# Patient Record
Sex: FEMALE | Race: WHITE | Employment: OTHER | URBAN - METROPOLITAN AREA
[De-identification: names, ages, dates, MRNs, and addresses within clinical notes are randomized per-mention and may not be internally consistent; named-entity substitution may affect disease eponyms.]

---

## 2017-03-19 PROBLEM — N39.0 UTI (URINARY TRACT INFECTION): Status: ACTIVE | Noted: 2017-03-19

## 2017-05-01 ENCOUNTER — HOSPITAL ENCOUNTER (OUTPATIENT)
Dept: OTHER | Age: 57
Discharge: HOME | End: 2017-05-31
Attending: INTERNAL MEDICINE | Admitting: INTERNAL MEDICINE

## 2017-05-17 LAB
ALBUMIN SERPL-MCNC: 3.7 GM/DL (ref 3.4–5)
ALP BLD-CCNC: 76 IU/L (ref 40–128)
ALT SERPL-CCNC: 7 U/L (ref 10–40)
ANION GAP SERPL CALCULATED.3IONS-SCNC: 15 MMOL/L (ref 4–16)
AST SERPL-CCNC: 12 IU/L (ref 15–37)
BILIRUB SERPL-MCNC: 0.2 MG/DL (ref 0–1)
BUN BLDV-MCNC: 23 MG/DL (ref 6–23)
CALCIUM SERPL-MCNC: 9.1 MG/DL (ref 8.3–10.6)
CHLORIDE BLD-SCNC: 101 MMOL/L (ref 99–110)
CHOLESTEROL: 228 MG/DL
CO2: 25 MMOL/L (ref 21–32)
CREAT SERPL-MCNC: 0.6 MG/DL (ref 0.6–1.1)
ESTIMATED AVERAGE GLUCOSE: 123 MG/DL
GFR AFRICAN AMERICAN: >60 ML/MIN/1.73M2
GFR NON-AFRICAN AMERICAN: >60 ML/MIN/1.73M2
GLUCOSE BLD-MCNC: 95 MG/DL (ref 70–140)
HBA1C MFR BLD: 5.9 % (ref 4.2–6.3)
HDLC SERPL-MCNC: 28 MG/DL
LDL CHOLESTEROL DIRECT: 116 MG/DL
POTASSIUM SERPL-SCNC: 4 MMOL/L (ref 3.5–5.1)
SODIUM BLD-SCNC: 141 MMOL/L (ref 135–145)
T4 FREE: 0.86 NG/DL (ref 0.9–1.8)
TOTAL PROTEIN: 6.1 GM/DL (ref 6.4–8.2)
TRIGL SERPL-MCNC: 611 MG/DL
TSH HIGH SENSITIVITY: 2.52 UIU/ML (ref 0.27–4.2)

## 2017-06-01 ENCOUNTER — HOSPITAL ENCOUNTER (OUTPATIENT)
Dept: OTHER | Age: 57
Discharge: HOME | End: 2017-06-30
Attending: INTERNAL MEDICINE | Admitting: INTERNAL MEDICINE

## 2017-06-14 LAB
ALBUMIN SERPL-MCNC: 3.7 GM/DL (ref 3.4–5)
ALP BLD-CCNC: 85 IU/L (ref 40–128)
ALT SERPL-CCNC: 8 U/L (ref 10–40)
ANION GAP SERPL CALCULATED.3IONS-SCNC: 14 MMOL/L (ref 4–16)
AST SERPL-CCNC: 9 IU/L (ref 15–37)
BILIRUB SERPL-MCNC: 0.2 MG/DL (ref 0–1)
BUN BLDV-MCNC: 19 MG/DL (ref 6–23)
CALCIUM SERPL-MCNC: 9 MG/DL (ref 8.3–10.6)
CHLORIDE BLD-SCNC: 102 MMOL/L (ref 99–110)
CHOLESTEROL: 198 MG/DL
CO2: 27 MMOL/L (ref 21–32)
CREAT SERPL-MCNC: 0.6 MG/DL (ref 0.6–1.1)
ESTIMATED AVERAGE GLUCOSE: 123 MG/DL
GFR AFRICAN AMERICAN: >60 ML/MIN/1.73M2
GFR NON-AFRICAN AMERICAN: >60 ML/MIN/1.73M2
GLUCOSE BLD-MCNC: 95 MG/DL (ref 70–140)
HBA1C MFR BLD: 5.9 % (ref 4.2–6.3)
HDLC SERPL-MCNC: 32 MG/DL
LDL CHOLESTEROL DIRECT: 110 MG/DL
POTASSIUM SERPL-SCNC: 3.8 MMOL/L (ref 3.5–5.1)
SODIUM BLD-SCNC: 143 MMOL/L (ref 135–145)
T4 FREE: 1.15 NG/DL (ref 0.9–1.8)
TOTAL PROTEIN: 6 GM/DL (ref 6.4–8.2)
TRIGL SERPL-MCNC: 493 MG/DL
TSH HIGH SENSITIVITY: 1.7 UIU/ML (ref 0.27–4.2)

## 2017-07-01 ENCOUNTER — HOSPITAL ENCOUNTER (OUTPATIENT)
Dept: OTHER | Age: 57
Discharge: HOME | End: 2017-07-31
Attending: INTERNAL MEDICINE | Admitting: INTERNAL MEDICINE

## 2017-07-24 ENCOUNTER — OFFICE VISIT (OUTPATIENT)
Dept: FAMILY MEDICINE CLINIC | Age: 57
End: 2017-07-24

## 2017-07-24 VITALS — SYSTOLIC BLOOD PRESSURE: 121 MMHG | HEART RATE: 75 BPM | DIASTOLIC BLOOD PRESSURE: 80 MMHG | OXYGEN SATURATION: 93 %

## 2017-07-24 DIAGNOSIS — I10 HTN (HYPERTENSION), BENIGN: Primary | ICD-10-CM

## 2017-07-24 DIAGNOSIS — J44.9 CHRONIC OBSTRUCTIVE PULMONARY DISEASE, UNSPECIFIED COPD TYPE (HCC): ICD-10-CM

## 2017-07-24 DIAGNOSIS — G40.909 SEIZURE DISORDER (HCC): Chronic | ICD-10-CM

## 2017-07-24 DIAGNOSIS — G81.94 LEFT HEMIPLEGIA (HCC): ICD-10-CM

## 2017-07-24 DIAGNOSIS — E78.2 MIXED HYPERLIPIDEMIA: ICD-10-CM

## 2017-07-24 DIAGNOSIS — Z86.73 H/O: STROKE: ICD-10-CM

## 2017-07-24 DIAGNOSIS — E03.9 ACQUIRED HYPOTHYROIDISM: ICD-10-CM

## 2017-07-24 DIAGNOSIS — E11.9 CONTROLLED TYPE 2 DIABETES MELLITUS WITHOUT COMPLICATION, WITHOUT LONG-TERM CURRENT USE OF INSULIN (HCC): ICD-10-CM

## 2017-07-24 PROCEDURE — 99203 OFFICE O/P NEW LOW 30 MIN: CPT | Performed by: FAMILY MEDICINE

## 2017-07-24 RX ORDER — ATORVASTATIN CALCIUM 40 MG/1
40 TABLET, FILM COATED ORAL DAILY
Qty: 30 TABLET | Refills: 5 | Status: SHIPPED | OUTPATIENT
Start: 2017-07-24 | End: 2017-09-19 | Stop reason: DRUGHIGH

## 2017-08-06 ASSESSMENT — ENCOUNTER SYMPTOMS
STRIDOR: 0
DIARRHEA: 0
EYE REDNESS: 0
VOMITING: 0
BLOOD IN STOOL: 0
SINUS PRESSURE: 0
SHORTNESS OF BREATH: 0
EYE ITCHING: 0
COUGH: 0
WHEEZING: 0
NAUSEA: 0
RHINORRHEA: 0
CONSTIPATION: 0
SORE THROAT: 0
ABDOMINAL PAIN: 0
APNEA: 0
TROUBLE SWALLOWING: 0

## 2017-09-08 RX ORDER — FLUTICASONE PROPIONATE 50 MCG
1 SPRAY, SUSPENSION (ML) NASAL DAILY
Qty: 1 BOTTLE | Refills: 3 | Status: SHIPPED | OUTPATIENT
Start: 2017-09-08 | End: 2018-05-31 | Stop reason: SDUPTHER

## 2017-10-02 RX ORDER — GLUCOSAMINE HCL/CHONDROITIN SU 500-400 MG
CAPSULE ORAL
Qty: 100 STRIP | Refills: 3 | Status: SHIPPED | OUTPATIENT
Start: 2017-10-02 | End: 2018-03-14 | Stop reason: SDUPTHER

## 2017-10-24 ENCOUNTER — OFFICE VISIT (OUTPATIENT)
Dept: FAMILY MEDICINE CLINIC | Age: 57
End: 2017-10-24

## 2017-10-24 VITALS
TEMPERATURE: 98.1 F | SYSTOLIC BLOOD PRESSURE: 125 MMHG | DIASTOLIC BLOOD PRESSURE: 82 MMHG | OXYGEN SATURATION: 98 % | HEART RATE: 78 BPM

## 2017-10-24 DIAGNOSIS — G40.909 SEIZURE DISORDER (HCC): Chronic | ICD-10-CM

## 2017-10-24 DIAGNOSIS — R82.998 DARK URINE: ICD-10-CM

## 2017-10-24 DIAGNOSIS — I10 HTN (HYPERTENSION), BENIGN: ICD-10-CM

## 2017-10-24 DIAGNOSIS — G81.94 LEFT HEMIPLEGIA (HCC): ICD-10-CM

## 2017-10-24 DIAGNOSIS — Z86.73 H/O: STROKE: ICD-10-CM

## 2017-10-24 DIAGNOSIS — E11.9 CONTROLLED TYPE 2 DIABETES MELLITUS WITHOUT COMPLICATION, WITHOUT LONG-TERM CURRENT USE OF INSULIN (HCC): Primary | ICD-10-CM

## 2017-10-24 DIAGNOSIS — H61.23 BILATERAL IMPACTED CERUMEN: ICD-10-CM

## 2017-10-24 PROCEDURE — G8428 CUR MEDS NOT DOCUMENT: HCPCS | Performed by: FAMILY MEDICINE

## 2017-10-24 PROCEDURE — G8484 FLU IMMUNIZE NO ADMIN: HCPCS | Performed by: FAMILY MEDICINE

## 2017-10-24 PROCEDURE — 83036 HEMOGLOBIN GLYCOSYLATED A1C: CPT | Performed by: FAMILY MEDICINE

## 2017-10-24 PROCEDURE — 3044F HG A1C LEVEL LT 7.0%: CPT | Performed by: FAMILY MEDICINE

## 2017-10-24 PROCEDURE — 1111F DSCHRG MED/CURRENT MED MERGE: CPT | Performed by: FAMILY MEDICINE

## 2017-10-24 PROCEDURE — 3014F SCREEN MAMMO DOC REV: CPT | Performed by: FAMILY MEDICINE

## 2017-10-24 PROCEDURE — 99213 OFFICE O/P EST LOW 20 MIN: CPT | Performed by: FAMILY MEDICINE

## 2017-10-24 PROCEDURE — 1036F TOBACCO NON-USER: CPT | Performed by: FAMILY MEDICINE

## 2017-10-24 PROCEDURE — G8417 CALC BMI ABV UP PARAM F/U: HCPCS | Performed by: FAMILY MEDICINE

## 2017-10-24 PROCEDURE — 3017F COLORECTAL CA SCREEN DOC REV: CPT | Performed by: FAMILY MEDICINE

## 2017-10-24 PROCEDURE — 69209 REMOVE IMPACTED EAR WAX UNI: CPT | Performed by: FAMILY MEDICINE

## 2017-10-25 LAB — HBA1C MFR BLD: 5.4 %

## 2017-10-28 ASSESSMENT — ENCOUNTER SYMPTOMS
SINUS PRESSURE: 0
SHORTNESS OF BREATH: 0
SINUS PAIN: 0

## 2017-10-28 NOTE — PROGRESS NOTES
Take 1 capsule by mouth every 4 hours as needed for Headaches 30 capsule 0    cephALEXin (KEFLEX) 500 MG capsule Take 1 capsule by mouth 2 times daily for 7 days 14 capsule 0    butalbital-aspirin-caffeine (FIORINAL) -40 MG per capsule Take 1 capsule by mouth every 4 hours as needed for Headaches 30 capsule 0    Glucose Blood (BLOOD GLUCOSE TEST STRIPS) STRP Check blood sugars 3 times daily 100 strip 3    levETIRAcetam (KEPPRA) 750 MG tablet Take 2 tablets by mouth 2 times daily 120 tablet 0    metFORMIN (GLUCOPHAGE) 500 MG tablet Take 2 tablets by mouth daily (with breakfast) 30 tablet 0    lactulose (CHRONULAC) 10 GM/15ML solution Take 30 mLs by mouth 3 times daily . Adjust from once to three times a day so that patient has at least 1-2 bowel movements a day.  1 Bottle 0    atorvastatin (LIPITOR) 20 MG tablet Take 20 mg by mouth nightly      levocetirizine (XYZAL) 5 MG tablet Take 5 mg by mouth daily      fluticasone (FLONASE) 50 MCG/ACT nasal spray 1 spray by Nasal route daily 1 Bottle 3    docusate sodium (COLACE) 100 MG capsule Take 1 capsule by mouth 2 times daily 60 capsule 0    mirtazapine (REMERON) 30 MG tablet Take 30 mg by mouth nightly       divalproex (DEPAKOTE) 500 MG DR tablet Take 4 tablets by mouth nightly 90 tablet 0    lisinopril (PRINIVIL;ZESTRIL) 40 MG tablet Take 40 mg by mouth daily      promethazine-dextromethorphan (PROMETHAZINE-DM) 6.25-15 MG/5ML syrup Take 5 mLs by mouth 4 times daily as needed for Cough      ondansetron (ZOFRAN ODT) 4 MG disintegrating tablet Take 1 tablet by mouth every 6 hours 10 tablet 0    dicyclomine (BENTYL) 10 MG capsule Take 1 capsule by mouth 3 times daily As needed for abdominal pain 15 capsule 3    acetaminophen (AMINOFEN) 325 MG tablet Take 2 tablets by mouth every 6 hours as needed for Pain 120 tablet 3    amLODIPine (NORVASC) 5 MG tablet Take 5 mg by mouth daily      busPIRone (BUSPAR) 10 MG tablet Take 10 mg by mouth 2 times daily  diphenhydrAMINE (BENADRYL) 25 MG capsule Take 25 mg by mouth every 6 hours as needed for Itching      albuterol (PROVENTIL) (2.5 MG/3ML) 0.083% nebulizer solution Take 3 mLs by nebulization every 6 hours as needed for Wheezing. 120 each 1    carBAMazepine (TEGRETOL) 200 MG tablet Take 1 tablet by mouth 2 times daily. 90 tablet 0    furosemide (LASIX) 20 MG tablet Take 1 tablet by mouth daily. 60 tablet 0    sitaGLIPtin (JANUVIA) 100 MG tablet Take 1 tablet by mouth daily. 30 tablet 1    sertraline (ZOLOFT) 100 MG tablet Take 200 mg by mouth nightly       risperiDONE (RISPERDAL) 1 MG tablet Take 1 mg by mouth 3 times daily 1 tablet in am, 1 tablet at 4pm, 1 tablet at bedtime      Lactobacillus (ACIDOPHILUS) 100 MG CAPS Take 100 mg by mouth daily.  levothyroxine (SYNTHROID) 25 MCG tablet 1 tablet by PEG Tube route Daily. (Patient taking differently: Take 25 mcg by mouth daily ) 30 tablet 0     No current facility-administered medications for this visit. Review of Systems   Constitutional: Negative for activity change. HENT: Negative for ear pain, sinus pain and sinus pressure. Respiratory: Negative for shortness of breath. Cardiovascular: Negative for leg swelling. Genitourinary: Negative for dysuria and hematuria. Neurological: Negative for dizziness. Psychiatric/Behavioral: Negative for agitation. The patient is not nervous/anxious. /82 (Site: Right Arm, Position: Sitting, Cuff Size: Small Adult)   Pulse 78   Temp 98.1 °F (36.7 °C) (Oral)   SpO2 98%   Breastfeeding? No     Physical Exam   Constitutional: She is oriented to person, place, and time. She appears well-developed and well-nourished. HENT:   Head: Normocephalic and atraumatic. Right Ear: No decreased hearing is noted. Left Ear: No decreased hearing is noted. Full of wax both ears   Cardiovascular: Normal rate, regular rhythm and normal heart sounds. No murmur heard.   Pulmonary/Chest: Effort

## 2017-11-01 ENCOUNTER — OFFICE VISIT (OUTPATIENT)
Dept: FAMILY MEDICINE CLINIC | Age: 57
End: 2017-11-01

## 2017-11-01 ENCOUNTER — HOSPITAL ENCOUNTER (OUTPATIENT)
Dept: OTHER | Age: 57
Discharge: HOME | End: 2017-11-30
Admitting: INTERNAL MEDICINE

## 2017-11-01 VITALS
SYSTOLIC BLOOD PRESSURE: 118 MMHG | BODY MASS INDEX: 29.71 KG/M2 | HEIGHT: 56 IN | HEART RATE: 74 BPM | OXYGEN SATURATION: 96 % | DIASTOLIC BLOOD PRESSURE: 70 MMHG | WEIGHT: 132.06 LBS

## 2017-11-01 DIAGNOSIS — R51.9 ACUTE INTRACTABLE HEADACHE, UNSPECIFIED HEADACHE TYPE: Primary | ICD-10-CM

## 2017-11-01 LAB
ALBUMIN SERPL-MCNC: 3.8 GM/DL (ref 3.4–5)
ALP BLD-CCNC: 83 IU/L (ref 40–128)
ALT SERPL-CCNC: 11 U/L (ref 10–40)
ANION GAP SERPL CALCULATED.3IONS-SCNC: 17 MMOL/L (ref 4–16)
AST SERPL-CCNC: 12 IU/L (ref 15–37)
BILIRUB SERPL-MCNC: 0.2 MG/DL (ref 0–1)
BUN BLDV-MCNC: 15 MG/DL (ref 6–23)
CALCIUM SERPL-MCNC: 8.9 MG/DL (ref 8.3–10.6)
CHLORIDE BLD-SCNC: 91 MMOL/L (ref 99–110)
CO2: 26 MMOL/L (ref 21–32)
CREAT SERPL-MCNC: 0.6 MG/DL (ref 0.6–1.1)
ESTIMATED AVERAGE GLUCOSE: 108 MG/DL
GFR AFRICAN AMERICAN: >60 ML/MIN/1.73M2
GFR NON-AFRICAN AMERICAN: >60 ML/MIN/1.73M2
GLUCOSE BLD-MCNC: 95 MG/DL (ref 70–140)
HBA1C MFR BLD: 5.4 % (ref 4.2–6.3)
POTASSIUM SERPL-SCNC: 4 MMOL/L (ref 3.5–5.1)
SODIUM BLD-SCNC: 134 MMOL/L (ref 135–145)
TOTAL PROTEIN: 5.9 GM/DL (ref 6.4–8.2)
TSH HIGH SENSITIVITY: 2.74 UIU/ML (ref 0.27–4.2)

## 2017-11-01 PROCEDURE — 99213 OFFICE O/P EST LOW 20 MIN: CPT | Performed by: FAMILY MEDICINE

## 2017-11-01 PROCEDURE — G8417 CALC BMI ABV UP PARAM F/U: HCPCS | Performed by: FAMILY MEDICINE

## 2017-11-01 PROCEDURE — 3014F SCREEN MAMMO DOC REV: CPT | Performed by: FAMILY MEDICINE

## 2017-11-01 PROCEDURE — 96372 THER/PROPH/DIAG INJ SC/IM: CPT | Performed by: FAMILY MEDICINE

## 2017-11-01 PROCEDURE — 1036F TOBACCO NON-USER: CPT | Performed by: FAMILY MEDICINE

## 2017-11-01 PROCEDURE — G8484 FLU IMMUNIZE NO ADMIN: HCPCS | Performed by: FAMILY MEDICINE

## 2017-11-01 PROCEDURE — 3017F COLORECTAL CA SCREEN DOC REV: CPT | Performed by: FAMILY MEDICINE

## 2017-11-01 PROCEDURE — G8427 DOCREV CUR MEDS BY ELIG CLIN: HCPCS | Performed by: FAMILY MEDICINE

## 2017-11-01 RX ORDER — NAPROXEN 500 MG/1
500 TABLET ORAL 2 TIMES DAILY PRN
Qty: 30 TABLET | Refills: 0 | Status: SHIPPED | OUTPATIENT
Start: 2017-11-01 | End: 2018-03-09 | Stop reason: SDUPTHER

## 2017-11-01 RX ORDER — SUMATRIPTAN 50 MG/1
50 TABLET, FILM COATED ORAL
Qty: 9 TABLET | Refills: 3 | Status: SHIPPED | OUTPATIENT
Start: 2017-11-01 | End: 2018-05-31 | Stop reason: SDUPTHER

## 2017-11-01 ASSESSMENT — ENCOUNTER SYMPTOMS
SHORTNESS OF BREATH: 0
VOMITING: 1
BLURRED VISION: 0
COUGH: 0
NAUSEA: 1
ABDOMINAL PAIN: 0
PHOTOPHOBIA: 1

## 2017-11-01 ASSESSMENT — PATIENT HEALTH QUESTIONNAIRE - PHQ9
SUM OF ALL RESPONSES TO PHQ9 QUESTIONS 1 & 2: 0
SUM OF ALL RESPONSES TO PHQ QUESTIONS 1-9: 0
1. LITTLE INTEREST OR PLEASURE IN DOING THINGS: 0
2. FEELING DOWN, DEPRESSED OR HOPELESS: 0

## 2017-11-02 LAB — T4 TOTAL: 5.1 UG/DL

## 2017-11-02 NOTE — PROGRESS NOTES
tobacco: Never Used    Alcohol use No    Drug use: No    Sexual activity: Not on file     Other Topics Concern    Not on file     Social History Narrative    No narrative on file       Allergies   Allergen Reactions    Macrobid [Nitrofurantoin] Hives and Swelling     Hives     Current Outpatient Prescriptions   Medication Sig Dispense Refill    SUMAtriptan (IMITREX) 50 MG tablet Take 1 tablet by mouth once as needed for Migraine 9 tablet 3    naproxen (NAPROSYN) 500 MG tablet Take 1 tablet by mouth 2 times daily as needed for Pain 30 tablet 0    neomycin-polymyxin-hydrocortisone (CORTISPORIN) 3.5-10892-5 otic solution Place 3 drops in ear(s) 3 times daily for 10 days 1 each 0    butalbital-aspirin-caffeine (FIORINAL) -40 MG per capsule Take 1 capsule by mouth every 4 hours as needed for Headaches 30 capsule 0    cephALEXin (KEFLEX) 500 MG capsule Take 1 capsule by mouth 2 times daily for 7 days 14 capsule 0    butalbital-aspirin-caffeine (FIORINAL) -40 MG per capsule Take 1 capsule by mouth every 4 hours as needed for Headaches 30 capsule 0    Glucose Blood (BLOOD GLUCOSE TEST STRIPS) STRP Check blood sugars 3 times daily 100 strip 3    levETIRAcetam (KEPPRA) 750 MG tablet Take 2 tablets by mouth 2 times daily 120 tablet 0    metFORMIN (GLUCOPHAGE) 500 MG tablet Take 2 tablets by mouth daily (with breakfast) 30 tablet 0    lactulose (CHRONULAC) 10 GM/15ML solution Take 30 mLs by mouth 3 times daily . Adjust from once to three times a day so that patient has at least 1-2 bowel movements a day.  1 Bottle 0    atorvastatin (LIPITOR) 20 MG tablet Take 20 mg by mouth nightly      levocetirizine (XYZAL) 5 MG tablet Take 5 mg by mouth daily      fluticasone (FLONASE) 50 MCG/ACT nasal spray 1 spray by Nasal route daily 1 Bottle 3    docusate sodium (COLACE) 100 MG capsule Take 1 capsule by mouth 2 times daily 60 capsule 0    mirtazapine (REMERON) 30 MG tablet Take 30 mg by mouth nightly blurred vision. Respiratory: Negative for cough and shortness of breath. Cardiovascular: Negative for chest pain. Gastrointestinal: Positive for nausea and vomiting. Negative for abdominal pain. Musculoskeletal: Negative for neck pain. Neurological: Positive for headaches. Negative for dizziness. Psychiatric/Behavioral: Positive for agitation. The patient is nervous/anxious. /70   Pulse 74   Ht 4' 8\" (1.422 m)   Wt 132 lb 0.9 oz (59.9 kg)   SpO2 96%   BMI 29.61 kg/m²     Physical Exam   Constitutional: She is oriented to person, place, and time. She appears well-developed and well-nourished. HENT:   Head: Normocephalic and atraumatic. Right Ear: External ear normal.   Left Ear: There is drainage, swelling and tenderness. No foreign bodies. Tympanic membrane is injected and erythematous. Mouth/Throat: Oropharynx is clear and moist. No oropharyngeal exudate. Cardiovascular: Normal rate and normal heart sounds. No murmur heard. Pulmonary/Chest: Effort normal and breath sounds normal. She has no wheezes. Musculoskeletal: Normal range of motion. Neurological: She is alert and oriented to person, place, and time. Patient use the wheel chair       ASSESSMENT/ PLAN:    1. Acute intractable headache, unspecified headache type  - MRI brain with and without contrast; Future  - ketorolac (TORADOL) injection 60 mg; Inject 2 mLs into the muscle once  - SUMAtriptan (IMITREX) 50 MG tablet; Take 1 tablet by mouth once as needed for Migraine  Dispense: 9 tablet; Refill: 3  - naproxen (NAPROSYN) 500 MG tablet; Take 1 tablet by mouth 2 times daily as needed for Pain  Dispense: 30 tablet; Refill: 0                - Appropriate prescription are addressed. - After visit summery provided. - Questions answered and patient verbalizes understanding.  - Call for any problem, questions, or concerns       Return if symptoms worsen or fail to improve.

## 2017-11-14 ENCOUNTER — HOSPITAL ENCOUNTER (OUTPATIENT)
Dept: MRI IMAGING | Age: 57
Discharge: HOME | End: 2017-11-14
Attending: FAMILY MEDICINE | Admitting: FAMILY MEDICINE

## 2017-11-14 DIAGNOSIS — R51.9 ACUTE INTRACTABLE HEADACHE, UNSPECIFIED HEADACHE TYPE: ICD-10-CM

## 2017-12-01 ENCOUNTER — HOSPITAL ENCOUNTER (OUTPATIENT)
Dept: OTHER | Age: 57
Discharge: HOME | End: 2017-12-31
Attending: INTERNAL MEDICINE | Admitting: INTERNAL MEDICINE

## 2017-12-04 ENCOUNTER — TELEPHONE (OUTPATIENT)
Dept: FAMILY MEDICINE CLINIC | Age: 57
End: 2017-12-04

## 2017-12-04 NOTE — TELEPHONE ENCOUNTER
Pts care giver called requesting an appointment for the pt due to a potential UTI. An appointment was provided for the PT with the [facility]. Care Giver called back stating they couldn't get her a ride there at 10:15am. I then provided them with the number to call them back to reschedule.

## 2018-01-05 ENCOUNTER — OFFICE VISIT (OUTPATIENT)
Dept: FAMILY MEDICINE CLINIC | Age: 58
End: 2018-01-05

## 2018-01-05 VITALS — HEART RATE: 77 BPM | SYSTOLIC BLOOD PRESSURE: 118 MMHG | RESPIRATION RATE: 18 BRPM | DIASTOLIC BLOOD PRESSURE: 70 MMHG

## 2018-01-05 DIAGNOSIS — J40 BRONCHITIS: Primary | ICD-10-CM

## 2018-01-05 PROCEDURE — 99212 OFFICE O/P EST SF 10 MIN: CPT | Performed by: NURSE PRACTITIONER

## 2018-01-05 RX ORDER — BENZONATATE 100 MG/1
100 CAPSULE ORAL 3 TIMES DAILY PRN
Qty: 21 CAPSULE | Refills: 0 | Status: SHIPPED | OUTPATIENT
Start: 2018-01-05 | End: 2018-01-12

## 2018-01-05 RX ORDER — DOXYCYCLINE HYCLATE 100 MG
100 TABLET ORAL 2 TIMES DAILY
Qty: 20 TABLET | Refills: 0 | Status: SHIPPED | OUTPATIENT
Start: 2018-01-05 | End: 2018-01-15

## 2018-01-05 ASSESSMENT — ENCOUNTER SYMPTOMS
SORE THROAT: 1
COUGH: 1
NAUSEA: 1
VOMITING: 1
SINUS PAIN: 0
SHORTNESS OF BREATH: 0
WHEEZING: 0
RHINORRHEA: 1
CHEST TIGHTNESS: 0
SINUS PRESSURE: 0

## 2018-01-05 NOTE — PROGRESS NOTES
Dayanara Jackson   56 y.o.  female  [id number]      Chief Complaint   Patient presents with    Cough     Productive cough Yellowish in color         Subjective:  56 y. o.female is here for a follow up. She has the following chronic/acute medical problems:   Here to with a productive cough. Sputum is yellow in color. Cough   This is a new problem. The current episode started in the past 7 days (3 days). The problem has been gradually worsening. Episode frequency: intermittantly. The cough is productive of sputum (yellow in color). Associated symptoms include chills, nasal congestion, postnasal drip, rhinorrhea and a sore throat. Pertinent negatives include no chest pain, fever, headaches, myalgias, rash, shortness of breath or wheezing. She has tried OTC cough suppressant for the symptoms. The treatment provided mild relief. Her past medical history is significant for COPD. Review of Systems   Constitutional: Positive for chills and fatigue. Negative for appetite change (decreased) and fever. HENT: Positive for congestion, postnasal drip, rhinorrhea and sore throat. Negative for sinus pain and sinus pressure. Respiratory: Positive for cough. Negative for chest tightness, shortness of breath and wheezing. Cardiovascular: Negative for chest pain and palpitations. Gastrointestinal: Positive for nausea and vomiting (Wed night). Musculoskeletal: Negative for myalgias. Skin: Negative for rash. Neurological: Negative for headaches.        Current Outpatient Prescriptions   Medication Sig Dispense Refill    benzonatate (TESSALON PERLES) 100 MG capsule Take 1 capsule by mouth 3 times daily as needed for Cough 21 capsule 0    doxycycline hyclate (VIBRA-TABS) 100 MG tablet Take 1 tablet by mouth 2 times daily for 10 days 20 tablet 0    atorvastatin (LIPITOR) 40 MG tablet TAKE 1 TABLET BY MOUTH DAILY 30 tablet 5    ondansetron (ZOFRAN ODT) 4 MG disintegrating tablet Take 1 tablet by mouth every 8 mouth 2 times daily      diphenhydrAMINE (BENADRYL) 25 MG capsule Take 25 mg by mouth every 6 hours as needed for Itching      albuterol (PROVENTIL) (2.5 MG/3ML) 0.083% nebulizer solution Take 3 mLs by nebulization every 6 hours as needed for Wheezing. 120 each 1    carBAMazepine (TEGRETOL) 200 MG tablet Take 1 tablet by mouth 2 times daily. 90 tablet 0    furosemide (LASIX) 20 MG tablet Take 1 tablet by mouth daily. 60 tablet 0    sitaGLIPtin (JANUVIA) 100 MG tablet Take 1 tablet by mouth daily. 30 tablet 1    sertraline (ZOLOFT) 100 MG tablet Take 200 mg by mouth nightly       risperiDONE (RISPERDAL) 1 MG tablet Take 1 mg by mouth 3 times daily 1 tablet in am, 1 tablet at 4pm, 1 tablet at bedtime      Lactobacillus (ACIDOPHILUS) 100 MG CAPS Take 100 mg by mouth daily.  levothyroxine (SYNTHROID) 25 MCG tablet 1 tablet by PEG Tube route Daily. (Patient taking differently: Take 25 mcg by mouth daily ) 30 tablet 0    SUMAtriptan (IMITREX) 50 MG tablet Take 1 tablet by mouth once as needed for Migraine 9 tablet 3     No current facility-administered medications for this visit. Past medical, family,surgical history reviewed today. Objective:  /70 (Site: Right Arm, Position: Sitting, Cuff Size: Medium Adult)   Pulse 77   Resp 18   Breastfeeding? No   BP Readings from Last 3 Encounters:   01/05/18 118/70   12/04/17 (!) 157/83   11/01/17 118/70     Wt Readings from Last 3 Encounters:   12/04/17 132 lb (59.9 kg)   11/01/17 132 lb 0.9 oz (59.9 kg)   10/27/17 132 lb (59.9 kg)         Physical Exam   Constitutional: She is oriented to person, place, and time. She appears well-developed and well-nourished. HENT:   Head: Normocephalic. Right Ear: Hearing and tympanic membrane normal.   Left Ear: Hearing and tympanic membrane normal.   Nose: Rhinorrhea present. No mucosal edema. Mouth/Throat: Posterior oropharyngeal erythema present. Neck: Neck supple.    Cardiovascular: Normal rate,

## 2018-01-05 NOTE — PATIENT INSTRUCTIONS
good.  When should you call for help? Call 911 anytime you think you may need emergency care. For example, call if:  ? · You have severe trouble breathing. ?Call your doctor now or seek immediate medical care if:  ? · You have new or worse trouble breathing. ? · You cough up dark brown or bloody mucus (sputum). ? · You have a new or higher fever. ? · You have a new rash. ? Watch closely for changes in your health, and be sure to contact your doctor if:  ? · You cough more deeply or more often, especially if you notice more mucus or a change in the color of your mucus. ? · You are not getting better as expected. Where can you learn more? Go to [email or web address] and sign in to your [facility] account. Enter H333 in the [employer] box to learn more about \"Bronchitis: Care Instructions. \"     If you do not have an account, please click on the \"Sign Up Now\" link. Current as of: May 12, 2017  Content Version: 11.5  © 4840-0850 Healthwise, Inc[employer]. Care instructions adapted under license by [facility] ([facility]). If you have questions about a medical condition or this instruction, always ask your healthcare professional. [address] any warranty or liability for your use of this information. We are committed to providing you the best care possible. If you receive a survey after visiting one of our offices, please take time to share your experience concerning your physician office visit. These surveys are confidential and no health information about you is shared. We are eager to improve for you and we are counting on your feedback to help make that happen.

## 2018-03-09 DIAGNOSIS — R51.9 ACUTE INTRACTABLE HEADACHE, UNSPECIFIED HEADACHE TYPE: ICD-10-CM

## 2018-03-09 RX ORDER — NAPROXEN 500 MG/1
500 TABLET ORAL 2 TIMES DAILY PRN
Qty: 30 TABLET | Refills: 0 | Status: SHIPPED | OUTPATIENT
Start: 2018-03-09 | End: 2018-05-31 | Stop reason: SDUPTHER

## 2018-03-09 RX ORDER — CETIRIZINE HYDROCHLORIDE 10 MG/1
10 TABLET ORAL DAILY
Qty: 30 TABLET | Refills: 3 | Status: SHIPPED | OUTPATIENT
Start: 2018-03-09 | End: 2018-03-20 | Stop reason: SDUPTHER

## 2018-03-15 RX ORDER — GLUCOSAMINE HCL/CHONDROITIN SU 500-400 MG
CAPSULE ORAL
Qty: 100 STRIP | Refills: 3 | Status: SHIPPED | OUTPATIENT
Start: 2018-03-15 | End: 2018-05-31 | Stop reason: SDUPTHER

## 2018-03-20 RX ORDER — CETIRIZINE HYDROCHLORIDE 10 MG/1
10 TABLET ORAL DAILY
Qty: 30 TABLET | Refills: 3 | Status: SHIPPED | OUTPATIENT
Start: 2018-03-20 | End: 2018-05-31 | Stop reason: SDUPTHER

## 2018-04-12 ENCOUNTER — TELEPHONE (OUTPATIENT)
Dept: FAMILY MEDICINE CLINIC | Age: 58
End: 2018-04-12

## 2018-04-24 ENCOUNTER — OFFICE VISIT (OUTPATIENT)
Dept: FAMILY MEDICINE CLINIC | Age: 58
End: 2018-04-24

## 2018-04-24 VITALS — SYSTOLIC BLOOD PRESSURE: 124 MMHG | DIASTOLIC BLOOD PRESSURE: 82 MMHG | OXYGEN SATURATION: 95 % | HEART RATE: 74 BPM

## 2018-04-24 DIAGNOSIS — Z86.73 H/O: STROKE: ICD-10-CM

## 2018-04-24 DIAGNOSIS — G81.94 LEFT HEMIPLEGIA (HCC): ICD-10-CM

## 2018-04-24 DIAGNOSIS — E03.9 ACQUIRED HYPOTHYROIDISM: ICD-10-CM

## 2018-04-24 DIAGNOSIS — E78.2 MIXED HYPERLIPIDEMIA: ICD-10-CM

## 2018-04-24 DIAGNOSIS — I10 HTN (HYPERTENSION), BENIGN: Primary | ICD-10-CM

## 2018-04-24 DIAGNOSIS — G40.909 SEIZURE DISORDER (HCC): Chronic | ICD-10-CM

## 2018-04-24 PROCEDURE — 99214 OFFICE O/P EST MOD 30 MIN: CPT | Performed by: FAMILY MEDICINE

## 2018-04-24 RX ORDER — NAPROXEN 500 MG/1
500 TABLET ORAL 2 TIMES DAILY PRN
Qty: 30 TABLET | Refills: 0 | Status: CANCELLED | OUTPATIENT
Start: 2018-04-24

## 2018-04-24 RX ORDER — ATORVASTATIN CALCIUM 40 MG/1
40 TABLET, FILM COATED ORAL DAILY
Qty: 30 TABLET | Refills: 5 | Status: SHIPPED | OUTPATIENT
Start: 2018-04-24 | End: 2018-05-31 | Stop reason: SDUPTHER

## 2018-04-26 ASSESSMENT — ENCOUNTER SYMPTOMS
SHORTNESS OF BREATH: 0
STRIDOR: 0
COUGH: 0

## 2018-05-01 ENCOUNTER — HOSPITAL ENCOUNTER (OUTPATIENT)
Dept: OTHER | Age: 58
Discharge: HOME | End: 2018-05-31
Attending: INTERNAL MEDICINE | Admitting: INTERNAL MEDICINE

## 2018-05-16 LAB
ALBUMIN SERPL-MCNC: 3.7 GM/DL (ref 3.4–5)
ALP BLD-CCNC: 78 IU/L (ref 40–128)
ALT SERPL-CCNC: 12 U/L (ref 10–40)
ANION GAP SERPL CALCULATED.3IONS-SCNC: 16 MMOL/L (ref 4–16)
AST SERPL-CCNC: 18 IU/L (ref 15–37)
BILIRUB SERPL-MCNC: 0.2 MG/DL (ref 0–1)
BUN BLDV-MCNC: 13 MG/DL (ref 6–23)
CALCIUM SERPL-MCNC: 9.2 MG/DL (ref 8.3–10.6)
CHLORIDE BLD-SCNC: 98 MMOL/L (ref 99–110)
CO2: 27 MMOL/L (ref 21–32)
CREAT SERPL-MCNC: 0.4 MG/DL (ref 0.6–1.1)
ESTIMATED AVERAGE GLUCOSE: 140 MG/DL
GFR AFRICAN AMERICAN: >60 ML/MIN/1.73M2
GFR NON-AFRICAN AMERICAN: >60 ML/MIN/1.73M2
GLUCOSE BLD-MCNC: 134 MG/DL (ref 70–99)
HBA1C MFR BLD: 6.5 % (ref 4.2–6.3)
POTASSIUM SERPL-SCNC: 4.3 MMOL/L (ref 3.5–5.1)
SODIUM BLD-SCNC: 141 MMOL/L (ref 135–145)
TOTAL PROTEIN: 6.1 GM/DL (ref 6.4–8.2)
TSH HIGH SENSITIVITY: 2.82 UIU/ML (ref 0.27–4.2)

## 2018-05-17 LAB — T4 TOTAL: 4.37 UG/DL

## 2018-05-31 DIAGNOSIS — G43.909 MIGRAINE WITHOUT STATUS MIGRAINOSUS, NOT INTRACTABLE, UNSPECIFIED MIGRAINE TYPE: Primary | ICD-10-CM

## 2018-05-31 DIAGNOSIS — E78.2 MIXED HYPERLIPIDEMIA: ICD-10-CM

## 2018-05-31 DIAGNOSIS — J44.9 CHRONIC OBSTRUCTIVE PULMONARY DISEASE, UNSPECIFIED COPD TYPE (HCC): ICD-10-CM

## 2018-05-31 DIAGNOSIS — R51.9 ACUTE INTRACTABLE HEADACHE, UNSPECIFIED HEADACHE TYPE: ICD-10-CM

## 2018-05-31 RX ORDER — FLUTICASONE PROPIONATE 50 MCG
1 SPRAY, SUSPENSION (ML) NASAL DAILY
Qty: 1 BOTTLE | Refills: 3 | Status: SHIPPED | OUTPATIENT
Start: 2018-05-31 | End: 2018-09-03

## 2018-05-31 RX ORDER — DEXTROMETHORPHAN HYDROBROMIDE AND PROMETHAZINE HYDROCHLORIDE 15; 6.25 MG/5ML; MG/5ML
5 SYRUP ORAL 4 TIMES DAILY PRN
Qty: 1 BOTTLE | Refills: 5 | Status: SHIPPED | OUTPATIENT
Start: 2018-05-31 | End: 2018-06-30

## 2018-05-31 RX ORDER — RISPERIDONE 1 MG/1
1 TABLET, FILM COATED ORAL 3 TIMES DAILY
Qty: 60 TABLET | Refills: 5 | Status: ON HOLD | OUTPATIENT
Start: 2018-05-31 | End: 2022-11-02 | Stop reason: SDUPTHER

## 2018-05-31 RX ORDER — SUMATRIPTAN 50 MG/1
50 TABLET, FILM COATED ORAL
Qty: 9 TABLET | Refills: 5 | Status: ON HOLD | OUTPATIENT
Start: 2018-05-31 | End: 2018-12-18

## 2018-05-31 RX ORDER — GLUCOSAMINE HCL/CHONDROITIN SU 500-400 MG
CAPSULE ORAL
Qty: 100 STRIP | Refills: 5 | Status: ON HOLD | OUTPATIENT
Start: 2018-05-31 | End: 2018-12-26 | Stop reason: HOSPADM

## 2018-05-31 RX ORDER — DIVALPROEX SODIUM 500 MG/1
2000 TABLET, DELAYED RELEASE ORAL NIGHTLY
Qty: 90 TABLET | Refills: 0 | Status: ON HOLD | OUTPATIENT
Start: 2018-05-31 | End: 2022-11-02 | Stop reason: SDUPTHER

## 2018-05-31 RX ORDER — YOHIMBE BARK 500 MG
100 CAPSULE ORAL DAILY
Qty: 30 CAPSULE | Refills: 5 | Status: SHIPPED | OUTPATIENT
Start: 2018-05-31 | End: 2018-10-23

## 2018-05-31 RX ORDER — LEVETIRACETAM 750 MG/1
1500 TABLET ORAL 2 TIMES DAILY
Qty: 120 TABLET | Refills: 0 | Status: SHIPPED | OUTPATIENT
Start: 2018-05-31 | End: 2020-03-10

## 2018-05-31 RX ORDER — CARBAMAZEPINE 200 MG/1
200 TABLET ORAL 2 TIMES DAILY
Qty: 90 TABLET | Refills: 5 | Status: SHIPPED | OUTPATIENT
Start: 2018-05-31 | End: 2018-12-18 | Stop reason: CLARIF

## 2018-05-31 RX ORDER — LEVOTHYROXINE SODIUM 0.03 MG/1
25 TABLET ORAL DAILY
Qty: 90 TABLET | Refills: 5 | Status: ON HOLD | OUTPATIENT
Start: 2018-05-31 | End: 2018-12-18 | Stop reason: DRUGHIGH

## 2018-05-31 RX ORDER — ATORVASTATIN CALCIUM 40 MG/1
40 TABLET, FILM COATED ORAL DAILY
Qty: 30 TABLET | Refills: 5 | Status: SHIPPED | OUTPATIENT
Start: 2018-05-31 | End: 2018-09-03

## 2018-05-31 RX ORDER — DICYCLOMINE HYDROCHLORIDE 10 MG/1
10 CAPSULE ORAL 3 TIMES DAILY
Qty: 15 CAPSULE | Refills: 3 | Status: SHIPPED | OUTPATIENT
Start: 2018-05-31 | End: 2018-09-03

## 2018-05-31 RX ORDER — IPRATROPIUM BROMIDE AND ALBUTEROL SULFATE 2.5; .5 MG/3ML; MG/3ML
1 SOLUTION RESPIRATORY (INHALATION) PRN
Qty: 120 VIAL | Refills: 5 | Status: ON HOLD | OUTPATIENT
Start: 2018-05-31 | End: 2018-12-26

## 2018-05-31 RX ORDER — BUTALBITAL, ASPIRIN, AND CAFFEINE 50; 325; 40 MG/1; MG/1; MG/1
1 CAPSULE ORAL EVERY 4 HOURS PRN
Qty: 30 CAPSULE | Refills: 5 | OUTPATIENT
Start: 2018-05-31 | End: 2018-06-30

## 2018-05-31 RX ORDER — BUSPIRONE HYDROCHLORIDE 10 MG/1
10 TABLET ORAL 2 TIMES DAILY
Qty: 60 TABLET | Refills: 5 | Status: SHIPPED | OUTPATIENT
Start: 2018-05-31

## 2018-05-31 RX ORDER — ATORVASTATIN CALCIUM 20 MG/1
20 TABLET, FILM COATED ORAL NIGHTLY
Qty: 30 TABLET | Refills: 5 | Status: ON HOLD | OUTPATIENT
Start: 2018-05-31 | End: 2018-12-18 | Stop reason: DRUGHIGH

## 2018-05-31 RX ORDER — LEVOCETIRIZINE DIHYDROCHLORIDE 5 MG/1
5 TABLET, FILM COATED ORAL DAILY
Qty: 30 TABLET | Refills: 5 | Status: SHIPPED | OUTPATIENT
Start: 2018-05-31 | End: 2018-12-18 | Stop reason: CLARIF

## 2018-05-31 RX ORDER — ONDANSETRON 4 MG/1
4 TABLET, ORALLY DISINTEGRATING ORAL EVERY 8 HOURS PRN
Qty: 15 TABLET | Refills: 0 | Status: ON HOLD | OUTPATIENT
Start: 2018-05-31 | End: 2018-12-18

## 2018-05-31 RX ORDER — ACETAMINOPHEN 325 MG/1
650 TABLET ORAL EVERY 6 HOURS PRN
Qty: 120 TABLET | Refills: 5 | Status: SHIPPED | OUTPATIENT
Start: 2018-05-31 | End: 2018-09-03

## 2018-05-31 RX ORDER — BUTALBITAL, ASPIRIN, AND CAFFEINE 50; 325; 40 MG/1; MG/1; MG/1
1 CAPSULE ORAL EVERY 4 HOURS PRN
Qty: 30 CAPSULE | Refills: 5 | Status: ON HOLD | OUTPATIENT
Start: 2018-05-31 | End: 2018-12-18

## 2018-05-31 RX ORDER — MIRTAZAPINE 30 MG/1
30 TABLET, FILM COATED ORAL NIGHTLY
Qty: 30 TABLET | Refills: 5 | Status: ON HOLD | OUTPATIENT
Start: 2018-05-31 | End: 2018-12-20 | Stop reason: SDUPTHER

## 2018-05-31 RX ORDER — SERTRALINE HYDROCHLORIDE 100 MG/1
200 TABLET, FILM COATED ORAL NIGHTLY
Qty: 30 TABLET | Refills: 5 | Status: ON HOLD | OUTPATIENT
Start: 2018-05-31 | End: 2022-11-02 | Stop reason: SDUPTHER

## 2018-05-31 RX ORDER — CETIRIZINE HYDROCHLORIDE 10 MG/1
10 TABLET ORAL DAILY
Qty: 30 TABLET | Refills: 3 | Status: SHIPPED | OUTPATIENT
Start: 2018-05-31 | End: 2018-09-03

## 2018-05-31 RX ORDER — FUROSEMIDE 20 MG/1
20 TABLET ORAL DAILY
Qty: 60 TABLET | Refills: 5 | Status: SHIPPED | OUTPATIENT
Start: 2018-05-31 | End: 2019-02-25 | Stop reason: SDUPTHER

## 2018-05-31 RX ORDER — DOCUSATE SODIUM 100 MG/1
100 CAPSULE, LIQUID FILLED ORAL 2 TIMES DAILY
Qty: 60 CAPSULE | Refills: 0 | Status: SHIPPED | OUTPATIENT
Start: 2018-05-31 | End: 2018-09-03

## 2018-05-31 RX ORDER — NAPROXEN 500 MG/1
500 TABLET ORAL 2 TIMES DAILY PRN
Qty: 30 TABLET | Refills: 0 | Status: SHIPPED | OUTPATIENT
Start: 2018-05-31 | End: 2018-09-03

## 2018-05-31 RX ORDER — LISINOPRIL 40 MG/1
40 TABLET ORAL DAILY
Qty: 30 TABLET | Refills: 5 | Status: SHIPPED | OUTPATIENT
Start: 2018-05-31 | End: 2018-09-03

## 2018-05-31 RX ORDER — ALBUTEROL SULFATE 2.5 MG/3ML
2.5 SOLUTION RESPIRATORY (INHALATION) EVERY 6 HOURS PRN
Qty: 120 EACH | Refills: 5 | Status: SHIPPED | OUTPATIENT
Start: 2018-05-31 | End: 2018-12-18 | Stop reason: CLARIF

## 2018-05-31 RX ORDER — AMLODIPINE BESYLATE 5 MG/1
5 TABLET ORAL DAILY
Qty: 30 TABLET | Refills: 5 | Status: SHIPPED | OUTPATIENT
Start: 2018-05-31 | End: 2019-02-25 | Stop reason: SDUPTHER

## 2018-05-31 RX ORDER — LACTULOSE 10 G/15ML
20 SOLUTION ORAL 3 TIMES DAILY
Qty: 1 BOTTLE | Refills: 0 | Status: SHIPPED | OUTPATIENT
Start: 2018-05-31 | End: 2018-09-03

## 2018-05-31 RX ORDER — DIPHENHYDRAMINE HCL 25 MG
25 CAPSULE ORAL EVERY 6 HOURS PRN
Qty: 90 CAPSULE | Refills: 5 | Status: SHIPPED | OUTPATIENT
Start: 2018-05-31 | End: 2018-09-03

## 2018-06-01 ENCOUNTER — HOSPITAL ENCOUNTER (OUTPATIENT)
Dept: OTHER | Age: 58
Discharge: HOME | End: 2018-06-30
Attending: INTERNAL MEDICINE | Admitting: INTERNAL MEDICINE

## 2018-08-28 ENCOUNTER — OFFICE VISIT (OUTPATIENT)
Dept: FAMILY MEDICINE CLINIC | Age: 58
End: 2018-08-28

## 2018-08-28 VITALS — OXYGEN SATURATION: 92 % | HEART RATE: 75 BPM | SYSTOLIC BLOOD PRESSURE: 136 MMHG | DIASTOLIC BLOOD PRESSURE: 82 MMHG

## 2018-08-28 DIAGNOSIS — N30.01 ACUTE CYSTITIS WITH HEMATURIA: ICD-10-CM

## 2018-08-28 DIAGNOSIS — E11.9 TYPE 2 DIABETES MELLITUS WITHOUT COMPLICATION, WITHOUT LONG-TERM CURRENT USE OF INSULIN (HCC): ICD-10-CM

## 2018-08-28 DIAGNOSIS — R30.0 DYSURIA: Primary | ICD-10-CM

## 2018-08-28 LAB
BILIRUBIN, POC: ABNORMAL
BLOOD URINE, POC: ABNORMAL
CLARITY, POC: ABNORMAL
COLOR, POC: ABNORMAL
CREATININE URINE: 108 MG/DL (ref 28–259)
GLUCOSE URINE, POC: 100
KETONES, POC: 15
LEUKOCYTE EST, POC: ABNORMAL
MICROALBUMIN UR-MCNC: <1.2 MG/DL
MICROALBUMIN/CREAT UR-RTO: NORMAL MG/G (ref 0–30)
NITRITE, POC: ABNORMAL
PH, POC: 5
PROTEIN, POC: 302
SPECIFIC GRAVITY, POC: 1.02
UROBILINOGEN, POC: 2

## 2018-08-28 PROCEDURE — 3017F COLORECTAL CA SCREEN DOC REV: CPT | Performed by: NURSE PRACTITIONER

## 2018-08-28 PROCEDURE — 1036F TOBACCO NON-USER: CPT | Performed by: NURSE PRACTITIONER

## 2018-08-28 PROCEDURE — 81002 URINALYSIS NONAUTO W/O SCOPE: CPT | Performed by: NURSE PRACTITIONER

## 2018-08-28 PROCEDURE — G8427 DOCREV CUR MEDS BY ELIG CLIN: HCPCS | Performed by: NURSE PRACTITIONER

## 2018-08-28 PROCEDURE — G8417 CALC BMI ABV UP PARAM F/U: HCPCS | Performed by: NURSE PRACTITIONER

## 2018-08-28 PROCEDURE — 2022F DILAT RTA XM EVC RTNOPTHY: CPT | Performed by: NURSE PRACTITIONER

## 2018-08-28 PROCEDURE — 99213 OFFICE O/P EST LOW 20 MIN: CPT | Performed by: NURSE PRACTITIONER

## 2018-08-28 PROCEDURE — 3044F HG A1C LEVEL LT 7.0%: CPT | Performed by: NURSE PRACTITIONER

## 2018-08-28 RX ORDER — PHENAZOPYRIDINE HYDROCHLORIDE 200 MG/1
200 TABLET, FILM COATED ORAL 3 TIMES DAILY PRN
Qty: 6 TABLET | Refills: 0 | Status: SHIPPED | OUTPATIENT
Start: 2018-08-28 | End: 2018-08-30

## 2018-08-28 RX ORDER — CIPROFLOXACIN 500 MG/1
500 TABLET, FILM COATED ORAL 2 TIMES DAILY
Qty: 14 TABLET | Refills: 0 | Status: SHIPPED | OUTPATIENT
Start: 2018-08-28 | End: 2018-09-03

## 2018-08-28 ASSESSMENT — ENCOUNTER SYMPTOMS
NAUSEA: 0
SHORTNESS OF BREATH: 0
VOMITING: 0
BACK PAIN: 0
ABDOMINAL DISTENTION: 0

## 2018-08-28 NOTE — PROGRESS NOTES
SUBJECTIVE:  Lisa Warren   1960   female   Allergies   Allergen Reactions    Macrobid [Nitrofurantoin] Hives and Swelling     Hives       Chief Complaint   Patient presents with    Urinary Tract Infection     dysuria, odor while urinating       HPI   Wheelchair-bound patient brought in by caregiver today. Finished bactrim for UTI last week, and continues to complain of burning with urination & foul odor  No fever, no vomiting, no change in appetite  Tried azo otc over the past few days  She is sometimes incontinent of urine and stool chronically due to history of CVA and developmental delay    Past Medical History:   Diagnosis Date    Anxiety disorder     Back pain, chronic     Cerebral palsy (HCC)     COPD (chronic obstructive pulmonary disease) (HCC)     CVA (cerebral infarction) 2014 x2    Diverticulosis     [facility] [address]     GERD (gastroesophageal reflux disease)     Hyperlipidemia     Hypertension     Insomnia     Iron (Fe) deficiency anemia     Mental disability     Seizures (HCC)     Unspecified cerebral artery occlusion with cerebral infarction      Social History     Social History    Marital status: Single     Spouse name: N/A    Number of children: N/A    Years of education: N/A     Occupational History    Not on file. Social History Main Topics    Smoking status: Former Smoker     Packs/day: 1.50     Years: 20.00     Quit date: 8/28/2011    Smokeless tobacco: Never Used    Alcohol use No    Drug use: No    Sexual activity: Not on file     Other Topics Concern    Not on file     Social History Narrative    No narrative on file     No family history on file. Past Surgical History:   Procedure Laterality Date    GASTROSTOMY TUBE PLACEMENT         Review of Systems   Constitutional: Negative for fatigue and unexpected weight change. Respiratory: Negative for shortness of breath. Cardiovascular: Negative for chest pain.    Gastrointestinal: Negative for abdominal

## 2018-08-28 NOTE — PATIENT INSTRUCTIONS
We are committed to providing you the best care possible. If you receive a survey after visiting one of our offices, please take time to share your experience concerning your physician office visit. These surveys are confidential and no health information about you is shared. We are eager to improve for you and we are counting on your feedback to help make that happen. Your medication has been sent to the pharmacy    Discontinue the azo, and start the pyridium  Patient Education            Current as of: June 26, 2017  Content Version: 11.7  © 0250-3610 [employer]. Care instructions adapted under license by [facility] ([facility]). If you have questions about a medical condition or this instruction, always ask your healthcare professional. [address] any warranty or liability for your use of this information. Patient Education        Female Urinary Tract: Anatomy Sketch    Current as of: Rut 10, 2017  Content Version: 11.7  © 7832-9710 [employer]. Care instructions adapted under license by [facility] ([facility]). If you have questions about a medical condition or this instruction, always ask your healthcare professional. [address] any warranty or liability for your use of this information.

## 2018-08-30 ENCOUNTER — NURSE ONLY (OUTPATIENT)
Dept: FAMILY MEDICINE CLINIC | Age: 58
End: 2018-08-30

## 2018-08-30 DIAGNOSIS — R31.0 GROSS HEMATURIA: Primary | ICD-10-CM

## 2018-08-30 DIAGNOSIS — N30.01 ACUTE CYSTITIS WITH HEMATURIA: Primary | ICD-10-CM

## 2018-08-30 LAB
ORGANISM: ABNORMAL
URINE CULTURE, ROUTINE: ABNORMAL
URINE CULTURE, ROUTINE: ABNORMAL

## 2018-08-30 RX ORDER — CEFTRIAXONE 500 MG/1
500 INJECTION, POWDER, FOR SOLUTION INTRAMUSCULAR; INTRAVENOUS ONCE
Status: COMPLETED | OUTPATIENT
Start: 2018-08-30 | End: 2018-08-30

## 2018-08-30 RX ADMIN — CEFTRIAXONE 500 MG: 500 INJECTION, POWDER, FOR SOLUTION INTRAMUSCULAR; INTRAVENOUS at 10:48

## 2018-09-01 ENCOUNTER — HOSPITAL ENCOUNTER (OUTPATIENT)
Dept: OTHER | Age: 58
Discharge: HOME | End: 2018-09-01
Attending: INTERNAL MEDICINE | Admitting: INTERNAL MEDICINE

## 2018-09-04 ENCOUNTER — TELEPHONE (OUTPATIENT)
Dept: FAMILY MEDICINE CLINIC | Age: 58
End: 2018-09-04

## 2018-09-04 DIAGNOSIS — N39.0 URINARY TRACT INFECTION WITHOUT HEMATURIA, SITE UNSPECIFIED: Primary | ICD-10-CM

## 2018-09-04 RX ORDER — SULFAMETHOXAZOLE AND TRIMETHOPRIM 800; 160 MG/1; MG/1
1 TABLET ORAL 2 TIMES DAILY
Qty: 14 TABLET | Refills: 0 | Status: SHIPPED | OUTPATIENT
Start: 2018-09-04 | End: 2018-09-11

## 2018-09-06 LAB
ALBUMIN SERPL-MCNC: 3.9 GM/DL (ref 3.4–5)
ALP BLD-CCNC: 79 IU/L (ref 40–128)
ALT SERPL-CCNC: 13 U/L (ref 10–40)
ANION GAP SERPL CALCULATED.3IONS-SCNC: 14 MMOL/L (ref 4–16)
AST SERPL-CCNC: 13 IU/L (ref 15–37)
BILIRUB SERPL-MCNC: 0.2 MG/DL (ref 0–1)
BUN BLDV-MCNC: 13 MG/DL (ref 6–23)
CALCIUM SERPL-MCNC: 9.6 MG/DL (ref 8.3–10.6)
CHLORIDE BLD-SCNC: 98 MMOL/L (ref 99–110)
CHOLESTEROL: 156 MG/DL
CO2: 28 MMOL/L (ref 21–32)
CREAT SERPL-MCNC: 0.5 MG/DL (ref 0.6–1.1)
ESTIMATED AVERAGE GLUCOSE: 114 MG/DL
GFR AFRICAN AMERICAN: >60 ML/MIN/1.73M2
GFR NON-AFRICAN AMERICAN: >60 ML/MIN/1.73M2
GLUCOSE BLD-MCNC: 107 MG/DL (ref 70–99)
HBA1C MFR BLD: 5.6 % (ref 4.2–6.3)
HDLC SERPL-MCNC: 37 MG/DL
LDL CHOLESTEROL DIRECT: 80 MG/DL
POTASSIUM SERPL-SCNC: 4 MMOL/L (ref 3.5–5.1)
SODIUM BLD-SCNC: 140 MMOL/L (ref 135–145)
T4 FREE: 1.12 NG/DL (ref 0.9–1.8)
TOTAL PROTEIN: 6 GM/DL (ref 6.4–8.2)
TRIGL SERPL-MCNC: 328 MG/DL
TSH HIGH SENSITIVITY: 1.54 UIU/ML (ref 0.27–4.2)

## 2018-09-10 ENCOUNTER — TELEPHONE (OUTPATIENT)
Dept: FAMILY MEDICINE CLINIC | Age: 58
End: 2018-09-10

## 2018-09-18 ENCOUNTER — TELEPHONE (OUTPATIENT)
Dept: FAMILY MEDICINE CLINIC | Age: 58
End: 2018-09-18

## 2018-09-18 NOTE — TELEPHONE ENCOUNTER
Delgado Reynolds [phone] the patients home care nurse called and states that the patient is still complaining of burning with urination even after the bactrim and the injection she received at our office. Delgado Reynolds wondering where we go from here.  Please Advise

## 2018-09-26 ENCOUNTER — APPOINTMENT (OUTPATIENT)
Dept: CT IMAGING | Age: 58
End: 2018-09-26
Payer: MEDICAID

## 2018-09-26 ENCOUNTER — HOSPITAL ENCOUNTER (EMERGENCY)
Age: 58
Discharge: HOME | End: 2018-09-26
Attending: EMERGENCY MEDICINE
Payer: MEDICAID

## 2018-09-26 VITALS
TEMPERATURE: 97.6 F | RESPIRATION RATE: 18 BRPM | DIASTOLIC BLOOD PRESSURE: 87 MMHG | WEIGHT: 140 LBS | HEART RATE: 69 BPM | HEIGHT: 58 IN | BODY MASS INDEX: 29.39 KG/M2 | SYSTOLIC BLOOD PRESSURE: 160 MMHG | OXYGEN SATURATION: 95 %

## 2018-09-26 DIAGNOSIS — K59.00 CONSTIPATION, UNSPECIFIED CONSTIPATION TYPE: ICD-10-CM

## 2018-09-26 DIAGNOSIS — R30.0 DYSURIA: Primary | ICD-10-CM

## 2018-09-26 PROBLEM — N39.0 UTI (URINARY TRACT INFECTION): Status: RESOLVED | Noted: 2017-03-19 | Resolved: 2018-09-26

## 2018-09-26 LAB
ALBUMIN SERPL-MCNC: 3.8 GM/DL (ref 3.4–5)
ALP BLD-CCNC: 89 IU/L (ref 40–129)
ALT SERPL-CCNC: 9 U/L (ref 10–40)
ANION GAP SERPL CALCULATED.3IONS-SCNC: 15 MMOL/L (ref 4–16)
AST SERPL-CCNC: 10 IU/L (ref 15–37)
BACTERIA: NEGATIVE /HPF
BASOPHILS ABSOLUTE: 0 K/CU MM
BASOPHILS RELATIVE PERCENT: 0.5 % (ref 0–1)
BILIRUB SERPL-MCNC: 0.1 MG/DL (ref 0–1)
BILIRUBIN URINE: NEGATIVE MG/DL
BLOOD, URINE: NEGATIVE
BUN BLDV-MCNC: 7 MG/DL (ref 6–23)
CALCIUM SERPL-MCNC: 9 MG/DL (ref 8.3–10.6)
CHLORIDE BLD-SCNC: 93 MMOL/L (ref 99–110)
CLARITY: CLEAR
CO2: 25 MMOL/L (ref 21–32)
COLOR: ABNORMAL
CREAT SERPL-MCNC: 0.4 MG/DL (ref 0.6–1.1)
DIFFERENTIAL TYPE: ABNORMAL
EOSINOPHILS ABSOLUTE: 0.1 K/CU MM
EOSINOPHILS RELATIVE PERCENT: 1.6 % (ref 0–3)
GFR AFRICAN AMERICAN: >60 ML/MIN/1.73M2
GFR NON-AFRICAN AMERICAN: >60 ML/MIN/1.73M2
GLUCOSE BLD-MCNC: 127 MG/DL (ref 70–99)
GLUCOSE, URINE: NEGATIVE MG/DL
HCG QUALITATIVE: NEGATIVE
HCT VFR BLD CALC: 40.2 % (ref 37–47)
HEMOGLOBIN: 13.1 GM/DL (ref 12.5–16)
IMMATURE NEUTROPHIL %: 0.6 % (ref 0–0.43)
KETONES, URINE: NEGATIVE MG/DL
LEUKOCYTE ESTERASE, URINE: NEGATIVE
LYMPHOCYTES ABSOLUTE: 1.9 K/CU MM
LYMPHOCYTES RELATIVE PERCENT: 29.9 % (ref 24–44)
MCH RBC QN AUTO: 30.9 PG (ref 27–31)
MCHC RBC AUTO-ENTMCNC: 32.6 % (ref 32–36)
MCV RBC AUTO: 94.8 FL (ref 78–100)
MONOCYTES ABSOLUTE: 0.7 K/CU MM
MONOCYTES RELATIVE PERCENT: 11.6 % (ref 0–4)
NITRITE URINE, QUANTITATIVE: NEGATIVE
NUCLEATED RBC %: 0 %
PDW BLD-RTO: 11.9 % (ref 11.7–14.9)
PH, URINE: 8 (ref 5–8)
PLATELET # BLD: 194 K/CU MM (ref 140–440)
PMV BLD AUTO: 8.2 FL (ref 7.5–11.1)
POTASSIUM SERPL-SCNC: 3.4 MMOL/L (ref 3.5–5.1)
PROTEIN UA: NEGATIVE MG/DL
RBC # BLD: 4.24 M/CU MM (ref 4.2–5.4)
RBC URINE: ABNORMAL /HPF (ref 0–6)
SEGMENTED NEUTROPHILS ABSOLUTE COUNT: 3.6 K/CU MM
SEGMENTED NEUTROPHILS RELATIVE PERCENT: 55.8 % (ref 36–66)
SODIUM BLD-SCNC: 133 MMOL/L (ref 135–145)
SPECIFIC GRAVITY UA: 1.01 (ref 1–1.03)
SQUAMOUS EPITHELIAL: 1 /HPF
TOTAL IMMATURE NEUTOROPHIL: 0.04 K/CU MM
TOTAL NUCLEATED RBC: 0 K/CU MM
TOTAL PROTEIN: 6.9 GM/DL (ref 6.4–8.2)
TRICHOMONAS: ABNORMAL /HPF
UROBILINOGEN, URINE: NORMAL MG/DL (ref 0.2–1)
WBC # BLD: 6.4 K/CU MM (ref 4–10.5)
WBC UA: 1 /HPF (ref 0–5)

## 2018-09-26 PROCEDURE — 80053 COMPREHEN METABOLIC PANEL: CPT

## 2018-09-26 PROCEDURE — 84703 CHORIONIC GONADOTROPIN ASSAY: CPT

## 2018-09-26 PROCEDURE — 99284 EMERGENCY DEPT VISIT MOD MDM: CPT

## 2018-09-26 PROCEDURE — 81001 URINALYSIS AUTO W/SCOPE: CPT

## 2018-09-26 PROCEDURE — 6370000000 HC RX 637 (ALT 250 FOR IP): Performed by: EMERGENCY MEDICINE

## 2018-09-26 PROCEDURE — 36415 COLL VENOUS BLD VENIPUNCTURE: CPT

## 2018-09-26 PROCEDURE — 87086 URINE CULTURE/COLONY COUNT: CPT

## 2018-09-26 PROCEDURE — 74176 CT ABD & PELVIS W/O CONTRAST: CPT

## 2018-09-26 PROCEDURE — 85025 COMPLETE CBC W/AUTO DIFF WBC: CPT

## 2018-09-26 PROCEDURE — 4500000027

## 2018-09-26 RX ORDER — DOCUSATE SODIUM 100 MG/1
100 CAPSULE, LIQUID FILLED ORAL 3 TIMES DAILY PRN
Qty: 30 CAPSULE | Refills: 0 | Status: SHIPPED | OUTPATIENT
Start: 2018-09-26 | End: 2018-10-23 | Stop reason: DRUGHIGH

## 2018-09-26 RX ORDER — TRAMADOL HYDROCHLORIDE 50 MG/1
50 TABLET ORAL ONCE
Status: COMPLETED | OUTPATIENT
Start: 2018-09-26 | End: 2018-09-26

## 2018-09-26 RX ORDER — SENNOSIDES 8.6 MG
1 TABLET ORAL 2 TIMES DAILY
Qty: 30 TABLET | Refills: 0 | Status: ON HOLD | OUTPATIENT
Start: 2018-09-26 | End: 2018-12-20 | Stop reason: HOSPADM

## 2018-09-26 RX ORDER — SULFAMETHOXAZOLE AND TRIMETHOPRIM 800; 160 MG/1; MG/1
1 TABLET ORAL ONCE
Status: COMPLETED | OUTPATIENT
Start: 2018-09-26 | End: 2018-09-26

## 2018-09-26 RX ORDER — SULFAMETHOXAZOLE AND TRIMETHOPRIM 800; 160 MG/1; MG/1
1 TABLET ORAL 2 TIMES DAILY
Qty: 6 TABLET | Refills: 0 | Status: SHIPPED | OUTPATIENT
Start: 2018-09-26 | End: 2018-09-29

## 2018-09-26 RX ADMIN — SULFAMETHOXAZOLE AND TRIMETHOPRIM 1 TABLET: 800; 160 TABLET ORAL at 13:32

## 2018-09-26 RX ADMIN — TRAMADOL HYDROCHLORIDE 50 MG: 50 TABLET, FILM COATED ORAL at 13:32

## 2018-09-26 ASSESSMENT — PAIN DESCRIPTION - PAIN TYPE: TYPE: ACUTE PAIN

## 2018-09-26 ASSESSMENT — PAIN DESCRIPTION - ORIENTATION: ORIENTATION: RIGHT;LEFT

## 2018-09-26 ASSESSMENT — PAIN SCALES - GENERAL: PAINLEVEL_OUTOF10: 5

## 2018-09-26 ASSESSMENT — PAIN SCALES - WONG BAKER: WONGBAKER_NUMERICALRESPONSE: 4

## 2018-09-26 ASSESSMENT — PAIN DESCRIPTION - ONSET: ONSET: GRADUAL

## 2018-09-26 ASSESSMENT — PAIN DESCRIPTION - FREQUENCY: FREQUENCY: INTERMITTENT

## 2018-09-26 ASSESSMENT — PAIN DESCRIPTION - LOCATION: LOCATION: BACK;FLANK

## 2018-09-26 ASSESSMENT — PAIN DESCRIPTION - DESCRIPTORS: DESCRIPTORS: BURNING;ACHING

## 2018-09-26 NOTE — ED NOTES
Reviewed discharge instructions with patient and family. All voice understanding/deny questions. Assisted to wheelchair. Taken from floor with staff.        Gillian Souza RN  09/26/18 0914

## 2018-09-27 LAB
CULTURE: NORMAL
Lab: NORMAL
REPORT STATUS: NORMAL
SPECIMEN: NORMAL
TOTAL COLONY COUNT: NORMAL

## 2018-10-04 ENCOUNTER — TELEPHONE (OUTPATIENT)
Dept: FAMILY MEDICINE CLINIC | Age: 58
End: 2018-10-04

## 2018-10-22 RX ORDER — CETIRIZINE HYDROCHLORIDE 10 MG/1
10 TABLET ORAL DAILY
Qty: 31 TABLET | Refills: 3 | Status: SHIPPED | OUTPATIENT
Start: 2018-10-22 | End: 2019-02-25 | Stop reason: SDUPTHER

## 2018-10-23 ENCOUNTER — OFFICE VISIT (OUTPATIENT)
Dept: FAMILY MEDICINE CLINIC | Age: 58
End: 2018-10-23
Payer: MEDICAID

## 2018-10-23 VITALS — SYSTOLIC BLOOD PRESSURE: 128 MMHG | OXYGEN SATURATION: 91 % | HEART RATE: 75 BPM | DIASTOLIC BLOOD PRESSURE: 84 MMHG

## 2018-10-23 DIAGNOSIS — Z23 NEEDS FLU SHOT: ICD-10-CM

## 2018-10-23 DIAGNOSIS — Z00.00 ANNUAL PHYSICAL EXAM: Primary | ICD-10-CM

## 2018-10-23 DIAGNOSIS — E03.9 ACQUIRED HYPOTHYROIDISM: ICD-10-CM

## 2018-10-23 DIAGNOSIS — Z86.73 H/O: STROKE: ICD-10-CM

## 2018-10-23 DIAGNOSIS — E78.2 MIXED HYPERLIPIDEMIA: ICD-10-CM

## 2018-10-23 DIAGNOSIS — G81.94 LEFT HEMIPLEGIA (HCC): ICD-10-CM

## 2018-10-23 PROCEDURE — G0008 ADMIN INFLUENZA VIRUS VAC: HCPCS | Performed by: FAMILY MEDICINE

## 2018-10-23 PROCEDURE — G8482 FLU IMMUNIZE ORDER/ADMIN: HCPCS | Performed by: FAMILY MEDICINE

## 2018-10-23 PROCEDURE — 90686 IIV4 VACC NO PRSV 0.5 ML IM: CPT | Performed by: FAMILY MEDICINE

## 2018-10-23 PROCEDURE — 99396 PREV VISIT EST AGE 40-64: CPT | Performed by: FAMILY MEDICINE

## 2018-10-23 RX ORDER — DOCUSATE SODIUM 100 MG/1
100 CAPSULE, LIQUID FILLED ORAL 2 TIMES DAILY
Qty: 30 CAPSULE | Refills: 0 | Status: SHIPPED | OUTPATIENT
Start: 2018-10-23 | End: 2018-11-07

## 2018-10-23 ASSESSMENT — PATIENT HEALTH QUESTIONNAIRE - PHQ9
SUM OF ALL RESPONSES TO PHQ QUESTIONS 1-9: 0
SUM OF ALL RESPONSES TO PHQ QUESTIONS 1-9: 0
1. LITTLE INTEREST OR PLEASURE IN DOING THINGS: 0
2. FEELING DOWN, DEPRESSED OR HOPELESS: 0
SUM OF ALL RESPONSES TO PHQ9 QUESTIONS 1 & 2: 0

## 2018-10-23 NOTE — PROGRESS NOTES
Vaccine Information Sheet, \"Influenza - Inactivated\"  given to Magan Jim, or parent/legal guardian of  Magan Jim and verbalized understanding. Patient responses:    Have you ever had a reaction to a flu vaccine? No  Are you able to eat eggs without adverse effects? Yes  Do you have any current illness? No  Have you ever had Guillian [place] Syndrome? No    Flu vaccine given per order. Please see immunization tab.

## 2018-10-23 NOTE — PROGRESS NOTES
Martell Graciela  1960  10/28/18    Chief Complaint   Patient presents with    Annual Exam           Patient here with the social work, for annual physical, patient doing fine and no new concerns. Past Medical History:   Diagnosis Date    Anxiety disorder     Back pain, chronic     Cerebral palsy (HCC)     COPD (chronic obstructive pulmonary disease) (HCC)     CVA (cerebral infarction) 2014 x2    Diabetes mellitus ([facility] [address]     Diverticulosis     Epilepsy ([facility] [address]     GERD (gastroesophageal reflux disease)     Hyperlipidemia     Hypertension     Insomnia     Iron (Fe) deficiency anemia     Mental disability     Seizures (HCC)     Unspecified cerebral artery occlusion with cerebral infarction      Past Surgical History:   Procedure Laterality Date    GASTROSTOMY TUBE PLACEMENT       No family history on file. Social History     Social History    Marital status: Single     Spouse name: N/A    Number of children: N/A    Years of education: N/A     Occupational History    Not on file.      Social History Main Topics    Smoking status: Former Smoker     Packs/day: 1.50     Years: 20.00     Quit date: 8/28/2011    Smokeless tobacco: Never Used    Alcohol use No    Drug use: No    Sexual activity: Not on file     Other Topics Concern    Not on file     Social History Narrative    No narrative on file       Allergies   Allergen Reactions    Macrobid [Nitrofurantoin] Hives and Swelling     Hives     Current Outpatient Prescriptions   Medication Sig Dispense Refill    docusate sodium (COLACE) 100 MG capsule Take 1 capsule by mouth 2 times daily for 30 doses 30 capsule 0    albuterol (PROVENTIL) (2.5 MG/3ML) 0.083% nebulizer solution Take 3 mLs by nebulization every 6 hours as needed for Wheezing 120 each 5    cetirizine (ZYRTEC) 10 MG tablet TAKE 1 TABLET BY MOUTH DAILY 31 tablet 3    senna (SENOKOT) 8.6 MG TABS tablet Take 1 tablet by mouth 2 times daily 30 tablet 0    trimethoprim from Last 3 Encounters:   10/23/18 128/84   09/26/18 (!) 160/87   09/03/18 (!) 180/99       Wt Readings from Last 3 Encounters:   09/26/18 140 lb (63.5 kg)   09/03/18 140 lb (63.5 kg)   08/11/18 140 lb (63.5 kg)         Physical Exam   Constitutional: She appears well-developed and well-nourished. No distress. Use wheel chair   HENT:   Head: Normocephalic and atraumatic. Right Ear: External ear normal.   Left Ear: There is drainage, swelling and tenderness. No foreign bodies. Tympanic membrane is injected and erythematous. Mouth/Throat: Oropharynx is clear and moist. No oropharyngeal exudate. Eyes: Pupils are equal, round, and reactive to light. EOM are normal. No scleral icterus. Neck: Normal range of motion. Neck supple. Cardiovascular: Normal rate and normal heart sounds. No murmur heard. Pulmonary/Chest: Effort normal and breath sounds normal. She has no wheezes. Abdominal: Soft. She exhibits no mass. Musculoskeletal: Normal range of motion. Neurological: She is alert. Has left hemiplegic   Skin: She is not diaphoretic. ASSESSMENT/ PLAN:    1. Annual physical exam  - stable    2. H/O: stroke  - stable    3. Mixed hyperlipidemia  - stable    4. Acquired hypothyroidism  - stable    5. Left hemiplegia ([address]  - Ambulatory referral to Physical Therapy    6. Needs flu shot  - INFLUENZA, QUADV, 3 YRS AND OLDER, IM, PF, PREFILL SYR OR SDV, 0.5ML (FLUZONE QUADV, PF)  - tolerate the shot              - Appropriateprescription are addressed. - After visit summery provided. - Questions answered and patient verbalizes understanding.  - Call for any problem, questions, or concerns. Return in about 6 months (around 4/23/2019).

## 2018-10-28 ASSESSMENT — ENCOUNTER SYMPTOMS
DIARRHEA: 0
COUGH: 0
ABDOMINAL PAIN: 0
CONSTIPATION: 0
SHORTNESS OF BREATH: 0
SINUS PAIN: 0
SINUS PRESSURE: 0
NAUSEA: 0

## 2018-11-25 ENCOUNTER — HOSPITAL ENCOUNTER (EMERGENCY)
Age: 58
Discharge: HOME | End: 2018-11-25
Attending: EMERGENCY MEDICINE
Payer: MEDICARE

## 2018-11-25 VITALS
BODY MASS INDEX: 29.39 KG/M2 | TEMPERATURE: 98.4 F | SYSTOLIC BLOOD PRESSURE: 121 MMHG | WEIGHT: 140 LBS | RESPIRATION RATE: 15 BRPM | HEART RATE: 80 BPM | OXYGEN SATURATION: 92 % | DIASTOLIC BLOOD PRESSURE: 74 MMHG | HEIGHT: 58 IN

## 2018-11-25 DIAGNOSIS — R19.7 DIARRHEA, UNSPECIFIED TYPE: ICD-10-CM

## 2018-11-25 DIAGNOSIS — M79.10 MYALGIA: Primary | ICD-10-CM

## 2018-11-25 LAB
ANION GAP SERPL CALCULATED.3IONS-SCNC: 12 MMOL/L (ref 4–16)
BACTERIA: ABNORMAL /HPF
BASOPHILS ABSOLUTE: 0 K/CU MM
BASOPHILS RELATIVE PERCENT: 0.3 % (ref 0–1)
BILIRUBIN URINE: NEGATIVE MG/DL
BLOOD, URINE: NEGATIVE
BUN BLDV-MCNC: 10 MG/DL (ref 6–23)
CALCIUM SERPL-MCNC: 9.3 MG/DL (ref 8.3–10.6)
CHLORIDE BLD-SCNC: 97 MMOL/L (ref 99–110)
CLARITY: CLEAR
CO2: 29 MMOL/L (ref 21–32)
COLOR: YELLOW
CREAT SERPL-MCNC: 0.5 MG/DL (ref 0.6–1.1)
DIFFERENTIAL TYPE: ABNORMAL
EOSINOPHILS ABSOLUTE: 0 K/CU MM
EOSINOPHILS RELATIVE PERCENT: 0.6 % (ref 0–3)
GFR AFRICAN AMERICAN: >60 ML/MIN/1.73M2
GFR NON-AFRICAN AMERICAN: >60 ML/MIN/1.73M2
GLUCOSE BLD-MCNC: 122 MG/DL (ref 70–99)
GLUCOSE, URINE: NEGATIVE MG/DL
HCT VFR BLD CALC: 42 % (ref 37–47)
HEMOGLOBIN: 13.4 GM/DL (ref 12.5–16)
IMMATURE NEUTROPHIL %: 1 % (ref 0–0.43)
KETONES, URINE: ABNORMAL MG/DL
LEUKOCYTE ESTERASE, URINE: ABNORMAL
LYMPHOCYTES ABSOLUTE: 1.7 K/CU MM
LYMPHOCYTES RELATIVE PERCENT: 27.4 % (ref 24–44)
MCH RBC QN AUTO: 30.6 PG (ref 27–31)
MCHC RBC AUTO-ENTMCNC: 31.9 % (ref 32–36)
MCV RBC AUTO: 95.9 FL (ref 78–100)
MONOCYTES ABSOLUTE: 0.6 K/CU MM
MONOCYTES RELATIVE PERCENT: 9.5 % (ref 0–4)
NITRITE URINE, QUANTITATIVE: NEGATIVE
NUCLEATED RBC %: 0 %
PDW BLD-RTO: 11.8 % (ref 11.7–14.9)
PH, URINE: 6 (ref 5–8)
PLATELET # BLD: 274 K/CU MM (ref 140–440)
PMV BLD AUTO: 8.9 FL (ref 7.5–11.1)
POTASSIUM SERPL-SCNC: 4.2 MMOL/L (ref 3.5–5.1)
PROTEIN UA: NEGATIVE MG/DL
RBC # BLD: 4.38 M/CU MM (ref 4.2–5.4)
RBC URINE: 2 /HPF (ref 0–6)
SEGMENTED NEUTROPHILS ABSOLUTE COUNT: 3.8 K/CU MM
SEGMENTED NEUTROPHILS RELATIVE PERCENT: 61.2 % (ref 36–66)
SODIUM BLD-SCNC: 138 MMOL/L (ref 135–145)
SPECIFIC GRAVITY UA: 1.02 (ref 1–1.03)
TOTAL CK: 37 IU/L (ref 26–140)
TOTAL IMMATURE NEUTOROPHIL: 0.06 K/CU MM
TOTAL NUCLEATED RBC: 0 K/CU MM
TRICHOMONAS: ABNORMAL /HPF
UROBILINOGEN, URINE: NORMAL MG/DL (ref 0.2–1)
WBC # BLD: 6.2 K/CU MM (ref 4–10.5)
WBC UA: 2 /HPF (ref 0–5)

## 2018-11-25 PROCEDURE — 80048 BASIC METABOLIC PNL TOTAL CA: CPT

## 2018-11-25 PROCEDURE — 6370000000 HC RX 637 (ALT 250 FOR IP): Performed by: EMERGENCY MEDICINE

## 2018-11-25 PROCEDURE — 82550 ASSAY OF CK (CPK): CPT

## 2018-11-25 PROCEDURE — 99284 EMERGENCY DEPT VISIT MOD MDM: CPT

## 2018-11-25 PROCEDURE — 85025 COMPLETE CBC W/AUTO DIFF WBC: CPT

## 2018-11-25 PROCEDURE — 81001 URINALYSIS AUTO W/SCOPE: CPT

## 2018-11-25 RX ORDER — ACETAMINOPHEN 500 MG
1000 TABLET ORAL ONCE
Status: COMPLETED | OUTPATIENT
Start: 2018-11-25 | End: 2018-11-25

## 2018-11-25 RX ADMIN — ACETAMINOPHEN 1000 MG: 500 TABLET ORAL at 14:49

## 2018-11-25 ASSESSMENT — PAIN DESCRIPTION - DESCRIPTORS
DESCRIPTORS: PATIENT UNABLE TO DESCRIBE
DESCRIPTORS: PATIENT UNABLE TO DESCRIBE

## 2018-11-25 ASSESSMENT — PAIN DESCRIPTION - PAIN TYPE
TYPE: ACUTE PAIN
TYPE: ACUTE PAIN

## 2018-11-25 ASSESSMENT — PAIN SCALES - GENERAL
PAINLEVEL_OUTOF10: 6
PAINLEVEL_OUTOF10: 2

## 2018-11-25 ASSESSMENT — PAIN DESCRIPTION - LOCATION
LOCATION: GENERALIZED
LOCATION: GENERALIZED

## 2018-11-25 ASSESSMENT — PAIN SCALES - WONG BAKER: WONGBAKER_NUMERICALRESPONSE: 8

## 2018-11-25 NOTE — ED PROVIDER NOTES
MM    Lymphocytes # 1.7 K/CU MM    Monocytes # 0.6 K/CU MM    Eosinophils # 0.0 K/CU MM    Basophils # 0.0 K/CU MM    Nucleated RBC % 0.0 %    Total Nucleated RBC 0.0 K/CU MM    Total Immature Neutrophil 0.06 K/CU MM    Immature Neutrophil % 1.0 (H) 0 - 0.43 %   BMP   Result Value Ref Range    Sodium 138 135 - 145 MMOL/L    Potassium 4.2 3.5 - 5.1 MMOL/L    Chloride 97 (L) 99 - 110 mMol/L    CO2 29 21 - 32 MMOL/L    Anion Gap 12 4 - 16    BUN 10 6 - 23 MG/DL    CREATININE 0.5 (L) 0.6 - 1.1 MG/DL    Glucose 122 (H) 70 - 99 MG/DL    Calcium 9.3 8.3 - 10.6 MG/DL    GFR Non-African American >60 >60 mL/min/1.73m2    GFR African American >60 >60 mL/min/1.73m2   CK   Result Value Ref Range    Total CK 37 26 - 140 IU/L   Urinalysis (Lab)   Result Value Ref Range    Color, UA YELLOW UYELL    Clarity, UA CLEAR CLEAR    Glucose, Urine NEGATIVE NEG MG/DL    Bilirubin Urine NEGATIVE NEG MG/DL    Ketones, Urine SMALL (A) NEG MG/DL    Specific Gravity, UA 1.018 1.001 - 1.035    Blood, Urine NEGATIVE NEG    pH, Urine 6.0 5.0 - 8.0    Protein, UA NEGATIVE NEG MG/DL    Urobilinogen, Urine NORMAL 0.2 - 1.0 MG/DL    Nitrite Urine, Quantitative NEGATIVE NEG    Leukocyte Esterase, Urine TRACE (A) NEG    RBC, UA 2 0 - 6 /HPF    WBC, UA 2 0 - 5 /HPF    Bacteria, UA RARE (A) NEG /HPF    Trichomonas, UA NONE SEEN NOSEE /HPF      Radiographs (if obtained):  [] The following radiograph was interpreted by myself in the absence of a radiologist:  [] Radiologist's Report Reviewed:    EKG (if obtained): (All EKG's are interpreted by myself in the absence of a cardiologist)    MDM:  Plan of care is discussed thoroughly with the patient and family if present. If performed, all imaging and lab work also discussed with patient. All relevant prior results and chart reviewed if available. Patient well-appearing on exam at this time with normal vital signs.   She has a benign abdominal exam.  Caretaker is present and states that she appears to be at

## 2018-11-25 NOTE — ED NOTES
Discharge instructions given to pt and caregiver. Caregiver verbalized her understanding. Pt awaiting transport from [facility] at this time.      Katie Gant RN  11/25/18 4850

## 2018-11-25 NOTE — ED NOTES
Bed: ED-20  Expected date:   Expected time:   Means of arrival:   Comments:  spfld twp, body aches, MRDD     Gaby Lerma RN  11/25/18 5466

## 2018-11-28 ENCOUNTER — HOSPITAL ENCOUNTER (OUTPATIENT)
Dept: PHYSICAL THERAPY | Age: 58
Setting detail: THERAPIES SERIES
Discharge: HOME | End: 2018-11-28
Payer: MEDICARE

## 2018-11-28 PROCEDURE — 97162 PT EVAL MOD COMPLEX 30 MIN: CPT

## 2018-11-28 PROCEDURE — 97530 THERAPEUTIC ACTIVITIES: CPT

## 2018-11-28 PROCEDURE — G8979 MOBILITY GOAL STATUS: HCPCS

## 2018-11-28 PROCEDURE — G8978 MOBILITY CURRENT STATUS: HCPCS

## 2018-11-29 NOTE — PROGRESS NOTES
anti-tippers, wearing seat belt, caregiver present    PROM RLE (degrees)  RLE General PROM: neutral DF; heel cord tightness  AROM RLE (degrees)  RLE AROM: WFL  RLE General AROM: lacks approx 10* DF AROM (cognitive deficit/lack of understanding?)  PROM LLE (degrees)  LLE General PROM: limited hip IR d/t tight hip ER's; unable to measure d/t limited patient participation. Sits w/LLE in ER approx 30 deg. Heel cord restriction; lacking approx 5* to neutral DF  AROM LLE (degrees)  LLE General AROM: limited by hemiplegia/weakness (see MMT)  PROM RUE (degrees)  RUE PROM: WFL  AROM RUE (degrees)  RUE AROM : WFL  AROM LUE (degrees)  LUE General AROM: severe limitation d/t hemiplegia; non-functional  Spine  Cervical: cervical SBL approx 30 deg (structural changes from chronic posturing since CVA)  Thoracic: thoracic SBL, structural changes    Strength RLE  Comment: hip 3-/5, knee 3+/5, ankle 3-/5  Strength LLE  Comment: hip 2-/5, knee 2+/5, ankle trace to 1/5  Strength RUE  Comment: shoulder 3- to 3/5, elbow 3+/5  Strength LUE  Comment: no functional strength; trace to 2/5 at best   Motor Control  Gross Motor?: Exceptions  Comments: hypertonicity LUE w/intentional attempts, hypotonic tendency LLE      Sensation  Overall Sensation Status: WFL (grossly)     Transfers  Sit to Stand: Moderate Assistance  Stand to sit: Moderate Assistance  Bed to Chair: Maximum assistance  Stand Pivot Transfers: Maximum Assistance  Ambulation  Ambulation?: No (Pt unable; static stand x <= 5 sec Mod assist x 1 prior to fatigue.   Unable to weight shift or unweight LEs, posterior lean and supporting self against WC .)  Wheelchair Activities  Wheelchair Type: Standard  Wheelchair Cushion: None  Level of Assistance for pressure relief activities: Maximum assistance  Wheelchair Parts Management: No  Propulsion: Yes  Propulsion 1  Propulsion: Manual  Level: Carpet  Method: RLE;LLE;RUE  Level of Assistance: Supervision  Distance: 10 feet seizures, mental disability, cerebral artery occlusion, chronic back pain, anxiety disorder, HLP, GERD, diverticulosis  Exam: MMT, ROM, transfers, cognition  Clinical Presentation: Medium complexity  Patient Education: see daily note  Barriers to Learning: yes; cognitive deficits, anxiety  REQUIRES PT FOLLOW UP: Yes  Treatment Initiated : see daily note  Discharge Recommendations: 24 hour supervision or assist  Activity Tolerance  Activity Tolerance: Patient Tolerated treatment well  Activity Tolerance: can become agitated         Plan   Plan  Times per week: 2  Plan weeks: 8  Current Treatment Recommendations: Strengthening, ROM, Balance Training, Functional Mobility Training, Transfer Training, Gait Training, Endurance Training, Neuromuscular Re-education, Patient/Caregiver Education & Training, Safety Education & Training, Home Exercise Program  Safety Devices  Type of devices: Gait belt, Patient at risk for falls    G-Code  PT G-Codes  Functional Assessment Tool Used: Transfers  Score: Max Assist  Functional Limitation: Mobility: Walking and moving around  Mobility: Walking and Moving Around Current Status (): At least 80 percent but less than 100 percent impaired, limited or restricted  Mobility: Walking and Moving Around Goal Status (): At least 60 percent but less than 80 percent impaired, limited or restricted    OutComes Score                                           Goals  Long term goals  Time Frame for Long term goals : in 8 weeks, patient will  Long term goal 1: perform HEP w/caregiver assist.  Long term goal 2: perform sit><stand transfers w/Min to Mod Assist x 1 person, demonstrating safe technique. Long term goal 3: perform SPTs Mod assist x 1 person, demonstrating safe technique. Long term goal 4: ambulate >= 15 feet Mod assist x 1 plus WC follow.   Patient Goals   Patient goals : Walk short distances w/help; improve transfers       Therapy Time   Individual Concurrent Group Co-treatment

## 2018-11-29 NOTE — FLOWSHEET NOTE
Physical Therapy Daily Treatment Note  Date:  2018    Patient Name:  Shruti Meza    :  1960  MRN: [id number]  Other position/activity restrictions: fall risk, left hemiplegia   Diagnosis: Left hemiplegia (remote CVA )  Treatment Diagnosis: left hemiplegia, postural deficits, impaired ROM left hip/bilat heel cords, impaired mobility and function  Date of injury/Date of Surgery:   PT Insurance Information: [employer], Medicare, Secondary Medicaid  Referring Practitioner: Kady Carlos  Referring Practitioner Follow-Up:   Unknown  POC Signed: pending  POC Date Range:  18 - 19  Progress Note Due:  Every 10 visits  Visit# / total visits:                     G-Code Selection: (On Eval and every 10th visit or Discharge)    MEASURE    [x] Mobility: Walking and Moving Around     [x] Current ()   [x] Goal ()   [] DC ()  [] Changing/Maintaining Body Position     [] Current (5878)      [] Goal ()   [] DC ()  [] Carrying / Moving / Handling Objects     [] Current ()   [] Goal ()   [] DC ()  [] Self-Care     [] Current ()   [] Goal ()   [] DC ()  [] Other PT/OT primary DX     [] Current ()   [] Goal ()   [] DC ()    SEVERITY    CURRENT  GOAL  DISCHARGE   [] CH (0% Impaired, Indep.)  [] CI (1-19% Impaired, SBA-CGA)  [] CJ (20-39% Impaired, MIN A)  [] CK  (40-59% Impairment, Mod A)  [] CL  (60-79% Impairment, Max A)  [x] CM  (80-99% Impairment, Dep.)   [] CN  (100% Impairment, Tot Dep.) [] CH (0% Impaired, Indep.)  [] CI (1-19% Impaired, SBA-CGA)  [] CJ (20-39% Impaired, MIN A)  [] CK  (40-59% Impairment, Mod A)  [x] CL  (60-79% Impairment, Max A)  [] CM  (80-99% Impairment, Dep.)   [] CN  (100% Impairment, Tot Dep.)  [] CH (0% Impaired, Indep.)  [] CI (1-19% Impaired, SBA-CGA)  [] CJ (20-39% Impaired, MIN A)  [] CK  (40-59% Impairment, Mod A)  [] CL  (60-79% Impairment, Max A)  [] CM  (80-99% Impairment, Dep.)   [] CN  (100% Impairment, Tot Dep.)            Goals:     Long term goals  Time Frame for Long term goals : in 8 weeks, patient will  Long term goal 1: perform HEP w/caregiver assist.  Long term goal 2: perform sit><stand transfers w/Min to Mod Assist x 1 person, demonstrating safe technique. Long term goal 3: perform SPTs Mod assist x 1 person, demonstrating safe technique. Long term goal 4: ambulate >= 15 feet Mod assist x 1 plus WC follow. Patient goals : Walk short distances w/help; improve transfers    Summary of Eval:   Patient is a 66 y.o. female w/remote hx CVA x 2 in 2014.  Pt also has co-morbid DX, including but not limited to mental disability, cerebral palsy.  Today, patient was able to answer simple, non-complex questions such as name, birth date, city and street where she lives and why she came for this appointment. Makenna Colin, patient's caregiver provided more substantive information regarding PLOF/Current LOF, assist level required at home.  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  PLOF: Miraa Primrose to [facility] in 2014, patient was walking w/o AD and able to assist more w/personal care. Ge Pittman has had 24/7 care since being in [address] x approx 5 years. Gaetana Primrose to that, patient was institutionalized as [place].  Current LOF:  Patient's main means of mobility is via standard WC, which she can propel short distances.  She had been walking short distances (<= 10ft) w/2 person assist up to approx 1 mo ago, requires nearly total assist for ADLs and Mod to Total A for transfers depending on patient's ability or willingness to participate.  Pt is able to feed self/drink from a cup after set-up.  It is patient and caregiver(s) goal(s) to improve upon transfers and short distance gait.   Treatment Diagnosis: left hemiplegia,

## 2018-12-06 ENCOUNTER — HOSPITAL ENCOUNTER (OUTPATIENT)
Dept: PHYSICAL THERAPY | Age: 58
Discharge: HOME | End: 2018-12-06

## 2018-12-06 NOTE — FLOWSHEET NOTE
Physical Therapy  Cancellation/No-show Note  Patient Name:  Ariela Hughes  :  1960   Date:  2018  Cancelled visits to date: 1  No-shows to date: 0    For today's appointment patient:  [x]  Cancelled  []  Rescheduled appointment  []  No-show     Reason given by patient:  []  Patient ill  []  Conflicting appointment  [x]  No transportation    []  Conflict with work  []  No reason given  []  Other:     Comments:      Electronically signed by:  Gricelda Montoya PTA, 2018, 4:34 PM

## 2018-12-18 ENCOUNTER — HOSPITAL ENCOUNTER (INPATIENT)
Age: 58
LOS: 3 days | Discharge: HOME HEALTH | DRG: 871 | End: 2018-12-21
Attending: EMERGENCY MEDICINE | Admitting: INTERNAL MEDICINE
Payer: MEDICARE

## 2018-12-18 ENCOUNTER — APPOINTMENT (OUTPATIENT)
Dept: GENERAL RADIOLOGY | Age: 58
DRG: 871 | End: 2018-12-18
Payer: MEDICARE

## 2018-12-18 ENCOUNTER — APPOINTMENT (OUTPATIENT)
Dept: CT IMAGING | Age: 58
DRG: 871 | End: 2018-12-18
Payer: MEDICARE

## 2018-12-18 DIAGNOSIS — A41.9 SEPTICEMIA (HCC): ICD-10-CM

## 2018-12-18 DIAGNOSIS — F79 MENTAL RETARDATION: ICD-10-CM

## 2018-12-18 DIAGNOSIS — N39.0 URINARY TRACT INFECTION WITHOUT HEMATURIA, SITE UNSPECIFIED: ICD-10-CM

## 2018-12-18 DIAGNOSIS — R05.9 COUGH: ICD-10-CM

## 2018-12-18 DIAGNOSIS — R50.9 FEVER, UNSPECIFIED FEVER CAUSE: Primary | ICD-10-CM

## 2018-12-18 LAB
ALBUMIN SERPL-MCNC: 3.6 GM/DL (ref 3.4–5)
ALP BLD-CCNC: 86 IU/L (ref 40–129)
ALT SERPL-CCNC: 10 U/L (ref 10–40)
ANION GAP SERPL CALCULATED.3IONS-SCNC: 13 MMOL/L (ref 4–16)
AST SERPL-CCNC: 16 IU/L (ref 15–37)
BACTERIA: NEGATIVE /HPF
BASOPHILS ABSOLUTE: 0 K/CU MM
BASOPHILS RELATIVE PERCENT: 0.3 % (ref 0–1)
BILIRUB SERPL-MCNC: 0.1 MG/DL (ref 0–1)
BILIRUBIN URINE: NEGATIVE MG/DL
BLOOD, URINE: ABNORMAL
BUN BLDV-MCNC: 8 MG/DL (ref 6–23)
CALCIUM SERPL-MCNC: 8.9 MG/DL (ref 8.3–10.6)
CHLORIDE BLD-SCNC: 98 MMOL/L (ref 99–110)
CLARITY: CLEAR
CO2: 25 MMOL/L (ref 21–32)
COLOR: YELLOW
CREAT SERPL-MCNC: 0.5 MG/DL (ref 0.6–1.1)
DIFFERENTIAL TYPE: ABNORMAL
EOSINOPHILS ABSOLUTE: 0 K/CU MM
EOSINOPHILS RELATIVE PERCENT: 0.2 % (ref 0–3)
GFR AFRICAN AMERICAN: >60 ML/MIN/1.73M2
GFR NON-AFRICAN AMERICAN: >60 ML/MIN/1.73M2
GLUCOSE BLD-MCNC: 156 MG/DL (ref 70–99)
GLUCOSE BLD-MCNC: 177 MG/DL (ref 70–99)
GLUCOSE, URINE: NEGATIVE MG/DL
HCT VFR BLD CALC: 40 % (ref 37–47)
HEMOGLOBIN: 12.7 GM/DL (ref 12.5–16)
IMMATURE NEUTROPHIL %: 0.8 % (ref 0–0.43)
KETONES, URINE: ABNORMAL MG/DL
LACTATE: 1.7 MMOL/L (ref 0.4–2)
LEUKOCYTE ESTERASE, URINE: ABNORMAL
LIPASE: 20 IU/L (ref 13–60)
LYMPHOCYTES ABSOLUTE: 1 K/CU MM
LYMPHOCYTES RELATIVE PERCENT: 9.8 % (ref 24–44)
MCH RBC QN AUTO: 30.8 PG (ref 27–31)
MCHC RBC AUTO-ENTMCNC: 31.8 % (ref 32–36)
MCV RBC AUTO: 97.1 FL (ref 78–100)
MONOCYTES ABSOLUTE: 1 K/CU MM
MONOCYTES RELATIVE PERCENT: 10.1 % (ref 0–4)
MUCUS: ABNORMAL HPF
NITRITE URINE, QUANTITATIVE: NEGATIVE
NUCLEATED RBC %: 0 %
PDW BLD-RTO: 11.9 % (ref 11.7–14.9)
PH, URINE: 6 (ref 5–8)
PLATELET # BLD: 254 K/CU MM (ref 140–440)
PMV BLD AUTO: 8.3 FL (ref 7.5–11.1)
POTASSIUM SERPL-SCNC: 4.1 MMOL/L (ref 3.5–5.1)
PRO-BNP: 188.6 PG/ML
PROTEIN UA: NEGATIVE MG/DL
RAPID INFLUENZA  B AGN: NEGATIVE
RAPID INFLUENZA A AGN: NEGATIVE
RBC # BLD: 4.12 M/CU MM (ref 4.2–5.4)
RBC URINE: 5 /HPF (ref 0–6)
SEGMENTED NEUTROPHILS ABSOLUTE COUNT: 7.8 K/CU MM
SEGMENTED NEUTROPHILS RELATIVE PERCENT: 78.8 % (ref 36–66)
SODIUM BLD-SCNC: 136 MMOL/L (ref 135–145)
SPECIFIC GRAVITY UA: 1.02 (ref 1–1.03)
TOTAL CK: 47 IU/L (ref 26–140)
TOTAL IMMATURE NEUTOROPHIL: 0.08 K/CU MM
TOTAL NUCLEATED RBC: 0 K/CU MM
TOTAL PROTEIN: 6.9 GM/DL (ref 6.4–8.2)
TRICHOMONAS: ABNORMAL /HPF
TROPONIN T: <0.01 NG/ML
TSH HIGH SENSITIVITY: 0.95 UIU/ML (ref 0.27–4.2)
UROBILINOGEN, URINE: NORMAL MG/DL (ref 0.2–1)
WBC # BLD: 9.9 K/CU MM (ref 4–10.5)
WBC UA: 80 /HPF (ref 0–5)

## 2018-12-18 PROCEDURE — 83880 ASSAY OF NATRIURETIC PEPTIDE: CPT

## 2018-12-18 PROCEDURE — 93005 ELECTROCARDIOGRAM TRACING: CPT | Performed by: EMERGENCY MEDICINE

## 2018-12-18 PROCEDURE — 6370000000 HC RX 637 (ALT 250 FOR IP): Performed by: EMERGENCY MEDICINE

## 2018-12-18 PROCEDURE — 94664 DEMO&/EVAL PT USE INHALER: CPT

## 2018-12-18 PROCEDURE — 81001 URINALYSIS AUTO W/SCOPE: CPT

## 2018-12-18 PROCEDURE — G8996 SWALLOW CURRENT STATUS: HCPCS | Performed by: SPEECH-LANGUAGE PATHOLOGIST

## 2018-12-18 PROCEDURE — G8997 SWALLOW GOAL STATUS: HCPCS | Performed by: SPEECH-LANGUAGE PATHOLOGIST

## 2018-12-18 PROCEDURE — 36415 COLL VENOUS BLD VENIPUNCTURE: CPT

## 2018-12-18 PROCEDURE — 92610 EVALUATE SWALLOWING FUNCTION: CPT | Performed by: SPEECH-LANGUAGE PATHOLOGIST

## 2018-12-18 PROCEDURE — 6370000000 HC RX 637 (ALT 250 FOR IP): Performed by: INTERNAL MEDICINE

## 2018-12-18 PROCEDURE — 87804 INFLUENZA ASSAY W/OPTIC: CPT

## 2018-12-18 PROCEDURE — 4500000027

## 2018-12-18 PROCEDURE — 6360000002 HC RX W HCPCS: Performed by: EMERGENCY MEDICINE

## 2018-12-18 PROCEDURE — 80053 COMPREHEN METABOLIC PANEL: CPT

## 2018-12-18 PROCEDURE — 94640 AIRWAY INHALATION TREATMENT: CPT

## 2018-12-18 PROCEDURE — 87086 URINE CULTURE/COLONY COUNT: CPT

## 2018-12-18 PROCEDURE — 87077 CULTURE AEROBIC IDENTIFY: CPT

## 2018-12-18 PROCEDURE — 2580000003 HC RX 258: Performed by: EMERGENCY MEDICINE

## 2018-12-18 PROCEDURE — 87186 SC STD MICRODIL/AGAR DIL: CPT

## 2018-12-18 PROCEDURE — 83690 ASSAY OF LIPASE: CPT

## 2018-12-18 PROCEDURE — 99285 EMERGENCY DEPT VISIT HI MDM: CPT

## 2018-12-18 PROCEDURE — 74176 CT ABD & PELVIS W/O CONTRAST: CPT

## 2018-12-18 PROCEDURE — 71045 X-RAY EXAM CHEST 1 VIEW: CPT

## 2018-12-18 PROCEDURE — 70450 CT HEAD/BRAIN W/O DYE: CPT

## 2018-12-18 PROCEDURE — 96365 THER/PROPH/DIAG IV INF INIT: CPT

## 2018-12-18 PROCEDURE — 96367 TX/PROPH/DG ADDL SEQ IV INF: CPT

## 2018-12-18 PROCEDURE — 82550 ASSAY OF CK (CPK): CPT

## 2018-12-18 PROCEDURE — 84484 ASSAY OF TROPONIN QUANT: CPT

## 2018-12-18 PROCEDURE — 2580000003 HC RX 258: Performed by: INTERNAL MEDICINE

## 2018-12-18 PROCEDURE — 82962 GLUCOSE BLOOD TEST: CPT

## 2018-12-18 PROCEDURE — 96375 TX/PRO/DX INJ NEW DRUG ADDON: CPT

## 2018-12-18 PROCEDURE — 85025 COMPLETE CBC W/AUTO DIFF WBC: CPT

## 2018-12-18 PROCEDURE — 6360000002 HC RX W HCPCS: Performed by: INTERNAL MEDICINE

## 2018-12-18 PROCEDURE — 87040 BLOOD CULTURE FOR BACTERIA: CPT

## 2018-12-18 PROCEDURE — 1200000000 HC SEMI PRIVATE

## 2018-12-18 PROCEDURE — 2700000000 HC OXYGEN THERAPY PER DAY

## 2018-12-18 PROCEDURE — 94761 N-INVAS EAR/PLS OXIMETRY MLT: CPT

## 2018-12-18 PROCEDURE — 83605 ASSAY OF LACTIC ACID: CPT

## 2018-12-18 PROCEDURE — 84443 ASSAY THYROID STIM HORMONE: CPT

## 2018-12-18 RX ORDER — ATORVASTATIN CALCIUM 40 MG/1
40 TABLET, FILM COATED ORAL DAILY
COMMUNITY
End: 2019-06-28

## 2018-12-18 RX ORDER — CARBAMAZEPINE 300 MG/1
300 CAPSULE, EXTENDED RELEASE ORAL 2 TIMES DAILY
COMMUNITY
End: 2019-03-01 | Stop reason: SDUPTHER

## 2018-12-18 RX ORDER — SODIUM CHLORIDE 9 MG/ML
INJECTION, SOLUTION INTRAVENOUS CONTINUOUS
Status: DISCONTINUED | OUTPATIENT
Start: 2018-12-18 | End: 2018-12-20

## 2018-12-18 RX ORDER — LACTOBACILLUS RHAMNOSUS GG 10B CELL
1 CAPSULE ORAL DAILY
Status: DISCONTINUED | OUTPATIENT
Start: 2018-12-18 | End: 2018-12-21 | Stop reason: HOSPADM

## 2018-12-18 RX ORDER — ACETAMINOPHEN 500 MG
1000 TABLET ORAL ONCE
Status: COMPLETED | OUTPATIENT
Start: 2018-12-18 | End: 2018-12-18

## 2018-12-18 RX ORDER — 0.9 % SODIUM CHLORIDE 0.9 %
1000 INTRAVENOUS SOLUTION INTRAVENOUS ONCE
Status: COMPLETED | OUTPATIENT
Start: 2018-12-18 | End: 2018-12-18

## 2018-12-18 RX ORDER — FLUTICASONE PROPIONATE 50 MCG
1 SPRAY, SUSPENSION (ML) NASAL DAILY
COMMUNITY
End: 2020-01-03 | Stop reason: SDUPTHER

## 2018-12-18 RX ORDER — ACETAMINOPHEN 650 MG/1
650 SUPPOSITORY RECTAL ONCE
Status: DISCONTINUED | OUTPATIENT
Start: 2018-12-18 | End: 2018-12-18

## 2018-12-18 RX ORDER — DEXTROSE MONOHYDRATE 50 MG/ML
100 INJECTION, SOLUTION INTRAVENOUS PRN
Status: DISCONTINUED | OUTPATIENT
Start: 2018-12-18 | End: 2018-12-21 | Stop reason: HOSPADM

## 2018-12-18 RX ORDER — NICOTINE POLACRILEX 4 MG
15 LOZENGE BUCCAL PRN
Status: DISCONTINUED | OUTPATIENT
Start: 2018-12-18 | End: 2018-12-21 | Stop reason: HOSPADM

## 2018-12-18 RX ORDER — CETIRIZINE HYDROCHLORIDE 10 MG/1
10 TABLET ORAL DAILY
Status: DISCONTINUED | OUTPATIENT
Start: 2018-12-18 | End: 2018-12-21 | Stop reason: HOSPADM

## 2018-12-18 RX ORDER — SODIUM CHLORIDE 0.9 % (FLUSH) 0.9 %
10 SYRINGE (ML) INJECTION EVERY 12 HOURS SCHEDULED
Status: DISCONTINUED | OUTPATIENT
Start: 2018-12-18 | End: 2018-12-21 | Stop reason: HOSPADM

## 2018-12-18 RX ORDER — LACTULOSE 10 G/15ML
20 SOLUTION ORAL 3 TIMES DAILY PRN
Status: ON HOLD | COMMUNITY
End: 2021-09-25 | Stop reason: HOSPADM

## 2018-12-18 RX ORDER — LEVETIRACETAM 500 MG/1
1500 TABLET ORAL 2 TIMES DAILY
Status: DISCONTINUED | OUTPATIENT
Start: 2018-12-18 | End: 2018-12-21 | Stop reason: HOSPADM

## 2018-12-18 RX ORDER — BUSPIRONE HYDROCHLORIDE 10 MG/1
10 TABLET ORAL 2 TIMES DAILY
Status: DISCONTINUED | OUTPATIENT
Start: 2018-12-18 | End: 2018-12-21 | Stop reason: HOSPADM

## 2018-12-18 RX ORDER — FLUTICASONE PROPIONATE 50 MCG
1 SPRAY, SUSPENSION (ML) NASAL NIGHTLY
Status: DISCONTINUED | OUTPATIENT
Start: 2018-12-18 | End: 2018-12-21 | Stop reason: HOSPADM

## 2018-12-18 RX ORDER — IPRATROPIUM BROMIDE AND ALBUTEROL SULFATE 2.5; .5 MG/3ML; MG/3ML
1 SOLUTION RESPIRATORY (INHALATION)
Status: DISCONTINUED | OUTPATIENT
Start: 2018-12-18 | End: 2018-12-21 | Stop reason: HOSPADM

## 2018-12-18 RX ORDER — DIVALPROEX SODIUM 500 MG/1
2000 TABLET, DELAYED RELEASE ORAL NIGHTLY
Status: DISCONTINUED | OUTPATIENT
Start: 2018-12-18 | End: 2018-12-21 | Stop reason: HOSPADM

## 2018-12-18 RX ORDER — CLOPIDOGREL 300 MG/1
75 TABLET, FILM COATED ORAL DAILY
Status: ON HOLD | COMMUNITY
End: 2018-12-18

## 2018-12-18 RX ORDER — RISPERIDONE 0.5 MG/1
1 TABLET, FILM COATED ORAL 3 TIMES DAILY
Status: DISCONTINUED | OUTPATIENT
Start: 2018-12-18 | End: 2018-12-21 | Stop reason: HOSPADM

## 2018-12-18 RX ORDER — AMLODIPINE BESYLATE 5 MG/1
5 TABLET ORAL DAILY
Status: DISCONTINUED | OUTPATIENT
Start: 2018-12-18 | End: 2018-12-21 | Stop reason: HOSPADM

## 2018-12-18 RX ORDER — LEVOFLOXACIN 5 MG/ML
500 INJECTION, SOLUTION INTRAVENOUS EVERY 24 HOURS
Status: DISCONTINUED | OUTPATIENT
Start: 2018-12-18 | End: 2018-12-21 | Stop reason: HOSPADM

## 2018-12-18 RX ORDER — CARBAMAZEPINE 300 MG/1
300 CAPSULE, EXTENDED RELEASE ORAL 2 TIMES DAILY
Status: DISCONTINUED | OUTPATIENT
Start: 2018-12-18 | End: 2018-12-21 | Stop reason: HOSPADM

## 2018-12-18 RX ORDER — LEVOTHYROXINE SODIUM 0.05 MG/1
50 TABLET ORAL DAILY
Status: DISCONTINUED | OUTPATIENT
Start: 2018-12-19 | End: 2018-12-21 | Stop reason: HOSPADM

## 2018-12-18 RX ORDER — ONDANSETRON 2 MG/ML
4 INJECTION INTRAMUSCULAR; INTRAVENOUS ONCE
Status: COMPLETED | OUTPATIENT
Start: 2018-12-18 | End: 2018-12-18

## 2018-12-18 RX ORDER — IPRATROPIUM BROMIDE AND ALBUTEROL SULFATE 2.5; .5 MG/3ML; MG/3ML
1 SOLUTION RESPIRATORY (INHALATION) ONCE
Status: COMPLETED | OUTPATIENT
Start: 2018-12-18 | End: 2018-12-18

## 2018-12-18 RX ORDER — LISINOPRIL 40 MG/1
40 TABLET ORAL DAILY
Status: DISCONTINUED | OUTPATIENT
Start: 2018-12-18 | End: 2018-12-21 | Stop reason: HOSPADM

## 2018-12-18 RX ORDER — LEVOTHYROXINE SODIUM 0.05 MG/1
50 TABLET ORAL DAILY
Status: ON HOLD | COMMUNITY
End: 2021-10-17 | Stop reason: HOSPADM

## 2018-12-18 RX ORDER — SODIUM CHLORIDE 0.9 % (FLUSH) 0.9 %
10 SYRINGE (ML) INJECTION PRN
Status: DISCONTINUED | OUTPATIENT
Start: 2018-12-18 | End: 2018-12-21 | Stop reason: HOSPADM

## 2018-12-18 RX ORDER — PANTOPRAZOLE SODIUM 40 MG/1
40 TABLET, DELAYED RELEASE ORAL
Status: DISCONTINUED | OUTPATIENT
Start: 2018-12-18 | End: 2018-12-21 | Stop reason: HOSPADM

## 2018-12-18 RX ORDER — ATORVASTATIN CALCIUM 20 MG/1
20 TABLET, FILM COATED ORAL NIGHTLY
Status: DISCONTINUED | OUTPATIENT
Start: 2018-12-18 | End: 2018-12-21 | Stop reason: HOSPADM

## 2018-12-18 RX ORDER — CARVEDILOL 6.25 MG/1
6.25 TABLET ORAL 2 TIMES DAILY WITH MEALS
Status: ON HOLD | COMMUNITY
End: 2018-12-18

## 2018-12-18 RX ORDER — LISINOPRIL 40 MG/1
40 TABLET ORAL DAILY
COMMUNITY
End: 2019-05-20 | Stop reason: SDUPTHER

## 2018-12-18 RX ORDER — METHYLPREDNISOLONE SODIUM SUCCINATE 40 MG/ML
40 INJECTION, POWDER, LYOPHILIZED, FOR SOLUTION INTRAMUSCULAR; INTRAVENOUS DAILY
Status: DISCONTINUED | OUTPATIENT
Start: 2018-12-18 | End: 2018-12-21 | Stop reason: HOSPADM

## 2018-12-18 RX ORDER — YOHIMBE BARK 500 MG
1 CAPSULE ORAL DAILY
COMMUNITY
End: 2019-05-20 | Stop reason: SDUPTHER

## 2018-12-18 RX ORDER — DEXTROSE MONOHYDRATE 25 G/50ML
12.5 INJECTION, SOLUTION INTRAVENOUS PRN
Status: DISCONTINUED | OUTPATIENT
Start: 2018-12-18 | End: 2018-12-21 | Stop reason: HOSPADM

## 2018-12-18 RX ORDER — FUROSEMIDE 20 MG/1
20 TABLET ORAL DAILY
Status: DISCONTINUED | OUTPATIENT
Start: 2018-12-18 | End: 2018-12-21 | Stop reason: HOSPADM

## 2018-12-18 RX ORDER — ONDANSETRON 2 MG/ML
4 INJECTION INTRAMUSCULAR; INTRAVENOUS EVERY 6 HOURS PRN
Status: DISCONTINUED | OUTPATIENT
Start: 2018-12-18 | End: 2018-12-21 | Stop reason: HOSPADM

## 2018-12-18 RX ORDER — MIRTAZAPINE 15 MG/1
30 TABLET, FILM COATED ORAL NIGHTLY
Status: DISCONTINUED | OUTPATIENT
Start: 2018-12-18 | End: 2018-12-21 | Stop reason: HOSPADM

## 2018-12-18 RX ADMIN — IPRATROPIUM BROMIDE AND ALBUTEROL SULFATE 1 AMPULE: .5; 3 SOLUTION RESPIRATORY (INHALATION) at 11:18

## 2018-12-18 RX ADMIN — TAZOBACTAM SODIUM AND PIPERACILLIN SODIUM 3.38 G: 375; 3 INJECTION, SOLUTION INTRAVENOUS at 08:22

## 2018-12-18 RX ADMIN — FLUTICASONE PROPIONATE 1 SPRAY: 50 SPRAY, METERED NASAL at 22:01

## 2018-12-18 RX ADMIN — RISPERIDONE 1 MG: 0.5 TABLET, FILM COATED ORAL at 22:02

## 2018-12-18 RX ADMIN — ATORVASTATIN CALCIUM 20 MG: 20 TABLET, FILM COATED ORAL at 22:02

## 2018-12-18 RX ADMIN — METHYLPREDNISOLONE SODIUM SUCCINATE 40 MG: 40 INJECTION, POWDER, LYOPHILIZED, FOR SOLUTION INTRAMUSCULAR; INTRAVENOUS at 15:44

## 2018-12-18 RX ADMIN — SODIUM CHLORIDE 1000 ML: 9 INJECTION, SOLUTION INTRAVENOUS at 08:22

## 2018-12-18 RX ADMIN — MIRTAZAPINE 30 MG: 15 TABLET, FILM COATED ORAL at 22:02

## 2018-12-18 RX ADMIN — CARBAMAZEPINE 300 MG: 300 CAPSULE, EXTENDED RELEASE ORAL at 22:01

## 2018-12-18 RX ADMIN — ACETAMINOPHEN 1000 MG: 500 TABLET ORAL at 08:21

## 2018-12-18 RX ADMIN — IPRATROPIUM BROMIDE AND ALBUTEROL SULFATE 1 AMPULE: .5; 3 SOLUTION RESPIRATORY (INHALATION) at 16:36

## 2018-12-18 RX ADMIN — LEVOFLOXACIN 500 MG: 5 INJECTION, SOLUTION INTRAVENOUS at 15:44

## 2018-12-18 RX ADMIN — SODIUM CHLORIDE: 9 INJECTION, SOLUTION INTRAVENOUS at 13:19

## 2018-12-18 RX ADMIN — IPRATROPIUM BROMIDE AND ALBUTEROL SULFATE 1 AMPULE: .5; 3 SOLUTION RESPIRATORY (INHALATION) at 21:35

## 2018-12-18 RX ADMIN — DIVALPROEX SODIUM 2000 MG: 500 TABLET, DELAYED RELEASE ORAL at 22:01

## 2018-12-18 RX ADMIN — INSULIN LISPRO 1 UNITS: 100 INJECTION, SOLUTION INTRAVENOUS; SUBCUTANEOUS at 22:06

## 2018-12-18 RX ADMIN — BUSPIRONE HYDROCHLORIDE 10 MG: 10 TABLET ORAL at 22:02

## 2018-12-18 RX ADMIN — ONDANSETRON 4 MG: 2 INJECTION INTRAMUSCULAR; INTRAVENOUS at 08:21

## 2018-12-18 RX ADMIN — VANCOMYCIN HYDROCHLORIDE 1500 MG: 5 INJECTION, POWDER, LYOPHILIZED, FOR SOLUTION INTRAVENOUS at 09:49

## 2018-12-18 RX ADMIN — IPRATROPIUM BROMIDE AND ALBUTEROL SULFATE 1 AMPULE: .5; 3 SOLUTION RESPIRATORY (INHALATION) at 09:47

## 2018-12-18 RX ADMIN — SODIUM CHLORIDE, PRESERVATIVE FREE 10 ML: 5 INJECTION INTRAVENOUS at 22:02

## 2018-12-18 RX ADMIN — LEVETIRACETAM 1500 MG: 500 TABLET ORAL at 22:02

## 2018-12-18 ASSESSMENT — ENCOUNTER SYMPTOMS: COUGH: 1

## 2018-12-18 ASSESSMENT — PAIN SCALES - GENERAL
PAINLEVEL_OUTOF10: 0
PAINLEVEL_OUTOF10: 0

## 2018-12-19 ENCOUNTER — APPOINTMENT (OUTPATIENT)
Dept: GENERAL RADIOLOGY | Age: 58
DRG: 871 | End: 2018-12-19
Payer: MEDICARE

## 2018-12-19 LAB
ADENOVIRUS DETECTION BY PCR: NOT DETECTED
ANION GAP SERPL CALCULATED.3IONS-SCNC: 10 MMOL/L (ref 4–16)
BORDETELLA PERTUSSIS PCR: NOT DETECTED
BUN BLDV-MCNC: 4 MG/DL (ref 6–23)
CALCIUM SERPL-MCNC: 8.9 MG/DL (ref 8.3–10.6)
CHLAMYDOPHILA PNEUMONIA PCR: NOT DETECTED
CHLORIDE BLD-SCNC: 104 MMOL/L (ref 99–110)
CO2: 30 MMOL/L (ref 21–32)
CORONAVIRUS 229E PCR: NOT DETECTED
CORONAVIRUS HKU1 PCR: NOT DETECTED
CORONAVIRUS NL63 PCR: NOT DETECTED
CORONAVIRUS OC43 PCR: NOT DETECTED
CREAT SERPL-MCNC: 0.5 MG/DL (ref 0.6–1.1)
GFR AFRICAN AMERICAN: >60 ML/MIN/1.73M2
GFR NON-AFRICAN AMERICAN: >60 ML/MIN/1.73M2
GLUCOSE BLD-MCNC: 137 MG/DL (ref 70–99)
GLUCOSE BLD-MCNC: 143 MG/DL (ref 70–99)
HCT VFR BLD CALC: 39.1 % (ref 37–47)
HEMOGLOBIN: 11.8 GM/DL (ref 12.5–16)
HUMAN METAPNEUMOVIRUS PCR: ABNORMAL
INFLUENZA A BY PCR: NOT DETECTED
INFLUENZA A H1 (2009) PCR: NOT DETECTED
INFLUENZA A H1 PANDEMIC PCR: NOT DETECTED
INFLUENZA A H3 PCR: NOT DETECTED
INFLUENZA B BY PCR: NOT DETECTED
MCH RBC QN AUTO: 30.6 PG (ref 27–31)
MCHC RBC AUTO-ENTMCNC: 30.2 % (ref 32–36)
MCV RBC AUTO: 101.3 FL (ref 78–100)
MYCOPLASMA PNEUMONIAE PCR: NOT DETECTED
PARAINFLUENZA 1 PCR: NOT DETECTED
PARAINFLUENZA 2 PCR: NOT DETECTED
PARAINFLUENZA 3 PCR: NOT DETECTED
PARAINFLUENZA 4 PCR: NOT DETECTED
PDW BLD-RTO: 12 % (ref 11.7–14.9)
PLATELET # BLD: 199 K/CU MM (ref 140–440)
PMV BLD AUTO: 8.4 FL (ref 7.5–11.1)
POTASSIUM SERPL-SCNC: 4.3 MMOL/L (ref 3.5–5.1)
RBC # BLD: 3.86 M/CU MM (ref 4.2–5.4)
RHINOVIRUS ENTEROVIRUS PCR: NOT DETECTED
RSV PCR: NOT DETECTED
SODIUM BLD-SCNC: 144 MMOL/L (ref 135–145)
WBC # BLD: 5.1 K/CU MM (ref 4–10.5)

## 2018-12-19 PROCEDURE — 1200000000 HC SEMI PRIVATE

## 2018-12-19 PROCEDURE — 80048 BASIC METABOLIC PNL TOTAL CA: CPT

## 2018-12-19 PROCEDURE — 2700000000 HC OXYGEN THERAPY PER DAY

## 2018-12-19 PROCEDURE — 94761 N-INVAS EAR/PLS OXIMETRY MLT: CPT

## 2018-12-19 PROCEDURE — 2580000003 HC RX 258: Performed by: INTERNAL MEDICINE

## 2018-12-19 PROCEDURE — 6360000002 HC RX W HCPCS: Performed by: INTERNAL MEDICINE

## 2018-12-19 PROCEDURE — 71046 X-RAY EXAM CHEST 2 VIEWS: CPT

## 2018-12-19 PROCEDURE — 93010 ELECTROCARDIOGRAM REPORT: CPT | Performed by: INTERNAL MEDICINE

## 2018-12-19 PROCEDURE — 87798 DETECT AGENT NOS DNA AMP: CPT

## 2018-12-19 PROCEDURE — 82962 GLUCOSE BLOOD TEST: CPT

## 2018-12-19 PROCEDURE — 6370000000 HC RX 637 (ALT 250 FOR IP): Performed by: INTERNAL MEDICINE

## 2018-12-19 PROCEDURE — 36415 COLL VENOUS BLD VENIPUNCTURE: CPT

## 2018-12-19 PROCEDURE — 85027 COMPLETE CBC AUTOMATED: CPT

## 2018-12-19 PROCEDURE — 94640 AIRWAY INHALATION TREATMENT: CPT

## 2018-12-19 RX ADMIN — PANTOPRAZOLE SODIUM 40 MG: 40 TABLET, DELAYED RELEASE ORAL at 05:54

## 2018-12-19 RX ADMIN — CARBAMAZEPINE 300 MG: 300 CAPSULE, EXTENDED RELEASE ORAL at 10:02

## 2018-12-19 RX ADMIN — CETIRIZINE HYDROCHLORIDE 10 MG: 10 TABLET, FILM COATED ORAL at 09:55

## 2018-12-19 RX ADMIN — ENOXAPARIN SODIUM 40 MG: 40 INJECTION SUBCUTANEOUS at 09:55

## 2018-12-19 RX ADMIN — CARBAMAZEPINE 300 MG: 300 CAPSULE, EXTENDED RELEASE ORAL at 22:16

## 2018-12-19 RX ADMIN — RISPERIDONE 1 MG: 0.5 TABLET, FILM COATED ORAL at 22:16

## 2018-12-19 RX ADMIN — LEVOTHYROXINE SODIUM 50 MCG: 50 TABLET ORAL at 05:54

## 2018-12-19 RX ADMIN — SODIUM CHLORIDE: 9 INJECTION, SOLUTION INTRAVENOUS at 02:25

## 2018-12-19 RX ADMIN — METHYLPREDNISOLONE SODIUM SUCCINATE 40 MG: 40 INJECTION, POWDER, LYOPHILIZED, FOR SOLUTION INTRAMUSCULAR; INTRAVENOUS at 09:56

## 2018-12-19 RX ADMIN — SODIUM CHLORIDE: 9 INJECTION, SOLUTION INTRAVENOUS at 22:16

## 2018-12-19 RX ADMIN — AMLODIPINE BESYLATE 5 MG: 5 TABLET ORAL at 09:54

## 2018-12-19 RX ADMIN — SODIUM CHLORIDE, PRESERVATIVE FREE 10 ML: 5 INJECTION INTRAVENOUS at 22:18

## 2018-12-19 RX ADMIN — DIVALPROEX SODIUM 2000 MG: 500 TABLET, DELAYED RELEASE ORAL at 22:16

## 2018-12-19 RX ADMIN — IPRATROPIUM BROMIDE AND ALBUTEROL SULFATE 1 AMPULE: .5; 3 SOLUTION RESPIRATORY (INHALATION) at 08:44

## 2018-12-19 RX ADMIN — MIRTAZAPINE 30 MG: 15 TABLET, FILM COATED ORAL at 22:16

## 2018-12-19 RX ADMIN — FUROSEMIDE 20 MG: 20 TABLET ORAL at 09:55

## 2018-12-19 RX ADMIN — LISINOPRIL 40 MG: 40 TABLET ORAL at 09:54

## 2018-12-19 RX ADMIN — LEVETIRACETAM 1500 MG: 500 TABLET ORAL at 09:54

## 2018-12-19 RX ADMIN — FLUTICASONE PROPIONATE 1 SPRAY: 50 SPRAY, METERED NASAL at 22:16

## 2018-12-19 RX ADMIN — IPRATROPIUM BROMIDE AND ALBUTEROL SULFATE 1 AMPULE: .5; 3 SOLUTION RESPIRATORY (INHALATION) at 15:34

## 2018-12-19 RX ADMIN — LEVOFLOXACIN 500 MG: 5 INJECTION, SOLUTION INTRAVENOUS at 09:55

## 2018-12-19 RX ADMIN — IPRATROPIUM BROMIDE AND ALBUTEROL SULFATE 1 AMPULE: .5; 3 SOLUTION RESPIRATORY (INHALATION) at 20:05

## 2018-12-19 RX ADMIN — ATORVASTATIN CALCIUM 20 MG: 20 TABLET, FILM COATED ORAL at 22:17

## 2018-12-19 RX ADMIN — Medication 1 CAPSULE: at 09:54

## 2018-12-19 RX ADMIN — RISPERIDONE 1 MG: 0.5 TABLET, FILM COATED ORAL at 16:37

## 2018-12-19 RX ADMIN — BUSPIRONE HYDROCHLORIDE 10 MG: 10 TABLET ORAL at 09:54

## 2018-12-19 RX ADMIN — LINAGLIPTIN 5 MG: 5 TABLET, FILM COATED ORAL at 09:54

## 2018-12-19 RX ADMIN — RISPERIDONE 1 MG: 0.5 TABLET, FILM COATED ORAL at 09:54

## 2018-12-19 RX ADMIN — IPRATROPIUM BROMIDE AND ALBUTEROL SULFATE 1 AMPULE: .5; 3 SOLUTION RESPIRATORY (INHALATION) at 12:15

## 2018-12-19 RX ADMIN — LEVETIRACETAM 1500 MG: 500 TABLET ORAL at 22:16

## 2018-12-19 RX ADMIN — BUSPIRONE HYDROCHLORIDE 10 MG: 10 TABLET ORAL at 22:17

## 2018-12-20 ENCOUNTER — HOSPITAL ENCOUNTER (OUTPATIENT)
Dept: PHYSICAL THERAPY | Age: 58
Discharge: HOME | End: 2018-12-20

## 2018-12-20 DIAGNOSIS — E78.2 MIXED HYPERLIPIDEMIA: ICD-10-CM

## 2018-12-20 LAB
ANION GAP SERPL CALCULATED.3IONS-SCNC: 13 MMOL/L (ref 4–16)
BUN BLDV-MCNC: 8 MG/DL (ref 6–23)
CALCIUM SERPL-MCNC: 8.8 MG/DL (ref 8.3–10.6)
CHLORIDE BLD-SCNC: 104 MMOL/L (ref 99–110)
CO2: 31 MMOL/L (ref 21–32)
CREAT SERPL-MCNC: 0.5 MG/DL (ref 0.6–1.1)
CULTURE: NORMAL
GFR AFRICAN AMERICAN: >60 ML/MIN/1.73M2
GFR NON-AFRICAN AMERICAN: >60 ML/MIN/1.73M2
GLUCOSE BLD-MCNC: 140 MG/DL (ref 70–99)
GLUCOSE BLD-MCNC: 183 MG/DL (ref 70–99)
GLUCOSE BLD-MCNC: 210 MG/DL (ref 70–99)
HCT VFR BLD CALC: 39 % (ref 37–47)
HEMOGLOBIN: 11.9 GM/DL (ref 12.5–16)
Lab: NORMAL
MCH RBC QN AUTO: 30.7 PG (ref 27–31)
MCHC RBC AUTO-ENTMCNC: 30.5 % (ref 32–36)
MCV RBC AUTO: 100.8 FL (ref 78–100)
ORGANISM: NORMAL
PDW BLD-RTO: 12.2 % (ref 11.7–14.9)
PLATELET # BLD: 209 K/CU MM (ref 140–440)
PMV BLD AUTO: 8.3 FL (ref 7.5–11.1)
POTASSIUM SERPL-SCNC: 3.7 MMOL/L (ref 3.5–5.1)
RBC # BLD: 3.87 M/CU MM (ref 4.2–5.4)
REPORT STATUS: NORMAL
SODIUM BLD-SCNC: 148 MMOL/L (ref 135–145)
SPECIMEN: NORMAL
TOTAL COLONY COUNT: NORMAL
WBC # BLD: 4.5 K/CU MM (ref 4–10.5)

## 2018-12-20 PROCEDURE — 80048 BASIC METABOLIC PNL TOTAL CA: CPT

## 2018-12-20 PROCEDURE — 94761 N-INVAS EAR/PLS OXIMETRY MLT: CPT

## 2018-12-20 PROCEDURE — G8979 MOBILITY GOAL STATUS: HCPCS

## 2018-12-20 PROCEDURE — 36415 COLL VENOUS BLD VENIPUNCTURE: CPT

## 2018-12-20 PROCEDURE — 6370000000 HC RX 637 (ALT 250 FOR IP): Performed by: INTERNAL MEDICINE

## 2018-12-20 PROCEDURE — 94760 N-INVAS EAR/PLS OXIMETRY 1: CPT

## 2018-12-20 PROCEDURE — 2700000000 HC OXYGEN THERAPY PER DAY

## 2018-12-20 PROCEDURE — 82962 GLUCOSE BLOOD TEST: CPT

## 2018-12-20 PROCEDURE — 94640 AIRWAY INHALATION TREATMENT: CPT

## 2018-12-20 PROCEDURE — 85027 COMPLETE CBC AUTOMATED: CPT

## 2018-12-20 PROCEDURE — 2580000003 HC RX 258: Performed by: INTERNAL MEDICINE

## 2018-12-20 PROCEDURE — 1200000000 HC SEMI PRIVATE

## 2018-12-20 PROCEDURE — 6360000002 HC RX W HCPCS: Performed by: INTERNAL MEDICINE

## 2018-12-20 PROCEDURE — G8980 MOBILITY D/C STATUS: HCPCS

## 2018-12-20 RX ORDER — PANTOPRAZOLE SODIUM 40 MG/1
40 TABLET, DELAYED RELEASE ORAL
Qty: 30 TABLET | Refills: 3 | Status: SHIPPED | OUTPATIENT
Start: 2018-12-21 | End: 2019-03-21 | Stop reason: SDUPTHER

## 2018-12-20 RX ORDER — FUROSEMIDE 10 MG/ML
40 INJECTION INTRAMUSCULAR; INTRAVENOUS ONCE
Status: COMPLETED | OUTPATIENT
Start: 2018-12-20 | End: 2018-12-20

## 2018-12-20 RX ORDER — PREDNISONE 20 MG/1
40 TABLET ORAL DAILY
Qty: 20 TABLET | Refills: 0 | Status: ON HOLD | OUTPATIENT
Start: 2018-12-20 | End: 2018-12-26 | Stop reason: HOSPADM

## 2018-12-20 RX ORDER — LEVOFLOXACIN 500 MG/1
500 TABLET, FILM COATED ORAL DAILY
Qty: 5 TABLET | Refills: 0 | Status: ON HOLD | OUTPATIENT
Start: 2018-12-20 | End: 2018-12-26 | Stop reason: HOSPADM

## 2018-12-20 RX ADMIN — INSULIN LISPRO 1 UNITS: 100 INJECTION, SOLUTION INTRAVENOUS; SUBCUTANEOUS at 21:48

## 2018-12-20 RX ADMIN — LEVETIRACETAM 1500 MG: 500 TABLET ORAL at 09:10

## 2018-12-20 RX ADMIN — MIRTAZAPINE 30 MG: 15 TABLET, FILM COATED ORAL at 21:05

## 2018-12-20 RX ADMIN — INSULIN LISPRO 4 UNITS: 100 INJECTION, SOLUTION INTRAVENOUS; SUBCUTANEOUS at 18:45

## 2018-12-20 RX ADMIN — FUROSEMIDE 40 MG: 10 INJECTION, SOLUTION INTRAMUSCULAR; INTRAVENOUS at 14:41

## 2018-12-20 RX ADMIN — RISPERIDONE 1 MG: 0.5 TABLET, FILM COATED ORAL at 09:10

## 2018-12-20 RX ADMIN — IPRATROPIUM BROMIDE AND ALBUTEROL SULFATE 1 AMPULE: .5; 3 SOLUTION RESPIRATORY (INHALATION) at 15:38

## 2018-12-20 RX ADMIN — METHYLPREDNISOLONE SODIUM SUCCINATE 40 MG: 40 INJECTION, POWDER, LYOPHILIZED, FOR SOLUTION INTRAMUSCULAR; INTRAVENOUS at 09:10

## 2018-12-20 RX ADMIN — BUSPIRONE HYDROCHLORIDE 10 MG: 10 TABLET ORAL at 21:05

## 2018-12-20 RX ADMIN — ATORVASTATIN CALCIUM 20 MG: 20 TABLET, FILM COATED ORAL at 21:05

## 2018-12-20 RX ADMIN — FUROSEMIDE 20 MG: 20 TABLET ORAL at 09:10

## 2018-12-20 RX ADMIN — FLUTICASONE PROPIONATE 1 SPRAY: 50 SPRAY, METERED NASAL at 21:11

## 2018-12-20 RX ADMIN — ENOXAPARIN SODIUM 40 MG: 40 INJECTION SUBCUTANEOUS at 09:16

## 2018-12-20 RX ADMIN — IPRATROPIUM BROMIDE AND ALBUTEROL SULFATE 1 AMPULE: .5; 3 SOLUTION RESPIRATORY (INHALATION) at 12:49

## 2018-12-20 RX ADMIN — SODIUM CHLORIDE, PRESERVATIVE FREE 10 ML: 5 INJECTION INTRAVENOUS at 21:06

## 2018-12-20 RX ADMIN — LEVETIRACETAM 1500 MG: 500 TABLET ORAL at 21:05

## 2018-12-20 RX ADMIN — BUSPIRONE HYDROCHLORIDE 10 MG: 10 TABLET ORAL at 09:10

## 2018-12-20 RX ADMIN — RISPERIDONE 1 MG: 0.5 TABLET, FILM COATED ORAL at 12:51

## 2018-12-20 RX ADMIN — LEVOFLOXACIN 500 MG: 5 INJECTION, SOLUTION INTRAVENOUS at 12:51

## 2018-12-20 RX ADMIN — CARBAMAZEPINE 300 MG: 300 CAPSULE, EXTENDED RELEASE ORAL at 21:06

## 2018-12-20 RX ADMIN — PANTOPRAZOLE SODIUM 40 MG: 40 TABLET, DELAYED RELEASE ORAL at 09:16

## 2018-12-20 RX ADMIN — LEVOTHYROXINE SODIUM 50 MCG: 50 TABLET ORAL at 09:15

## 2018-12-20 RX ADMIN — CARBAMAZEPINE 300 MG: 300 CAPSULE, EXTENDED RELEASE ORAL at 09:15

## 2018-12-20 RX ADMIN — CETIRIZINE HYDROCHLORIDE 10 MG: 10 TABLET, FILM COATED ORAL at 09:10

## 2018-12-20 RX ADMIN — LISINOPRIL 40 MG: 40 TABLET ORAL at 09:10

## 2018-12-20 RX ADMIN — AMLODIPINE BESYLATE 5 MG: 5 TABLET ORAL at 09:10

## 2018-12-20 RX ADMIN — DIVALPROEX SODIUM 2000 MG: 500 TABLET, DELAYED RELEASE ORAL at 21:05

## 2018-12-20 RX ADMIN — RISPERIDONE 1 MG: 0.5 TABLET, FILM COATED ORAL at 21:05

## 2018-12-20 RX ADMIN — LINAGLIPTIN 5 MG: 5 TABLET, FILM COATED ORAL at 09:10

## 2018-12-20 RX ADMIN — Medication 1 CAPSULE: at 09:11

## 2018-12-20 RX ADMIN — IPRATROPIUM BROMIDE AND ALBUTEROL SULFATE 1 AMPULE: .5; 3 SOLUTION RESPIRATORY (INHALATION) at 19:11

## 2018-12-20 NOTE — FLOWSHEET NOTE
Physical Therapy  Cancellation/No-show Note  Patient Name:  Mckenzie Garcia  :  1960   Date:  2018  Cancelled visits to date: 4  No-shows to date: 0    For today's appointment patient:  [x]  Cancelled  []  Rescheduled appointment  []  No-show     Reason given by patient:  []  Patient ill  []  Conflicting appointment  []  No transportation    []  Conflict with work  []  No reason given  [x]  Other: In hospital/will discharge   Comments:      Electronically signed by:  Kaushal Ortiz,  2018, 10:43 AM

## 2018-12-20 NOTE — FLOWSHEET NOTE
Physical Therapy Daily Treatment Note  DISCHARGE  Date:  2018    Patient Name:  Cyril Bardales    :  1960  MRN: [id number]  Other position/activity restrictions: fall risk, left hemiplegia   Diagnosis: Left hemiplegia (remote CVA )  Treatment Diagnosis: left hemiplegia, postural deficits, impaired ROM left hip/bilat heel cords, impaired mobility and function  Date of injury/Date of Surgery:   PT Insurance Information: [employer], Medicare, Secondary Medicaid  Referring Practitioner: Bryan Hauser  Referring Practitioner Follow-Up:   Unknown  POC Signed: pending  POC Date Range:  18 - 19  Progress Note Due:  Every 10 visits  Visit# / total visits:                     G-Code Selection: (On Eval and every 10th visit or Discharge)    MEASURE    [x] Mobility: Walking and Moving Around     [] Current ()   [x] Goal ()   [x] DC ()  [] Changing/Maintaining Body Position     [] Current (2609)      [] Goal ()   [] DC ()  [] Carrying / Moving / Handling Objects     [] Current ()   [] Goal ()   [] DC ()  [] Self-Care     [] Current ()   [] Goal ()   [] DC ()  [] Other PT/OT primary DX     [] Current ()   [] Goal ()   [] DC ()    SEVERITY    CURRENT  GOAL  DISCHARGE   [] CH (0% Impaired, Indep.)  [] CI (1-19% Impaired, SBA-CGA)  [] CJ (20-39% Impaired, MIN A)  [] CK  (40-59% Impairment, Mod A)  [] CL  (60-79% Impairment, Max A)  [] CM  (80-99% Impairment, Dep.)   [] CN  (100% Impairment, Tot Dep.) [] CH (0% Impaired, Indep.)  [] CI (1-19% Impaired, SBA-CGA)  [] CJ (20-39% Impaired, MIN A)  [] CK  (40-59% Impairment, Mod A)  [x] CL  (60-79% Impairment, Max A)  [] CM  (80-99% Impairment, Dep.)   [] CN  (100% Impairment, Tot Dep.)  [] CH (0% Impaired, Indep.)  [] CI (1-19% Impaired, SBA-CGA)  [] CJ (20-39% Impaired, MIN A)  [] CK  (40-59% Impairment, Mod A)  [] CL  (60-79% Impairment, Max A)  [x] CM  (80-99% Impairment, Dep.)   [] CN  (100% Impairment, Tot Dep.)            Goals:     Long term goals  Time Frame for Long term goals : in 8 weeks, patient will  Long term goal 1: perform HEP w/caregiver assist.  Long term goal 2: perform sit><stand transfers w/Min to Mod Assist x 1 person, demonstrating safe technique. Long term goal 3: perform SPTs Mod assist x 1 person, demonstrating safe technique. Long term goal 4: ambulate >= 15 feet Mod assist x 1 plus WC follow. Patient goals : Walk short distances w/help; improve transfers    Summary of Eval:   Patient is a 66 y.o. female w/remote hx CVA x 2 in 2014.  Pt also has co-morbid DX, including but not limited to mental disability, cerebral palsy.  Today, patient was able to answer simple, non-complex questions such as name, birth date, city and street where she lives and why she came for this appointment. Maritza Alfonso, patient's caregiver provided more substantive information regarding PLOF/Current LOF, assist level required at home.  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  PLOF: Eh Cuellar to [facility] in 2014, patient was walking w/o AD and able to assist more w/personal care. Dipika Gómez has had 24/7 care since being in [address] x approx 5 years. Eh Cuellar to that, patient was institutionalized as [place].  Current LOF:  Patient's main means of mobility is via standard WC, which she can propel short distances.  She had been walking short distances (<= 10ft) w/2 person assist up to approx 1 mo ago, requires nearly total assist for ADLs and Mod to Total A for transfers depending on patient's ability or willingness to participate.  Pt is able to feed self/drink from a cup after set-up.  It is patient and caregiver(s) goal(s) to improve upon transfers and short distance gait.   Treatment Diagnosis: left Nu-Step  Mat core and LE strengthening (needs a wedge/cannot lie flat)      Plan:  DISCHARGE D/T HOSPITALIZATION      Electronically signed by:  Alec Sauceda, PT 12/20/2018, 5:34 PM

## 2018-12-21 ENCOUNTER — TELEPHONE (OUTPATIENT)
Dept: FAMILY MEDICINE CLINIC | Age: 58
End: 2018-12-21

## 2018-12-21 ENCOUNTER — HOSPITAL ENCOUNTER (INPATIENT)
Age: 58
LOS: 6 days | Discharge: HOME HEALTH | DRG: 871 | End: 2018-12-27
Attending: EMERGENCY MEDICINE | Admitting: INTERNAL MEDICINE
Payer: MEDICARE

## 2018-12-21 ENCOUNTER — APPOINTMENT (OUTPATIENT)
Dept: GENERAL RADIOLOGY | Age: 58
DRG: 871 | End: 2018-12-21
Payer: MEDICARE

## 2018-12-21 ENCOUNTER — APPOINTMENT (OUTPATIENT)
Dept: CT IMAGING | Age: 58
DRG: 871 | End: 2018-12-21
Payer: MEDICARE

## 2018-12-21 VITALS
SYSTOLIC BLOOD PRESSURE: 148 MMHG | TEMPERATURE: 98.8 F | RESPIRATION RATE: 20 BRPM | DIASTOLIC BLOOD PRESSURE: 66 MMHG | HEIGHT: 61 IN | HEART RATE: 95 BPM | OXYGEN SATURATION: 91 % | WEIGHT: 135.4 LBS | BODY MASS INDEX: 25.57 KG/M2

## 2018-12-21 DIAGNOSIS — R06.03 RESPIRATORY DISTRESS: Primary | ICD-10-CM

## 2018-12-21 DIAGNOSIS — J44.9 CHRONIC OBSTRUCTIVE PULMONARY DISEASE, UNSPECIFIED COPD TYPE (HCC): ICD-10-CM

## 2018-12-21 DIAGNOSIS — J12.3 HUMAN METAPNEUMOVIRUS PNEUMONIA: ICD-10-CM

## 2018-12-21 PROBLEM — J20.9 ACUTE BRONCHITIS: Status: ACTIVE | Noted: 2018-12-21

## 2018-12-21 LAB
ADENOVIRUS DETECTION BY PCR: NOT DETECTED
ALBUMIN SERPL-MCNC: 3.2 GM/DL (ref 3.4–5)
ALP BLD-CCNC: 66 IU/L (ref 40–129)
ALT SERPL-CCNC: 7 U/L (ref 10–40)
ANION GAP SERPL CALCULATED.3IONS-SCNC: 11 MMOL/L (ref 4–16)
ANION GAP SERPL CALCULATED.3IONS-SCNC: 15 MMOL/L (ref 4–16)
AST SERPL-CCNC: 12 IU/L (ref 15–37)
BACTERIA: NEGATIVE /HPF
BASOPHILS ABSOLUTE: 0 K/CU MM
BASOPHILS RELATIVE PERCENT: 0.3 % (ref 0–1)
BILIRUB SERPL-MCNC: 0.1 MG/DL (ref 0–1)
BILIRUBIN URINE: NEGATIVE MG/DL
BLOOD, URINE: NORMAL
BORDETELLA PERTUSSIS PCR: NOT DETECTED
BUN BLDV-MCNC: 12 MG/DL (ref 6–23)
BUN BLDV-MCNC: 13 MG/DL (ref 6–23)
CALCIUM SERPL-MCNC: 8.6 MG/DL (ref 8.3–10.6)
CALCIUM SERPL-MCNC: 8.7 MG/DL (ref 8.3–10.6)
CHLAMYDOPHILA PNEUMONIA PCR: NOT DETECTED
CHLORIDE BLD-SCNC: 101 MMOL/L (ref 99–110)
CHLORIDE BLD-SCNC: 97 MMOL/L (ref 99–110)
CLARITY: CLEAR
CO2: 28 MMOL/L (ref 21–32)
CO2: 32 MMOL/L (ref 21–32)
COLOR: YELLOW
CORONAVIRUS 229E PCR: NOT DETECTED
CORONAVIRUS HKU1 PCR: NOT DETECTED
CORONAVIRUS NL63 PCR: NOT DETECTED
CORONAVIRUS OC43 PCR: NOT DETECTED
CREAT SERPL-MCNC: 0.5 MG/DL (ref 0.6–1.1)
CREAT SERPL-MCNC: 0.5 MG/DL (ref 0.6–1.1)
DIFFERENTIAL TYPE: ABNORMAL
EKG ATRIAL RATE: 108 BPM
EKG DIAGNOSIS: NORMAL
EKG P AXIS: 50 DEGREES
EKG P-R INTERVAL: 160 MS
EKG Q-T INTERVAL: 326 MS
EKG QRS DURATION: 74 MS
EKG QTC CALCULATION (BAZETT): 436 MS
EKG R AXIS: 34 DEGREES
EKG T AXIS: 14 DEGREES
EKG VENTRICULAR RATE: 108 BPM
EOSINOPHILS ABSOLUTE: 0 K/CU MM
EOSINOPHILS RELATIVE PERCENT: 0.5 % (ref 0–3)
GFR AFRICAN AMERICAN: >60 ML/MIN/1.73M2
GFR AFRICAN AMERICAN: >60 ML/MIN/1.73M2
GFR NON-AFRICAN AMERICAN: >60 ML/MIN/1.73M2
GFR NON-AFRICAN AMERICAN: >60 ML/MIN/1.73M2
GLUCOSE BLD-MCNC: 127 MG/DL (ref 70–99)
GLUCOSE BLD-MCNC: 151 MG/DL (ref 70–99)
GLUCOSE BLD-MCNC: 155 MG/DL (ref 70–99)
GLUCOSE BLD-MCNC: 161 MG/DL (ref 70–99)
GLUCOSE BLD-MCNC: 225 MG/DL (ref 70–99)
GLUCOSE, URINE: NEGATIVE MG/DL
HCT VFR BLD CALC: 40.9 % (ref 37–47)
HCT VFR BLD CALC: 41.3 % (ref 37–47)
HEMOGLOBIN: 12.6 GM/DL (ref 12.5–16)
HEMOGLOBIN: 13 GM/DL (ref 12.5–16)
HUMAN METAPNEUMOVIRUS PCR: ABNORMAL
IMMATURE NEUTROPHIL %: 0.7 % (ref 0–0.43)
INFLUENZA A BY PCR: NOT DETECTED
INFLUENZA A H1 (2009) PCR: NOT DETECTED
INFLUENZA A H1 PANDEMIC PCR: NOT DETECTED
INFLUENZA A H3 PCR: NOT DETECTED
INFLUENZA B BY PCR: NOT DETECTED
KETONES, URINE: NEGATIVE MG/DL
LACTIC ACID, SEPSIS: 1.4 MMOL/L (ref 0.5–1.9)
LEUKOCYTE ESTERASE, URINE: NEGATIVE
LYMPHOCYTES ABSOLUTE: 0.8 K/CU MM
LYMPHOCYTES RELATIVE PERCENT: 13.7 % (ref 24–44)
MAGNESIUM: 1.8 MG/DL (ref 1.8–2.4)
MCH RBC QN AUTO: 30.4 PG (ref 27–31)
MCH RBC QN AUTO: 30.7 PG (ref 27–31)
MCHC RBC AUTO-ENTMCNC: 30.8 % (ref 32–36)
MCHC RBC AUTO-ENTMCNC: 31.5 % (ref 32–36)
MCV RBC AUTO: 97.4 FL (ref 78–100)
MCV RBC AUTO: 98.8 FL (ref 78–100)
MONOCYTES ABSOLUTE: 0.8 K/CU MM
MONOCYTES RELATIVE PERCENT: 14 % (ref 0–4)
MUCUS: NORMAL HPF
MYCOPLASMA PNEUMONIAE PCR: NOT DETECTED
NITRITE URINE, QUANTITATIVE: NEGATIVE
NUCLEATED RBC %: 0 %
PARAINFLUENZA 1 PCR: NOT DETECTED
PARAINFLUENZA 2 PCR: NOT DETECTED
PARAINFLUENZA 3 PCR: NOT DETECTED
PARAINFLUENZA 4 PCR: NOT DETECTED
PDW BLD-RTO: 12.2 % (ref 11.7–14.9)
PDW BLD-RTO: 12.5 % (ref 11.7–14.9)
PH, URINE: 6
PLATELET # BLD: 203 K/CU MM (ref 140–440)
PLATELET # BLD: 252 K/CU MM (ref 140–440)
PMV BLD AUTO: 8.4 FL (ref 7.5–11.1)
PMV BLD AUTO: 9.3 FL (ref 7.5–11.1)
POTASSIUM SERPL-SCNC: 3.4 MMOL/L (ref 3.5–5.1)
POTASSIUM SERPL-SCNC: 3.6 MMOL/L (ref 3.5–5.1)
PRO-BNP: 94.78 PG/ML
PROTEIN UA: 30 MG/DL
RBC # BLD: 4.14 M/CU MM (ref 4.2–5.4)
RBC # BLD: 4.24 M/CU MM (ref 4.2–5.4)
RBC URINE: 156 /HPF
REASON FOR REJECTION: NORMAL
REJECTED TEST: NORMAL
RHINOVIRUS ENTEROVIRUS PCR: NOT DETECTED
RSV PCR: NOT DETECTED
SEGMENTED NEUTROPHILS ABSOLUTE COUNT: 4.1 K/CU MM
SEGMENTED NEUTROPHILS RELATIVE PERCENT: 70.8 % (ref 36–66)
SODIUM BLD-SCNC: 140 MMOL/L (ref 135–145)
SODIUM BLD-SCNC: 144 MMOL/L (ref 135–145)
SOURCE: NORMAL
SPECIFIC GRAVITY UA: 1.05
SQUAMOUS EPITHELIAL: <1 /HPF
TOTAL IMMATURE NEUTOROPHIL: 0.04 K/CU MM
TOTAL NUCLEATED RBC: 0 K/CU MM
TOTAL PROTEIN: 6.8 GM/DL (ref 6.4–8.2)
TRICHOMONAS: NORMAL /HPF
TROPONIN T: <0.01 NG/ML
UROBILINOGEN, URINE: NORMAL MG/DL
WBC # BLD: 4.2 K/CU MM (ref 4–10.5)
WBC # BLD: 5.8 K/CU MM (ref 4–10.5)
WBC UA: 1 /HPF

## 2018-12-21 PROCEDURE — 99285 EMERGENCY DEPT VISIT HI MDM: CPT

## 2018-12-21 PROCEDURE — 80048 BASIC METABOLIC PNL TOTAL CA: CPT

## 2018-12-21 PROCEDURE — 80053 COMPREHEN METABOLIC PANEL: CPT

## 2018-12-21 PROCEDURE — 87798 DETECT AGENT NOS DNA AMP: CPT

## 2018-12-21 PROCEDURE — 83880 ASSAY OF NATRIURETIC PEPTIDE: CPT

## 2018-12-21 PROCEDURE — 71045 X-RAY EXAM CHEST 1 VIEW: CPT

## 2018-12-21 PROCEDURE — 96374 THER/PROPH/DIAG INJ IV PUSH: CPT

## 2018-12-21 PROCEDURE — 6360000004 HC RX CONTRAST MEDICATION: Performed by: EMERGENCY MEDICINE

## 2018-12-21 PROCEDURE — 71275 CT ANGIOGRAPHY CHEST: CPT

## 2018-12-21 PROCEDURE — 6370000000 HC RX 637 (ALT 250 FOR IP): Performed by: INTERNAL MEDICINE

## 2018-12-21 PROCEDURE — 83605 ASSAY OF LACTIC ACID: CPT

## 2018-12-21 PROCEDURE — 36415 COLL VENOUS BLD VENIPUNCTURE: CPT

## 2018-12-21 PROCEDURE — 2060000000 HC ICU INTERMEDIATE R&B

## 2018-12-21 PROCEDURE — 6370000000 HC RX 637 (ALT 250 FOR IP): Performed by: EMERGENCY MEDICINE

## 2018-12-21 PROCEDURE — 94640 AIRWAY INHALATION TREATMENT: CPT

## 2018-12-21 PROCEDURE — 6360000002 HC RX W HCPCS: Performed by: EMERGENCY MEDICINE

## 2018-12-21 PROCEDURE — 84484 ASSAY OF TROPONIN QUANT: CPT

## 2018-12-21 PROCEDURE — 2580000003 HC RX 258: Performed by: INTERNAL MEDICINE

## 2018-12-21 PROCEDURE — 2580000003 HC RX 258: Performed by: EMERGENCY MEDICINE

## 2018-12-21 PROCEDURE — 94761 N-INVAS EAR/PLS OXIMETRY MLT: CPT

## 2018-12-21 PROCEDURE — 6360000002 HC RX W HCPCS: Performed by: INTERNAL MEDICINE

## 2018-12-21 PROCEDURE — 94680 O2 UPTK RST&XERS DIR SIMPLE: CPT

## 2018-12-21 PROCEDURE — 85025 COMPLETE CBC W/AUTO DIFF WBC: CPT

## 2018-12-21 PROCEDURE — 82962 GLUCOSE BLOOD TEST: CPT

## 2018-12-21 PROCEDURE — 2700000000 HC OXYGEN THERAPY PER DAY

## 2018-12-21 PROCEDURE — 93005 ELECTROCARDIOGRAM TRACING: CPT | Performed by: EMERGENCY MEDICINE

## 2018-12-21 PROCEDURE — 83735 ASSAY OF MAGNESIUM: CPT

## 2018-12-21 PROCEDURE — 81001 URINALYSIS AUTO W/SCOPE: CPT

## 2018-12-21 PROCEDURE — 85027 COMPLETE CBC AUTOMATED: CPT

## 2018-12-21 RX ORDER — SODIUM CHLORIDE 9 MG/ML
INJECTION, SOLUTION INTRAVENOUS CONTINUOUS
Status: DISCONTINUED | OUTPATIENT
Start: 2018-12-21 | End: 2018-12-26

## 2018-12-21 RX ORDER — SODIUM CHLORIDE 0.9 % (FLUSH) 0.9 %
10 SYRINGE (ML) INJECTION 2 TIMES DAILY
Status: DISCONTINUED | OUTPATIENT
Start: 2018-12-21 | End: 2018-12-27 | Stop reason: HOSPADM

## 2018-12-21 RX ORDER — LEVOFLOXACIN 5 MG/ML
500 INJECTION, SOLUTION INTRAVENOUS DAILY
Status: DISCONTINUED | OUTPATIENT
Start: 2018-12-22 | End: 2018-12-26

## 2018-12-21 RX ORDER — LEVOTHYROXINE SODIUM 0.05 MG/1
50 TABLET ORAL DAILY
Status: DISCONTINUED | OUTPATIENT
Start: 2018-12-22 | End: 2018-12-27 | Stop reason: HOSPADM

## 2018-12-21 RX ORDER — CETIRIZINE HYDROCHLORIDE 10 MG/1
10 TABLET ORAL DAILY
Status: DISCONTINUED | OUTPATIENT
Start: 2018-12-22 | End: 2018-12-27 | Stop reason: HOSPADM

## 2018-12-21 RX ORDER — IPRATROPIUM BROMIDE AND ALBUTEROL SULFATE 2.5; .5 MG/3ML; MG/3ML
3 SOLUTION RESPIRATORY (INHALATION) ONCE
Status: COMPLETED | OUTPATIENT
Start: 2018-12-21 | End: 2018-12-21

## 2018-12-21 RX ORDER — METHYLPREDNISOLONE SODIUM SUCCINATE 40 MG/ML
40 INJECTION, POWDER, LYOPHILIZED, FOR SOLUTION INTRAMUSCULAR; INTRAVENOUS 2 TIMES DAILY
Status: DISCONTINUED | OUTPATIENT
Start: 2018-12-22 | End: 2018-12-23

## 2018-12-21 RX ORDER — DEXTROSE MONOHYDRATE 25 G/50ML
12.5 INJECTION, SOLUTION INTRAVENOUS PRN
Status: DISCONTINUED | OUTPATIENT
Start: 2018-12-21 | End: 2018-12-27 | Stop reason: HOSPADM

## 2018-12-21 RX ORDER — SERTRALINE HYDROCHLORIDE 100 MG/1
200 TABLET, FILM COATED ORAL NIGHTLY
Status: DISCONTINUED | OUTPATIENT
Start: 2018-12-21 | End: 2018-12-27 | Stop reason: HOSPADM

## 2018-12-21 RX ORDER — METHYLPREDNISOLONE SODIUM SUCCINATE 125 MG/2ML
125 INJECTION, POWDER, LYOPHILIZED, FOR SOLUTION INTRAMUSCULAR; INTRAVENOUS ONCE
Status: COMPLETED | OUTPATIENT
Start: 2018-12-21 | End: 2018-12-21

## 2018-12-21 RX ORDER — ATORVASTATIN CALCIUM 40 MG/1
40 TABLET, FILM COATED ORAL DAILY
Status: DISCONTINUED | OUTPATIENT
Start: 2018-12-22 | End: 2018-12-27 | Stop reason: HOSPADM

## 2018-12-21 RX ORDER — MIRTAZAPINE 30 MG/1
30 TABLET, FILM COATED ORAL NIGHTLY
Qty: 31 TABLET | Refills: 5 | Status: SHIPPED | OUTPATIENT
Start: 2018-12-21 | End: 2018-12-21 | Stop reason: HOSPADM

## 2018-12-21 RX ORDER — AMLODIPINE BESYLATE 5 MG/1
5 TABLET ORAL DAILY
Status: DISCONTINUED | OUTPATIENT
Start: 2018-12-22 | End: 2018-12-27 | Stop reason: HOSPADM

## 2018-12-21 RX ORDER — LACTOBACILLUS RHAMNOSUS GG 10B CELL
1 CAPSULE ORAL DAILY
Status: DISCONTINUED | OUTPATIENT
Start: 2018-12-22 | End: 2018-12-27 | Stop reason: HOSPADM

## 2018-12-21 RX ORDER — PANTOPRAZOLE SODIUM 40 MG/1
40 TABLET, DELAYED RELEASE ORAL
Status: DISCONTINUED | OUTPATIENT
Start: 2018-12-22 | End: 2018-12-27 | Stop reason: HOSPADM

## 2018-12-21 RX ORDER — DIVALPROEX SODIUM 500 MG/1
2000 TABLET, DELAYED RELEASE ORAL NIGHTLY
Status: DISCONTINUED | OUTPATIENT
Start: 2018-12-21 | End: 2018-12-27 | Stop reason: HOSPADM

## 2018-12-21 RX ORDER — LACTULOSE 10 G/15ML
20 SOLUTION ORAL 3 TIMES DAILY
Status: DISCONTINUED | OUTPATIENT
Start: 2018-12-21 | End: 2018-12-27 | Stop reason: HOSPADM

## 2018-12-21 RX ORDER — CARBAMAZEPINE 300 MG/1
300 CAPSULE, EXTENDED RELEASE ORAL 2 TIMES DAILY
Status: DISCONTINUED | OUTPATIENT
Start: 2018-12-21 | End: 2018-12-27 | Stop reason: HOSPADM

## 2018-12-21 RX ORDER — LEVETIRACETAM 500 MG/1
1500 TABLET ORAL 2 TIMES DAILY
Status: DISCONTINUED | OUTPATIENT
Start: 2018-12-21 | End: 2018-12-27 | Stop reason: HOSPADM

## 2018-12-21 RX ORDER — ACETAMINOPHEN 500 MG
1000 TABLET ORAL
Status: COMPLETED | OUTPATIENT
Start: 2018-12-21 | End: 2018-12-21

## 2018-12-21 RX ORDER — BUSPIRONE HYDROCHLORIDE 10 MG/1
10 TABLET ORAL 2 TIMES DAILY
Status: DISCONTINUED | OUTPATIENT
Start: 2018-12-21 | End: 2018-12-27 | Stop reason: HOSPADM

## 2018-12-21 RX ORDER — IPRATROPIUM BROMIDE AND ALBUTEROL SULFATE 2.5; .5 MG/3ML; MG/3ML
1 SOLUTION RESPIRATORY (INHALATION) EVERY 4 HOURS PRN
Status: DISCONTINUED | OUTPATIENT
Start: 2018-12-21 | End: 2018-12-27 | Stop reason: HOSPADM

## 2018-12-21 RX ORDER — NICOTINE POLACRILEX 4 MG
15 LOZENGE BUCCAL PRN
Status: DISCONTINUED | OUTPATIENT
Start: 2018-12-21 | End: 2018-12-27 | Stop reason: HOSPADM

## 2018-12-21 RX ORDER — DEXTROSE MONOHYDRATE 50 MG/ML
100 INJECTION, SOLUTION INTRAVENOUS PRN
Status: DISCONTINUED | OUTPATIENT
Start: 2018-12-21 | End: 2018-12-27 | Stop reason: HOSPADM

## 2018-12-21 RX ORDER — IPRATROPIUM BROMIDE AND ALBUTEROL SULFATE 2.5; .5 MG/3ML; MG/3ML
1 SOLUTION RESPIRATORY (INHALATION) 4 TIMES DAILY
Status: DISCONTINUED | OUTPATIENT
Start: 2018-12-21 | End: 2018-12-27 | Stop reason: HOSPADM

## 2018-12-21 RX ORDER — RISPERIDONE 0.5 MG/1
1 TABLET, FILM COATED ORAL 3 TIMES DAILY
Status: DISCONTINUED | OUTPATIENT
Start: 2018-12-21 | End: 2018-12-27 | Stop reason: HOSPADM

## 2018-12-21 RX ORDER — 0.9 % SODIUM CHLORIDE 0.9 %
1000 INTRAVENOUS SOLUTION INTRAVENOUS ONCE
Status: COMPLETED | OUTPATIENT
Start: 2018-12-21 | End: 2018-12-21

## 2018-12-21 RX ORDER — ATORVASTATIN CALCIUM 40 MG/1
40 TABLET, FILM COATED ORAL DAILY
Qty: 31 TABLET | Refills: 5 | Status: SHIPPED | OUTPATIENT
Start: 2018-12-21 | End: 2018-12-21 | Stop reason: HOSPADM

## 2018-12-21 RX ADMIN — BUSPIRONE HYDROCHLORIDE 10 MG: 10 TABLET ORAL at 22:51

## 2018-12-21 RX ADMIN — METHYLPREDNISOLONE SODIUM SUCCINATE 125 MG: 125 INJECTION, POWDER, FOR SOLUTION INTRAMUSCULAR; INTRAVENOUS at 19:13

## 2018-12-21 RX ADMIN — IPRATROPIUM BROMIDE AND ALBUTEROL SULFATE 3 AMPULE: .5; 3 SOLUTION RESPIRATORY (INHALATION) at 19:17

## 2018-12-21 RX ADMIN — LEVETIRACETAM 1500 MG: 500 TABLET ORAL at 22:50

## 2018-12-21 RX ADMIN — CETIRIZINE HYDROCHLORIDE 10 MG: 10 TABLET, FILM COATED ORAL at 09:41

## 2018-12-21 RX ADMIN — IPRATROPIUM BROMIDE AND ALBUTEROL SULFATE 1 AMPULE: .5; 3 SOLUTION RESPIRATORY (INHALATION) at 07:19

## 2018-12-21 RX ADMIN — ENOXAPARIN SODIUM 40 MG: 40 INJECTION SUBCUTANEOUS at 09:41

## 2018-12-21 RX ADMIN — SODIUM CHLORIDE, PRESERVATIVE FREE 10 ML: 5 INJECTION INTRAVENOUS at 17:52

## 2018-12-21 RX ADMIN — IPRATROPIUM BROMIDE AND ALBUTEROL SULFATE 1 AMPULE: .5; 3 SOLUTION RESPIRATORY (INHALATION) at 11:08

## 2018-12-21 RX ADMIN — SODIUM CHLORIDE 1000 ML: 9 INJECTION, SOLUTION INTRAVENOUS at 17:04

## 2018-12-21 RX ADMIN — LACTULOSE 20 G: 10 SOLUTION ORAL at 22:50

## 2018-12-21 RX ADMIN — INSULIN LISPRO 2 UNITS: 100 INJECTION, SOLUTION INTRAVENOUS; SUBCUTANEOUS at 09:43

## 2018-12-21 RX ADMIN — GUAIFENESIN AND DEXTROMETHORPHAN HYDROBROMIDE 1 TABLET: 600; 30 TABLET, EXTENDED RELEASE ORAL at 22:51

## 2018-12-21 RX ADMIN — IOPAMIDOL 67 ML: 755 INJECTION, SOLUTION INTRAVENOUS at 17:51

## 2018-12-21 RX ADMIN — PANTOPRAZOLE SODIUM 40 MG: 40 TABLET, DELAYED RELEASE ORAL at 06:14

## 2018-12-21 RX ADMIN — IPRATROPIUM BROMIDE AND ALBUTEROL SULFATE 1 AMPULE: .5; 3 SOLUTION RESPIRATORY (INHALATION) at 22:17

## 2018-12-21 RX ADMIN — ACETAMINOPHEN 1000 MG: 500 TABLET, FILM COATED ORAL at 17:04

## 2018-12-21 RX ADMIN — IPRATROPIUM BROMIDE AND ALBUTEROL SULFATE 1 AMPULE: .5; 3 SOLUTION RESPIRATORY (INHALATION) at 06:00

## 2018-12-21 RX ADMIN — SODIUM CHLORIDE, PRESERVATIVE FREE 10 ML: 5 INJECTION INTRAVENOUS at 22:52

## 2018-12-21 RX ADMIN — INSULIN LISPRO 1 UNITS: 100 INJECTION, SOLUTION INTRAVENOUS; SUBCUTANEOUS at 23:05

## 2018-12-21 RX ADMIN — SODIUM CHLORIDE: 9 INJECTION, SOLUTION INTRAVENOUS at 22:50

## 2018-12-21 RX ADMIN — RISPERIDONE 1 MG: 0.5 TABLET, FILM COATED ORAL at 22:51

## 2018-12-21 RX ADMIN — METHYLPREDNISOLONE SODIUM SUCCINATE 40 MG: 40 INJECTION, POWDER, LYOPHILIZED, FOR SOLUTION INTRAMUSCULAR; INTRAVENOUS at 09:42

## 2018-12-21 RX ADMIN — LISINOPRIL 40 MG: 40 TABLET ORAL at 09:41

## 2018-12-21 RX ADMIN — FUROSEMIDE 20 MG: 20 TABLET ORAL at 09:41

## 2018-12-21 RX ADMIN — RISPERIDONE 1 MG: 0.5 TABLET, FILM COATED ORAL at 09:41

## 2018-12-21 RX ADMIN — LEVETIRACETAM 1500 MG: 500 TABLET ORAL at 09:41

## 2018-12-21 RX ADMIN — CARBAMAZEPINE 300 MG: 300 CAPSULE, EXTENDED RELEASE ORAL at 23:25

## 2018-12-21 RX ADMIN — Medication 1 CAPSULE: at 09:42

## 2018-12-21 RX ADMIN — CARBAMAZEPINE 300 MG: 300 CAPSULE, EXTENDED RELEASE ORAL at 09:42

## 2018-12-21 RX ADMIN — LEVOTHYROXINE SODIUM 50 MCG: 50 TABLET ORAL at 06:14

## 2018-12-21 RX ADMIN — LINAGLIPTIN 5 MG: 5 TABLET, FILM COATED ORAL at 09:41

## 2018-12-21 RX ADMIN — BUSPIRONE HYDROCHLORIDE 10 MG: 10 TABLET ORAL at 09:41

## 2018-12-21 RX ADMIN — SERTRALINE HYDROCHLORIDE 200 MG: 100 TABLET ORAL at 22:51

## 2018-12-21 RX ADMIN — DIVALPROEX SODIUM 2000 MG: 500 TABLET, DELAYED RELEASE ORAL at 22:50

## 2018-12-21 RX ADMIN — AMLODIPINE BESYLATE 5 MG: 5 TABLET ORAL at 09:41

## 2018-12-21 ASSESSMENT — PAIN SCALES - GENERAL
PAINLEVEL_OUTOF10: 0

## 2018-12-22 LAB
ANION GAP SERPL CALCULATED.3IONS-SCNC: 10 MMOL/L (ref 4–16)
BASOPHILS ABSOLUTE: 0 K/CU MM
BASOPHILS RELATIVE PERCENT: 0.2 % (ref 0–1)
BUN BLDV-MCNC: 21 MG/DL (ref 6–23)
CALCIUM SERPL-MCNC: 8.9 MG/DL (ref 8.3–10.6)
CHLORIDE BLD-SCNC: 102 MMOL/L (ref 99–110)
CO2: 32 MMOL/L (ref 21–32)
CREAT SERPL-MCNC: 0.5 MG/DL (ref 0.6–1.1)
DIFFERENTIAL TYPE: ABNORMAL
EOSINOPHILS ABSOLUTE: 0 K/CU MM
EOSINOPHILS RELATIVE PERCENT: 0 % (ref 0–3)
GFR AFRICAN AMERICAN: >60 ML/MIN/1.73M2
GFR NON-AFRICAN AMERICAN: >60 ML/MIN/1.73M2
GLUCOSE BLD-MCNC: 105 MG/DL (ref 70–99)
GLUCOSE BLD-MCNC: 131 MG/DL (ref 70–99)
GLUCOSE BLD-MCNC: 141 MG/DL (ref 70–99)
GLUCOSE BLD-MCNC: 191 MG/DL (ref 70–99)
GLUCOSE BLD-MCNC: 219 MG/DL (ref 70–99)
HCT VFR BLD CALC: 37.8 % (ref 37–47)
HEMOGLOBIN: 11.6 GM/DL (ref 12.5–16)
IMMATURE NEUTROPHIL %: 0.5 % (ref 0–0.43)
LACTIC ACID, SEPSIS: 1.8 MMOL/L (ref 0.5–1.9)
LACTIC ACID, SEPSIS: 2.2 MMOL/L (ref 0.5–1.9)
LYMPHOCYTES ABSOLUTE: 1.6 K/CU MM
LYMPHOCYTES RELATIVE PERCENT: 26.4 % (ref 24–44)
MAGNESIUM: 2.3 MG/DL (ref 1.8–2.4)
MCH RBC QN AUTO: 30.9 PG (ref 27–31)
MCHC RBC AUTO-ENTMCNC: 30.7 % (ref 32–36)
MCV RBC AUTO: 100.8 FL (ref 78–100)
MONOCYTES ABSOLUTE: 0.6 K/CU MM
MONOCYTES RELATIVE PERCENT: 10.9 % (ref 0–4)
NUCLEATED RBC %: 0 %
PDW BLD-RTO: 12.2 % (ref 11.7–14.9)
PLATELET # BLD: 219 K/CU MM (ref 140–440)
PMV BLD AUTO: 8.7 FL (ref 7.5–11.1)
POTASSIUM SERPL-SCNC: 4.3 MMOL/L (ref 3.5–5.1)
RBC # BLD: 3.75 M/CU MM (ref 4.2–5.4)
SEGMENTED NEUTROPHILS ABSOLUTE COUNT: 3.7 K/CU MM
SEGMENTED NEUTROPHILS RELATIVE PERCENT: 62 % (ref 36–66)
SODIUM BLD-SCNC: 144 MMOL/L (ref 135–145)
TOTAL IMMATURE NEUTOROPHIL: 0.03 K/CU MM
TOTAL NUCLEATED RBC: 0 K/CU MM
WBC # BLD: 5.9 K/CU MM (ref 4–10.5)

## 2018-12-22 PROCEDURE — 2580000003 HC RX 258: Performed by: EMERGENCY MEDICINE

## 2018-12-22 PROCEDURE — 1200000000 HC SEMI PRIVATE

## 2018-12-22 PROCEDURE — 6370000000 HC RX 637 (ALT 250 FOR IP): Performed by: INTERNAL MEDICINE

## 2018-12-22 PROCEDURE — 85025 COMPLETE CBC W/AUTO DIFF WBC: CPT

## 2018-12-22 PROCEDURE — 93010 ELECTROCARDIOGRAM REPORT: CPT | Performed by: INTERNAL MEDICINE

## 2018-12-22 PROCEDURE — 6360000002 HC RX W HCPCS: Performed by: INTERNAL MEDICINE

## 2018-12-22 PROCEDURE — 2060000000 HC ICU INTERMEDIATE R&B

## 2018-12-22 PROCEDURE — 82962 GLUCOSE BLOOD TEST: CPT

## 2018-12-22 PROCEDURE — 2700000000 HC OXYGEN THERAPY PER DAY

## 2018-12-22 PROCEDURE — 83735 ASSAY OF MAGNESIUM: CPT

## 2018-12-22 PROCEDURE — 94640 AIRWAY INHALATION TREATMENT: CPT

## 2018-12-22 PROCEDURE — 80048 BASIC METABOLIC PNL TOTAL CA: CPT

## 2018-12-22 PROCEDURE — 36415 COLL VENOUS BLD VENIPUNCTURE: CPT

## 2018-12-22 PROCEDURE — 2580000003 HC RX 258: Performed by: INTERNAL MEDICINE

## 2018-12-22 PROCEDURE — 94761 N-INVAS EAR/PLS OXIMETRY MLT: CPT

## 2018-12-22 PROCEDURE — 83605 ASSAY OF LACTIC ACID: CPT

## 2018-12-22 RX ADMIN — METHYLPREDNISOLONE SODIUM SUCCINATE 40 MG: 40 INJECTION, POWDER, LYOPHILIZED, FOR SOLUTION INTRAMUSCULAR; INTRAVENOUS at 09:51

## 2018-12-22 RX ADMIN — SODIUM CHLORIDE, PRESERVATIVE FREE 10 ML: 5 INJECTION INTRAVENOUS at 09:52

## 2018-12-22 RX ADMIN — IPRATROPIUM BROMIDE AND ALBUTEROL SULFATE 1 AMPULE: .5; 3 SOLUTION RESPIRATORY (INHALATION) at 12:44

## 2018-12-22 RX ADMIN — IPRATROPIUM BROMIDE AND ALBUTEROL SULFATE 1 AMPULE: .5; 3 SOLUTION RESPIRATORY (INHALATION) at 16:18

## 2018-12-22 RX ADMIN — RISPERIDONE 1 MG: 0.5 TABLET, FILM COATED ORAL at 09:51

## 2018-12-22 RX ADMIN — CETIRIZINE HYDROCHLORIDE 10 MG: 10 TABLET, FILM COATED ORAL at 09:52

## 2018-12-22 RX ADMIN — LEVOFLOXACIN 500 MG: 5 INJECTION, SOLUTION INTRAVENOUS at 09:52

## 2018-12-22 RX ADMIN — PANTOPRAZOLE SODIUM 40 MG: 40 TABLET, DELAYED RELEASE ORAL at 07:04

## 2018-12-22 RX ADMIN — ENOXAPARIN SODIUM 40 MG: 40 INJECTION SUBCUTANEOUS at 09:51

## 2018-12-22 RX ADMIN — LEVETIRACETAM 1500 MG: 500 TABLET ORAL at 21:58

## 2018-12-22 RX ADMIN — SODIUM CHLORIDE: 9 INJECTION, SOLUTION INTRAVENOUS at 18:37

## 2018-12-22 RX ADMIN — SERTRALINE HYDROCHLORIDE 200 MG: 100 TABLET ORAL at 21:58

## 2018-12-22 RX ADMIN — GUAIFENESIN AND DEXTROMETHORPHAN HYDROBROMIDE 1 TABLET: 600; 30 TABLET, EXTENDED RELEASE ORAL at 21:58

## 2018-12-22 RX ADMIN — CARBAMAZEPINE 300 MG: 300 CAPSULE, EXTENDED RELEASE ORAL at 22:00

## 2018-12-22 RX ADMIN — GUAIFENESIN AND DEXTROMETHORPHAN HYDROBROMIDE 1 TABLET: 600; 30 TABLET, EXTENDED RELEASE ORAL at 09:51

## 2018-12-22 RX ADMIN — LACTULOSE 20 G: 10 SOLUTION ORAL at 15:00

## 2018-12-22 RX ADMIN — SODIUM CHLORIDE, PRESERVATIVE FREE 10 ML: 5 INJECTION INTRAVENOUS at 21:59

## 2018-12-22 RX ADMIN — IPRATROPIUM BROMIDE AND ALBUTEROL SULFATE 1 AMPULE: .5; 3 SOLUTION RESPIRATORY (INHALATION) at 08:31

## 2018-12-22 RX ADMIN — INSULIN LISPRO 1 UNITS: 100 INJECTION, SOLUTION INTRAVENOUS; SUBCUTANEOUS at 18:36

## 2018-12-22 RX ADMIN — LACTULOSE 20 G: 10 SOLUTION ORAL at 09:51

## 2018-12-22 RX ADMIN — LEVOTHYROXINE SODIUM 50 MCG: 50 TABLET ORAL at 07:04

## 2018-12-22 RX ADMIN — AMLODIPINE BESYLATE 5 MG: 5 TABLET ORAL at 09:52

## 2018-12-22 RX ADMIN — LEVETIRACETAM 1500 MG: 500 TABLET ORAL at 09:51

## 2018-12-22 RX ADMIN — IPRATROPIUM BROMIDE AND ALBUTEROL SULFATE 1 AMPULE: .5; 3 SOLUTION RESPIRATORY (INHALATION) at 19:35

## 2018-12-22 RX ADMIN — LINAGLIPTIN 5 MG: 5 TABLET, FILM COATED ORAL at 09:52

## 2018-12-22 RX ADMIN — METHYLPREDNISOLONE SODIUM SUCCINATE 40 MG: 40 INJECTION, POWDER, LYOPHILIZED, FOR SOLUTION INTRAMUSCULAR; INTRAVENOUS at 21:58

## 2018-12-22 RX ADMIN — ATORVASTATIN CALCIUM 40 MG: 40 TABLET, FILM COATED ORAL at 09:52

## 2018-12-22 RX ADMIN — CARBAMAZEPINE 300 MG: 300 CAPSULE, EXTENDED RELEASE ORAL at 09:51

## 2018-12-22 RX ADMIN — BUSPIRONE HYDROCHLORIDE 10 MG: 10 TABLET ORAL at 21:58

## 2018-12-22 RX ADMIN — BUSPIRONE HYDROCHLORIDE 10 MG: 10 TABLET ORAL at 09:52

## 2018-12-22 RX ADMIN — LACTULOSE 20 G: 10 SOLUTION ORAL at 21:58

## 2018-12-22 RX ADMIN — RISPERIDONE 1 MG: 0.5 TABLET, FILM COATED ORAL at 15:00

## 2018-12-22 RX ADMIN — Medication 1 CAPSULE: at 09:51

## 2018-12-22 RX ADMIN — DIVALPROEX SODIUM 2000 MG: 500 TABLET, DELAYED RELEASE ORAL at 21:57

## 2018-12-22 RX ADMIN — RISPERIDONE 1 MG: 0.5 TABLET, FILM COATED ORAL at 21:58

## 2018-12-22 ASSESSMENT — PAIN SCALES - GENERAL
PAINLEVEL_OUTOF10: 0
PAINLEVEL_OUTOF10: 0

## 2018-12-23 LAB
ANION GAP SERPL CALCULATED.3IONS-SCNC: 9 MMOL/L (ref 4–16)
BASOPHILS ABSOLUTE: 0 K/CU MM
BASOPHILS RELATIVE PERCENT: 0.4 % (ref 0–1)
BUN BLDV-MCNC: 18 MG/DL (ref 6–23)
CALCIUM SERPL-MCNC: 8.5 MG/DL (ref 8.3–10.6)
CHLORIDE BLD-SCNC: 105 MMOL/L (ref 99–110)
CO2: 29 MMOL/L (ref 21–32)
CREAT SERPL-MCNC: 0.4 MG/DL (ref 0.6–1.1)
CULTURE: NORMAL
CULTURE: NORMAL
DIFFERENTIAL TYPE: ABNORMAL
EOSINOPHILS ABSOLUTE: 0 K/CU MM
EOSINOPHILS RELATIVE PERCENT: 0.2 % (ref 0–3)
GFR AFRICAN AMERICAN: >60 ML/MIN/1.73M2
GFR NON-AFRICAN AMERICAN: >60 ML/MIN/1.73M2
GLUCOSE BLD-MCNC: 112 MG/DL (ref 70–99)
GLUCOSE BLD-MCNC: 138 MG/DL (ref 70–99)
GLUCOSE BLD-MCNC: 139 MG/DL (ref 70–99)
GLUCOSE BLD-MCNC: 167 MG/DL (ref 70–99)
GLUCOSE BLD-MCNC: 226 MG/DL (ref 70–99)
GLUCOSE BLD-MCNC: 228 MG/DL (ref 70–99)
HCT VFR BLD CALC: 38.7 % (ref 37–47)
HEMOGLOBIN: 11.6 GM/DL (ref 12.5–16)
IMMATURE NEUTROPHIL %: 1.4 % (ref 0–0.43)
LYMPHOCYTES ABSOLUTE: 1.3 K/CU MM
LYMPHOCYTES RELATIVE PERCENT: 24.8 % (ref 24–44)
Lab: NORMAL
Lab: NORMAL
MAGNESIUM: 2 MG/DL (ref 1.8–2.4)
MCH RBC QN AUTO: 30.3 PG (ref 27–31)
MCHC RBC AUTO-ENTMCNC: 30 % (ref 32–36)
MCV RBC AUTO: 101 FL (ref 78–100)
MONOCYTES ABSOLUTE: 0.2 K/CU MM
MONOCYTES RELATIVE PERCENT: 4.5 % (ref 0–4)
PDW BLD-RTO: 12 % (ref 11.7–14.9)
PLATELET # BLD: 210 K/CU MM (ref 140–440)
PMV BLD AUTO: 9 FL (ref 7.5–11.1)
POTASSIUM SERPL-SCNC: 4.4 MMOL/L (ref 3.5–5.1)
RBC # BLD: 3.83 M/CU MM (ref 4.2–5.4)
REPORT STATUS: NORMAL
REPORT STATUS: NORMAL
SEGMENTED NEUTROPHILS ABSOLUTE COUNT: 3.5 K/CU MM
SEGMENTED NEUTROPHILS RELATIVE PERCENT: 68.7 % (ref 36–66)
SODIUM BLD-SCNC: 143 MMOL/L (ref 135–145)
SPECIMEN: NORMAL
SPECIMEN: NORMAL
TOTAL IMMATURE NEUTOROPHIL: 0.07 K/CU MM
WBC # BLD: 5.1 K/CU MM (ref 4–10.5)
WBC # BLD: ABNORMAL 10*3/UL

## 2018-12-23 PROCEDURE — 6370000000 HC RX 637 (ALT 250 FOR IP): Performed by: INTERNAL MEDICINE

## 2018-12-23 PROCEDURE — 1200000000 HC SEMI PRIVATE

## 2018-12-23 PROCEDURE — 36415 COLL VENOUS BLD VENIPUNCTURE: CPT

## 2018-12-23 PROCEDURE — 94640 AIRWAY INHALATION TREATMENT: CPT

## 2018-12-23 PROCEDURE — 2700000000 HC OXYGEN THERAPY PER DAY

## 2018-12-23 PROCEDURE — 6360000002 HC RX W HCPCS: Performed by: INTERNAL MEDICINE

## 2018-12-23 PROCEDURE — 94761 N-INVAS EAR/PLS OXIMETRY MLT: CPT

## 2018-12-23 PROCEDURE — 2580000003 HC RX 258: Performed by: EMERGENCY MEDICINE

## 2018-12-23 PROCEDURE — 85025 COMPLETE CBC W/AUTO DIFF WBC: CPT

## 2018-12-23 PROCEDURE — 83735 ASSAY OF MAGNESIUM: CPT

## 2018-12-23 PROCEDURE — 82962 GLUCOSE BLOOD TEST: CPT

## 2018-12-23 PROCEDURE — 80048 BASIC METABOLIC PNL TOTAL CA: CPT

## 2018-12-23 PROCEDURE — 2580000003 HC RX 258: Performed by: INTERNAL MEDICINE

## 2018-12-23 RX ORDER — METHYLPREDNISOLONE SODIUM SUCCINATE 40 MG/ML
20 INJECTION, POWDER, LYOPHILIZED, FOR SOLUTION INTRAMUSCULAR; INTRAVENOUS 2 TIMES DAILY
Status: DISCONTINUED | OUTPATIENT
Start: 2018-12-23 | End: 2018-12-26

## 2018-12-23 RX ADMIN — CETIRIZINE HYDROCHLORIDE 10 MG: 10 TABLET, FILM COATED ORAL at 10:00

## 2018-12-23 RX ADMIN — METHYLPREDNISOLONE SODIUM SUCCINATE 40 MG: 40 INJECTION, POWDER, LYOPHILIZED, FOR SOLUTION INTRAMUSCULAR; INTRAVENOUS at 10:01

## 2018-12-23 RX ADMIN — RISPERIDONE 1 MG: 0.5 TABLET, FILM COATED ORAL at 15:49

## 2018-12-23 RX ADMIN — IPRATROPIUM BROMIDE AND ALBUTEROL SULFATE 1 AMPULE: .5; 3 SOLUTION RESPIRATORY (INHALATION) at 20:21

## 2018-12-23 RX ADMIN — SODIUM CHLORIDE: 9 INJECTION, SOLUTION INTRAVENOUS at 20:58

## 2018-12-23 RX ADMIN — GUAIFENESIN AND DEXTROMETHORPHAN HYDROBROMIDE 1 TABLET: 600; 30 TABLET, EXTENDED RELEASE ORAL at 20:57

## 2018-12-23 RX ADMIN — IPRATROPIUM BROMIDE AND ALBUTEROL SULFATE 1 AMPULE: .5; 3 SOLUTION RESPIRATORY (INHALATION) at 08:16

## 2018-12-23 RX ADMIN — LEVOFLOXACIN 500 MG: 5 INJECTION, SOLUTION INTRAVENOUS at 09:58

## 2018-12-23 RX ADMIN — Medication 1 CAPSULE: at 09:58

## 2018-12-23 RX ADMIN — LINAGLIPTIN 5 MG: 5 TABLET, FILM COATED ORAL at 09:59

## 2018-12-23 RX ADMIN — SODIUM CHLORIDE, PRESERVATIVE FREE 10 ML: 5 INJECTION INTRAVENOUS at 10:02

## 2018-12-23 RX ADMIN — AMLODIPINE BESYLATE 5 MG: 5 TABLET ORAL at 09:59

## 2018-12-23 RX ADMIN — ATORVASTATIN CALCIUM 40 MG: 40 TABLET, FILM COATED ORAL at 10:00

## 2018-12-23 RX ADMIN — IPRATROPIUM BROMIDE AND ALBUTEROL SULFATE 1 AMPULE: .5; 3 SOLUTION RESPIRATORY (INHALATION) at 15:20

## 2018-12-23 RX ADMIN — IPRATROPIUM BROMIDE AND ALBUTEROL SULFATE 1 AMPULE: .5; 3 SOLUTION RESPIRATORY (INHALATION) at 11:50

## 2018-12-23 RX ADMIN — PANTOPRAZOLE SODIUM 40 MG: 40 TABLET, DELAYED RELEASE ORAL at 06:10

## 2018-12-23 RX ADMIN — GUAIFENESIN AND DEXTROMETHORPHAN HYDROBROMIDE 1 TABLET: 600; 30 TABLET, EXTENDED RELEASE ORAL at 09:58

## 2018-12-23 RX ADMIN — SERTRALINE HYDROCHLORIDE 200 MG: 100 TABLET ORAL at 20:57

## 2018-12-23 RX ADMIN — BUSPIRONE HYDROCHLORIDE 10 MG: 10 TABLET ORAL at 20:57

## 2018-12-23 RX ADMIN — LACTULOSE 20 G: 10 SOLUTION ORAL at 20:58

## 2018-12-23 RX ADMIN — INSULIN LISPRO 2 UNITS: 100 INJECTION, SOLUTION INTRAVENOUS; SUBCUTANEOUS at 21:05

## 2018-12-23 RX ADMIN — RISPERIDONE 1 MG: 0.5 TABLET, FILM COATED ORAL at 09:58

## 2018-12-23 RX ADMIN — INSULIN LISPRO 1 UNITS: 100 INJECTION, SOLUTION INTRAVENOUS; SUBCUTANEOUS at 12:12

## 2018-12-23 RX ADMIN — SODIUM CHLORIDE, PRESERVATIVE FREE 10 ML: 5 INJECTION INTRAVENOUS at 20:58

## 2018-12-23 RX ADMIN — ENOXAPARIN SODIUM 40 MG: 40 INJECTION SUBCUTANEOUS at 09:58

## 2018-12-23 RX ADMIN — LEVETIRACETAM 1500 MG: 500 TABLET ORAL at 09:58

## 2018-12-23 RX ADMIN — CARBAMAZEPINE 300 MG: 300 CAPSULE, EXTENDED RELEASE ORAL at 20:57

## 2018-12-23 RX ADMIN — CARBAMAZEPINE 300 MG: 300 CAPSULE, EXTENDED RELEASE ORAL at 10:00

## 2018-12-23 RX ADMIN — LEVETIRACETAM 1500 MG: 500 TABLET ORAL at 20:57

## 2018-12-23 RX ADMIN — DIVALPROEX SODIUM 2000 MG: 500 TABLET, DELAYED RELEASE ORAL at 20:58

## 2018-12-23 RX ADMIN — LEVOTHYROXINE SODIUM 50 MCG: 50 TABLET ORAL at 06:10

## 2018-12-23 RX ADMIN — BUSPIRONE HYDROCHLORIDE 10 MG: 10 TABLET ORAL at 09:59

## 2018-12-23 RX ADMIN — METHYLPREDNISOLONE SODIUM SUCCINATE 20 MG: 40 INJECTION, POWDER, LYOPHILIZED, FOR SOLUTION INTRAMUSCULAR; INTRAVENOUS at 20:58

## 2018-12-23 RX ADMIN — RISPERIDONE 1 MG: 0.5 TABLET, FILM COATED ORAL at 20:57

## 2018-12-24 LAB
ANION GAP SERPL CALCULATED.3IONS-SCNC: 11 MMOL/L (ref 4–16)
ATYPICAL LYMPHOCYTE ABSOLUTE COUNT: ABNORMAL
BANDED NEUTROPHILS ABSOLUTE COUNT: 0.36 K/CU MM
BANDED NEUTROPHILS RELATIVE PERCENT: 7 % (ref 5–11)
BUN BLDV-MCNC: 13 MG/DL (ref 6–23)
CALCIUM SERPL-MCNC: 8.6 MG/DL (ref 8.3–10.6)
CHLORIDE BLD-SCNC: 105 MMOL/L (ref 99–110)
CO2: 29 MMOL/L (ref 21–32)
CREAT SERPL-MCNC: 0.5 MG/DL (ref 0.6–1.1)
DIFFERENTIAL TYPE: ABNORMAL
EOSINOPHILS ABSOLUTE: 0.1 K/CU MM
EOSINOPHILS RELATIVE PERCENT: 1 % (ref 0–3)
GFR AFRICAN AMERICAN: >60 ML/MIN/1.73M2
GFR NON-AFRICAN AMERICAN: >60 ML/MIN/1.73M2
GLUCOSE BLD-MCNC: 108 MG/DL (ref 70–99)
GLUCOSE BLD-MCNC: 131 MG/DL (ref 70–99)
GLUCOSE BLD-MCNC: 176 MG/DL (ref 70–99)
GLUCOSE BLD-MCNC: 252 MG/DL (ref 70–99)
HCT VFR BLD CALC: 36.5 % (ref 37–47)
HEMOGLOBIN: 11 GM/DL (ref 12.5–16)
LYMPHOCYTES ABSOLUTE: 2.2 K/CU MM
LYMPHOCYTES RELATIVE PERCENT: 45 % (ref 24–44)
MAGNESIUM: 2.2 MG/DL (ref 1.8–2.4)
MCH RBC QN AUTO: 29.8 PG (ref 27–31)
MCHC RBC AUTO-ENTMCNC: 30.1 % (ref 32–36)
MCV RBC AUTO: 98.9 FL (ref 78–100)
MONOCYTES ABSOLUTE: 0.4 K/CU MM
MONOCYTES RELATIVE PERCENT: 7 % (ref 0–4)
PDW BLD-RTO: 11.7 % (ref 11.7–14.9)
PLATELET # BLD: 270 K/CU MM (ref 140–440)
PMV BLD AUTO: 8.9 FL (ref 7.5–11.1)
POLYCHROMASIA: ABNORMAL
POTASSIUM SERPL-SCNC: 4.9 MMOL/L (ref 3.5–5.1)
RBC # BLD: 3.69 M/CU MM (ref 4.2–5.4)
SEGMENTED NEUTROPHILS ABSOLUTE COUNT: 2.1 K/CU MM
SEGMENTED NEUTROPHILS RELATIVE PERCENT: 40 % (ref 36–66)
SODIUM BLD-SCNC: 145 MMOL/L (ref 135–145)
WBC # BLD: 5.2 K/CU MM (ref 4–10.5)

## 2018-12-24 PROCEDURE — 2580000003 HC RX 258: Performed by: INTERNAL MEDICINE

## 2018-12-24 PROCEDURE — 6370000000 HC RX 637 (ALT 250 FOR IP): Performed by: INTERNAL MEDICINE

## 2018-12-24 PROCEDURE — 6360000002 HC RX W HCPCS: Performed by: INTERNAL MEDICINE

## 2018-12-24 PROCEDURE — 83735 ASSAY OF MAGNESIUM: CPT

## 2018-12-24 PROCEDURE — 85025 COMPLETE CBC W/AUTO DIFF WBC: CPT

## 2018-12-24 PROCEDURE — 2580000003 HC RX 258: Performed by: EMERGENCY MEDICINE

## 2018-12-24 PROCEDURE — 94640 AIRWAY INHALATION TREATMENT: CPT

## 2018-12-24 PROCEDURE — 2700000000 HC OXYGEN THERAPY PER DAY

## 2018-12-24 PROCEDURE — 1200000000 HC SEMI PRIVATE

## 2018-12-24 PROCEDURE — 36415 COLL VENOUS BLD VENIPUNCTURE: CPT

## 2018-12-24 PROCEDURE — 82962 GLUCOSE BLOOD TEST: CPT

## 2018-12-24 PROCEDURE — 80048 BASIC METABOLIC PNL TOTAL CA: CPT

## 2018-12-24 PROCEDURE — 94761 N-INVAS EAR/PLS OXIMETRY MLT: CPT

## 2018-12-24 RX ADMIN — DIVALPROEX SODIUM 2000 MG: 500 TABLET, DELAYED RELEASE ORAL at 22:19

## 2018-12-24 RX ADMIN — METHYLPREDNISOLONE SODIUM SUCCINATE 20 MG: 40 INJECTION, POWDER, LYOPHILIZED, FOR SOLUTION INTRAMUSCULAR; INTRAVENOUS at 22:20

## 2018-12-24 RX ADMIN — LEVOTHYROXINE SODIUM 50 MCG: 50 TABLET ORAL at 05:42

## 2018-12-24 RX ADMIN — RISPERIDONE 1 MG: 0.5 TABLET, FILM COATED ORAL at 14:06

## 2018-12-24 RX ADMIN — ATORVASTATIN CALCIUM 40 MG: 40 TABLET, FILM COATED ORAL at 08:24

## 2018-12-24 RX ADMIN — RISPERIDONE 1 MG: 0.5 TABLET, FILM COATED ORAL at 08:24

## 2018-12-24 RX ADMIN — IPRATROPIUM BROMIDE AND ALBUTEROL SULFATE 1 AMPULE: .5; 3 SOLUTION RESPIRATORY (INHALATION) at 13:02

## 2018-12-24 RX ADMIN — ENOXAPARIN SODIUM 40 MG: 40 INJECTION SUBCUTANEOUS at 08:23

## 2018-12-24 RX ADMIN — BUSPIRONE HYDROCHLORIDE 10 MG: 10 TABLET ORAL at 22:19

## 2018-12-24 RX ADMIN — SODIUM CHLORIDE, PRESERVATIVE FREE 10 ML: 5 INJECTION INTRAVENOUS at 08:23

## 2018-12-24 RX ADMIN — AMLODIPINE BESYLATE 5 MG: 5 TABLET ORAL at 08:24

## 2018-12-24 RX ADMIN — GUAIFENESIN AND DEXTROMETHORPHAN HYDROBROMIDE 1 TABLET: 600; 30 TABLET, EXTENDED RELEASE ORAL at 08:24

## 2018-12-24 RX ADMIN — SODIUM CHLORIDE: 9 INJECTION, SOLUTION INTRAVENOUS at 14:06

## 2018-12-24 RX ADMIN — SERTRALINE HYDROCHLORIDE 200 MG: 100 TABLET ORAL at 22:20

## 2018-12-24 RX ADMIN — CARBAMAZEPINE 300 MG: 300 CAPSULE, EXTENDED RELEASE ORAL at 08:25

## 2018-12-24 RX ADMIN — INSULIN LISPRO 3 UNITS: 100 INJECTION, SOLUTION INTRAVENOUS; SUBCUTANEOUS at 11:53

## 2018-12-24 RX ADMIN — CARBAMAZEPINE 300 MG: 300 CAPSULE, EXTENDED RELEASE ORAL at 22:33

## 2018-12-24 RX ADMIN — LINAGLIPTIN 5 MG: 5 TABLET, FILM COATED ORAL at 08:25

## 2018-12-24 RX ADMIN — PANTOPRAZOLE SODIUM 40 MG: 40 TABLET, DELAYED RELEASE ORAL at 05:42

## 2018-12-24 RX ADMIN — LEVOFLOXACIN 500 MG: 5 INJECTION, SOLUTION INTRAVENOUS at 08:24

## 2018-12-24 RX ADMIN — LACTULOSE 20 G: 10 SOLUTION ORAL at 14:06

## 2018-12-24 RX ADMIN — BUSPIRONE HYDROCHLORIDE 10 MG: 10 TABLET ORAL at 08:24

## 2018-12-24 RX ADMIN — SODIUM CHLORIDE, PRESERVATIVE FREE 10 ML: 5 INJECTION INTRAVENOUS at 22:34

## 2018-12-24 RX ADMIN — LEVETIRACETAM 1500 MG: 500 TABLET ORAL at 22:19

## 2018-12-24 RX ADMIN — RISPERIDONE 1 MG: 0.5 TABLET, FILM COATED ORAL at 22:19

## 2018-12-24 RX ADMIN — LACTULOSE 20 G: 10 SOLUTION ORAL at 22:20

## 2018-12-24 RX ADMIN — LACTULOSE 20 G: 10 SOLUTION ORAL at 08:23

## 2018-12-24 RX ADMIN — CETIRIZINE HYDROCHLORIDE 10 MG: 10 TABLET, FILM COATED ORAL at 08:24

## 2018-12-24 RX ADMIN — Medication 1 CAPSULE: at 08:22

## 2018-12-24 RX ADMIN — METHYLPREDNISOLONE SODIUM SUCCINATE 20 MG: 40 INJECTION, POWDER, LYOPHILIZED, FOR SOLUTION INTRAMUSCULAR; INTRAVENOUS at 08:26

## 2018-12-24 RX ADMIN — INSULIN LISPRO 1 UNITS: 100 INJECTION, SOLUTION INTRAVENOUS; SUBCUTANEOUS at 22:34

## 2018-12-24 RX ADMIN — LEVETIRACETAM 1500 MG: 500 TABLET ORAL at 08:23

## 2018-12-24 RX ADMIN — GUAIFENESIN AND DEXTROMETHORPHAN HYDROBROMIDE 1 TABLET: 600; 30 TABLET, EXTENDED RELEASE ORAL at 22:20

## 2018-12-24 ASSESSMENT — PAIN SCALES - GENERAL
PAINLEVEL_OUTOF10: 0

## 2018-12-25 LAB
ANION GAP SERPL CALCULATED.3IONS-SCNC: 13 MMOL/L (ref 4–16)
BASOPHILS ABSOLUTE: 0 K/CU MM
BASOPHILS RELATIVE PERCENT: 0.6 % (ref 0–1)
BUN BLDV-MCNC: 10 MG/DL (ref 6–23)
CALCIUM SERPL-MCNC: 8.7 MG/DL (ref 8.3–10.6)
CHLORIDE BLD-SCNC: 104 MMOL/L (ref 99–110)
CO2: 29 MMOL/L (ref 21–32)
CREAT SERPL-MCNC: 0.4 MG/DL (ref 0.6–1.1)
DIFFERENTIAL TYPE: ABNORMAL
EOSINOPHILS ABSOLUTE: 0 K/CU MM
EOSINOPHILS RELATIVE PERCENT: 0 % (ref 0–3)
GFR AFRICAN AMERICAN: >60 ML/MIN/1.73M2
GFR NON-AFRICAN AMERICAN: >60 ML/MIN/1.73M2
GLUCOSE BLD-MCNC: 116 MG/DL (ref 70–99)
GLUCOSE BLD-MCNC: 156 MG/DL (ref 70–99)
GLUCOSE BLD-MCNC: 175 MG/DL (ref 70–99)
GLUCOSE BLD-MCNC: 177 MG/DL (ref 70–99)
GLUCOSE BLD-MCNC: 206 MG/DL (ref 70–99)
GLUCOSE BLD-MCNC: 233 MG/DL (ref 70–99)
HCT VFR BLD CALC: 39.9 % (ref 37–47)
HEMOGLOBIN: 12 GM/DL (ref 12.5–16)
IMMATURE NEUTROPHIL %: 2.7 % (ref 0–0.43)
LYMPHOCYTES ABSOLUTE: 1.7 K/CU MM
LYMPHOCYTES RELATIVE PERCENT: 31.8 % (ref 24–44)
MAGNESIUM: 2.2 MG/DL (ref 1.8–2.4)
MCH RBC QN AUTO: 30.5 PG (ref 27–31)
MCHC RBC AUTO-ENTMCNC: 30.1 % (ref 32–36)
MCV RBC AUTO: 101.5 FL (ref 78–100)
MONOCYTES ABSOLUTE: 0.2 K/CU MM
MONOCYTES RELATIVE PERCENT: 3.8 % (ref 0–4)
NUCLEATED RBC %: 0 %
PDW BLD-RTO: 11.6 % (ref 11.7–14.9)
PLATELET # BLD: 302 K/CU MM (ref 140–440)
PMV BLD AUTO: 9.1 FL (ref 7.5–11.1)
POTASSIUM SERPL-SCNC: 4.8 MMOL/L (ref 3.5–5.1)
RBC # BLD: 3.93 M/CU MM (ref 4.2–5.4)
SEGMENTED NEUTROPHILS ABSOLUTE COUNT: 3.2 K/CU MM
SEGMENTED NEUTROPHILS RELATIVE PERCENT: 61.1 % (ref 36–66)
SODIUM BLD-SCNC: 146 MMOL/L (ref 135–145)
TOTAL IMMATURE NEUTOROPHIL: 0.14 K/CU MM
TOTAL NUCLEATED RBC: 0 K/CU MM
WBC # BLD: 5.2 K/CU MM (ref 4–10.5)

## 2018-12-25 PROCEDURE — 6360000002 HC RX W HCPCS: Performed by: INTERNAL MEDICINE

## 2018-12-25 PROCEDURE — 1200000000 HC SEMI PRIVATE

## 2018-12-25 PROCEDURE — 2580000003 HC RX 258: Performed by: INTERNAL MEDICINE

## 2018-12-25 PROCEDURE — 6370000000 HC RX 637 (ALT 250 FOR IP): Performed by: INTERNAL MEDICINE

## 2018-12-25 PROCEDURE — 2580000003 HC RX 258: Performed by: EMERGENCY MEDICINE

## 2018-12-25 PROCEDURE — 85025 COMPLETE CBC W/AUTO DIFF WBC: CPT

## 2018-12-25 PROCEDURE — 94761 N-INVAS EAR/PLS OXIMETRY MLT: CPT

## 2018-12-25 PROCEDURE — 36415 COLL VENOUS BLD VENIPUNCTURE: CPT

## 2018-12-25 PROCEDURE — 83735 ASSAY OF MAGNESIUM: CPT

## 2018-12-25 PROCEDURE — 82962 GLUCOSE BLOOD TEST: CPT

## 2018-12-25 PROCEDURE — 94640 AIRWAY INHALATION TREATMENT: CPT

## 2018-12-25 PROCEDURE — 80048 BASIC METABOLIC PNL TOTAL CA: CPT

## 2018-12-25 PROCEDURE — 2700000000 HC OXYGEN THERAPY PER DAY

## 2018-12-25 RX ADMIN — CARBAMAZEPINE 300 MG: 300 CAPSULE, EXTENDED RELEASE ORAL at 08:07

## 2018-12-25 RX ADMIN — LACTULOSE 20 G: 10 SOLUTION ORAL at 08:07

## 2018-12-25 RX ADMIN — IPRATROPIUM BROMIDE AND ALBUTEROL SULFATE 1 AMPULE: .5; 3 SOLUTION RESPIRATORY (INHALATION) at 15:26

## 2018-12-25 RX ADMIN — LEVOTHYROXINE SODIUM 50 MCG: 50 TABLET ORAL at 05:49

## 2018-12-25 RX ADMIN — LEVOFLOXACIN 500 MG: 5 INJECTION, SOLUTION INTRAVENOUS at 08:08

## 2018-12-25 RX ADMIN — AMLODIPINE BESYLATE 5 MG: 5 TABLET ORAL at 08:07

## 2018-12-25 RX ADMIN — INSULIN LISPRO 1 UNITS: 100 INJECTION, SOLUTION INTRAVENOUS; SUBCUTANEOUS at 22:06

## 2018-12-25 RX ADMIN — METHYLPREDNISOLONE SODIUM SUCCINATE 20 MG: 40 INJECTION, POWDER, LYOPHILIZED, FOR SOLUTION INTRAMUSCULAR; INTRAVENOUS at 08:07

## 2018-12-25 RX ADMIN — DIVALPROEX SODIUM 2000 MG: 500 TABLET, DELAYED RELEASE ORAL at 21:45

## 2018-12-25 RX ADMIN — RISPERIDONE 1 MG: 0.5 TABLET, FILM COATED ORAL at 08:08

## 2018-12-25 RX ADMIN — PANTOPRAZOLE SODIUM 40 MG: 40 TABLET, DELAYED RELEASE ORAL at 05:49

## 2018-12-25 RX ADMIN — METHYLPREDNISOLONE SODIUM SUCCINATE 20 MG: 40 INJECTION, POWDER, LYOPHILIZED, FOR SOLUTION INTRAMUSCULAR; INTRAVENOUS at 21:45

## 2018-12-25 RX ADMIN — RISPERIDONE 1 MG: 0.5 TABLET, FILM COATED ORAL at 21:45

## 2018-12-25 RX ADMIN — INSULIN LISPRO 2 UNITS: 100 INJECTION, SOLUTION INTRAVENOUS; SUBCUTANEOUS at 17:20

## 2018-12-25 RX ADMIN — Medication 1 CAPSULE: at 08:08

## 2018-12-25 RX ADMIN — IPRATROPIUM BROMIDE AND ALBUTEROL SULFATE 1 AMPULE: .5; 3 SOLUTION RESPIRATORY (INHALATION) at 08:01

## 2018-12-25 RX ADMIN — ATORVASTATIN CALCIUM 40 MG: 40 TABLET, FILM COATED ORAL at 08:08

## 2018-12-25 RX ADMIN — RISPERIDONE 1 MG: 0.5 TABLET, FILM COATED ORAL at 13:26

## 2018-12-25 RX ADMIN — CETIRIZINE HYDROCHLORIDE 10 MG: 10 TABLET, FILM COATED ORAL at 08:08

## 2018-12-25 RX ADMIN — SODIUM CHLORIDE, PRESERVATIVE FREE 10 ML: 5 INJECTION INTRAVENOUS at 21:44

## 2018-12-25 RX ADMIN — IPRATROPIUM BROMIDE AND ALBUTEROL SULFATE 1 AMPULE: .5; 3 SOLUTION RESPIRATORY (INHALATION) at 23:39

## 2018-12-25 RX ADMIN — LEVETIRACETAM 1500 MG: 500 TABLET ORAL at 08:07

## 2018-12-25 RX ADMIN — GUAIFENESIN AND DEXTROMETHORPHAN HYDROBROMIDE 1 TABLET: 600; 30 TABLET, EXTENDED RELEASE ORAL at 08:07

## 2018-12-25 RX ADMIN — SERTRALINE HYDROCHLORIDE 200 MG: 100 TABLET ORAL at 21:45

## 2018-12-25 RX ADMIN — GUAIFENESIN AND DEXTROMETHORPHAN HYDROBROMIDE 1 TABLET: 600; 30 TABLET, EXTENDED RELEASE ORAL at 21:45

## 2018-12-25 RX ADMIN — LINAGLIPTIN 5 MG: 5 TABLET, FILM COATED ORAL at 08:08

## 2018-12-25 RX ADMIN — IPRATROPIUM BROMIDE AND ALBUTEROL SULFATE 1 AMPULE: .5; 3 SOLUTION RESPIRATORY (INHALATION) at 11:37

## 2018-12-25 RX ADMIN — ENOXAPARIN SODIUM 40 MG: 40 INJECTION SUBCUTANEOUS at 08:07

## 2018-12-25 RX ADMIN — SODIUM CHLORIDE: 9 INJECTION, SOLUTION INTRAVENOUS at 13:27

## 2018-12-25 RX ADMIN — LEVETIRACETAM 1500 MG: 500 TABLET ORAL at 21:44

## 2018-12-25 RX ADMIN — INSULIN LISPRO 2 UNITS: 100 INJECTION, SOLUTION INTRAVENOUS; SUBCUTANEOUS at 13:26

## 2018-12-25 RX ADMIN — CARBAMAZEPINE 300 MG: 300 CAPSULE, EXTENDED RELEASE ORAL at 21:45

## 2018-12-25 RX ADMIN — BUSPIRONE HYDROCHLORIDE 10 MG: 10 TABLET ORAL at 08:08

## 2018-12-25 RX ADMIN — SODIUM CHLORIDE, PRESERVATIVE FREE 10 ML: 5 INJECTION INTRAVENOUS at 08:07

## 2018-12-25 RX ADMIN — BUSPIRONE HYDROCHLORIDE 10 MG: 10 TABLET ORAL at 21:44

## 2018-12-25 ASSESSMENT — PAIN SCALES - GENERAL
PAINLEVEL_OUTOF10: 0

## 2018-12-26 LAB
ANION GAP SERPL CALCULATED.3IONS-SCNC: 12 MMOL/L (ref 4–16)
ANISOCYTOSIS: ABNORMAL
BANDED NEUTROPHILS ABSOLUTE COUNT: 0.59 K/CU MM
BANDED NEUTROPHILS RELATIVE PERCENT: 11 % (ref 5–11)
BUN BLDV-MCNC: 12 MG/DL (ref 6–23)
CALCIUM SERPL-MCNC: 8.9 MG/DL (ref 8.3–10.6)
CHLORIDE BLD-SCNC: 101 MMOL/L (ref 99–110)
CO2: 30 MMOL/L (ref 21–32)
CREAT SERPL-MCNC: 0.5 MG/DL (ref 0.6–1.1)
DIFFERENTIAL TYPE: ABNORMAL
GFR AFRICAN AMERICAN: >60 ML/MIN/1.73M2
GFR NON-AFRICAN AMERICAN: >60 ML/MIN/1.73M2
GLUCOSE BLD-MCNC: 107 MG/DL (ref 70–99)
GLUCOSE BLD-MCNC: 121 MG/DL (ref 70–99)
GLUCOSE BLD-MCNC: 152 MG/DL (ref 70–99)
GLUCOSE BLD-MCNC: 157 MG/DL (ref 70–99)
GLUCOSE BLD-MCNC: 208 MG/DL (ref 70–99)
HCT VFR BLD CALC: 37.3 % (ref 37–47)
HEMOGLOBIN: 11.8 GM/DL (ref 12.5–16)
LYMPHOCYTES ABSOLUTE: 1.8 K/CU MM
LYMPHOCYTES RELATIVE PERCENT: 33 % (ref 24–44)
MAGNESIUM: 2.2 MG/DL (ref 1.8–2.4)
MCH RBC QN AUTO: 30.5 PG (ref 27–31)
MCHC RBC AUTO-ENTMCNC: 31.6 % (ref 32–36)
MCV RBC AUTO: 96.4 FL (ref 78–100)
METAMYELOCYTES ABSOLUTE COUNT: 0.05 K/CU MM
METAMYELOCYTES PERCENT: 1 %
MONOCYTES ABSOLUTE: 0.3 K/CU MM
MONOCYTES RELATIVE PERCENT: 5 % (ref 0–4)
PDW BLD-RTO: 11.8 % (ref 11.7–14.9)
PLATELET # BLD: 324 K/CU MM (ref 140–440)
PMV BLD AUTO: 8.9 FL (ref 7.5–11.1)
POTASSIUM SERPL-SCNC: 4.6 MMOL/L (ref 3.5–5.1)
RBC # BLD: 3.87 M/CU MM (ref 4.2–5.4)
RBC # BLD: ABNORMAL 10*6/UL
SEGMENTED NEUTROPHILS ABSOLUTE COUNT: 2.7 K/CU MM
SEGMENTED NEUTROPHILS RELATIVE PERCENT: 50 % (ref 36–66)
SODIUM BLD-SCNC: 143 MMOL/L (ref 135–145)
WBC # BLD: 5.4 K/CU MM (ref 4–10.5)
WBC # BLD: ABNORMAL 10*3/UL

## 2018-12-26 PROCEDURE — 36415 COLL VENOUS BLD VENIPUNCTURE: CPT

## 2018-12-26 PROCEDURE — 6370000000 HC RX 637 (ALT 250 FOR IP): Performed by: INTERNAL MEDICINE

## 2018-12-26 PROCEDURE — 82962 GLUCOSE BLOOD TEST: CPT

## 2018-12-26 PROCEDURE — 83735 ASSAY OF MAGNESIUM: CPT

## 2018-12-26 PROCEDURE — 94640 AIRWAY INHALATION TREATMENT: CPT

## 2018-12-26 PROCEDURE — 80048 BASIC METABOLIC PNL TOTAL CA: CPT

## 2018-12-26 PROCEDURE — 6360000002 HC RX W HCPCS: Performed by: INTERNAL MEDICINE

## 2018-12-26 PROCEDURE — 2700000000 HC OXYGEN THERAPY PER DAY

## 2018-12-26 PROCEDURE — 1200000000 HC SEMI PRIVATE

## 2018-12-26 PROCEDURE — 6370000000 HC RX 637 (ALT 250 FOR IP): Performed by: HOSPITALIST

## 2018-12-26 PROCEDURE — 85007 BL SMEAR W/DIFF WBC COUNT: CPT

## 2018-12-26 PROCEDURE — 2580000003 HC RX 258: Performed by: INTERNAL MEDICINE

## 2018-12-26 PROCEDURE — 85027 COMPLETE CBC AUTOMATED: CPT

## 2018-12-26 PROCEDURE — 94761 N-INVAS EAR/PLS OXIMETRY MLT: CPT

## 2018-12-26 RX ORDER — LEVOFLOXACIN 500 MG/1
500 TABLET, FILM COATED ORAL
Status: DISCONTINUED | OUTPATIENT
Start: 2018-12-27 | End: 2018-12-27 | Stop reason: HOSPADM

## 2018-12-26 RX ORDER — PREDNISONE 20 MG/1
TABLET ORAL
Qty: 18 TABLET | Refills: 0 | Status: SHIPPED | OUTPATIENT
Start: 2018-12-26 | End: 2019-03-12

## 2018-12-26 RX ORDER — GLUCOSAMINE HCL/CHONDROITIN SU 500-400 MG
CAPSULE ORAL
Qty: 90 STRIP | Refills: 0 | Status: SHIPPED | OUTPATIENT
Start: 2018-12-26

## 2018-12-26 RX ORDER — IPRATROPIUM BROMIDE AND ALBUTEROL SULFATE 2.5; .5 MG/3ML; MG/3ML
1 SOLUTION RESPIRATORY (INHALATION) EVERY 6 HOURS
Qty: 3 ML | Refills: 1 | Status: SHIPPED | OUTPATIENT
Start: 2018-12-26 | End: 2019-05-20 | Stop reason: DRUGHIGH

## 2018-12-26 RX ADMIN — SODIUM CHLORIDE: 9 INJECTION, SOLUTION INTRAVENOUS at 11:47

## 2018-12-26 RX ADMIN — DIVALPROEX SODIUM 2000 MG: 500 TABLET, DELAYED RELEASE ORAL at 21:52

## 2018-12-26 RX ADMIN — LEVOFLOXACIN 500 MG: 5 INJECTION, SOLUTION INTRAVENOUS at 11:48

## 2018-12-26 RX ADMIN — IPRATROPIUM BROMIDE AND ALBUTEROL SULFATE 1 AMPULE: .5; 3 SOLUTION RESPIRATORY (INHALATION) at 07:57

## 2018-12-26 RX ADMIN — RISPERIDONE 1 MG: 0.5 TABLET, FILM COATED ORAL at 11:48

## 2018-12-26 RX ADMIN — BUSPIRONE HYDROCHLORIDE 10 MG: 10 TABLET ORAL at 21:53

## 2018-12-26 RX ADMIN — GUAIFENESIN AND DEXTROMETHORPHAN HYDROBROMIDE 1 TABLET: 600; 30 TABLET, EXTENDED RELEASE ORAL at 11:48

## 2018-12-26 RX ADMIN — CARBAMAZEPINE 300 MG: 300 CAPSULE, EXTENDED RELEASE ORAL at 21:53

## 2018-12-26 RX ADMIN — LINAGLIPTIN 5 MG: 5 TABLET, FILM COATED ORAL at 11:49

## 2018-12-26 RX ADMIN — ENOXAPARIN SODIUM 40 MG: 40 INJECTION SUBCUTANEOUS at 11:49

## 2018-12-26 RX ADMIN — METHYLPREDNISOLONE SODIUM SUCCINATE 20 MG: 40 INJECTION, POWDER, LYOPHILIZED, FOR SOLUTION INTRAMUSCULAR; INTRAVENOUS at 11:48

## 2018-12-26 RX ADMIN — SERTRALINE HYDROCHLORIDE 200 MG: 100 TABLET ORAL at 21:53

## 2018-12-26 RX ADMIN — GUAIFENESIN AND DEXTROMETHORPHAN HYDROBROMIDE 1 TABLET: 600; 30 TABLET, EXTENDED RELEASE ORAL at 21:53

## 2018-12-26 RX ADMIN — RISPERIDONE 1 MG: 0.5 TABLET, FILM COATED ORAL at 21:53

## 2018-12-26 RX ADMIN — PANTOPRAZOLE SODIUM 40 MG: 40 TABLET, DELAYED RELEASE ORAL at 06:08

## 2018-12-26 RX ADMIN — LEVETIRACETAM 1500 MG: 500 TABLET ORAL at 11:49

## 2018-12-26 RX ADMIN — BUSPIRONE HYDROCHLORIDE 10 MG: 10 TABLET ORAL at 11:51

## 2018-12-26 RX ADMIN — CARBAMAZEPINE 300 MG: 300 CAPSULE, EXTENDED RELEASE ORAL at 11:49

## 2018-12-26 RX ADMIN — INSULIN LISPRO 2 UNITS: 100 INJECTION, SOLUTION INTRAVENOUS; SUBCUTANEOUS at 18:55

## 2018-12-26 RX ADMIN — IPRATROPIUM BROMIDE AND ALBUTEROL SULFATE 1 AMPULE: .5; 3 SOLUTION RESPIRATORY (INHALATION) at 19:35

## 2018-12-26 RX ADMIN — CETIRIZINE HYDROCHLORIDE 10 MG: 10 TABLET, FILM COATED ORAL at 11:48

## 2018-12-26 RX ADMIN — ATORVASTATIN CALCIUM 40 MG: 40 TABLET, FILM COATED ORAL at 11:49

## 2018-12-26 RX ADMIN — AMLODIPINE BESYLATE 5 MG: 5 TABLET ORAL at 11:49

## 2018-12-26 RX ADMIN — PREDNISONE 30 MG: 20 TABLET ORAL at 21:53

## 2018-12-26 RX ADMIN — LEVETIRACETAM 1500 MG: 500 TABLET ORAL at 21:53

## 2018-12-26 RX ADMIN — IPRATROPIUM BROMIDE AND ALBUTEROL SULFATE 1 AMPULE: .5; 3 SOLUTION RESPIRATORY (INHALATION) at 11:21

## 2018-12-26 RX ADMIN — Medication 1 CAPSULE: at 11:49

## 2018-12-26 RX ADMIN — INSULIN LISPRO 1 UNITS: 100 INJECTION, SOLUTION INTRAVENOUS; SUBCUTANEOUS at 22:14

## 2018-12-26 RX ADMIN — LEVOTHYROXINE SODIUM 50 MCG: 50 TABLET ORAL at 06:08

## 2018-12-26 ASSESSMENT — PAIN SCALES - WONG BAKER: WONGBAKER_NUMERICALRESPONSE: 8

## 2018-12-27 VITALS
DIASTOLIC BLOOD PRESSURE: 67 MMHG | BODY MASS INDEX: 23.11 KG/M2 | RESPIRATION RATE: 18 BRPM | HEIGHT: 64 IN | SYSTOLIC BLOOD PRESSURE: 133 MMHG | TEMPERATURE: 97.7 F | WEIGHT: 135.36 LBS | OXYGEN SATURATION: 95 % | HEART RATE: 61 BPM

## 2018-12-27 LAB
ANION GAP SERPL CALCULATED.3IONS-SCNC: 13 MMOL/L (ref 4–16)
BASOPHILS ABSOLUTE: 0 K/CU MM
BASOPHILS RELATIVE PERCENT: 0.3 % (ref 0–1)
BUN BLDV-MCNC: 18 MG/DL (ref 6–23)
CALCIUM SERPL-MCNC: 9.3 MG/DL (ref 8.3–10.6)
CHLORIDE BLD-SCNC: 98 MMOL/L (ref 99–110)
CO2: 29 MMOL/L (ref 21–32)
CREAT SERPL-MCNC: 0.5 MG/DL (ref 0.6–1.1)
DIFFERENTIAL TYPE: ABNORMAL
EOSINOPHILS ABSOLUTE: 0 K/CU MM
EOSINOPHILS RELATIVE PERCENT: 0 % (ref 0–3)
GFR AFRICAN AMERICAN: >60 ML/MIN/1.73M2
GFR NON-AFRICAN AMERICAN: >60 ML/MIN/1.73M2
GLUCOSE BLD-MCNC: 183 MG/DL (ref 70–99)
HCT VFR BLD CALC: 39.1 % (ref 37–47)
HEMOGLOBIN: 12.6 GM/DL (ref 12.5–16)
IMMATURE NEUTROPHIL %: 2 % (ref 0–0.43)
LYMPHOCYTES ABSOLUTE: 1.5 K/CU MM
LYMPHOCYTES RELATIVE PERCENT: 23.5 % (ref 24–44)
MAGNESIUM: 2.3 MG/DL (ref 1.8–2.4)
MCH RBC QN AUTO: 30.7 PG (ref 27–31)
MCHC RBC AUTO-ENTMCNC: 32.2 % (ref 32–36)
MCV RBC AUTO: 95.4 FL (ref 78–100)
MONOCYTES ABSOLUTE: 0.2 K/CU MM
MONOCYTES RELATIVE PERCENT: 3.1 % (ref 0–4)
NUCLEATED RBC %: 0 %
PDW BLD-RTO: 11.9 % (ref 11.7–14.9)
PLATELET # BLD: 377 K/CU MM (ref 140–440)
PMV BLD AUTO: 8.8 FL (ref 7.5–11.1)
POTASSIUM SERPL-SCNC: 4.5 MMOL/L (ref 3.5–5.1)
RBC # BLD: 4.1 M/CU MM (ref 4.2–5.4)
SEGMENTED NEUTROPHILS ABSOLUTE COUNT: 4.6 K/CU MM
SEGMENTED NEUTROPHILS RELATIVE PERCENT: 71.1 % (ref 36–66)
SODIUM BLD-SCNC: 140 MMOL/L (ref 135–145)
TOTAL IMMATURE NEUTOROPHIL: 0.13 K/CU MM
TOTAL NUCLEATED RBC: 0 K/CU MM
WBC # BLD: 6.5 K/CU MM (ref 4–10.5)

## 2018-12-27 PROCEDURE — 83735 ASSAY OF MAGNESIUM: CPT

## 2018-12-27 PROCEDURE — 85025 COMPLETE CBC W/AUTO DIFF WBC: CPT

## 2018-12-27 PROCEDURE — 36415 COLL VENOUS BLD VENIPUNCTURE: CPT

## 2018-12-27 PROCEDURE — 6370000000 HC RX 637 (ALT 250 FOR IP): Performed by: HOSPITALIST

## 2018-12-27 PROCEDURE — 80048 BASIC METABOLIC PNL TOTAL CA: CPT

## 2018-12-27 PROCEDURE — 6370000000 HC RX 637 (ALT 250 FOR IP): Performed by: INTERNAL MEDICINE

## 2018-12-27 RX ADMIN — LEVOFLOXACIN 500 MG: 500 TABLET, FILM COATED ORAL at 06:52

## 2018-12-27 RX ADMIN — LEVOTHYROXINE SODIUM 50 MCG: 50 TABLET ORAL at 06:52

## 2018-12-27 RX ADMIN — PANTOPRAZOLE SODIUM 40 MG: 40 TABLET, DELAYED RELEASE ORAL at 06:52

## 2018-12-28 LAB
EKG ATRIAL RATE: 108 BPM
EKG DIAGNOSIS: NORMAL
EKG P AXIS: 25 DEGREES
EKG P-R INTERVAL: 130 MS
EKG Q-T INTERVAL: 408 MS
EKG QRS DURATION: 72 MS
EKG QTC CALCULATION (BAZETT): 546 MS
EKG R AXIS: 8 DEGREES
EKG T AXIS: 13 DEGREES
EKG VENTRICULAR RATE: 108 BPM

## 2019-01-02 ENCOUNTER — TELEPHONE (OUTPATIENT)
Dept: FAMILY MEDICINE CLINIC | Age: 59
End: 2019-01-02

## 2019-01-02 DIAGNOSIS — G81.94 LEFT HEMIPLEGIA (HCC): Primary | ICD-10-CM

## 2019-01-08 ENCOUNTER — OFFICE VISIT (OUTPATIENT)
Dept: FAMILY MEDICINE CLINIC | Age: 59
End: 2019-01-08
Payer: MEDICARE

## 2019-01-08 VITALS — DIASTOLIC BLOOD PRESSURE: 86 MMHG | HEART RATE: 71 BPM | OXYGEN SATURATION: 97 % | SYSTOLIC BLOOD PRESSURE: 126 MMHG

## 2019-01-08 DIAGNOSIS — I10 HTN (HYPERTENSION), BENIGN: ICD-10-CM

## 2019-01-08 DIAGNOSIS — Z09 HOSPITAL DISCHARGE FOLLOW-UP: ICD-10-CM

## 2019-01-08 DIAGNOSIS — R53.1 WEAKNESS: ICD-10-CM

## 2019-01-08 DIAGNOSIS — G81.94 LEFT HEMIPLEGIA (HCC): ICD-10-CM

## 2019-01-08 DIAGNOSIS — J12.9 VIRAL PNEUMONIA: Primary | ICD-10-CM

## 2019-01-08 DIAGNOSIS — J44.9 CHRONIC OBSTRUCTIVE PULMONARY DISEASE, UNSPECIFIED COPD TYPE (HCC): ICD-10-CM

## 2019-01-08 PROCEDURE — G8926 SPIRO NO PERF OR DOC: HCPCS | Performed by: FAMILY MEDICINE

## 2019-01-08 PROCEDURE — G8427 DOCREV CUR MEDS BY ELIG CLIN: HCPCS | Performed by: FAMILY MEDICINE

## 2019-01-08 PROCEDURE — 1036F TOBACCO NON-USER: CPT | Performed by: FAMILY MEDICINE

## 2019-01-08 PROCEDURE — 1111F DSCHRG MED/CURRENT MED MERGE: CPT | Performed by: FAMILY MEDICINE

## 2019-01-08 PROCEDURE — G8420 CALC BMI NORM PARAMETERS: HCPCS | Performed by: FAMILY MEDICINE

## 2019-01-08 PROCEDURE — 3017F COLORECTAL CA SCREEN DOC REV: CPT | Performed by: FAMILY MEDICINE

## 2019-01-08 PROCEDURE — 3023F SPIROM DOC REV: CPT | Performed by: FAMILY MEDICINE

## 2019-01-08 PROCEDURE — 99214 OFFICE O/P EST MOD 30 MIN: CPT | Performed by: FAMILY MEDICINE

## 2019-01-08 PROCEDURE — G8482 FLU IMMUNIZE ORDER/ADMIN: HCPCS | Performed by: FAMILY MEDICINE

## 2019-01-08 ASSESSMENT — ENCOUNTER SYMPTOMS
COUGH: 0
CONSTIPATION: 0
SHORTNESS OF BREATH: 0
WHEEZING: 0
DIARRHEA: 0
VOMITING: 0
NAUSEA: 0
ABDOMINAL PAIN: 0
CHOKING: 0

## 2019-02-25 RX ORDER — CETIRIZINE HYDROCHLORIDE 10 MG/1
10 TABLET ORAL DAILY
Qty: 31 TABLET | Refills: 3 | Status: SHIPPED | OUTPATIENT
Start: 2019-02-25 | End: 2019-06-05 | Stop reason: SDUPTHER

## 2019-02-25 RX ORDER — LISINOPRIL 40 MG/1
40 TABLET ORAL DAILY
Qty: 31 TABLET | Refills: 5 | Status: SHIPPED | OUTPATIENT
Start: 2019-02-25 | End: 2019-05-20 | Stop reason: SDUPTHER

## 2019-02-25 RX ORDER — SELENIUM 50 MCG
TABLET ORAL
Qty: 31 CAPSULE | Refills: 5 | Status: SHIPPED | OUTPATIENT
Start: 2019-02-25 | End: 2019-08-04 | Stop reason: SDUPTHER

## 2019-02-25 RX ORDER — AMLODIPINE BESYLATE 5 MG/1
5 TABLET ORAL DAILY
Qty: 31 TABLET | Refills: 5 | Status: ON HOLD | OUTPATIENT
Start: 2019-02-25 | End: 2019-06-30 | Stop reason: HOSPADM

## 2019-02-25 RX ORDER — FUROSEMIDE 20 MG/1
TABLET ORAL
Qty: 31 TABLET | Refills: 5 | Status: SHIPPED | OUTPATIENT
Start: 2019-02-25 | End: 2019-08-04 | Stop reason: SDUPTHER

## 2019-02-25 RX ORDER — SITAGLIPTIN 100 MG/1
100 TABLET, FILM COATED ORAL DAILY
Qty: 31 TABLET | Refills: 5 | Status: SHIPPED | OUTPATIENT
Start: 2019-02-25 | End: 2020-05-05 | Stop reason: SDUPTHER

## 2019-02-26 RX ORDER — LISINOPRIL 40 MG/1
40 TABLET ORAL DAILY
Qty: 31 TABLET | Refills: 5 | OUTPATIENT
Start: 2019-02-26

## 2019-03-01 ENCOUNTER — TELEPHONE (OUTPATIENT)
Dept: FAMILY MEDICINE CLINIC | Age: 59
End: 2019-03-01

## 2019-03-01 RX ORDER — SULFAMETHOXAZOLE AND TRIMETHOPRIM 800; 160 MG/1; MG/1
1 TABLET ORAL 2 TIMES DAILY
Qty: 10 TABLET | Refills: 0 | Status: SHIPPED | OUTPATIENT
Start: 2019-03-01 | End: 2019-03-06

## 2019-03-01 RX ORDER — CARBAMAZEPINE 300 MG/1
300 CAPSULE, EXTENDED RELEASE ORAL 2 TIMES DAILY
Qty: 60 CAPSULE | Refills: 5 | Status: SHIPPED | OUTPATIENT
Start: 2019-03-01 | End: 2019-07-02 | Stop reason: SDUPTHER

## 2019-03-05 RX ORDER — LISINOPRIL 40 MG/1
40 TABLET ORAL DAILY
Qty: 31 TABLET | Refills: 5 | Status: SHIPPED | OUTPATIENT
Start: 2019-03-05 | End: 2019-08-04 | Stop reason: SDUPTHER

## 2019-03-12 ENCOUNTER — HOSPITAL ENCOUNTER (EMERGENCY)
Age: 59
Discharge: HOME | End: 2019-03-12
Attending: EMERGENCY MEDICINE
Payer: MEDICARE

## 2019-03-12 ENCOUNTER — APPOINTMENT (OUTPATIENT)
Dept: CT IMAGING | Age: 59
End: 2019-03-12
Payer: MEDICARE

## 2019-03-12 ENCOUNTER — TELEPHONE (OUTPATIENT)
Dept: FAMILY MEDICINE CLINIC | Age: 59
End: 2019-03-12

## 2019-03-12 VITALS
BODY MASS INDEX: 23.05 KG/M2 | TEMPERATURE: 98.6 F | OXYGEN SATURATION: 92 % | RESPIRATION RATE: 16 BRPM | HEIGHT: 64 IN | DIASTOLIC BLOOD PRESSURE: 74 MMHG | HEART RATE: 77 BPM | WEIGHT: 135 LBS | SYSTOLIC BLOOD PRESSURE: 144 MMHG

## 2019-03-12 DIAGNOSIS — J44.9 CHRONIC OBSTRUCTIVE PULMONARY DISEASE, UNSPECIFIED COPD TYPE (HCC): Primary | ICD-10-CM

## 2019-03-12 LAB
ALBUMIN SERPL-MCNC: 3.5 GM/DL (ref 3.4–5)
ALP BLD-CCNC: 78 IU/L (ref 40–129)
ALT SERPL-CCNC: 6 U/L (ref 10–40)
ANION GAP SERPL CALCULATED.3IONS-SCNC: 10 MMOL/L (ref 4–16)
AST SERPL-CCNC: 9 IU/L (ref 15–37)
BASE EXCESS MIXED: ABNORMAL (ref 0–2.3)
BASOPHILS ABSOLUTE: 0 K/CU MM
BASOPHILS RELATIVE PERCENT: 0.7 % (ref 0–1)
BILIRUB SERPL-MCNC: 0.1 MG/DL (ref 0–1)
BUN BLDV-MCNC: 12 MG/DL (ref 6–23)
CALCIUM SERPL-MCNC: 8.5 MG/DL (ref 8.3–10.6)
CHLORIDE BLD-SCNC: 101 MMOL/L (ref 99–110)
CO2: 28 MMOL/L (ref 21–32)
COMMENT: ABNORMAL
CREAT SERPL-MCNC: 0.5 MG/DL (ref 0.6–1.1)
DIFFERENTIAL TYPE: ABNORMAL
EOSINOPHILS ABSOLUTE: 0.1 K/CU MM
EOSINOPHILS RELATIVE PERCENT: 1.1 % (ref 0–3)
GFR AFRICAN AMERICAN: >60 ML/MIN/1.73M2
GFR NON-AFRICAN AMERICAN: >60 ML/MIN/1.73M2
GLUCOSE BLD-MCNC: 125 MG/DL (ref 70–99)
HCO3 VENOUS: 32.7 MMOL/L (ref 19–25)
HCT VFR BLD CALC: 35.8 % (ref 37–47)
HEMOGLOBIN: 11.1 GM/DL (ref 12.5–16)
IMMATURE NEUTROPHIL %: 1.3 % (ref 0–0.43)
LACTATE: 1.5 MMOL/L (ref 0.4–2)
LYMPHOCYTES ABSOLUTE: 1.5 K/CU MM
LYMPHOCYTES RELATIVE PERCENT: 26.7 % (ref 24–44)
MCH RBC QN AUTO: 30.7 PG (ref 27–31)
MCHC RBC AUTO-ENTMCNC: 31 % (ref 32–36)
MCV RBC AUTO: 99.2 FL (ref 78–100)
MONOCYTES ABSOLUTE: 0.7 K/CU MM
MONOCYTES RELATIVE PERCENT: 12.2 % (ref 0–4)
NUCLEATED RBC %: 0 %
O2 SAT, VEN: 94.6 % (ref 50–70)
PCO2, VEN: 47 MMHG (ref 38–52)
PDW BLD-RTO: 13.5 % (ref 11.7–14.9)
PH VENOUS: 7.45 (ref 7.32–7.42)
PLATELET # BLD: 261 K/CU MM (ref 140–440)
PMV BLD AUTO: 8.4 FL (ref 7.5–11.1)
PO2, VEN: 137 MMHG (ref 28–48)
POTASSIUM SERPL-SCNC: 3.7 MMOL/L (ref 3.5–5.1)
PRO-BNP: 139.6 PG/ML
RBC # BLD: 3.61 M/CU MM (ref 4.2–5.4)
SEGMENTED NEUTROPHILS ABSOLUTE COUNT: 3.2 K/CU MM
SEGMENTED NEUTROPHILS RELATIVE PERCENT: 58 % (ref 36–66)
SODIUM BLD-SCNC: 139 MMOL/L (ref 135–145)
TOTAL IMMATURE NEUTOROPHIL: 0.07 K/CU MM
TOTAL NUCLEATED RBC: 0 K/CU MM
TOTAL PROTEIN: 6.2 GM/DL (ref 6.4–8.2)
TROPONIN T: <0.01 NG/ML
WBC # BLD: 5.5 K/CU MM (ref 4–10.5)

## 2019-03-12 PROCEDURE — 2700000000 HC OXYGEN THERAPY PER DAY

## 2019-03-12 PROCEDURE — 94761 N-INVAS EAR/PLS OXIMETRY MLT: CPT

## 2019-03-12 PROCEDURE — 93005 ELECTROCARDIOGRAM TRACING: CPT | Performed by: EMERGENCY MEDICINE

## 2019-03-12 PROCEDURE — 82805 BLOOD GASES W/O2 SATURATION: CPT

## 2019-03-12 PROCEDURE — 94640 AIRWAY INHALATION TREATMENT: CPT

## 2019-03-12 PROCEDURE — 36415 COLL VENOUS BLD VENIPUNCTURE: CPT

## 2019-03-12 PROCEDURE — 80053 COMPREHEN METABOLIC PANEL: CPT

## 2019-03-12 PROCEDURE — 6370000000 HC RX 637 (ALT 250 FOR IP): Performed by: EMERGENCY MEDICINE

## 2019-03-12 PROCEDURE — 83605 ASSAY OF LACTIC ACID: CPT

## 2019-03-12 PROCEDURE — 83880 ASSAY OF NATRIURETIC PEPTIDE: CPT

## 2019-03-12 PROCEDURE — 99285 EMERGENCY DEPT VISIT HI MDM: CPT

## 2019-03-12 PROCEDURE — 84484 ASSAY OF TROPONIN QUANT: CPT

## 2019-03-12 PROCEDURE — 71250 CT THORAX DX C-: CPT

## 2019-03-12 PROCEDURE — 85025 COMPLETE CBC W/AUTO DIFF WBC: CPT

## 2019-03-12 RX ORDER — PREDNISONE 20 MG/1
60 TABLET ORAL ONCE
Status: COMPLETED | OUTPATIENT
Start: 2019-03-12 | End: 2019-03-12

## 2019-03-12 RX ORDER — IPRATROPIUM BROMIDE AND ALBUTEROL SULFATE 2.5; .5 MG/3ML; MG/3ML
1 SOLUTION RESPIRATORY (INHALATION) ONCE
Status: COMPLETED | OUTPATIENT
Start: 2019-03-12 | End: 2019-03-12

## 2019-03-12 RX ORDER — PREDNISONE 10 MG/1
TABLET ORAL
Qty: 45 TABLET | Refills: 0 | Status: SHIPPED | OUTPATIENT
Start: 2019-03-12 | End: 2019-05-20 | Stop reason: ALTCHOICE

## 2019-03-12 RX ADMIN — PREDNISONE 60 MG: 20 TABLET ORAL at 10:31

## 2019-03-12 RX ADMIN — IPRATROPIUM BROMIDE AND ALBUTEROL SULFATE 1 AMPULE: .5; 3 SOLUTION RESPIRATORY (INHALATION) at 10:27

## 2019-03-12 NOTE — ED NOTES
Third diaper changed per ED staff. Isha Newell EDT sts some BM again, with urine. Skin cleansed and fresh diaper applied.      Shannan Luna RN  03/12/19 [id number]

## 2019-03-12 NOTE — ED PROVIDER NOTES
Triage Chief Complaint:   Other (reported low oxygen saturation)      [place]:  Gemma Goldstein is a 66 y.o. female that presents to the emergency department with low O2 sats. Stated it got down into the 80s. Patient has a history of COPD. Caregiver is bedside and states that she was recently taken off oxygen. .  She is denying any chest pain or pressure. Patient does have a history of anxiety, cerebral palsy, mental disability, diabetes, hypertension, hyperlipidemia. Caregiver says she was brought in to be evaluated. Past Medical History:   Diagnosis Date    Anxiety disorder     Back pain, chronic     Cerebral palsy (HCC)     COPD (chronic obstructive pulmonary disease) (HCC)     CVA (cerebral infarction) 2014 x2    Diabetes mellitus ([facility]     Diverticulosis     Epilepsy ([facility]     GERD (gastroesophageal reflux disease)     Hyperlipidemia     Hypertension     Insomnia     Iron (Fe) deficiency anemia     Mental disability     Seizures (HCC)     Unspecified cerebral artery occlusion with cerebral infarction      Past Surgical History:   Procedure Laterality Date    GASTROSTOMY TUBE PLACEMENT       History reviewed. No pertinent family history.   Social History     Socioeconomic History    Marital status: Single     Spouse name: Not on file    Number of children: Not on file    Years of education: Not on file    Highest education level: Not on file   Occupational History    Not on file   Social Needs    Financial resource strain: Not on file    Food insecurity:     Worry: Not on file     Inability: Not on file    Transportation needs:     Medical: Not on file     Non-medical: Not on file   Tobacco Use    Smoking status: Former Smoker     Packs/day: 1.50     Years: 20.00     Pack years: 30.00     Last attempt to quit: 2011     Years since quittin.5    Smokeless tobacco: Never Used   Substance and Sexual Activity    Alcohol use: No     Alcohol/week: 0.0 oz    Drug use: No    Sexual activity: Not on file   Lifestyle    Physical activity:     Days per week: Not on file     Minutes per session: Not on file    Stress: Not on file   Relationships    Social connections:     Talks on phone: Not on file     Gets together: Not on file     Attends Jehovah's witness service: Not on file     Active member of club or organization: Not on file     Attends meetings of clubs or organizations: Not on file     Relationship status: Not on file    Intimate partner violence:     Fear of current or ex partner: Not on file     Emotionally abused: Not on file     Physically abused: Not on file     Forced sexual activity: Not on file   Other Topics Concern    Not on file   Social History Narrative    Not on file     No current facility-administered medications for this encounter. Current Outpatient Medications   Medication Sig Dispense Refill    predniSONE (DELTASONE) 10 MG tablet 5 tablets PO Qday X 3, then 4 tablets PO Qday X 3, then 3 tablets PO Qday X 3, then 2 tablets PO Qday X 3, then 1 tablets PO Qday X 3, then stop.  45 tablet 0    lisinopril (PRINIVIL;ZESTRIL) 40 MG tablet TAKE 1 TABLET BY MOUTH DAILY 31 tablet 5    carBAMazepine (CARBATROL) 300 MG extended release capsule Take 1 capsule by mouth 2 times daily 60 capsule 5    cetirizine (ZYRTEC) 10 MG tablet TAKE 1 TABLET BY MOUTH DAILY 31 tablet 3    furosemide (LASIX) 20 MG tablet TAKE ONE TABLET BY MOUTH DAILY 31 tablet 5    amLODIPine (NORVASC) 5 MG tablet TAKE 1 TABLET BY MOUTH DAILY 31 tablet 5    JANUVIA 100 MG tablet TAKE 1 TABLET BY MOUTH DAILY 31 tablet 5    lisinopril (PRINIVIL;ZESTRIL) 40 MG tablet TAKE 1 TABLET BY MOUTH DAILY 31 tablet 5    Lactobacillus (ACIDOPHILUS) CAPS capsule TAKE 1 CAPSULE BY MOUTH DAILY 31 capsule 5    UNABLE TO FIND Need 2 persons to assist her mobility and lifting 1 Dose 0    ipratropium-albuterol (DUONEB) 0.5-2.5 (3) MG/3ML SOLN nebulizer solution Inhale 3 mLs into the lungs every 6 hours 3 mL 1    blood glucose monitor strips Test 3 times a day & as needed for symptoms of irregular blood glucose. 90 strip 0    pantoprazole (PROTONIX) 40 MG tablet Take 1 tablet by mouth every morning (before breakfast) 30 tablet 3    albuterol-ipratropium (COMBIVENT RESPIMAT)  MCG/ACT AERS inhaler Inhale 1 puff into the lungs every 6 hours 1 Inhaler 0    Lactobacillus (ACIDOPHILUS) 100 MG CAPS Take 1 capsule by mouth daily      lisinopril (PRINIVIL;ZESTRIL) 40 MG tablet Take 40 mg by mouth daily      fluticasone (FLONASE) 50 MCG/ACT nasal spray 1 spray by Each Nare route daily      lactulose (CHRONULAC) 10 GM/15ML solution Take 20 g by mouth 3 times daily      conjugated estrogens (PREMARIN) 0.625 MG/GM vaginal cream Place 0.5 g vaginally See Admin Instructions Use daily for two weeks then decrease to three time per week      atorvastatin (LIPITOR) 40 MG tablet Take 40 mg by mouth daily      levothyroxine (SYNTHROID) 50 MCG tablet Take 50 mcg by mouth Daily      busPIRone (BUSPAR) 10 MG tablet Take 1 tablet by mouth 2 times daily 60 tablet 5    divalproex (DEPAKOTE) 500 MG DR tablet Take 4 tablets by mouth nightly 90 tablet 0    levETIRAcetam (KEPPRA) 750 MG tablet Take 2 tablets by mouth 2 times daily 120 tablet 0    metFORMIN (GLUCOPHAGE) 500 MG tablet Take 2 tablets by mouth daily (with breakfast) 30 tablet 0    risperiDONE (RISPERDAL) 1 MG tablet Take 1 tablet by mouth 3 times daily 1 tablet in am, 1 tablet at 4pm, 1 tablet at bedtime 60 tablet 5    sertraline (ZOLOFT) 100 MG tablet Take 2 tablets by mouth nightly 30 tablet 5     Allergies   Allergen Reactions    Macrobid [Nitrofurantoin] Hives and Swelling     Hives     Nursing Notes Reviewed    ROS:  At least 10 systems reviewed and otherwise negative except as in the [place].     Physical Exam:  ED Triage Vitals   Enc Vitals Group      BP 03/12/19 0941 (!) 140/90      Pulse 03/12/19 0941 82      Resp 03/12/19 0941 16      Temp 03/12/19 0941 98.6 °F (37 °C)      Temp Source 03/12/19 0941 Oral      SpO2 03/12/19 0941 90 % on 2L       Weight 03/12/19 0934 135 lb (61.2 kg)      Height 03/12/19 0934 5' 4\" (1.626 m)      Head Circumference --       Peak Flow --       Pain Score --       Pain Loc --       Pain Edu? --       Excl. in [address]? --      My pulse oximetry interpretation is which is abnormal    GENERAL APPEARANCE: Awake and alert. Cooperative. No acute distress. HEAD: Normocephalic. Atraumatic. EYES: EOM's grossly intact. Sclera anicteric. ENT: Mucous membranes are moist. Tolerates saliva. No trismus. NECK: Supple. No meningismus. Trachea midline. HEART: RRR. Radial pulses 2+. LUNGS: Respirations unlabored. Decreased breath sounds at bases. ABDOMEN: Soft. Non-tender. No guarding or rebound. EXTREMITIES: No acute deformities. No pitting edema. SKIN: Warm and dry. NEUROLOGICAL: No gross facial drooping. Moves all 4 extremities spontaneously. PSYCHIATRIC: Normal mood.     I have reviewed and interpreted all of the currently available lab results from this visit (if applicable):  Results for orders placed or performed during the hospital encounter of 03/12/19   CBC Auto Differential   Result Value Ref Range    WBC 5.5 4.0 - 10.5 K/CU MM    RBC 3.61 (L) 4.2 - 5.4 M/CU MM    Hemoglobin 11.1 (L) 12.5 - 16.0 GM/DL    Hematocrit 35.8 (L) 37 - 47 %    MCV 99.2 78 - 100 FL    MCH 30.7 27 - 31 PG    MCHC 31.0 (L) 32.0 - 36.0 %    RDW 13.5 11.7 - 14.9 %    Platelets 539 535 - 313 K/CU MM    MPV 8.4 7.5 - 11.1 FL    Differential Type AUTOMATED DIFFERENTIAL     Segs Relative 58.0 36 - 66 %    Lymphocytes % 26.7 24 - 44 %    Monocytes % 12.2 (H) 0 - 4 %    Eosinophils % 1.1 0 - 3 %    Basophils % 0.7 0 - 1 %    Segs Absolute 3.2 K/CU MM    Lymphocytes # 1.5 K/CU MM    Monocytes # 0.7 K/CU MM    Eosinophils # 0.1 K/CU MM    Basophils # 0.0 K/CU MM    Nucleated RBC % 0.0 %    Total Nucleated RBC 0.0 K/CU MM    Total Immature Neutrophil 0.07 K/CU MM    Immature Neutrophil % 1.3 (H) 0 - 0.43 %   CMP   Result Value Ref Range    Sodium 139 135 - 145 MMOL/L    Potassium 3.7 3.5 - 5.1 MMOL/L    Chloride 101 99 - 110 mMol/L    CO2 28 21 - 32 MMOL/L    BUN 12 6 - 23 MG/DL    CREATININE 0.5 (L) 0.6 - 1.1 MG/DL    Glucose 125 (H) 70 - 99 MG/DL    Calcium 8.5 8.3 - 10.6 MG/DL    Alb 3.5 3.4 - 5.0 GM/DL    Total Protein 6.2 (L) 6.4 - 8.2 GM/DL    Total Bilirubin 0.1 0.0 - 1.0 MG/DL    ALT 6 (L) 10 - 40 U/L    AST 9 (L) 15 - 37 IU/L    Alkaline Phosphatase 78 40 - 129 IU/L    GFR Non-African American >60 >60 mL/min/1.73m2    GFR African American >60 >60 mL/min/1.73m2    Anion Gap 10 4 - 16   Brain Natriuretic Peptide   Result Value Ref Range    Pro-.6 <300 PG/ML   Lactic Acid, Plasma   Result Value Ref Range    Lactate 1.5 0.4 - 2.0 mMOL/L   Troponin   Result Value Ref Range    Troponin T <0.010 <0.01 NG/ML   Blood Gas, Venous   Result Value Ref Range    pH, Lan 7.45 (H) 7.32 - 7.42    pCO2, Lan 47 38 - 52 mmHG    pO2, Lan 137 (H) 28 - 48 mmHG    Base Exc, Mixed 7.5  PLUS   (H) 0 - 2.3    HCO3, Venous 32.7 (H) 19 - 25 MMOL/L    O2 Sat, Lan 94.6 (H) 50 - 70 %    Comment VBG    EKG 12 Lead   Result Value Ref Range    Ventricular Rate 80 BPM    Atrial Rate 80 BPM    P-R Interval 150 ms    QRS Duration 82 ms    Q-T Interval 368 ms    QTc Calculation (Bazett) 424 ms    P Axis 34 degrees    R Axis 47 degrees    T Axis 24 degrees    Diagnosis       Normal sinus rhythm  Normal ECG  When compared with ECG of 21-DEC-2018 16:21,  QT has shortened          Radiographs:  [] Radiologist's Wet Read Report Reviewed:      CT CHEST WO CONTRAST (Preliminary result)   Result time 03/12/19 10:51:08   Preliminary result by Lucy Monsivais MD (03/12/19 10:51:08)                Impression:    1. Technically limited by patient motion. 2. No acute cardiopulmonary disease. 3. Nonspecific prominent mediastinal lymph nodes, stable since December 2018.             Narrative:    EXAMINATION:  CT OF THE CHEST WITHOUT radiologist:     EKG: (All EKG's are interpreted by myself in the absence of a cardiologist)  The Ekg interpreted by me shows  normal sinus rhythm with a rate of 80  Axis is   Normal  QTc is  normal  Intervals and Durations are unremarkable. ST Segments: no acute change  No significant change from prior EKG dated 12-          MDM:  Patient's vital signs are stable other than low oxygen sat. She is currently on 2 L now. Does have a history of COPD. did give prednisone and a DuoNeb treatment. Labs are all within normal limits including troponin, EKG, BNP, CBC, CMP. CT chest does not show any acute findings. Patient does have COPD. Will discharge with prednisone taper. Follow-up PCP. Clinical Impression:  1. Chronic obstructive pulmonary disease, unspecified COPD type ([facility]        Disposition Vitals:  [unfilled], [unfilled], [unfilled], [unfilled]    Disposition referral (if applicable):  Blaze Moody MD  [address]   [phone]            Disposition medications (if applicable):  New Prescriptions    PREDNISONE (DELTASONE) 10 MG TABLET    5 tablets PO Qday X 3, then 4 tablets PO Qday X 3, then 3 tablets PO Qday X 3, then 2 tablets PO Qday X 3, then 1 tablets PO Qday X 3, then stop.          (Please note that portions of this note may have been completed with a voice recognition program. Efforts were made to edit the dictations but occasionally words are mis-transcribed.)    MD Pj Oliva Mc, Mc, MD  03/12/19 7386

## 2019-03-12 NOTE — ED TRIAGE NOTES
Patient presents to ED by EMS with complaint of reported low oxygen saturation for home health providers. Patient denies complaints.

## 2019-03-12 NOTE — ED NOTES
SOB and a cough times 2 days according to her care givers at the bedside. One is her direct caregiver and the other is the supervisor of her home, for disabled people.      Armen Khalil, RN  03/12/19 1531

## 2019-03-13 PROCEDURE — 93010 ELECTROCARDIOGRAM REPORT: CPT | Performed by: INTERNAL MEDICINE

## 2019-03-19 LAB
EKG ATRIAL RATE: 80 BPM
EKG DIAGNOSIS: NORMAL
EKG P AXIS: 34 DEGREES
EKG P-R INTERVAL: 150 MS
EKG Q-T INTERVAL: 368 MS
EKG QRS DURATION: 82 MS
EKG QTC CALCULATION (BAZETT): 424 MS
EKG R AXIS: 47 DEGREES
EKG T AXIS: 24 DEGREES
EKG VENTRICULAR RATE: 80 BPM

## 2019-03-20 ENCOUNTER — TELEPHONE (OUTPATIENT)
Dept: FAMILY MEDICINE CLINIC | Age: 59
End: 2019-03-20

## 2019-03-20 NOTE — TELEPHONE ENCOUNTER
Patient was given a script for Protonix and is now out and would like to know if you would send a new script?  Please advise

## 2019-03-21 RX ORDER — PANTOPRAZOLE SODIUM 40 MG/1
40 TABLET, DELAYED RELEASE ORAL
Qty: 30 TABLET | Refills: 5 | Status: SHIPPED | OUTPATIENT
Start: 2019-03-21 | End: 2019-08-04 | Stop reason: SDUPTHER

## 2019-03-29 ENCOUNTER — TELEPHONE (OUTPATIENT)
Dept: FAMILY MEDICINE CLINIC | Age: 59
End: 2019-03-29

## 2019-03-29 DIAGNOSIS — G81.94 LEFT HEMIPLEGIA (HCC): ICD-10-CM

## 2019-03-29 DIAGNOSIS — R53.1 WEAKNESS: Primary | ICD-10-CM

## 2019-04-05 ENCOUNTER — OFFICE VISIT (OUTPATIENT)
Dept: FAMILY MEDICINE CLINIC | Age: 59
End: 2019-04-05
Payer: MEDICARE

## 2019-04-05 VITALS — DIASTOLIC BLOOD PRESSURE: 82 MMHG | OXYGEN SATURATION: 93 % | SYSTOLIC BLOOD PRESSURE: 116 MMHG | HEART RATE: 75 BPM

## 2019-04-05 DIAGNOSIS — R30.0 DYSURIA: Primary | ICD-10-CM

## 2019-04-05 DIAGNOSIS — Z87.440 H/O URINARY TRACT INFECTION: ICD-10-CM

## 2019-04-05 LAB
BILIRUBIN, POC: NORMAL
BLOOD URINE, POC: NORMAL
CLARITY, POC: CLEAR
COLOR, POC: YELLOW
GLUCOSE URINE, POC: NORMAL
KETONES, POC: 15
LEUKOCYTE EST, POC: NORMAL
NITRITE, POC: NORMAL
PH, POC: 6
PROTEIN, POC: NORMAL
SPECIFIC GRAVITY, POC: 1.02
UROBILINOGEN, POC: 0.2

## 2019-04-05 PROCEDURE — G8427 DOCREV CUR MEDS BY ELIG CLIN: HCPCS | Performed by: FAMILY MEDICINE

## 2019-04-05 PROCEDURE — 99213 OFFICE O/P EST LOW 20 MIN: CPT | Performed by: FAMILY MEDICINE

## 2019-04-05 PROCEDURE — 1036F TOBACCO NON-USER: CPT | Performed by: FAMILY MEDICINE

## 2019-04-05 PROCEDURE — 3017F COLORECTAL CA SCREEN DOC REV: CPT | Performed by: FAMILY MEDICINE

## 2019-04-05 PROCEDURE — G8417 CALC BMI ABV UP PARAM F/U: HCPCS | Performed by: FAMILY MEDICINE

## 2019-04-05 PROCEDURE — 81002 URINALYSIS NONAUTO W/O SCOPE: CPT | Performed by: FAMILY MEDICINE

## 2019-04-05 ASSESSMENT — PATIENT HEALTH QUESTIONNAIRE - PHQ9
SUM OF ALL RESPONSES TO PHQ QUESTIONS 1-9: 4
1. LITTLE INTEREST OR PLEASURE IN DOING THINGS: 1
2. FEELING DOWN, DEPRESSED OR HOPELESS: 3
SUM OF ALL RESPONSES TO PHQ9 QUESTIONS 1 & 2: 4
SUM OF ALL RESPONSES TO PHQ QUESTIONS 1-9: 4

## 2019-04-05 ASSESSMENT — ENCOUNTER SYMPTOMS
COUGH: 0
SHORTNESS OF BREATH: 0

## 2019-04-05 NOTE — PROGRESS NOTES
Adrianne Jayna  1960    Chief Complaint   Patient presents with    Urinary Tract Infection     had UTI last 2 wks           Patient here with the care giver, to check her urine, because was having Dysuria and was having UTI, finished the antibiotic, no fever, no abdominal pain, per the care giver, eating well, sleeping well, and no concerns at this time. Past Medical History:   Diagnosis Date    Anxiety disorder     Back pain, chronic     Cerebral palsy (HCC)     [facility]) ([facility])     CVA (cerebral infarction) 2014 x2    Diabetes mellitus ([facility]     Diverticulosis     Epile[facility]     GERD (gastroesophageal reflux disease)     Hyperlipidemia     Hypertension     Insomnia     Iron (Fe) deficiency anemia     Mental disability     Seizures ([facility])     Unspecified cerebral artery occlusion with cerebral infarction      Past Surgical History:   Procedure Laterality Date    GASTROSTOMY TUBE PLACEMENT       No family history on file.   Social History     Socioeconomic History    Marital status: Single     Spouse name: Not on file    Number of children: Not on file    Years of education: Not on file    Highest education level: Not on file   Occupational History    Not on file   Social Needs    Financial resource strain: Not on file    Food insecurity:     Worry: Not on file     Inability: Not on file    Transportation needs:     Medical: Not on file     Non-medical: Not on file   Tobacco Use    Smoking status: Former Smoker     Packs/day: 1.50     Years: 20.00     Pack years: 30.00     Last attempt to quit: 2011     Years since quittin.6    Smokeless tobacco: Never Used   Substance and Sexual Activity    Alcohol use: No     Alcohol/week: 0.0 oz    Drug use: No    Sexual activity: Not on file   Lifestyle    Physical activity:     Days per week: Not on file     Minutes per session: Not on file    Stress: Not on file   Relationships    (3) MG/3ML SOLN nebulizer solution Inhale 3 mLs into the lungs every 6 hours 3 mL 1    blood glucose monitor strips Test 3 times a day & as needed for symptoms of irregular blood glucose. 90 strip 0    Lactobacillus (ACIDOPHILUS) 100 MG CAPS Take 1 capsule by mouth daily      lisinopril (PRINIVIL;ZESTRIL) 40 MG tablet Take 40 mg by mouth daily      fluticasone (FLONASE) 50 MCG/ACT nasal spray 1 spray by Each Nare route daily      lactulose (CHRONULAC) 10 GM/15ML solution Take 20 g by mouth 3 times daily      conjugated estrogens (PREMARIN) 0.625 MG/GM vaginal cream Place 0.5 g vaginally See Admin Instructions Use daily for two weeks then decrease to three time per week      atorvastatin (LIPITOR) 40 MG tablet Take 40 mg by mouth daily      levothyroxine (SYNTHROID) 50 MCG tablet Take 50 mcg by mouth Daily      busPIRone (BUSPAR) 10 MG tablet Take 1 tablet by mouth 2 times daily 60 tablet 5    divalproex (DEPAKOTE) 500 MG DR tablet Take 4 tablets by mouth nightly 90 tablet 0    levETIRAcetam (KEPPRA) 750 MG tablet Take 2 tablets by mouth 2 times daily 120 tablet 0    metFORMIN (GLUCOPHAGE) 500 MG tablet Take 2 tablets by mouth daily (with breakfast) 30 tablet 0    risperiDONE (RISPERDAL) 1 MG tablet Take 1 tablet by mouth 3 times daily 1 tablet in am, 1 tablet at 4pm, 1 tablet at bedtime 60 tablet 5    sertraline (ZOLOFT) 100 MG tablet Take 2 tablets by mouth nightly 30 tablet 5     No current facility-administered medications for this visit. Review of Systems   Constitutional: Negative for activity change, chills and fever. Respiratory: Negative for cough and shortness of breath. Cardiovascular: Negative for chest pain. Genitourinary: Positive for dysuria (improved). Negative for frequency. Psychiatric/Behavioral: Negative for agitation. The patient is not nervous/anxious.         Lab Results   Component Value Date    WBC 5.5 03/12/2019    HGB 11.1 (L) 03/12/2019    HCT 35.8 (L) 03/12/2019    MCV 99.2 03/12/2019     03/12/2019     Lab Results   Component Value Date     03/12/2019    K 3.7 03/12/2019     03/12/2019    CO2 28 03/12/2019    BUN 12 03/12/2019    CREATININE 0.5 (L) 03/12/2019    GLUCOSE 125 (H) 03/12/2019    CALCIUM 8.5 03/12/2019    PROT 6.2 (L) 03/12/2019    LABALBU 3.5 03/12/2019    BILITOT 0.1 03/12/2019    ALKPHOS 78 03/12/2019    AST 9 (L) 03/12/2019    ALT 6 (L) 03/12/2019    LABGLOM >60 03/12/2019    GFRAA >60 03/12/2019     Lab Results   Component Value Date    CHOL 156 09/06/2018    CHOL 198 06/14/2017    CHOL 228 (H) 05/17/2017     Lab Results   Component Value Date    TRIG 328 (H) 09/06/2018    TRIG 493 (H) 06/14/2017    TRIG 611 (H) 05/17/2017     Lab Results   Component Value Date    HDL 37 (L) 09/06/2018    HDL 32 (L) 06/14/2017    HDL 28 (L) 05/17/2017     No results found for: LDLCALC, LDLCHOLESTEROL  Lab Results   Component Value Date    LABA1C 5.6 09/06/2018     Lab Results   Component Value Date    TSHHS 0.951 12/18/2018         /82   Pulse 75   SpO2 93%     BP Readings from Last 3 Encounters:   04/05/19 116/82   03/12/19 (!) 144/74   01/08/19 126/86       Wt Readings from Last 3 Encounters:   03/12/19 135 lb (61.2 kg)   12/27/18 135 lb 5.8 oz (61.4 kg)   12/21/18 135 lb 6.4 oz (61.4 kg)         Physical Exam   Constitutional: She is oriented to person, place, and time. She appears well-developed and well-nourished. HENT:   Head: Normocephalic and atraumatic. Cardiovascular: Normal rate, regular rhythm and normal heart sounds. No murmur heard. Pulmonary/Chest: Effort normal and breath sounds normal.   Musculoskeletal: Normal range of motion. Neurological: She is alert and oriented to person, place, and time. Psychiatric: She has a normal mood and affect. Her behavior is normal.       ASSESSMENT/ PLAN:    1. Dysuria  - POCT Urinalysis no Micro    2.  H/O urinary tract infection  - improving                -

## 2019-04-15 ENCOUNTER — TELEPHONE (OUTPATIENT)
Dept: FAMILY MEDICINE CLINIC | Age: 59
End: 2019-04-15

## 2019-04-15 NOTE — TELEPHONE ENCOUNTER
Gregg Bloom from [facility] [phone] called is requesting that we send over a new order for a wheel chair to [facility]. Please Advise.

## 2019-05-20 ENCOUNTER — HOSPITAL ENCOUNTER (INPATIENT)
Age: 59
LOS: 2 days | Discharge: HOME | DRG: 190 | End: 2019-05-22
Attending: EMERGENCY MEDICINE | Admitting: INTERNAL MEDICINE
Payer: MEDICARE

## 2019-05-20 ENCOUNTER — APPOINTMENT (OUTPATIENT)
Dept: CT IMAGING | Age: 59
DRG: 190 | End: 2019-05-20
Payer: MEDICARE

## 2019-05-20 DIAGNOSIS — R50.9 FEVER, UNSPECIFIED FEVER CAUSE: ICD-10-CM

## 2019-05-20 DIAGNOSIS — J18.9 PNEUMONIA DUE TO ORGANISM: Primary | ICD-10-CM

## 2019-05-20 DIAGNOSIS — R09.02 HYPOXIA: ICD-10-CM

## 2019-05-20 DIAGNOSIS — N30.00 ACUTE CYSTITIS WITHOUT HEMATURIA: ICD-10-CM

## 2019-05-20 PROBLEM — N39.0 URINARY TRACT INFECTION: Status: ACTIVE | Noted: 2019-05-20

## 2019-05-20 LAB
ADENOVIRUS DETECTION BY PCR: NOT DETECTED
ALBUMIN SERPL-MCNC: 3.6 GM/DL (ref 3.4–5)
ALP BLD-CCNC: 80 IU/L (ref 40–129)
ALT SERPL-CCNC: 14 U/L (ref 10–40)
ANION GAP SERPL CALCULATED.3IONS-SCNC: 11 MMOL/L (ref 4–16)
AST SERPL-CCNC: 26 IU/L (ref 15–37)
BACTERIA: ABNORMAL /HPF
BASOPHILS ABSOLUTE: 0 K/CU MM
BASOPHILS RELATIVE PERCENT: 0.3 % (ref 0–1)
BILIRUB SERPL-MCNC: 0.1 MG/DL (ref 0–1)
BILIRUBIN URINE: NEGATIVE MG/DL
BLOOD, URINE: ABNORMAL
BORDETELLA PERTUSSIS PCR: NOT DETECTED
BUN BLDV-MCNC: 13 MG/DL (ref 6–23)
CALCIUM SERPL-MCNC: 9 MG/DL (ref 8.3–10.6)
CHLAMYDOPHILA PNEUMONIA PCR: NOT DETECTED
CHLORIDE BLD-SCNC: 100 MMOL/L (ref 99–110)
CLARITY: CLEAR
CO2: 27 MMOL/L (ref 21–32)
COLOR: YELLOW
CORONAVIRUS 229E PCR: NOT DETECTED
CORONAVIRUS HKU1 PCR: NOT DETECTED
CORONAVIRUS NL63 PCR: NOT DETECTED
CORONAVIRUS OC43 PCR: NOT DETECTED
CREAT SERPL-MCNC: 0.5 MG/DL (ref 0.6–1.1)
DIFFERENTIAL TYPE: ABNORMAL
EOSINOPHILS ABSOLUTE: 0.1 K/CU MM
EOSINOPHILS RELATIVE PERCENT: 0.7 % (ref 0–3)
GFR AFRICAN AMERICAN: >60 ML/MIN/1.73M2
GFR NON-AFRICAN AMERICAN: >60 ML/MIN/1.73M2
GLUCOSE BLD-MCNC: 136 MG/DL (ref 70–99)
GLUCOSE BLD-MCNC: 151 MG/DL (ref 70–99)
GLUCOSE BLD-MCNC: 196 MG/DL (ref 70–99)
GLUCOSE, URINE: NEGATIVE MG/DL
HCT VFR BLD CALC: 38.3 % (ref 37–47)
HEMOGLOBIN: 11.8 GM/DL (ref 12.5–16)
HUMAN METAPNEUMOVIRUS PCR: NOT DETECTED
IMMATURE NEUTROPHIL %: 0.6 % (ref 0–0.43)
INFLUENZA A BY PCR: NOT DETECTED
INFLUENZA A H1 (2009) PCR: NOT DETECTED
INFLUENZA A H1 PANDEMIC PCR: NOT DETECTED
INFLUENZA A H3 PCR: NOT DETECTED
INFLUENZA B BY PCR: NOT DETECTED
KETONES, URINE: ABNORMAL MG/DL
LACTATE: 1.9 MMOL/L (ref 0.4–2)
LACTATE: ABNORMAL MMOL/L (ref 0.4–2)
LEUKOCYTE ESTERASE, URINE: ABNORMAL
LIPASE: 17 IU/L (ref 13–60)
LYMPHOCYTES ABSOLUTE: 0.8 K/CU MM
LYMPHOCYTES RELATIVE PERCENT: 11.6 % (ref 24–44)
MAGNESIUM: 1.7 MG/DL (ref 1.8–2.4)
MCH RBC QN AUTO: 29.5 PG (ref 27–31)
MCHC RBC AUTO-ENTMCNC: 30.8 % (ref 32–36)
MCV RBC AUTO: 95.8 FL (ref 78–100)
MONOCYTES ABSOLUTE: 0.9 K/CU MM
MONOCYTES RELATIVE PERCENT: 12.6 % (ref 0–4)
MYCOPLASMA PNEUMONIAE PCR: NOT DETECTED
NITRITE URINE, QUANTITATIVE: NEGATIVE
NUCLEATED RBC %: 0 %
PARAINFLUENZA 1 PCR: NOT DETECTED
PARAINFLUENZA 2 PCR: NOT DETECTED
PARAINFLUENZA 3 PCR: NOT DETECTED
PARAINFLUENZA 4 PCR: NOT DETECTED
PDW BLD-RTO: 12.4 % (ref 11.7–14.9)
PH, URINE: 7 (ref 5–8)
PHOSPHORUS: 3.4 MG/DL (ref 2.5–4.9)
PLATELET # BLD: 262 K/CU MM (ref 140–440)
PMV BLD AUTO: 8.3 FL (ref 7.5–11.1)
POTASSIUM SERPL-SCNC: 3.6 MMOL/L (ref 3.5–5.1)
PROTEIN UA: 30 MG/DL
RBC # BLD: 4 M/CU MM (ref 4.2–5.4)
RBC URINE: 3 /HPF (ref 0–6)
RHINOVIRUS ENTEROVIRUS PCR: ABNORMAL
RSV PCR: NOT DETECTED
SEGMENTED NEUTROPHILS ABSOLUTE COUNT: 5 K/CU MM
SEGMENTED NEUTROPHILS RELATIVE PERCENT: 74.2 % (ref 36–66)
SODIUM BLD-SCNC: 138 MMOL/L (ref 135–145)
SPECIFIC GRAVITY UA: 1.02 (ref 1–1.03)
TOTAL IMMATURE NEUTOROPHIL: 0.04 K/CU MM
TOTAL NUCLEATED RBC: 0 K/CU MM
TOTAL PROTEIN: 6.6 GM/DL (ref 6.4–8.2)
TRANSITIONAL EPITHELIAL: <1 /HPF
TRICHOMONAS: ABNORMAL /HPF
UROBILINOGEN, URINE: NORMAL MG/DL (ref 0.2–1)
WBC # BLD: 6.7 K/CU MM (ref 4–10.5)
WBC UA: 34 /HPF (ref 0–5)

## 2019-05-20 PROCEDURE — 71260 CT THORAX DX C+: CPT

## 2019-05-20 PROCEDURE — 96365 THER/PROPH/DIAG IV INF INIT: CPT

## 2019-05-20 PROCEDURE — 74177 CT ABD & PELVIS W/CONTRAST: CPT

## 2019-05-20 PROCEDURE — 36415 COLL VENOUS BLD VENIPUNCTURE: CPT

## 2019-05-20 PROCEDURE — 85025 COMPLETE CBC W/AUTO DIFF WBC: CPT

## 2019-05-20 PROCEDURE — 87633 RESP VIRUS 12-25 TARGETS: CPT

## 2019-05-20 PROCEDURE — 81001 URINALYSIS AUTO W/SCOPE: CPT

## 2019-05-20 PROCEDURE — 80053 COMPREHEN METABOLIC PANEL: CPT

## 2019-05-20 PROCEDURE — 84100 ASSAY OF PHOSPHORUS: CPT

## 2019-05-20 PROCEDURE — 82962 GLUCOSE BLOOD TEST: CPT

## 2019-05-20 PROCEDURE — 2580000003 HC RX 258: Performed by: NURSE PRACTITIONER

## 2019-05-20 PROCEDURE — 83605 ASSAY OF LACTIC ACID: CPT

## 2019-05-20 PROCEDURE — 99285 EMERGENCY DEPT VISIT HI MDM: CPT

## 2019-05-20 PROCEDURE — 92610 EVALUATE SWALLOWING FUNCTION: CPT

## 2019-05-20 PROCEDURE — 87081 CULTURE SCREEN ONLY: CPT

## 2019-05-20 PROCEDURE — 96366 THER/PROPH/DIAG IV INF ADDON: CPT

## 2019-05-20 PROCEDURE — 6370000000 HC RX 637 (ALT 250 FOR IP): Performed by: NURSE PRACTITIONER

## 2019-05-20 PROCEDURE — 6360000002 HC RX W HCPCS: Performed by: NURSE PRACTITIONER

## 2019-05-20 PROCEDURE — 87430 STREP A AG IA: CPT

## 2019-05-20 PROCEDURE — 6360000004 HC RX CONTRAST MEDICATION: Performed by: EMERGENCY MEDICINE

## 2019-05-20 PROCEDURE — 87581 M.PNEUMON DNA AMP PROBE: CPT

## 2019-05-20 PROCEDURE — 2700000000 HC OXYGEN THERAPY PER DAY

## 2019-05-20 PROCEDURE — 94640 AIRWAY INHALATION TREATMENT: CPT

## 2019-05-20 PROCEDURE — 1200000000 HC SEMI PRIVATE

## 2019-05-20 PROCEDURE — 83735 ASSAY OF MAGNESIUM: CPT

## 2019-05-20 PROCEDURE — 87798 DETECT AGENT NOS DNA AMP: CPT

## 2019-05-20 PROCEDURE — 6360000002 HC RX W HCPCS: Performed by: EMERGENCY MEDICINE

## 2019-05-20 PROCEDURE — 94761 N-INVAS EAR/PLS OXIMETRY MLT: CPT

## 2019-05-20 PROCEDURE — 2580000003 HC RX 258: Performed by: EMERGENCY MEDICINE

## 2019-05-20 PROCEDURE — 83690 ASSAY OF LIPASE: CPT

## 2019-05-20 PROCEDURE — 6370000000 HC RX 637 (ALT 250 FOR IP): Performed by: EMERGENCY MEDICINE

## 2019-05-20 PROCEDURE — 87486 CHLMYD PNEUM DNA AMP PROBE: CPT

## 2019-05-20 PROCEDURE — 87040 BLOOD CULTURE FOR BACTERIA: CPT

## 2019-05-20 RX ORDER — 0.9 % SODIUM CHLORIDE 0.9 %
10 VIAL (ML) INJECTION
Status: COMPLETED | OUTPATIENT
Start: 2019-05-20 | End: 2019-05-20

## 2019-05-20 RX ORDER — IPRATROPIUM BROMIDE AND ALBUTEROL SULFATE 2.5; .5 MG/3ML; MG/3ML
1 SOLUTION RESPIRATORY (INHALATION)
Status: DISCONTINUED | OUTPATIENT
Start: 2019-05-20 | End: 2019-05-22 | Stop reason: HOSPADM

## 2019-05-20 RX ORDER — ATORVASTATIN CALCIUM 40 MG/1
40 TABLET, FILM COATED ORAL NIGHTLY
Status: DISCONTINUED | OUTPATIENT
Start: 2019-05-20 | End: 2019-05-22 | Stop reason: HOSPADM

## 2019-05-20 RX ORDER — RISPERIDONE 0.5 MG/1
1 TABLET, FILM COATED ORAL 3 TIMES DAILY
Status: DISCONTINUED | OUTPATIENT
Start: 2019-05-20 | End: 2019-05-22 | Stop reason: HOSPADM

## 2019-05-20 RX ORDER — 0.9 % SODIUM CHLORIDE 0.9 %
30 INTRAVENOUS SOLUTION INTRAVENOUS ONCE
Status: COMPLETED | OUTPATIENT
Start: 2019-05-20 | End: 2019-05-20

## 2019-05-20 RX ORDER — FUROSEMIDE 20 MG/1
20 TABLET ORAL DAILY
Status: DISCONTINUED | OUTPATIENT
Start: 2019-05-20 | End: 2019-05-22 | Stop reason: HOSPADM

## 2019-05-20 RX ORDER — MIRTAZAPINE 15 MG/1
30 TABLET, FILM COATED ORAL NIGHTLY
Status: DISCONTINUED | OUTPATIENT
Start: 2019-05-20 | End: 2019-05-22 | Stop reason: HOSPADM

## 2019-05-20 RX ORDER — LACTULOSE 10 G/15ML
20 SOLUTION ORAL 3 TIMES DAILY PRN
Status: DISCONTINUED | OUTPATIENT
Start: 2019-05-20 | End: 2019-05-22 | Stop reason: HOSPADM

## 2019-05-20 RX ORDER — DIVALPROEX SODIUM 500 MG/1
2000 TABLET, DELAYED RELEASE ORAL NIGHTLY
Status: DISCONTINUED | OUTPATIENT
Start: 2019-05-20 | End: 2019-05-22 | Stop reason: HOSPADM

## 2019-05-20 RX ORDER — CARBAMAZEPINE 300 MG/1
300 CAPSULE, EXTENDED RELEASE ORAL 2 TIMES DAILY
Status: DISCONTINUED | OUTPATIENT
Start: 2019-05-20 | End: 2019-05-22 | Stop reason: HOSPADM

## 2019-05-20 RX ORDER — PANTOPRAZOLE SODIUM 40 MG/1
40 TABLET, DELAYED RELEASE ORAL
Status: DISCONTINUED | OUTPATIENT
Start: 2019-05-21 | End: 2019-05-22 | Stop reason: HOSPADM

## 2019-05-20 RX ORDER — CETIRIZINE HYDROCHLORIDE 10 MG/1
10 TABLET ORAL DAILY
Status: DISCONTINUED | OUTPATIENT
Start: 2019-05-20 | End: 2019-05-22 | Stop reason: HOSPADM

## 2019-05-20 RX ORDER — ONDANSETRON 2 MG/ML
4 INJECTION INTRAMUSCULAR; INTRAVENOUS EVERY 6 HOURS PRN
Status: DISCONTINUED | OUTPATIENT
Start: 2019-05-20 | End: 2019-05-22 | Stop reason: HOSPADM

## 2019-05-20 RX ORDER — BUSPIRONE HYDROCHLORIDE 10 MG/1
10 TABLET ORAL 2 TIMES DAILY
Status: DISCONTINUED | OUTPATIENT
Start: 2019-05-20 | End: 2019-05-22 | Stop reason: HOSPADM

## 2019-05-20 RX ORDER — IPRATROPIUM BROMIDE AND ALBUTEROL SULFATE 2.5; .5 MG/3ML; MG/3ML
1 SOLUTION RESPIRATORY (INHALATION) EVERY 6 HOURS PRN
Status: DISCONTINUED | OUTPATIENT
Start: 2019-05-20 | End: 2019-05-22 | Stop reason: HOSPADM

## 2019-05-20 RX ORDER — MIRTAZAPINE 30 MG/1
30 TABLET, FILM COATED ORAL NIGHTLY
COMMUNITY
End: 2019-06-28

## 2019-05-20 RX ORDER — SERTRALINE HYDROCHLORIDE 100 MG/1
200 TABLET, FILM COATED ORAL NIGHTLY
Status: DISCONTINUED | OUTPATIENT
Start: 2019-05-20 | End: 2019-05-22 | Stop reason: HOSPADM

## 2019-05-20 RX ORDER — LEVETIRACETAM 500 MG/1
1500 TABLET ORAL 2 TIMES DAILY
Status: DISCONTINUED | OUTPATIENT
Start: 2019-05-20 | End: 2019-05-22 | Stop reason: HOSPADM

## 2019-05-20 RX ORDER — AMLODIPINE BESYLATE 5 MG/1
5 TABLET ORAL DAILY
Status: DISCONTINUED | OUTPATIENT
Start: 2019-05-20 | End: 2019-05-22 | Stop reason: HOSPADM

## 2019-05-20 RX ORDER — METHYLPREDNISOLONE SODIUM SUCCINATE 40 MG/ML
40 INJECTION, POWDER, LYOPHILIZED, FOR SOLUTION INTRAMUSCULAR; INTRAVENOUS DAILY
Status: DISCONTINUED | OUTPATIENT
Start: 2019-05-20 | End: 2019-05-22 | Stop reason: HOSPADM

## 2019-05-20 RX ORDER — LEVOTHYROXINE SODIUM 0.05 MG/1
50 TABLET ORAL DAILY
Status: DISCONTINUED | OUTPATIENT
Start: 2019-05-20 | End: 2019-05-22 | Stop reason: HOSPADM

## 2019-05-20 RX ORDER — SODIUM CHLORIDE 0.9 % (FLUSH) 0.9 %
10 SYRINGE (ML) INJECTION PRN
Status: DISCONTINUED | OUTPATIENT
Start: 2019-05-20 | End: 2019-05-22 | Stop reason: HOSPADM

## 2019-05-20 RX ORDER — FLUTICASONE PROPIONATE 50 MCG
1 SPRAY, SUSPENSION (ML) NASAL DAILY
Status: DISCONTINUED | OUTPATIENT
Start: 2019-05-20 | End: 2019-05-22 | Stop reason: HOSPADM

## 2019-05-20 RX ORDER — IPRATROPIUM BROMIDE AND ALBUTEROL SULFATE 2.5; .5 MG/3ML; MG/3ML
1 SOLUTION RESPIRATORY (INHALATION) EVERY 6 HOURS PRN
Status: ON HOLD | COMMUNITY
End: 2019-12-02 | Stop reason: SDUPTHER

## 2019-05-20 RX ORDER — GUAIFENESIN 600 MG/1
600 TABLET, EXTENDED RELEASE ORAL 2 TIMES DAILY
Status: DISCONTINUED | OUTPATIENT
Start: 2019-05-20 | End: 2019-05-22 | Stop reason: HOSPADM

## 2019-05-20 RX ORDER — SODIUM CHLORIDE 0.9 % (FLUSH) 0.9 %
10 SYRINGE (ML) INJECTION EVERY 12 HOURS SCHEDULED
Status: DISCONTINUED | OUTPATIENT
Start: 2019-05-20 | End: 2019-05-22 | Stop reason: HOSPADM

## 2019-05-20 RX ORDER — LISINOPRIL 40 MG/1
40 TABLET ORAL DAILY
Status: DISCONTINUED | OUTPATIENT
Start: 2019-05-20 | End: 2019-05-22 | Stop reason: HOSPADM

## 2019-05-20 RX ORDER — ACETAMINOPHEN 325 MG/1
650 TABLET ORAL ONCE
Status: COMPLETED | OUTPATIENT
Start: 2019-05-20 | End: 2019-05-20

## 2019-05-20 RX ORDER — ACETAMINOPHEN 325 MG/1
650 TABLET ORAL EVERY 4 HOURS PRN
Status: DISCONTINUED | OUTPATIENT
Start: 2019-05-20 | End: 2019-05-22 | Stop reason: HOSPADM

## 2019-05-20 RX ADMIN — BUSPIRONE HYDROCHLORIDE 10 MG: 10 TABLET ORAL at 15:00

## 2019-05-20 RX ADMIN — ENOXAPARIN SODIUM 40 MG: 40 INJECTION SUBCUTANEOUS at 15:00

## 2019-05-20 RX ADMIN — FUROSEMIDE 20 MG: 20 TABLET ORAL at 15:00

## 2019-05-20 RX ADMIN — LEVOTHYROXINE SODIUM 50 MCG: 50 TABLET ORAL at 15:00

## 2019-05-20 RX ADMIN — LEVETIRACETAM 1500 MG: 500 TABLET, FILM COATED ORAL at 14:59

## 2019-05-20 RX ADMIN — GUAIFENESIN 600 MG: 600 TABLET, EXTENDED RELEASE ORAL at 21:17

## 2019-05-20 RX ADMIN — CEFTRIAXONE 1 G: 1 INJECTION, POWDER, FOR SOLUTION INTRAMUSCULAR; INTRAVENOUS at 09:30

## 2019-05-20 RX ADMIN — LEVETIRACETAM 1500 MG: 500 TABLET, FILM COATED ORAL at 21:18

## 2019-05-20 RX ADMIN — RISPERIDONE 1 MG: 0.5 TABLET, FILM COATED ORAL at 14:59

## 2019-05-20 RX ADMIN — IOPAMIDOL 75 ML: 755 INJECTION, SOLUTION INTRAVENOUS at 09:16

## 2019-05-20 RX ADMIN — DIVALPROEX SODIUM 2000 MG: 500 TABLET, DELAYED RELEASE ORAL at 21:23

## 2019-05-20 RX ADMIN — MIRTAZAPINE 30 MG: 15 TABLET, FILM COATED ORAL at 21:17

## 2019-05-20 RX ADMIN — SERTRALINE HYDROCHLORIDE 200 MG: 100 TABLET ORAL at 21:17

## 2019-05-20 RX ADMIN — RISPERIDONE 1 MG: 0.5 TABLET, FILM COATED ORAL at 21:17

## 2019-05-20 RX ADMIN — SODIUM CHLORIDE, PRESERVATIVE FREE 10 ML: 5 INJECTION INTRAVENOUS at 21:24

## 2019-05-20 RX ADMIN — GUAIFENESIN 600 MG: 600 TABLET, EXTENDED RELEASE ORAL at 14:59

## 2019-05-20 RX ADMIN — CARBAMAZEPINE 300 MG: 300 CAPSULE, EXTENDED RELEASE ORAL at 17:42

## 2019-05-20 RX ADMIN — CETIRIZINE HYDROCHLORIDE 10 MG: 10 TABLET, FILM COATED ORAL at 14:59

## 2019-05-20 RX ADMIN — LISINOPRIL 40 MG: 40 TABLET ORAL at 15:00

## 2019-05-20 RX ADMIN — SODIUM CHLORIDE 1836 ML: 9 INJECTION, SOLUTION INTRAVENOUS at 08:31

## 2019-05-20 RX ADMIN — IPRATROPIUM BROMIDE AND ALBUTEROL SULFATE 1 AMPULE: .5; 3 SOLUTION RESPIRATORY (INHALATION) at 16:04

## 2019-05-20 RX ADMIN — ATORVASTATIN CALCIUM 40 MG: 40 TABLET, FILM COATED ORAL at 21:18

## 2019-05-20 RX ADMIN — FLUTICASONE PROPIONATE 1 SPRAY: 50 SPRAY, METERED NASAL at 17:42

## 2019-05-20 RX ADMIN — INSULIN LISPRO 1 UNITS: 100 INJECTION, SOLUTION INTRAVENOUS; SUBCUTANEOUS at 21:28

## 2019-05-20 RX ADMIN — CARBAMAZEPINE 300 MG: 300 CAPSULE, EXTENDED RELEASE ORAL at 21:28

## 2019-05-20 RX ADMIN — SODIUM CHLORIDE 10 ML: 9 INJECTION, SOLUTION INTRAMUSCULAR; INTRAVENOUS; SUBCUTANEOUS at 09:16

## 2019-05-20 RX ADMIN — AMLODIPINE BESYLATE 5 MG: 5 TABLET ORAL at 14:59

## 2019-05-20 RX ADMIN — AZITHROMYCIN MONOHYDRATE 500 MG: 500 INJECTION, POWDER, LYOPHILIZED, FOR SOLUTION INTRAVENOUS at 17:42

## 2019-05-20 RX ADMIN — ACETAMINOPHEN 650 MG: 325 TABLET ORAL at 08:32

## 2019-05-20 RX ADMIN — METHYLPREDNISOLONE SODIUM SUCCINATE 40 MG: 40 INJECTION, POWDER, LYOPHILIZED, FOR SOLUTION INTRAMUSCULAR; INTRAVENOUS at 15:14

## 2019-05-20 RX ADMIN — BUSPIRONE HYDROCHLORIDE 10 MG: 10 TABLET ORAL at 21:17

## 2019-05-20 ASSESSMENT — PAIN SCALES - GENERAL
PAINLEVEL_OUTOF10: 6
PAINLEVEL_OUTOF10: 0
PAINLEVEL_OUTOF10: 0

## 2019-05-20 ASSESSMENT — PAIN DESCRIPTION - PAIN TYPE: TYPE: ACUTE PAIN

## 2019-05-20 NOTE — PROGRESS NOTES
tablet Take 1 tablet by mouth 2 times daily 5/31/18  Yes Anival Hutchins MD   divalproex (DEPAKOTE) 500 MG DR tablet Take 4 tablets by mouth nightly 5/31/18  Yes Anival Hutchins MD   levETIRAcetam (KEPPRA) 750 MG tablet Take 2 tablets by mouth 2 times daily 5/31/18  Yes Anival Hutchins MD   metFORMIN (GLUCOPHAGE) 500 MG tablet Take 2 tablets by mouth daily (with breakfast) 5/31/18  Yes Anival Hutchins MD   risperiDONE (RISPERDAL) 1 MG tablet Take 1 tablet by mouth 3 times daily 1 tablet in am, 1 tablet at 4pm, 1 tablet at bedtime 5/31/18  Yes Anival Hutchins MD   sertraline (ZOLOFT) 100 MG tablet Take 2 tablets by mouth nightly 5/31/18  Yes Anival Hutchins MD   ipratropium-albuterol ([place] Stephanie) 0.5-2.5 (3) MG/3ML SOLN nebulizer solution Inhale 1 vial into the lungs every 6 hours as needed for Shortness of Breath    Historical Provider, MD   [address] chair 4/16/19   Anival Hutchins MD                   UNABLE TO FIND Need 2 persons to assist her mobility and lifting 1/2/19   Anival Hutchins MD   blood glucose monitor strips Test 3 times a day & as needed for symptoms of irregular blood glucose. 12/26/18   Alan Farley MD           lactulose (CHRONULAC) 10 GM/15ML solution Take 20 g by mouth 3 times daily as needed     Historical Provider, MD                               Medications added or changed (ex.  new medication, dosage change, interval change, formulation change):  Mirtazapine new medication  Duoneb frequency changed to q6 prn from q6 scheduled  Lactulose frequency changed to tid prn from tid scheduled    Medications removed from list (include reason, ex. noncompliance, medication cost, therapy complete etc.):   Lactobacillus duplicate  Lisinopril duplicate   Prednisone therapy complete  Estrogen cream no longer using    Other Comments:  Reviewed and updated med list per facility list provided and verified with claims  Patient can swallow whole tablets if given with applesauce    To my knowledge the above medication history is accurate as of 5/20/2019 11:17 AM.   Speedy Last, CPhT   5/20/2019 11:17 AM

## 2019-05-20 NOTE — ED NOTES
Called and gave report to [facility]; pt will be going [place] [id number].  Derick Rashid  05/20/19 0941

## 2019-05-20 NOTE — PROGRESS NOTES
problem list.  ONSET DATE: this admission    Recent Chest Xray/CT of Chest: see chart    Date of Eval: 5/20/2019  Evaluating Therapist: Sirena Nino    Current Diet level:  Current Diet : Regular  Current Liquid Diet : Thin      Primary Complaint  Patient Complaint: does not state    Pain:  Pain Assessment  Pain Level: 6  Pain Type: Acute pain    Reason for Referral  Mckenzie Garcia was referred for a bedside swallow evaluation to assess the efficiency of her swallow function, identify signs and symptoms of aspiration and make recommendations regarding safe dietary consistencies, effective compensatory strategies, and safe eating environment. Impression  Dysphagia Diagnosis: Moderate oral stage dysphagia;Mild to moderate pharyngeal stage dysphagia  Dysphagia Outcome Severity Scale: Level 4: Mild moderate dysphagia- Intermittent supervision/cueing. One - two diet consistencies restricted     Treatment Plan  Requires SLP Intervention: Yes  Duration/Frequency of Treatment: 2-3x/week for LOS  D/C Recommendations: 24 hour supervision/assistance       Recommended Diet and Intervention  Diet Solids Recommendation: Dysphagia II Mechanically Altered  Liquid Consistency Recommendation: Thin  Recommended Form of Meds: Whole with puree     Therapeutic Interventions: Diet tolerance monitoring;Patient/Family education; Therapeutic PO trials with SLP    Compensatory Swallowing Strategies  Compensatory Swallowing Strategies: Upright as possible for all oral intake;Small bites/sips;Eat/Feed slowly; Assist feed    Treatment/Goals  Short-term Goals  Timeframe for Short-term Goals: length of admission  Goal 1: Pt will tolerate thin liquid/dysphagia II solid diet with adequate oral manipulation and no s/s aspiration. Goal 2: Caregivers will indicate understanding of all recommendations. General  Chart Reviewed: Yes  Behavior/Cognition: Alert; Requires cueing; Uncooperative  Communication Observation: (cognitive-communication impairment)  Follows Directions: Simple(inconsistent)  Dentition: Some missing teeth;Poor dental/oral hygeine  Patient Positioning: Upright in bed  Prior Dysphagia History: yes; history of multiple MBSs, most recent rx thin/dysphagia II  Consistencies Administered: Reg solid; Soft solid;Puree; Thin - teaspoon; Thin - cup; Thin - straw           Vision/Hearing  Vision  Vision: Within Functional Limits  Hearing  Hearing: Within functional limits    Oral Motor Deficits  Oral/Motor  Oral Motor: Exceptions to [facility]    Oral Phase Dysfunction  Oral Phase  Oral Phase: Exceptions     Indicators of Pharyngeal Phase Dysfunction   Pharyngeal Phase  Pharyngeal Phase: Exceptions    Prognosis  Prognosis  Prognosis for safe diet advancement: guarded  Individuals consulted  Consulted and agree with results and recommendations: Patient;RN    Education  Patient Education: recommendations/plan  Patient Education Response: Needs reinforcement  Safety Devices in place: Yes  Type of devices:  All fall risk precautions in place           Therapy Time  SLP Individual Minutes  Time In: 5965  Time Out: 1600  Minutes: [address]  5/20/2019 5:21 PM

## 2019-05-20 NOTE — H&P
History and Physical      Name:  Master Leung /Age/Sex: 1960  (66 y.o. female)   MRN & CSN:  [id number] & [id number] Admission Date/Time: 2019  7:32 AM   Location:  ED[facility]/ED-34 PCP: Junior Fields MD       Admitting Physicians :  Juliajuanis Cabot is a 66 y.o.  female  who presents with Fever (103.3 orally); Cough (Patient is prone to pneumonia and has cough per health aid. No production. Patient coughing more this morning than normal. ); Pharyngitis (per patient, throat is sore. ); and Abdominal Pain (To palpation. )    Assessment and Plan:   1. Right lower lobe pneumonia  CXR: concerning for right lower pneumonia  -Azithromycin and Rocephin IV  -Respiratory interventions: supplemental oxygen, continuous/ spot check pulse oximetry, and TCDB. -Continue routine inhalers and add prn bronchodilators, expectorants, and antitussives.   -Solu-medrol   -Blood and Sputum culture  -Resp PCR panel, Legionella, Strep pneumo Ag   -Procalcitonin level, Magnesium, Phosphorus:    2. Acute cystitis with hematuria  -Rocephin IV  -Urine cx    3. Chronic Issues  -DMII  -HTN  -Seizure  -Insominia  -COPD  -Chronic back pain      DVT-PPX: Lovenox  Diet:           Medications:   Medications:    Infusions:   PRN Meds:   No current facility-administered medications for this encounter.      Current Outpatient Medications:     mirtazapine (REMERON) 30 MG tablet, Take 30 mg by mouth nightly, Disp: , Rfl:     pantoprazole (PROTONIX) 40 MG tablet, Take 1 tablet by mouth every morning (before breakfast), Disp: 30 tablet, Rfl: 5    albuterol-ipratropium (COMBIVENT RESPIMAT)  MCG/ACT AERS inhaler, Inhale 1 puff into the lungs every 6 hours, Disp: 1 Inhaler, Rfl: 5    lisinopril (PRINIVIL;ZESTRIL) 40 MG tablet, TAKE 1 TABLET BY MOUTH DAILY, Disp: 31 tablet, Rfl: 5    carBAMazepine (CARBATROL) 300 MG extended release capsule, Take 1 capsule by mouth 2 times daily, Disp: 60 capsule, Rfl: 5    cetirizine (ZYRTEC) 10 MG tablet, TAKE 1 TABLET BY MOUTH DAILY, Disp: 31 tablet, Rfl: 3    furosemide (LASIX) 20 MG tablet, TAKE ONE TABLET BY MOUTH DAILY, Disp: 31 tablet, Rfl: 5    amLODIPine (NORVASC) 5 MG tablet, TAKE 1 TABLET BY MOUTH DAILY, Disp: 31 tablet, Rfl: 5    JANUVIA 100 MG tablet, TAKE 1 TABLET BY MOUTH DAILY, Disp: 31 tablet, Rfl: 5    Lactobacillus (ACIDOPHILUS) CAPS capsule, TAKE 1 CAPSULE BY MOUTH DAILY, Disp: 31 capsule, Rfl: 5    fluticasone (FLONASE) 50 MCG/ACT nasal spray, 1 spray by Each Nare route daily, Disp: , Rfl:     atorvastatin (LIPITOR) 40 MG tablet, Take 40 mg by mouth daily, Disp: , Rfl:     levothyroxine (SYNTHROID) 50 MCG tablet, Take 50 mcg by mouth Daily, Disp: , Rfl:     busPIRone (BUSPAR) 10 MG tablet, Take 1 tablet by mouth 2 times daily, Disp: 60 tablet, Rfl: 5    divalproex (DEPAKOTE) 500 MG DR tablet, Take 4 tablets by mouth nightly, Disp: 90 tablet, Rfl: 0    levETIRAcetam (KEPPRA) 750 MG tablet, Take 2 tablets by mouth 2 times daily, Disp: 120 tablet, Rfl: 0    metFORMIN (GLUCOPHAGE) 500 MG tablet, Take 2 tablets by mouth daily (with breakfast), Disp: 30 tablet, Rfl: 0    risperiDONE (RISPERDAL) 1 MG tablet, Take 1 tablet by mouth 3 times daily 1 tablet in am, 1 tablet at 4pm, 1 tablet at bedtime, Disp: 60 tablet, Rfl: 5    sertraline (ZOLOFT) 100 MG tablet, Take 2 tablets by mouth nightly, Disp: 30 tablet, Rfl: 5    ipratropium-albuterol (DUONEB) 0.5-2.5 (3) MG/3ML SOLN nebulizer solution, Inhale 1 vial into the lungs every 6 hours as needed for Shortness of Breath, Disp: , Rfl:     UNABLE TO FIND, Wheel chair, Disp: 1 Units, Rfl: 0    UNABLE TO FIND, Need 2 persons to assist her mobility and lifting, Disp: 1 Dose, Rfl: 0    blood glucose monitor strips, Test 3 times a day & as needed for symptoms of irregular blood glucose., Disp: 90 strip, Rfl: 0    lactulose (CHRONULAC) 10 GM/15ML solution, Take 20 g by mouth 3 times daily as needed , Disp: , Rfl:     History of present illness     Chief Complaint: Fever (103.3 orally); Cough (Patient is prone to pneumonia and has cough per health aid. No production. Patient coughing more this morning than normal. ); Pharyngitis (per patient, throat is sore. ); and Abdominal Pain (To palpation. )      Carlo Li is a 66 y.o.  female  who presents with fever and productive cough that started this morning as reported by caregiver. Patient has hx of recurrent UTI and pneumonia in setting of MRDD. Patient unable to provide HPI due to MRDD. Hx of DMII, HTN, Seizure, Insomnia, COPD, and Chronic back pain     Review of Systems   Ten point ROS reviewed and negative, unless as noted below. Objective:   No intake or output data in the 24 hours ending 05/20/19 1127   Vitals:   Vitals:    05/20/19 1000   BP: 134/88   Pulse: 92   Resp: 16   Temp:    SpO2: 99%     Physical Exam:   Gen:  awake, alert, cooperative, no apparent distress  EYES:Lids and lashes normal, pupils equal, round ,extra ocular muscles intact, sclera clear, conjunctiva normal  ENT:  Normocephalic, oral pharynx with moist mucus membranes  NECK:  Supple, symmetrical, trachea midline, no adenopathy,  LUNGS:  Diminished with diffuse wheezing and use of accessory muscles noted. Hypoxic requiring 2 liters of oxygen  CARDIOVASCULAR:Tachycardic, normal S1 and S2,no murmur noted, peripheral pulses 2+, no pitting edema  ABDOMEN: Normal BS, mild tenderness in lower abdominal pain, non distended, no HSM noted. MUSCULOSKELETAL:  ROM of all extremities grossly wnl  NEUROLOGIC: AOx 3,  Cranial nerves II-XII are grossly intact. Motor is 5 out of 5 bilaterally. Sensory is intact, no lateralizing findings.    SKIN:  no bruising or bleeding, normal skin color, turgor, no redness, warmth, or swelling      Past Medical History:      Past Medical History:   Diagnosis Date    Anxiety disorder     Back pain, chronic     Cerebral palsy (HCC)     COPD [facility]ch[facility] obstructive [facility] disease) [facility]  CVA (cerebral infarction) 2014 x2    Diabetes mellitus ([facility]     Diverticulosis     Epilepsy ([facility]     GERD (gastroesophageal reflux disease)     Hyperlipidemia     Hypertension     Insomnia     Iron (Fe) deficiency anemia     Mental disability     Seizures (HCC)     Unspecified cerebral artery occlusion with cerebral infarction      PSHX:  has a past surgical history that includes Gastrostomy tube placement. Allergies: Allergies   Allergen Reactions    Macrobid [Nitrofurantoin] Hives and Swelling     Hives       FAM HX: Unable to obtain due to MRDD  Family history reviewed and is essentially negative unless as stated above.      Soc HX:   Social History     Socioeconomic History    Marital status: Single     Spouse name: None    Number of children: None    Years of education: None    Highest education level: None   Occupational History    None   Social Needs    Financial resource strain: None    Food insecurity:     Worry: None     Inability: None    Transportation needs:     Medical: None     Non-medical: None   Tobacco Use    Smoking status: Former Smoker     Packs/day: 1.50     Years: 20.00     Pack years: 30.00     Last attempt to quit: 2011     Years since quittin.7    Smokeless tobacco: Never Used   Substance and Sexual Activity    Alcohol use: No     Alcohol/week: 0.0 oz    Drug use: No    Sexual activity: None   Lifestyle    Physical activity:     Days per week: None     Minutes per session: None    Stress: None   Relationships    Social connections:     Talks on phone: None     Gets together: None     Attends Tenriism service: None     Active member of club or organization: None     Attends meetings of clubs or organizations: None     Relationship status: None    Intimate partner violence:     Fear of current or ex partner: None     Emotionally abused: None     Physically abused: None     Forced sexual activity: None   Other Topics Concern    None   Social History Narrative    None       Electronically signed by INOCENCIO Centeno CNP on 5/20/2019 at 11:27 AM

## 2019-05-20 NOTE — ED NOTES
Pt straight cathed for urine per order. Nirmala care provided and depends changed. Pt repositioned for comfort. Call light within reach and bed in lowest position. Caregiver at bedside. Urine specimen sent to lab.       Wesley Waller RN  05/20/19 8981

## 2019-05-20 NOTE — ED PROVIDER NOTES
Triage Chief Complaint:   Fever (103.3 orally); Cough (Patient is prone to pneumonia and has cough per health aid. No production. Patient coughing more this morning than normal. ); Pharyngitis (per patient, throat is sore. ); and Abdominal Pain (To palpation. )      [place]:  Ashish Hayes is a 66 y.o. female that presents via EMS for evaluation of fever and abdominal pain. Caregiver, she was in her normal state of health yesterday. This morning when the caregiver arrived, her sinus from the night team that had been no acute findings. Last check was at 3 AM this morning. She's been complaining of diffuse upper abdominal pain. She also has been having a cough. She has been known to have pneumonia and UTIs in the past.  Caregiver reports that there are no additional medications given prior to arrival.    Patient complains of a sore throat. Up until this morning, she had a normal appetite. No diarrhea, nausea, vomiting. She denies having any sick contacts that she is aware of however did spend a lot of the weekend out. ROS:  At least 10 systems reviewed and otherwise negative except as in the [address] Past Medical History:   Diagnosis Date    Anxiety disorder     Back pain, chronic     Cerebral palsy (HCC)     COPD (chronic obstructive pulmonary disease) (HCC)     CVA (cerebral infarction) 2014 x2    Diabetes mellitus [facility]     Diverticulosis     Epilepsy ([facility]     GERD (gastroesophageal reflux disease)     Hyperlipidemia     Hypertension     Insomnia     Iron (Fe) deficiency anemia     Mental disability     Seizures (HCC)     Unspecified cerebral artery occlusion with cerebral infarction      Past Surgical History:   Procedure Laterality Date    GASTROSTOMY TUBE PLACEMENT       History reviewed. No pertinent family history.   Social History     Socioeconomic History    Marital status: Single     Spouse name: Not on file    Number of children: Not on file    Years of education: Not on file daily 60 capsule 5    cetirizine (ZYRTEC) 10 MG tablet TAKE 1 TABLET BY MOUTH DAILY 31 tablet 3    furosemide (LASIX) 20 MG tablet TAKE ONE TABLET BY MOUTH DAILY 31 tablet 5    amLODIPine (NORVASC) 5 MG tablet TAKE 1 TABLET BY MOUTH DAILY 31 tablet 5    JANUVIA 100 MG tablet TAKE 1 TABLET BY MOUTH DAILY 31 tablet 5    Lactobacillus (ACIDOPHILUS) CAPS capsule TAKE 1 CAPSULE BY MOUTH DAILY 31 capsule 5    fluticasone (FLONASE) 50 MCG/ACT nasal spray 1 spray by Each Nare route daily      atorvastatin (LIPITOR) 40 MG tablet Take 40 mg by mouth daily      levothyroxine (SYNTHROID) 50 MCG tablet Take 50 mcg by mouth Daily      busPIRone (BUSPAR) 10 MG tablet Take 1 tablet by mouth 2 times daily 60 tablet 5    divalproex (DEPAKOTE) 500 MG DR tablet Take 4 tablets by mouth nightly 90 tablet 0    levETIRAcetam (KEPPRA) 750 MG tablet Take 2 tablets by mouth 2 times daily 120 tablet 0    metFORMIN (GLUCOPHAGE) 500 MG tablet Take 2 tablets by mouth daily (with breakfast) 30 tablet 0    risperiDONE (RISPERDAL) 1 MG tablet Take 1 tablet by mouth 3 times daily 1 tablet in am, 1 tablet at 4pm, 1 tablet at bedtime 60 tablet 5    sertraline (ZOLOFT) 100 MG tablet Take 2 tablets by mouth nightly 30 tablet 5    ipratropium-albuterol (DUONEB) 0.5-2.5 (3) MG/3ML SOLN nebulizer solution Inhale 1 vial into the lungs every 6 hours as needed for Shortness of Breath      UNABLE TO FIND Wheel chair 1 Units 0    UNABLE TO FIND Need 2 persons to assist her mobility and lifting 1 Dose 0    blood glucose monitor strips Test 3 times a day & as needed for symptoms of irregular blood glucose.  90 strip 0    lactulose (CHRONULAC) 10 GM/15ML solution Take 20 g by mouth 3 times daily as needed        Allergies   Allergen Reactions    Macrobid [Nitrofurantoin] Hives and Swelling     Hives       Nursing Notes Reviewed    Physical Exam:  ED Triage Vitals [05/20/19 0734]   Enc Vitals Group      BP (!) 167/93      Pulse 115      Resp 22 MM    Eosinophils # 0.1 K/CU MM    Basophils # 0.0 K/CU MM    Nucleated RBC % 0.0 %    Total Nucleated RBC 0.0 K/CU MM    Total Immature Neutrophil 0.04 K/CU MM    Immature Neutrophil % 0.6 (H) 0 - 0.43 %   Comprehensive Metabolic Panel w/ Reflex to MG   Result Value Ref Range    Sodium 138 135 - 145 MMOL/L    Potassium 3.6 3.5 - 5.1 MMOL/L    Chloride 100 99 - 110 mMol/L    CO2 27 21 - 32 MMOL/L    BUN 13 6 - 23 MG/DL    CREATININE 0.5 (L) 0.6 - 1.1 MG/DL    Glucose 151 (H) 70 - 99 MG/DL    Calcium 9.0 8.3 - 10.6 MG/DL    Alb 3.6 3.4 - 5.0 GM/DL    Total Protein 6.6 6.4 - 8.2 GM/DL    Total Bilirubin 0.1 0.0 - 1.0 MG/DL    ALT 14 10 - 40 U/L    AST 26 15 - 37 IU/L    Alkaline Phosphatase 80 40 - 129 IU/L    GFR Non-African American >60 >60 mL/min/1.73m2    GFR African American >60 >60 mL/min/1.73m2    Anion Gap 11 4 - 16   Lactic Acid, Plasma   Result Value Ref Range    Lactate (HH) 0.4 - 2.0 mMOL/L     2.1  LACT CALLED TO ER, DR Tessy Damon ON [id number] AT 0853 BY [facility]  RESULTS READ BACK     Lipase   Result Value Ref Range    Lipase 17 13 - 60 IU/L   Urinalysis   Result Value Ref Range    Color, UA YELLOW YELLOW    Clarity, UA CLEAR CLEAR    Glucose, Urine NEGATIVE NEGATIVE MG/DL    Bilirubin Urine NEGATIVE NEGATIVE MG/DL    Ketones, Urine SMALL (A) NEGATIVE MG/DL    Specific Gravity, UA 1.017 1.001 - 1.035    Blood, Urine SMALL (A) NEGATIVE    pH, Urine 7.0 5.0 - 8.0    Protein, UA 30 (A) NEGATIVE MG/DL    Urobilinogen, Urine NORMAL 0.2 - 1.0 MG/DL    Nitrite Urine, Quantitative NEGATIVE NEGATIVE    Leukocyte Esterase, Urine MODERATE (A) NEGATIVE    RBC, UA 3 0 - 6 /HPF    WBC, UA 34 (H) 0 - 5 /HPF    Bacteria, UA RARE (A) NEGATIVE /HPF    Trans Epithel, UA <1 /HPF    Trichomonas, UA NONE SEEN NONE SEEN /HPF   Lactic Acid, Plasma   Result Value Ref Range    Lactate 1.9 0.4 - 2.0 mMOL/L      Radiographs (if obtained):  [] The following radiograph was interpreted by myself in the absence of a radiologist:  [x] Radiologist's Report Reviewed:  No results found. EKG (if obtained): (All EKG's are interpreted by myself in the absence of a cardiologist)    MDM:    Patient presents with fever, abdominal pain. She did have a dry cough on examination. Her blood work was overall benign. She did have evidence of a urinary tract infection on straight cath. I did start her on Rocephin and sent the urine for culture. Her chest abdomen pelvis CT scan did demonstrate evidence of pneumonia but without signs of an acute intra-abdominal process. She did have a mild oxygen requirement here when she first arrived. Her caregiver at bedside reports that she has had this previously before after she had a prolonged hospitalization and rehabilitation course after pneumonia. I do think she would benefit from admission at this time given her comorbidities, hypoxia and 2 infections. Plan for admission for further workup and monitoring. Clinical Impression:  1. Pneumonia due to organism    2. Acute cystitis without hematuria    3. Fever, unspecified fever cause    4.  Hypoxia      (Please note that portions of this note may have been completed with a voice recognition program. Efforts were made to edit the dictations but occasionally words are mis-transcribed.)    Electronically signed by Kuldip Cates MD on 5/20/2019 at 11:24 Aura Bradford MD  05/20/19 1124

## 2019-05-20 NOTE — ED NOTES
Bed: ED-34  Expected date:   Expected time:   Means of arrival:   Comments:  medic     Courtney Richter, AMBROSIO  05/20/19 2635

## 2019-05-21 LAB
ANION GAP SERPL CALCULATED.3IONS-SCNC: 10 MMOL/L (ref 4–16)
BUN BLDV-MCNC: 8 MG/DL (ref 6–23)
CALCIUM SERPL-MCNC: 8.7 MG/DL (ref 8.3–10.6)
CHLORIDE BLD-SCNC: 106 MMOL/L (ref 99–110)
CO2: 31 MMOL/L (ref 21–32)
CREAT SERPL-MCNC: 0.5 MG/DL (ref 0.6–1.1)
GFR AFRICAN AMERICAN: >60 ML/MIN/1.73M2
GFR NON-AFRICAN AMERICAN: >60 ML/MIN/1.73M2
GLUCOSE BLD-MCNC: 102 MG/DL (ref 70–99)
GLUCOSE BLD-MCNC: 114 MG/DL (ref 70–99)
GLUCOSE BLD-MCNC: 116 MG/DL (ref 70–99)
GLUCOSE BLD-MCNC: 326 MG/DL (ref 70–99)
GLUCOSE BLD-MCNC: 89 MG/DL (ref 70–99)
HCT VFR BLD CALC: 36.8 % (ref 37–47)
HEMOGLOBIN: 11.1 GM/DL (ref 12.5–16)
LACTATE: 1.5 MMOL/L (ref 0.4–2)
MCH RBC QN AUTO: 30 PG (ref 27–31)
MCHC RBC AUTO-ENTMCNC: 30.2 % (ref 32–36)
MCV RBC AUTO: 99.5 FL (ref 78–100)
PDW BLD-RTO: 12.3 % (ref 11.7–14.9)
PLATELET # BLD: 253 K/CU MM (ref 140–440)
PMV BLD AUTO: 8.4 FL (ref 7.5–11.1)
POTASSIUM SERPL-SCNC: 4.3 MMOL/L (ref 3.5–5.1)
PROCALCITONIN: 0.06
RBC # BLD: 3.7 M/CU MM (ref 4.2–5.4)
SODIUM BLD-SCNC: 147 MMOL/L (ref 135–145)
WBC # BLD: 4.5 K/CU MM (ref 4–10.5)

## 2019-05-21 PROCEDURE — 92526 ORAL FUNCTION THERAPY: CPT

## 2019-05-21 PROCEDURE — 83605 ASSAY OF LACTIC ACID: CPT

## 2019-05-21 PROCEDURE — 84145 PROCALCITONIN (PCT): CPT

## 2019-05-21 PROCEDURE — 82962 GLUCOSE BLOOD TEST: CPT

## 2019-05-21 PROCEDURE — 1200000000 HC SEMI PRIVATE

## 2019-05-21 PROCEDURE — 80048 BASIC METABOLIC PNL TOTAL CA: CPT

## 2019-05-21 PROCEDURE — 6360000002 HC RX W HCPCS: Performed by: NURSE PRACTITIONER

## 2019-05-21 PROCEDURE — 6370000000 HC RX 637 (ALT 250 FOR IP): Performed by: NURSE PRACTITIONER

## 2019-05-21 PROCEDURE — 2700000000 HC OXYGEN THERAPY PER DAY

## 2019-05-21 PROCEDURE — 2580000003 HC RX 258: Performed by: NURSE PRACTITIONER

## 2019-05-21 PROCEDURE — 94640 AIRWAY INHALATION TREATMENT: CPT

## 2019-05-21 PROCEDURE — 94761 N-INVAS EAR/PLS OXIMETRY MLT: CPT

## 2019-05-21 PROCEDURE — 36415 COLL VENOUS BLD VENIPUNCTURE: CPT

## 2019-05-21 PROCEDURE — 85027 COMPLETE CBC AUTOMATED: CPT

## 2019-05-21 RX ADMIN — METHYLPREDNISOLONE SODIUM SUCCINATE 40 MG: 40 INJECTION, POWDER, LYOPHILIZED, FOR SOLUTION INTRAMUSCULAR; INTRAVENOUS at 09:48

## 2019-05-21 RX ADMIN — INSULIN LISPRO 4 UNITS: 100 INJECTION, SOLUTION INTRAVENOUS; SUBCUTANEOUS at 14:03

## 2019-05-21 RX ADMIN — FLUTICASONE PROPIONATE 1 SPRAY: 50 SPRAY, METERED NASAL at 09:47

## 2019-05-21 RX ADMIN — ENOXAPARIN SODIUM 40 MG: 40 INJECTION SUBCUTANEOUS at 09:49

## 2019-05-21 RX ADMIN — IPRATROPIUM BROMIDE AND ALBUTEROL SULFATE 1 AMPULE: .5; 3 SOLUTION RESPIRATORY (INHALATION) at 15:34

## 2019-05-21 RX ADMIN — AMLODIPINE BESYLATE 5 MG: 5 TABLET ORAL at 09:49

## 2019-05-21 RX ADMIN — LEVETIRACETAM 1500 MG: 500 TABLET, FILM COATED ORAL at 09:48

## 2019-05-21 RX ADMIN — LEVOTHYROXINE SODIUM 50 MCG: 50 TABLET ORAL at 05:43

## 2019-05-21 RX ADMIN — IPRATROPIUM BROMIDE AND ALBUTEROL SULFATE 1 AMPULE: .5; 3 SOLUTION RESPIRATORY (INHALATION) at 23:45

## 2019-05-21 RX ADMIN — GUAIFENESIN 600 MG: 600 TABLET, EXTENDED RELEASE ORAL at 09:48

## 2019-05-21 RX ADMIN — SODIUM CHLORIDE, PRESERVATIVE FREE 10 ML: 5 INJECTION INTRAVENOUS at 22:09

## 2019-05-21 RX ADMIN — CETIRIZINE HYDROCHLORIDE 10 MG: 10 TABLET, FILM COATED ORAL at 09:48

## 2019-05-21 RX ADMIN — BUSPIRONE HYDROCHLORIDE 10 MG: 10 TABLET ORAL at 22:09

## 2019-05-21 RX ADMIN — SODIUM CHLORIDE, PRESERVATIVE FREE 10 ML: 5 INJECTION INTRAVENOUS at 09:49

## 2019-05-21 RX ADMIN — GUAIFENESIN 600 MG: 600 TABLET, EXTENDED RELEASE ORAL at 22:10

## 2019-05-21 RX ADMIN — ATORVASTATIN CALCIUM 40 MG: 40 TABLET, FILM COATED ORAL at 22:10

## 2019-05-21 RX ADMIN — LEVETIRACETAM 1500 MG: 500 TABLET, FILM COATED ORAL at 22:10

## 2019-05-21 RX ADMIN — CARBAMAZEPINE 300 MG: 300 CAPSULE, EXTENDED RELEASE ORAL at 09:48

## 2019-05-21 RX ADMIN — RISPERIDONE 1 MG: 0.5 TABLET, FILM COATED ORAL at 09:48

## 2019-05-21 RX ADMIN — BUSPIRONE HYDROCHLORIDE 10 MG: 10 TABLET ORAL at 09:48

## 2019-05-21 RX ADMIN — AZITHROMYCIN MONOHYDRATE 500 MG: 500 INJECTION, POWDER, LYOPHILIZED, FOR SOLUTION INTRAVENOUS at 14:11

## 2019-05-21 RX ADMIN — PANTOPRAZOLE SODIUM 40 MG: 40 TABLET, DELAYED RELEASE ORAL at 05:44

## 2019-05-21 RX ADMIN — DIVALPROEX SODIUM 2000 MG: 500 TABLET, DELAYED RELEASE ORAL at 22:10

## 2019-05-21 RX ADMIN — FUROSEMIDE 20 MG: 20 TABLET ORAL at 09:48

## 2019-05-21 RX ADMIN — LISINOPRIL 40 MG: 40 TABLET ORAL at 09:48

## 2019-05-21 RX ADMIN — RISPERIDONE 1 MG: 0.5 TABLET, FILM COATED ORAL at 14:11

## 2019-05-21 RX ADMIN — CEFTRIAXONE 1 G: 1 INJECTION, POWDER, FOR SOLUTION INTRAMUSCULAR; INTRAVENOUS at 12:02

## 2019-05-21 RX ADMIN — CARBAMAZEPINE 300 MG: 300 CAPSULE, EXTENDED RELEASE ORAL at 22:09

## 2019-05-21 RX ADMIN — SERTRALINE HYDROCHLORIDE 200 MG: 100 TABLET ORAL at 22:10

## 2019-05-21 RX ADMIN — RISPERIDONE 1 MG: 0.5 TABLET, FILM COATED ORAL at 22:11

## 2019-05-21 RX ADMIN — IPRATROPIUM BROMIDE AND ALBUTEROL SULFATE 1 AMPULE: .5; 3 SOLUTION RESPIRATORY (INHALATION) at 07:46

## 2019-05-21 RX ADMIN — IPRATROPIUM BROMIDE AND ALBUTEROL SULFATE 1 AMPULE: .5; 3 SOLUTION RESPIRATORY (INHALATION) at 11:46

## 2019-05-21 RX ADMIN — MIRTAZAPINE 30 MG: 15 TABLET, FILM COATED ORAL at 22:11

## 2019-05-21 ASSESSMENT — PAIN SCALES - GENERAL: PAINLEVEL_OUTOF10: 0

## 2019-05-21 NOTE — PROGRESS NOTES
Hospitalist Progress Note      Name:  Stephanie Munoz /Age/Sex: 1960  (66 y.o. female)   MRN & CSN:  [id number] & [id number] Admission Date/Time: 2019  7:32 AM   Location:  [address] [place] [address] PCP: Samira Carrion MD         Hospital Day: 2    Assessment and Plan:   Stephanie Munoz is a 66 y.o.  female  who presents with Pneumonia    1. Pneumonia, Right Lowe Lobe: on Ceftriaxone and Azithromycin. RVP +ve Rhinovirus. Continue Duonebs,   2. Urinary Tract Infection: UA with pyuria. Urine culture pending. Continue Rocephin   3. Type 2 DM: Last A1C. Sliding scale. Hypoglycemia protocol. Accucheck. Hold oral hypoglycemic agents for now  4. Hyperlipidemia: continue statin  5. Seizure Disorder: Continue medications. 6. Hypertension: Blood pressure controlled. Continue medications. 7. MRDD    Diet DIET CARDIAC; Dysphagia II Mechanically Altered   DVT Prophylaxis [x] Lovenox, []  Heparin, [] SCDs, [] Warfarin  [] NOAC     GI Prophylaxis [x] PPI,  [] H2 Blocker,  [] Carafate,  [] Diet/Tube Feeds   Code Status Full Code   MDM [] Low, [x] Moderate,[]  High     History of Present Illness:     Chief Complaint: Urinary tract infection    She was seen and examined. Still with cough. No SOB. No chest pain. Ten point ROS reviewed negative, unless as noted above    Objective:   No intake or output data in the 24 hours ending 19 1559   Vitals:   Vitals:    19 1534   BP:    Pulse:    Resp: 18   Temp:    SpO2: 93%     Physical Exam:   GEN Awake female, sitting upright in bed in no apparent distress. Appears given age. EYES Pupils are equally round. No scleral erythema, discharge, or conjunctivitis. HENT Mucous membranes are moist. Oral pharynx without exudates, no evidence of thrush. NECK Supple, no apparent thyromegaly or masses. RESP + wheezing, no rales,   CARDIO/VASC S1/S2 auscultated. Regular rate without appreciable murmurs, rubs, or gallops. No JVD or carotid bruits.  Peripheral pulses equal bilaterally and palpable. No peripheral edema. GI Abdomen is soft without significant tenderness, masses, or guarding. Bowel sounds are normoactive. Rectal exam deferred.  No costovertebral angle tenderness. Normal appearing external genitalia. Alberts catheter is not present. HEME/LYMPH No palpable cervical lymphadenopathy and no hepatosplenomegaly. No petechiae or ecchymoses. MSK No gross joint deformities. SKIN Normal coloration, warm, dry. NEURO Cranial nerves appear grossly intact, normal speech, no lateralizing weakness.   PSYCH Awake, not oriented    Medications:   Medications:    amLODIPine  5 mg Oral Daily    atorvastatin  40 mg Oral Nightly    busPIRone  10 mg Oral BID    carBAMazepine  300 mg Oral BID    cetirizine  10 mg Oral Daily    divalproex  2,000 mg Oral Nightly    fluticasone  1 spray Each Nare Daily    furosemide  20 mg Oral Daily    levETIRAcetam  1,500 mg Oral BID    levothyroxine  50 mcg Oral Daily    lisinopril  40 mg Oral Daily    mirtazapine  30 mg Oral Nightly    pantoprazole  40 mg Oral QAM AC    risperiDONE  1 mg Oral TID    sertraline  200 mg Oral Nightly    sodium chloride flush  10 mL Intravenous 2 times per day    enoxaparin  40 mg Subcutaneous Daily    ipratropium-albuterol  1 ampule Inhalation Q4H WA    azithromycin  500 mg Intravenous Q24H    And    cefTRIAXone (ROCEPHIN) IV  1 g Intravenous Q24H    insulin lispro  0-6 Units Subcutaneous TID WC    insulin lispro  0-3 Units Subcutaneous Nightly    methylPREDNISolone  40 mg Intravenous Daily    guaiFENesin  600 mg Oral BID      Infusions:   PRN Meds:   ipratropium-albuterol 1 vial Q6H PRN   lactulose 20 g TID PRN   sodium chloride flush 10 mL PRN   magnesium hydroxide 30 mL Daily PRN   ondansetron 4 mg Q6H PRN   acetaminophen 650 mg Q4H PRN       Recent Labs     05/20/19  0815 05/21/19  0738   WBC 6.7 4.5   HGB 11.8* 11.1*   HCT 38.3 36.8*    253      Recent Labs     05/20/19  0815 05/20/19  1033

## 2019-05-21 NOTE — CARE COORDINATION
FRACISCO called pts house supervisor Eva Jiang [phone]   CM was informed by pts house supervisor; Pt lives alone in a group home. Pt receives 24/7 caregiving. Pt is on a mechanical soft diet, able to feed self. Pt is able to pivot to chr with assist of one. Pt is mostly incont of urine and stool. Pt takes meds in applesauce. Pt calls people either a \"doll\" or \"bitch. \" Staff describe her as being feisty. Pt receives nursing care twice a day from [facility]. Facility will need order to continue SN. FRACISCO called [facility] at [phone]  Fax [phone]   FRACISCO spoke with Maryjane Heller and he confirmed nursing care twice a day for pt for med management. When pt is discharged please fax order, face sheet, AVS and D/S to the above Number. FRACISCO collaborated with nursing Nicole Valentin with the above information.  [address]

## 2019-05-21 NOTE — PROGRESS NOTES
[facility]  DEPARTMENT OF SPEECH/LANGUAGE PATHOLOGY  DAILY PROGRESS NOTE  Johnnie Paiz  5/21/2019  [id number]  Pneumonia due to organism [J18.9]  Urinary tract infection [N39.0]  Hypoxia [R09.02]  Acute cystitis without hematuria [N30.00]  Fever, unspecified fever cause [R50.9]  Allergies   Allergen Reactions    Macrobid [Nitrofurantoin] Hives and Swelling     Hives         Pt was seen this date for dysphagia treatment. IMPRESSION AND RECOMMENDATIONS: Johnnie Paiz was seen for diet tolerance monitoring and PO trials seated upright in bed, alert, cooperative given cues. She was presented with PO trials of thin liquids by cup/straw, nectar liquids by straw, puree, and soft solids. Continues with oral stage dysphagia characterized by lingual protrusion and poor mastication/bolus formation, lingual pumping for AP transit, likely premature posterior loss. Adequate oral clearance for all textures. Continues with suspected pharyngeal dysphagia evidenced by delayed swallow initiation, reduced laryngeal elevation, and wet vocal quality and immediate coughing following majority of trials of thin liquids. No s/s aspiration following trials of nectar liquids, puree, or soft solids. Recommend downgrade to nectar liquids with allowance for ice chips, continued dysphagia II solids. Position pt upright for all PO. SLP will follow. Recommendations discussed with RN. GOALS (current status in bold):  Short-term Goals  Timeframe for Short-term Goals: length of admission  Goal 1: Pt will tolerate thin liquid/dysphagia II solid diet with adequate oral manipulation and no s/s aspiration. Not met  New goal: Pt will tolerate nectar liquid/dysphagia II solid diet with adequate oral manipulation and no s/s aspiration. New goal: Pt will tolerate trials of ice chips without s/s aspiration. Goal 2: Caregivers will indicate understanding of all recommendations.  Meeting, continue          EDUCATION:

## 2019-05-22 VITALS
HEART RATE: 66 BPM | RESPIRATION RATE: 16 BRPM | HEIGHT: 58 IN | SYSTOLIC BLOOD PRESSURE: 120 MMHG | TEMPERATURE: 98.1 F | WEIGHT: 131.8 LBS | DIASTOLIC BLOOD PRESSURE: 62 MMHG | OXYGEN SATURATION: 95 % | BODY MASS INDEX: 27.66 KG/M2

## 2019-05-22 LAB
CULTURE: ABNORMAL
GLUCOSE BLD-MCNC: 112 MG/DL (ref 70–99)
GLUCOSE BLD-MCNC: 121 MG/DL (ref 70–99)
GLUCOSE BLD-MCNC: 176 MG/DL (ref 70–99)
Lab: ABNORMAL
SPECIMEN: ABNORMAL
STREP A DIRECT SCREEN: NEGATIVE

## 2019-05-22 PROCEDURE — 2580000003 HC RX 258: Performed by: NURSE PRACTITIONER

## 2019-05-22 PROCEDURE — 2700000000 HC OXYGEN THERAPY PER DAY

## 2019-05-22 PROCEDURE — 94640 AIRWAY INHALATION TREATMENT: CPT

## 2019-05-22 PROCEDURE — 82962 GLUCOSE BLOOD TEST: CPT

## 2019-05-22 PROCEDURE — 6360000002 HC RX W HCPCS: Performed by: NURSE PRACTITIONER

## 2019-05-22 PROCEDURE — 6370000000 HC RX 637 (ALT 250 FOR IP): Performed by: NURSE PRACTITIONER

## 2019-05-22 PROCEDURE — 94761 N-INVAS EAR/PLS OXIMETRY MLT: CPT

## 2019-05-22 RX ORDER — AMOXICILLIN AND CLAVULANATE POTASSIUM 875; 125 MG/1; MG/1
1 TABLET, FILM COATED ORAL 2 TIMES DAILY
Qty: 14 TABLET | Refills: 0 | Status: SHIPPED | OUTPATIENT
Start: 2019-05-22 | End: 2019-05-29

## 2019-05-22 RX ORDER — AZITHROMYCIN 500 MG/1
500 TABLET, FILM COATED ORAL DAILY
Qty: 6 TABLET | Refills: 0 | Status: SHIPPED | OUTPATIENT
Start: 2019-05-22 | End: 2019-05-25

## 2019-05-22 RX ORDER — PREDNISONE 20 MG/1
40 TABLET ORAL DAILY
Qty: 10 TABLET | Refills: 0 | Status: SHIPPED | OUTPATIENT
Start: 2019-05-25 | End: 2019-05-30

## 2019-05-22 RX ORDER — PREDNISONE 20 MG/1
40 TABLET ORAL 2 TIMES DAILY
Qty: 12 TABLET | Refills: 0 | Status: SHIPPED | OUTPATIENT
Start: 2019-05-22 | End: 2019-05-25

## 2019-05-22 RX ADMIN — AMLODIPINE BESYLATE 5 MG: 5 TABLET ORAL at 10:03

## 2019-05-22 RX ADMIN — AZITHROMYCIN MONOHYDRATE 500 MG: 500 INJECTION, POWDER, LYOPHILIZED, FOR SOLUTION INTRAVENOUS at 16:30

## 2019-05-22 RX ADMIN — INSULIN LISPRO 1 UNITS: 100 INJECTION, SOLUTION INTRAVENOUS; SUBCUTANEOUS at 13:56

## 2019-05-22 RX ADMIN — METHYLPREDNISOLONE SODIUM SUCCINATE 40 MG: 40 INJECTION, POWDER, LYOPHILIZED, FOR SOLUTION INTRAMUSCULAR; INTRAVENOUS at 10:04

## 2019-05-22 RX ADMIN — FUROSEMIDE 20 MG: 20 TABLET ORAL at 10:03

## 2019-05-22 RX ADMIN — LEVOTHYROXINE SODIUM 50 MCG: 50 TABLET ORAL at 05:19

## 2019-05-22 RX ADMIN — PANTOPRAZOLE SODIUM 40 MG: 40 TABLET, DELAYED RELEASE ORAL at 05:19

## 2019-05-22 RX ADMIN — IPRATROPIUM BROMIDE AND ALBUTEROL SULFATE 1 AMPULE: .5; 3 SOLUTION RESPIRATORY (INHALATION) at 15:25

## 2019-05-22 RX ADMIN — GUAIFENESIN 600 MG: 600 TABLET, EXTENDED RELEASE ORAL at 10:03

## 2019-05-22 RX ADMIN — CEFTRIAXONE 1 G: 1 INJECTION, POWDER, FOR SOLUTION INTRAMUSCULAR; INTRAVENOUS at 12:52

## 2019-05-22 RX ADMIN — IPRATROPIUM BROMIDE AND ALBUTEROL SULFATE 1 AMPULE: .5; 3 SOLUTION RESPIRATORY (INHALATION) at 11:35

## 2019-05-22 RX ADMIN — RISPERIDONE 1 MG: 0.5 TABLET, FILM COATED ORAL at 10:03

## 2019-05-22 RX ADMIN — CARBAMAZEPINE 300 MG: 300 CAPSULE, EXTENDED RELEASE ORAL at 10:23

## 2019-05-22 RX ADMIN — SODIUM CHLORIDE, PRESERVATIVE FREE 10 ML: 5 INJECTION INTRAVENOUS at 10:04

## 2019-05-22 RX ADMIN — ENOXAPARIN SODIUM 40 MG: 40 INJECTION SUBCUTANEOUS at 10:04

## 2019-05-22 RX ADMIN — LEVETIRACETAM 1500 MG: 500 TABLET, FILM COATED ORAL at 10:02

## 2019-05-22 RX ADMIN — BUSPIRONE HYDROCHLORIDE 10 MG: 10 TABLET ORAL at 10:03

## 2019-05-22 RX ADMIN — CETIRIZINE HYDROCHLORIDE 10 MG: 10 TABLET, FILM COATED ORAL at 10:03

## 2019-05-22 RX ADMIN — LISINOPRIL 40 MG: 40 TABLET ORAL at 10:03

## 2019-05-22 RX ADMIN — FLUTICASONE PROPIONATE 1 SPRAY: 50 SPRAY, METERED NASAL at 10:23

## 2019-05-22 RX ADMIN — RISPERIDONE 1 MG: 0.5 TABLET, FILM COATED ORAL at 12:52

## 2019-05-22 ASSESSMENT — PAIN SCALES - GENERAL
PAINLEVEL_OUTOF10: 0

## 2019-05-22 NOTE — CARE COORDINATION
CM reviewed chart and found that pt is discharged. CM called [facility] and spoke with Kishore Delatorre. Nelly angry that they were not given a notice. CM explained that CM does not always know the exact time of discharge. CM informed Kishore Delatorre that CM could set up [facility] for later today so that staff could be called in.   CM called and spoke with Lizzy at [facility]. CM informed that pt was discharged. CM informed that ok for pt to return with [facility] home care. CM faxed [phone] the face sheet, H/P notes, AVS to [facility]. CM called [facility] and informed Spring Chavez  that pt would be discharged today at 200. CM faxed face sheet ,H/p AVS Order, D/S . CM called [facility] and transport time is 7 pm.   CM collaborated with nursing. CM called [facility] [phone]. CM informed that they will have staffing at 7 pm for pt. CM spoke with Kishore Delatorre and informed of transport time who is in agreement. L  CM placed envelope in soft chart. [facility]  1530 CM faxed updated AVS to [facility] home care and [facility]. [facility]    5/23 CM placed a PS to Dr Dena Hills to write when pt is able to go back to work. CM received documentation and called Kishore Delatorre and faxed information. Pt to return to work in one week.  [facility]

## 2019-05-22 NOTE — DISCHARGE SUMMARY
Discharge Summary    Name:  Luann Obregon /Age/Sex: 1960  (66 y.o. female)   MRN & CSN:  [id number] & [id number] Admission Date/Time: 2019  7:32 AM   Attending:  Bennie Tuttle MD Discharging Physician: Bennie Tuttle MD     Hospital Course:   Luann Obregon is a 66 y.o.  female  who presents with Urinary tract infection    She was admitted to the hospital due to cough. She was found to have right lower lobe pneumonia. Respiratory viral panel was positive for rhinovirus. Urinalysis showed pyuria. She was started on ceftriaxone and azithromycin. IV steroid was also started. Her symptoms improved. She was discharged stable. 1. Pneumonia, Right Lowe Lobe: on Ceftriaxone and Azithromycin. RVP +ve Rhinovirus. Continue Duonebs. Discharged on Augmentin and azithromycin. Prednisone given. 2. Urinary Tract Infection: UA with pyuria. Urine culture pending. 3. Type 2 DM: Last A1C. Sliding scale. Hypoglycemia protocol. Accucheck. Hold oral hypoglycemic agents for now  4. Hyperlipidemia: continue statin  5. Seizure Disorder: Continue medications. 6. Hypertension: Blood pressure controlled. Continue medications. 7. MRDD, Severe    The patient expressed appropriate understanding of and agreement with the discharge recommendations, medications, and plan. She may return to work after 1 week.      Consults this admission:  IP CONSULT TO HOSPITALIST    Discharge Instruction:   Follow up appointments:   Primary care physician:  within 1 week    Diet:  diabetic diet   Activity: activity as tolerated  Disposition: Discharged to:   [x]Home, []C, []SNF, []Acute Rehab, []Hospice   Condition on discharge: Stable    Discharge Medications:      [address] Medication Instructions ANGELICA:[id number]    Printed on:19 1246   Medication Information                      albuterol-ipratropium (COMBIVENT RESPIMAT)  MCG/ACT AERS inhaler  Inhale 1 puff into the lungs every 6 hours amLODIPine (NORVASC) 5 MG tablet  TAKE 1 TABLET BY MOUTH DAILY             atorvastatin (LIPITOR) 40 MG tablet  Take 40 mg by mouth daily             blood glucose monitor strips  Test 3 times a day & as needed for symptoms of irregular blood glucose.              busPIRone (BUSPAR) 10 MG tablet  Take 1 tablet by mouth 2 times daily             carBAMazepine (CARBATROL) 300 MG extended release capsule  Take 1 capsule by mouth 2 times daily             cetirizine (ZYRTEC) 10 MG tablet  TAKE 1 TABLET BY MOUTH DAILY             divalproex (DEPAKOTE) 500 MG DR tablet  Take 4 tablets by mouth nightly             fluticasone (FLONASE) 50 MCG/ACT nasal spray  1 spray by Each Nare route daily             furosemide (LASIX) 20 MG tablet  TAKE ONE TABLET BY MOUTH DAILY             ipratropium-albuterol (DUONEB) 0.5-2.5 (3) MG/3ML SOLN nebulizer solution  Inhale 1 vial into the lungs every 6 hours as needed for Shortness of Breath             JANUVIA 100 MG tablet  TAKE 1 TABLET BY MOUTH DAILY             Lactobacillus (ACIDOPHILUS) CAPS capsule  TAKE 1 CAPSULE BY MOUTH DAILY             lactulose (CHRONULAC) 10 GM/15ML solution  Take 20 g by mouth 3 times daily as needed              levETIRAcetam (KEPPRA) 750 MG tablet  Take 2 tablets by mouth 2 times daily             levothyroxine (SYNTHROID) 50 MCG tablet  Take 50 mcg by mouth Daily             lisinopril (PRINIVIL;ZESTRIL) 40 MG tablet  TAKE 1 TABLET BY MOUTH DAILY             metFORMIN (GLUCOPHAGE) 500 MG tablet  Take 2 tablets by mouth daily (with breakfast)             mirtazapine (REMERON) 30 MG tablet  Take 30 mg by mouth nightly             pantoprazole (PROTONIX) 40 MG tablet  Take 1 tablet by mouth every morning (before breakfast)             risperiDONE (RISPERDAL) 1 MG tablet  Take 1 tablet by mouth 3 times daily 1 tablet in am, 1 tablet at 4pm, 1 tablet at bedtime             sertraline (ZOLOFT) 100 MG tablet  Take 2 tablets by mouth nightly UNABLE TO FIND  Need 2 persons to assist her mobility and lifting             UNABLE  [address] chair                 Objective Findings at Discharge:   /62   Pulse 66   Temp 98.1 °F (36.7 °C) (Oral)   Resp 16   Ht 4' 10\" (1.473 m)   Wt 131 lb 12.8 oz (59.8 kg)   SpO2 92%   BMI 27.55 kg/m²            GEN    Awake female, sitting upright in bed in no apparent distress. Appears given age. EYES   Pupils are equally round. No scleral erythema, discharge, or conjunctivitis. HENT  Mucous membranes are moist. Oral pharynx without exudates, no evidence of thrush. NECK  Supple, no apparent thyromegaly or masses. RESP  + wheezing, resolving  CARDIO/VASC           S1/S2 auscultated. Regular rate without appreciable murmurs, rubs, or gallops. No JVD or carotid bruits. Peripheral pulses equal bilaterally and palpable. No peripheral edema. GI        Abdomen is soft without significant tenderness, masses, or guarding. Bowel sounds are normoactive. Rectal exam deferred.        No costovertebral angle tenderness. Normal appearing external genitalia. Alberts catheter is not present. HEME/LYMPH            No palpable cervical lymphadenopathy and no hepatosplenomegaly. No petechiae or ecchymoses. MSK    No gross joint deformities. SKIN    Normal coloration, warm, dry. NEURO           Cranial nerves appear grossly intact, normal speech, no lateralizing weakness. PSYCH            Awake, not oriented    BMP/CBC  Recent Labs     05/20/19  0815 05/21/19  0738    147*   K 3.6 4.3    106   CO2 27 31   BUN 13 8   CREATININE 0.5* 0.5*   WBC 6.7 4.5   HCT 38.3 36.8*    253       IMAGING:  Ct Chest W Contrast    Result Date: 5/20/2019  EXAMINATION: CT OF THE CHEST WITH CONTRAST; CT OF THE ABDOMEN AND PELVIS WITH CONTRAST 5/20/2019 8:53 am TECHNIQUE: CT of the chest was performed with the administration of intravenous contrast. Multiplanar reformatted images are provided for review.  Dose modulation, iterative reconstruction, and/or weight based adjustment of the mA/kV was utilized to reduce the radiation dose to as low as reasonably achievable.; CT of the abdomen and pelvis was performed with the administration of intravenous contrast. Multiplanar reformatted images are provided for review. Dose modulation, iterative reconstruction, and/or weight based adjustment of the mA/kV was utilized to reduce the radiation dose to as low as reasonably achievable. COMPARISON: 03/12/2019 HISTORY: ORDERING SYSTEM PROVIDED HISTORY: cough, fever TECHNOLOGIST PROVIDED HISTORY: Ordering Physician Provided Reason for Exam: cough, fever Acuity: Acute Type of Exam: Initial Additional signs and symptoms: patient unable to follow commands, mrdd Relevant Medical/Surgical History: 75ml isovue 370/ gfr>60 FINDINGS: CT scan of chest: Mediastinum: There is a stable enlarged pre tracheal lymph node with a normal fatty hilum which measures 1.5 cm in short axis diameter. This is unchanged when compared to a prior study from 03/12/2019. There is also a stable enlarged subcarinal lymph node measuring 1.8 cm in short axis diameter. Lungs/pleura: There is bilateral ground-glass infiltrate and right lower lobe interstitial infiltrate. .  No focal consolidation or pneumothorax is seen. No pleural effusion is seen. CT scan of the abdomen: The liver, spleen, adrenal glands, pancreas, gallbladder are normal.  There is an exophytic cyst off the posterior mid left kidney. There is a nonobstructed bowel gas pattern. There is motion artifact through the abdomen. CT pelvis: There is diverticulosis in the sigmoid colon. The bladder is predominantly decompressed but otherwise unremarkable in appearance. No free air free fluid is seen within the pelvis. Uterus is unremarkable in appearance. There is calcific atherosclerotic disease in the aorta and mesenteric and renal arteries.      Bilateral ground-glass infiltrate with interstitial infiltrate in the right lower lobe and stable enlarged mediastinal lymph nodes which may be reactive in nature. Follow-up imaging is recommended to confirm resolution for stability. Diverticulosis Calcific atherosclerotic disease in the aorta, mesenteric arteries, and renal arteries. Ct Abdomen Pelvis W Iv Contrast    Result Date: 5/20/2019  EXAMINATION: CT OF THE CHEST WITH CONTRAST; CT OF THE ABDOMEN AND PELVIS WITH CONTRAST 5/20/2019 8:53 am TECHNIQUE: CT of the chest was performed with the administration of intravenous contrast. Multiplanar reformatted images are provided for review. Dose modulation, iterative reconstruction, and/or weight based adjustment of the mA/kV was utilized to reduce the radiation dose to as low as reasonably achievable.; CT of the abdomen and pelvis was performed with the administration of intravenous contrast. Multiplanar reformatted images are provided for review. Dose modulation, iterative reconstruction, and/or weight based adjustment of the mA/kV was utilized to reduce the radiation dose to as low as reasonably achievable. COMPARISON: 03/12/2019 HISTORY: ORDERING SYSTEM PROVIDED HISTORY: cough, fever TECHNOLOGIST PROVIDED HISTORY: Ordering Physician Provided Reason for Exam: cough, fever Acuity: Acute Type of Exam: Initial Additional signs and symptoms: patient unable to follow commands, mrdd Relevant Medical/Surgical History: 75ml isovue 370/ gfr>60 FINDINGS: CT scan of chest: Mediastinum: There is a stable enlarged pre tracheal lymph node with a normal fatty hilum which measures 1.5 cm in short axis diameter. This is unchanged when compared to a prior study from 03/12/2019. There is also a stable enlarged subcarinal lymph node measuring 1.8 cm in short axis diameter. Lungs/pleura: There is bilateral ground-glass infiltrate and right lower lobe interstitial infiltrate. .  No focal consolidation or pneumothorax is seen. No pleural effusion is seen. CT scan of the abdomen:  The

## 2019-05-22 NOTE — DISCHARGE INSTR - COC
Continuity of Care Form    Patient Name: Dax Castro   :  1960  MRN:  [id number]    Admit date:  2019  Discharge date:  2019    Code Status Order: Full Code   Advance Directives:     Admitting Physician:  Aristeo Sloan MD  PCP: Luther Montalvo MD    Discharging Nurse: [facility] Unit/Room#: 1221/9801-T  Discharging Unit Phone Number: [phone]    Emergency Contact:   Extended Emergency Contact Information  Primary Emergency Contact: Liza Blum [address] Phone: [phone]  Work Phone: [phone]  Relation: Other  Secondary Emergency Contact: ApsiApsi  Address: [address] [place] [address], [address] Phone: [phone]  Work Phone: [phone]  Relation: Legal Guardian    Past Surgical History:  Past Surgical History:   Procedure Laterality Date    GASTROSTOMY TUBE PLACEMENT         Immunization History:   Immunization History   Administered Date(s) Administered    Influenza Vaccine, unspecified formulation 10/21/2016    Influenza Virus Vaccine 2012, 10/01/2013, 2015    Influenza, [place], 3 yrs and older, IM, PF (Fluzone 3 yrs and older or Afluria 5 yrs and older) 10/23/2018    Pneumococcal Polysaccharide ([id number]) 2011, 2015       Active Problems:  Patient Active Problem List   Diagnosis Code    Pneumonia J18.9    HTN (hypertension), benign I10    Seizure disorder ([address] G40.909    Altered mental status R41.82    Chest pain R07.9    MR (mental retardation) F79    COPD (chronic obstructive pulmonary disease) ([address] J44.9    Chronic obstructive pulmonary disease ([address] J44.9    Left hemiplegia ([address] G81.94    H/O: stroke Z80.78    Mixed hyperlipidemia E78.2    Acquired hypothyroidism E03.9    Sepsis ([address] A41.9    Acute bronchitis J20.9    Urinary tract infection N39.0       Isolation/Infection:   Isolation          Contact and Droplet a DME order):  {EQUIPMENT:484023010}  Other Treatments: ***    Patient's personal belongings (please select all that are sent with patient):  {CHP DME Belongings:708971989}    RN SIGNATURE:  {Esignature:[id number]}    CASE MANAGEMENT/SOCIAL WORK SECTION    Inpatient Status Date: ***    Readmission Risk Assessment Score:  Readmission Risk              Risk of Unplanned Readmission:        30           Discharging to Facility/ Agency   · Name:   · Address:  · Phone:  · Fax:    Dialysis Facility (if applicable)   · Name:  · Address:  · Dialysis Schedule:  · Phone:  · Fax:    / signature: {Esignature:[id number]}    PHYSICIAN SECTION    Prognosis: {Prognosis:2246269715}    Condition at Discharge: [address] [place] [address] Patient Condition:521098886}    Rehab Potential (if transferring to Rehab): {Prognosis:3301270694}    Recommended Labs or Other Treatments After Discharge: ***    Physician Certification: I certify the above information and transfer of Anu Brown  is necessary for the continuing treatment of the diagnosis listed and that she requires {Admit to Appropriate Level of Care:59398} for {GREATER/LESS:221215702} 30 days.      Update Admission H&P: {CHP DME Changes in DQU:798220757}    PHYSICIAN SIGNATURE:  {Esignature:080234896}

## 2019-05-22 NOTE — PROGRESS NOTES
HOSPITALIST on call      Called re: staff wanting to know if patient should be straith cath'd so urine Strep pneumo ag and Legionella ag can be sent. Will defer to rounding physician.

## 2019-05-25 LAB
CULTURE: NORMAL
CULTURE: NORMAL
Lab: NORMAL
Lab: NORMAL
SPECIMEN: NORMAL
SPECIMEN: NORMAL

## 2019-05-28 ENCOUNTER — OFFICE VISIT (OUTPATIENT)
Dept: FAMILY MEDICINE CLINIC | Age: 59
End: 2019-05-28
Payer: MEDICARE

## 2019-05-28 VITALS
HEART RATE: 85 BPM | TEMPERATURE: 98.3 F | OXYGEN SATURATION: 95 % | DIASTOLIC BLOOD PRESSURE: 74 MMHG | SYSTOLIC BLOOD PRESSURE: 112 MMHG

## 2019-05-28 DIAGNOSIS — J18.9 PNEUMONIA OF RIGHT LOWER LOBE DUE TO INFECTIOUS ORGANISM: ICD-10-CM

## 2019-05-28 DIAGNOSIS — N39.0 URINARY TRACT INFECTION WITHOUT HEMATURIA, SITE UNSPECIFIED: Primary | ICD-10-CM

## 2019-05-28 PROCEDURE — G8417 CALC BMI ABV UP PARAM F/U: HCPCS | Performed by: NURSE PRACTITIONER

## 2019-05-28 PROCEDURE — 1036F TOBACCO NON-USER: CPT | Performed by: NURSE PRACTITIONER

## 2019-05-28 PROCEDURE — G8427 DOCREV CUR MEDS BY ELIG CLIN: HCPCS | Performed by: NURSE PRACTITIONER

## 2019-05-28 PROCEDURE — 99213 OFFICE O/P EST LOW 20 MIN: CPT | Performed by: NURSE PRACTITIONER

## 2019-05-28 PROCEDURE — 3017F COLORECTAL CA SCREEN DOC REV: CPT | Performed by: NURSE PRACTITIONER

## 2019-05-28 PROCEDURE — 1111F DSCHRG MED/CURRENT MED MERGE: CPT | Performed by: NURSE PRACTITIONER

## 2019-05-28 NOTE — PATIENT INSTRUCTIONS
We are committed to providing you the best care possible. If you receive a survey after visiting one of our offices, please take time to share your experience concerning your physician office visit. These surveys are confidential and no health information about you is shared. We are eager to improve for you and we are counting on your feedback to help make that happen. Patient Education        Urinary Tract Infection in Women: Care Instructions  Your Care Instructions    A urinary tract infection, or UTI, is a general term for an infection anywhere between the kidneys and the urethra (where urine comes out). Most UTIs are bladder infections. They often cause pain or burning when you urinate. UTIs are caused by bacteria and can be cured with antibiotics. Be sure to complete your treatment so that the infection goes away. Follow-up care is a key part of your treatment and safety. Be sure to make and go to all appointments, and call your doctor if you are having problems. It's also a good idea to know your test results and keep a list of the medicines you take. How can you care for yourself at home? · Take your antibiotics as directed. Do not stop taking them just because you feel better. You need to take the full course of antibiotics. · Drink extra water and other fluids for the next day or two. This may help wash out the bacteria that are causing the infection. (If you have kidney, heart, or liver disease and have to limit fluids, talk with your doctor before you increase your fluid intake.)  · Avoid drinks that are carbonated or have caffeine. They can irritate the bladder. · Urinate often. Try to empty your bladder each time. · To relieve pain, take a hot bath or lay a heating pad set on low over your lower belly or genital area. Never go to sleep with a heating pad in place. To prevent UTIs  · Drink plenty of water each day. This helps you urinate often, which clears bacteria from your system. (If you have kidney, heart, or liver disease and have to limit fluids, talk with your doctor before you increase your fluid intake.)  · Urinate when you need to. · Urinate right after you have sex. · Change sanitary pads often. · Avoid douches, bubble baths, feminine hygiene sprays, and other feminine hygiene products that have deodorants. · After going to the bathroom, wipe from front to back. When should you call for help? Call your doctor now or seek immediate medical care if:    · Symptoms such as fever, chills, nausea, or vomiting get worse or appear for the first time.     · You have new pain in your back just below your rib cage. This is called flank pain.     · There is new blood or pus in your urine.     · You have any problems with your antibiotic medicine.    Watch closely for changes in your health, and be sure to contact your doctor if:    · You are not getting better after taking an antibiotic for 2 days.     · Your symptoms go away but then come back. Where can you learn more? Go to https://GoPago."[employer]. org and sign in to your [employer] account. Enter E711 in the [employer] box to learn more about \"Urinary Tract Infection in Women: Care Instructions. \"     If you do not have an account, please click on the \"Sign Up Now\" link. Current as of: December 19, 2018  Content Version: 12.0  © 8143-0815 [employer]. Care instructions adapted under license by [facility] ([facility]). If you have questions about a medical condition or this instruction, always ask your healthcare professional. [place] [address] any warranty or liability for your use of this information. Patient Education        Pneumonia: Care Instructions  Your Care Instructions    Pneumonia is an infection of the lungs. Most cases are caused by infections from bacteria or viruses. Pneumonia may be mild or very severe.  If it is caused by bacteria, you will be treated with antibiotics. It may take a few weeks to a few months to recover fully from pneumonia, depending on how sick you were and whether your overall health is good. Follow-up care is a key part of your treatment and safety. Be sure to make and go to all appointments, and call your doctor if you are having problems. It's also a good idea to know your test results and keep a list of the medicines you take. How can you care for yourself at home? · Take your antibiotics exactly as directed. Do not stop taking the medicine just because you are feeling better. You need to take the full course of antibiotics. · Take your medicines exactly as prescribed. Call your doctor if you think you are having a problem with your medicine. · Get plenty of rest and sleep. You may feel weak and tired for a while, but your energy level will improve with time. · To prevent dehydration, drink plenty of fluids, enough so that your urine is light yellow or clear like water. Choose water and other caffeine-free clear liquids until you feel better. If you have kidney, heart, or liver disease and have to limit fluids, talk with your doctor before you increase the amount of fluids you drink. · Take care of your cough so you can rest. A cough that brings up mucus from your lungs is common with pneumonia. It is one way your body gets rid of the infection. But if coughing keeps you from resting or causes severe fatigue and chest-wall pain, talk to your doctor. He or she may suggest that you take a medicine to reduce the cough. · Use a vaporizer or humidifier to add moisture to your bedroom. Follow the directions for cleaning the machine. · Do not smoke or allow others to smoke around you. Smoke will make your cough last longer. If you need help quitting, talk to your doctor about stop-smoking programs and medicines. These can increase your chances of quitting for good.   · Take an over-the-counter pain medicine, such as acetaminophen (Tylenol), ibuprofen (Advil, Motrin), or naproxen (Aleve). Read and follow all instructions on the label. · Do not take two or more pain medicines at the same time unless the doctor told you to. Many pain medicines have acetaminophen, which is Tylenol. Too much acetaminophen (Tylenol) can be harmful. · If you were given a spirometer to measure how well your lungs are working, use it as instructed. This can help your doctor tell how your recovery is going. · To prevent pneumonia in the future, talk to your doctor about getting a flu vaccine (once a year) and a pneumococcal vaccine (one time only for most people). When should you call for help? Call 911 anytime you think you may need emergency care. For example, call if:    · You have severe trouble breathing.    Call your doctor now or seek immediate medical care if:    · You cough up dark brown or bloody mucus (sputum).     · You have new or worse trouble breathing.     · You are dizzy or lightheaded, or you feel like you may faint.    Watch closely for changes in your health, and be sure to contact your doctor if:    · You have a new or higher fever.     · You are coughing more deeply or more often.     · You are not getting better after 2 days (48 hours).     · You do not get better as expected. Where can you learn more? Go to https://Conjure.twenty5media. org and sign in to your [employer] account. Enter D336 in the [employer] box to learn more about \"Pneumonia: Care Instructions. \"     If you do not have an account, please click on the \"Sign Up Now\" link. Current as of: September 5, 2018  Content Version: 12.0  © 6333-2752 [employer]. Care instructions adapted under license by [facility] ([facility]). If you have questions about a medical condition or this instruction, always ask your healthcare professional. [address] any warranty or liability for your use of this information.

## 2019-05-29 RX ORDER — NAPROXEN 250 MG/1
250 TABLET ORAL 2 TIMES DAILY WITH MEALS
Status: ON HOLD | COMMUNITY
End: 2019-12-02 | Stop reason: SDUPTHER

## 2019-05-29 RX ORDER — BENZONATATE 100 MG/1
100 CAPSULE ORAL 3 TIMES DAILY PRN
Status: ON HOLD | COMMUNITY
End: 2021-09-25 | Stop reason: HOSPADM

## 2019-05-29 RX ORDER — SUMATRIPTAN 50 MG/1
50 TABLET, FILM COATED ORAL
Status: ON HOLD | COMMUNITY
End: 2021-09-25 | Stop reason: HOSPADM

## 2019-05-29 RX ORDER — DIPHENHYDRAMINE HCL 25 MG
25 CAPSULE ORAL EVERY 6 HOURS PRN
COMMUNITY

## 2019-05-29 RX ORDER — ASPIRIN 325 MG
325 TABLET ORAL EVERY 4 HOURS PRN
Status: ON HOLD | COMMUNITY
End: 2021-10-17 | Stop reason: HOSPADM

## 2019-05-29 RX ORDER — DOCUSATE SODIUM 100 MG/1
100 CAPSULE, LIQUID FILLED ORAL 2 TIMES DAILY
Status: ON HOLD | COMMUNITY
End: 2021-09-25 | Stop reason: HOSPADM

## 2019-06-03 ASSESSMENT — ENCOUNTER SYMPTOMS
DIARRHEA: 0
CONSTIPATION: 0
SORE THROAT: 0
ABDOMINAL PAIN: 0
NAUSEA: 0
CHEST TIGHTNESS: 0
RHINORRHEA: 0
ABDOMINAL DISTENTION: 0
SINUS PAIN: 0
SINUS PRESSURE: 0
COUGH: 0

## 2019-06-03 NOTE — PROGRESS NOTES
Juliet Tirado is a 66 y.o. female who presents today for hermedical conditions/complaints as noted below. Juliet Tirado is c/o of Other (needs RTW note)      Chief Complaint   Patient presents with    Other     needs RTW note       HPI:     Juliet Tirado is a 66 y.o. Female who presents today for a return to work slip. The patient was seen in the [address] [place] [address] Avenue 5/20/19 with a cough and urinary symptoms, diagnosed with Pneumonia, Right Lowe Lobe: on Ceftriaxone and Azithromycin. RVP +ve Rhinovirus and Urinary Tract Infection: UA with pyuria. Discharged on Augmentin and azithromycin. States antibiotics are completed and she feels better she is ready to go back to work. Subjective:     Review of Systems   Constitutional: Negative for appetite change, chills, fatigue and fever. HENT: Negative for congestion, postnasal drip, rhinorrhea, sinus pressure, sinus pain and sore throat. Respiratory: Negative for cough and chest tightness. Cardiovascular: Negative for chest pain and palpitations. Gastrointestinal: Negative for abdominal distention, abdominal pain, constipation, diarrhea and nausea. Genitourinary: Negative for difficulty urinating, dysuria, flank pain, frequency, hematuria and urgency. Skin: Negative. Neurological: Negative for dizziness, syncope, weakness and light-headedness. Objective:     Vitals:    05/28/19 1241   BP: 112/74   Site: Right Upper Arm   Position: Sitting   Cuff Size: Medium Adult   Pulse: 85   Temp: 98.3 °F (36.8 °C)   SpO2: 95%       Physical Exam   Constitutional: She is oriented to person, place, and time. She appears well-developed and well-nourished. No distress. HENT:   Head: Normocephalic. Cardiovascular: Normal rate, regular rhythm and normal heart sounds. Pulmonary/Chest: Effort normal and breath sounds normal. No tachypnea and no bradypnea. No respiratory distress. Abdominal: Soft.  Bowel sounds are normal.   Genitourinary:   Genitourinary Comments: Negative CVA tenderness. Neurological: She is alert and oriented to person, place, and time. Skin: Skin is warm and dry. No rash noted. No pallor. Assessment & Plan: The following diagnoses and conditions are stable with no further orders unlessindicated:    1. Urinary tract infection without hematuria, site unspecified    2. Pneumonia of right lower lobe due to infectious organism [facility])        Uday Turner was seen today for other. Diagnoses and all orders for this visit:    Urinary tract infection without hematuria, site unspecified  -  [facility]FL - Maxim Chiu MD, Urology, [facility]. Has seen Dr. Qi Chicas in the past and wants to follow up with him for recurrent UTI's.  -Return to work letter provided. -Return to clinic or ED if any signs or symptoms recurrent pneumonia: fever, chills, wheezing, fatigue, cough. -Handouts given on UTI and pneumonia. Return if symptoms worsen or fail to improve. Patient should call the office immediately with new or ongoing signs or symptoms or worsening, or proceed to the emergency room. If you are onmedications which could impair your senses, you are at risk of weakness, falls, dizziness, or drowsiness. You should be careful during activities which could place you at risk of harm, such as climbing, using stairs,operating machinery, or driving vehicles. If you feel you cannot safely do these activities, you should request others to help you, or avoid the activities altogether. If you are drowsy for any other reason, you should usethe same precautions as listed above. Call if pattern of symptoms change or persists for an extended time.     SUSAN Alba

## 2019-06-05 DIAGNOSIS — E78.2 MIXED HYPERLIPIDEMIA: ICD-10-CM

## 2019-06-06 RX ORDER — MIRTAZAPINE 30 MG/1
30 TABLET, FILM COATED ORAL NIGHTLY
Qty: 31 TABLET | Refills: 5 | Status: SHIPPED | OUTPATIENT
Start: 2019-06-06 | End: 2019-12-16 | Stop reason: SDUPTHER

## 2019-06-06 RX ORDER — CETIRIZINE HYDROCHLORIDE 10 MG/1
TABLET ORAL
Qty: 31 TABLET | Refills: 5 | Status: SHIPPED | OUTPATIENT
Start: 2019-06-06 | End: 2019-12-16 | Stop reason: SDUPTHER

## 2019-06-06 RX ORDER — ATORVASTATIN CALCIUM 40 MG/1
40 TABLET, FILM COATED ORAL DAILY
Qty: 31 TABLET | Refills: 5 | Status: SHIPPED | OUTPATIENT
Start: 2019-06-06 | End: 2019-12-16 | Stop reason: SDUPTHER

## 2019-06-20 PROBLEM — N39.0 URINARY TRACT INFECTION: Status: RESOLVED | Noted: 2019-05-20 | Resolved: 2019-06-20

## 2019-06-27 ENCOUNTER — TELEPHONE (OUTPATIENT)
Dept: FAMILY MEDICINE CLINIC | Age: 59
End: 2019-06-27

## 2019-06-27 ENCOUNTER — HOSPITAL ENCOUNTER (EMERGENCY)
Age: 59
Discharge: HOME | DRG: 190 | End: 2019-06-27
Payer: MEDICARE

## 2019-06-27 ENCOUNTER — APPOINTMENT (OUTPATIENT)
Dept: GENERAL RADIOLOGY | Age: 59
DRG: 190 | End: 2019-06-27
Payer: MEDICARE

## 2019-06-27 VITALS
WEIGHT: 130 LBS | DIASTOLIC BLOOD PRESSURE: 57 MMHG | SYSTOLIC BLOOD PRESSURE: 144 MMHG | HEIGHT: 58 IN | BODY MASS INDEX: 27.29 KG/M2 | HEART RATE: 111 BPM | OXYGEN SATURATION: 93 % | TEMPERATURE: 99.7 F | RESPIRATION RATE: 18 BRPM

## 2019-06-27 DIAGNOSIS — N39.0 URINARY TRACT INFECTION WITH HEMATURIA, SITE UNSPECIFIED: ICD-10-CM

## 2019-06-27 DIAGNOSIS — J02.9 SORE THROAT: Primary | ICD-10-CM

## 2019-06-27 DIAGNOSIS — R31.9 URINARY TRACT INFECTION WITH HEMATURIA, SITE UNSPECIFIED: ICD-10-CM

## 2019-06-27 LAB
BACTERIA: ABNORMAL /HPF
BILIRUBIN URINE: NEGATIVE MG/DL
BLOOD, URINE: ABNORMAL
CLARITY: CLEAR
COLOR: YELLOW
GLUCOSE, URINE: NEGATIVE MG/DL
KETONES, URINE: ABNORMAL MG/DL
LEUKOCYTE ESTERASE, URINE: ABNORMAL
NITRITE URINE, QUANTITATIVE: NEGATIVE
PH, URINE: 7 (ref 5–8)
PROTEIN UA: NEGATIVE MG/DL
RBC URINE: 27 /HPF (ref 0–6)
SPECIFIC GRAVITY UA: 1.02 (ref 1–1.03)
TRICHOMONAS: ABNORMAL /HPF
UROBILINOGEN, URINE: NORMAL MG/DL (ref 0.2–1)
WBC UA: 10 /HPF (ref 0–5)

## 2019-06-27 PROCEDURE — 99284 EMERGENCY DEPT VISIT MOD MDM: CPT

## 2019-06-27 PROCEDURE — 81001 URINALYSIS AUTO W/SCOPE: CPT

## 2019-06-27 PROCEDURE — 71046 X-RAY EXAM CHEST 2 VIEWS: CPT

## 2019-06-27 RX ORDER — CEPHALEXIN 500 MG/1
500 CAPSULE ORAL 2 TIMES DAILY
Qty: 14 CAPSULE | Refills: 0 | Status: ON HOLD | OUTPATIENT
Start: 2019-06-27 | End: 2019-06-30 | Stop reason: HOSPADM

## 2019-06-27 NOTE — ED PROVIDER NOTES
EMERGENCY DEPARTMENT ENCOUNTER      PCP: Meme Combs MD    [address] [facility]    Chief Complaint   Patient presents with    Fever    Pharyngitis           This patient was not evaluated by the attending physician. I have independently evaluated this patient. HPI    Johann Solis is a 66 y.o. female who presents with caregiver, Ro, for sore throat, mild nonproductive cough, generally not feeling well. Onset yesterday evening. Context is patient receives home care and caregiver states patient complains of the above symptoms since yesterday evening. Caregiver measures oral temperature of 100.4 yesterday evening. Caregiver also states patient is \"prone to urinary tract infections\". REVIEW OF SYSTEMS    Constitutional:  Denies fever, chills, weight loss or weakness   HENT: See HPI.   Cardiovascular:  Denies chest pain, palpitations   Respiratory:  Denies wheezes or shortness of breath    GI:  Denies abdominal pain, nausea, vomiting, or diarrhea  :  Denies any urinary symptoms  Musculoskeletal:  Denies back pain  Skin:  Denies rash  Neurologic:  Denies headache, focal weakness or sensory changes   Endocrine:  Denies polyuria or polydypsia   Lymphatic:  Denies swollen glands     All other review of systems are negative  See HPI and nursing notes for additional information     PAST MEDICAL AND SURGICAL HISTORY    Past Medical History:   Diagnosis Date    Anxiety disorder     Back pain, chronic     Cerebral palsy ([address]     COPD (chronic obstructive pulmonary disease) ([address]     CVA (cerebral infarction) 2014 x2    Diabetes mellitus ([address]     Diverticulosis     Epilepsy ([address]     GERD (gastroesophageal reflux disease)     Hyperlipidemia     Hypertension     Insomnia     Iron (Fe) deficiency anemia     Mental disability     Seizures (HCC)     Unspecified cerebral artery occlusion with cerebral infarction      Past Surgical History:   Procedure Laterality Date    GASTROSTOMY TUBE tobacco: Never Used   Substance and Sexual Activity    Alcohol use: No     Alcohol/week: 0.0 oz    Drug use: No    Sexual activity: None   Lifestyle    Physical activity:     Days per week: None     Minutes per session: None    Stress: None   Relationships    Social connections:     Talks on phone: None     Gets together: None     Attends [employer] service: None     Active member of club or organization: None     Attends meetings of clubs or organizations: None     Relationship status: None    Intimate partner violence:     Fear of current or ex partner: None     Emotionally abused: None     Physically abused: None     Forced sexual activity: None   Other Topics Concern    None   Social History Narrative    None     History reviewed. No pertinent family history. PHYSICAL EXAM    VITAL SIGNS: BP (!) 144/57   Pulse 111   Temp 99.7 °F (37.6 °C) (Oral)   Resp 18   Ht 4' 10\" (1.473 m)   Wt 130 lb (59 kg)   SpO2 93%   BMI 27.17 kg/m²    Constitutional:  Well developed, Well nourished  HENT:  Normocephalic, Atraumatic, PERRL. EOMI. Sclera clear. Conjunctiva normal, No discharge. Ear canals and TMs clear. Oropharynx clear. Nasal passages clear. Neck/Lymphatics: supple, no JVD, no swollen nodes  Cardiovascular:  Rate 100, reg Rhythm,  no murmurs/rubs/gallops. No JVD  Respiratory:  Nonlabored breathing. Normal breath sounds, No wheezing  Abdomen: Bowel sounds normal, Soft, No tenderness, no masses. Musculoskeletal:    There is no edema, asymmetry, or calf / thigh tenderness bilaterally. No cyanosis. No cool or pale-appearing limb. Distal cap refill and pulses intact bilateral upper and lower extremities  Integument:  Warm, Dry  Neurologic: Alert & oriented , No focal deficits noted. Cranial nerves II through XII grossly intact. Normal gross motor coordination & motor strength bilateral upper and lower extremities  Sensation intact.   Psychiatric:  Affect normal, Mood normal. Labs:  Results for orders placed or performed during the hospital encounter of 06/27/19   Urinalysis   Result Value Ref Range    Color, UA YELLOW YELLOW    Clarity, UA CLEAR CLEAR    Glucose, Urine NEGATIVE NEGATIVE MG/DL    Bilirubin Urine NEGATIVE NEGATIVE MG/DL    Ketones, Urine SMALL (A) NEGATIVE MG/DL    Specific Gravity, UA 1.019 1.001 - 1.035    Blood, Urine SMALL (A) NEGATIVE    pH, Urine 7.0 5.0 - 8.0    Protein, UA NEGATIVE NEGATIVE MG/DL    Urobilinogen, Urine NORMAL 0.2 - 1.0 MG/DL    Nitrite Urine, Quantitative NEGATIVE NEGATIVE    Leukocyte Esterase, Urine SMALL (A) NEGATIVE    RBC, UA 27 (H) 0 - 6 /HPF    WBC, UA 10 (H) 0 - 5 /HPF    Bacteria, UA RARE (A) NEGATIVE /HPF    Trichomonas, UA NONE SEEN NONE SEEN /HPF             RADIOLOGY    Xr Chest Standard (2 Vw)    Result Date: 6/27/2019  EXAMINATION: TWO XRAY VIEWS OF THE CHEST 6/27/2019 9:22 am COMPARISON: 12/21/2018. HISTORY: ORDERING SYSTEM PROVIDED HISTORY: cough TECHNOLOGIST PROVIDED HISTORY: Reason for exam:->cough Ordering Physician Provided Reason for Exam: cough Acuity: Acute Type of Exam: Initial Additional signs and symptoms: Pt to ED with complaints of a sore throat and a fever since yesterday. FINDINGS: The cardiac silhouette appears unchanged in size from the prior exam. Minimal prominence of the pulmonary vasculature also appears unchanged. No focal consolidation. No pleural effusion or pneumothorax. No acute osseous abnormality. 1. Overall, no significant change in the appearance of the chest. 2. Mild prominence of the pulmonary vasculature bilaterally. 3. No focal consolidation. ED COURSE & MEDICAL DECISION MAKING       Exact cause of patient's symptoms unknown. History and exam is consistent with URI, likely viral.  Patient's urinalysis today does reveal evidence of red blood cells and white blood cells, possible early UTI.   Will provide patient oral antibiotic therapy and recommend follow with PCP over the next several days for recheck. Otherwise patient is clinically stable, afebrile, well-hydrated and I feel safe for discharge home with outpatient plan. Pt's  caregiver agrees to return emergency department if symptoms worsen or any new symptoms develop. Vital signs and nursing notes reviewed during ED course. Clinical  IMPRESSION    1. Sore throat    2. Urinary tract infection with hematuria, site unspecified              Comment: Please note this report has been produced using speech recognition software and may contain errors related to that system including errors in grammar, punctuation, and spelling, as well as words and phrases that may be inappropriate. If there are any questions or concerns please feel free to contact the dictating provider for clarification.          Mua Lee  06/27/19 1023

## 2019-06-27 NOTE — LETTER
[facility] Emergency Department  [address] [id number]  Phone: [phone]  Fax: [phone]             June 27, 2019    Patient: Marcel Lee   YOB: 1960   Date of Visit: 6/27/2019       To Whom It May Concern:    Mariluz Mead was seen and treated in our emergency department on 6/27/2019. She may return to work on 7/1/19 without retrictions.       Sincerely,             Signature:__________________________________

## 2019-06-28 ENCOUNTER — HOSPITAL ENCOUNTER (INPATIENT)
Age: 59
LOS: 2 days | Discharge: HOME | DRG: 190 | End: 2019-06-30
Attending: EMERGENCY MEDICINE | Admitting: HOSPITALIST
Payer: MEDICARE

## 2019-06-28 ENCOUNTER — APPOINTMENT (OUTPATIENT)
Dept: ULTRASOUND IMAGING | Age: 59
DRG: 190 | End: 2019-06-28
Payer: MEDICARE

## 2019-06-28 ENCOUNTER — APPOINTMENT (OUTPATIENT)
Dept: GENERAL RADIOLOGY | Age: 59
DRG: 190 | End: 2019-06-28
Payer: MEDICARE

## 2019-06-28 ENCOUNTER — TELEPHONE (OUTPATIENT)
Dept: FAMILY MEDICINE CLINIC | Age: 59
End: 2019-06-28

## 2019-06-28 DIAGNOSIS — R09.02 HYPOXIA: ICD-10-CM

## 2019-06-28 DIAGNOSIS — J44.1 COPD EXACERBATION (HCC): Primary | ICD-10-CM

## 2019-06-28 PROBLEM — J96.01 ACUTE RESPIRATORY FAILURE WITH HYPOXIA (HCC): Status: ACTIVE | Noted: 2019-06-28

## 2019-06-28 PROBLEM — J96.00 ACUTE RESPIRATORY FAILURE (HCC): Status: ACTIVE | Noted: 2019-06-28

## 2019-06-28 LAB
ALBUMIN SERPL-MCNC: 3.7 GM/DL (ref 3.4–5)
ALP BLD-CCNC: 83 IU/L (ref 40–129)
ALT SERPL-CCNC: 11 U/L (ref 10–40)
ANION GAP SERPL CALCULATED.3IONS-SCNC: 12 MMOL/L (ref 4–16)
AST SERPL-CCNC: 15 IU/L (ref 15–37)
BASOPHILS ABSOLUTE: 0 K/CU MM
BASOPHILS RELATIVE PERCENT: 0.7 % (ref 0–1)
BILIRUB SERPL-MCNC: 0.1 MG/DL (ref 0–1)
BUN BLDV-MCNC: 14 MG/DL (ref 6–23)
CALCIUM SERPL-MCNC: 9.5 MG/DL (ref 8.3–10.6)
CHLORIDE BLD-SCNC: 104 MMOL/L (ref 99–110)
CO2: 27 MMOL/L (ref 21–32)
CREAT SERPL-MCNC: 0.5 MG/DL (ref 0.6–1.1)
DIFFERENTIAL TYPE: ABNORMAL
EOSINOPHILS ABSOLUTE: 0.1 K/CU MM
EOSINOPHILS RELATIVE PERCENT: 1.8 % (ref 0–3)
ESTIMATED AVERAGE GLUCOSE: 134 MG/DL
GFR AFRICAN AMERICAN: >60 ML/MIN/1.73M2
GFR NON-AFRICAN AMERICAN: >60 ML/MIN/1.73M2
GLUCOSE BLD-MCNC: 184 MG/DL (ref 70–99)
GLUCOSE BLD-MCNC: 243 MG/DL (ref 70–99)
GLUCOSE BLD-MCNC: 251 MG/DL (ref 70–99)
HBA1C MFR BLD: 6.3 % (ref 4.2–6.3)
HCT VFR BLD CALC: 36.7 % (ref 37–47)
HEMOGLOBIN: 11.1 GM/DL (ref 12.5–16)
IMMATURE NEUTROPHIL %: 1.8 % (ref 0–0.43)
LYMPHOCYTES ABSOLUTE: 1.5 K/CU MM
LYMPHOCYTES RELATIVE PERCENT: 24.8 % (ref 24–44)
MCH RBC QN AUTO: 29.6 PG (ref 27–31)
MCHC RBC AUTO-ENTMCNC: 30.2 % (ref 32–36)
MCV RBC AUTO: 97.9 FL (ref 78–100)
MONOCYTES ABSOLUTE: 0.8 K/CU MM
MONOCYTES RELATIVE PERCENT: 12.9 % (ref 0–4)
NUCLEATED RBC %: 0 %
PDW BLD-RTO: 13.2 % (ref 11.7–14.9)
PLATELET # BLD: 244 K/CU MM (ref 140–440)
PMV BLD AUTO: 8.6 FL (ref 7.5–11.1)
POTASSIUM SERPL-SCNC: 4 MMOL/L (ref 3.5–5.1)
PRO-BNP: 180.1 PG/ML
RBC # BLD: 3.75 M/CU MM (ref 4.2–5.4)
SEGMENTED NEUTROPHILS ABSOLUTE COUNT: 3.6 K/CU MM
SEGMENTED NEUTROPHILS RELATIVE PERCENT: 58 % (ref 36–66)
SODIUM BLD-SCNC: 143 MMOL/L (ref 135–145)
TOTAL IMMATURE NEUTOROPHIL: 0.11 K/CU MM
TOTAL NUCLEATED RBC: 0 K/CU MM
TOTAL PROTEIN: 6.5 GM/DL (ref 6.4–8.2)
TROPONIN T: <0.01 NG/ML
WBC # BLD: 6.1 K/CU MM (ref 4–10.5)

## 2019-06-28 PROCEDURE — 96372 THER/PROPH/DIAG INJ SC/IM: CPT

## 2019-06-28 PROCEDURE — 2700000000 HC OXYGEN THERAPY PER DAY

## 2019-06-28 PROCEDURE — 96376 TX/PRO/DX INJ SAME DRUG ADON: CPT

## 2019-06-28 PROCEDURE — 96365 THER/PROPH/DIAG IV INF INIT: CPT

## 2019-06-28 PROCEDURE — 93970 EXTREMITY STUDY: CPT

## 2019-06-28 PROCEDURE — 6360000002 HC RX W HCPCS: Performed by: HOSPITALIST

## 2019-06-28 PROCEDURE — 93005 ELECTROCARDIOGRAM TRACING: CPT | Performed by: EMERGENCY MEDICINE

## 2019-06-28 PROCEDURE — 93010 ELECTROCARDIOGRAM REPORT: CPT | Performed by: INTERNAL MEDICINE

## 2019-06-28 PROCEDURE — 96374 THER/PROPH/DIAG INJ IV PUSH: CPT

## 2019-06-28 PROCEDURE — 36415 COLL VENOUS BLD VENIPUNCTURE: CPT

## 2019-06-28 PROCEDURE — 71046 X-RAY EXAM CHEST 2 VIEWS: CPT

## 2019-06-28 PROCEDURE — 82962 GLUCOSE BLOOD TEST: CPT

## 2019-06-28 PROCEDURE — 6370000000 HC RX 637 (ALT 250 FOR IP): Performed by: EMERGENCY MEDICINE

## 2019-06-28 PROCEDURE — 99285 EMERGENCY DEPT VISIT HI MDM: CPT

## 2019-06-28 PROCEDURE — 96367 TX/PROPH/DG ADDL SEQ IV INF: CPT

## 2019-06-28 PROCEDURE — 96375 TX/PRO/DX INJ NEW DRUG ADDON: CPT

## 2019-06-28 PROCEDURE — 94761 N-INVAS EAR/PLS OXIMETRY MLT: CPT

## 2019-06-28 PROCEDURE — 2580000003 HC RX 258: Performed by: HOSPITALIST

## 2019-06-28 PROCEDURE — 83036 HEMOGLOBIN GLYCOSYLATED A1C: CPT

## 2019-06-28 PROCEDURE — 85025 COMPLETE CBC W/AUTO DIFF WBC: CPT

## 2019-06-28 PROCEDURE — 6360000002 HC RX W HCPCS: Performed by: EMERGENCY MEDICINE

## 2019-06-28 PROCEDURE — 80053 COMPREHEN METABOLIC PANEL: CPT

## 2019-06-28 PROCEDURE — 83880 ASSAY OF NATRIURETIC PEPTIDE: CPT

## 2019-06-28 PROCEDURE — 94640 AIRWAY INHALATION TREATMENT: CPT

## 2019-06-28 PROCEDURE — 84484 ASSAY OF TROPONIN QUANT: CPT

## 2019-06-28 PROCEDURE — 1200000000 HC SEMI PRIVATE

## 2019-06-28 PROCEDURE — 6370000000 HC RX 637 (ALT 250 FOR IP): Performed by: HOSPITALIST

## 2019-06-28 PROCEDURE — G0378 HOSPITAL OBSERVATION PER HR: HCPCS

## 2019-06-28 RX ORDER — BUSPIRONE HYDROCHLORIDE 10 MG/1
10 TABLET ORAL 2 TIMES DAILY
Status: DISCONTINUED | OUTPATIENT
Start: 2019-06-28 | End: 2019-06-30 | Stop reason: HOSPADM

## 2019-06-28 RX ORDER — IPRATROPIUM BROMIDE AND ALBUTEROL SULFATE 2.5; .5 MG/3ML; MG/3ML
1 SOLUTION RESPIRATORY (INHALATION) ONCE
Status: COMPLETED | OUTPATIENT
Start: 2019-06-28 | End: 2019-06-28

## 2019-06-28 RX ORDER — DOCUSATE SODIUM 100 MG/1
100 CAPSULE, LIQUID FILLED ORAL 2 TIMES DAILY
Status: DISCONTINUED | OUTPATIENT
Start: 2019-06-28 | End: 2019-06-30 | Stop reason: HOSPADM

## 2019-06-28 RX ORDER — RISPERIDONE 0.5 MG/1
1 TABLET, FILM COATED ORAL 3 TIMES DAILY
Status: DISCONTINUED | OUTPATIENT
Start: 2019-06-28 | End: 2019-06-30 | Stop reason: HOSPADM

## 2019-06-28 RX ORDER — DEXTROSE MONOHYDRATE 25 G/50ML
12.5 INJECTION, SOLUTION INTRAVENOUS PRN
Status: DISCONTINUED | OUTPATIENT
Start: 2019-06-28 | End: 2019-06-30 | Stop reason: HOSPADM

## 2019-06-28 RX ORDER — IPRATROPIUM BROMIDE AND ALBUTEROL SULFATE 2.5; .5 MG/3ML; MG/3ML
1 SOLUTION RESPIRATORY (INHALATION)
Status: DISCONTINUED | OUTPATIENT
Start: 2019-06-28 | End: 2019-06-30 | Stop reason: HOSPADM

## 2019-06-28 RX ORDER — SERTRALINE HYDROCHLORIDE 100 MG/1
200 TABLET, FILM COATED ORAL NIGHTLY
Status: DISCONTINUED | OUTPATIENT
Start: 2019-06-28 | End: 2019-06-30 | Stop reason: HOSPADM

## 2019-06-28 RX ORDER — METHYLPREDNISOLONE SODIUM SUCCINATE 40 MG/ML
40 INJECTION, POWDER, LYOPHILIZED, FOR SOLUTION INTRAMUSCULAR; INTRAVENOUS EVERY 6 HOURS
Status: DISCONTINUED | OUTPATIENT
Start: 2019-06-28 | End: 2019-06-30 | Stop reason: HOSPADM

## 2019-06-28 RX ORDER — SODIUM CHLORIDE 0.9 % (FLUSH) 0.9 %
10 SYRINGE (ML) INJECTION PRN
Status: DISCONTINUED | OUTPATIENT
Start: 2019-06-28 | End: 2019-06-30 | Stop reason: HOSPADM

## 2019-06-28 RX ORDER — DEXTROSE MONOHYDRATE 50 MG/ML
100 INJECTION, SOLUTION INTRAVENOUS PRN
Status: DISCONTINUED | OUTPATIENT
Start: 2019-06-28 | End: 2019-06-30 | Stop reason: HOSPADM

## 2019-06-28 RX ORDER — ALBUTEROL SULFATE 2.5 MG/3ML
2.5 SOLUTION RESPIRATORY (INHALATION)
Status: DISCONTINUED | OUTPATIENT
Start: 2019-06-28 | End: 2019-06-30 | Stop reason: HOSPADM

## 2019-06-28 RX ORDER — SODIUM CHLORIDE 0.9 % (FLUSH) 0.9 %
10 SYRINGE (ML) INJECTION EVERY 12 HOURS SCHEDULED
Status: DISCONTINUED | OUTPATIENT
Start: 2019-06-28 | End: 2019-06-30 | Stop reason: HOSPADM

## 2019-06-28 RX ORDER — PANTOPRAZOLE SODIUM 40 MG/1
40 TABLET, DELAYED RELEASE ORAL
Status: DISCONTINUED | OUTPATIENT
Start: 2019-06-29 | End: 2019-06-30 | Stop reason: HOSPADM

## 2019-06-28 RX ORDER — INSULIN GLARGINE 100 [IU]/ML
5 INJECTION, SOLUTION SUBCUTANEOUS NIGHTLY
Status: DISCONTINUED | OUTPATIENT
Start: 2019-06-28 | End: 2019-06-30 | Stop reason: HOSPADM

## 2019-06-28 RX ORDER — DIVALPROEX SODIUM 500 MG/1
1500 TABLET, EXTENDED RELEASE ORAL NIGHTLY
Status: DISCONTINUED | OUTPATIENT
Start: 2019-06-28 | End: 2019-06-30 | Stop reason: HOSPADM

## 2019-06-28 RX ORDER — LEVETIRACETAM 500 MG/1
1500 TABLET ORAL 2 TIMES DAILY
Status: DISCONTINUED | OUTPATIENT
Start: 2019-06-28 | End: 2019-06-30 | Stop reason: HOSPADM

## 2019-06-28 RX ORDER — CARBAMAZEPINE 300 MG/1
300 CAPSULE, EXTENDED RELEASE ORAL 2 TIMES DAILY
Status: DISCONTINUED | OUTPATIENT
Start: 2019-06-28 | End: 2019-06-30 | Stop reason: HOSPADM

## 2019-06-28 RX ORDER — ASPIRIN 325 MG
325 TABLET ORAL EVERY 4 HOURS PRN
Status: DISCONTINUED | OUTPATIENT
Start: 2019-06-28 | End: 2019-06-30 | Stop reason: HOSPADM

## 2019-06-28 RX ORDER — FUROSEMIDE 20 MG/1
20 TABLET ORAL DAILY
Status: DISCONTINUED | OUTPATIENT
Start: 2019-06-28 | End: 2019-06-30 | Stop reason: HOSPADM

## 2019-06-28 RX ORDER — LEVOTHYROXINE SODIUM 0.05 MG/1
50 TABLET ORAL DAILY
Status: DISCONTINUED | OUTPATIENT
Start: 2019-06-29 | End: 2019-06-30 | Stop reason: HOSPADM

## 2019-06-28 RX ORDER — LACTOBACILLUS RHAMNOSUS GG 10B CELL
1 CAPSULE ORAL
Status: DISCONTINUED | OUTPATIENT
Start: 2019-06-29 | End: 2019-06-30 | Stop reason: HOSPADM

## 2019-06-28 RX ORDER — NICOTINE POLACRILEX 4 MG
15 LOZENGE BUCCAL PRN
Status: DISCONTINUED | OUTPATIENT
Start: 2019-06-28 | End: 2019-06-30 | Stop reason: HOSPADM

## 2019-06-28 RX ORDER — ATORVASTATIN CALCIUM 40 MG/1
40 TABLET, FILM COATED ORAL DAILY
Status: DISCONTINUED | OUTPATIENT
Start: 2019-06-28 | End: 2019-06-30 | Stop reason: HOSPADM

## 2019-06-28 RX ORDER — PREDNISONE 20 MG/1
40 TABLET ORAL DAILY
Status: DISCONTINUED | OUTPATIENT
Start: 2019-07-01 | End: 2019-06-30 | Stop reason: HOSPADM

## 2019-06-28 RX ORDER — ONDANSETRON 2 MG/ML
4 INJECTION INTRAMUSCULAR; INTRAVENOUS EVERY 6 HOURS PRN
Status: DISCONTINUED | OUTPATIENT
Start: 2019-06-28 | End: 2019-06-30 | Stop reason: HOSPADM

## 2019-06-28 RX ORDER — ACETAMINOPHEN 325 MG/1
650 TABLET ORAL EVERY 4 HOURS PRN
Status: DISCONTINUED | OUTPATIENT
Start: 2019-06-28 | End: 2019-06-30 | Stop reason: HOSPADM

## 2019-06-28 RX ORDER — METHYLPREDNISOLONE SODIUM SUCCINATE 125 MG/2ML
125 INJECTION, POWDER, LYOPHILIZED, FOR SOLUTION INTRAMUSCULAR; INTRAVENOUS ONCE
Status: COMPLETED | OUTPATIENT
Start: 2019-06-28 | End: 2019-06-28

## 2019-06-28 RX ADMIN — METHYLPREDNISOLONE SODIUM SUCCINATE 40 MG: 40 INJECTION, POWDER, LYOPHILIZED, FOR SOLUTION INTRAMUSCULAR; INTRAVENOUS at 23:09

## 2019-06-28 RX ADMIN — BUSPIRONE HYDROCHLORIDE 10 MG: 10 TABLET ORAL at 21:35

## 2019-06-28 RX ADMIN — METHYLPREDNISOLONE SODIUM SUCCINATE 125 MG: 125 INJECTION, POWDER, LYOPHILIZED, FOR SOLUTION INTRAMUSCULAR; INTRAVENOUS at 14:52

## 2019-06-28 RX ADMIN — RISPERIDONE 1 MG: 0.5 TABLET, FILM COATED ORAL at 21:34

## 2019-06-28 RX ADMIN — DIVALPROEX SODIUM 1500 MG: 500 TABLET, FILM COATED, EXTENDED RELEASE ORAL at 21:34

## 2019-06-28 RX ADMIN — INSULIN GLARGINE 5 UNITS: 100 INJECTION, SOLUTION SUBCUTANEOUS at 21:36

## 2019-06-28 RX ADMIN — ENOXAPARIN SODIUM 40 MG: 100 INJECTION SUBCUTANEOUS at 18:06

## 2019-06-28 RX ADMIN — METOPROLOL TARTRATE 25 MG: 25 TABLET ORAL at 21:34

## 2019-06-28 RX ADMIN — LEVETIRACETAM 1500 MG: 500 TABLET ORAL at 21:35

## 2019-06-28 RX ADMIN — INSULIN LISPRO 2 UNITS: 100 INJECTION, SOLUTION INTRAVENOUS; SUBCUTANEOUS at 18:05

## 2019-06-28 RX ADMIN — AZITHROMYCIN MONOHYDRATE 500 MG: 500 INJECTION, POWDER, LYOPHILIZED, FOR SOLUTION INTRAVENOUS at 21:36

## 2019-06-28 RX ADMIN — INSULIN LISPRO 2 UNITS: 100 INJECTION, SOLUTION INTRAVENOUS; SUBCUTANEOUS at 21:35

## 2019-06-28 RX ADMIN — SODIUM CHLORIDE, PRESERVATIVE FREE 10 ML: 5 INJECTION INTRAVENOUS at 21:35

## 2019-06-28 RX ADMIN — CEFTRIAXONE 1 G: 1 INJECTION, POWDER, FOR SOLUTION INTRAMUSCULAR; INTRAVENOUS at 21:35

## 2019-06-28 RX ADMIN — CARBAMAZEPINE 300 MG: 300 CAPSULE, EXTENDED RELEASE ORAL at 22:16

## 2019-06-28 RX ADMIN — SERTRALINE HYDROCHLORIDE 200 MG: 100 TABLET ORAL at 21:34

## 2019-06-28 RX ADMIN — IPRATROPIUM BROMIDE AND ALBUTEROL SULFATE 1 AMPULE: .5; 3 SOLUTION RESPIRATORY (INHALATION) at 14:02

## 2019-06-28 RX ADMIN — IPRATROPIUM BROMIDE AND ALBUTEROL SULFATE 1 AMPULE: .5; 3 SOLUTION RESPIRATORY (INHALATION) at 14:14

## 2019-06-28 RX ADMIN — DOCUSATE SODIUM 100 MG: 100 CAPSULE, LIQUID FILLED ORAL at 21:34

## 2019-06-28 RX ADMIN — IPRATROPIUM BROMIDE AND ALBUTEROL SULFATE 1 AMPULE: .5; 3 SOLUTION RESPIRATORY (INHALATION) at 14:08

## 2019-06-28 RX ADMIN — ATORVASTATIN CALCIUM 40 MG: 40 TABLET, FILM COATED ORAL at 21:35

## 2019-06-28 ASSESSMENT — PAIN SCALES - GENERAL: PAINLEVEL_OUTOF10: 0

## 2019-06-28 NOTE — H&P
History and Physical      Name:  Haleigh Mcclellan /Age/Sex: 1960  (66 y.o. female)   MRN & CSN:  [id number] & [id number] Admission Date/Time: 2019 11:32 AM   Location:  ED25/ED-25 PCP: Jose Nicolas MD       Hospital Day: 1    Assessment and Plan:   Haleigh Mcclellan is a 66 y.o.  female  who presents with Dyspnea, dx with acute resp failure, COPD exacerbation. > acute resp failure,   > COPD exacerbation. - admit, duonebs, steroids, IV rocephin, zithromax, oxygen  - consult pulm, check resp panel, strep AG,   - concern about aspiration, consult SLP    >Type 2 DM:   - Hold oral hypoglycemic agents for now  - cover with lantus, SSI    Seizure Disorder   Hypertension:   Hypothyroidism   MRDD, Severe  Cerebral palsy  H/o SDH  H/o CVA  Left Hemiplegia     Continue home meds. Diet No diet orders on file   DVT Prophylaxis [x] Lovenox, []  Heparin, [] SCDs, [] No VTE prophylaxis, patient ambulating   Code Status Prior     History of Present Illness:     Chief Complaint: Dyspnea, dx with acute resp failure, COPD exacerbation. Haleigh Mcclellan is a 66 y.o.  female  who presents with Dyspnea, dx with acute resp failure, COPD exacerbation. Limited with pt's MRDD, cerebral palsy, obtained with the help of ER staff and documentation. Pt presented with few days h/o worsening dyspnea, worse with cough , better with breathing treatment, associated with cough productive of phlegm, pt admit coughing with food, no abd pain, no N/V, no F/C.     10-14 point ROS reviewed negative, unless as noted above    Objective:   No intake or output data in the 24 hours ending 19 1456   Vitals:   Vitals:    19 1452   BP: 128/63   Pulse: 75   Resp: 24   Temp:    SpO2: 100%     Physical Exam:      Pt communicate  and follow commands. GEN Awake female, sitting upright in bed in no apparent distress. Appears given age. EYES Pupils are equally round.   No scleral erythema, discharge, or

## 2019-06-28 NOTE — ED NOTES
VL DUP LOWER EXTREMITY VENOUS BILATERAL   Status: Preliminary result   Order Providers     Nidia Mendoza MD          Reading Providers     Read Date Phone Pager   Checo Gama Jun 28, 2019 [phone]    Radiation Dose Estimates     No radiation information found for this patient   Narrative   EXAMINATION:   DUPLEX VENOUS ULTRASOUND OF THE BILATERAL LOWER EXTREMITIES, 6/28/2019 1:24 pm       TECHNIQUE:   Duplex ultrasound and Doppler images were obtained of the bilateral lower   extremities       COMPARISON:   08/17/2013       HISTORY:   ORDERING SYSTEM PROVIDED HISTORY: r/o dvt   TECHNOLOGIST PROVIDED HISTORY:   Reason for exam:->r/o dvt   Ordering Physician Provided Reason for Exam: SOB, leg pain   Acuity: Unknown   Type of Exam: Initial       FINDINGS:   The visualized veins of the bilateral lower extremities are patent and free   of echogenic thrombus.  The veins are normally compressible and have normal   phasic flow.           Impression   No evidence of DVT in either lower extremity          Inez Gil RN  06/28/19 6531

## 2019-06-28 NOTE — ED NOTES
Patient stating she needs changed before xray. Unit clerk was told that patient needed changed @1150.  Will check back for xray

## 2019-06-28 NOTE — TELEPHONE ENCOUNTER
Spoke with one of patients home care nursed and she stated the patients O2 was 82-89 when she got there today. She gave the patient a breathing treatment and O2 was at 97%. Nurse instructed other staff to give treatments every 6 hours. Staff felt that the patient was doing worse and said they were going to the ED. I told to nurse the patient could go to the [facility] instead of the ED.

## 2019-06-28 NOTE — ED PROVIDER NOTES
Triage Chief Complaint:   Shortness of Breath (pt's caregiver called EMS due to shortness of breath; pt does not wear home oxygen; EMS reports patient's oxygen sat 93% on room air and placed on 2 LNC and improved to 97%)    [place]:  Zuleika Burnette is a 66 y.o. female that presents for evaluation for hypoxia and shortness of breath. Reportedly this patient has been feeling short of breath normally over the last day or 2. The patient has not been wearing home oxygen and was found to be in the low 90s. Was seen by her physician and recommended to come to the ER for hospitalization. She states that she feels some relief with nebulizer treatments. She says denied any chest pain. No abdominal pain. No nausea vomiting diarrhea constipation. No fevers or chills. No known sick contacts.   She does have a caretaker at bedside and is noticed that she is been more labored than normal.    ROS:  General:  No fevers, no chills, no weakness  Eyes:  No recent vison changes, no discharge  ENT:  No sore throat, no nasal congestion, no hearing changes  Cardiovascular:  No chest pain, no palpitations  Respiratory:  +shortness of breath, + cough, + wheezing  Gastrointestinal:  No pain, no nausea, no vomiting, no diarrhea  Musculoskeletal:  No muscle pain, no joint pain  Skin:  No rash, no pruritis, no easy bruising  Neurologic:  No speech problems, no headache, no extremity numbness, no extremity tingling, no extremity weakness  Psychiatric:  No anxiety  Genitourinary:  No dysuria, no hematuria  Endocrine:  No unexpected weight gain, no unexpected weight loss  Extremities:  no edema, no pain    Past Medical History:   Diagnosis Date    Anxiety disorder     Back pain, chronic     Cerebral palsy (HCC)     COPD (chronic obstructive pulmonary disease) (HCC)     CVA (cerebral infarction) 2014 x2    [facility]     Diverticulosis     Epilepsy ([facility]     GERD (gastroesophageal reflux disease)     Hyperlipidemia     Neck:  Trachea midline. Extremity:  No swelling. Normal ROM     Heart:  Regular rate and rhythm. Perfusion:  intact  Respiratory: Patient is tachypneic in mild respiratory distress with diffuse wheezes. Abdominal:  Normal bowel sounds. Soft. Nontender. Non distended. Back:  No CVA tenderness to palpation     Neurological:  Alert and oriented times 3. At baseline neurological function.   Patient does not move her lower extremities          Psychiatric:  Appropriate    I have reviewed and interpreted all of the currently available lab results from this visit (if applicable):  Results for orders placed or performed during the hospital encounter of 06/28/19   CBC auto diff   Result Value Ref Range    WBC 6.1 4.0 - 10.5 K/CU MM    RBC 3.75 (L) 4.2 - 5.4 M/CU MM    Hemoglobin 11.1 (L) 12.5 - 16.0 GM/DL    Hematocrit 36.7 (L) 37 - 47 %    MCV 97.9 78 - 100 FL    MCH 29.6 27 - 31 PG    MCHC 30.2 (L) 32.0 - 36.0 %    RDW 13.2 11.7 - 14.9 %    Platelets 163 821 - 573 K/CU MM    MPV 8.6 7.5 - 11.1 FL    Differential Type AUTOMATED DIFFERENTIAL     Segs Relative 58.0 36 - 66 %    Lymphocytes % 24.8 24 - 44 %    Monocytes % 12.9 (H) 0 - 4 %    Eosinophils % 1.8 0 - 3 %    Basophils % 0.7 0 - 1 %    Segs Absolute 3.6 K/CU MM    Lymphocytes # 1.5 K/CU MM    Monocytes # 0.8 K/CU MM    Eosinophils # 0.1 K/CU MM    Basophils # 0.0 K/CU MM    Nucleated RBC % 0.0 %    Total Nucleated RBC 0.0 K/CU MM    Total Immature Neutrophil 0.11 K/CU MM    Immature Neutrophil % 1.8 (H) 0 - 0.43 %   CMP   Result Value Ref Range    Sodium 143 135 - 145 MMOL/L    Potassium 4.0 3.5 - 5.1 MMOL/L    Chloride 104 99 - 110 mMol/L    CO2 27 21 - 32 MMOL/L    BUN 14 6 - 23 MG/DL    CREATININE 0.5 (L) 0.6 - 1.1 MG/DL    Glucose 184 (H) 70 - 99 MG/DL    Calcium 9.5 8.3 - 10.6 MG/DL    Alb 3.7 3.4 - 5.0 GM/DL    Total Protein 6.5 6.4 - 8.2 GM/DL    Total Bilirubin 0.1 0.0 - 1.0 MG/DL    ALT 11 10 - 40 U/L    AST 15 15 - 37 IU/L    Alkaline

## 2019-06-28 NOTE — ED NOTES
Pt checked for incontinence. Pt had soft semi formed bowel movement and incontinence of urine. Nirmala care provided. Depends changed and repositioned in bed. Transport team here to take patient. Pt clean and dry at time of departure from the ER.       Monet Arellano RN  06/28/19 8131

## 2019-06-28 NOTE — ED NOTES
Rai Cheema is an alert 66 y.o female that presents to ED via EMS from home due to increasing shortness of breath x 2 days. Per report of home health care aide, pt has had more difficulty breathing and noticed a drop in her oxygen sat levels. Pt's oxygen sat on EMS arrival was reportedly 93%. EMS placed patient on 2 L NC and oxygen sat improved to 97%. Pt does not normally wear home oxygen. Pt currently on 2 L NC and at 99%. Pt has labored breathing noted as evidenced by increased respiratory rate of 26 and accessory muscle use. Pt has a dry infrequent cough noted. Pt history of COPD. Is not a smoker. Pt history of CVA, chronic back pain, anxiety, diabetes, Hyperlipidemia, HTN, insomnia, mental disability, left hemiplegia, seizures, and cerebral palsy. Left upper extremity contracted. Pt is typically able to feed self but requires full assistance with other activities of daily living. Pt is bed/wheelchair bound.       Brielle Villegas RN  06/28/19 5409

## 2019-06-29 LAB
GLUCOSE BLD-MCNC: 195 MG/DL (ref 70–99)
GLUCOSE BLD-MCNC: 201 MG/DL (ref 70–99)
GLUCOSE BLD-MCNC: 209 MG/DL (ref 70–99)
GLUCOSE BLD-MCNC: 215 MG/DL (ref 70–99)
GLUCOSE BLD-MCNC: 258 MG/DL (ref 70–99)

## 2019-06-29 PROCEDURE — 6360000002 HC RX W HCPCS: Performed by: HOSPITALIST

## 2019-06-29 PROCEDURE — 87633 RESP VIRUS 12-25 TARGETS: CPT

## 2019-06-29 PROCEDURE — 87581 M.PNEUMON DNA AMP PROBE: CPT

## 2019-06-29 PROCEDURE — G0378 HOSPITAL OBSERVATION PER HR: HCPCS

## 2019-06-29 PROCEDURE — 96366 THER/PROPH/DIAG IV INF ADDON: CPT

## 2019-06-29 PROCEDURE — 6370000000 HC RX 637 (ALT 250 FOR IP): Performed by: HOSPITALIST

## 2019-06-29 PROCEDURE — 1200000000 HC SEMI PRIVATE

## 2019-06-29 PROCEDURE — 2700000000 HC OXYGEN THERAPY PER DAY

## 2019-06-29 PROCEDURE — 82962 GLUCOSE BLOOD TEST: CPT

## 2019-06-29 PROCEDURE — 2580000003 HC RX 258: Performed by: HOSPITALIST

## 2019-06-29 PROCEDURE — 96376 TX/PRO/DX INJ SAME DRUG ADON: CPT

## 2019-06-29 PROCEDURE — 87798 DETECT AGENT NOS DNA AMP: CPT

## 2019-06-29 PROCEDURE — 87486 CHLMYD PNEUM DNA AMP PROBE: CPT

## 2019-06-29 PROCEDURE — 94761 N-INVAS EAR/PLS OXIMETRY MLT: CPT

## 2019-06-29 PROCEDURE — 94640 AIRWAY INHALATION TREATMENT: CPT

## 2019-06-29 PROCEDURE — 96372 THER/PROPH/DIAG INJ SC/IM: CPT

## 2019-06-29 RX ADMIN — AZITHROMYCIN MONOHYDRATE 500 MG: 500 INJECTION, POWDER, LYOPHILIZED, FOR SOLUTION INTRAVENOUS at 09:41

## 2019-06-29 RX ADMIN — INSULIN LISPRO 2 UNITS: 100 INJECTION, SOLUTION INTRAVENOUS; SUBCUTANEOUS at 17:56

## 2019-06-29 RX ADMIN — BUSPIRONE HYDROCHLORIDE 10 MG: 10 TABLET ORAL at 08:29

## 2019-06-29 RX ADMIN — SODIUM CHLORIDE, PRESERVATIVE FREE 10 ML: 5 INJECTION INTRAVENOUS at 12:50

## 2019-06-29 RX ADMIN — INSULIN LISPRO 3 UNITS: 100 INJECTION, SOLUTION INTRAVENOUS; SUBCUTANEOUS at 12:52

## 2019-06-29 RX ADMIN — CARBAMAZEPINE 300 MG: 300 CAPSULE, EXTENDED RELEASE ORAL at 08:29

## 2019-06-29 RX ADMIN — METHYLPREDNISOLONE SODIUM SUCCINATE 40 MG: 40 INJECTION, POWDER, LYOPHILIZED, FOR SOLUTION INTRAMUSCULAR; INTRAVENOUS at 12:50

## 2019-06-29 RX ADMIN — METOPROLOL TARTRATE 25 MG: 25 TABLET ORAL at 08:29

## 2019-06-29 RX ADMIN — METHYLPREDNISOLONE SODIUM SUCCINATE 40 MG: 40 INJECTION, POWDER, LYOPHILIZED, FOR SOLUTION INTRAMUSCULAR; INTRAVENOUS at 05:49

## 2019-06-29 RX ADMIN — PANTOPRAZOLE SODIUM 40 MG: 40 TABLET, DELAYED RELEASE ORAL at 05:49

## 2019-06-29 RX ADMIN — INSULIN LISPRO 1 UNITS: 100 INJECTION, SOLUTION INTRAVENOUS; SUBCUTANEOUS at 22:31

## 2019-06-29 RX ADMIN — Medication 1 CAPSULE: at 08:29

## 2019-06-29 RX ADMIN — IPRATROPIUM BROMIDE AND ALBUTEROL SULFATE 1 AMPULE: .5; 3 SOLUTION RESPIRATORY (INHALATION) at 12:00

## 2019-06-29 RX ADMIN — INSULIN GLARGINE 5 UNITS: 100 INJECTION, SOLUTION SUBCUTANEOUS at 22:31

## 2019-06-29 RX ADMIN — IPRATROPIUM BROMIDE AND ALBUTEROL SULFATE 1 AMPULE: .5; 3 SOLUTION RESPIRATORY (INHALATION) at 07:56

## 2019-06-29 RX ADMIN — ATORVASTATIN CALCIUM 40 MG: 40 TABLET, FILM COATED ORAL at 22:31

## 2019-06-29 RX ADMIN — CEFTRIAXONE 1 G: 1 INJECTION, POWDER, FOR SOLUTION INTRAMUSCULAR; INTRAVENOUS at 08:28

## 2019-06-29 RX ADMIN — LEVOTHYROXINE SODIUM 50 MCG: 50 TABLET ORAL at 05:49

## 2019-06-29 RX ADMIN — FUROSEMIDE 20 MG: 20 TABLET ORAL at 08:29

## 2019-06-29 RX ADMIN — RISPERIDONE 1 MG: 0.5 TABLET, FILM COATED ORAL at 22:33

## 2019-06-29 RX ADMIN — BUSPIRONE HYDROCHLORIDE 10 MG: 10 TABLET ORAL at 22:33

## 2019-06-29 RX ADMIN — DIVALPROEX SODIUM 1500 MG: 500 TABLET, FILM COATED, EXTENDED RELEASE ORAL at 22:33

## 2019-06-29 RX ADMIN — DOCUSATE SODIUM 100 MG: 100 CAPSULE, LIQUID FILLED ORAL at 08:29

## 2019-06-29 RX ADMIN — ENOXAPARIN SODIUM 40 MG: 100 INJECTION SUBCUTANEOUS at 08:29

## 2019-06-29 RX ADMIN — SERTRALINE HYDROCHLORIDE 200 MG: 100 TABLET ORAL at 22:33

## 2019-06-29 RX ADMIN — INSULIN LISPRO 2 UNITS: 100 INJECTION, SOLUTION INTRAVENOUS; SUBCUTANEOUS at 08:32

## 2019-06-29 RX ADMIN — LEVETIRACETAM 1500 MG: 500 TABLET ORAL at 08:28

## 2019-06-29 RX ADMIN — SODIUM CHLORIDE, PRESERVATIVE FREE 10 ML: 5 INJECTION INTRAVENOUS at 08:31

## 2019-06-29 RX ADMIN — RISPERIDONE 1 MG: 0.5 TABLET, FILM COATED ORAL at 12:50

## 2019-06-29 RX ADMIN — METHYLPREDNISOLONE SODIUM SUCCINATE 40 MG: 40 INJECTION, POWDER, LYOPHILIZED, FOR SOLUTION INTRAMUSCULAR; INTRAVENOUS at 17:56

## 2019-06-29 RX ADMIN — IPRATROPIUM BROMIDE AND ALBUTEROL SULFATE 1 AMPULE: .5; 3 SOLUTION RESPIRATORY (INHALATION) at 15:46

## 2019-06-29 RX ADMIN — ALBUTEROL SULFATE 2.5 MG: 2.5 SOLUTION RESPIRATORY (INHALATION) at 07:58

## 2019-06-29 RX ADMIN — CARBAMAZEPINE 300 MG: 300 CAPSULE, EXTENDED RELEASE ORAL at 22:32

## 2019-06-29 RX ADMIN — RISPERIDONE 1 MG: 0.5 TABLET, FILM COATED ORAL at 08:29

## 2019-06-29 RX ADMIN — DOCUSATE SODIUM 100 MG: 100 CAPSULE, LIQUID FILLED ORAL at 22:34

## 2019-06-29 RX ADMIN — METHYLPREDNISOLONE SODIUM SUCCINATE 40 MG: 40 INJECTION, POWDER, LYOPHILIZED, FOR SOLUTION INTRAMUSCULAR; INTRAVENOUS at 22:31

## 2019-06-29 RX ADMIN — IPRATROPIUM BROMIDE AND ALBUTEROL SULFATE 1 AMPULE: .5; 3 SOLUTION RESPIRATORY (INHALATION) at 22:15

## 2019-06-29 RX ADMIN — SODIUM CHLORIDE, PRESERVATIVE FREE 10 ML: 5 INJECTION INTRAVENOUS at 22:31

## 2019-06-29 RX ADMIN — LEVETIRACETAM 1500 MG: 500 TABLET ORAL at 22:33

## 2019-06-29 ASSESSMENT — PAIN SCALES - WONG BAKER: WONGBAKER_NUMERICALRESPONSE: 0

## 2019-06-29 ASSESSMENT — PAIN SCALES - GENERAL
PAINLEVEL_OUTOF10: 0
PAINLEVEL_OUTOF10: 0

## 2019-06-29 NOTE — PROGRESS NOTES
Hospitalist Progress Note      Name:  Neema Nash /Age/Sex: 1960  (66 y.o. female)   MRN & CSN:  [id number] & [id number] Admission Date/Time: 2019 11:32 AM   Location:  [address] [place] [address] PCP: Ernesto Evans MD         Hospital Day: 2    Assessment and Plan:   Neema Nash is a 66 y.o.  female  who presents with Acute respiratory failure with hypoxia ([address]    > acute resp failure,   > COPD exacerbation.   - duonebs, steroids, IV rocephin, zithromax, oxygen  - consult pulm,  resp panel, strep AG pending,   - concern about aspiration, consulted SLP     >Type 2 DM:   - Hold oral hypoglycemic agents for now  - cover with lantus, SSI     Seizure Disorder   Hypertension:   Hypothyroidism   MRDD, Severe  Cerebral palsy  H/o SDH  H/o CVA  Left Hemiplegia      Continue home meds. Diet DIET CARB CONTROL;   DVT Prophylaxis ? Lovenox,    Code Status Full Code   Disposition  pending clinical improvement     History of Present Illness:     Pt S&E. Improving dyspnea and cough, no abd pain, no chest pain, no F/C,     10-14 point ROS reviewed negative, unless as noted above    Objective: Intake/Output Summary (Last 24 hours) at 2019 0902  Last data filed at 2019 1913  Gross per 24 hour   Intake 240 ml   Output 3 ml   Net 237 ml      Vitals:   Vitals:    19 0501   BP: (!) 113/56   Pulse: 76   Resp: 18   Temp: 97.4 °F (36.3 °C)   SpO2: 96%     Physical Exam:      GEN    Awake female, sitting upright in bed in no apparent distress. Appears given age. NECK  No apparent thyromegaly or masses. RESP  Decreased air sounds, wheezes. Symmetric chest movement . CARDIO/VASC           S1/S2 auscultated. Regular rate . GI        Abdomen is soft without significant tenderness, or guarding. Bowel sounds are normoactive. MSK/Neuro/Psych      Awake, alert, oriented, Left Hemiplegia, muscle atrophy, cerebral palsy, MRDD, impaired speech, Affect appropriate.   SKIN    Normal coloration, warm,

## 2019-06-30 VITALS
HEART RATE: 83 BPM | RESPIRATION RATE: 17 BRPM | TEMPERATURE: 97.7 F | HEIGHT: 58 IN | OXYGEN SATURATION: 93 % | SYSTOLIC BLOOD PRESSURE: 92 MMHG | BODY MASS INDEX: 28.25 KG/M2 | WEIGHT: 134.6 LBS | DIASTOLIC BLOOD PRESSURE: 48 MMHG

## 2019-06-30 LAB
ADENOVIRUS DETECTION BY PCR: NOT DETECTED
BORDETELLA PERTUSSIS PCR: NOT DETECTED
CHLAMYDOPHILA PNEUMONIA PCR: NOT DETECTED
CORONAVIRUS 229E PCR: NOT DETECTED
CORONAVIRUS HKU1 PCR: NOT DETECTED
CORONAVIRUS NL63 PCR: NOT DETECTED
CORONAVIRUS OC43 PCR: NOT DETECTED
GLUCOSE BLD-MCNC: 172 MG/DL (ref 70–99)
GLUCOSE BLD-MCNC: 242 MG/DL (ref 70–99)
HUMAN METAPNEUMOVIRUS PCR: NOT DETECTED
INFLUENZA A BY PCR: NOT DETECTED
INFLUENZA A H1 (2009) PCR: NOT DETECTED
INFLUENZA A H1 PANDEMIC PCR: NOT DETECTED
INFLUENZA A H3 PCR: NOT DETECTED
INFLUENZA B BY PCR: NOT DETECTED
MYCOPLASMA PNEUMONIAE PCR: NOT DETECTED
PARAINFLUENZA 1 PCR: NOT DETECTED
PARAINFLUENZA 2 PCR: NOT DETECTED
PARAINFLUENZA 3 PCR: NOT DETECTED
PARAINFLUENZA 4 PCR: NOT DETECTED
RHINOVIRUS ENTEROVIRUS PCR: ABNORMAL
RSV PCR: NOT DETECTED

## 2019-06-30 PROCEDURE — 94761 N-INVAS EAR/PLS OXIMETRY MLT: CPT

## 2019-06-30 PROCEDURE — 6370000000 HC RX 637 (ALT 250 FOR IP): Performed by: HOSPITALIST

## 2019-06-30 PROCEDURE — 2700000000 HC OXYGEN THERAPY PER DAY

## 2019-06-30 PROCEDURE — 96372 THER/PROPH/DIAG INJ SC/IM: CPT

## 2019-06-30 PROCEDURE — 96376 TX/PRO/DX INJ SAME DRUG ADON: CPT

## 2019-06-30 PROCEDURE — 82962 GLUCOSE BLOOD TEST: CPT

## 2019-06-30 PROCEDURE — 94640 AIRWAY INHALATION TREATMENT: CPT

## 2019-06-30 PROCEDURE — G0378 HOSPITAL OBSERVATION PER HR: HCPCS

## 2019-06-30 PROCEDURE — 6360000002 HC RX W HCPCS: Performed by: HOSPITALIST

## 2019-06-30 PROCEDURE — 2580000003 HC RX 258: Performed by: HOSPITALIST

## 2019-06-30 RX ORDER — PREDNISONE 20 MG/1
TABLET ORAL
Qty: 18 TABLET | Refills: 0 | Status: SHIPPED | OUTPATIENT
Start: 2019-06-30 | End: 2019-07-08

## 2019-06-30 RX ORDER — CEFDINIR 300 MG/1
300 CAPSULE ORAL 2 TIMES DAILY
Qty: 10 CAPSULE | Refills: 0 | Status: SHIPPED | OUTPATIENT
Start: 2019-06-30 | End: 2019-07-05

## 2019-06-30 RX ORDER — AZITHROMYCIN 250 MG/1
250 TABLET, FILM COATED ORAL DAILY
Qty: 3 TABLET | Refills: 0 | Status: SHIPPED | OUTPATIENT
Start: 2019-06-30 | End: 2019-07-03

## 2019-06-30 RX ADMIN — INSULIN LISPRO 1 UNITS: 100 INJECTION, SOLUTION INTRAVENOUS; SUBCUTANEOUS at 09:13

## 2019-06-30 RX ADMIN — METOPROLOL TARTRATE 25 MG: 25 TABLET ORAL at 09:10

## 2019-06-30 RX ADMIN — RISPERIDONE 1 MG: 0.5 TABLET, FILM COATED ORAL at 14:31

## 2019-06-30 RX ADMIN — METHYLPREDNISOLONE SODIUM SUCCINATE 40 MG: 40 INJECTION, POWDER, LYOPHILIZED, FOR SOLUTION INTRAMUSCULAR; INTRAVENOUS at 12:45

## 2019-06-30 RX ADMIN — ENOXAPARIN SODIUM 40 MG: 100 INJECTION SUBCUTANEOUS at 09:10

## 2019-06-30 RX ADMIN — LEVETIRACETAM 1500 MG: 500 TABLET ORAL at 09:10

## 2019-06-30 RX ADMIN — LEVOTHYROXINE SODIUM 50 MCG: 50 TABLET ORAL at 06:12

## 2019-06-30 RX ADMIN — BUSPIRONE HYDROCHLORIDE 10 MG: 10 TABLET ORAL at 09:10

## 2019-06-30 RX ADMIN — Medication 1 CAPSULE: at 09:10

## 2019-06-30 RX ADMIN — CARBAMAZEPINE 300 MG: 300 CAPSULE, EXTENDED RELEASE ORAL at 09:21

## 2019-06-30 RX ADMIN — IPRATROPIUM BROMIDE AND ALBUTEROL SULFATE 1 AMPULE: .5; 3 SOLUTION RESPIRATORY (INHALATION) at 08:15

## 2019-06-30 RX ADMIN — DOCUSATE SODIUM 100 MG: 100 CAPSULE, LIQUID FILLED ORAL at 09:10

## 2019-06-30 RX ADMIN — METHYLPREDNISOLONE SODIUM SUCCINATE 40 MG: 40 INJECTION, POWDER, LYOPHILIZED, FOR SOLUTION INTRAMUSCULAR; INTRAVENOUS at 05:33

## 2019-06-30 RX ADMIN — CEFTRIAXONE 1 G: 1 INJECTION, POWDER, FOR SOLUTION INTRAMUSCULAR; INTRAVENOUS at 09:12

## 2019-06-30 RX ADMIN — INSULIN LISPRO 2 UNITS: 100 INJECTION, SOLUTION INTRAVENOUS; SUBCUTANEOUS at 12:41

## 2019-06-30 RX ADMIN — AZITHROMYCIN MONOHYDRATE 500 MG: 500 INJECTION, POWDER, LYOPHILIZED, FOR SOLUTION INTRAVENOUS at 09:48

## 2019-06-30 RX ADMIN — RISPERIDONE 1 MG: 0.5 TABLET, FILM COATED ORAL at 09:10

## 2019-06-30 RX ADMIN — SODIUM CHLORIDE, PRESERVATIVE FREE 10 ML: 5 INJECTION INTRAVENOUS at 09:12

## 2019-06-30 RX ADMIN — PANTOPRAZOLE SODIUM 40 MG: 40 TABLET, DELAYED RELEASE ORAL at 06:13

## 2019-06-30 RX ADMIN — FUROSEMIDE 20 MG: 20 TABLET ORAL at 09:10

## 2019-06-30 RX ADMIN — IPRATROPIUM BROMIDE AND ALBUTEROL SULFATE 1 AMPULE: .5; 3 SOLUTION RESPIRATORY (INHALATION) at 11:32

## 2019-06-30 ASSESSMENT — PAIN SCALES - GENERAL: PAINLEVEL_OUTOF10: 0

## 2019-06-30 NOTE — DISCHARGE SUMMARY
Discharge Summary    Name:  Michelle Wills /Age/Sex: 1960  (66 y.o. female)   MRN & CSN:  [id number] & [id number] Admission Date/Time: 2019 11:32 AM   Attending:  Morris Danielle MD Discharging Physician: Jad Meadows MD     Hospital Course:   Michelle Wills is a 66 y.o.  female  who presents with Acute respiratory failure with hypoxia ([address]    > acute resp failure,   > COPD exacerbation.   - duonebs, steroids, IV rocephin, zithromax, oxygen  - consult pulm,  resp panel positive for rhinovirus  - concern about aspiration, consulted SLP  - sx and clinically improved and was discharged home with [address] in a stable condition.      >Type 2 DM:   - Hold oral hypoglycemic agents for now  - cover with lantus, SSI     Seizure Disorder   Hypertension:   Hypothyroidism   MRDD, Severe  Cerebral palsy  H/o SDH  H/o CVA  Left Hemiplegia      Continue home meds.     The patient expressed appropriate understanding of and agreement with the discharge recommendations, medications, and plan. Consults this admission:  IP CONSULT TO HOSPITALIST  IP CONSULT TO PULMONOLOGY  IP CONSULT TO HOME CARE NEEDS    Discharge Instruction:   Follow up appointments:     See AVS    Disposition: Discharged to:   ?Home, ? [facility]  Condition on discharge: Stable    Discharge Medications:      [address] Medication Instructions GTD:[id number]    Printed on:19 4234   Medication Information                      albuterol-ipratropium (COMBIVENT RESPIMAT)  MCG/ACT AERS inhaler  Inhale 1 puff into the lungs every 6 hours             aspirin 325 MG tablet  Take 325 mg by mouth every 4 hours as needed for Pain             atorvastatin (LIPITOR) 40 MG tablet  TAKE 1 TABLET BY MOUTH DAILY             azithromycin (ZITHROMAX) 250 MG tablet  Take 1 tablet by mouth daily for 3 days             benzonatate (TESSALON) 100 MG capsule  Take 100 mg by mouth 3 times daily as needed for Cough             blood glucose monitor strips  Test to ED via EMS from home due to increasing shortness of breath x 2 days. Per report of home health care aide, pt has had more difficulty breathing and noticed a drop in her oxygen sat levels. Pt's oxygen sat on EMS arrival was reportedly 93%. EMS placed patient on 2 L NC and oxygen sat improved to 97%. Pt does not normally wear home oxygen FINDINGS: The lung volumes are low. The heart size is normal.  There is no pneumothorax, edema or focal consolidation. The bony thorax is grossly intact. There is mild degenerative change of the spine. Aortic calcifications are noted. Low lung volumes. Otherwise, no evidence of acute cardiopulmonary disease. Xr Chest Standard (2 Vw)    Result Date: 6/27/2019  EXAMINATION: TWO XRAY VIEWS OF THE CHEST 6/27/2019 9:22 am COMPARISON: 12/21/2018. HISTORY: ORDERING SYSTEM PROVIDED HISTORY: cough TECHNOLOGIST PROVIDED HISTORY: Reason for exam:->cough Ordering Physician Provided Reason for Exam: cough Acuity: Acute Type of Exam: Initial Additional signs and symptoms: Pt to ED with complaints of a sore throat and a fever since yesterday. FINDINGS: The cardiac silhouette appears unchanged in size from the prior exam. Minimal prominence of the pulmonary vasculature also appears unchanged. No focal consolidation. No pleural effusion or pneumothorax. No acute osseous abnormality. 1. Overall, no significant change in the appearance of the chest. 2. Mild prominence of the pulmonary vasculature bilaterally. 3. No focal consolidation.      Vl Dup Lower Extremity Venous Bilateral    Result Date: 6/28/2019  EXAMINATION: DUPLEX VENOUS ULTRASOUND OF THE BILATERAL LOWER EXTREMITIES, 6/28/2019 1:24 pm TECHNIQUE: Duplex ultrasound and Doppler images were obtained of the bilateral lower extremities COMPARISON: 08/17/2013 HISTORY: ORDERING SYSTEM PROVIDED HISTORY: r/o dvt TECHNOLOGIST PROVIDED HISTORY: Reason for exam:->r/o dvt Ordering Physician Provided Reason for Exam: SOB, leg pain Acuity: Unknown Type of Exam: Initial FINDINGS: The visualized veins of the bilateral lower extremities are patent and free of echogenic thrombus. The veins are normally compressible and have normal phasic flow. No evidence of DVT in either lower extremity.        Discharge Time of 35 minutes    Electronically signed by Capri Ho MD on 6/30/2019 at 9:33 AM

## 2019-06-30 NOTE — FLOWSHEET NOTE
Kevni Holcomb from [facility] gave consent for pt to be discharged today. Kevin Hurst also stated that the hospital does not need to get consent to discharge pts. The hospital only has to get consent to treat.

## 2019-07-01 ENCOUNTER — TELEPHONE (OUTPATIENT)
Dept: FAMILY MEDICINE CLINIC | Age: 59
End: 2019-07-01

## 2019-07-01 LAB
EKG ATRIAL RATE: 81 BPM
EKG DIAGNOSIS: NORMAL
EKG P AXIS: 43 DEGREES
EKG P-R INTERVAL: 150 MS
EKG Q-T INTERVAL: 368 MS
EKG QRS DURATION: 82 MS
EKG QTC CALCULATION (BAZETT): 427 MS
EKG R AXIS: 36 DEGREES
EKG T AXIS: 33 DEGREES
EKG VENTRICULAR RATE: 81 BPM

## 2019-07-02 ENCOUNTER — APPOINTMENT (OUTPATIENT)
Dept: GENERAL RADIOLOGY | Age: 59
End: 2019-07-02
Payer: MEDICARE

## 2019-07-02 ENCOUNTER — HOSPITAL ENCOUNTER (EMERGENCY)
Age: 59
Discharge: HOME | End: 2019-07-02
Attending: EMERGENCY MEDICINE
Payer: MEDICARE

## 2019-07-02 ENCOUNTER — APPOINTMENT (OUTPATIENT)
Dept: CT IMAGING | Age: 59
End: 2019-07-02
Payer: MEDICARE

## 2019-07-02 ENCOUNTER — TELEPHONE (OUTPATIENT)
Dept: FAMILY MEDICINE CLINIC | Age: 59
End: 2019-07-02

## 2019-07-02 VITALS
BODY MASS INDEX: 28.13 KG/M2 | WEIGHT: 134 LBS | TEMPERATURE: 98.2 F | RESPIRATION RATE: 16 BRPM | SYSTOLIC BLOOD PRESSURE: 132 MMHG | DIASTOLIC BLOOD PRESSURE: 72 MMHG | HEIGHT: 58 IN | HEART RATE: 86 BPM | OXYGEN SATURATION: 94 %

## 2019-07-02 DIAGNOSIS — M54.2 NECK PAIN: ICD-10-CM

## 2019-07-02 DIAGNOSIS — J02.9 SORE THROAT: ICD-10-CM
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
## 2019-07-02 DIAGNOSIS — R52 BODY ACHES: ICD-10-CM

## 2019-07-02 DIAGNOSIS — M54.9 ACUTE MIDLINE BACK PAIN, UNSPECIFIED BACK LOCATION: ICD-10-CM

## 2019-07-02 LAB
ALBUMIN SERPL-MCNC: 4 GM/DL (ref 3.4–5)
ALP BLD-CCNC: 84 IU/L (ref 40–128)
ALT SERPL-CCNC: 10 U/L (ref 10–40)
ANION GAP SERPL CALCULATED.3IONS-SCNC: 16 MMOL/L (ref 4–16)
AST SERPL-CCNC: 8 IU/L (ref 15–37)
BACTERIA: NEGATIVE /HPF
BASOPHILS ABSOLUTE: 0 K/CU MM
BASOPHILS RELATIVE PERCENT: 0.5 % (ref 0–1)
BILIRUB SERPL-MCNC: 0.2 MG/DL (ref 0–1)
BILIRUBIN URINE: NEGATIVE MG/DL
BLOOD, URINE: NEGATIVE
BUN BLDV-MCNC: 15 MG/DL (ref 6–23)
CALCIUM SERPL-MCNC: 9.1 MG/DL (ref 8.3–10.6)
CHLORIDE BLD-SCNC: 98 MMOL/L (ref 99–110)
CLARITY: CLEAR
CO2: 27 MMOL/L (ref 21–32)
COLOR: YELLOW
CREAT SERPL-MCNC: 0.5 MG/DL (ref 0.6–1.1)
DIFFERENTIAL TYPE: ABNORMAL
EOSINOPHILS ABSOLUTE: 0 K/CU MM
EOSINOPHILS RELATIVE PERCENT: 0.2 % (ref 0–3)
GFR AFRICAN AMERICAN: >60 ML/MIN/1.73M2
GFR NON-AFRICAN AMERICAN: >60 ML/MIN/1.73M2
GLUCOSE BLD-MCNC: 166 MG/DL (ref 70–99)
GLUCOSE, URINE: NEGATIVE MG/DL
HCT VFR BLD CALC: 38.5 % (ref 37–47)
HEMOGLOBIN: 12.1 GM/DL (ref 12.5–16)
IMMATURE NEUTROPHIL %: 2.2 % (ref 0–0.43)
KETONES, URINE: ABNORMAL MG/DL
LEUKOCYTE ESTERASE, URINE: NEGATIVE
LYMPHOCYTES ABSOLUTE: 1.2 K/CU MM
LYMPHOCYTES RELATIVE PERCENT: 19.4 % (ref 24–44)
MCH RBC QN AUTO: 29.5 PG (ref 27–31)
MCHC RBC AUTO-ENTMCNC: 31.4 % (ref 32–36)
MCV RBC AUTO: 93.9 FL (ref 78–100)
MONOCYTES ABSOLUTE: 0.3 K/CU MM
MONOCYTES RELATIVE PERCENT: 4 % (ref 0–4)
NITRITE URINE, QUANTITATIVE: NEGATIVE
NUCLEATED RBC %: 0 %
PDW BLD-RTO: 13.2 % (ref 11.7–14.9)
PH, URINE: 8 (ref 5–8)
PLATELET # BLD: 254 K/CU MM (ref 140–440)
PMV BLD AUTO: 8.6 FL (ref 7.5–11.1)
POTASSIUM SERPL-SCNC: 4.1 MMOL/L (ref 3.5–5.1)
PROTEIN UA: NEGATIVE MG/DL
RBC # BLD: 4.1 M/CU MM (ref 4.2–5.4)
RBC URINE: <1 /HPF (ref 0–6)
SEGMENTED NEUTROPHILS ABSOLUTE COUNT: 4.6 K/CU MM
SEGMENTED NEUTROPHILS RELATIVE PERCENT: 73.7 % (ref 36–66)
SODIUM BLD-SCNC: 141 MMOL/L (ref 135–145)
SPECIFIC GRAVITY UA: 1.02 (ref 1–1.03)
SQUAMOUS EPITHELIAL: <1 /HPF
TOTAL CK: 28 IU/L (ref 26–140)
TOTAL IMMATURE NEUTOROPHIL: 0.14 K/CU MM
TOTAL NUCLEATED RBC: 0 K/CU MM
TOTAL PROTEIN: 6.7 GM/DL (ref 6.4–8.2)
TRICHOMONAS: ABNORMAL /HPF
UROBILINOGEN, URINE: NORMAL MG/DL (ref 0.2–1)
WBC # BLD: 6.2 K/CU MM (ref 4–10.5)
WBC UA: <1 /HPF (ref 0–5)

## 2019-07-02 PROCEDURE — 72131 CT LUMBAR SPINE W/O DYE: CPT

## 2019-07-02 PROCEDURE — 80053 COMPREHEN METABOLIC PANEL: CPT

## 2019-07-02 PROCEDURE — 87081 CULTURE SCREEN ONLY: CPT

## 2019-07-02 PROCEDURE — 82550 ASSAY OF CK (CPK): CPT

## 2019-07-02 PROCEDURE — 71045 X-RAY EXAM CHEST 1 VIEW: CPT

## 2019-07-02 PROCEDURE — 87430 STREP A AG IA: CPT

## 2019-07-02 PROCEDURE — 72128 CT CHEST SPINE W/O DYE: CPT

## 2019-07-02 PROCEDURE — 36415 COLL VENOUS BLD VENIPUNCTURE: CPT

## 2019-07-02 PROCEDURE — 99285 EMERGENCY DEPT VISIT HI MDM: CPT

## 2019-07-02 PROCEDURE — 93005 ELECTROCARDIOGRAM TRACING: CPT | Performed by: PHYSICIAN ASSISTANT

## 2019-07-02 PROCEDURE — 72125 CT NECK SPINE W/O DYE: CPT

## 2019-07-02 PROCEDURE — 81001 URINALYSIS AUTO W/SCOPE: CPT

## 2019-07-02 PROCEDURE — 85025 COMPLETE CBC W/AUTO DIFF WBC: CPT

## 2019-07-02 PROCEDURE — 93010 ELECTROCARDIOGRAM REPORT: CPT | Performed by: INTERNAL MEDICINE

## 2019-07-02 ASSESSMENT — PAIN SCALES - GENERAL: PAINLEVEL_OUTOF10: 3

## 2019-07-02 NOTE — ED PROVIDER NOTES
Past Surgical History:   Procedure Laterality Date    GASTROSTOMY TUBE PLACEMENT         CURRENT MEDICATIONS    Current Outpatient Rx   Medication Sig Dispense Refill    azithromycin (ZITHROMAX) 250 MG tablet Take 1 tablet by mouth daily for 3 days 3 tablet 0    predniSONE (DELTASONE) 20 MG tablet 30mg daily x 3 d, then 20mg daily x 3d, then 10mg daily x 3. 18 tablet 0    cefdinir (OMNICEF) 300 MG capsule Take 1 capsule by mouth 2 times daily for 5 days 10 capsule 0    mirtazapine (REMERON) 30 MG tablet TAKE 1 TABLET BY MOUTH NIGHTLY 31 tablet 5    atorvastatin (LIPITOR) 40 MG tablet TAKE 1 TABLET BY MOUTH DAILY 31 tablet 5    cetirizine (ZYRTEC) 10 MG tablet TAKE 1 TABLET BY MOUTH DAILY (BED) 31 tablet 5    Ondansetron HCl (ZOFRAN PO) Take 1 tablet by mouth every 8 hours Indications: per facility med list      docusate sodium (COLACE) 100 MG capsule Take 100 mg by mouth 2 times daily      naproxen (NAPROSYN) 250 MG tablet Take 250 mg by mouth 2 times daily (with meals)      diphenhydrAMINE (BENADRYL) 25 MG capsule Take 25 mg by mouth every 6 hours as needed for Itching      SUMAtriptan (IMITREX) 50 MG tablet Take 50 mg by mouth once as needed for Migraine      benzonatate (TESSALON) 100 MG capsule Take 100 mg by mouth 3 times daily as needed for Cough      aspirin 325 MG tablet Take 325 mg by mouth every 4 hours as needed for Pain      metoprolol tartrate (LOPRESSOR) 25 MG tablet Take 25 mg by mouth 2 times daily      ipratropium-albuterol (DUONEB) 0.5-2.5 (3) MG/3ML SOLN nebulizer solution Inhale 1 vial into the lungs every 6 hours as needed for Shortness of Breath      pantoprazole (PROTONIX) 40 MG tablet Take 1 tablet by mouth every morning (before breakfast) 30 tablet 5    albuterol-ipratropium (COMBIVENT RESPIMAT)  MCG/ACT AERS inhaler Inhale 1 puff into the lungs every 6 hours 1 Inhaler 5    lisinopril (PRINIVIL;ZESTRIL) 40 MG tablet TAKE 1 TABLET BY MOUTH DAILY 31 tablet 5    to right  Psych: Pleasant affect, no hallucinations        LABS:  Results for orders placed or performed during the hospital encounter of 07/02/19   Strep Screen Group A Throat   Result Value Ref Range    Specimen THROAT     Special Requests NONE     Strep A Direct Screen NEGATIVE    CBC with Auto Diff   Result Value Ref Range    WBC 6.2 4.0 - 10.5 K/CU MM    RBC 4.10 (L) 4.2 - 5.4 M/CU MM    Hemoglobin 12.1 (L) 12.5 - 16.0 GM/DL    Hematocrit 38.5 37 - 47 %    MCV 93.9 78 - 100 FL    MCH 29.5 27 - 31 PG    MCHC 31.4 (L) 32.0 - 36.0 %    RDW 13.2 11.7 - 14.9 %    Platelets 818 664 - 670 K/CU MM    MPV 8.6 7.5 - 11.1 FL    Differential Type AUTOMATED DIFFERENTIAL     Segs Relative 73.7 (H) 36 - 66 %    Lymphocytes % 19.4 (L) 24 - 44 %    Monocytes % 4.0 0 - 4 %    Eosinophils % 0.2 0 - 3 %    Basophils % 0.5 0 - 1 %    Segs Absolute 4.6 K/CU MM    Lymphocytes # 1.2 K/CU MM    Monocytes # 0.3 K/CU MM    Eosinophils # 0.0 K/CU MM    Basophils # 0.0 K/CU MM    Nucleated RBC % 0.0 %    Total Nucleated RBC 0.0 K/CU MM    Total Immature Neutrophil 0.14 K/CU MM    Immature Neutrophil % 2.2 (H) 0 - 0.43 %   Comprehensive Metabolic Panel   Result Value Ref Range    Sodium 141 135 - 145 MMOL/L    Potassium 4.1 3.5 - 5.1 MMOL/L    Chloride 98 (L) 99 - 110 mMol/L    CO2 27 21 - 32 MMOL/L    BUN 15 6 - 23 MG/DL    CREATININE 0.5 (L) 0.6 - 1.1 MG/DL    Glucose 166 (H) 70 - 99 MG/DL    Calcium 9.1 8.3 - 10.6 MG/DL    Alb 4.0 3.4 - 5.0 GM/DL    Total Protein 6.7 6.4 - 8.2 GM/DL    Total Bilirubin 0.2 0.0 - 1.0 MG/DL    ALT 10 10 - 40 U/L    AST 8 (L) 15 - 37 IU/L    Alkaline Phosphatase 84 40 - 128 IU/L    GFR Non-African American >60 >60 mL/min/1.73m2    GFR African American >60 >60 mL/min/1.73m2    Anion Gap 16 4 - 16   CK   Result Value Ref Range    Total CK 28 26 - 140 IU/L         RADIOLOGY   XR CHEST PORTABLE   Final Result   No acute process.          CT Thoracic Spine WO Contrast   Final Result   No acute abnormality in the

## 2019-07-03 LAB
EKG ATRIAL RATE: 77 BPM
EKG DIAGNOSIS: NORMAL
EKG P AXIS: 45 DEGREES
EKG P-R INTERVAL: 146 MS
EKG Q-T INTERVAL: 388 MS
EKG QRS DURATION: 74 MS
EKG QTC CALCULATION (BAZETT): 439 MS
EKG R AXIS: 23 DEGREES
EKG T AXIS: 33 DEGREES
EKG VENTRICULAR RATE: 77 BPM

## 2019-07-03 RX ORDER — CARBAMAZEPINE 300 MG/1
CAPSULE, EXTENDED RELEASE ORAL
Qty: 62 CAPSULE | Refills: 5 | Status: SHIPPED | OUTPATIENT
Start: 2019-07-03 | End: 2020-02-05

## 2019-07-04 LAB
CULTURE: ABNORMAL
Lab: ABNORMAL
SPECIMEN: ABNORMAL
STREP A DIRECT SCREEN: NEGATIVE

## 2019-07-08 ENCOUNTER — OFFICE VISIT (OUTPATIENT)
Dept: FAMILY MEDICINE CLINIC | Age: 59
End: 2019-07-08
Payer: MEDICARE

## 2019-07-08 VITALS — DIASTOLIC BLOOD PRESSURE: 68 MMHG | HEART RATE: 67 BPM | OXYGEN SATURATION: 93 % | SYSTOLIC BLOOD PRESSURE: 110 MMHG

## 2019-07-08 DIAGNOSIS — Z12.31 ENCOUNTER FOR SCREENING MAMMOGRAM FOR BREAST CANCER: ICD-10-CM

## 2019-07-08 DIAGNOSIS — Z12.11 SCREENING FOR COLON CANCER: ICD-10-CM

## 2019-07-08 DIAGNOSIS — J44.9 CHRONIC OBSTRUCTIVE PULMONARY DISEASE, UNSPECIFIED COPD TYPE (HCC): ICD-10-CM

## 2019-07-08 DIAGNOSIS — E78.2 MIXED HYPERLIPIDEMIA: ICD-10-CM

## 2019-07-08 DIAGNOSIS — Z09 HOSPITAL DISCHARGE FOLLOW-UP: ICD-10-CM

## 2019-07-08 DIAGNOSIS — J44.1 COPD EXACERBATION (HCC): Primary | ICD-10-CM

## 2019-07-08 PROCEDURE — 3017F COLORECTAL CA SCREEN DOC REV: CPT | Performed by: FAMILY MEDICINE

## 2019-07-08 PROCEDURE — G8427 DOCREV CUR MEDS BY ELIG CLIN: HCPCS | Performed by: FAMILY MEDICINE

## 2019-07-08 PROCEDURE — G8417 CALC BMI ABV UP PARAM F/U: HCPCS | Performed by: FAMILY MEDICINE

## 2019-07-08 PROCEDURE — 1111F DSCHRG MED/CURRENT MED MERGE: CPT | Performed by: FAMILY MEDICINE

## 2019-07-08 PROCEDURE — G8926 SPIRO NO PERF OR DOC: HCPCS | Performed by: FAMILY MEDICINE

## 2019-07-08 PROCEDURE — 3023F SPIROM DOC REV: CPT | Performed by: FAMILY MEDICINE

## 2019-07-08 PROCEDURE — 1036F TOBACCO NON-USER: CPT | Performed by: FAMILY MEDICINE

## 2019-07-08 PROCEDURE — 99214 OFFICE O/P EST MOD 30 MIN: CPT | Performed by: FAMILY MEDICINE

## 2019-07-08 RX ORDER — NYSTATIN 100000 [USP'U]/G
POWDER TOPICAL
Qty: 1 BOTTLE | Refills: 1 | Status: ON HOLD | OUTPATIENT
Start: 2019-07-08 | End: 2021-09-25 | Stop reason: HOSPADM

## 2019-07-08 NOTE — PROGRESS NOTES
Post-Discharge Transitional Care Management Services or Hospital Follow Up      Peter Saldaña   YOB: 1960    Date of Office Visit:  7/8/2019  Date of Hospital Admission: 6/28/19  Date of Hospital Discharge: 6/30/19  Readmission Risk Score(high >=14%.  Medium >=10%):Readmission Risk Score: 0      Care management risk score Rising risk (score 2-5) and Complex Care (Scores >=6): 6     Non face to face  following discharge, date last encounter closed (first attempt may have been earlier): *No documented post hospital discharge outreach found in the last 14 days *No documented post hospital discharge outreach found in the last 14 days    Call initiated 2 business days of discharge: *No response recorded in the last 14 days     Patient Active Problem List   Diagnosis    Pneumonia    HTN (hypertension), benign    Seizure disorder ([address]    Altered mental status    Chest pain    MR (mental retardation)    COPD (chronic obstructive pulmonary disease) ([address]    Chronic obstructive pulmonary disease ([address]    Left hemiplegia ([address]    H/O: stroke    Mixed hyperlipidemia    Acquired hypothyroidism    Sepsis ([address]    Acute bronchitis    Acute respiratory failure with hypoxia ([address]    Acute respiratory failure ([address]       Allergies   Allergen Reactions    Macrobid [Nitrofurantoin] Hives and Swelling     Hives       Medications listed as ordered at the time of discharge from hospital   [address] Medication Instructions ANGELICA:    Printed on:07/21/19 6515   Medication Information                      albuterol-ipratropium (COMBIVENT RESPIMAT)  MCG/ACT AERS inhaler  Inhale 1 puff into the lungs every 6 hours             aspirin 325 MG tablet  Take 325 mg by mouth every 4 hours as needed for Pain             atorvastatin (LIPITOR) 40 MG tablet  TAKE 1 TABLET BY MOUTH DAILY             benzonatate (TESSALON) 100 MG capsule  Take 100 mg by mouth 3 times daily as needed for Cough abdominal folds for 2 wks             Ondansetron HCl (ZOFRAN PO)  Take 1 tablet by mouth every 8 hours Indications: per facility med list             pantoprazole (PROTONIX) 40 MG tablet  Take 1 tablet by mouth every morning (before breakfast)             risperiDONE (RISPERDAL) 1 MG tablet  Take 1 tablet by mouth 3 times daily 1 tablet in am, 1 tablet at 4pm, 1 tablet at bedtime             sertraline (ZOLOFT) 100 MG tablet  Take 2 tablets by mouth nightly             SUMAtriptan (IMITREX) 50 MG tablet  Take 50 mg by mouth once as needed for Migraine                   Medications marked \"taking\" at this time  Outpatient Medications Marked as Taking for the 7/8/19 encounter (Office Visit) with Israle Saucedo MD   Medication Sig Dispense Refill    nystatin (MYCOSTATIN) 491006 UNIT/GM powder Apply topically 4 times daily. under the abdominal folds for 2 wks 1 Bottle 1    carBAMazepine (CARBATROL) 300 MG extended release capsule TAKE 1 CAPSULE BY MOUTH TWICE A DAY 62 capsule 5    mirtazapine (REMERON) 30 MG tablet TAKE 1 TABLET BY MOUTH NIGHTLY 31 tablet 5    atorvastatin (LIPITOR) 40 MG tablet TAKE 1 TABLET BY MOUTH DAILY 31 tablet 5    cetirizine (ZYRTEC) 10 MG tablet TAKE 1 TABLET BY MOUTH DAILY (BED) 31 tablet 5    Ondansetron HCl (ZOFRAN PO) Take 1 tablet by mouth every 8 hours Indications: per facility med list      docusate sodium (COLACE) 100 MG capsule Take 100 mg by mouth 2 times daily      naproxen (NAPROSYN) 250 MG tablet Take 250 mg by mouth 2 times daily (with meals)      diphenhydrAMINE (BENADRYL) 25 MG capsule Take 25 mg by mouth every 6 hours as needed for Itching      SUMAtriptan (IMITREX) 50 MG tablet Take 50 mg by mouth once as needed for Migraine      benzonatate (TESSALON) 100 MG capsule Take 100 mg by mouth 3 times daily as needed for Cough      aspirin 325 MG tablet Take 325 mg by mouth every 4 hours as needed for Pain      metoprolol tartrate (LOPRESSOR) 25 MG tablet Take 25 mg by

## 2019-07-11 ENCOUNTER — HOSPITAL ENCOUNTER (OUTPATIENT)
Age: 59
Discharge: HOME | End: 2019-07-11
Payer: MEDICARE

## 2019-07-11 LAB
ALBUMIN SERPL-MCNC: 4.1 GM/DL (ref 3.4–5)
ALP BLD-CCNC: 82 IU/L (ref 40–128)
ALT SERPL-CCNC: 13 U/L (ref 10–40)
ANION GAP SERPL CALCULATED.3IONS-SCNC: 16 MMOL/L (ref 4–16)
AST SERPL-CCNC: 11 IU/L (ref 15–37)
BILIRUB SERPL-MCNC: 0.2 MG/DL (ref 0–1)
BUN BLDV-MCNC: 18 MG/DL (ref 6–23)
CALCIUM SERPL-MCNC: 9.4 MG/DL (ref 8.3–10.6)
CHLORIDE BLD-SCNC: 102 MMOL/L (ref 99–110)
CHOLESTEROL: 183 MG/DL
CO2: 27 MMOL/L (ref 21–32)
CREAT SERPL-MCNC: 0.7 MG/DL (ref 0.6–1.1)
ESTIMATED AVERAGE GLUCOSE: 134 MG/DL
GFR AFRICAN AMERICAN: >60 ML/MIN/1.73M2
GFR NON-AFRICAN AMERICAN: >60 ML/MIN/1.73M2
GLUCOSE BLD-MCNC: 94 MG/DL (ref 70–99)
HBA1C MFR BLD: 6.3 % (ref 4.2–6.3)
HDLC SERPL-MCNC: 54 MG/DL
LDL CHOLESTEROL DIRECT: 102 MG/DL
POTASSIUM SERPL-SCNC: 4.2 MMOL/L (ref 3.5–5.1)
SODIUM BLD-SCNC: 145 MMOL/L (ref 135–145)
TOTAL PROTEIN: 6.3 GM/DL (ref 6.4–8.2)
TRIGL SERPL-MCNC: 318 MG/DL
TSH HIGH SENSITIVITY: 1.77 UIU/ML (ref 0.27–4.2)

## 2019-07-11 PROCEDURE — 84436 ASSAY OF TOTAL THYROXINE: CPT

## 2019-07-11 PROCEDURE — 83036 HEMOGLOBIN GLYCOSYLATED A1C: CPT

## 2019-07-11 PROCEDURE — 84443 ASSAY THYROID STIM HORMONE: CPT

## 2019-07-11 PROCEDURE — 36415 COLL VENOUS BLD VENIPUNCTURE: CPT

## 2019-07-11 PROCEDURE — 80053 COMPREHEN METABOLIC PANEL: CPT

## 2019-07-11 PROCEDURE — 80061 LIPID PANEL: CPT

## 2019-07-11 PROCEDURE — 83721 ASSAY OF BLOOD LIPOPROTEIN: CPT

## 2019-07-12 LAB
T4 TOTAL: 6.4 UG/DL (ref 5.1–14.1)
T4 TOTAL: NORMAL UG/DL (ref 5.1–14.1)

## 2019-07-21 ASSESSMENT — ENCOUNTER SYMPTOMS
SHORTNESS OF BREATH: 0
COUGH: 0

## 2019-08-05 RX ORDER — AMLODIPINE BESYLATE 5 MG/1
5 TABLET ORAL DAILY
Qty: 31 TABLET | Refills: 5 | Status: ON HOLD | OUTPATIENT
Start: 2019-08-05 | End: 2019-12-02 | Stop reason: HOSPADM

## 2019-08-05 RX ORDER — SELENIUM 50 MCG
TABLET ORAL
Qty: 31 CAPSULE | Refills: 5 | Status: SHIPPED | OUTPATIENT
Start: 2019-08-05 | End: 2020-02-13

## 2019-08-05 RX ORDER — PANTOPRAZOLE SODIUM 40 MG/1
40 TABLET, DELAYED RELEASE ORAL
Qty: 31 TABLET | Refills: 5 | Status: SHIPPED | OUTPATIENT
Start: 2019-08-05 | End: 2020-03-06

## 2019-08-05 RX ORDER — LISINOPRIL 40 MG/1
40 TABLET ORAL DAILY
Qty: 31 TABLET | Refills: 5 | Status: ON HOLD | OUTPATIENT
Start: 2019-08-05 | End: 2019-12-02 | Stop reason: SDUPTHER

## 2019-08-05 RX ORDER — FUROSEMIDE 20 MG/1
TABLET ORAL
Qty: 31 TABLET | Refills: 5 | Status: ON HOLD | OUTPATIENT
Start: 2019-08-05 | End: 2019-12-02 | Stop reason: HOSPADM

## 2019-08-22 ENCOUNTER — TELEPHONE (OUTPATIENT)
Dept: FAMILY MEDICINE CLINIC | Age: 59
End: 2019-08-22

## 2019-09-14 ENCOUNTER — APPOINTMENT (OUTPATIENT)
Dept: ULTRASOUND IMAGING | Age: 59
End: 2019-09-14
Payer: MEDICARE

## 2019-09-14 ENCOUNTER — HOSPITAL ENCOUNTER (EMERGENCY)
Age: 59
Discharge: HOME | End: 2019-09-14
Attending: EMERGENCY MEDICINE
Payer: MEDICARE

## 2019-09-14 VITALS
SYSTOLIC BLOOD PRESSURE: 139 MMHG | TEMPERATURE: 98.4 F | HEART RATE: 84 BPM | DIASTOLIC BLOOD PRESSURE: 81 MMHG | BODY MASS INDEX: 28.13 KG/M2 | HEIGHT: 58 IN | RESPIRATION RATE: 18 BRPM | WEIGHT: 134 LBS | OXYGEN SATURATION: 96 %

## 2019-09-14 DIAGNOSIS — N39.0 URINARY TRACT INFECTION WITH HEMATURIA, SITE UNSPECIFIED: Primary | ICD-10-CM

## 2019-09-14 DIAGNOSIS — R31.9 URINARY TRACT INFECTION WITH HEMATURIA, SITE UNSPECIFIED: Primary | ICD-10-CM

## 2019-09-14 LAB
BACTERIA: ABNORMAL /HPF
BILIRUBIN URINE: NEGATIVE MG/DL
BLOOD, URINE: ABNORMAL
CLARITY: CLEAR
COLOR: YELLOW
GLUCOSE, URINE: NEGATIVE MG/DL
KETONES, URINE: NEGATIVE MG/DL
LEUKOCYTE ESTERASE, URINE: ABNORMAL
MUCUS: ABNORMAL HPF
NITRITE URINE, QUANTITATIVE: NEGATIVE
PH, URINE: 5 (ref 5–8)
PROTEIN UA: NEGATIVE MG/DL
RBC URINE: 1 /HPF (ref 0–6)
SPECIFIC GRAVITY UA: 1.01 (ref 1–1.03)
SQUAMOUS EPITHELIAL: <1 /HPF
TRICHOMONAS: ABNORMAL /HPF
UROBILINOGEN, URINE: NORMAL MG/DL (ref 0.2–1)
WBC CLUMP: ABNORMAL /HPF
WBC UA: 15 /HPF (ref 0–5)

## 2019-09-14 PROCEDURE — 6370000000 HC RX 637 (ALT 250 FOR IP): Performed by: EMERGENCY MEDICINE

## 2019-09-14 PROCEDURE — 87077 CULTURE AEROBIC IDENTIFY: CPT

## 2019-09-14 PROCEDURE — 99284 EMERGENCY DEPT VISIT MOD MDM: CPT

## 2019-09-14 PROCEDURE — 87086 URINE CULTURE/COLONY COUNT: CPT

## 2019-09-14 PROCEDURE — 76775 US EXAM ABDO BACK WALL LIM: CPT

## 2019-09-14 PROCEDURE — 87186 SC STD MICRODIL/AGAR DIL: CPT

## 2019-09-14 PROCEDURE — 81001 URINALYSIS AUTO W/SCOPE: CPT

## 2019-09-14 RX ORDER — IBUPROFEN 600 MG/1
600 TABLET ORAL ONCE
Status: COMPLETED | OUTPATIENT
Start: 2019-09-14 | End: 2019-09-14

## 2019-09-14 RX ORDER — CEPHALEXIN 250 MG/1
500 CAPSULE ORAL ONCE
Status: COMPLETED | OUTPATIENT
Start: 2019-09-14 | End: 2019-09-14

## 2019-09-14 RX ORDER — CEPHALEXIN 500 MG/1
500 CAPSULE ORAL 2 TIMES DAILY
Qty: 14 CAPSULE | Refills: 0 | Status: SHIPPED | OUTPATIENT
Start: 2019-09-14 | End: 2019-09-21

## 2019-09-14 RX ORDER — IBUPROFEN 600 MG/1
600 TABLET ORAL EVERY 8 HOURS PRN
Qty: 21 TABLET | Refills: 0 | Status: ON HOLD | OUTPATIENT
Start: 2019-09-14 | End: 2019-12-02 | Stop reason: HOSPADM

## 2019-09-14 RX ADMIN — CEPHALEXIN 500 MG: 250 CAPSULE ORAL at 15:12

## 2019-09-14 RX ADMIN — IBUPROFEN 600 MG: 600 TABLET, FILM COATED ORAL at 15:12

## 2019-09-14 ASSESSMENT — ENCOUNTER SYMPTOMS
ABDOMINAL PAIN: 1
SORE THROAT: 0
COUGH: 0
SHORTNESS OF BREATH: 0
COLOR CHANGE: 0
BACK PAIN: 0

## 2019-09-14 ASSESSMENT — PAIN DESCRIPTION - PAIN TYPE: TYPE: ACUTE PAIN

## 2019-09-14 ASSESSMENT — PAIN SCALES - GENERAL
PAINLEVEL_OUTOF10: 7
PAINLEVEL_OUTOF10: 7

## 2019-09-14 NOTE — ED NOTES
Report given to [facility] OZARK RN and [facility]      Saul Edge RN  09/14/19 3236

## 2019-09-14 NOTE — ED NOTES
Discharge instructions and prescriptions given to pts caregiver. Caregiver verbalized her understanding and denied any questions or concerns at this time. Pt transported per [facility] to private residence.      Deana Gant RN  09/14/19 5557

## 2019-09-14 NOTE — ED PROVIDER NOTES
tenderness (mildly in the suprapubic region). There is no guarding. Musculoskeletal: She exhibits no tenderness or deformity. Neurological: She is alert. Skin: Skin is warm and dry. Psychiatric: Judgment normal.      I have reviewed and interpreted all of the currently available lab results from this visit (if applicable):  Results for orders placed or performed during the hospital encounter of 09/14/19   Urinalysis Reflex to Culture   Result Value Ref Range    Color, UA YELLOW YELLOW    Clarity, UA CLEAR CLEAR    Glucose, Urine NEGATIVE NEGATIVE MG/DL    Bilirubin Urine NEGATIVE NEGATIVE MG/DL    Ketones, Urine NEGATIVE NEGATIVE MG/DL    Specific Gravity, UA 1.013 1.001 - 1.035    Blood, Urine SMALL (A) NEGATIVE    pH, Urine 5.0 5.0 - 8.0    Protein, UA NEGATIVE NEGATIVE MG/DL    Urobilinogen, Urine NORMAL 0.2 - 1.0 MG/DL    Nitrite Urine, Quantitative NEGATIVE NEGATIVE    Leukocyte Esterase, Urine SMALL (A) NEGATIVE    RBC, UA 1 0 - 6 /HPF    WBC, UA 15 (H) 0 - 5 /HPF    Bacteria, UA RARE (A) NEGATIVE /HPF    WBC Clumps, UA RARE /HPF    Squam Epithel, UA <1 /HPF    Mucus, UA RARE (A) NEGATIVE HPF    Trichomonas, UA NONE SEEN NONE SEEN /HPF      Radiographs (if obtained):    [] Radiologist's Report Reviewed:  US RETROPERITONEAL LIMITED   Final Result   1. No evidence of hydronephrosis. Normal renal cortical echogenicity. 2. No sonographic evidence of renal calculi. Chart review shows recent radiographs:  No results found. MDM:  Patient was signs concerning for infection. She is afebrile. No tachycardia. Not tachypnea. Do not believe she is septic at this time. Ultrasound for the retroperitoneal shows no signs of hydronephrosis. She is nontoxic in appearance. Discussed antibiotics and ibuprofen as needed for analgesics. Discussed with the caregiver at bedside. Discussed follow-up with PCP and also return precautions. Clinical Impression:  1.  Urinary tract infection with hematuria, site unspecified      Disposition referral (if applicable):  Bj Camp MD  [address]    Schedule an appointment as soon as possible for a visit in 1 week      Disposition medications (if applicable):  New Prescriptions    CEPHALEXIN (KEFLEX) 500 MG CAPSULE    Take 1 capsule by mouth 2 times daily for 7 days    IBUPROFEN (ADVIL;MOTRIN) 600 MG TABLET    Take 1 tablet by mouth every 8 hours as needed for Pain       Comment: Please note this report has been produced using speech recognition software and may contain errors related to that system including errors in grammar, punctuation, and spelling, as well as words and phrases that may be inappropriate. Efforts were made to edit the dictations.         Tarun Montoya MD  09/14/19 0428

## 2019-09-16 LAB
CULTURE: ABNORMAL
Lab: ABNORMAL
SPECIMEN: ABNORMAL
TOTAL COLONY COUNT: ABNORMAL

## 2019-11-29 ENCOUNTER — APPOINTMENT (OUTPATIENT)
Dept: GENERAL RADIOLOGY | Age: 59
DRG: 191 | End: 2019-11-29
Payer: MEDICARE

## 2019-11-29 ENCOUNTER — HOSPITAL ENCOUNTER (INPATIENT)
Age: 59
LOS: 3 days | Discharge: HOME | DRG: 191 | End: 2019-12-02
Attending: EMERGENCY MEDICINE | Admitting: INTERNAL MEDICINE
Payer: MEDICARE

## 2019-11-29 LAB
ALBUMIN SERPL-MCNC: 3.4 GM/DL (ref 3.4–5)
ALP BLD-CCNC: 81 IU/L (ref 40–128)
ALT SERPL-CCNC: 7 U/L (ref 10–40)
ANION GAP SERPL CALCULATED.3IONS-SCNC: 9 MMOL/L (ref 4–16)
AST SERPL-CCNC: 9 IU/L (ref 15–37)
BACTERIA: NEGATIVE /HPF
BASOPHILS ABSOLUTE: 0 K/CU MM
BASOPHILS RELATIVE PERCENT: 0.3 % (ref 0–1)
BILIRUB SERPL-MCNC: 0.2 MG/DL (ref 0–1)
BILIRUBIN URINE: NEGATIVE MG/DL
BLOOD, URINE: NEGATIVE
BUN BLDV-MCNC: 21 MG/DL (ref 6–23)
CALCIUM SERPL-MCNC: 8.8 MG/DL (ref 8.3–10.6)
CHLORIDE BLD-SCNC: 108 MMOL/L (ref 99–110)
CLARITY: CLEAR
CO2: 32 MMOL/L (ref 21–32)
COLOR: YELLOW
CREAT SERPL-MCNC: 0.6 MG/DL (ref 0.6–1.1)
DIFFERENTIAL TYPE: ABNORMAL
EOSINOPHILS ABSOLUTE: 0.1 K/CU MM
EOSINOPHILS RELATIVE PERCENT: 0.8 % (ref 0–3)
GFR AFRICAN AMERICAN: >60 ML/MIN/1.73M2
GFR NON-AFRICAN AMERICAN: >60 ML/MIN/1.73M2
GLUCOSE BLD-MCNC: 165 MG/DL (ref 70–99)
GLUCOSE BLD-MCNC: 180 MG/DL (ref 70–99)
GLUCOSE BLD-MCNC: 238 MG/DL (ref 70–99)
GLUCOSE, URINE: NEGATIVE MG/DL
HCT VFR BLD CALC: 37.3 % (ref 37–47)
HEMOGLOBIN: 10.6 GM/DL (ref 12.5–16)
IMMATURE NEUTROPHIL %: 0.6 % (ref 0–0.43)
KETONES, URINE: ABNORMAL MG/DL
LEUKOCYTE ESTERASE, URINE: ABNORMAL
LIPASE: 25 IU/L (ref 13–60)
LYMPHOCYTES ABSOLUTE: 1.7 K/CU MM
LYMPHOCYTES RELATIVE PERCENT: 22 % (ref 24–44)
MCH RBC QN AUTO: 27.2 PG (ref 27–31)
MCHC RBC AUTO-ENTMCNC: 28.4 % (ref 32–36)
MCV RBC AUTO: 95.9 FL (ref 78–100)
MONOCYTES ABSOLUTE: 0.6 K/CU MM
MONOCYTES RELATIVE PERCENT: 8.1 % (ref 0–4)
MUCUS: ABNORMAL HPF
NITRITE URINE, QUANTITATIVE: NEGATIVE
NUCLEATED RBC %: 0 %
PDW BLD-RTO: 14.7 % (ref 11.7–14.9)
PH, URINE: 7 (ref 5–8)
PLATELET # BLD: 268 K/CU MM (ref 140–440)
PMV BLD AUTO: 8.9 FL (ref 7.5–11.1)
POTASSIUM SERPL-SCNC: 4.3 MMOL/L (ref 3.5–5.1)
PRO-BNP: 170.5 PG/ML
PROTEIN UA: NEGATIVE MG/DL
RAPID INFLUENZA  B AGN: NEGATIVE
RAPID INFLUENZA A AGN: NEGATIVE
RBC # BLD: 3.89 M/CU MM (ref 4.2–5.4)
RBC URINE: 2 /HPF (ref 0–6)
SEGMENTED NEUTROPHILS ABSOLUTE COUNT: 5.3 K/CU MM
SEGMENTED NEUTROPHILS RELATIVE PERCENT: 68.2 % (ref 36–66)
SODIUM BLD-SCNC: 149 MMOL/L (ref 135–145)
SPECIFIC GRAVITY UA: 1.02 (ref 1–1.03)
TOTAL IMMATURE NEUTOROPHIL: 0.05 K/CU MM
TOTAL NUCLEATED RBC: 0 K/CU MM
TOTAL PROTEIN: 6.1 GM/DL (ref 6.4–8.2)
TRICHOMONAS: ABNORMAL /HPF
TROPONIN T: <0.01 NG/ML
UROBILINOGEN, URINE: NORMAL MG/DL (ref 0.2–1)
WBC # BLD: 7.8 K/CU MM (ref 4–10.5)
WBC CLUMP: ABNORMAL /HPF
WBC UA: 8 /HPF (ref 0–5)

## 2019-11-29 PROCEDURE — 94640 AIRWAY INHALATION TREATMENT: CPT

## 2019-11-29 PROCEDURE — 2700000000 HC OXYGEN THERAPY PER DAY

## 2019-11-29 PROCEDURE — 6370000000 HC RX 637 (ALT 250 FOR IP): Performed by: INTERNAL MEDICINE

## 2019-11-29 PROCEDURE — 83690 ASSAY OF LIPASE: CPT

## 2019-11-29 PROCEDURE — 94761 N-INVAS EAR/PLS OXIMETRY MLT: CPT

## 2019-11-29 PROCEDURE — 84484 ASSAY OF TROPONIN QUANT: CPT

## 2019-11-29 PROCEDURE — 85025 COMPLETE CBC W/AUTO DIFF WBC: CPT

## 2019-11-29 PROCEDURE — 6370000000 HC RX 637 (ALT 250 FOR IP): Performed by: PHYSICIAN ASSISTANT

## 2019-11-29 PROCEDURE — 71045 X-RAY EXAM CHEST 1 VIEW: CPT

## 2019-11-29 PROCEDURE — 87086 URINE CULTURE/COLONY COUNT: CPT

## 2019-11-29 PROCEDURE — 93005 ELECTROCARDIOGRAM TRACING: CPT | Performed by: PHYSICIAN ASSISTANT

## 2019-11-29 PROCEDURE — 83880 ASSAY OF NATRIURETIC PEPTIDE: CPT

## 2019-11-29 PROCEDURE — 36415 COLL VENOUS BLD VENIPUNCTURE: CPT

## 2019-11-29 PROCEDURE — 99285 EMERGENCY DEPT VISIT HI MDM: CPT

## 2019-11-29 PROCEDURE — 6360000002 HC RX W HCPCS: Performed by: INTERNAL MEDICINE

## 2019-11-29 PROCEDURE — 87899 AGENT NOS ASSAY W/OPTIC: CPT

## 2019-11-29 PROCEDURE — 80053 COMPREHEN METABOLIC PANEL: CPT

## 2019-11-29 PROCEDURE — 87804 INFLUENZA ASSAY W/OPTIC: CPT

## 2019-11-29 PROCEDURE — 87186 SC STD MICRODIL/AGAR DIL: CPT

## 2019-11-29 PROCEDURE — 92610 EVALUATE SWALLOWING FUNCTION: CPT

## 2019-11-29 PROCEDURE — 1200000000 HC SEMI PRIVATE

## 2019-11-29 PROCEDURE — 2500000003 HC RX 250 WO HCPCS: Performed by: INTERNAL MEDICINE

## 2019-11-29 PROCEDURE — 82962 GLUCOSE BLOOD TEST: CPT

## 2019-11-29 PROCEDURE — 81001 URINALYSIS AUTO W/SCOPE: CPT

## 2019-11-29 PROCEDURE — 87077 CULTURE AEROBIC IDENTIFY: CPT

## 2019-11-29 RX ORDER — NICOTINE POLACRILEX 4 MG
15 LOZENGE BUCCAL PRN
Status: DISCONTINUED | OUTPATIENT
Start: 2019-11-29 | End: 2019-12-02 | Stop reason: HOSPADM

## 2019-11-29 RX ORDER — LEVOTHYROXINE SODIUM 0.05 MG/1
50 TABLET ORAL DAILY
Status: DISCONTINUED | OUTPATIENT
Start: 2019-11-29 | End: 2019-12-02 | Stop reason: HOSPADM

## 2019-11-29 RX ORDER — MIRTAZAPINE 15 MG/1
30 TABLET, FILM COATED ORAL NIGHTLY
Status: DISCONTINUED | OUTPATIENT
Start: 2019-11-29 | End: 2019-12-02 | Stop reason: HOSPADM

## 2019-11-29 RX ORDER — CETIRIZINE HYDROCHLORIDE 10 MG/1
10 TABLET ORAL DAILY
Status: DISCONTINUED | OUTPATIENT
Start: 2019-11-30 | End: 2019-12-02 | Stop reason: HOSPADM

## 2019-11-29 RX ORDER — DIPHENHYDRAMINE HCL 25 MG
25 TABLET ORAL EVERY 6 HOURS PRN
Status: DISCONTINUED | OUTPATIENT
Start: 2019-11-29 | End: 2019-12-02 | Stop reason: HOSPADM

## 2019-11-29 RX ORDER — AMLODIPINE BESYLATE 5 MG/1
5 TABLET ORAL DAILY
Status: DISCONTINUED | OUTPATIENT
Start: 2019-11-30 | End: 2019-12-02 | Stop reason: HOSPADM

## 2019-11-29 RX ORDER — LACTOBACILLUS RHAMNOSUS GG 10B CELL
1 CAPSULE ORAL DAILY
Status: DISCONTINUED | OUTPATIENT
Start: 2019-11-29 | End: 2019-12-02 | Stop reason: HOSPADM

## 2019-11-29 RX ORDER — PREDNISONE 20 MG/1
40 TABLET ORAL ONCE
Status: COMPLETED | OUTPATIENT
Start: 2019-11-29 | End: 2019-11-29

## 2019-11-29 RX ORDER — PANTOPRAZOLE SODIUM 40 MG/1
40 TABLET, DELAYED RELEASE ORAL
Status: DISCONTINUED | OUTPATIENT
Start: 2019-11-30 | End: 2019-12-02 | Stop reason: HOSPADM

## 2019-11-29 RX ORDER — CARBAMAZEPINE 300 MG/1
300 CAPSULE, EXTENDED RELEASE ORAL 2 TIMES DAILY
Status: DISCONTINUED | OUTPATIENT
Start: 2019-11-29 | End: 2019-12-02 | Stop reason: HOSPADM

## 2019-11-29 RX ORDER — IPRATROPIUM BROMIDE AND ALBUTEROL SULFATE 2.5; .5 MG/3ML; MG/3ML
1 SOLUTION RESPIRATORY (INHALATION) 4 TIMES DAILY
Status: DISCONTINUED | OUTPATIENT
Start: 2019-11-29 | End: 2019-12-02 | Stop reason: HOSPADM

## 2019-11-29 RX ORDER — LISINOPRIL 40 MG/1
40 TABLET ORAL DAILY
Status: DISCONTINUED | OUTPATIENT
Start: 2019-11-29 | End: 2019-12-02 | Stop reason: HOSPADM

## 2019-11-29 RX ORDER — LEVETIRACETAM 500 MG/1
1500 TABLET ORAL 2 TIMES DAILY
Status: DISCONTINUED | OUTPATIENT
Start: 2019-11-29 | End: 2019-12-02 | Stop reason: HOSPADM

## 2019-11-29 RX ORDER — IPRATROPIUM BROMIDE AND ALBUTEROL SULFATE 2.5; .5 MG/3ML; MG/3ML
1 SOLUTION RESPIRATORY (INHALATION) EVERY 4 HOURS PRN
Status: DISCONTINUED | OUTPATIENT
Start: 2019-11-29 | End: 2019-11-29 | Stop reason: SDUPTHER

## 2019-11-29 RX ORDER — LACTULOSE 10 G/15ML
20 SOLUTION ORAL 3 TIMES DAILY PRN
Status: DISCONTINUED | OUTPATIENT
Start: 2019-11-29 | End: 2019-12-02 | Stop reason: HOSPADM

## 2019-11-29 RX ORDER — IBUPROFEN 600 MG/1
600 TABLET ORAL ONCE
Status: COMPLETED | OUTPATIENT
Start: 2019-11-29 | End: 2019-11-29

## 2019-11-29 RX ORDER — DEXTROSE MONOHYDRATE 50 MG/ML
100 INJECTION, SOLUTION INTRAVENOUS PRN
Status: DISCONTINUED | OUTPATIENT
Start: 2019-11-29 | End: 2019-12-02 | Stop reason: HOSPADM

## 2019-11-29 RX ORDER — LEVOFLOXACIN 5 MG/ML
500 INJECTION, SOLUTION INTRAVENOUS DAILY
Status: DISCONTINUED | OUTPATIENT
Start: 2019-11-29 | End: 2019-12-02 | Stop reason: HOSPADM

## 2019-11-29 RX ORDER — ATORVASTATIN CALCIUM 40 MG/1
40 TABLET, FILM COATED ORAL DAILY
Status: DISCONTINUED | OUTPATIENT
Start: 2019-11-29 | End: 2019-12-02 | Stop reason: HOSPADM

## 2019-11-29 RX ORDER — DIVALPROEX SODIUM 500 MG/1
2000 TABLET, DELAYED RELEASE ORAL NIGHTLY
Status: DISCONTINUED | OUTPATIENT
Start: 2019-11-29 | End: 2019-12-02 | Stop reason: HOSPADM

## 2019-11-29 RX ORDER — DEXTROSE MONOHYDRATE 25 G/50ML
12.5 INJECTION, SOLUTION INTRAVENOUS PRN
Status: DISCONTINUED | OUTPATIENT
Start: 2019-11-29 | End: 2019-12-02 | Stop reason: HOSPADM

## 2019-11-29 RX ORDER — METHYLPREDNISOLONE SODIUM SUCCINATE 40 MG/ML
40 INJECTION, POWDER, LYOPHILIZED, FOR SOLUTION INTRAMUSCULAR; INTRAVENOUS EVERY 12 HOURS
Status: DISCONTINUED | OUTPATIENT
Start: 2019-11-29 | End: 2019-12-01

## 2019-11-29 RX ORDER — RISPERIDONE 0.5 MG/1
1 TABLET, FILM COATED ORAL 3 TIMES DAILY
Status: DISCONTINUED | OUTPATIENT
Start: 2019-11-29 | End: 2019-12-02 | Stop reason: HOSPADM

## 2019-11-29 RX ORDER — DOCUSATE SODIUM 100 MG/1
100 CAPSULE, LIQUID FILLED ORAL 2 TIMES DAILY
Status: DISCONTINUED | OUTPATIENT
Start: 2019-11-29 | End: 2019-12-02 | Stop reason: HOSPADM

## 2019-11-29 RX ORDER — GUAIFENESIN/DEXTROMETHORPHAN 100-10MG/5
5 SYRUP ORAL EVERY 4 HOURS PRN
Status: DISCONTINUED | OUTPATIENT
Start: 2019-11-29 | End: 2019-12-02 | Stop reason: HOSPADM

## 2019-11-29 RX ORDER — ASPIRIN 325 MG
325 TABLET ORAL EVERY 4 HOURS PRN
Status: DISCONTINUED | OUTPATIENT
Start: 2019-11-29 | End: 2019-12-02 | Stop reason: HOSPADM

## 2019-11-29 RX ORDER — IPRATROPIUM BROMIDE AND ALBUTEROL SULFATE 2.5; .5 MG/3ML; MG/3ML
2 SOLUTION RESPIRATORY (INHALATION) ONCE
Status: COMPLETED | OUTPATIENT
Start: 2019-11-29 | End: 2019-11-29

## 2019-11-29 RX ORDER — BUSPIRONE HYDROCHLORIDE 10 MG/1
10 TABLET ORAL 2 TIMES DAILY
Status: DISCONTINUED | OUTPATIENT
Start: 2019-11-29 | End: 2019-12-02 | Stop reason: HOSPADM

## 2019-11-29 RX ORDER — SERTRALINE HYDROCHLORIDE 100 MG/1
200 TABLET, FILM COATED ORAL NIGHTLY
Status: DISCONTINUED | OUTPATIENT
Start: 2019-11-29 | End: 2019-12-02 | Stop reason: HOSPADM

## 2019-11-29 RX ADMIN — LEVOTHYROXINE SODIUM 50 MCG: 50 TABLET ORAL at 16:44

## 2019-11-29 RX ADMIN — MIRTAZAPINE 30 MG: 15 TABLET, FILM COATED ORAL at 22:17

## 2019-11-29 RX ADMIN — LISINOPRIL 40 MG: 40 TABLET ORAL at 16:44

## 2019-11-29 RX ADMIN — SERTRALINE HYDROCHLORIDE 200 MG: 100 TABLET ORAL at 22:17

## 2019-11-29 RX ADMIN — LEVOFLOXACIN 500 MG: 5 INJECTION, SOLUTION INTRAVENOUS at 16:42

## 2019-11-29 RX ADMIN — METOPROLOL TARTRATE 25 MG: 25 TABLET ORAL at 22:18

## 2019-11-29 RX ADMIN — GUAIFENESIN AND DEXTROMETHORPHAN HYDROBROMIDE 1 TABLET: 600; 30 TABLET, EXTENDED RELEASE ORAL at 22:19

## 2019-11-29 RX ADMIN — IPRATROPIUM BROMIDE AND ALBUTEROL SULFATE 2 AMPULE: .5; 3 SOLUTION RESPIRATORY (INHALATION) at 10:46

## 2019-11-29 RX ADMIN — RISPERIDONE 1 MG: 0.5 TABLET, FILM COATED ORAL at 22:18

## 2019-11-29 RX ADMIN — BUSPIRONE HYDROCHLORIDE 10 MG: 10 TABLET ORAL at 22:18

## 2019-11-29 RX ADMIN — CARBAMAZEPINE 300 MG: 300 CAPSULE, EXTENDED RELEASE ORAL at 22:26

## 2019-11-29 RX ADMIN — IBUPROFEN 600 MG: 600 TABLET, FILM COATED ORAL at 10:38

## 2019-11-29 RX ADMIN — ATORVASTATIN CALCIUM 40 MG: 40 TABLET, FILM COATED ORAL at 16:44

## 2019-11-29 RX ADMIN — DOCUSATE SODIUM 100 MG: 100 CAPSULE, LIQUID FILLED ORAL at 22:17

## 2019-11-29 RX ADMIN — METHYLPREDNISOLONE SODIUM SUCCINATE 40 MG: 40 INJECTION, POWDER, FOR SOLUTION INTRAMUSCULAR; INTRAVENOUS at 16:42

## 2019-11-29 RX ADMIN — PREDNISONE 40 MG: 20 TABLET ORAL at 10:38

## 2019-11-29 RX ADMIN — INSULIN LISPRO 2 UNITS: 100 INJECTION, SOLUTION INTRAVENOUS; SUBCUTANEOUS at 17:47

## 2019-11-29 RX ADMIN — DIVALPROEX SODIUM 2000 MG: 500 TABLET, DELAYED RELEASE ORAL at 22:17

## 2019-11-29 RX ADMIN — MICONAZOLE NITRATE: 20 POWDER TOPICAL at 22:26

## 2019-11-29 RX ADMIN — Medication 1 CAPSULE: at 16:44

## 2019-11-29 RX ADMIN — ENOXAPARIN SODIUM 40 MG: 40 INJECTION SUBCUTANEOUS at 16:44

## 2019-11-29 RX ADMIN — LINAGLIPTIN 5 MG: 5 TABLET, FILM COATED ORAL at 16:44

## 2019-11-29 RX ADMIN — IPRATROPIUM BROMIDE AND ALBUTEROL SULFATE 1 AMPULE: .5; 3 SOLUTION RESPIRATORY (INHALATION) at 19:50

## 2019-11-29 RX ADMIN — RISPERIDONE 1 MG: 0.5 TABLET, FILM COATED ORAL at 16:46

## 2019-11-29 RX ADMIN — LEVETIRACETAM 1500 MG: 500 TABLET, FILM COATED ORAL at 22:16

## 2019-11-29 ASSESSMENT — PAIN DESCRIPTION - PAIN TYPE
TYPE: ACUTE PAIN
TYPE: ACUTE PAIN

## 2019-11-29 ASSESSMENT — PAIN SCALES - GENERAL
PAINLEVEL_OUTOF10: 4
PAINLEVEL_OUTOF10: 3
PAINLEVEL_OUTOF10: 0
PAINLEVEL_OUTOF10: 5

## 2019-11-29 ASSESSMENT — PAIN DESCRIPTION - DESCRIPTORS
DESCRIPTORS: ACHING
DESCRIPTORS: ACHING

## 2019-11-29 ASSESSMENT — PAIN DESCRIPTION - LOCATION
LOCATION: CHEST
LOCATION: CHEST

## 2019-11-29 NOTE — ED PROVIDER NOTES
I independently examined and evaluated Catherine Markham. Chief Complaint   Patient presents with    Shortness of Breath     SpO 87% on room air    Chest Pain       In brief, shortness of breath hypoxia patient with history of COPD. Wheezing with EMS and initially here. Focused exam revealed well-appearing female in no acute distress, heart and lung sounds normal, normal respiratory rate. ED course:   XR CHEST PORTABLE   Final Result   No acute process. ED Course as of Nov 29 1439 Fri Nov 29, 2019   1122 Troponin T: <0.010 []   1438 Normal sinus rhythm, no evidence of acute ischemia, intervals normal   EKG 12 Lead []      ED Course User Index  [] Tre James MD     Required 2 L nasal cannula so admitted to hospitalist for further treatment. Vitals:    11/29/19 1130 11/29/19 1201 11/29/19 1231 11/29/19 1330   BP:  (!) 135/58 (!) 136/57 129/67   Pulse: 80 79 75 81   Resp: 17 17 18 17   Temp: 98.3 °F (36.8 °C)  98.3 °F (36.8 °C)    TempSrc: Oral  Oral    SpO2:  98% 97%        All diagnostic, treatment, and disposition decisions were made by myself in conjunction with the advanced practice provider. For all further details of the patient's emergency department visit, please see the advanced practice provider's documentation. Comment: Please note this report has been produced using speech recognition software and may contain errors related to that system including errors in grammar, punctuation, and spelling, as well as words and phrases that may be inappropriate. If there are any questions or concerns please feel free to contact the dictating provider for clarification.        Tre James MD  11/29/19 3279

## 2019-11-29 NOTE — ED TRIAGE NOTES
Patient from home with shortness of breath and chest pain. Per EMS patients home nurse called the squad for oxygen saturation of 87% on room air. Per EMS patient has oxygen at home that she used to be on but nurse did not have order for oxygen. Patient 90% on room air in the ED placed on 2L increased to 95%. Patient reports chest pain. Vital signs stable.  Patient A&OX3

## 2019-11-29 NOTE — H&P
HISTORY AND PHYSICAL  (Hospitalist, Internal Medicine)  IDENTIFYING INFORMATION   PATIENT:  Isabel Dillard  MRN:  [id number]  ADMIT DATE: 11/29/2019  TIME OF EVALUATION: 11/29/2019 1:02 PM    CHIEF COMPLAINT   SOB    HISTORY OF PRESENT ILLNESS   Isabel Dillard is a 64 y.o. female with a past medical history of MRDD, CP hx of smoking with COPD who presents with acute onset SOB this morning with oyxgen in 87% on RA. Also noted audible wheeze at bedside. She was noted to be well last evening. She presented to the ED where she improved with duonebs and prednisone but remains dependent on 1-2 L NC. Hx provided by caregiver as she is unable to provide due to cognitive dysfunction. PMH listed below:    PAST MEDICAL, SURGICAL, FAMILY, and SOCIAL HISTORY     Past Medical History:   Diagnosis Date    Anxiety disorder     Back pain, chronic     Cerebral palsy (HCC)     [facility]     CVA (cerebral infarction) 2014 x2    Diabetes mellitus [facility]     Diverticulosis     Epilepsy [facility]     GERD (gastroesophageal reflux disease)     Hyperlipidemia     Hypertension     Insomnia     Iron (Fe) deficiency anemia     Mental disability     Seizures (HCC)     Unspecified cerebral artery occlusion with cerebral infarction      Past Surgical History:   Procedure Laterality Date    GASTROSTOMY TUBE PLACEMENT       History reviewed. No pertinent family history.   Family Hx of HTN  Family Hx as reviewed above, otherwise non-contributory  Social History     Socioeconomic History    Marital status: Single     Spouse name: None    Number of children: None    Years of education: None    Highest education level: None   Occupational History    None   Social Needs    Financial resource strain: None    Food insecurity:     Worry: None     Inability: None    Transportation needs:     Medical: None     Non-medical: None   Tobacco Use    Smoking status: Former Smoker     Packs/day: 1.50 (CARBATROL) 300 MG extended release capsule TAKE 1 CAPSULE BY MOUTH TWICE A DAY 62 capsule 5    mirtazapine (REMERON) 30 MG tablet TAKE 1 TABLET BY MOUTH NIGHTLY 31 tablet 5    atorvastatin (LIPITOR) 40 MG tablet TAKE 1 TABLET BY MOUTH DAILY 31 tablet 5    cetirizine (ZYRTEC) 10 MG tablet TAKE 1 TABLET BY MOUTH DAILY (BED) 31 tablet 5    Ondansetron HCl (ZOFRAN PO) Take 1 tablet by mouth every 8 hours Indications: per facility med list      docusate sodium (COLACE) 100 MG capsule Take 100 mg by mouth 2 times daily      naproxen (NAPROSYN) 250 MG tablet Take 250 mg by mouth 2 times daily (with meals)      diphenhydrAMINE (BENADRYL) 25 MG capsule Take 25 mg by mouth every 6 hours as needed for Itching      SUMAtriptan (IMITREX) 50 MG tablet Take 50 mg by mouth once as needed for Migraine      benzonatate (TESSALON) 100 MG capsule Take 100 mg by mouth 3 times daily as needed for Cough      aspirin 325 MG tablet Take 325 mg by mouth every 4 hours as needed for Pain      metoprolol tartrate (LOPRESSOR) 25 MG tablet Take 25 mg by mouth 2 times daily      ipratropium-albuterol (DUONEB) 0.5-2.5 (3) MG/3ML SOLN nebulizer solution Inhale 1 vial into the lungs every 6 hours as needed for Shortness of Breath      JANUVIA 100 MG tablet TAKE 1 TABLET BY MOUTH DAILY 31 tablet 5    blood glucose monitor strips Test 3 times a day & as needed for symptoms of irregular blood glucose.  90 strip 0    fluticasone (FLONASE) 50 MCG/ACT nasal spray 1 spray by Each Nare route daily      lactulose (CHRONULAC) 10 GM/15ML solution Take 20 g by mouth 3 times daily as needed       levothyroxine (SYNTHROID) 50 MCG tablet Take 50 mcg by mouth Daily      busPIRone (BUSPAR) 10 MG tablet Take 1 tablet by mouth 2 times daily 60 tablet 5    divalproex (DEPAKOTE) 500 MG DR tablet Take 4 tablets by mouth nightly 90 tablet 0    levETIRAcetam (KEPPRA) 750 MG tablet Take 2 tablets by mouth 2 times daily 120 tablet 0    metFORMIN PLATELET  Chemistry  [unfilled]  [unfilled]  No results found for: LDH  Coagulation Studies  Lab Results   Component Value Date    INR 0.82 12/04/2017     Liver Function Studies  Lab Results   Component Value Date    ALT 7 11/29/2019    AST 9 11/29/2019    ALKPHOS 81 11/29/2019       Recent Imaging    Minor Chon #[id number] ([id number]) (64 y.o. F) (Adm: 11/29/19)    [facility] [id number]         Imaging Results (last 7 days)          Procedure Component Value Ref Range Date/Time     XR CHEST PORTABLE [[id number]] Collected: 11/29/19 1040     Order Status: Completed Updated: 11/29/19 1043     Narrative:       EXAMINATION:  ONE XRAY VIEW OF THE CHEST    11/29/2019 10:34 am    COMPARISON:  July 2, 2019    HISTORY:  ORDERING SYSTEM PROVIDED HISTORY: SOB  TECHNOLOGIST PROVIDED HISTORY:  Reason for exam:->SOB  Reason for Exam: sob  Acuity: Acute  Type of Exam: Initial  Relevant Medical/Surgical History: COPD    FINDINGS:  The lungs are without acute focal process.  There is no effusion or  pneumothorax. The cardiomediastinal silhouette is without acute process.  The  osseous structures are without acute process.     Impression:       No acute process.         EKG personally reviewed with rate 85, NSR      Relevant labs and imaging reviewed    ASSESSMENT AND PLAN     Acute on hypoxic resp failure needing 2 L NC  Suspect viral bronchitis  COPD  - known to Dr Keyonna Ortega  - P, serologies  - IV levaquin, duonebs, IV steroids, anti-tussive  - speech eval due to MRDD  - modified diet for now  - tele, pulse ox    DMII - add ISS  Seizure  Hx of CVA with left side weakness  HTN  Seizure  MRDD, CP  - hold lasix to avoid dehydration     Lovenox ppx      Full code    Case d/w ED physician    [address] [facility], Internal Medicine  11/29/2019 at 1:02 PM

## 2019-11-29 NOTE — ED PROVIDER NOTES
EMERGENCY DEPARTMENT ENCOUNTER      PCP: Armando Kaur MD    CHIEF COMPLAINT    Chief Complaint   Patient presents with    Shortness of Breath     SpO 87% on room air    Chest Pain     Patient staffed with supervising physician Dr. Kathe Richard    HPI    Joy Patel is a 64 y.o. female who presents by EMS for hypoxia. Patient has history of mental retardation, nurse presented today and oxygen level was 87% on room air. Patient used to wear oxygen, no longer is required to wear oxygen. Patient was placed on oxygen and pulse ox improved into the mid 90s. Patient states she does have chest pain and cough. No family members at bedside during initial exam and history is limited.     REVIEW OF SYSTEMS    Review of systems limited due to patient history of mental retardation  See HPI and nursing notes for additional information       PAST MEDICAL & SURGICAL HISTORY    Past Medical History:   Diagnosis Date    Anxiety disorder     Back pain, chronic     [facility]     COPD (chronic obstructive pulmonary disease) [facility]     CVA (cerebral infarction) 2014 x2    Diabetes mellitus ([facility]     Diverticulosis     Epilepsy ([facility]     GERD (gastroesophageal reflux disease)     Hyperlipidemia     Hypertension     Insomnia     Iron (Fe) deficiency anemia     Mental disability     Seizures (HCC)     Unspecified cerebral artery occlusion with cerebral infarction      Past Surgical History:   Procedure Laterality Date    GASTROSTOMY TUBE PLACEMENT         CURRENT MEDICATIONS    Current Outpatient Rx   Medication Sig Dispense Refill    ibuprofen (ADVIL;MOTRIN) 600 MG tablet Take 1 tablet by mouth every 8 hours as needed for Pain 21 tablet 0    UNABLE TO FIND Fitted wheel chair 1 Units 0    COMBIVENT RESPIMAT  MCG/ACT AERS inhaler INHALE 1 PUFF BY ORAL INHALATION EVERY 6 HOURS 4 g 5    pantoprazole (PROTONIX) 40 MG tablet TAKE 1 TABLET BY MOUTH EVERY MORNING (BEFORE BREAKFAST) 31 tablet 5    furosemide (LASIX) 20 MG tablet TAKE ONE TABLET BY MOUTH DAILY 31 tablet 5    amLODIPine (NORVASC) 5 MG tablet TAKE 1 TABLET BY MOUTH DAILY 31 tablet 5    lisinopril (PRINIVIL;ZESTRIL) 40 MG tablet TAKE 1 TABLET BY MOUTH DAILY 31 tablet 5    Lactobacillus (ACIDOPHILUS) CAPS capsule TAKE 1 CAPSULE BY MOUTH DAILY 31 capsule 5    nystatin (MYCOSTATIN) 359154 UNIT/GM powder Apply topically 4 times daily. under the abdominal folds for 2 wks 1 Bottle 1    carBAMazepine (CARBATROL) 300 MG extended release capsule TAKE 1 CAPSULE BY MOUTH TWICE A DAY 62 capsule 5    mirtazapine (REMERON) 30 MG tablet TAKE 1 TABLET BY MOUTH NIGHTLY 31 tablet 5    atorvastatin (LIPITOR) 40 MG tablet TAKE 1 TABLET BY MOUTH DAILY 31 tablet 5    cetirizine (ZYRTEC) 10 MG tablet TAKE 1 TABLET BY MOUTH DAILY (BED) 31 tablet 5    Ondansetron HCl (ZOFRAN PO) Take 1 tablet by mouth every 8 hours Indications: per facility med list      docusate sodium (COLACE) 100 MG capsule Take 100 mg by mouth 2 times daily      naproxen (NAPROSYN) 250 MG tablet Take 250 mg by mouth 2 times daily (with meals)      diphenhydrAMINE (BENADRYL) 25 MG capsule Take 25 mg by mouth every 6 hours as needed for Itching      SUMAtriptan (IMITREX) 50 MG tablet Take 50 mg by mouth once as needed for Migraine      benzonatate (TESSALON) 100 MG capsule Take 100 mg by mouth 3 times daily as needed for Cough      aspirin 325 MG tablet Take 325 mg by mouth every 4 hours as needed for Pain      metoprolol tartrate (LOPRESSOR) 25 MG tablet Take 25 mg by mouth 2 times daily      ipratropium-albuterol (DUONEB) 0.5-2.5 (3) MG/3ML SOLN nebulizer solution Inhale 1 vial into the lungs every 6 hours as needed for Shortness of Breath      JANUVIA 100 MG tablet TAKE 1 TABLET BY MOUTH DAILY 31 tablet 5    blood glucose monitor strips Test 3 times a day & as needed for symptoms of irregular blood glucose.  90 strip 0    fluticasone (FLONASE) 50 MCG/ACT nasal spray 1 spray by Each Nare route daily      lactulose (CHRONULAC) 10 GM/15ML solution Take 20 g by mouth 3 times daily as needed       levothyroxine (SYNTHROID) 50 MCG tablet Take 50 mcg by mouth Daily      busPIRone (BUSPAR) 10 MG tablet Take 1 tablet by mouth 2 times daily 60 tablet 5    divalproex (DEPAKOTE) 500 MG DR tablet Take 4 tablets by mouth nightly 90 tablet 0    levETIRAcetam (KEPPRA) 750 MG tablet Take 2 tablets by mouth 2 times daily 120 tablet 0    metFORMIN (GLUCOPHAGE) 500 MG tablet Take 2 tablets by mouth daily (with breakfast) 30 tablet 0    risperiDONE (RISPERDAL) 1 MG tablet Take 1 tablet by mouth 3 times daily 1 tablet in am, 1 tablet at 4pm, 1 tablet at bedtime 60 tablet 5    sertraline (ZOLOFT) 100 MG tablet Take 2 tablets by mouth nightly 30 tablet 5       ALLERGIES    Allergies   Allergen Reactions    Macrobid [Nitrofurantoin] Hives and Swelling     Hives       SOCIAL & FAMILY HISTORY    Social History     Socioeconomic History    Marital status: Single     Spouse name: None    Number of children: None    Years of education: None    Highest education level: None   Occupational History    None   Social Needs    Financial resource strain: None    Food insecurity:     Worry: None     Inability: None    Transportation needs:     Medical: None     Non-medical: None   Tobacco Use    Smoking status: Former Smoker     Packs/day: 1.50     Years: 20.00     Pack years: 30.00     Last attempt to quit: 2011     Years since quittin.2    Smokeless tobacco: Never Used   Substance and Sexual Activity    Alcohol use: No     Alcohol/week: 0.0 standard drinks    Drug use: No    Sexual activity: None   Lifestyle    Physical activity:     Days per week: None     Minutes per session: None    Stress: None   Relationships    Social connections:     Talks on phone: None     Gets together: None     Attends Congregational service: None     Active member of club or organization: None     Attends meetings of clubs or organizations: None     Relationship status: None    Intimate partner violence:     Fear of current or ex partner: None     Emotionally abused: None     Physically abused: None     Forced sexual activity: None   Other Topics Concern    None   Social History Narrative    None     History reviewed. No pertinent family history. PHYSICAL EXAM    VITAL SIGNS: BP (!) 138/51   Pulse 80   Temp 98.3 °F (36.8 °C) (Oral)   Resp 17   SpO2 96%    Constitutional: Female patient lying in bed with nasal cannula oxygen in place, no increased work of breathing  HENT:  Atraumatic, moist mucus membranes, oropharynx is clear  Neck/Lymphatics: supple, no JVD, no swollen nodes  Respiratory:  Nonlabored breathing.   Lungs with occasional crackles and wheezes, no retractions   Cardiovascular: Normal rate and rhythm  GI:  Soft, nontender, normal bowel sounds  Musculoskeletal: Atrophy to bilateral lower extremities, no obvious swelling  Integument:  Skin is warm and dry, no petechiae   Neurologic: Answers questions, cooperative  Psych: Flat affect      EKG      EKG Interpretation  Please see ED physician's note for EKG interpretation      LABS:  Results for orders placed or performed during the hospital encounter of 11/29/19   Rapid influenza A/B antigens   Result Value Ref Range    Rapid Influenza A Ag NEGATIVE NEGATIVE    Rapid Influenza B Ag NEGATIVE NEGATIVE   CBC with Auto Diff   Result Value Ref Range    WBC 7.8 4.0 - 10.5 K/CU MM    RBC 3.89 (L) 4.2 - 5.4 M/CU MM    Hemoglobin 10.6 (L) 12.5 - 16.0 GM/DL    Hematocrit 37.3 37 - 47 %    MCV 95.9 78 - 100 FL    MCH 27.2 27 - 31 PG    MCHC 28.4 (L) 32.0 - 36.0 %    RDW 14.7 11.7 - 14.9 %    Platelets 276 961 - 758 K/CU MM    MPV 8.9 7.5 - 11.1 FL    Differential Type AUTOMATED DIFFERENTIAL     Segs Relative 68.2 (H) 36 - 66 %    Lymphocytes % 22.0 (L) 24 - 44 %    Monocytes % 8.1 (H) 0 - 4 %    Eosinophils % 0.8 0 - 3 %    Basophils % 0.3 0 - 1 %    Segs Absolute 5.3 K/CU MM may contain errors related to that system including errors in grammar, punctuation, and spelling, as well as words and phrases that may be inappropriate. If there are any questions or concerns please feel free to contact the dictating provider for clarification.                           Dois PUMA Liriano  11/29/19 4474

## 2019-11-29 NOTE — LETTER
[facility]  [address] [address]  Phone: [phone]             December 2, 2019    Patient: Lisa Gutierrez   YOB: 1960   Date of Visit: 11/29/2019       To Whom It May Concern:    Mily Michele was seen and treated in our facility  beginning 11/29/2019 until 12/2/2019. She may return to work on 12/16.       Sincerely,       Petra Naqvi RN         Signature:__________________________________

## 2019-11-30 LAB
ADENOVIRUS DETECTION BY PCR: NOT DETECTED
ANION GAP SERPL CALCULATED.3IONS-SCNC: 12 MMOL/L (ref 4–16)
BORDETELLA PERTUSSIS PCR: NOT DETECTED
BUN BLDV-MCNC: 18 MG/DL (ref 6–23)
CALCIUM SERPL-MCNC: 9.3 MG/DL (ref 8.3–10.6)
CHLAMYDOPHILA PNEUMONIA PCR: NOT DETECTED
CHLORIDE BLD-SCNC: 102 MMOL/L (ref 99–110)
CO2: 30 MMOL/L (ref 21–32)
CORONAVIRUS 229E PCR: NOT DETECTED
CORONAVIRUS HKU1 PCR: NOT DETECTED
CORONAVIRUS NL63 PCR: NOT DETECTED
CORONAVIRUS OC43 PCR: NOT DETECTED
CREAT SERPL-MCNC: 0.6 MG/DL (ref 0.6–1.1)
GFR AFRICAN AMERICAN: >60 ML/MIN/1.73M2
GFR NON-AFRICAN AMERICAN: >60 ML/MIN/1.73M2
GLUCOSE BLD-MCNC: 169 MG/DL (ref 70–99)
GLUCOSE BLD-MCNC: 185 MG/DL (ref 70–99)
GLUCOSE BLD-MCNC: 188 MG/DL (ref 70–99)
GLUCOSE BLD-MCNC: 195 MG/DL (ref 70–99)
GLUCOSE BLD-MCNC: 210 MG/DL (ref 70–99)
GLUCOSE BLD-MCNC: 214 MG/DL (ref 70–99)
HUMAN METAPNEUMOVIRUS PCR: NOT DETECTED
INFLUENZA A BY PCR: NOT DETECTED
INFLUENZA A H1 (2009) PCR: NOT DETECTED
INFLUENZA A H1 PANDEMIC PCR: NOT DETECTED
INFLUENZA A H3 PCR: NOT DETECTED
INFLUENZA B BY PCR: NOT DETECTED
LACTATE: 3.4 MMOL/L (ref 0.4–2)
LACTATE: ABNORMAL MMOL/L (ref 0.4–2)
LACTIC ACID, SEPSIS: 1.5 MMOL/L (ref 0.5–1.9)
LACTIC ACID, SEPSIS: ABNORMAL MMOL/L (ref 0.5–1.9)
LEGIONELLA URINARY AG: NEGATIVE
MAGNESIUM: 2.2 MG/DL (ref 1.8–2.4)
MYCOPLASMA PNEUMONIAE PCR: NOT DETECTED
PARAINFLUENZA 1 PCR: NOT DETECTED
PARAINFLUENZA 2 PCR: NOT DETECTED
PARAINFLUENZA 3 PCR: NOT DETECTED
PARAINFLUENZA 4 PCR: NOT DETECTED
POTASSIUM SERPL-SCNC: 4.8 MMOL/L (ref 3.5–5.1)
PROCALCITONIN: 0.03
RHINOVIRUS ENTEROVIRUS PCR: NOT DETECTED
RSV PCR: NOT DETECTED
SODIUM BLD-SCNC: 144 MMOL/L (ref 135–145)
STREP PNEUMONIAE ANTIGEN: NORMAL

## 2019-11-30 PROCEDURE — 2700000000 HC OXYGEN THERAPY PER DAY

## 2019-11-30 PROCEDURE — 6370000000 HC RX 637 (ALT 250 FOR IP): Performed by: INTERNAL MEDICINE

## 2019-11-30 PROCEDURE — 80048 BASIC METABOLIC PNL TOTAL CA: CPT

## 2019-11-30 PROCEDURE — 87798 DETECT AGENT NOS DNA AMP: CPT

## 2019-11-30 PROCEDURE — 94761 N-INVAS EAR/PLS OXIMETRY MLT: CPT

## 2019-11-30 PROCEDURE — 87633 RESP VIRUS 12-25 TARGETS: CPT

## 2019-11-30 PROCEDURE — 1200000000 HC SEMI PRIVATE

## 2019-11-30 PROCEDURE — 87581 M.PNEUMON DNA AMP PROBE: CPT

## 2019-11-30 PROCEDURE — 83735 ASSAY OF MAGNESIUM: CPT

## 2019-11-30 PROCEDURE — 6360000002 HC RX W HCPCS: Performed by: INTERNAL MEDICINE

## 2019-11-30 PROCEDURE — 84145 PROCALCITONIN (PCT): CPT

## 2019-11-30 PROCEDURE — 36415 COLL VENOUS BLD VENIPUNCTURE: CPT

## 2019-11-30 PROCEDURE — 94640 AIRWAY INHALATION TREATMENT: CPT

## 2019-11-30 PROCEDURE — 82962 GLUCOSE BLOOD TEST: CPT

## 2019-11-30 PROCEDURE — 2580000003 HC RX 258: Performed by: INTERNAL MEDICINE

## 2019-11-30 PROCEDURE — 87449 NOS EACH ORGANISM AG IA: CPT

## 2019-11-30 PROCEDURE — 87486 CHLMYD PNEUM DNA AMP PROBE: CPT

## 2019-11-30 PROCEDURE — 83605 ASSAY OF LACTIC ACID: CPT

## 2019-11-30 PROCEDURE — 93010 ELECTROCARDIOGRAM REPORT: CPT | Performed by: INTERNAL MEDICINE

## 2019-11-30 RX ORDER — 0.9 % SODIUM CHLORIDE 0.9 %
1000 INTRAVENOUS SOLUTION INTRAVENOUS ONCE
Status: COMPLETED | OUTPATIENT
Start: 2019-11-30 | End: 2019-11-30

## 2019-11-30 RX ORDER — SODIUM CHLORIDE 9 MG/ML
INJECTION, SOLUTION INTRAVENOUS CONTINUOUS
Status: DISCONTINUED | OUTPATIENT
Start: 2019-11-30 | End: 2019-12-01

## 2019-11-30 RX ADMIN — LINAGLIPTIN 5 MG: 5 TABLET, FILM COATED ORAL at 09:54

## 2019-11-30 RX ADMIN — MIRTAZAPINE 30 MG: 15 TABLET, FILM COATED ORAL at 22:25

## 2019-11-30 RX ADMIN — LEVOFLOXACIN 500 MG: 5 INJECTION, SOLUTION INTRAVENOUS at 09:53

## 2019-11-30 RX ADMIN — IPRATROPIUM BROMIDE AND ALBUTEROL SULFATE 1 AMPULE: .5; 3 SOLUTION RESPIRATORY (INHALATION) at 07:18

## 2019-11-30 RX ADMIN — LISINOPRIL 40 MG: 40 TABLET ORAL at 09:54

## 2019-11-30 RX ADMIN — RISPERIDONE 1 MG: 0.5 TABLET, FILM COATED ORAL at 22:25

## 2019-11-30 RX ADMIN — IPRATROPIUM BROMIDE AND ALBUTEROL SULFATE 1 AMPULE: .5; 3 SOLUTION RESPIRATORY (INHALATION) at 11:17

## 2019-11-30 RX ADMIN — DOCUSATE SODIUM 100 MG: 100 CAPSULE, LIQUID FILLED ORAL at 22:28

## 2019-11-30 RX ADMIN — LEVOTHYROXINE SODIUM 50 MCG: 50 TABLET ORAL at 07:11

## 2019-11-30 RX ADMIN — RISPERIDONE 1 MG: 0.5 TABLET, FILM COATED ORAL at 09:54

## 2019-11-30 RX ADMIN — CARBAMAZEPINE 300 MG: 300 CAPSULE, EXTENDED RELEASE ORAL at 09:54

## 2019-11-30 RX ADMIN — LEVETIRACETAM 1500 MG: 500 TABLET, FILM COATED ORAL at 22:27

## 2019-11-30 RX ADMIN — METOPROLOL TARTRATE 25 MG: 25 TABLET ORAL at 09:54

## 2019-11-30 RX ADMIN — SODIUM CHLORIDE 1000 ML: 9 INJECTION, SOLUTION INTRAVENOUS at 09:53

## 2019-11-30 RX ADMIN — BUSPIRONE HYDROCHLORIDE 10 MG: 10 TABLET ORAL at 09:00

## 2019-11-30 RX ADMIN — SODIUM CHLORIDE: 9 INJECTION, SOLUTION INTRAVENOUS at 22:37

## 2019-11-30 RX ADMIN — METHYLPREDNISOLONE SODIUM SUCCINATE 40 MG: 40 INJECTION, POWDER, FOR SOLUTION INTRAMUSCULAR; INTRAVENOUS at 14:06

## 2019-11-30 RX ADMIN — IPRATROPIUM BROMIDE AND ALBUTEROL SULFATE 1 AMPULE: .5; 3 SOLUTION RESPIRATORY (INHALATION) at 15:20

## 2019-11-30 RX ADMIN — CETIRIZINE HYDROCHLORIDE 10 MG: 10 TABLET, FILM COATED ORAL at 09:00

## 2019-11-30 RX ADMIN — LEVETIRACETAM 1500 MG: 500 TABLET, FILM COATED ORAL at 09:53

## 2019-11-30 RX ADMIN — ENOXAPARIN SODIUM 40 MG: 40 INJECTION SUBCUTANEOUS at 04:00

## 2019-11-30 RX ADMIN — DOCUSATE SODIUM 100 MG: 100 CAPSULE, LIQUID FILLED ORAL at 09:00

## 2019-11-30 RX ADMIN — INSULIN LISPRO 1 UNITS: 100 INJECTION, SOLUTION INTRAVENOUS; SUBCUTANEOUS at 17:20

## 2019-11-30 RX ADMIN — SODIUM CHLORIDE: 9 INJECTION, SOLUTION INTRAVENOUS at 11:35

## 2019-11-30 RX ADMIN — GUAIFENESIN AND DEXTROMETHORPHAN HYDROBROMIDE 1 TABLET: 600; 30 TABLET, EXTENDED RELEASE ORAL at 22:28

## 2019-11-30 RX ADMIN — INSULIN LISPRO 1 UNITS: 100 INJECTION, SOLUTION INTRAVENOUS; SUBCUTANEOUS at 09:07

## 2019-11-30 RX ADMIN — GUAIFENESIN AND DEXTROMETHORPHAN HYDROBROMIDE 1 TABLET: 600; 30 TABLET, EXTENDED RELEASE ORAL at 09:00

## 2019-11-30 RX ADMIN — METOPROLOL TARTRATE 25 MG: 25 TABLET ORAL at 22:26

## 2019-11-30 RX ADMIN — ATORVASTATIN CALCIUM 40 MG: 40 TABLET, FILM COATED ORAL at 09:00

## 2019-11-30 RX ADMIN — DIVALPROEX SODIUM 2000 MG: 500 TABLET, DELAYED RELEASE ORAL at 22:28

## 2019-11-30 RX ADMIN — MICONAZOLE NITRATE: 20 POWDER TOPICAL at 14:07

## 2019-11-30 RX ADMIN — IPRATROPIUM BROMIDE AND ALBUTEROL SULFATE 1 AMPULE: .5; 3 SOLUTION RESPIRATORY (INHALATION) at 20:29

## 2019-11-30 RX ADMIN — RISPERIDONE 1 MG: 0.5 TABLET, FILM COATED ORAL at 14:06

## 2019-11-30 RX ADMIN — Medication 1 CAPSULE: at 09:54

## 2019-11-30 RX ADMIN — CARBAMAZEPINE 300 MG: 300 CAPSULE, EXTENDED RELEASE ORAL at 22:31

## 2019-11-30 RX ADMIN — MICONAZOLE NITRATE: 20 POWDER TOPICAL at 22:30

## 2019-11-30 RX ADMIN — AMLODIPINE BESYLATE 5 MG: 5 TABLET ORAL at 09:00

## 2019-11-30 RX ADMIN — SERTRALINE HYDROCHLORIDE 200 MG: 100 TABLET ORAL at 22:26

## 2019-11-30 RX ADMIN — METHYLPREDNISOLONE SODIUM SUCCINATE 40 MG: 40 INJECTION, POWDER, FOR SOLUTION INTRAMUSCULAR; INTRAVENOUS at 03:08

## 2019-11-30 RX ADMIN — BUSPIRONE HYDROCHLORIDE 10 MG: 10 TABLET ORAL at 22:26

## 2019-11-30 RX ADMIN — INSULIN LISPRO 2 UNITS: 100 INJECTION, SOLUTION INTRAVENOUS; SUBCUTANEOUS at 11:35

## 2019-11-30 ASSESSMENT — PAIN SCALES - WONG BAKER: WONGBAKER_NUMERICALRESPONSE: 4

## 2019-11-30 ASSESSMENT — PAIN - FUNCTIONAL ASSESSMENT: PAIN_FUNCTIONAL_ASSESSMENT: PREVENTS OR INTERFERES SOME ACTIVE ACTIVITIES AND ADLS

## 2019-11-30 ASSESSMENT — PAIN DESCRIPTION - LOCATION: LOCATION: CHEST

## 2019-11-30 ASSESSMENT — PAIN DESCRIPTION - DESCRIPTORS: DESCRIPTORS: ACHING

## 2019-11-30 ASSESSMENT — PAIN DESCRIPTION - PAIN TYPE: TYPE: ACUTE PAIN

## 2019-11-30 ASSESSMENT — PAIN DESCRIPTION - PROGRESSION: CLINICAL_PROGRESSION: NOT CHANGED

## 2019-11-30 ASSESSMENT — PAIN SCALES - GENERAL: PAINLEVEL_OUTOF10: 2

## 2019-11-30 ASSESSMENT — PAIN DESCRIPTION - FREQUENCY: FREQUENCY: CONTINUOUS

## 2019-11-30 ASSESSMENT — PAIN DESCRIPTION - ONSET: ONSET: ON-GOING

## 2019-11-30 NOTE — PROGRESS NOTES
Progress Note (Hospitalist, Internal Medicine)  IDENTIFYING INFORMATION   PATIENT:  Jacklyn Whitmore  MRN:  [id number]  ADMIT DATE: 11/29/2019      HISTORY OF PRESENT ILLNESS   Jacklyn Whitmore is a 64 y.o. female with a past medical history of MRDD, CP hx of smoking with COPD who presents with acute onset SOB this morning with oyxgen in 87% on RA. Also noted audible wheeze at bedside. She was noted to be well last evening. She presented to the ED where she improved with duonebs and prednisone but remains dependent on 1-2 L NC. Hx provided by caregiver as she is unable to provide due to cognitive dysfunction.       SUBJECTIVE     Doing fair. Lactate high. Decreased oral intake. MEDICATIONS   Medications Prior to Admission  Medications Prior to Admission: ibuprofen (ADVIL;MOTRIN) 600 MG tablet, Take 1 tablet by mouth every 8 hours as needed for Pain  UNABLE TO FIND, Fitted wheel chair  COMBIVENT RESPIMAT  MCG/ACT AERS inhaler, INHALE 1 PUFF BY ORAL INHALATION EVERY 6 HOURS  pantoprazole (PROTONIX) 40 MG tablet, TAKE 1 TABLET BY MOUTH EVERY MORNING (BEFORE BREAKFAST)  furosemide (LASIX) 20 MG tablet, TAKE ONE TABLET BY MOUTH DAILY  amLODIPine (NORVASC) 5 MG tablet, TAKE 1 TABLET BY MOUTH DAILY  lisinopril (PRINIVIL;ZESTRIL) 40 MG tablet, TAKE 1 TABLET BY MOUTH DAILY  Lactobacillus (ACIDOPHILUS) CAPS capsule, TAKE 1 CAPSULE BY MOUTH DAILY  nystatin (MYCOSTATIN) 105292 UNIT/GM powder, Apply topically 4 times daily. under the abdominal folds for 2 wks  carBAMazepine (CARBATROL) 300 MG extended release capsule, TAKE 1 CAPSULE BY MOUTH TWICE A DAY  mirtazapine (REMERON) 30 MG tablet, TAKE 1 TABLET BY MOUTH NIGHTLY  atorvastatin (LIPITOR) 40 MG tablet, TAKE 1 TABLET BY MOUTH DAILY  cetirizine (ZYRTEC) 10 MG tablet, TAKE 1 TABLET BY MOUTH DAILY (BED)  Ondansetron HCl (ZOFRAN PO), Take 1 tablet by mouth every 8 hours Indications: per facility med list  docusate sodium (COLACE) 100 MG capsule, Take 100 mg by mouth 2 times (ZOLOFT) tablet 200 mg  200 mg Oral Nightly Earl Mayo MD   200 mg at 11/29/19 2217    enoxaparin (LOVENOX) injection 40 mg  40 mg Subcutaneous Daily Earl Mayo MD   40 mg at 11/30/19 0400    ipratropium-albuterol (DUONEB) nebulizer solution 1 ampule  1 ampule Inhalation 4x daily Earl Mayo MD   1 ampule at 11/30/19 0718    levofloxacin (LEVAQUIN) 500 MG/100ML infusion 500 mg  500 mg Intravenous Daily Earl Mayo  mL/hr at 11/30/19 0953 500 mg at 11/30/19 0953    dextromethorphan-guaiFENesin ([place] [address] DM)  MG per extended release tablet 1 tablet  1 tablet Oral BID Earl Mayo MD   1 tablet at 11/30/19 0900    guaiFENesin-dextromethorphan (ROBITUSSIN DM) 100-10 MG/5ML syrup 5 mL  5 mL Oral Q4H PRN Earl Mayo MD        methylPREDNISolone sodium (SOLU-MEDROL) injection 40 mg  40 mg Intravenous Q12H Earl Mayo MD   40 mg at 11/30/19 0308    insulin lispro (HUMALOG) injection vial 0-6 Units  0-6 Units Subcutaneous TID WC Earl Mayo MD   1 Units at 11/30/19 5378    insulin lispro (HUMALOG) injection vial 0-3 Units  0-3 Units Subcutaneous Nightly Earl Mayo MD   1 Units at 11/29/19 2233    glucose (GLUTOSE) 40 % oral gel 15 g  15 g Oral PRN Earl Mayo MD        dextrose 50 % IV solution  12.5 g Intravenous PRN Earl Mayo MD        glucagon (rDNA) injection 1 mg  1 mg Intramuscular PRN Earl Mayo MD        dextrose 5 % solution  100 mL/hr Intravenous PRN Earl Mayo MD        influenza quadrivalent split vaccine (FLUZONE;FLUARIX;FLULAVAL;AFLURIA) injection 0.5 mL  0.5 mL Intramuscular Once Earl Mayo MD             Allergies  Allergies   Allergen Reactions    Macrobid [Nitrofurantoin] Hives and Swelling     Hives       REVIEW OF SYSTEMS     Within above limitations. 14 point review of systems reviewed. Pertinent positive or negative as per HPI or otherwise negative per 14 point systems review.      Reviewed 11/30/2019 at 10:02 AM    PHYSICAL EXAM       Blood pressure (!) 127/59, pulse 80, temperature 98.5 °F (36.9 °C), temperature source Oral, resp. rate 16, height 4' 9\" (1.448 m), weight 140 lb 1.6 oz (63.5 kg), SpO2 94 %, not currently breastfeeding. General - Awake but cognitive deficits  Psych - Appropriate affect/speech. No agitation  Eyes - Eye lids intact. No scleral icterus  ENT - Lips wnl. External ear clear/dry/intact. No thyromegaly on inspection  Neuro - Limited by cognitive function  Heart - Sinus. RRR. S1 and S2 present. No added HS/murmurs appreciated. No elevated JVD appreciated  Lung - Adequate air entry b/l, No crackles, improving scant wheezes appreciated  GI - Soft. No guarding/rigidity. No hepatosplenomegaly/ascites. BS+   - No CVA/suprapubic tenderness or palpable bladder distension  Skin - Trace edema. Intact. No rash/petechiae/ecchymosis. Warm extremities      LABS AND IMAGING   CBC  [unfilled]    Last 3 Hemoglobin  Lab Results   Component Value Date    HGB 10.6 11/29/2019    HGB 12.1 07/02/2019    HGB 11.1 06/28/2019     Last 3 WBC/ANC  Lab Results   Component Value Date    WBC 7.8 11/29/2019    WBC 6.2 07/02/2019    WBC 6.1 06/28/2019     No components found for: GRNLOCTYABS  Last 3 Platelets  No results found for: PLATELET  Chemistry  [unfilled]  [unfilled]  No results found for: LDH  Coagulation Studies  Lab Results   Component Value Date    INR 0.82 12/04/2017     Liver Function Studies  Lab Results   Component Value Date    ALT 7 11/29/2019    AST 9 11/29/2019    ALKPHOS 81 11/29/2019       Recent Imaging    Minor Carp #[id number] ([id number]) (64 y.o.  F) (Adm: 11/29/19)    [facility] [id number]         Imaging Results (last 7 days)          Procedure Component Value Ref Range Date/Time     XR CHEST PORTABLE [[id number]] Collected: 11/29/19 1040     Order Status: Completed Updated: 11/29/19 1043     Narrative:       EXAMINATION:  ONE XRAY VIEW OF THE CHEST    11/29/2019 10:34 am    COMPARISON:  July 2, 2019    HISTORY:  ORDERING SYSTEM PROVIDED HISTORY: SOB  TECHNOLOGIST PROVIDED HISTORY:  Reason for exam:->SOB  Reason for Exam: sob  Acuity: Acute  Type of Exam: Initial  Relevant Medical/Surgical History: COPD    FINDINGS:  The lungs are without acute focal process.  There is no effusion or  pneumothorax. The cardiomediastinal silhouette is without acute process. The  osseous structures are without acute process.     Impression:       No acute process.             Relevant labs and imaging reviewed    ASSESSMENT AND PLAN         Acute on hypoxic resp failure needing 2 L NC  Suspect viral bronchitis  COPD  - known to Dr Nilo Li  - RVP, serologies  - IV levaquin, duonebs, IV steroids, anti-tussive - weaning IV steroids today  - speech eval due to MRDD - rec: thin liquid/dysphagia III (soft and bite-sized diet) with general aspiration precautions.  Give pills whole in applesauce or pudding  - IVF, bolus due to elevated lactate 11/30  - tele, pulse ox     DMII - add ISS  Seizure  Hx of CVA with left side weakness  HTN  Seizure  MRDD, CP  - hold lasix to avoid dehydration     Lovenox ppx          [facility], Internal Medicine  11/30/2019 at 10:02 AM

## 2019-12-01 LAB
ALBUMIN SERPL-MCNC: 3.2 GM/DL (ref 3.4–5)
ALP BLD-CCNC: 64 IU/L (ref 40–128)
ALT SERPL-CCNC: 6 U/L (ref 10–40)
ANION GAP SERPL CALCULATED.3IONS-SCNC: 12 MMOL/L (ref 4–16)
AST SERPL-CCNC: 9 IU/L (ref 15–37)
BANDED NEUTROPHILS ABSOLUTE COUNT: 0.26 K/CU MM
BANDED NEUTROPHILS RELATIVE PERCENT: 5 % (ref 5–11)
BILIRUB SERPL-MCNC: 0.2 MG/DL (ref 0–1)
BUN BLDV-MCNC: 14 MG/DL (ref 6–23)
CALCIUM SERPL-MCNC: 8.7 MG/DL (ref 8.3–10.6)
CHLORIDE BLD-SCNC: 101 MMOL/L (ref 99–110)
CO2: 27 MMOL/L (ref 21–32)
CREAT SERPL-MCNC: 0.6 MG/DL (ref 0.6–1.1)
CULTURE: ABNORMAL
CULTURE: ABNORMAL
DIFFERENTIAL TYPE: ABNORMAL
GFR AFRICAN AMERICAN: >60 ML/MIN/1.73M2
GFR NON-AFRICAN AMERICAN: >60 ML/MIN/1.73M2
GLUCOSE BLD-MCNC: 139 MG/DL (ref 70–99)
GLUCOSE BLD-MCNC: 159 MG/DL (ref 70–99)
GLUCOSE BLD-MCNC: 199 MG/DL (ref 70–99)
GLUCOSE BLD-MCNC: 202 MG/DL (ref 70–99)
HCT VFR BLD CALC: 34.8 % (ref 37–47)
HEMOGLOBIN: 9.9 GM/DL (ref 12.5–16)
LACTIC ACID, SEPSIS: 1.1 MMOL/L (ref 0.5–1.9)
LYMPHOCYTES ABSOLUTE: 1.6 K/CU MM
LYMPHOCYTES RELATIVE PERCENT: 31 % (ref 24–44)
Lab: ABNORMAL
MAGNESIUM: 2 MG/DL (ref 1.8–2.4)
MCH RBC QN AUTO: 28 PG (ref 27–31)
MCHC RBC AUTO-ENTMCNC: 28.4 % (ref 32–36)
MCV RBC AUTO: 98.3 FL (ref 78–100)
METAMYELOCYTES ABSOLUTE COUNT: 0.15 K/CU MM
METAMYELOCYTES PERCENT: 3 %
MONOCYTES ABSOLUTE: 0.1 K/CU MM
MONOCYTES RELATIVE PERCENT: 2 % (ref 0–4)
MYELOCYTE PERCENT: 1 %
MYELOCYTES ABSOLUTE COUNT: 0.05 K/CU MM
PDW BLD-RTO: 14.3 % (ref 11.7–14.9)
PHOSPHORUS: 4.1 MG/DL (ref 2.5–4.9)
PLATELET # BLD: 225 K/CU MM (ref 140–440)
PMV BLD AUTO: 9 FL (ref 7.5–11.1)
POLYCHROMASIA: ABNORMAL
POTASSIUM SERPL-SCNC: 4.5 MMOL/L (ref 3.5–5.1)
RBC # BLD: 3.54 M/CU MM (ref 4.2–5.4)
RBC # BLD: ABNORMAL 10*6/UL
SEGMENTED NEUTROPHILS ABSOLUTE COUNT: 2.9 K/CU MM
SEGMENTED NEUTROPHILS RELATIVE PERCENT: 58 % (ref 36–66)
SODIUM BLD-SCNC: 140 MMOL/L (ref 135–145)
SPECIMEN: ABNORMAL
TOTAL COLONY COUNT: ABNORMAL
TOTAL PROTEIN: 5.4 GM/DL (ref 6.4–8.2)
WBC # BLD: 5.1 K/CU MM (ref 4–10.5)

## 2019-12-01 PROCEDURE — 90686 IIV4 VACC NO PRSV 0.5 ML IM: CPT | Performed by: INTERNAL MEDICINE

## 2019-12-01 PROCEDURE — 6370000000 HC RX 637 (ALT 250 FOR IP): Performed by: INTERNAL MEDICINE

## 2019-12-01 PROCEDURE — 6360000002 HC RX W HCPCS: Performed by: INTERNAL MEDICINE

## 2019-12-01 PROCEDURE — 84100 ASSAY OF PHOSPHORUS: CPT

## 2019-12-01 PROCEDURE — 82962 GLUCOSE BLOOD TEST: CPT

## 2019-12-01 PROCEDURE — 36415 COLL VENOUS BLD VENIPUNCTURE: CPT

## 2019-12-01 PROCEDURE — 80053 COMPREHEN METABOLIC PANEL: CPT

## 2019-12-01 PROCEDURE — 1200000000 HC SEMI PRIVATE

## 2019-12-01 PROCEDURE — G0008 ADMIN INFLUENZA VIRUS VAC: HCPCS | Performed by: INTERNAL MEDICINE

## 2019-12-01 PROCEDURE — 85027 COMPLETE CBC AUTOMATED: CPT

## 2019-12-01 PROCEDURE — 94640 AIRWAY INHALATION TREATMENT: CPT

## 2019-12-01 PROCEDURE — 80048 BASIC METABOLIC PNL TOTAL CA: CPT

## 2019-12-01 PROCEDURE — 85007 BL SMEAR W/DIFF WBC COUNT: CPT

## 2019-12-01 PROCEDURE — 83735 ASSAY OF MAGNESIUM: CPT

## 2019-12-01 PROCEDURE — 2700000000 HC OXYGEN THERAPY PER DAY

## 2019-12-01 PROCEDURE — 94761 N-INVAS EAR/PLS OXIMETRY MLT: CPT

## 2019-12-01 RX ORDER — METHYLPREDNISOLONE SODIUM SUCCINATE 40 MG/ML
40 INJECTION, POWDER, LYOPHILIZED, FOR SOLUTION INTRAMUSCULAR; INTRAVENOUS DAILY
Status: DISCONTINUED | OUTPATIENT
Start: 2019-12-01 | End: 2019-12-02 | Stop reason: HOSPADM

## 2019-12-01 RX ORDER — SODIUM CHLORIDE 9 MG/ML
INJECTION, SOLUTION INTRAVENOUS CONTINUOUS
Status: DISCONTINUED | OUTPATIENT
Start: 2019-12-01 | End: 2019-12-02 | Stop reason: HOSPADM

## 2019-12-01 RX ADMIN — Medication 1 CAPSULE: at 11:17

## 2019-12-01 RX ADMIN — LEVETIRACETAM 1500 MG: 500 TABLET, FILM COATED ORAL at 23:10

## 2019-12-01 RX ADMIN — MIRTAZAPINE 30 MG: 15 TABLET, FILM COATED ORAL at 23:11

## 2019-12-01 RX ADMIN — METOPROLOL TARTRATE 25 MG: 25 TABLET ORAL at 11:17

## 2019-12-01 RX ADMIN — MICONAZOLE NITRATE: 20 POWDER TOPICAL at 13:39

## 2019-12-01 RX ADMIN — PANTOPRAZOLE SODIUM 40 MG: 40 TABLET, DELAYED RELEASE ORAL at 06:36

## 2019-12-01 RX ADMIN — CARBAMAZEPINE 300 MG: 300 CAPSULE, EXTENDED RELEASE ORAL at 23:12

## 2019-12-01 RX ADMIN — CETIRIZINE HYDROCHLORIDE 10 MG: 10 TABLET, FILM COATED ORAL at 11:17

## 2019-12-01 RX ADMIN — DOCUSATE SODIUM 100 MG: 100 CAPSULE, LIQUID FILLED ORAL at 23:11

## 2019-12-01 RX ADMIN — LEVOFLOXACIN 500 MG: 5 INJECTION, SOLUTION INTRAVENOUS at 11:06

## 2019-12-01 RX ADMIN — RISPERIDONE 1 MG: 0.5 TABLET, FILM COATED ORAL at 23:11

## 2019-12-01 RX ADMIN — BUSPIRONE HYDROCHLORIDE 10 MG: 10 TABLET ORAL at 23:11

## 2019-12-01 RX ADMIN — BUSPIRONE HYDROCHLORIDE 10 MG: 10 TABLET ORAL at 11:18

## 2019-12-01 RX ADMIN — LISINOPRIL 40 MG: 40 TABLET ORAL at 11:18

## 2019-12-01 RX ADMIN — METHYLPREDNISOLONE SODIUM SUCCINATE 40 MG: 40 INJECTION, POWDER, LYOPHILIZED, FOR SOLUTION INTRAMUSCULAR; INTRAVENOUS at 15:04

## 2019-12-01 RX ADMIN — METOPROLOL TARTRATE 25 MG: 25 TABLET ORAL at 23:11

## 2019-12-01 RX ADMIN — ATORVASTATIN CALCIUM 40 MG: 40 TABLET, FILM COATED ORAL at 11:18

## 2019-12-01 RX ADMIN — LEVOTHYROXINE SODIUM 50 MCG: 50 TABLET ORAL at 06:36

## 2019-12-01 RX ADMIN — RISPERIDONE 1 MG: 0.5 TABLET, FILM COATED ORAL at 15:21

## 2019-12-01 RX ADMIN — DIVALPROEX SODIUM 2000 MG: 500 TABLET, DELAYED RELEASE ORAL at 23:12

## 2019-12-01 RX ADMIN — IPRATROPIUM BROMIDE AND ALBUTEROL SULFATE 1 AMPULE: .5; 3 SOLUTION RESPIRATORY (INHALATION) at 21:01

## 2019-12-01 RX ADMIN — INSULIN LISPRO 2 UNITS: 100 INJECTION, SOLUTION INTRAVENOUS; SUBCUTANEOUS at 11:01

## 2019-12-01 RX ADMIN — IPRATROPIUM BROMIDE AND ALBUTEROL SULFATE 1 AMPULE: .5; 3 SOLUTION RESPIRATORY (INHALATION) at 07:51

## 2019-12-01 RX ADMIN — RISPERIDONE 1 MG: 0.5 TABLET, FILM COATED ORAL at 11:16

## 2019-12-01 RX ADMIN — GUAIFENESIN AND DEXTROMETHORPHAN HYDROBROMIDE 1 TABLET: 600; 30 TABLET, EXTENDED RELEASE ORAL at 11:17

## 2019-12-01 RX ADMIN — AMLODIPINE BESYLATE 5 MG: 5 TABLET ORAL at 11:16

## 2019-12-01 RX ADMIN — IPRATROPIUM BROMIDE AND ALBUTEROL SULFATE 1 AMPULE: .5; 3 SOLUTION RESPIRATORY (INHALATION) at 14:49

## 2019-12-01 RX ADMIN — MICONAZOLE NITRATE: 20 POWDER TOPICAL at 23:13

## 2019-12-01 RX ADMIN — GUAIFENESIN AND DEXTROMETHORPHAN HYDROBROMIDE 1 TABLET: 600; 30 TABLET, EXTENDED RELEASE ORAL at 23:12

## 2019-12-01 RX ADMIN — METHYLPREDNISOLONE SODIUM SUCCINATE 40 MG: 40 INJECTION, POWDER, FOR SOLUTION INTRAMUSCULAR; INTRAVENOUS at 03:15

## 2019-12-01 RX ADMIN — LINAGLIPTIN 5 MG: 5 TABLET, FILM COATED ORAL at 11:17

## 2019-12-01 RX ADMIN — ENOXAPARIN SODIUM 40 MG: 40 INJECTION SUBCUTANEOUS at 11:15

## 2019-12-01 RX ADMIN — LEVETIRACETAM 1500 MG: 500 TABLET, FILM COATED ORAL at 11:19

## 2019-12-01 RX ADMIN — DOCUSATE SODIUM 100 MG: 100 CAPSULE, LIQUID FILLED ORAL at 11:17

## 2019-12-01 RX ADMIN — SERTRALINE HYDROCHLORIDE 200 MG: 100 TABLET ORAL at 23:12

## 2019-12-01 RX ADMIN — INFLUENZA A VIRUS A/BRISBANE/02/2018 IVR-190 (H1N1) ANTIGEN (PROPIOLACTONE INACTIVATED), INFLUENZA A VIRUS A/KANSAS/14/2017 X-327 (H3N2) ANTIGEN (PROPIOLACTONE INACTIVATED), INFLUENZA B VIRUS B/MARYLAND/15/2016 ANTIGEN (PROPIOLACTONE INACTIVATED), INFLUENZA B VIRUS B/PHUKET/3073/2013 BVR-1B ANTIGEN (PROPIOLACTONE INACTIVATED) 0.5 ML: 15; 15; 15; 15 INJECTION, SUSPENSION INTRAMUSCULAR at 11:30

## 2019-12-01 RX ADMIN — CARBAMAZEPINE 300 MG: 300 CAPSULE, EXTENDED RELEASE ORAL at 11:16

## 2019-12-01 NOTE — PROGRESS NOTES
Progress Note (Hospitalist, Internal Medicine)  IDENTIFYING INFORMATION   PATIENT:  Jacklyn Whitmore  MRN:  [id number]  ADMIT DATE: 11/29/2019      HISTORY OF PRESENT ILLNESS   Jacklyn Whitmore is a 64 y.o. female with a past medical history of MRDD, CP hx of smoking with COPD who presents with acute onset SOB this morning with oyxgen in 87% on RA. Also noted audible wheeze at bedside. She was noted to be well last evening. She presented to the ED where she improved with duonebs and prednisone but remains dependent on 1-2 L NC. Hx provided by caregiver as she is unable to provide due to cognitive dysfunction.       SUBJECTIVE     Doing fair. Lactate high. Decreased oral intake. MEDICATIONS   Medications Prior to Admission  Medications Prior to Admission: ibuprofen (ADVIL;MOTRIN) 600 MG tablet, Take 1 tablet by mouth every 8 hours as needed for Pain  UNABLE TO FIND, Fitted wheel chair  COMBIVENT RESPIMAT  MCG/ACT AERS inhaler, INHALE 1 PUFF BY ORAL INHALATION EVERY 6 HOURS  pantoprazole (PROTONIX) 40 MG tablet, TAKE 1 TABLET BY MOUTH EVERY MORNING (BEFORE BREAKFAST)  furosemide (LASIX) 20 MG tablet, TAKE ONE TABLET BY MOUTH DAILY  amLODIPine (NORVASC) 5 MG tablet, TAKE 1 TABLET BY MOUTH DAILY  lisinopril (PRINIVIL;ZESTRIL) 40 MG tablet, TAKE 1 TABLET BY MOUTH DAILY  Lactobacillus (ACIDOPHILUS) CAPS capsule, TAKE 1 CAPSULE BY MOUTH DAILY  nystatin (MYCOSTATIN) 510381 UNIT/GM powder, Apply topically 4 times daily. under the abdominal folds for 2 wks  carBAMazepine (CARBATROL) 300 MG extended release capsule, TAKE 1 CAPSULE BY MOUTH TWICE A DAY  mirtazapine (REMERON) 30 MG tablet, TAKE 1 TABLET BY MOUTH NIGHTLY  atorvastatin (LIPITOR) 40 MG tablet, TAKE 1 TABLET BY MOUTH DAILY  cetirizine (ZYRTEC) 10 MG tablet, TAKE 1 TABLET BY MOUTH DAILY (BED)  Ondansetron HCl (ZOFRAN PO), Take 1 tablet by mouth every 8 hours Indications: per facility med list  docusate sodium (COLACE) 100 MG capsule, Take 100 mg by mouth 2 times capsule Oral Daily Lenore Vasquez MD   1 capsule at 11/30/19 0954    lisinopril (PRINIVIL;ZESTRIL) tablet 40 mg  40 mg Oral Daily Lenore Vasquez MD   40 mg at 11/30/19 0954    levothyroxine (SYNTHROID) tablet 50 mcg  50 mcg Oral Daily Lenore Vasquez MD   50 mcg at 12/01/19 0636    levETIRAcetam (KEPPRA) tablet 1,500 mg  1,500 mg Oral BID Lenore Vasquez MD   1,500 mg at 11/30/19 2227    lactulose (CHRONULAC) 10 GM/15ML solution 20 g  20 g Oral TID PRN Lenore Vasquez MD        linagliptin (TRADJENTA) tablet 5 mg  5 mg Oral Daily Lenore Vasquez MD   5 mg at 11/30/19 6455    docusate sodium (COLACE) capsule 100 mg  100 mg Oral BID Lenore Vasquez MD   100 mg at 11/30/19 2228    divalproex (DEPAKOTE) DR tablet 2,000 mg  2,000 mg Oral Nightly Lenore Vasquez MD   2,000 mg at 11/30/19 2228    diphenhydrAMINE (BENADRYL) tablet 25 mg  25 mg Oral Q6H PRN Lenore Vasquez MD        cetirizine (ZYRTEC) tablet 10 mg  10 mg Oral Daily Lenore Vasquez MD   10 mg at 11/30/19 0900    carBAMazepine (CARBATROL) extended release capsule 300 mg  300 mg Oral BID Lenore Vasquez MD   300 mg at 11/30/19 2231    busPIRone (BUSPAR) tablet 10 mg  10 mg Oral BID Lenore Vasquez MD   10 mg at 11/30/19 2226    atorvastatin (LIPITOR) tablet 40 mg  40 mg Oral Daily Lenore Vasquez MD   40 mg at 11/30/19 0900    aspirin tablet 325 mg  325 mg Oral Q4H PRN Lenore Vasquez MD        amLODIPine (NORVASC) tablet 5 mg  5 mg Oral Daily Lenore Vasquez MD   5 mg at 11/30/19 0900    metoprolol tartrate (LOPRESSOR) tablet 25 mg  25 mg Oral BID Lenore Vasquez MD   25 mg at 11/30/19 2226    mirtazapine (REMERON) tablet 30 mg  30 mg Oral Nightly Lenore Vasquez MD   30 mg at 11/30/19 2225    miconazole (MICOTIN) 2 % powder   Topical BID Lenore Vasquez MD        pantoprazole (PROTONIX) tablet 40 mg  40 mg Oral QAM AC Lenore Vasquez MD   40 mg at 12/01/19 0636    risperiDONE (RISPERDAL) tablet 1 mg  1 mg Oral TID Lenore Vasquez MD   1 mg at 11/30/19 2222    sertraline Initial  Relevant Medical/Surgical History: COPD    FINDINGS:  The lungs are without acute focal process.  There is no effusion or  pneumothorax. The cardiomediastinal silhouette is without acute process. The  osseous structures are without acute process.     Impression:       No acute process.             Relevant labs and imaging reviewed    ASSESSMENT AND PLAN         Acute on hypoxic resp failure needing 2 L NC  Suspect viral bronchitis  COPD  - known to Dr Lillian Ray  - RVP, serologies  - continue IV levaquin, duonebs, IV steroids, anti-tussive - continue to wean IV steroids today  - speech eval due to MRDD - rec: thin liquid/dysphagia III (soft and bite-sized diet) with general aspiration precautions. Give pills whole in applesauce or pudding  - IVF, bolus due to elevated lactate 11/30. Continue IVF with improvement  - tele, pulse ox    Hx of UTI, urine cx appears to have growth.  Due to MRDD and decreased ability to provide hx, will empirically dx and treat as UTI  - await cx data  - continue antibiotics     DMII - add ISS  Seizure  Hx of CVA with left side weakness  HTN  Seizure  MRDD, CP  - hold lasix to avoid dehydration     Lovenox ppx          [address] [facility], Internal Medicine  12/1/2019 at 9:04 AM

## 2019-12-01 NOTE — PROGRESS NOTES
MCHC 28.4* 28.4*   RDW 14.7 14.3   SEGSPCT 68.2* 58.0   BANDSPCT  --  5      BMP:  Recent Labs     11/29/19  1021 11/30/19  0736 12/01/19  0840   * 144 140   K 4.3 4.8 4.5    102 101   CO2 32 30 27   BUN 21 18 14   CREATININE 0.6 0.6 0.6   CALCIUM 8.8 9.3 8.7   GLUCOSE 165* 210* 199*      ABG:  No results for input(s): PH, PO2ART, GKR3CUR, HCO3, BEART, O2SAT in the last 72 hours. Lab Results   Component Value Date    PROBNP 170.5 11/29/2019    PROBNP 180.1 06/28/2019    PROBNP 139.6 03/12/2019     No results found for: [facility]    Radiology Review:  Pertinent images / reports were reviewed as a part of this visit. Assessment:     Patient Active Problem List   Diagnosis    Pneumonia    HTN (hypertension), benign    Seizure disorder ([address]    Altered mental status    Chest pain    MR (mental retardation)    COPD (chronic obstructive pulmonary disease) ([address]    Chronic obstructive pulmonary disease ([address]    Left hemiplegia ([address]    H/O: stroke    Mixed hyperlipidemia    Acquired hypothyroidism    Sepsis ([address]    Acute bronchitis    Acute respiratory failure with hypoxia ([address]    Acute respiratory failure ([address]       Plan:   1. Overall the patient has slightly improved.   2. Mose Paget MD  12/1/2019  1:51 PM

## 2019-12-01 NOTE — CONSULTS
[address], [address] [place] [address]                                  CONSULTATION    PATIENT NAME: Jama Mcdaniels                       :        1960  MED REC NO:   [id number]                          ROOM:       9423  ACCOUNT NO:   [de-identified]                           ADMIT DATE: 2019  PROVIDER:     Cari Deshpande MD    CONSULT DATE:  2019    HISTORY OF PRESENT ILLNESS:  The patient is a 61-year-old lady with  history of mental retardation, cerebral palsy, COPD, who was brought to  the emergency room because of increasing shortness of breath and  hypoxia. The patient was having audible wheezing in the emergency room. She was given breathing treatment with some relief, but she continued to  have shortness of breath, so it was decided to admit her for aggressive  therapy. There was no history of hemoptysis, hematemesis, nausea, or  vomiting. No history of chest pains. PAST MEDICAL HISTORY:  Significant for diabetes, CVA, COPD, cerebral  palsy, mental retardation, and seizure disorder. PAST SURGICAL HISTORY:  Remarkable for gastrostomy tube placement. FAMILY HISTORY:  Noncontributory. SOCIAL HISTORY:  Reveals that she quit smoking in , but used to  smoke one-and-a-half packs per day for 20 years prior to that. No  history of alcohol or drug abuse. MEDICATIONS:  Reviewed. ALLERGIES:  She is allergic to [facility]. REVIEW OF SYSTEMS:  A 10 to 14-point review of systems is reviewed and  is negative except for what is mentioned in the history of present  illness. PHYSICAL EXAMINATION:  GENERAL:  The patient is awake and responsive, in no acute respiratory  distress. VITAL SIGNS:  Her blood pressure is 127/59 mmHg, pulse of 80 per minute,  and respiratory rate of 16 per minute. She is afebrile. Her saturation  is 97% on 2 liters nasal cannula. HEENT:  Essentially unremarkable.   NECK:  There is no JVD. No lymphadenopathy. The neck is supple. LUNGS:  Revealed occasional bilateral rhonchi. HEART:  Showed normal S1 and S2. There was no S3 or S4 noted. ABDOMEN:  Benign. There is no evidence of any organomegaly. Bowel  sounds are present. LABORATORY DATA:  Her urine is growing coliform. Her lactate was 4.1. Her electrolytes were unremarkable. Her strep Pneumo antigen was  negative. Legionella urinary antigen was negative. Her chest x-ray showed no acute disease. IMPRESSION:  1. Acute bronchitis. 2.  UTI. 3.  Reactive airway disease. 4.  History of mental retardation. PLAN:  1. Continue present antibiotic therapy. 2.  DuoNeb q.4 h. while awake. 3.  Solu-Medrol 40 mg q.12 h.  4.  Sputum for culture and sensitivity. 5.  Check mag and phos. 6.  Respiratory disease panel PCR. 7.  As per orders.         Stephen Meyer MD    D: 11/30/2019 14:46:59       T: 11/30/2019 22:07:25     /[email or web address]  Job#: [id number]     Doc#: [id number]    CC:

## 2019-12-02 VITALS
RESPIRATION RATE: 20 BRPM | OXYGEN SATURATION: 95 % | TEMPERATURE: 97.5 F | HEART RATE: 65 BPM | BODY MASS INDEX: 30.23 KG/M2 | SYSTOLIC BLOOD PRESSURE: 116 MMHG | HEIGHT: 57 IN | DIASTOLIC BLOOD PRESSURE: 53 MMHG | WEIGHT: 140.1 LBS

## 2019-12-02 LAB
ANION GAP SERPL CALCULATED.3IONS-SCNC: 7 MMOL/L (ref 4–16)
BUN BLDV-MCNC: 21 MG/DL (ref 6–23)
CALCIUM SERPL-MCNC: 8.7 MG/DL (ref 8.3–10.6)
CHLORIDE BLD-SCNC: 102 MMOL/L (ref 99–110)
CO2: 34 MMOL/L (ref 21–32)
CREAT SERPL-MCNC: 0.6 MG/DL (ref 0.6–1.1)
GFR AFRICAN AMERICAN: >60 ML/MIN/1.73M2
GFR NON-AFRICAN AMERICAN: >60 ML/MIN/1.73M2
GLUCOSE BLD-MCNC: 119 MG/DL (ref 70–99)
GLUCOSE BLD-MCNC: 127 MG/DL (ref 70–99)
GLUCOSE BLD-MCNC: 178 MG/DL (ref 70–99)
MAGNESIUM: 2.1 MG/DL (ref 1.8–2.4)
POTASSIUM SERPL-SCNC: 4.2 MMOL/L (ref 3.5–5.1)
SODIUM BLD-SCNC: 143 MMOL/L (ref 135–145)

## 2019-12-02 PROCEDURE — 6360000002 HC RX W HCPCS: Performed by: INTERNAL MEDICINE

## 2019-12-02 PROCEDURE — 80048 BASIC METABOLIC PNL TOTAL CA: CPT

## 2019-12-02 PROCEDURE — 82962 GLUCOSE BLOOD TEST: CPT

## 2019-12-02 PROCEDURE — 94761 N-INVAS EAR/PLS OXIMETRY MLT: CPT

## 2019-12-02 PROCEDURE — 36415 COLL VENOUS BLD VENIPUNCTURE: CPT

## 2019-12-02 PROCEDURE — 83735 ASSAY OF MAGNESIUM: CPT

## 2019-12-02 PROCEDURE — 6370000000 HC RX 637 (ALT 250 FOR IP): Performed by: INTERNAL MEDICINE

## 2019-12-02 PROCEDURE — 2700000000 HC OXYGEN THERAPY PER DAY

## 2019-12-02 PROCEDURE — 94640 AIRWAY INHALATION TREATMENT: CPT

## 2019-12-02 RX ORDER — PREDNISONE 20 MG/1
TABLET ORAL
Qty: 24 TABLET | Refills: 0 | Status: SHIPPED | OUTPATIENT
Start: 2019-12-02 | End: 2019-12-25 | Stop reason: ALTCHOICE

## 2019-12-02 RX ORDER — LEVOFLOXACIN 500 MG/1
500 TABLET, FILM COATED ORAL DAILY
Qty: 7 TABLET | Refills: 0 | Status: SHIPPED | OUTPATIENT
Start: 2019-12-02 | End: 2019-12-09

## 2019-12-02 RX ORDER — IPRATROPIUM BROMIDE AND ALBUTEROL SULFATE 2.5; .5 MG/3ML; MG/3ML
1 SOLUTION RESPIRATORY (INHALATION) 4 TIMES DAILY
Qty: 360 ML | Refills: 0 | Status: SHIPPED
Start: 2019-12-02 | End: 2019-12-05 | Stop reason: SDUPTHER

## 2019-12-02 RX ORDER — GUAIFENESIN/DEXTROMETHORPHAN 100-10MG/5
5 SYRUP ORAL EVERY 4 HOURS PRN
Qty: 120 ML | Refills: 0 | Status: SHIPPED | OUTPATIENT
Start: 2019-12-02 | End: 2019-12-12

## 2019-12-02 RX ORDER — LISINOPRIL 40 MG/1
40 TABLET ORAL DAILY
Qty: 31 TABLET | Refills: 5 | Status: SHIPPED
Start: 2019-12-02 | End: 2020-03-06

## 2019-12-02 RX ORDER — NAPROXEN 250 MG/1
250 TABLET ORAL 2 TIMES DAILY PRN
Qty: 60 TABLET | Refills: 3 | Status: SHIPPED
Start: 2019-12-02 | End: 2020-03-10

## 2019-12-02 RX ADMIN — CETIRIZINE HYDROCHLORIDE 10 MG: 10 TABLET, FILM COATED ORAL at 09:38

## 2019-12-02 RX ADMIN — LEVOFLOXACIN 500 MG: 5 INJECTION, SOLUTION INTRAVENOUS at 09:46

## 2019-12-02 RX ADMIN — IPRATROPIUM BROMIDE AND ALBUTEROL SULFATE 1 AMPULE: .5; 3 SOLUTION RESPIRATORY (INHALATION) at 08:53

## 2019-12-02 RX ADMIN — LINAGLIPTIN 5 MG: 5 TABLET, FILM COATED ORAL at 09:38

## 2019-12-02 RX ADMIN — IPRATROPIUM BROMIDE AND ALBUTEROL SULFATE 1 AMPULE: .5; 3 SOLUTION RESPIRATORY (INHALATION) at 15:53

## 2019-12-02 RX ADMIN — Medication 1 CAPSULE: at 09:38

## 2019-12-02 RX ADMIN — AMLODIPINE BESYLATE 5 MG: 5 TABLET ORAL at 09:38

## 2019-12-02 RX ADMIN — RISPERIDONE 1 MG: 0.5 TABLET, FILM COATED ORAL at 09:39

## 2019-12-02 RX ADMIN — LEVOTHYROXINE SODIUM 50 MCG: 50 TABLET ORAL at 05:57

## 2019-12-02 RX ADMIN — LEVETIRACETAM 1500 MG: 500 TABLET, FILM COATED ORAL at 09:39

## 2019-12-02 RX ADMIN — ENOXAPARIN SODIUM 40 MG: 40 INJECTION SUBCUTANEOUS at 09:46

## 2019-12-02 RX ADMIN — METOPROLOL TARTRATE 25 MG: 25 TABLET ORAL at 09:38

## 2019-12-02 RX ADMIN — METHYLPREDNISOLONE SODIUM SUCCINATE 40 MG: 40 INJECTION, POWDER, LYOPHILIZED, FOR SOLUTION INTRAMUSCULAR; INTRAVENOUS at 09:39

## 2019-12-02 RX ADMIN — GUAIFENESIN AND DEXTROMETHORPHAN HYDROBROMIDE 1 TABLET: 600; 30 TABLET, EXTENDED RELEASE ORAL at 09:38

## 2019-12-02 RX ADMIN — IPRATROPIUM BROMIDE AND ALBUTEROL SULFATE 1 AMPULE: .5; 3 SOLUTION RESPIRATORY (INHALATION) at 12:13

## 2019-12-02 RX ADMIN — RISPERIDONE 1 MG: 0.5 TABLET, FILM COATED ORAL at 14:46

## 2019-12-02 RX ADMIN — LISINOPRIL 40 MG: 40 TABLET ORAL at 09:38

## 2019-12-02 RX ADMIN — DOCUSATE SODIUM 100 MG: 100 CAPSULE, LIQUID FILLED ORAL at 09:38

## 2019-12-02 RX ADMIN — INSULIN LISPRO 1 UNITS: 100 INJECTION, SOLUTION INTRAVENOUS; SUBCUTANEOUS at 14:45

## 2019-12-02 RX ADMIN — BUSPIRONE HYDROCHLORIDE 10 MG: 10 TABLET ORAL at 09:39

## 2019-12-02 RX ADMIN — ATORVASTATIN CALCIUM 40 MG: 40 TABLET, FILM COATED ORAL at 09:38

## 2019-12-02 RX ADMIN — PANTOPRAZOLE SODIUM 40 MG: 40 TABLET, DELAYED RELEASE ORAL at 05:56

## 2019-12-02 RX ADMIN — CARBAMAZEPINE 300 MG: 300 CAPSULE, EXTENDED RELEASE ORAL at 09:39

## 2019-12-02 NOTE — PROGRESS NOTES
Please make sure the pt's caregiver knows to call [address] ([phone]) when the pt gets home.

## 2019-12-02 NOTE — DISCHARGE SUMMARY
Sunil  1960 [id number]  PCP:  Trent Hernandez MD    Admit date: 11/29/2019  Admitting Physician: Rafael Richardson MD    Discharge date: 12/2/2019 Discharge Physician: Rafael Richardson MD      Reason for admission:   Chief Complaint   Patient presents with    Shortness of Breath     SpO 87% on room air    Chest Pain     Present on Admission:   Acute respiratory failure with hypoxia [facility])       Discharge Diagnoses & Hospital Course[de-identified]     Acute on hypoxic resp failure needing 2 L NC  Suspect viral bronchitis  ACOPDE  - known to Dr Ra Parikh  - RVP, serologies  - continue IV levaquin, duonebs, IV steroids, anti-tussive - continue to wean IV steroids today  - speech eval due to MRDD - rec: thin liquid/dysphagia III (soft and bite-sized diet) with general aspiration precautions. Give pills whole in applesauce or pudding  - IVF, bolus due to elevated lactate 11/30. IVF with improvement  - home with home oxygen qualification, levaquin, steroid taper, f/u PCP in 1 week     Hx of UTI, urine cx appears to have growth. Due to MRDD and decreased ability to provide hx, will empirically dx and treat as UTI. We would dx this as UTI  - based on sensitivity, will complete course of levaquin    DMII   Seizure  Hx of CVA with left side weakness  HTN  Seizure  MRDD, CP  - hold lasix to avoid dehydration         Exam:   Wt Readings from Last 3 Encounters:   11/30/19 140 lb 1.6 oz (63.5 kg)   09/14/19 134 lb (60.8 kg)   07/02/19 134 lb (60.8 kg)       Blood pressure (!) 116/53, pulse 65, temperature 97.5 °F (36.4 °C), temperature source Oral, resp. rate 20, height 4' 9\" (1.448 m), weight 140 lb 1.6 oz (63.5 kg), SpO2 95 %, not currently breastfeeding. General - Awake but cognitive deficits  Psych - Appropriate affect/speech. No agitation  Eyes - Eye lids intact. No scleral icterus  ENT - Lips wnl. External ear clear/dry/intact. No thyromegaly on inspection  Neuro - Limited by cognitive function  Heart - Sinus. RRR.  S1 and g by mouth 3 times daily as needed              levETIRAcetam (KEPPRA) 750 MG tablet  Take 2 tablets by mouth 2 times daily             levofloxacin (LEVAQUIN) 500 MG tablet  Take 1 tablet by mouth daily for 7 days             levothyroxine (SYNTHROID) 50 MCG tablet  Take 50 mcg by mouth Daily             lisinopril (PRINIVIL;ZESTRIL) 40 MG tablet  Take 1 tablet by mouth daily Hold for SBP < 130             metFORMIN (GLUCOPHAGE) 500 MG tablet  Take 2 tablets by mouth daily (with breakfast)             metoprolol tartrate (LOPRESSOR) 25 MG tablet  Take 1 tablet by mouth 2 times daily Hold for SBP < 100 or HR < 60             mirtazapine (REMERON) 30 MG tablet  TAKE 1 TABLET BY MOUTH NIGHTLY             naproxen (NAPROSYN) 250 MG tablet  Take 1 tablet by mouth 2 times daily as needed for Pain             nystatin (MYCOSTATIN) 872862 UNIT/GM powder  Apply topically 4 times daily. under the abdominal folds for 2 wks             Ondansetron HCl (ZOFRAN PO)  Take 1 tablet by mouth every 8 hours Indications: per facility med list             pantoprazole (PROTONIX) 40 MG tablet  TAKE 1 TABLET BY MOUTH EVERY MORNING (BEFORE BREAKFAST)             predniSONE (DELTASONE) 20 MG tablet  Taper: 40mg BID x 3 days, 20mg BID x 4 days, 20mg QD x 4 days             risperiDONE (RISPERDAL) 1 MG tablet  Take 1 tablet by mouth 3 times daily 1 tablet in am, 1 tablet at 4pm, 1 tablet at bedtime             sertraline (ZOLOFT) 100 MG tablet  Take 2 tablets by mouth nightly             SUMAtriptan (IMITREX) 50 MG tablet  Take 50 mg by mouth once as needed for Migraine             UNABLE TO FIND  Fitted wheel chair                  Code Status: Full Code     Consults:   IP CONSULT TO HOSPITALIST  IP CONSULT TO PULMONOLOGY    Diet: thin liquid/dysphagia III (soft and bite-sized diet) with general aspiration precautions.  Give pills whole in applesauce or pudding    Activity: activity as tolerated   Work:    Discharged Condition: fair    Prognosis: Fair    Disposition: home      Follow-up with     Follow-up With  Details  Why  Contact Info   Armando Kaur MD  Schedule an appointment as soon as possible for a visit in 1 week  call to f/u PCP in 1 week for post hospitalization check   [address] [place] [address]   [phone]            Discharge Physician Signed:   [facility], Internal medicine  12/2/2019 at 11:58 AM    The patient was seen and examined on day of discharge and this discharge summary is in conjunction with any daily progress note from day of discharge.   Time spent on discharge in the examination, evaluation, counseling and review of medications and discharge plan: <30 minutes    Please forward this discharge summary to patient's PCP

## 2019-12-02 NOTE — PROGRESS NOTES
pulmonary      SUBJECTIVE: sleeping and no sob     OBJECTIVE    VITALS:  /63   Pulse 63   Temp 97.9 °F (36.6 °C) (Axillary)   Resp 18   Ht 4' 9\" (1.448 m)   Wt 140 lb 1.6 oz (63.5 kg)   SpO2 99%   BMI 30.32 kg/m²   HEAD AND FACE EXAM:  No throat injection, no active exudate,no thrush  NECK EXAM;No JVD, no masses, symmetrical  CHEST EXAM; Expansion equal and symmetrical, no masses  LUNG EXAM; Good breath sounds bilaterally. There are expiratory wheezes both lungs, there are crackles at both lung bases  CARDIOVASCULAR EXAM: Positive S1 and S2, no S3 or S4, no clicks ,no murmurs  RIGHT AND LEFT LOWER EXTRIMITY EXAM: No edema, no swelling, no inflamation            LABS   Lab Results   Component Value Date    WBC 5.1 12/01/2019    HGB 9.9 (L) 12/01/2019    HCT 34.8 (L) 12/01/2019    MCV 98.3 12/01/2019     12/01/2019     Lab Results   Component Value Date    CREATININE 0.6 12/01/2019    BUN 14 12/01/2019     12/01/2019    K 4.5 12/01/2019     12/01/2019    CO2 27 12/01/2019     Lab Results   Component Value Date    INR 0.82 12/04/2017    PROTIME 9.5 12/04/2017          Lab Results   Component Value Date    PHOS 4.1 12/01/2019    PHOS 3.4 05/20/2019    PHOS 5.2 09/21/2017      No results for input(s): PH, PO2ART, DLJ0IIV, HCO3, BEART, O2SAT in the last 72 hours. Wt Readings from Last 3 Encounters:   11/30/19 140 lb 1.6 oz (63.5 kg)   09/14/19 134 lb (60.8 kg)   07/02/19 134 lb (60.8 kg)               ASSESMENT  Ac copd  Ac bronchitis        PLAN  1. Bd rx  2. Steroids  3.  On levaquin    12/2/2019  Estrada Mccurdy M.D.

## 2019-12-02 NOTE — DISCHARGE INSTR - COC
Continuity of Care Form    Patient Name: Jacklyn Whitmore   :  1960  MRN:  [id number]    Admit date:  2019  Discharge date:  ***    Code Status Order: Full Code   Advance Directives:   Advance Care Flowsheet Documentation     Date/Time Healthcare Directive Type of Healthcare Directive Copy in [address] Agent's Name Healthcare Agent's Phone Number    19 1754  No, patient does not have an advance directive for healthcare treatment -- -- -- -- --          Admitting Physician:  Deanne Ortega MD  PCP: Mandi Sykes MD    Discharging Nurse: [facility] Unit/Room#: 0353/2603-S  Discharging Unit Phone Number: ***    Emergency Contact:   Extended Emergency Contact Information  Primary Emergency Contact: Apsi,Apsi  Address: [address], . [address] Phone: [phone]  Work Phone: [phone]  Relation: Legal Guardian  Secondary Emergency Contact: Self,Reliance   [address] Phone: [phone]  Relation: Other    Past Surgical History:  Past Surgical History:   Procedure Laterality Date    GASTROSTOMY TUBE PLACEMENT         Immunization History:   Immunization History   Administered Date(s) Administered    Influenza Vaccine, unspecified formulation 10/21/2016    Influenza Virus Vaccine 2012, 10/01/2013, 2015    Influenza, Quadv, IM, PF (6 mo and older Fluzone, Flulaval, Fluarix, and 3 yrs and older Afluria) 10/23/2018, 2019    Pneumococcal Polysaccharide (Veneozfrg83) 2011, 2015       Active Problems:  Patient Active Problem List   Diagnosis Code    Pneumonia J18.9    HTN (hypertension), benign I10    Seizure disorder ([facility] G40.909    Altered mental status R41.82    Chest pain R07.9    MR (mental retardation) F79    COPD (chronic obstructive pulmonary disease) ([facility] J44.9    Chronic obstructive pulmonary disease ([facility] J44.9  Left hemiplegia (HCC) G81.94    H/O: stroke Z80.78    Mixed hyperlipidemia E78.2    Acquired hypothyroidism E03.9    Sepsis ([facility]) A41.9    Acute bronchitis J20.9    Acute respiratory failure with hypoxia ([facility]) J96.01    Acute respiratory failure ([facility]) J96.00       Isolation/Infection:   Isolation          No Isolation        Patient Infection Status     None to display          Nurse Assessment:  Last Vital Signs: /63   Pulse 63   Temp 97.9 °F (36.6 °C) (Axillary)   Resp 18   Ht 4' 9\" (1.448 m)   Wt 140 lb 1.6 oz (63.5 kg)   SpO2 97%   BMI 30.32 kg/m²     Last documented pain score (0-10 scale): Pain Level: 2  Last Weight:   Wt Readings from Last 1 Encounters:   19 140 lb 1.6 oz (63.5 kg)     Mental Status:  {IP PT MENTAL STATUS:}    IV Access:  { EDUARDO IV ACCESS:385067718}    Nursing Mobility/ADLs:  Walking   {[facility] DME DNOM:951337300}  Transfer  {[facility] DME AHOI:854857539}  Bathing  {[facility] DME DKKD:949030736}  Dressing  {[facility] DME XJQD:377105042}  Toileting  {[facility] DME EWKP:535163283}  Feeding  {[facility] DME NARU:607782623}  Med Admin  {[facility] DME NKW}  Med Delivery   {[facility] Delivery:579040885}    Wound Care Documentation and Therapy:        Elimination:  Continence:   · Bowel: {YES / RX:22112}  · Bladder: {YES / PA:69430}  Urinary Catheter: {Urinary Catheter:977514960}   Colostomy/Ileostomy/Ileal Conduit: {YES / NC:34983}       Date of Last BM: ***    Intake/Output Summary (Last 24 hours) at 2019 1120  Last data filed at 2019 1044  Gross per 24 hour   Intake 240 ml   Output --   Net 240 ml     I/O last 3 completed shifts:   In: 120 [P.O.:120]  Out: -     Safety Concerns:     [employer] Safety Concerns:962073240}    Impairments/Disabilities:      [employer] Impairments/Disabilities:512169491}    Nutrition Therapy:  Current Nutrition Therapy:   [address] She[address] EDUARDO Diet List:620829610}    Routes of Feeding: {CHP DME Other Feedings:555080848}  Liquids: {Slp liquid thickness:04953}  Daily Fluid

## 2019-12-03 LAB
EKG ATRIAL RATE: 85 BPM
EKG DIAGNOSIS: NORMAL
EKG P AXIS: 45 DEGREES
EKG P-R INTERVAL: 166 MS
EKG Q-T INTERVAL: 376 MS
EKG QRS DURATION: 84 MS
EKG QTC CALCULATION (BAZETT): 447 MS
EKG R AXIS: 44 DEGREES
EKG T AXIS: 71 DEGREES
EKG VENTRICULAR RATE: 85 BPM

## 2019-12-10 ENCOUNTER — OFFICE VISIT (OUTPATIENT)
Dept: FAMILY MEDICINE CLINIC | Age: 59
End: 2019-12-10
Payer: MEDICARE

## 2019-12-10 VITALS
HEART RATE: 71 BPM | BODY MASS INDEX: 29.39 KG/M2 | HEIGHT: 58 IN | SYSTOLIC BLOOD PRESSURE: 112 MMHG | WEIGHT: 140 LBS | DIASTOLIC BLOOD PRESSURE: 70 MMHG | RESPIRATION RATE: 18 BRPM

## 2019-12-10 DIAGNOSIS — J44.9 CHRONIC OBSTRUCTIVE PULMONARY DISEASE, UNSPECIFIED COPD TYPE (HCC): ICD-10-CM

## 2019-12-10 DIAGNOSIS — T17.308D CHOKING, SUBSEQUENT ENCOUNTER: ICD-10-CM

## 2019-12-10 DIAGNOSIS — R09.02 HYPOXIA: Primary | ICD-10-CM

## 2019-12-10 PROCEDURE — 1111F DSCHRG MED/CURRENT MED MERGE: CPT | Performed by: FAMILY MEDICINE

## 2019-12-10 PROCEDURE — 99214 OFFICE O/P EST MOD 30 MIN: CPT | Performed by: FAMILY MEDICINE

## 2019-12-12 ENCOUNTER — TELEPHONE (OUTPATIENT)
Dept: FAMILY MEDICINE CLINIC | Age: 59
End: 2019-12-12

## 2019-12-16 DIAGNOSIS — E78.2 MIXED HYPERLIPIDEMIA: ICD-10-CM

## 2019-12-16 RX ORDER — ATORVASTATIN CALCIUM 40 MG/1
40 TABLET, FILM COATED ORAL DAILY
Qty: 31 TABLET | Refills: 5 | Status: SHIPPED | OUTPATIENT
Start: 2019-12-16 | End: 2020-06-05

## 2019-12-16 RX ORDER — MIRTAZAPINE 30 MG/1
30 TABLET, FILM COATED ORAL NIGHTLY
Qty: 31 TABLET | Refills: 5 | Status: SHIPPED | OUTPATIENT
Start: 2019-12-16 | End: 2020-06-05

## 2019-12-16 RX ORDER — CETIRIZINE HYDROCHLORIDE TABLETS 10 MG/1
TABLET, FILM COATED ORAL
Qty: 31 TABLET | Refills: 5 | Status: SHIPPED | OUTPATIENT
Start: 2019-12-16 | End: 2020-06-05

## 2019-12-17 ENCOUNTER — APPOINTMENT (OUTPATIENT)
Dept: GENERAL RADIOLOGY | Age: 59
End: 2019-12-17
Payer: MEDICARE

## 2019-12-17 ENCOUNTER — HOSPITAL ENCOUNTER (EMERGENCY)
Age: 59
Discharge: HOME | End: 2019-12-17
Attending: EMERGENCY MEDICINE
Payer: MEDICARE

## 2019-12-17 VITALS
WEIGHT: 148 LBS | HEART RATE: 78 BPM | BODY MASS INDEX: 27.23 KG/M2 | OXYGEN SATURATION: 100 % | TEMPERATURE: 98.8 F | SYSTOLIC BLOOD PRESSURE: 112 MMHG | HEIGHT: 62 IN | RESPIRATION RATE: 14 BRPM | DIASTOLIC BLOOD PRESSURE: 58 MMHG

## 2019-12-17 DIAGNOSIS — J44.1 COPD EXACERBATION (HCC): Primary | ICD-10-CM

## 2019-12-17 LAB
ALBUMIN SERPL-MCNC: 3.7 GM/DL (ref 3.4–5)
ALP BLD-CCNC: 67 IU/L (ref 40–129)
ALT SERPL-CCNC: 12 U/L (ref 10–40)
ANION GAP SERPL CALCULATED.3IONS-SCNC: 11 MMOL/L (ref 4–16)
AST SERPL-CCNC: 16 IU/L (ref 15–37)
BASOPHILS ABSOLUTE: 0 K/CU MM
BASOPHILS RELATIVE PERCENT: 0.3 % (ref 0–1)
BILIRUB SERPL-MCNC: 0.2 MG/DL (ref 0–1)
BUN BLDV-MCNC: 23 MG/DL (ref 6–23)
CALCIUM SERPL-MCNC: 9.3 MG/DL (ref 8.3–10.6)
CHLORIDE BLD-SCNC: 98 MMOL/L (ref 99–110)
CO2: 33 MMOL/L (ref 21–32)
CREAT SERPL-MCNC: 0.7 MG/DL (ref 0.6–1.1)
DIFFERENTIAL TYPE: ABNORMAL
EOSINOPHILS ABSOLUTE: 0.1 K/CU MM
EOSINOPHILS RELATIVE PERCENT: 0.7 % (ref 0–3)
GFR AFRICAN AMERICAN: >60 ML/MIN/1.73M2
GFR NON-AFRICAN AMERICAN: >60 ML/MIN/1.73M2
GLUCOSE BLD-MCNC: 147 MG/DL (ref 70–99)
HCT VFR BLD CALC: 37.5 % (ref 37–47)
HEMOGLOBIN: 11.1 GM/DL (ref 12.5–16)
IMMATURE NEUTROPHIL %: 0.3 % (ref 0–0.43)
LACTATE: 1.7 MMOL/L (ref 0.4–2)
LYMPHOCYTES ABSOLUTE: 2.4 K/CU MM
LYMPHOCYTES RELATIVE PERCENT: 24.2 % (ref 24–44)
MCH RBC QN AUTO: 27.6 PG (ref 27–31)
MCHC RBC AUTO-ENTMCNC: 29.6 % (ref 32–36)
MCV RBC AUTO: 93.3 FL (ref 78–100)
MONOCYTES ABSOLUTE: 1 K/CU MM
MONOCYTES RELATIVE PERCENT: 9.9 % (ref 0–4)
NUCLEATED RBC %: 0 %
PDW BLD-RTO: 15 % (ref 11.7–14.9)
PLATELET # BLD: 217 K/CU MM (ref 140–440)
PMV BLD AUTO: 8.8 FL (ref 7.5–11.1)
POTASSIUM SERPL-SCNC: 3.7 MMOL/L (ref 3.5–5.1)
PRO-BNP: 133.4 PG/ML
RBC # BLD: 4.02 M/CU MM (ref 4.2–5.4)
SEGMENTED NEUTROPHILS ABSOLUTE COUNT: 6.5 K/CU MM
SEGMENTED NEUTROPHILS RELATIVE PERCENT: 64.6 % (ref 36–66)
SODIUM BLD-SCNC: 142 MMOL/L (ref 135–145)
TOTAL IMMATURE NEUTOROPHIL: 0.03 K/CU MM
TOTAL NUCLEATED RBC: 0 K/CU MM
TOTAL PROTEIN: 7.4 GM/DL (ref 6.4–8.2)
TROPONIN T: <0.01 NG/ML
WBC # BLD: 10.1 K/CU MM (ref 4–10.5)

## 2019-12-17 PROCEDURE — 6360000002 HC RX W HCPCS: Performed by: EMERGENCY MEDICINE

## 2019-12-17 PROCEDURE — 2580000003 HC RX 258: Performed by: EMERGENCY MEDICINE

## 2019-12-17 PROCEDURE — 71045 X-RAY EXAM CHEST 1 VIEW: CPT

## 2019-12-17 PROCEDURE — 84484 ASSAY OF TROPONIN QUANT: CPT

## 2019-12-17 PROCEDURE — 96375 TX/PRO/DX INJ NEW DRUG ADDON: CPT

## 2019-12-17 PROCEDURE — 85025 COMPLETE CBC W/AUTO DIFF WBC: CPT

## 2019-12-17 PROCEDURE — 83605 ASSAY OF LACTIC ACID: CPT

## 2019-12-17 PROCEDURE — 83880 ASSAY OF NATRIURETIC PEPTIDE: CPT

## 2019-12-17 PROCEDURE — 96365 THER/PROPH/DIAG IV INF INIT: CPT

## 2019-12-17 PROCEDURE — 99285 EMERGENCY DEPT VISIT HI MDM: CPT

## 2019-12-17 PROCEDURE — 93005 ELECTROCARDIOGRAM TRACING: CPT | Performed by: EMERGENCY MEDICINE

## 2019-12-17 PROCEDURE — 80053 COMPREHEN METABOLIC PANEL: CPT

## 2019-12-17 PROCEDURE — 87040 BLOOD CULTURE FOR BACTERIA: CPT

## 2019-12-17 RX ORDER — PREDNISONE 10 MG/1
TABLET ORAL
Qty: 45 TABLET | Refills: 0 | Status: SHIPPED | OUTPATIENT
Start: 2019-12-17 | End: 2020-03-10

## 2019-12-17 RX ORDER — METHYLPREDNISOLONE SODIUM SUCCINATE 125 MG/2ML
125 INJECTION, POWDER, LYOPHILIZED, FOR SOLUTION INTRAMUSCULAR; INTRAVENOUS ONCE
Status: COMPLETED | OUTPATIENT
Start: 2019-12-17 | End: 2019-12-17

## 2019-12-17 RX ORDER — ONDANSETRON 2 MG/ML
4 INJECTION INTRAMUSCULAR; INTRAVENOUS EVERY 30 MIN PRN
Status: DISCONTINUED | OUTPATIENT
Start: 2019-12-17 | End: 2019-12-17 | Stop reason: HOSPADM

## 2019-12-17 RX ORDER — AZITHROMYCIN 250 MG/1
TABLET, FILM COATED ORAL
Qty: 1 PACKET | Refills: 0 | Status: SHIPPED | OUTPATIENT
Start: 2019-12-17 | End: 2019-12-27

## 2019-12-17 RX ADMIN — CEFTRIAXONE SODIUM 1 G: 1 INJECTION, POWDER, FOR SOLUTION INTRAMUSCULAR; INTRAVENOUS at 09:36

## 2019-12-17 RX ADMIN — METHYLPREDNISOLONE SODIUM SUCCINATE 125 MG: 125 INJECTION, POWDER, FOR SOLUTION INTRAMUSCULAR; INTRAVENOUS at 08:38

## 2019-12-18 PROCEDURE — 93010 ELECTROCARDIOGRAM REPORT: CPT | Performed by: INTERNAL MEDICINE

## 2019-12-19 ENCOUNTER — TELEPHONE (OUTPATIENT)
Dept: FAMILY MEDICINE CLINIC | Age: 59
End: 2019-12-19

## 2019-12-22 LAB
CULTURE: NORMAL
Lab: NORMAL
SPECIMEN: NORMAL

## 2019-12-23 ENCOUNTER — TELEPHONE (OUTPATIENT)
Dept: FAMILY MEDICINE CLINIC | Age: 59
End: 2019-12-23

## 2019-12-25 ENCOUNTER — HOSPITAL ENCOUNTER (EMERGENCY)
Age: 59
Discharge: HOME | End: 2019-12-25
Attending: EMERGENCY MEDICINE
Payer: MEDICARE

## 2019-12-25 ENCOUNTER — APPOINTMENT (OUTPATIENT)
Dept: GENERAL RADIOLOGY | Age: 59
End: 2019-12-25
Payer: MEDICARE

## 2019-12-25 VITALS
TEMPERATURE: 98.5 F | OXYGEN SATURATION: 99 % | SYSTOLIC BLOOD PRESSURE: 145 MMHG | HEART RATE: 62 BPM | DIASTOLIC BLOOD PRESSURE: 76 MMHG | RESPIRATION RATE: 18 BRPM

## 2019-12-25 DIAGNOSIS — J06.9 ACUTE UPPER RESPIRATORY INFECTION: ICD-10-CM

## 2019-12-25 DIAGNOSIS — J44.1 COPD EXACERBATION (HCC): Primary | ICD-10-CM

## 2019-12-25 LAB
ALBUMIN SERPL-MCNC: 3.5 GM/DL (ref 3.4–5)
ALP BLD-CCNC: 77 IU/L (ref 40–129)
ALT SERPL-CCNC: 9 U/L (ref 10–40)
ANION GAP SERPL CALCULATED.3IONS-SCNC: 12 MMOL/L (ref 4–16)
AST SERPL-CCNC: 7 IU/L (ref 15–37)
BASOPHILS ABSOLUTE: 0 K/CU MM
BASOPHILS RELATIVE PERCENT: 0.2 % (ref 0–1)
BILIRUB SERPL-MCNC: 0 MG/DL (ref 0–1)
BUN BLDV-MCNC: 20 MG/DL (ref 6–23)
CALCIUM SERPL-MCNC: 9.5 MG/DL (ref 8.3–10.6)
CHLORIDE BLD-SCNC: 101 MMOL/L (ref 99–110)
CO2: 32 MMOL/L (ref 21–32)
CREAT SERPL-MCNC: 0.5 MG/DL (ref 0.6–1.1)
DIFFERENTIAL TYPE: ABNORMAL
EOSINOPHILS ABSOLUTE: 0 K/CU MM
EOSINOPHILS RELATIVE PERCENT: 0 % (ref 0–3)
GFR AFRICAN AMERICAN: >60 ML/MIN/1.73M2
GFR NON-AFRICAN AMERICAN: >60 ML/MIN/1.73M2
GLUCOSE BLD-MCNC: 147 MG/DL (ref 70–99)
GLUCOSE BLD-MCNC: 167 MG/DL (ref 70–99)
HCT VFR BLD CALC: 35.5 % (ref 37–47)
HEMOGLOBIN: 10.6 GM/DL (ref 12.5–16)
IMMATURE NEUTROPHIL %: 0.8 % (ref 0–0.43)
LYMPHOCYTES ABSOLUTE: 1.7 K/CU MM
LYMPHOCYTES RELATIVE PERCENT: 32.7 % (ref 24–44)
MCH RBC QN AUTO: 28.1 PG (ref 27–31)
MCHC RBC AUTO-ENTMCNC: 29.9 % (ref 32–36)
MCV RBC AUTO: 94.2 FL (ref 78–100)
MONOCYTES ABSOLUTE: 0.5 K/CU MM
MONOCYTES RELATIVE PERCENT: 9.6 % (ref 0–4)
NUCLEATED RBC %: 0 %
PDW BLD-RTO: 14.4 % (ref 11.7–14.9)
PLATELET # BLD: 218 K/CU MM (ref 140–440)
PMV BLD AUTO: 8.9 FL (ref 7.5–11.1)
POTASSIUM SERPL-SCNC: 3.9 MMOL/L (ref 3.5–5.1)
PRO-BNP: 243.7 PG/ML
RBC # BLD: 3.77 M/CU MM (ref 4.2–5.4)
SEGMENTED NEUTROPHILS ABSOLUTE COUNT: 2.9 K/CU MM
SEGMENTED NEUTROPHILS RELATIVE PERCENT: 56.7 % (ref 36–66)
SODIUM BLD-SCNC: 145 MMOL/L (ref 135–145)
TOTAL IMMATURE NEUTOROPHIL: 0.04 K/CU MM
TOTAL NUCLEATED RBC: 0 K/CU MM
TOTAL PROTEIN: 6.4 GM/DL (ref 6.4–8.2)
TROPONIN T: <0.01 NG/ML
WBC # BLD: 5.1 K/CU MM (ref 4–10.5)

## 2019-12-25 PROCEDURE — 94761 N-INVAS EAR/PLS OXIMETRY MLT: CPT

## 2019-12-25 PROCEDURE — 94640 AIRWAY INHALATION TREATMENT: CPT

## 2019-12-25 PROCEDURE — 96374 THER/PROPH/DIAG INJ IV PUSH: CPT

## 2019-12-25 PROCEDURE — 93010 ELECTROCARDIOGRAM REPORT: CPT | Performed by: INTERNAL MEDICINE

## 2019-12-25 PROCEDURE — 84484 ASSAY OF TROPONIN QUANT: CPT

## 2019-12-25 PROCEDURE — 6370000000 HC RX 637 (ALT 250 FOR IP): Performed by: EMERGENCY MEDICINE

## 2019-12-25 PROCEDURE — 99285 EMERGENCY DEPT VISIT HI MDM: CPT

## 2019-12-25 PROCEDURE — 83880 ASSAY OF NATRIURETIC PEPTIDE: CPT

## 2019-12-25 PROCEDURE — 85025 COMPLETE CBC W/AUTO DIFF WBC: CPT

## 2019-12-25 PROCEDURE — 80053 COMPREHEN METABOLIC PANEL: CPT

## 2019-12-25 PROCEDURE — 82962 GLUCOSE BLOOD TEST: CPT

## 2019-12-25 PROCEDURE — 93005 ELECTROCARDIOGRAM TRACING: CPT | Performed by: EMERGENCY MEDICINE

## 2019-12-25 PROCEDURE — 6360000002 HC RX W HCPCS: Performed by: EMERGENCY MEDICINE

## 2019-12-25 PROCEDURE — 71045 X-RAY EXAM CHEST 1 VIEW: CPT

## 2019-12-25 PROCEDURE — 36415 COLL VENOUS BLD VENIPUNCTURE: CPT

## 2019-12-25 PROCEDURE — 2700000000 HC OXYGEN THERAPY PER DAY

## 2019-12-25 RX ORDER — METHYLPREDNISOLONE SODIUM SUCCINATE 125 MG/2ML
125 INJECTION, POWDER, LYOPHILIZED, FOR SOLUTION INTRAMUSCULAR; INTRAVENOUS ONCE
Status: COMPLETED | OUTPATIENT
Start: 2019-12-25 | End: 2019-12-25

## 2019-12-25 RX ORDER — IPRATROPIUM BROMIDE AND ALBUTEROL SULFATE 2.5; .5 MG/3ML; MG/3ML
1 SOLUTION RESPIRATORY (INHALATION) ONCE
Status: COMPLETED | OUTPATIENT
Start: 2019-12-25 | End: 2019-12-25

## 2019-12-25 RX ORDER — PREDNISONE 50 MG/1
50 TABLET ORAL DAILY
Qty: 5 TABLET | Refills: 0 | Status: SHIPPED | OUTPATIENT
Start: 2019-12-25 | End: 2019-12-30

## 2019-12-25 RX ADMIN — METHYLPREDNISOLONE SODIUM SUCCINATE 125 MG: 125 INJECTION, POWDER, FOR SOLUTION INTRAMUSCULAR; INTRAVENOUS at 11:55

## 2019-12-25 RX ADMIN — IPRATROPIUM BROMIDE AND ALBUTEROL SULFATE 1 AMPULE: .5; 3 SOLUTION RESPIRATORY (INHALATION) at 12:44

## 2019-12-27 ASSESSMENT — ENCOUNTER SYMPTOMS
COUGH: 0
ABDOMINAL PAIN: 0
SHORTNESS OF BREATH: 0
VOMITING: 0
NAUSEA: 0
WHEEZING: 0

## 2019-12-30 LAB
EKG ATRIAL RATE: 63 BPM
EKG DIAGNOSIS: NORMAL
EKG P AXIS: 34 DEGREES
EKG P-R INTERVAL: 152 MS
EKG Q-T INTERVAL: 416 MS
EKG QRS DURATION: 86 MS
EKG QTC CALCULATION (BAZETT): 425 MS
EKG R AXIS: 38 DEGREES
EKG T AXIS: 43 DEGREES
EKG VENTRICULAR RATE: 63 BPM

## 2020-01-02 ENCOUNTER — HOSPITAL ENCOUNTER (OUTPATIENT)
Dept: PULMONOLOGY | Age: 60
Discharge: HOME | End: 2020-01-02
Payer: MEDICARE

## 2020-01-02 PROCEDURE — 94760 N-INVAS EAR/PLS OXIMETRY 1: CPT

## 2020-01-02 PROCEDURE — 94761 N-INVAS EAR/PLS OXIMETRY MLT: CPT

## 2020-01-02 PROCEDURE — 94762 N-INVAS EAR/PLS OXIMTRY CONT: CPT

## 2020-01-02 NOTE — PROGRESS NOTES
[facility] # __[id number]_____    [] Patient Qualifies      [] Patient Does NOT qualify

## 2020-01-03 ENCOUNTER — TELEPHONE (OUTPATIENT)
Dept: FAMILY MEDICINE CLINIC | Age: 60
End: 2020-01-03

## 2020-01-03 RX ORDER — FLUTICASONE PROPIONATE 50 MCG
1 SPRAY, SUSPENSION (ML) NASAL DAILY
Qty: 1 BOTTLE | Refills: 5 | Status: SHIPPED | OUTPATIENT
Start: 2020-01-03 | End: 2021-02-09

## 2020-01-10 ENCOUNTER — TELEPHONE (OUTPATIENT)
Dept: FAMILY MEDICINE CLINIC | Age: 60
End: 2020-01-10

## 2020-01-10 NOTE — TELEPHONE ENCOUNTER
Pt home care nurse [address] called to inform that [facility] heart rate was 54 this morning so she held the metoprolol for this morning due to her HR being below 60.

## 2020-01-13 LAB
EKG ATRIAL RATE: 82 BPM
EKG DIAGNOSIS: NORMAL
EKG P AXIS: 45 DEGREES
EKG P-R INTERVAL: 148 MS
EKG Q-T INTERVAL: 360 MS
EKG QRS DURATION: 80 MS
EKG QTC CALCULATION (BAZETT): 420 MS
EKG R AXIS: 20 DEGREES
EKG T AXIS: 6 DEGREES
EKG VENTRICULAR RATE: 82 BPM

## 2020-01-16 NOTE — TELEPHONE ENCOUNTER
[facility] 78 nurse called pulse last night was 59 and pulse this morning 59.       Nurse can be reached at [phone]

## 2020-02-05 RX ORDER — CARBAMAZEPINE 300 MG/1
CAPSULE, EXTENDED RELEASE ORAL
Qty: 62 CAPSULE | Refills: 5 | Status: SHIPPED | OUTPATIENT
Start: 2020-02-05 | End: 2020-07-06

## 2020-02-06 ENCOUNTER — HOSPITAL ENCOUNTER (OUTPATIENT)
Age: 60
Setting detail: SPECIMEN
Discharge: HOME | End: 2020-02-06
Payer: MEDICARE

## 2020-02-06 LAB
ALBUMIN SERPL-MCNC: 3.1 GM/DL (ref 3.4–5)
ALP BLD-CCNC: 72 IU/L (ref 40–129)
ALT SERPL-CCNC: 5 U/L (ref 10–40)
ANION GAP SERPL CALCULATED.3IONS-SCNC: 13 MMOL/L (ref 4–16)
AST SERPL-CCNC: 9 IU/L (ref 15–37)
BASOPHILS ABSOLUTE: 0 K/CU MM
BASOPHILS RELATIVE PERCENT: 0.4 % (ref 0–1)
BILIRUB SERPL-MCNC: 0.1 MG/DL (ref 0–1)
BUN BLDV-MCNC: 12 MG/DL (ref 6–23)
CALCIUM SERPL-MCNC: 8.5 MG/DL (ref 8.3–10.6)
CHLORIDE BLD-SCNC: 100 MMOL/L (ref 99–110)
CO2: 29 MMOL/L (ref 21–32)
CREAT SERPL-MCNC: 0.5 MG/DL (ref 0.6–1.1)
DIFFERENTIAL TYPE: ABNORMAL
DOSE AMOUNT: NORMAL
DOSE TIME: NORMAL
EOSINOPHILS ABSOLUTE: 0 K/CU MM
EOSINOPHILS RELATIVE PERCENT: 0.8 % (ref 0–3)
GFR AFRICAN AMERICAN: >60 ML/MIN/1.73M2
GFR NON-AFRICAN AMERICAN: >60 ML/MIN/1.73M2
GLUCOSE BLD-MCNC: 110 MG/DL (ref 70–99)
HCT VFR BLD CALC: 30.3 % (ref 37–47)
HEMOGLOBIN: 9.4 GM/DL (ref 12.5–16)
IMMATURE NEUTROPHIL %: 1.2 % (ref 0–0.43)
LYMPHOCYTES ABSOLUTE: 2.3 K/CU MM
LYMPHOCYTES RELATIVE PERCENT: 45.5 % (ref 24–44)
MCH RBC QN AUTO: 29.1 PG (ref 27–31)
MCHC RBC AUTO-ENTMCNC: 31 % (ref 32–36)
MCV RBC AUTO: 93.8 FL (ref 78–100)
MONOCYTES ABSOLUTE: 0.5 K/CU MM
MONOCYTES RELATIVE PERCENT: 9.2 % (ref 0–4)
NUCLEATED RBC %: 0 %
PDW BLD-RTO: 13.7 % (ref 11.7–14.9)
PLATELET # BLD: 206 K/CU MM (ref 140–440)
PMV BLD AUTO: 9.2 FL (ref 7.5–11.1)
POTASSIUM SERPL-SCNC: 4.1 MMOL/L (ref 3.5–5.1)
RBC # BLD: 3.23 M/CU MM (ref 4.2–5.4)
SEGMENTED NEUTROPHILS ABSOLUTE COUNT: 2.2 K/CU MM
SEGMENTED NEUTROPHILS RELATIVE PERCENT: 42.9 % (ref 36–66)
SODIUM BLD-SCNC: 142 MMOL/L (ref 135–145)
TOTAL IMMATURE NEUTOROPHIL: 0.06 K/CU MM
TOTAL NUCLEATED RBC: 0 K/CU MM
TOTAL PROTEIN: 5.3 GM/DL (ref 6.4–8.2)
VALPROIC ACID LEVEL: 90 UG/ML (ref 50–100)
WBC # BLD: 5.1 K/CU MM (ref 4–10.5)

## 2020-02-06 PROCEDURE — 85025 COMPLETE CBC W/AUTO DIFF WBC: CPT

## 2020-02-06 PROCEDURE — 80053 COMPREHEN METABOLIC PANEL: CPT

## 2020-02-06 PROCEDURE — 80164 ASSAY DIPROPYLACETIC ACD TOT: CPT

## 2020-02-06 PROCEDURE — 36415 COLL VENOUS BLD VENIPUNCTURE: CPT

## 2020-02-13 RX ORDER — SELENIUM 50 MCG
TABLET ORAL
Qty: 31 CAPSULE | Refills: 5 | Status: SHIPPED | OUTPATIENT
Start: 2020-02-13 | End: 2020-05-05 | Stop reason: SDUPTHER

## 2020-02-13 RX ORDER — SELENIUM 50 MCG
TABLET ORAL
Qty: 31 CAPSULE | Refills: 5 | Status: SHIPPED | OUTPATIENT
Start: 2020-02-13 | End: 2020-02-13

## 2020-02-20 ENCOUNTER — HOSPITAL ENCOUNTER (OUTPATIENT)
Age: 60
Setting detail: SPECIMEN
Discharge: HOME | End: 2020-02-20
Payer: MEDICARE

## 2020-02-20 LAB
ALBUMIN SERPL-MCNC: 3.5 GM/DL (ref 3.4–5)
ALP BLD-CCNC: 66 IU/L (ref 40–128)
ALT SERPL-CCNC: 5 U/L (ref 10–40)
ANION GAP SERPL CALCULATED.3IONS-SCNC: 9 MMOL/L (ref 4–16)
AST SERPL-CCNC: 7 IU/L (ref 15–37)
BILIRUB SERPL-MCNC: 0.2 MG/DL (ref 0–1)
BUN BLDV-MCNC: 14 MG/DL (ref 6–23)
CALCIUM SERPL-MCNC: 8.7 MG/DL (ref 8.3–10.6)
CHLORIDE BLD-SCNC: 101 MMOL/L (ref 99–110)
CHOLESTEROL: 157 MG/DL
CO2: 33 MMOL/L (ref 21–32)
CREAT SERPL-MCNC: 0.5 MG/DL (ref 0.6–1.1)
ESTIMATED AVERAGE GLUCOSE: 117 MG/DL
GFR AFRICAN AMERICAN: >60 ML/MIN/1.73M2
GFR NON-AFRICAN AMERICAN: >60 ML/MIN/1.73M2
GLUCOSE BLD-MCNC: 101 MG/DL (ref 70–99)
HBA1C MFR BLD: 5.7 % (ref 4.2–6.3)
HDLC SERPL-MCNC: 36 MG/DL
LDL CHOLESTEROL DIRECT: 85 MG/DL
POTASSIUM SERPL-SCNC: 4 MMOL/L (ref 3.5–5.1)
SODIUM BLD-SCNC: 143 MMOL/L (ref 135–145)
T4 FREE: 0.78 NG/DL (ref 0.9–1.8)
TOTAL PROTEIN: 5.7 GM/DL (ref 6.4–8.2)
TRIGL SERPL-MCNC: 318 MG/DL
TSH HIGH SENSITIVITY: 1.1 UIU/ML (ref 0.27–4.2)

## 2020-02-20 PROCEDURE — 36415 COLL VENOUS BLD VENIPUNCTURE: CPT

## 2020-02-20 PROCEDURE — 84439 ASSAY OF FREE THYROXINE: CPT

## 2020-02-20 PROCEDURE — 83721 ASSAY OF BLOOD LIPOPROTEIN: CPT

## 2020-02-20 PROCEDURE — 80053 COMPREHEN METABOLIC PANEL: CPT

## 2020-02-20 PROCEDURE — 80061 LIPID PANEL: CPT

## 2020-02-20 PROCEDURE — 84443 ASSAY THYROID STIM HORMONE: CPT

## 2020-02-20 PROCEDURE — 83036 HEMOGLOBIN GLYCOSYLATED A1C: CPT

## 2020-02-28 ENCOUNTER — HOSPITAL ENCOUNTER (OUTPATIENT)
Dept: PULMONOLOGY | Age: 60
Discharge: HOME | End: 2020-02-28
Payer: MEDICARE

## 2020-02-28 PROCEDURE — 94760 N-INVAS EAR/PLS OXIMETRY 1: CPT

## 2020-02-28 PROCEDURE — 94680 O2 UPTK RST&XERS DIR SIMPLE: CPT

## 2020-02-28 NOTE — PROGRESS NOTES
2/28/2020 9:21 AM  Patient Room #: Room/bed info not found  Patient Name: Arash Sweet    (Step 1 Done by RN if possible otherwise call Pulmonary Diagnostics)  1. Place patient on room air at rest for at least 30 minutes. If patient falls below 88% before 30 minutes then you can record the level and stop. Record room air saturation level _95_ %. If patient is at 88% or below, they will qualify for home oxygen and you can stop. If level does not fall below 88%, fill in level above. If indicated continue to Step 2. Signature: Nely Thomason RRT Date: __02/28/2020_  (Step 2&3 Done by [facility])  2. Ambulate patient on room air until saturation falls below 89%. Record level of room air saturation with ambulation_Pt wheelchair bound. __ %. Next, place patient back on ___lpm oxygen and ambulate, record level __%. (Note:  this level must show improvement from room air level done with ambulation.)  If patients saturation on room air with ambulation is 88% or below AND patient shows improvement with oxygen during ambulation, they will qualify for home oxygen and you can stop. If patient does not drop below 89%, then patient should have an overnight oximetry trending on room air to see if level falls below 88%. Complete level in Step 3 below. 3. Room air overnight oximetry level 88 % for___  cumulative minutes. If patients room air oxygen level is < 89% for at least 5 cumulative minutes, patient will qualify for home oxygen and you can stop. (Attach Night Trending Report)    Complete order below: Diagnosis:_COPD__  Home oxygen at:  Length of Need: ? Lifetime ?  3 Months     ___lpm or __%   via  [] nasal cannula  []mask  [] other:___         []continuous []  with activity  []  Nocturnal   [] Portable Tanks []  Concentrator        Therapist Signature:____________     Date:  _____  Physician Signature:  __Electronically Signed in EMR_    Date: ____    Physician Printed Name:  _________   NPI #

## 2020-02-28 NOTE — PROGRESS NOTES
Pt does not qualify for Home O2 at rest. Pt is wheelchair bound and is unable to ambulate. Spoke extensively to [facility] and they are unable to provide pt with portable concentrator due to caregiver misuse of the concentrators resulting in a danger to pt by electric corona being emitted from exposed wires. Pt has home O2 in the form of tanks with ambulation wang on back of wheelchair. Pt is not qualifying for daytime Home O2 today, but have sent an overnight order home due to updated oxygen orders being needed per DME.

## 2020-03-04 NOTE — PROGRESS NOTES
Pt caregivers brought back the pulse oximetry from pt overnight trend and the machine is now broken. Called [facility] to let her know. Sending Austyn Mcconnell the order so pt can be tested through DME.

## 2020-03-06 RX ORDER — PANTOPRAZOLE SODIUM 40 MG/1
40 TABLET, DELAYED RELEASE ORAL
Qty: 31 TABLET | Refills: 5 | Status: SHIPPED | OUTPATIENT
Start: 2020-03-06 | End: 2020-08-04

## 2020-03-06 RX ORDER — LISINOPRIL 40 MG/1
TABLET ORAL
Qty: 31 TABLET | Refills: 5 | Status: SHIPPED | OUTPATIENT
Start: 2020-03-06 | End: 2020-08-04

## 2020-03-10 ENCOUNTER — OFFICE VISIT (OUTPATIENT)
Dept: FAMILY MEDICINE CLINIC | Age: 60
End: 2020-03-10
Payer: MEDICARE

## 2020-03-10 VITALS — OXYGEN SATURATION: 94 % | DIASTOLIC BLOOD PRESSURE: 80 MMHG | HEART RATE: 83 BPM | SYSTOLIC BLOOD PRESSURE: 121 MMHG

## 2020-03-10 PROBLEM — E11.9 CONTROLLED TYPE 2 DIABETES MELLITUS WITHOUT COMPLICATION, WITHOUT LONG-TERM CURRENT USE OF INSULIN (HCC): Status: ACTIVE | Noted: 2020-03-10

## 2020-03-10 PROBLEM — D64.9 ANEMIA: Status: ACTIVE | Noted: 2020-03-10

## 2020-03-10 PROBLEM — F41.9 ANXIETY AND DEPRESSION: Status: ACTIVE | Noted: 2020-03-10

## 2020-03-10 PROBLEM — F32.A ANXIETY AND DEPRESSION: Status: ACTIVE | Noted: 2020-03-10

## 2020-03-10 PROCEDURE — 99214 OFFICE O/P EST MOD 30 MIN: CPT | Performed by: FAMILY MEDICINE

## 2020-03-10 PROCEDURE — 2022F DILAT RTA XM EVC RTNOPTHY: CPT | Performed by: FAMILY MEDICINE

## 2020-03-10 PROCEDURE — 3044F HG A1C LEVEL LT 7.0%: CPT | Performed by: FAMILY MEDICINE

## 2020-03-10 PROCEDURE — 3023F SPIROM DOC REV: CPT | Performed by: FAMILY MEDICINE

## 2020-03-10 PROCEDURE — 3017F COLORECTAL CA SCREEN DOC REV: CPT | Performed by: FAMILY MEDICINE

## 2020-03-10 PROCEDURE — G8417 CALC BMI ABV UP PARAM F/U: HCPCS | Performed by: FAMILY MEDICINE

## 2020-03-10 PROCEDURE — 1036F TOBACCO NON-USER: CPT | Performed by: FAMILY MEDICINE

## 2020-03-10 PROCEDURE — G8482 FLU IMMUNIZE ORDER/ADMIN: HCPCS | Performed by: FAMILY MEDICINE

## 2020-03-10 PROCEDURE — G0438 PPPS, INITIAL VISIT: HCPCS | Performed by: FAMILY MEDICINE

## 2020-03-10 PROCEDURE — G8926 SPIRO NO PERF OR DOC: HCPCS | Performed by: FAMILY MEDICINE

## 2020-03-10 PROCEDURE — G8427 DOCREV CUR MEDS BY ELIG CLIN: HCPCS | Performed by: FAMILY MEDICINE

## 2020-03-10 ASSESSMENT — PATIENT HEALTH QUESTIONNAIRE - PHQ9
SUM OF ALL RESPONSES TO PHQ QUESTIONS 1-9: 0
SUM OF ALL RESPONSES TO PHQ QUESTIONS 1-9: 0

## 2020-03-10 ASSESSMENT — ENCOUNTER SYMPTOMS
COUGH: 0
ABDOMINAL PAIN: 0
STRIDOR: 0
SHORTNESS OF BREATH: 0
DIARRHEA: 0
CONSTIPATION: 0
VOMITING: 0

## 2020-03-10 ASSESSMENT — LIFESTYLE VARIABLES: HOW OFTEN DO YOU HAVE A DRINK CONTAINING ALCOHOL: 0

## 2020-03-10 NOTE — PROGRESS NOTES
Epilepsy ([facility]     GERD (gastroesophageal reflux disease)     Hyperlipidemia     Hypertension     Insomnia     Iron (Fe) deficiency anemia     Mental disability     Seizures (HCC)     Unspecified cerebral artery occlusion with cerebral infarction        Past Surgical History:   Procedure Laterality Date    GASTROSTOMY TUBE PLACEMENT         History reviewed. No pertinent family history. CareTeam (Including outside providers/suppliers regularly involved in providing care):   Patient Care Team:  Brady Aldridge MD as PCP - General (Family Medicine)  Brady Aldridge MD as PCP - [facility] Empaneled Provider  Bry Riley MD as Consulting Physician (Endocrinology)    Wt Readings from Last 3 Encounters:   12/17/19 148 lb (67.1 kg)   12/10/19 140 lb (63.5 kg)   11/30/19 140 lb 1.6 oz (63.5 kg)     There were no vitals filed for this visit. There is no height or weight on file to calculate BMI. Based upon direct observation of the patient, evaluation of cognition reveals she can remember the month and the date and the person. Patient's complete Health Risk Assessment and screening values have been reviewed and are found in Flowsheets. The following problems were reviewed today and where indicated follow up appointments were made and/or referrals ordered. Positive Risk Factor Screenings with Interventions:     General Health:  General  In general, how would you say your health is?: Very Good  In the past 7 days, have you experienced any of the following?  New or Increased Pain, New or Increased Fatigue, Loneliness, Social Isolation, Stress or Anger?: None of These  Do you get the social and emotional support that you need?: Yes  Do you have a Living Will?: (!) No  General Health Risk Interventions:  · lives in assisting living with 24 hrs aid    Health Habits/Nutrition:  Health Habits/Nutrition  Do you exercise for at least 20 minutes 2-3 times per week?: Yes  Have you lost any weight without trying Pneumococcal 0-64 years Vaccine  Completed    Hepatitis A vaccine  Aged Out    Hepatitis B vaccine  Aged Out    Hib vaccine  Aged Out    Meningococcal (ACWY) vaccine  Aged Out     Recommendations for [employer] Due: see orders and patient instructions/AVS.  . Recommended screening schedule for the next 5-10 years is provided to the patient in written form: see Patient Instructions/AVS.    Yuliet Nguyen was seen today for medicare aw. Diagnoses and all orders for this visit:    Routine general medical examination at a health care facility    Patient a wheel chair bound, lives at assisting living with 24 hrs Aid, poor ambulation, not able to stand to do the mammogram, refuse the mammogram and the colonoscopy.

## 2020-03-10 NOTE — PROGRESS NOTES
Joy Patel  1960  03/10/20    Chief Complaint   Patient presents with    3 Month Follow-Up    Hypertension    Hyperlipidemia    Diabetes    Hypothyroidism    Gastroesophageal Reflux    COPD    Depression    Anxiety           Patient here for 6 months f/u regarding HTN, HLD, DM, GERD, hypothyroidism, anxiety and depression, and hemiplegia, patient came with her care giver, patient doing fine and has no complaints. The patient is taking hypertensive medications compliantly without side effects. Denies chest pain, dyspnea, edema, she is taking her cholesterol medication with no side effects, her DM under control, f/u with the endocrinology, her thyroid under control, GERD under control. F/u with the mental health for her anxiety and depression. Past Medical History:   Diagnosis Date    Anxiety disorder     Back pain, chronic     Cerebral palsy (HCC)     COPD (chronic obstructive pulmonary disease) (HCC)     CVA (cerebral infarction) 2014 x2    Diabetes mellitus [facility]     Diverticulosis     Epilepsy ([facility]     GERD (gastroesophageal reflux disease)     Hyperlipidemia     Hypertension     Insomnia     Iron (Fe) deficiency anemia     Mental disability     Seizures (HCC)     Unspecified cerebral artery occlusion with cerebral infarction      Past Surgical History:   Procedure Laterality Date    GASTROSTOMY TUBE PLACEMENT       History reviewed. No pertinent family history.   Social History     Socioeconomic History    Marital status: Single     Spouse name: Not on file    Number of children: Not on file    Years of education: Not on file    Highest education level: Not on file   Occupational History    Not on file   Social Needs    Financial resource strain: Not on file    Food insecurity     Worry: Not on file     Inability: Not on file    Transportation needs     Medical: Not on file     Non-medical: Not on file   Tobacco Use    Smoking status: Former Smoker     Packs/day: 1.50     Years: 20.00     Pack years: 30.00     Last attempt to quit: 2011     Years since quittin.5    Smokeless tobacco: Never Used   Substance and Sexual Activity    Alcohol use: No     Alcohol/week: 0.0 standard drinks    Drug use: No    Sexual activity: Not on file   Lifestyle    Physical activity     Days per week: Not on file     Minutes per session: Not on file    Stress: Not on file   Relationships    Social connections     Talks on phone: Not on file     Gets together: Not on file     Attends Scientologist service: Not on file     Active member of club or organization: Not on file     Attends meetings of clubs or organizations: Not on file     Relationship status: Not on file    Intimate partner violence     Fear of current or ex partner: Not on file     Emotionally abused: Not on file     Physically abused: Not on file     Forced sexual activity: Not on file   Other Topics Concern    Not on file   Social History Narrative    ** Merged History Encounter **            Allergies   Allergen Reactions    Macrobid [Nitrofurantoin] Hives and Swelling     Hives     Current Outpatient Medications   Medication Sig Dispense Refill    pantoprazole (PROTONIX) 40 MG tablet TAKE 1 TABLET BY MOUTH EVERY MORNING (BEFORE BREAKFAST) 31 tablet 5    lisinopril (PRINIVIL;ZESTRIL) 40 MG tablet TAKE 1 TABLET BY MOUTH DAILY 31 tablet 5    COMBIVENT RESPIMAT  MCG/ACT AERS inhaler INHALE 1 PUFF BY ORAL INHALATION EVERY 6 HOURS 4 g 5    Lactobacillus (ACIDOPHILUS) CAPS capsule TAKE 1 CAPSULE BY MOUTH DAILY 31 capsule 5    carBAMazepine (CARBATROL) 300 MG extended release capsule TAKE 1 CAPSULE BY MOUTH TWICE A DAY 62 capsule 5    fluticasone (FLONASE) 50 MCG/ACT nasal spray 1 spray by Each Nostril route daily 1 Bottle 5    atorvastatin (LIPITOR) 40 MG tablet TAKE 1 TABLET BY MOUTH DAILY 31 tablet 5    mirtazapine (REMERON) 30 MG tablet TAKE 1 TABLET BY MOUTH NIGHTLY 31 tablet 5    HM CETIRIZINE HCL 10 MG tablet TAKE 1 TABLET BY MOUTH DAILY (BED) 31 tablet 5    ipratropium-albuterol (DUONEB) 0.5-2.5 (3) MG/3ML SOLN nebulizer solution Inhale 3 mLs into the lungs 4 times daily For 5 days before transition to prn 360 mL 5    UNABLE TO FIND Fitted wheel chair 1 Units 0    nystatin (MYCOSTATIN) 666296 UNIT/GM powder Apply topically 4 times daily. under the abdominal folds for 2 wks 1 Bottle 1    Ondansetron HCl (ZOFRAN PO) Take 1 tablet by mouth every 8 hours Indications: per facility med list      docusate sodium (COLACE) 100 MG capsule Take 100 mg by mouth 2 times daily      diphenhydrAMINE (BENADRYL) 25 MG capsule Take 25 mg by mouth every 6 hours as needed for Itching      SUMAtriptan (IMITREX) 50 MG tablet Take 50 mg by mouth once as needed for Migraine      benzonatate (TESSALON) 100 MG capsule Take 100 mg by mouth 3 times daily as needed for Cough      aspirin 325 MG tablet Take 325 mg by mouth every 4 hours as needed for Pain      JANUVIA 100 MG tablet TAKE 1 TABLET BY MOUTH DAILY 31 tablet 5    blood glucose monitor strips Test 3 times a day & as needed for symptoms of irregular blood glucose. 90 strip 0    lactulose (CHRONULAC) 10 GM/15ML solution Take 20 g by mouth 3 times daily as needed       levothyroxine (SYNTHROID) 50 MCG tablet Take 50 mcg by mouth Daily      busPIRone (BUSPAR) 10 MG tablet Take 1 tablet by mouth 2 times daily 60 tablet 5    divalproex (DEPAKOTE) 500 MG DR tablet Take 4 tablets by mouth nightly 90 tablet 0    metFORMIN (GLUCOPHAGE) 500 MG tablet Take 2 tablets by mouth daily (with breakfast) 30 tablet 0    risperiDONE (RISPERDAL) 1 MG tablet Take 1 tablet by mouth 3 times daily 1 tablet in am, 1 tablet at 4pm, 1 tablet at bedtime 60 tablet 5    sertraline (ZOLOFT) 100 MG tablet Take 2 tablets by mouth nightly 30 tablet 5     No current facility-administered medications for this visit.         Review of Systems   Constitutional: Negative for activity change, chills and Exam  Constitutional:       Appearance: She is well-developed. HENT:      Head: Normocephalic and atraumatic. Right Ear: External ear normal. There is no impacted cerumen. Left Ear: Drainage, swelling and tenderness present. There is no impacted cerumen. No foreign body. Tympanic membrane is injected and erythematous. Mouth/Throat:      Pharynx: No oropharyngeal exudate or posterior oropharyngeal erythema. Eyes:      General: No scleral icterus. Cardiovascular:      Rate and Rhythm: Normal rate. Heart sounds: Normal heart sounds. No murmur. Pulmonary:      Effort: Pulmonary effort is normal.      Breath sounds: Normal breath sounds. No wheezing. Musculoskeletal: Normal range of motion. Right lower leg: No edema. Left lower leg: No edema. Neurological:      Mental Status: She is alert and oriented to person, place, and time. Comments: Patient use the wheel chair   Psychiatric:         Mood and Affect: Mood normal.         Behavior: Behavior normal.         ASSESSMENT/ PLAN:    1. Controlled type 2 diabetes mellitus without complication, without long-term current use of insulin ([facility]  - under control, f/u with kirk Jaffe    2. HTN (hypertension), benign  - stable    3. Mixed hyperlipidemia  - stable    4. Acquired hypothyroidism  - stable    5. Chronic obstructive pulmonary disease, unspecified COPD type (HCC)  - stable    6. Seizure disorder (HCC)  - stable    7. Anemia, unspecified type  - will check:  - Vitamin B12 & Folate    8. Anxiety and depression  - stable, f/u with the [facility]                - Appropriateprescription are addressed. - After visit summery provided. - Questions answered and patient verbalizes understanding.  - Call for any problem, questions, or concerns. Return in about 3 months (around 6/10/2020).

## 2020-03-10 NOTE — PATIENT INSTRUCTIONS
Personalized Preventive Plan for Sharon Lockett - 3/10/2020  Medicare offers a range of preventive health benefits. Some of the tests and screenings are paid in full while other may be subject to a deductible, co-insurance, and/or copay. Some of these benefits include a comprehensive review of your medical history including lifestyle, illnesses that may run in your family, and various assessments and screenings as appropriate. After reviewing your medical record and screening and assessments performed today your provider may have ordered immunizations, labs, imaging, and/or referrals for you. A list of these orders (if applicable) as well as your Preventive Care list are included within your After Visit Summary for your review. Other Preventive Recommendations:    · A preventive eye exam performed by an eye specialist is recommended every 1-2 years to screen for glaucoma; cataracts, macular degeneration, and other eye disorders. · A preventive dental visit is recommended every 6 months. · Try to get at least 150 minutes of exercise per week or 10,000 steps per day on a pedometer . · Order or download the FREE \"Exercise & Physical Activity: Your Everyday Guide\" from [employer]. Call 4-319.635.6578 or search [employer] online. · You need 6622-3696 mg of calcium and 0044-2504 IU of vitamin D per day. It is possible to meet your calcium requirement with diet alone, but a vitamin D supplement is usually necessary to meet this goal.  · When exposed to the sun, use a sunscreen that protects against both UVA and UVB radiation with an SPF of 30 or greater. Reapply every 2 to 3 hours or after sweating, drying off with a towel, or swimming. · Always wear a seat belt when traveling in a car. Always wear a helmet when riding a bicycle or motorcycle.

## 2020-04-01 ENCOUNTER — TELEPHONE (OUTPATIENT)
Dept: FAMILY MEDICINE CLINIC | Age: 60
End: 2020-04-01

## 2020-04-01 NOTE — TELEPHONE ENCOUNTER
[facility] reliance needs orders for depends, diapers and gloves.   Ph. [phone]  [facility]  [phone]

## 2020-04-06 RX ORDER — DIAPER,BRIEF,ADULT, DISPOSABLE
1 EACH MISCELLANEOUS 4 TIMES DAILY
Qty: 100 PACKAGE | Refills: 5 | Status: SHIPPED | OUTPATIENT
Start: 2020-04-06 | End: 2020-04-06 | Stop reason: SDUPTHER

## 2020-04-06 RX ORDER — DIAPER,BRIEF,ADULT, DISPOSABLE
1 EACH MISCELLANEOUS 4 TIMES DAILY
Qty: 100 PACKAGE | Refills: 5 | Status: SHIPPED | OUTPATIENT
Start: 2020-04-06

## 2020-04-27 ENCOUNTER — TELEPHONE (OUTPATIENT)
Dept: FAMILY MEDICINE CLINIC | Age: 60
End: 2020-04-27

## 2020-04-27 NOTE — TELEPHONE ENCOUNTER
Patient nurse from [facility] called and stated that patient is having diarrhea, black stool  , abdominal pain and nausea, onset last night. Please contact Ava at [phone]. Please advise.

## 2020-04-28 ENCOUNTER — APPOINTMENT (OUTPATIENT)
Dept: CT IMAGING | Age: 60
End: 2020-04-28
Payer: MEDICARE

## 2020-04-28 ENCOUNTER — HOSPITAL ENCOUNTER (EMERGENCY)
Age: 60
Discharge: HOME HEALTH | End: 2020-04-28
Payer: MEDICARE

## 2020-04-28 VITALS
DIASTOLIC BLOOD PRESSURE: 72 MMHG | SYSTOLIC BLOOD PRESSURE: 160 MMHG | TEMPERATURE: 98.3 F | HEART RATE: 73 BPM | HEIGHT: 58 IN | OXYGEN SATURATION: 96 % | WEIGHT: 147.93 LBS | BODY MASS INDEX: 31.05 KG/M2 | RESPIRATION RATE: 16 BRPM

## 2020-04-28 LAB
ABO/RH: NORMAL
ALBUMIN SERPL-MCNC: 3.6 GM/DL (ref 3.4–5)
ALP BLD-CCNC: 77 IU/L (ref 40–129)
ALT SERPL-CCNC: 7 U/L (ref 10–40)
ANION GAP SERPL CALCULATED.3IONS-SCNC: 13 MMOL/L (ref 4–16)
ANTIBODY SCREEN: NEGATIVE
AST SERPL-CCNC: 9 IU/L (ref 15–37)
BASOPHILS ABSOLUTE: 0 K/CU MM
BASOPHILS RELATIVE PERCENT: 0.5 % (ref 0–1)
BILIRUB SERPL-MCNC: 0.1 MG/DL (ref 0–1)
BUN BLDV-MCNC: 17 MG/DL (ref 6–23)
CALCIUM SERPL-MCNC: 8.9 MG/DL (ref 8.3–10.6)
CHLORIDE BLD-SCNC: 106 MMOL/L (ref 99–110)
CO2: 25 MMOL/L (ref 21–32)
CREAT SERPL-MCNC: 0.6 MG/DL (ref 0.6–1.1)
DIFFERENTIAL TYPE: ABNORMAL
EOSINOPHILS ABSOLUTE: 0.1 K/CU MM
EOSINOPHILS RELATIVE PERCENT: 1.1 % (ref 0–3)
GFR AFRICAN AMERICAN: >60 ML/MIN/1.73M2
GFR NON-AFRICAN AMERICAN: >60 ML/MIN/1.73M2
GLUCOSE BLD-MCNC: 141 MG/DL (ref 70–99)
HCT VFR BLD CALC: 38.3 % (ref 37–47)
HEMOCCULT SP1 STL QL: NEGATIVE
HEMOGLOBIN: 11.6 GM/DL (ref 12.5–16)
IMMATURE NEUTROPHIL %: 1.1 % (ref 0–0.43)
LACTATE: 1.6 MMOL/L (ref 0.4–2)
LACTATE: 2.1 MMOL/L (ref 0.4–2)
LIPASE: 28 IU/L (ref 13–60)
LYMPHOCYTES ABSOLUTE: 2.3 K/CU MM
LYMPHOCYTES RELATIVE PERCENT: 40.5 % (ref 24–44)
MCH RBC QN AUTO: 29.3 PG (ref 27–31)
MCHC RBC AUTO-ENTMCNC: 30.3 % (ref 32–36)
MCV RBC AUTO: 96.7 FL (ref 78–100)
MONOCYTES ABSOLUTE: 0.5 K/CU MM
MONOCYTES RELATIVE PERCENT: 9.3 % (ref 0–4)
NUCLEATED RBC %: 0 %
PDW BLD-RTO: 13.2 % (ref 11.7–14.9)
PLATELET # BLD: 244 K/CU MM (ref 140–440)
PMV BLD AUTO: 8.3 FL (ref 7.5–11.1)
POTASSIUM SERPL-SCNC: 3.9 MMOL/L (ref 3.5–5.1)
RBC # BLD: 3.96 M/CU MM (ref 4.2–5.4)
SEGMENTED NEUTROPHILS ABSOLUTE COUNT: 2.7 K/CU MM
SEGMENTED NEUTROPHILS RELATIVE PERCENT: 47.5 % (ref 36–66)
SODIUM BLD-SCNC: 144 MMOL/L (ref 135–145)
TOTAL IMMATURE NEUTOROPHIL: 0.06 K/CU MM
TOTAL NUCLEATED RBC: 0 K/CU MM
TOTAL PROTEIN: 6.2 GM/DL (ref 6.4–8.2)
WBC # BLD: 5.6 K/CU MM (ref 4–10.5)

## 2020-04-28 PROCEDURE — 86850 RBC ANTIBODY SCREEN: CPT

## 2020-04-28 PROCEDURE — 86900 BLOOD TYPING SEROLOGIC ABO: CPT

## 2020-04-28 PROCEDURE — 83690 ASSAY OF LIPASE: CPT

## 2020-04-28 PROCEDURE — 6360000004 HC RX CONTRAST MEDICATION: Performed by: PHYSICIAN ASSISTANT

## 2020-04-28 PROCEDURE — 80053 COMPREHEN METABOLIC PANEL: CPT

## 2020-04-28 PROCEDURE — 99285 EMERGENCY DEPT VISIT HI MDM: CPT

## 2020-04-28 PROCEDURE — G0328 FECAL BLOOD SCRN IMMUNOASSAY: HCPCS

## 2020-04-28 PROCEDURE — 85025 COMPLETE CBC W/AUTO DIFF WBC: CPT

## 2020-04-28 PROCEDURE — 74177 CT ABD & PELVIS W/CONTRAST: CPT

## 2020-04-28 PROCEDURE — 86901 BLOOD TYPING SEROLOGIC RH(D): CPT

## 2020-04-28 PROCEDURE — 83605 ASSAY OF LACTIC ACID: CPT

## 2020-04-28 RX ADMIN — IOPAMIDOL 75 ML: 755 INJECTION, SOLUTION INTRAVENOUS at 21:06

## 2020-04-28 NOTE — ED NOTES
Report given to Luis E Hebert. Nurse to assume care at this time.       Julio C Blakely, RN  04/28/20 1924

## 2020-04-28 NOTE — ED PROVIDER NOTES
History Encounter **            PHYSICAL EXAM    VITAL SIGNS: BP (!) 160/72   Pulse 73   Temp 98.3 °F (36.8 °C) (Oral)   Resp 16   Ht 4' 9.99\" (1.473 m)   Wt 147 lb 14.9 oz (67.1 kg)   SpO2 96%   BMI 30.93 kg/m²   Constitutional:  Well developed, well nourished, no acute distress, non-toxic appearance   Eyes:  Sclera anicteric. HENT:  NC/AT. Ears, nose normal.  Oropharynx moist.  Neck:  Supple. Respiratory:  Respirations unlabored. GI:  Abdomen soft, non-distended, non-tender on my exam.  BS active. Musculoskeletal:  No acute deformities. Integument:  Warm and dry. Neurologic:  Alert, interactive. LABS/IMAGING    Labs Reviewed   CBC WITH AUTO DIFFERENTIAL - Abnormal; Notable for the following components:       Result Value    RBC 3.96 (*)     Hemoglobin 11.6 (*)     MCHC 30.3 (*)     Monocytes % 9.3 (*)     Immature Neutrophil % 1.1 (*)     All other components within normal limits   COMPREHENSIVE METABOLIC PANEL W/ REFLEX TO MG FOR LOW K - Abnormal; Notable for the following components:    Glucose 141 (*)     Total Protein 6.2 (*)     ALT 7 (*)     AST 9 (*)     All other components within normal limits   LACTIC ACID, PLASMA - Abnormal; Notable for the following components:    Lactate 2.1 (*)     All other components within normal limits   LIPASE   LACTIC ACID, PLASMA   BLOOD OCCULT STOOL SCREEN #1   TYPE AND SCREEN     Ct Abdomen Pelvis W Iv Contrast Additional Contrast? None    Result Date: 4/28/2020  EXAMINATION: CT OF THE ABDOMEN AND PELVIS WITH CONTRAST 4/28/2020 8:46 pm TECHNIQUE: CT of the abdomen and pelvis was performed with the administration of intravenous contrast. Multiplanar reformatted images are provided for review. Dose modulation, iterative reconstruction, and/or weight based adjustment of the mA/kV was utilized to reduce the radiation dose to as low as reasonably achievable.  COMPARISON: Abdomen and pelvis CTs dated 05/20/2019 and 12/18/2018 HISTORY: [address] [place] [address] HISTORY: possible rectal bleeding, MRDD TECHNOLOGIST PROVIDED HISTORY: Reason for exam:->possible rectal bleeding, MRDD Additional Contrast?->None Reason for Exam: MRDD r/o gi bleeding FINDINGS: LOWER CHEST:  Minimal gravity atelectasis in the lower lobes. Mild cardiomegaly. No pleural nor pericardial effusions. ORGANS:  Minimal focal steatosis in hepatic segment 4 adjacent to the fissure for ligamentum teres. Moderate pancreatic atrophy. 1.0 cm exophytic hypodense lesion of indeterminate density arising from the medial upper pole of the right kidney, possibly enhancing compared to the prior noncontrast study. 2.9 cm exophytic simple cyst arising from the posterior lower pole of the left kidney (Bosniak category 1). Additional hypodense lesions in each kidney measuring up to 0.6 cm, too small to characterize but also likely cysts. Normal gallbladder spleen, and adrenal glands. GI/BOWEL:  Normal course and caliber of the stomach, small bowel, colon, and rectum without obstruction. Normal appendix. Mild to moderate colonic diverticulosis without findings of acute diverticulitis. Minimal stool. PELVIS:  Age-appropriate atrophy of the uterus and ovaries. Apparent endometrial thickening for age, measuring up to 0.6 cm. Urinary bladder wall thickening with anterior perivesical stranding. PERITONEUM/RETROPERITONEUM:  Mild systemic atherosclerosis. No abdominal nor pelvic lymphadenopathy. No free intraperitoneal fluid nor gas. Fat necrosis adjacent to the distal sigmoid colon potentially due to prior diverticulitis, epiploic appendagitis, or omental infarction. SOFT TISSUES/BONES:  Laxity of the anterior and lateral abdominal wall musculature. No inguinal lymphadenopathy. Diffuse osseous demineralization. No acute fractures nor suspicious osseous lesions. 1. Mild to moderate colonic diverticulosis could be a source for gastrointestinal bleeding.   However, no definite bowel findings to account for the

## 2020-04-28 NOTE — ED NOTES
Patient incontinent of moderate amount of brown/green stool, patient cleaned up, linens changed, pulled up in bed. Tolerated well.  Stool specimen sent to the lab     Alvarado Desouza RN  04/28/20 1958

## 2020-05-05 ENCOUNTER — VIRTUAL VISIT (OUTPATIENT)
Dept: FAMILY MEDICINE CLINIC | Age: 60
End: 2020-05-05
Payer: MEDICARE

## 2020-05-05 PROBLEM — N28.9 LESION OF RIGHT NATIVE KIDNEY: Status: ACTIVE | Noted: 2020-05-05

## 2020-05-05 PROBLEM — K57.90 DIVERTICULOSIS: Status: ACTIVE | Noted: 2020-05-05

## 2020-05-05 PROBLEM — K92.1 BLACK STOOL: Status: ACTIVE | Noted: 2020-05-05

## 2020-05-05 PROCEDURE — 99214 OFFICE O/P EST MOD 30 MIN: CPT | Performed by: FAMILY MEDICINE

## 2020-05-05 PROCEDURE — 3017F COLORECTAL CA SCREEN DOC REV: CPT | Performed by: FAMILY MEDICINE

## 2020-05-05 PROCEDURE — G8428 CUR MEDS NOT DOCUMENT: HCPCS | Performed by: FAMILY MEDICINE

## 2020-05-05 RX ORDER — AMLODIPINE BESYLATE 10 MG/1
10 TABLET ORAL DAILY
Qty: 30 TABLET | Refills: 5 | Status: SHIPPED | OUTPATIENT
Start: 2020-05-05 | End: 2020-05-13 | Stop reason: SDUPTHER

## 2020-05-05 RX ORDER — SELENIUM 50 MCG
1 TABLET ORAL DAILY
Qty: 31 CAPSULE | Refills: 5 | Status: SHIPPED | OUTPATIENT
Start: 2020-05-05 | End: 2021-02-09

## 2020-05-05 ASSESSMENT — ENCOUNTER SYMPTOMS
CONSTIPATION: 0
ABDOMINAL DISTENTION: 0
DIARRHEA: 0
VOMITING: 0
ABDOMINAL PAIN: 0
RECTAL PAIN: 0
COUGH: 0
BLOOD IN STOOL: 1
SHORTNESS OF BREATH: 0

## 2020-05-05 NOTE — PROGRESS NOTES
2020    TELEHEALTH EVALUATION -- Audio/Visual (During NLNGZ-49 public health emergency)    Chief Complaint   Patient presents with    Other     f/u from the ED for black colored stool and concern about the r kidney lesion    Medication Refill         HPI:    Simeon Vigil (:  1960) has requested an audio/video evaluation for the following concern(s):    Patient seen today as VV for f/u from the ED for dark colored stool, happened 4 times since 20, no more since the , denies abdominal pain, no N/V or fever, no constipation, appetite is good, patient usually refuse the colonoscopy, had the blood work and the CT abdomin did show diverticulosis, and R kidney lesion, no medications changes, patient also need a refill on her medications, The patient is taking hypertensive medications compliantly without side effects. Denies chest pain, dyspnea, or edema. Per the care giver her BP average 140-155/90-95          Review of Systems   Constitutional: Negative for activity change, chills, fatigue and fever. HENT: Negative for congestion. Respiratory: Negative for cough and shortness of breath. Cardiovascular: Negative for chest pain and leg swelling. Gastrointestinal: Positive for blood in stool. Negative for abdominal distention, abdominal pain, constipation, diarrhea, rectal pain and vomiting. Genitourinary: Negative for dysuria and hematuria. Neurological: Negative for dizziness and headaches. Psychiatric/Behavioral: Negative for agitation and sleep disturbance. The patient is not nervous/anxious. Prior to Visit Medications    Medication Sig Taking?  Authorizing Provider   SITagliptin (JANUVIA) 100 MG tablet Take 1 tablet by mouth daily Yes Alva Sandoval MD   Lactobacillus (ACIDOPHILUS) CAPS capsule Take 1 capsule by mouth daily Yes Alva Sandoval MD   amLODIPine (NORVASC) 10 MG tablet Take 1 tablet by mouth daily Yes Alva Sandoval MD   Incontinence Supply Disposable (DEPEND cerebral artery occlusion with cerebral infarction    ,   Past Surgical History:   Procedure Laterality Date    GASTROSTOMY TUBE PLACEMENT     ,   Social History     Tobacco Use    Smoking status: Former Smoker     Packs/day: 1.50     Years: 20.00     Pack years: 30.00     Last attempt to quit: 2011     Years since quittin.6    Smokeless tobacco: Never Used   Substance Use Topics    Alcohol use: No     Alcohol/week: 0.0 standard drinks    Drug use: No   , No family history on file. PHYSICAL EXAMINATION:  [ INSTRUCTIONS:  \"[x]\" Indicates a positive item  \"[]\" Indicates a negative item  -- DELETE ALL ITEMS NOT EXAMINED]  Vital Signs: (As obtained by patient/caregiver or practitioner observation)    Blood pressure- 148/90  Heart rate- 74   Respiratory rate- 20   Temperature-97.9  Pulse oximetry- 98    Constitutional: [x] Appears well-developed and well-nourished [x] No apparent distress      [] Abnormal-   Mental status  [x] Alert and awake  [x] Oriented to person/place/time [x]Able to follow commands      Eyes:  EOM    []  Normal  [] Abnormal-  Sclera  []  Normal  [] Abnormal -         Discharge []  None visible  [] Abnormal -    HENT:   [x] Normocephalic, atraumatic. [] Abnormal   [] Mouth/Throat: Mucous membranes are moist.     External Ears [] Normal  [] Abnormal-     Neck: [] No visualized mass     Pulmonary/Chest: [x] Respiratory effort normal.  [x] No visualized signs of difficulty breathing or respiratory distress        [] Abnormal-      Musculoskeletal:   [] Normal gait with no signs of ataxia         [] Normal range of motion of neck        [] Abnormal-       Neurological:        [] No Facial Asymmetry (Cranial nerve 7 motor function) (limited exam to video visit)          [] No gaze palsy        [] Abnormal-                  Psychiatric:       [x] Normal Affect [] No Hallucinations        [] Abnormal-     ASSESSMENT/PLAN:  1. Black stool  - Amb External Referral To Gastroenterology    2. Lesion of right native kidney  - MRI ABDOMEN WO CONTRAST; Future      3. Diverticulosis  - Amb External Referral To Gastroenterology    4. HTN (hypertension), benign  - amLODIPine (NORVASC) 10 MG tablet; Take 1 tablet by mouth daily  Dispense: 30 tablet; Refill: 5    All the labs and the imaging reviewed with the patient    Return in about 3 months (around 8/5/2020). Margot Angelucci is a 64 y.o. female being evaluated by a Virtual Visit (video visit) encounter to address concerns as mentioned above. A caregiver was present when appropriate. Due to this being a TeleHealth encounter (During TQSSG-06 public health emergency), evaluation of the following organ systems was limited: Vitals/Constitutional/EENT/Resp/CV/GI//MS/Neuro/Skin/Heme-Lymph-Imm. Pursuant to the emergency declaration under the [address] [place] [address], [address] [place] [address] authority and the [employer] Act, this Virtual Visit was conducted with patient's (and/or legal guardian's) consent, to reduce the patient's risk of exposure to COVID-19 and provide necessary medical care. The patient (and/or legal guardian) has also been advised to contact this office for worsening conditions or problems, and seek emergency medical treatment and/or call 911 if deemed necessary. Patient identification was verified at the start of the visit: Yes    Total time spent on this encounter: 25 minutes    Services were provided through a video synchronous discussion virtually to substitute for in-person clinic visit. Patient and provider were located at their individual homes. --Hunter Pope MD on 5/5/2020 at 10:06 AM    An electronic signature was used to authenticate this note.

## 2020-05-11 ENCOUNTER — APPOINTMENT (OUTPATIENT)
Dept: GENERAL RADIOLOGY | Age: 60
End: 2020-05-11
Payer: MEDICARE

## 2020-05-11 ENCOUNTER — HOSPITAL ENCOUNTER (EMERGENCY)
Age: 60
Discharge: HOME | End: 2020-05-12
Payer: MEDICARE

## 2020-05-11 ENCOUNTER — APPOINTMENT (OUTPATIENT)
Dept: CT IMAGING | Age: 60
End: 2020-05-11
Payer: MEDICARE

## 2020-05-11 LAB
ALBUMIN SERPL-MCNC: 3.8 GM/DL (ref 3.4–5)
ALP BLD-CCNC: 84 IU/L (ref 40–129)
ALT SERPL-CCNC: 8 U/L (ref 10–40)
ANION GAP SERPL CALCULATED.3IONS-SCNC: 14 MMOL/L (ref 4–16)
AST SERPL-CCNC: 16 IU/L (ref 15–37)
BASOPHILS ABSOLUTE: 0 K/CU MM
BASOPHILS RELATIVE PERCENT: 0.4 % (ref 0–1)
BILIRUB SERPL-MCNC: 0.1 MG/DL (ref 0–1)
BUN BLDV-MCNC: 14 MG/DL (ref 6–23)
CALCIUM SERPL-MCNC: 9.2 MG/DL (ref 8.3–10.6)
CHLORIDE BLD-SCNC: 101 MMOL/L (ref 99–110)
CO2: 27 MMOL/L (ref 21–32)
CREAT SERPL-MCNC: 0.6 MG/DL (ref 0.6–1.1)
DIFFERENTIAL TYPE: ABNORMAL
EOSINOPHILS ABSOLUTE: 0 K/CU MM
EOSINOPHILS RELATIVE PERCENT: 0.3 % (ref 0–3)
GFR AFRICAN AMERICAN: >60 ML/MIN/1.73M2
GFR NON-AFRICAN AMERICAN: >60 ML/MIN/1.73M2
GLUCOSE BLD-MCNC: 118 MG/DL (ref 70–99)
HCT VFR BLD CALC: 41.4 % (ref 37–47)
HEMOGLOBIN: 12.5 GM/DL (ref 12.5–16)
IMMATURE NEUTROPHIL %: 0.9 % (ref 0–0.43)
LACTATE: 4.3 MMOL/L (ref 0.4–2)
LIPASE: 29 IU/L (ref 13–60)
LYMPHOCYTES ABSOLUTE: 2 K/CU MM
LYMPHOCYTES RELATIVE PERCENT: 26.1 % (ref 24–44)
MCH RBC QN AUTO: 29.1 PG (ref 27–31)
MCHC RBC AUTO-ENTMCNC: 30.2 % (ref 32–36)
MCV RBC AUTO: 96.3 FL (ref 78–100)
MONOCYTES ABSOLUTE: 0.6 K/CU MM
MONOCYTES RELATIVE PERCENT: 8.3 % (ref 0–4)
NUCLEATED RBC %: 0 %
PDW BLD-RTO: 13.2 % (ref 11.7–14.9)
PLATELET # BLD: 205 K/CU MM (ref 140–440)
PMV BLD AUTO: 8.9 FL (ref 7.5–11.1)
POTASSIUM SERPL-SCNC: 4.5 MMOL/L (ref 3.5–5.1)
RBC # BLD: 4.3 M/CU MM (ref 4.2–5.4)
SEGMENTED NEUTROPHILS ABSOLUTE COUNT: 5 K/CU MM
SEGMENTED NEUTROPHILS RELATIVE PERCENT: 64 % (ref 36–66)
SODIUM BLD-SCNC: 142 MMOL/L (ref 135–145)
TOTAL IMMATURE NEUTOROPHIL: 0.07 K/CU MM
TOTAL NUCLEATED RBC: 0 K/CU MM
TOTAL PROTEIN: 7 GM/DL (ref 6.4–8.2)
TROPONIN T: <0.01 NG/ML
WBC # BLD: 7.7 K/CU MM (ref 4–10.5)

## 2020-05-11 PROCEDURE — 84484 ASSAY OF TROPONIN QUANT: CPT

## 2020-05-11 PROCEDURE — 96361 HYDRATE IV INFUSION ADD-ON: CPT

## 2020-05-11 PROCEDURE — 71045 X-RAY EXAM CHEST 1 VIEW: CPT

## 2020-05-11 PROCEDURE — 81001 URINALYSIS AUTO W/SCOPE: CPT

## 2020-05-11 PROCEDURE — 99285 EMERGENCY DEPT VISIT HI MDM: CPT

## 2020-05-11 PROCEDURE — 2580000003 HC RX 258: Performed by: PHYSICIAN ASSISTANT

## 2020-05-11 PROCEDURE — 74177 CT ABD & PELVIS W/CONTRAST: CPT

## 2020-05-11 PROCEDURE — 80053 COMPREHEN METABOLIC PANEL: CPT

## 2020-05-11 PROCEDURE — 96360 HYDRATION IV INFUSION INIT: CPT

## 2020-05-11 PROCEDURE — 83690 ASSAY OF LIPASE: CPT

## 2020-05-11 PROCEDURE — 83605 ASSAY OF LACTIC ACID: CPT

## 2020-05-11 PROCEDURE — 93005 ELECTROCARDIOGRAM TRACING: CPT | Performed by: PHYSICIAN ASSISTANT

## 2020-05-11 PROCEDURE — 85025 COMPLETE CBC W/AUTO DIFF WBC: CPT

## 2020-05-11 PROCEDURE — 6360000004 HC RX CONTRAST MEDICATION: Performed by: PHYSICIAN ASSISTANT

## 2020-05-11 RX ORDER — SODIUM CHLORIDE 0.9 % (FLUSH) 0.9 %
10 SYRINGE (ML) INJECTION 2 TIMES DAILY
Status: DISCONTINUED | OUTPATIENT
Start: 2020-05-11 | End: 2020-05-12 | Stop reason: HOSPADM

## 2020-05-11 RX ORDER — 0.9 % SODIUM CHLORIDE 0.9 %
1000 INTRAVENOUS SOLUTION INTRAVENOUS ONCE
Status: COMPLETED | OUTPATIENT
Start: 2020-05-11 | End: 2020-05-12

## 2020-05-11 RX ORDER — CETIRIZINE HYDROCHLORIDE 10 MG/1
10 TABLET ORAL DAILY
COMMUNITY
End: 2021-03-26 | Stop reason: SDUPTHER

## 2020-05-11 RX ADMIN — SODIUM CHLORIDE, PRESERVATIVE FREE 10 ML: 5 INJECTION INTRAVENOUS at 23:25

## 2020-05-11 RX ADMIN — IOPAMIDOL 75 ML: 755 INJECTION, SOLUTION INTRAVENOUS at 23:25

## 2020-05-11 RX ADMIN — SODIUM CHLORIDE 1000 ML: 9 INJECTION, SOLUTION INTRAVENOUS at 22:58

## 2020-05-12 ENCOUNTER — APPOINTMENT (OUTPATIENT)
Dept: CT IMAGING | Age: 60
End: 2020-05-12
Payer: MEDICARE

## 2020-05-12 VITALS
OXYGEN SATURATION: 92 % | SYSTOLIC BLOOD PRESSURE: 130 MMHG | HEIGHT: 57 IN | RESPIRATION RATE: 15 BRPM | DIASTOLIC BLOOD PRESSURE: 70 MMHG | BODY MASS INDEX: 31.71 KG/M2 | TEMPERATURE: 98.5 F | HEART RATE: 67 BPM | WEIGHT: 147 LBS

## 2020-05-12 LAB
BACTERIA: ABNORMAL /HPF
BILIRUBIN URINE: NEGATIVE MG/DL
BLOOD, URINE: NEGATIVE
CLARITY: CLEAR
COLOR: COLORLESS
GLUCOSE, URINE: NEGATIVE MG/DL
KETONES, URINE: NEGATIVE MG/DL
LACTATE: 2.2 MMOL/L (ref 0.4–2)
LEUKOCYTE ESTERASE, URINE: NEGATIVE
NITRITE URINE, QUANTITATIVE: NEGATIVE
PH, URINE: 7 (ref 5–8)
PROTEIN UA: NEGATIVE MG/DL
RBC URINE: <1 /HPF (ref 0–6)
SPECIFIC GRAVITY UA: 1.04 (ref 1–1.03)
SQUAMOUS EPITHELIAL: 1 /HPF
TRANSITIONAL EPITHELIAL: <1 /HPF
TRICHOMONAS: ABNORMAL /HPF
UROBILINOGEN, URINE: NORMAL MG/DL (ref 0.2–1)
WBC UA: 2 /HPF (ref 0–5)

## 2020-05-12 PROCEDURE — 93010 ELECTROCARDIOGRAM REPORT: CPT | Performed by: INTERNAL MEDICINE

## 2020-05-12 PROCEDURE — 70450 CT HEAD/BRAIN W/O DYE: CPT

## 2020-05-12 PROCEDURE — 83605 ASSAY OF LACTIC ACID: CPT

## 2020-05-12 NOTE — ED NOTES
Need to call Arnetha Car when patient is up for discharge. Will need transport set up.  # [phone]     [facility]En[facility]  05/12/20 [id number]

## 2020-05-12 NOTE — ED PROVIDER NOTES
Hypertension     Insomnia     Iron (Fe) deficiency anemia     Mental disability     Seizures (HCC)     Unspecified cerebral artery occlusion with cerebral infarction      Past Surgical History:   Procedure Laterality Date    GASTROSTOMY TUBE PLACEMENT       Social History     Socioeconomic History    Marital status: Single     Spouse name: None    Number of children: None    Years of education: None    Highest education level: None   Occupational History    None   Social Needs    Financial resource strain: None    Food insecurity     Worry: None     Inability: None    Transportation needs     Medical: None     Non-medical: None   Tobacco Use    Smoking status: Former Smoker     Packs/day: 1.50     Years: 20.00     Pack years: 30.00     Last attempt to quit: 2011     Years since quittin.7    Smokeless tobacco: Never Used   Substance and Sexual Activity    Alcohol use: No     Alcohol/week: 0.0 standard drinks    Drug use: No    Sexual activity: None   Lifestyle    Physical activity     Days per week: None     Minutes per session: None    Stress: None   Relationships    Social connections     Talks on phone: None     Gets together: None     Attends [employer] service: None     Active member of club or organization: None     Attends meetings of clubs or organizations: None     Relationship status: None    Intimate partner violence     Fear of current or ex partner: None     Emotionally abused: None     Physically abused: None     Forced sexual activity: None   Other Topics Concern    None   Social History Narrative    ** Merged History Encounter **            Medications/Allergies     Previous Medications    AMLODIPINE (NORVASC) 10 MG TABLET    Take 1 tablet by mouth daily    ASPIRIN 325 MG TABLET    Take 325 mg by mouth every 4 hours as needed for Pain    ATORVASTATIN (LIPITOR) 40 MG TABLET    TAKE 1 TABLET BY MOUTH DAILY    BENZONATATE (TESSALON) 100 MG CAPSULE    Take 100 mg by mouth 3 times daily as needed for Cough    BLOOD GLUCOSE MONITOR STRIPS    Test 3 times a day & as needed for symptoms of irregular blood glucose.     BUSPIRONE (BUSPAR) 10 MG TABLET    Take 1 tablet by mouth 2 times daily    CARBAMAZEPINE (CARBATROL) 300 MG EXTENDED RELEASE CAPSULE    TAKE 1 CAPSULE BY MOUTH TWICE A DAY    CETIRIZINE (ZYRTEC) 10 MG TABLET    Take 10 mg by mouth daily    COMBIVENT RESPIMAT  MCG/ACT AERS INHALER    INHALE 1 PUFF BY ORAL INHALATION EVERY 6 HOURS    DIAPERS & SUPPLIES MISC    1 each by Does not apply route daily as needed (4-5 times daily)    DIPHENHYDRAMINE (BENADRYL) 25 MG CAPSULE    Take 25 mg by mouth every 6 hours as needed for Itching    DISPOSABLE GLOVES MISC    1 Units by Does not apply route 4 times daily    DIVALPROEX (DEPAKOTE) 500 MG DR TABLET    Take 4 tablets by mouth nightly    DOCUSATE SODIUM (COLACE) 100 MG CAPSULE    Take 100 mg by mouth 2 times daily    FLUTICASONE (FLONASE) 50 MCG/ACT NASAL SPRAY    1 spray by Each Nostril route daily    HM CETIRIZINE HCL 10 MG TABLET    TAKE 1 TABLET BY MOUTH DAILY (BED)    INCONTINENCE SUPPLY DISPOSABLE (DEPEND UNDERWEAR LARGE/XL) MISC    1 Units by Does not apply route 4 times daily    IPRATROPIUM-ALBUTEROL (DUONEB) 0.5-2.5 (3) MG/3ML SOLN NEBULIZER SOLUTION    Inhale 3 mLs into the lungs 4 times daily For 5 days before transition to prn    LACTOBACILLUS (ACIDOPHILUS) CAPS CAPSULE    Take 1 capsule by mouth daily    LACTULOSE (CHRONULAC) 10 GM/15ML SOLUTION    Take 20 g by mouth 3 times daily as needed     LEVETIRACETAM 750 MG TB3D    Take by mouth    LEVOTHYROXINE (SYNTHROID) 50 MCG TABLET    Take 50 mcg by mouth Daily    LISINOPRIL (PRINIVIL;ZESTRIL) 40 MG TABLET    TAKE 1 TABLET BY MOUTH DAILY    METFORMIN (GLUCOPHAGE) 500 MG TABLET    Take 2 tablets by mouth daily (with breakfast)    MIRTAZAPINE (REMERON) 30 MG TABLET    TAKE 1 TABLET BY MOUTH NIGHTLY    NYSTATIN (MYCOSTATIN) 661546 UNIT/GM POWDER    Apply topically 4 times daily.under the abdominal folds for 2 wks    ONDANSETRON HCL (ZOFRAN PO)    Take 1 tablet by mouth every 8 hours Indications: per facility med list    PANTOPRAZOLE (PROTONIX) 40 MG TABLET    TAKE 1 TABLET BY MOUTH EVERY MORNING (BEFORE BREAKFAST)    RISPERIDONE (RISPERDAL) 1 MG TABLET    Take 1 tablet by mouth 3 times daily 1 tablet in am, 1 tablet at 4pm, 1 tablet at bedtime    SERTRALINE (ZOLOFT) 100 MG TABLET    Take 2 tablets by mouth nightly    SITAGLIPTIN (JANUVIA) 100 MG TABLET    Take 1 tablet by mouth daily    SUMATRIPTAN (IMITREX) 50 MG TABLET    Take 50 mg by mouth once as needed for Migraine    UNABLE TO FIND    Fitted wheel chair     Allergies   Allergen Reactions    Macrobid [Nitrofurantoin] Hives and Swelling     Hives        Physical Exam       ED Triage Vitals   BP Temp Temp Source Pulse Resp SpO2 Height Weight   05/11/20 2112 05/11/20 2112 05/11/20 2112 05/11/20 2112 05/11/20 2112 05/11/20 2112 05/11/20 2115 05/11/20 2115   (!) 100/53 98.5 °F (36.9 °C) Oral 74 22 93 % 4' 9\" (1.448 m) 147 lb (66.7 kg)     GENERAL APPEARANCE:  Well-developed, well-nourished, no acute distress. HEAD:  NC/AT. EYES:  Sclera anicteric. ENT:  Ears, nose, mouth normal.     NECK:  Supple. CARDIO:  RRR. LUNGS:   CTAB. Respirations unlabored. ABDOMEN:  Soft, non-distended, non-tender. BS active. EXTREMITIES:  No acute deformities. SKIN:  Warm and dry. NEUROLOGICAL:  Alert and oriented. PSYCHIATRIC:  Normal mood.      Diagnostics     Labs:  Results for orders placed or performed during the hospital encounter of 05/11/20   EKG 12 Lead   Result Value Ref Range    Ventricular Rate 81 BPM    Atrial Rate 81 BPM    P-R Interval 162 ms    QRS Duration 76 ms    Q-T Interval 372 ms    QTc Calculation (Bazett) 432 ms    P Axis 56 degrees    R Axis 6 degrees    T Axis 100 degrees    Diagnosis       Normal sinus rhythm  Nonspecific T wave abnormality  Abnormal ECG  When compared with ECG of 25-DEC-2019 11:01,  Nonspecific T wave abnormality now evident in Inferior leads  Nonspecific T wave abnormality now evident in Anterolateral leads         Radiographs:  Ct Head Wo Contrast    Result Date: 5/12/2020  EXAMINATION: CT OF THE HEAD WITHOUT CONTRAST  5/12/2020 2:10 am TECHNIQUE: CT of the head was performed without the administration of intravenous contrast. Dose modulation, iterative reconstruction, and/or weight based adjustment of the mA/kV was utilized to reduce the radiation dose to as low as reasonably achievable. COMPARISON: CT head scan without contrast December 18, 2018 HISTORY: ORDERING SYSTEM PROVIDED HISTORY: ams TECHNOLOGIST PROVIDED HISTORY: Reason for exam:->ams Has a \"code stroke\" or \"stroke alert\" been called? 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 Moderate bilateral cerebellar volume loss is unchanged. ORBITS: The visualized portion of the orbits demonstrate no acute abnormality. SINUSES: The visualized paranasal sinuses and mastoid air cells demonstrate no acute abnormality. SOFT TISSUES/SKULL:  Bilateral frontotemporal cranial defects are seen which are unchanged. 1. 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 2. Bilateral cranial defects, possibly congenital in etiology. 3. Moderate bilateral cerebellar volume loss. 4. No significant interval change. Ct Abdomen Pelvis W Iv Contrast Additional Contrast? None    Result Date: 5/11/2020  EXAMINATION: CT OF THE ABDOMEN AND PELVIS WITH CONTRAST 5/11/2020 11:22 pm TECHNIQUE: CT of the abdomen and pelvis was performed with the administration of intravenous contrast. Multiplanar reformatted images are provided for review.  Dose modulation, iterative reconstruction, and/or weight based adjustment of reasonably achievable. COMPARISON: Abdomen and pelvis CTs dated 05/20/2019 and 12/18/2018 HISTORY: ORDERING SYSTEM PROVIDED HISTORY: possible rectal bleeding, MRDD TECHNOLOGIST PROVIDED HISTORY: Reason for exam:->possible rectal bleeding, MRDD Additional Contrast?->None Reason for Exam: MRDD r/o gi bleeding FINDINGS: LOWER CHEST:  Minimal gravity atelectasis in the lower lobes. Mild cardiomegaly. No pleural nor pericardial effusions. ORGANS:  Minimal focal steatosis in hepatic segment 4 adjacent to the fissure for ligamentum teres. Moderate pancreatic atrophy. 1.0 cm exophytic hypodense lesion of indeterminate density arising from the medial upper pole of the right kidney, possibly enhancing compared to the prior noncontrast study. 2.9 cm exophytic simple cyst arising from the posterior lower pole of the left kidney (Bosniak category 1). Additional hypodense lesions in each kidney measuring up to 0.6 cm, too small to characterize but also likely cysts. Normal gallbladder spleen, and adrenal glands. GI/BOWEL:  Normal course and caliber of the stomach, small bowel, colon, and rectum without obstruction. Normal appendix. Mild to moderate colonic diverticulosis without findings of acute diverticulitis. Minimal stool. PELVIS:  Age-appropriate atrophy of the uterus and ovaries. Apparent endometrial thickening for age, measuring up to 0.6 cm. Urinary bladder wall thickening with anterior perivesical stranding. PERITONEUM/RETROPERITONEUM:  Mild systemic atherosclerosis. No abdominal nor pelvic lymphadenopathy. No free intraperitoneal fluid nor gas. Fat necrosis adjacent to the distal sigmoid colon potentially due to prior diverticulitis, epiploic appendagitis, or omental infarction. SOFT TISSUES/BONES:  Laxity of the anterior and lateral abdominal wall musculature. No inguinal lymphadenopathy. Diffuse osseous demineralization. No acute fractures nor suspicious osseous lesions.      1. Mild to moderate colonic diverticulosis could be a source for gastrointestinal bleeding. However, no definite bowel findings to account for the reported melena. 2. Indeterminate 1.0 cm lesion in the upper pole of the right kidney. There may be enhancement compared to a prior noncontrast study, which would suggest a solid neoplasm. However, a complicated cyst is not excluded. Recommend further evaluation with renal protocol abdomen MRI (preferred) or CT on a nonemergent basis. 3. Apparent endometrial thickening for age. Recommend further evaluation with sonography on a nonemergent basis. Xr Chest Portable    Result Date: 5/11/2020  EXAMINATION: ONE XRAY VIEW OF THE CHEST 5/11/2020 9:47 pm COMPARISON: 12/25/2019 HISTORY: ORDERING SYSTEM PROVIDED HISTORY: abd pain TECHNOLOGIST PROVIDED HISTORY: Reason for exam:->abd pain Reason for Exam: abd pain Acuity: Acute Type of Exam: Initial Additional signs and symptoms: na Relevant Medical/Surgical History: diabetes, hypertension FINDINGS: Heart size and configuration are normal.  The lungs are clear. No pneumothorax or pleural fluid. No acute bone finding. No acute cardiopulmonary disease. Procedures/EKG:   Please see Dr. Srinivas Apple note for interpretation. ED Course and MDM   -  Patient seen and evaluated in the emergency department. -  Triage and nursing notes reviewed and incorporated. -  Old chart records reviewed and incorporated. -  Supervising physician was Dr. Moy Muñoz. Patient was seen independently. -  Work-up included:  See above  -  ED medications:  NS  -  Results discussed with patient. CT head shows no acute findings. CXR clear. CT abd pelv without acute findings--does show a right renal lesion which has been seen on previous studies as well as diverticulosis. Initial lactic was 4.3 which improved with fluids. Remaining labs and urine are unremarkable. Patient steadily improved throughout ED course, becoming more talkative.   Denied any abdominal pain or nausea on re-examination and voiced readiness for discharge. Niki Bangura did call and speak with home health aide. FU with PCP, return as needed. They are agreeable with plan of care and disposition.   -  Disposition:  Home    In light of current events, I did utilize appropriate PPE (including surgical face mask, safety glasses, and gloves, as recommended by the health facility/national standard best practice, during my bedside interactions with the patient. Patient was also masked throughout ED course. Final Impression      1. General weakness    2. Non-intractable vomiting, presence of nausea not specified, unspecified vomiting type    3.  Lesion of right native kidney          DISPOSITION        Jose Francisco Francis PA-C  [address] [place] [address]  05/12/20 [id number]

## 2020-05-12 NOTE — ED NOTES
Bed: 04TR-04  Expected date:   Expected time:   Means of arrival:   Comments:  1 Kate Jaramillo RN  05/11/20 [id number]

## 2020-05-13 ENCOUNTER — TELEPHONE (OUTPATIENT)
Dept: FAMILY MEDICINE CLINIC | Age: 60
End: 2020-05-13

## 2020-05-13 ENCOUNTER — VIRTUAL VISIT (OUTPATIENT)
Dept: FAMILY MEDICINE CLINIC | Age: 60
End: 2020-05-13
Payer: MEDICARE

## 2020-05-13 ENCOUNTER — CARE COORDINATION (OUTPATIENT)
Dept: CARE COORDINATION | Age: 60
End: 2020-05-13

## 2020-05-13 PROCEDURE — 3017F COLORECTAL CA SCREEN DOC REV: CPT | Performed by: NURSE PRACTITIONER

## 2020-05-13 PROCEDURE — 99213 OFFICE O/P EST LOW 20 MIN: CPT | Performed by: NURSE PRACTITIONER

## 2020-05-13 PROCEDURE — G8428 CUR MEDS NOT DOCUMENT: HCPCS | Performed by: NURSE PRACTITIONER

## 2020-05-13 RX ORDER — AMLODIPINE BESYLATE 5 MG/1
5 TABLET ORAL DAILY
Qty: 30 TABLET | Refills: 3 | Status: SHIPPED | OUTPATIENT
Start: 2020-05-13 | End: 2020-12-07

## 2020-05-13 ASSESSMENT — ENCOUNTER SYMPTOMS
DIARRHEA: 0
COUGH: 0
SHORTNESS OF BREATH: 0
CONSTIPATION: 0
VOMITING: 0
ABDOMINAL PAIN: 0
NAUSEA: 0
BLOOD IN STOOL: 0

## 2020-05-13 NOTE — CARE COORDINATION
Chart review for COVID19 monitoring after recent ER visit. No further outreach as pt is supervised by home health care. Claudette Pea, RN  Ambulatory Care Manager  [phone] office/cell  [phone] fax  [email or web address]

## 2020-05-13 NOTE — PROGRESS NOTES
by mouth daily  Historical Provider, MD   levETIRAcetam 750 MG TB3D Take by mouth  Historical Provider, MD   SITagliptin (JANUVIA) 100 MG tablet Take 1 tablet by mouth daily  Keke Walker MD   Lactobacillus (ACIDOPHILUS) CAPS capsule Take 1 capsule by mouth daily  Keke Walker MD   amLODIPine (NORVASC) 10 MG tablet Take 1 tablet by mouth daily  Keke Walker MD   Incontinence Supply Disposable (DEPEND UNDERWEAR LARGE/XL) MISC 1 Units by Does not apply route 4 times daily  Keke Walker MD   Diapers & Supplies MISC 1 each by Does not apply route daily as needed (4-5 times daily)  Keke Walker MD   Disposable Gloves MISC 1 Units by Does not apply route 4 times daily  Keek Walker MD   pantoprazole (PROTONIX) 40 MG tablet TAKE 1 TABLET BY MOUTH EVERY MORNING (BEFORE BREAKFAST)  Keke Walker MD   lisinopril (PRINIVIL;ZESTRIL) 40 MG tablet TAKE 1 TABLET BY MOUTH DAILY  Keke Walker MD   COMBIVENT RESPIMAT  MCG/ACT AERS inhaler INHALE 1 PUFF BY ORAL INHALATION EVERY 6 Buddy Catalan MD   carBAMazepine (CARBATROL) 300 MG extended release capsule TAKE 1 CAPSULE BY MOUTH TWICE A DAY  Jeanette Rao MD   fluticasone (FLONASE) 50 MCG/ACT nasal spray 1 spray by Each Nostril route daily  Keke Walker MD   atorvastatin (LIPITOR) 40 MG tablet TAKE 1 TABLET BY MOUTH DAILY  Jeanette Rao MD   mirtazapine (REMERON) 30 MG tablet TAKE 1 TABLET BY MOUTH NIGHTLY  Jeanette Rao MD   HM CETIRIZINE HCL 10 MG tablet TAKE 1 TABLET BY MOUTH DAILY (BED)  Keke Walker MD   ipratropium-albuterol (DUONEB) 0.5-2.5 (3) MG/3ML SOLN nebulizer solution Inhale 3 mLs into the lungs 4 times daily For 5 days before transition to prn  Francisca Sanchez MD   UNABLE TO FIND Fitted wheel chair  Keke Walker MD   nystatin (MYCOSTATIN) 465450 UNIT/GM powder Apply topically 4 times daily. under the abdominal folds for 2 wks  Keke Walker MD   Ondansetron HCl (ZOFRAN PO) Take 1 tablet by mouth every 8 hours Indications: per facility med list  Historical Provider, MD   docusate sodium (COLACE) 100 MG capsule Take 100 mg by mouth 2 times daily  Historical Provider, MD   diphenhydrAMINE (BENADRYL) 25 MG capsule Take 25 mg by mouth every 6 hours as needed for Itching  Historical Provider, MD   SUMAtriptan (IMITREX) 50 MG tablet Take 50 mg by mouth once as needed for Migraine  Historical Provider, MD   benzonatate (TESSALON) 100 MG capsule Take 100 mg by mouth 3 times daily as needed for Cough  Historical Provider, MD   aspirin 325 MG tablet Take 325 mg by mouth every 4 hours as needed for Pain  Historical Provider, MD   blood glucose monitor strips Test 3 times a day & as needed for symptoms of irregular blood glucose.   Aaln Farley MD   lactulose (CHRONULAC) 10 GM/15ML solution Take 20 g by mouth 3 times daily as needed   Historical Provider, MD   levothyroxine (SYNTHROID) 50 MCG tablet Take 50 mcg by mouth Daily  Historical Provider, MD   busPIRone (BUSPAR) 10 MG tablet Take 1 tablet by mouth 2 times daily  Herminia Acharya MD   divalproex (DEPAKOTE) 500 MG DR tablet Take 4 tablets by mouth nightly  Herminia Acharya MD   metFORMIN (GLUCOPHAGE) 500 MG tablet Take 2 tablets by mouth daily (with breakfast)  Herminia Acharya MD   risperiDONE (RISPERDAL) 1 MG tablet Take 1 tablet by mouth 3 times daily 1 tablet in am, 1 tablet at 4pm, 1 tablet at bedtime  Herminia Acharya MD   sertraline (ZOLOFT) 100 MG tablet Take 2 tablets by mouth nightly  Herminia Acharya MD       Social History     Tobacco Use    Smoking status: Former Smoker     Packs/day: 1.50     Years: 20.00     Pack years: 30.00     Last attempt to quit: 2011     Years since quittin.7    Smokeless tobacco: Never Used   Substance Use Topics    Alcohol use: No     Alcohol/week: 0.0 standard drinks    Drug use: No          PHYSICAL EXAMINATION:    Constitutional: [x] Appears well-developed and well-nourished [x] No apparent distress      [] Abnormal-   Mental

## 2020-05-14 LAB
EKG ATRIAL RATE: 81 BPM
EKG DIAGNOSIS: NORMAL
EKG P AXIS: 56 DEGREES
EKG P-R INTERVAL: 162 MS
EKG Q-T INTERVAL: 372 MS
EKG QRS DURATION: 76 MS
EKG QTC CALCULATION (BAZETT): 432 MS
EKG R AXIS: 6 DEGREES
EKG T AXIS: 100 DEGREES
EKG VENTRICULAR RATE: 81 BPM

## 2020-05-21 ENCOUNTER — HOSPITAL ENCOUNTER (OUTPATIENT)
Age: 60
Setting detail: SPECIMEN
Discharge: HOME | End: 2020-05-21
Payer: MEDICARE

## 2020-05-21 LAB
ALBUMIN SERPL-MCNC: 3.5 GM/DL (ref 3.4–5)
ALP BLD-CCNC: 68 IU/L (ref 40–128)
ALT SERPL-CCNC: <5 U/L (ref 10–40)
ANION GAP SERPL CALCULATED.3IONS-SCNC: 13 MMOL/L (ref 4–16)
AST SERPL-CCNC: 8 IU/L (ref 15–37)
BILIRUB SERPL-MCNC: 0.2 MG/DL (ref 0–1)
BUN BLDV-MCNC: 17 MG/DL (ref 6–23)
CALCIUM SERPL-MCNC: 9 MG/DL (ref 8.3–10.6)
CHLORIDE BLD-SCNC: 104 MMOL/L (ref 99–110)
CHOLESTEROL: 161 MG/DL
CO2: 29 MMOL/L (ref 21–32)
CREAT SERPL-MCNC: 0.6 MG/DL (ref 0.6–1.1)
ESTIMATED AVERAGE GLUCOSE: 111 MG/DL
GFR AFRICAN AMERICAN: >60 ML/MIN/1.73M2
GFR NON-AFRICAN AMERICAN: >60 ML/MIN/1.73M2
GLUCOSE BLD-MCNC: 89 MG/DL (ref 70–99)
HBA1C MFR BLD: 5.5 % (ref 4.2–6.3)
HDLC SERPL-MCNC: 36 MG/DL
LDL CHOLESTEROL DIRECT: 76 MG/DL
POTASSIUM SERPL-SCNC: 3.6 MMOL/L (ref 3.5–5.1)
SODIUM BLD-SCNC: 146 MMOL/L (ref 135–145)
T4 FREE: 0.93 NG/DL (ref 0.9–1.8)
TOTAL PROTEIN: 5.5 GM/DL (ref 6.4–8.2)
TRIGL SERPL-MCNC: 319 MG/DL
TSH HIGH SENSITIVITY: 1.66 UIU/ML (ref 0.27–4.2)

## 2020-05-21 PROCEDURE — 80061 LIPID PANEL: CPT

## 2020-05-21 PROCEDURE — 84443 ASSAY THYROID STIM HORMONE: CPT

## 2020-05-21 PROCEDURE — 83721 ASSAY OF BLOOD LIPOPROTEIN: CPT

## 2020-05-21 PROCEDURE — 36415 COLL VENOUS BLD VENIPUNCTURE: CPT

## 2020-05-21 PROCEDURE — 84439 ASSAY OF FREE THYROXINE: CPT

## 2020-05-21 PROCEDURE — 83036 HEMOGLOBIN GLYCOSYLATED A1C: CPT

## 2020-05-21 PROCEDURE — 80053 COMPREHEN METABOLIC PANEL: CPT

## 2020-06-05 RX ORDER — MIRTAZAPINE 30 MG/1
30 TABLET, FILM COATED ORAL NIGHTLY
Qty: 31 TABLET | Refills: 5 | Status: SHIPPED | OUTPATIENT
Start: 2020-06-05

## 2020-06-05 RX ORDER — ATORVASTATIN CALCIUM 40 MG/1
40 TABLET, FILM COATED ORAL DAILY
Qty: 31 TABLET | Refills: 5 | Status: SHIPPED | OUTPATIENT
Start: 2020-06-05 | End: 2021-01-08

## 2020-06-05 RX ORDER — CETIRIZINE HYDROCHLORIDE 10 MG/1
TABLET ORAL
Qty: 31 TABLET | Refills: 5 | Status: SHIPPED | OUTPATIENT
Start: 2020-06-05 | End: 2021-01-08

## 2020-06-18 ENCOUNTER — TELEPHONE (OUTPATIENT)
Dept: FAMILY MEDICINE CLINIC | Age: 60
End: 2020-06-18

## 2020-06-18 RX ORDER — NYSTATIN 100000 U/G
CREAM TOPICAL
Qty: 60 G | Refills: 1 | Status: ON HOLD | OUTPATIENT
Start: 2020-06-18 | End: 2021-10-17 | Stop reason: HOSPADM

## 2020-06-18 NOTE — TELEPHONE ENCOUNTER
Home care called and stated the patient has a yeast infection rash in the folds of her neck and would like to know if you would call something in for her.  Please advise

## 2020-07-06 RX ORDER — CARBAMAZEPINE 300 MG/1
CAPSULE, EXTENDED RELEASE ORAL
Qty: 62 CAPSULE | Refills: 5 | Status: SHIPPED | OUTPATIENT
Start: 2020-07-06 | End: 2021-01-29

## 2020-07-24 ENCOUNTER — TELEPHONE (OUTPATIENT)
Dept: FAMILY MEDICINE CLINIC | Age: 60
End: 2020-07-24

## 2020-08-04 RX ORDER — LISINOPRIL 40 MG/1
TABLET ORAL
Qty: 31 TABLET | Refills: 5 | Status: SHIPPED | OUTPATIENT
Start: 2020-08-04 | End: 2021-03-01

## 2020-08-04 RX ORDER — PANTOPRAZOLE SODIUM 40 MG/1
40 TABLET, DELAYED RELEASE ORAL
Qty: 31 TABLET | Refills: 5 | Status: SHIPPED | OUTPATIENT
Start: 2020-08-04 | End: 2021-02-11

## 2020-08-06 ENCOUNTER — TELEPHONE (OUTPATIENT)
Dept: FAMILY MEDICINE CLINIC | Age: 60
End: 2020-08-06

## 2020-08-19 ENCOUNTER — HOSPITAL ENCOUNTER (OUTPATIENT)
Dept: MRI IMAGING | Age: 60
Discharge: HOME | End: 2020-08-19
Payer: MEDICARE

## 2020-08-19 LAB
GFR AFRICAN AMERICAN: >60 ML/MIN/1.73M2
GFR NON-AFRICAN AMERICAN: >60 ML/MIN/1.73M2
POC CREATININE: 0.7 MG/DL (ref 0.6–1.1)

## 2020-08-19 PROCEDURE — 6360000004 HC RX CONTRAST MEDICATION: Performed by: FAMILY MEDICINE

## 2020-08-19 PROCEDURE — A9579 GAD-BASE MR CONTRAST NOS,1ML: HCPCS | Performed by: FAMILY MEDICINE

## 2020-08-19 PROCEDURE — 74183 MRI ABD W/O CNTR FLWD CNTR: CPT

## 2020-08-19 RX ADMIN — GADOTERIDOL 14 ML: 279.3 INJECTION, SOLUTION INTRAVENOUS at 15:38

## 2020-09-01 ENCOUNTER — TELEPHONE (OUTPATIENT)
Dept: FAMILY MEDICINE CLINIC | Age: 60
End: 2020-09-01

## 2020-09-17 ENCOUNTER — TELEPHONE (OUTPATIENT)
Dept: FAMILY MEDICINE CLINIC | Age: 60
End: 2020-09-17

## 2020-09-18 ENCOUNTER — HOSPITAL ENCOUNTER (OUTPATIENT)
Age: 60
Discharge: HOME | End: 2020-09-18
Payer: MEDICARE

## 2020-09-18 ENCOUNTER — OFFICE VISIT (OUTPATIENT)
Dept: FAMILY MEDICINE CLINIC | Age: 60
End: 2020-09-18
Payer: MEDICARE

## 2020-09-18 ENCOUNTER — HOSPITAL ENCOUNTER (OUTPATIENT)
Dept: GENERAL RADIOLOGY | Age: 60
Discharge: HOME | End: 2020-09-18
Payer: MEDICARE

## 2020-09-18 VITALS
TEMPERATURE: 96.7 F | HEART RATE: 62 BPM | SYSTOLIC BLOOD PRESSURE: 116 MMHG | OXYGEN SATURATION: 93 % | DIASTOLIC BLOOD PRESSURE: 78 MMHG

## 2020-09-18 PROCEDURE — 99214 OFFICE O/P EST MOD 30 MIN: CPT | Performed by: FAMILY MEDICINE

## 2020-09-18 PROCEDURE — G8427 DOCREV CUR MEDS BY ELIG CLIN: HCPCS | Performed by: FAMILY MEDICINE

## 2020-09-18 PROCEDURE — 1036F TOBACCO NON-USER: CPT | Performed by: FAMILY MEDICINE

## 2020-09-18 PROCEDURE — 71046 X-RAY EXAM CHEST 2 VIEWS: CPT

## 2020-09-18 PROCEDURE — G8417 CALC BMI ABV UP PARAM F/U: HCPCS | Performed by: FAMILY MEDICINE

## 2020-09-18 PROCEDURE — 3023F SPIROM DOC REV: CPT | Performed by: FAMILY MEDICINE

## 2020-09-18 PROCEDURE — 3017F COLORECTAL CA SCREEN DOC REV: CPT | Performed by: FAMILY MEDICINE

## 2020-09-18 PROCEDURE — 3044F HG A1C LEVEL LT 7.0%: CPT | Performed by: FAMILY MEDICINE

## 2020-09-18 PROCEDURE — 2022F DILAT RTA XM EVC RTNOPTHY: CPT | Performed by: FAMILY MEDICINE

## 2020-09-18 PROCEDURE — G8926 SPIRO NO PERF OR DOC: HCPCS | Performed by: FAMILY MEDICINE

## 2020-09-18 RX ORDER — AZITHROMYCIN 250 MG/1
250 TABLET, FILM COATED ORAL SEE ADMIN INSTRUCTIONS
Qty: 6 TABLET | Refills: 0 | Status: SHIPPED | OUTPATIENT
Start: 2020-09-18 | End: 2020-09-23

## 2020-09-18 RX ORDER — METHYLPREDNISOLONE 4 MG/1
TABLET ORAL
Qty: 1 KIT | Refills: 0 | Status: SHIPPED | OUTPATIENT
Start: 2020-09-18 | End: 2021-06-01 | Stop reason: ALTCHOICE

## 2020-09-18 NOTE — PROGRESS NOTES
History:   Procedure Laterality Date    GASTROSTOMY TUBE PLACEMENT       No family history on file. Social History     Socioeconomic History    Marital status: Single     Spouse name: Not on file    Number of children: Not on file    Years of education: Not on file    Highest education level: Not on file   Occupational History    Not on file   Social Needs    Financial resource strain: Not on file    Food insecurity     Worry: Not on file     Inability: Not on file    Transportation needs     Medical: Not on file     Non-medical: Not on file   Tobacco Use    Smoking status: Former Smoker     Packs/day: 1.50     Years: 20.00     Pack years: 30.00     Last attempt to quit: 2011     Years since quittin.0    Smokeless tobacco: Never Used   Substance and Sexual Activity    Alcohol use: No     Alcohol/week: 0.0 standard drinks    Drug use: No    Sexual activity: Not on file   Lifestyle    Physical activity     Days per week: Not on file     Minutes per session: Not on file    Stress: Not on file   Relationships    Social connections     Talks on phone: Not on file     Gets together: Not on file     Attends Mandaen service: Not on file     Active member of club or organization: Not on file     Attends meetings of clubs or organizations: Not on file     Relationship status: Not on file    Intimate partner violence     Fear of current or ex partner: Not on file     Emotionally abused: Not on file     Physically abused: Not on file     Forced sexual activity: Not on file   Other Topics Concern    Not on file   Social History Narrative    ** Merged History Encounter **            Allergies   Allergen Reactions    Macrobid [Nitrofurantoin] Hives and Swelling     Hives     Current Outpatient Medications   Medication Sig Dispense Refill    methylPREDNISolone (MEDROL, ASTRID,) 4 MG tablet Take by mouth.  As prescribed 1 kit 0    lisinopril (PRINIVIL;ZESTRIL) 40 MG tablet TAKE 1 TABLET BY MOUTH DAILY 31 tablet 5    pantoprazole (PROTONIX) 40 MG tablet TAKE 1 TABLET BY MOUTH EVERY MORNING (BEFORE BREAKFAST) 31 tablet 5    Diapers & Supplies MISC 1 each by Does not apply route daily as needed (4-5 times daily) 100 each 5    carBAMazepine (CARBATROL) 300 MG extended release capsule TAKE 1 CAPSULE BY MOUTH TWICE A DAY 62 capsule 5    nystatin (MYCOSTATIN) 701088 UNIT/GM cream Apply topically 2 times daily. To the affected area for 2 wks 60 g 1    atorvastatin (LIPITOR) 40 MG tablet TAKE 1 TABLET BY MOUTH DAILY 31 tablet 5    mirtazapine (REMERON) 30 MG tablet TAKE 1 TABLET BY MOUTH NIGHTLY 31 tablet 5    cetirizine (ZYRTEC) 10 MG tablet TAKE 1 TABLET BY MOUTH DAILY (BED) 31 tablet 5    amLODIPine (NORVASC) 5 MG tablet Take 1 tablet by mouth daily 30 tablet 3    cetirizine (ZYRTEC) 10 MG tablet Take 10 mg by mouth daily      levETIRAcetam 750 MG TB3D Take by mouth      SITagliptin (JANUVIA) 100 MG tablet Take 1 tablet by mouth daily 31 tablet 5    Lactobacillus (ACIDOPHILUS) CAPS capsule Take 1 capsule by mouth daily 31 capsule 5    Incontinence Supply Disposable (DEPEND UNDERWEAR LARGE/XL) MISC 1 Units by Does not apply route 4 times daily 100 Package 5    Disposable Gloves MISC 1 Units by Does not apply route 4 times daily 100 each 5    COMBIVENT RESPIMAT  MCG/ACT AERS inhaler INHALE 1 PUFF BY ORAL INHALATION EVERY 6 HOURS 4 g 5    fluticasone (FLONASE) 50 MCG/ACT nasal spray 1 spray by Each Nostril route daily 1 Bottle 5    ipratropium-albuterol (DUONEB) 0.5-2.5 (3) MG/3ML SOLN nebulizer solution Inhale 3 mLs into the lungs 4 times daily For 5 days before transition to prn 360 mL 5    UNABLE TO FIND Fitted wheel chair 1 Units 0    nystatin (MYCOSTATIN) 881471 UNIT/GM powder Apply topically 4 times daily. under the abdominal folds for 2 wks 1 Bottle 1    Ondansetron HCl (ZOFRAN PO) Take 1 tablet by mouth every 8 hours Indications: per facility med list      docusate sodium (COLACE) 100 MG capsule Take 100 mg by mouth 2 times daily      diphenhydrAMINE (BENADRYL) 25 MG capsule Take 25 mg by mouth every 6 hours as needed for Itching      SUMAtriptan (IMITREX) 50 MG tablet Take 50 mg by mouth once as needed for Migraine      benzonatate (TESSALON) 100 MG capsule Take 100 mg by mouth 3 times daily as needed for Cough      aspirin 325 MG tablet Take 325 mg by mouth every 4 hours as needed for Pain      blood glucose monitor strips Test 3 times a day & as needed for symptoms of irregular blood glucose. 90 strip 0    lactulose (CHRONULAC) 10 GM/15ML solution Take 20 g by mouth 3 times daily as needed       levothyroxine (SYNTHROID) 50 MCG tablet Take 50 mcg by mouth Daily      busPIRone (BUSPAR) 10 MG tablet Take 1 tablet by mouth 2 times daily 60 tablet 5    divalproex (DEPAKOTE) 500 MG DR tablet Take 4 tablets by mouth nightly 90 tablet 0    metFORMIN (GLUCOPHAGE) 500 MG tablet Take 2 tablets by mouth daily (with breakfast) 30 tablet 0    risperiDONE (RISPERDAL) 1 MG tablet Take 1 tablet by mouth 3 times daily 1 tablet in am, 1 tablet at 4pm, 1 tablet at bedtime 60 tablet 5    sertraline (ZOLOFT) 100 MG tablet Take 2 tablets by mouth nightly 30 tablet 5     No current facility-administered medications for this visit. Review of Systems   Constitutional: Positive for activity change. Negative for appetite change, chills, fatigue, fever and weight loss. HENT: Positive for congestion and postnasal drip. Negative for ear pain and sore throat. Respiratory: Positive for cough, shortness of breath and wheezing. Negative for hemoptysis and choking. Cardiovascular: Negative for chest pain. Gastrointestinal: Negative for abdominal pain, constipation, diarrhea, nausea and vomiting. Genitourinary: Negative for dysuria and flank pain. Musculoskeletal: Negative for myalgias. Neurological: Negative for dizziness and headaches.    Psychiatric/Behavioral: Negative for agitation and sleep disturbance. The patient is not nervous/anxious. Lab Results   Component Value Date    WBC 7.7 05/11/2020    HGB 12.5 05/11/2020    HCT 41.4 05/11/2020    MCV 96.3 05/11/2020     05/11/2020     Lab Results   Component Value Date     (H) 05/21/2020    K 3.6 05/21/2020     05/21/2020    CO2 29 05/21/2020    BUN 17 05/21/2020    CREATININE 0.7 08/19/2020    GLUCOSE 89 05/21/2020    CALCIUM 9.0 05/21/2020    PROT 5.5 (L) 05/21/2020    LABALBU 3.5 05/21/2020    BILITOT 0.2 05/21/2020    ALKPHOS 68 05/21/2020    AST 8 (L) 05/21/2020    ALT <5 (L) 05/21/2020    LABGLOM >60 08/19/2020    GFRAA >60 08/19/2020     Lab Results   Component Value Date    CHOL 161 05/21/2020    CHOL 157 02/20/2020    CHOL 183 07/11/2019     Lab Results   Component Value Date    TRIG 319 (H) 05/21/2020    TRIG 318 (H) 02/20/2020    TRIG 318 (H) 07/11/2019     Lab Results   Component Value Date    HDL 36 (L) 05/21/2020    HDL 36 (L) 02/20/2020    HDL 54 07/11/2019     No results found for: LDLCALC, LDLCHOLESTEROL  Lab Results   Component Value Date    LABA1C 5.5 05/21/2020     Lab Results   Component Value Date    TSHHS 1.660 05/21/2020         /78 (Site: Left Upper Arm, Position: Sitting, Cuff Size: Medium Adult)   Pulse 62   Temp 96.7 °F (35.9 °C)   SpO2 93%     BP Readings from Last 3 Encounters:   09/18/20 116/78   05/12/20 130/70   04/28/20 (!) 160/72       Wt Readings from Last 3 Encounters:   05/11/20 147 lb (66.7 kg)   04/28/20 147 lb 14.9 oz (67.1 kg)   12/17/19 148 lb (67.1 kg)         Physical Exam  Constitutional:       General: She is not in acute distress. Appearance: She is well-developed. She is ill-appearing. She is not diaphoretic. Comments: Using the wheel chair   HENT:      Head: Normocephalic and atraumatic. Nose: Congestion present. Eyes:      General: No scleral icterus. Extraocular Movements: Extraocular movements intact.       Pupils: Pupils are equal, round, and reactive to light. Neck:      Musculoskeletal: Normal range of motion and neck supple. No neck rigidity or muscular tenderness. Cardiovascular:      Rate and Rhythm: Normal rate and regular rhythm. Heart sounds: Normal heart sounds. No murmur. Pulmonary:      Effort: Pulmonary effort is normal.      Breath sounds: Wheezing, rhonchi and rales present. Musculoskeletal: Normal range of motion. Right lower leg: No edema. Left lower leg: No edema. Neurological:      Mental Status: She is alert and oriented to person, place, and time. Psychiatric:         Mood and Affect: Mood normal.         Behavior: Behavior normal.         ASSESSMENT/ PLAN:    1. HTN (hypertension), benign  -Stable    2. Acquired hypothyroidism  -Stable    3. Mixed hyperlipidemia  -Stable    4. H/O: stroke  -Stable    5. Lesion of right native kidney  -The MRI reviewed with the patient did show the same lesion, repeat the CT in 6 months    6. Chronic obstructive pulmonary disease, unspecified COPD type ([facility] [address]  -Follow-up with pulmonologist    7. Cough  -We will give the antibiotic and steroid  - azithromycin (ZITHROMAX) 250 MG tablet; Take 1 tablet by mouth See Admin Instructions for 5 days 500mg on day 1 followed by 250mg on days 2 - 5  Dispense: 6 tablet; Refill: 0  - methylPREDNISolone (MEDROL, ASTRID,) 4 MG tablet; Take by mouth. As prescribed  Dispense: 1 kit; Refill: 0    8. Acute bronchitis, unspecified organism  -We will give the antibiotic and steroid  - azithromycin (ZITHROMAX) 250 MG tablet; Take 1 tablet by mouth See Admin Instructions for 5 days 500mg on day 1 followed by 250mg on days 2 - 5  Dispense: 6 tablet; Refill: 0  - methylPREDNISolone (MEDROL, ASTRID,) 4 MG tablet; Take by mouth. As prescribed  Dispense: 1 kit; Refill: 0    9. DM under control, A1c last May was 5.5          - All old blood work reviewed with the patient  - Appropriate prescription are addressed. - After visit summery provided.   - Questions

## 2020-09-27 ASSESSMENT — ENCOUNTER SYMPTOMS
COUGH: 1
ABDOMINAL PAIN: 0
WHEEZING: 1
NAUSEA: 0
HEMOPTYSIS: 0
VOMITING: 0
SORE THROAT: 0
SHORTNESS OF BREATH: 1
CHOKING: 0
DIARRHEA: 0
CONSTIPATION: 0

## 2020-09-30 ENCOUNTER — TELEPHONE (OUTPATIENT)
Dept: FAMILY MEDICINE CLINIC | Age: 60
End: 2020-09-30

## 2020-09-30 NOTE — TELEPHONE ENCOUNTER
Kiersten Dillon from [facility] states that there was a med error last night. Patient never received her resperdal and only 2 out of 4 of her depakote. Wanted PCP advised.  Also states that she called on Sunday and left us a msg as well that there was a Med error on both Fri and Sat that patient did not receive her resperdal.

## 2020-10-01 ENCOUNTER — TELEPHONE (OUTPATIENT)
Dept: FAMILY MEDICINE CLINIC | Age: 60
End: 2020-10-01

## 2020-10-01 NOTE — TELEPHONE ENCOUNTER
[facility] called and stated that pt is complaining of pain with urination. They stated that pt just finished Zithromax for pneumonia.  They are not sure if she is getting a UTI or has a yeast infection please advise     Kathia Vasquez can be reached at [phone]

## 2020-10-02 ENCOUNTER — HOSPITAL ENCOUNTER (OUTPATIENT)
Age: 60
Setting detail: SPECIMEN
Discharge: HOME | End: 2020-10-02
Payer: MEDICARE

## 2020-10-02 LAB
BACTERIA: ABNORMAL /HPF
BILIRUBIN URINE: NEGATIVE MG/DL
BLOOD, URINE: NEGATIVE
CLARITY: ABNORMAL
COLOR: YELLOW
GLUCOSE, URINE: NEGATIVE MG/DL
HYALINE CASTS: 0 /LPF
KETONES, URINE: NEGATIVE MG/DL
LEUKOCYTE ESTERASE, URINE: ABNORMAL
MUCUS: ABNORMAL HPF
NITRITE URINE, QUANTITATIVE: NEGATIVE
PH, URINE: 6 (ref 5–8)
PROTEIN UA: NEGATIVE MG/DL
RBC URINE: ABNORMAL /HPF (ref 0–6)
SPECIFIC GRAVITY UA: 1.02 (ref 1–1.03)
SQUAMOUS EPITHELIAL: 4 /HPF
TRICHOMONAS: ABNORMAL /HPF
UROBILINOGEN, URINE: NORMAL MG/DL (ref 0.2–1)
WBC UA: 3 /HPF (ref 0–5)

## 2020-10-02 PROCEDURE — 81001 URINALYSIS AUTO W/SCOPE: CPT

## 2020-10-26 ENCOUNTER — VIRTUAL VISIT (OUTPATIENT)
Dept: FAMILY MEDICINE CLINIC | Age: 60
End: 2020-10-26
Payer: MEDICARE

## 2020-10-26 PROCEDURE — 3017F COLORECTAL CA SCREEN DOC REV: CPT | Performed by: FAMILY MEDICINE

## 2020-10-26 PROCEDURE — 99213 OFFICE O/P EST LOW 20 MIN: CPT | Performed by: FAMILY MEDICINE

## 2020-10-26 PROCEDURE — G8428 CUR MEDS NOT DOCUMENT: HCPCS | Performed by: FAMILY MEDICINE

## 2020-10-26 NOTE — PROGRESS NOTES
10/26/2020    TELEHEALTH EVALUATION -- Audio/Visual (During TOUOF-28 public health emergency)    Chief Complaint   Patient presents with    Diarrhea     x 2 days         HPI:    Mika Knight (:  1960) has requested an audio/video evaluation for the following concern(s):    Patient seen by virtual visit c/o diarrhea, started 2 days ago, 2-3 times per day, denies blood, her nurse was with her, denies fever, N/V, no abdominal pain. No urinary symptoms. Still eating good and sleeping good. Review of Systems   Constitutional: Negative for activity change, chills and fever. HENT: Negative for congestion. Respiratory: Negative for cough and shortness of breath. Gastrointestinal: Positive for diarrhea. Negative for abdominal pain, blood in stool, constipation, nausea and vomiting. Genitourinary: Negative for dysuria and frequency. Neurological: Negative for dizziness and numbness. Psychiatric/Behavioral: Negative for agitation. The patient is not hyperactive. Prior to Visit Medications    Medication Sig Taking? Authorizing Provider   methylPREDNISolone (MEDROL, ASTRID,) 4 MG tablet Take by mouth. As prescribed  Bee Walker MD   lisinopril (PRINIVIL;ZESTRIL) 40 MG tablet TAKE 1 TABLET BY MOUTH DAILY  Bee Walker MD   pantoprazole (PROTONIX) 40 MG tablet TAKE 1 TABLET BY MOUTH EVERY MORNING (BEFORE BREAKFAST)  Bee Walker MD   Diapers & Supplies MISC 1 each by Does not apply route daily as needed (4-5 times daily)  Bee Walker MD   carBAMazepine (CARBATROL) 300 MG extended release capsule TAKE 1 CAPSULE BY MOUTH TWICE A DAY  Jeanette Rao MD   nystatin (MYCOSTATIN) 295548 UNIT/GM cream Apply topically 2 times daily.  To the affected area for 2 wks  Bee Walker MD   atorvastatin (LIPITOR) 40 MG tablet TAKE 1 TABLET BY MOUTH DAILY  Bee Walker MD   mirtazapine (REMERON) 30 MG tablet TAKE 1 TABLET BY MOUTH NIGHTLY  Bee Walker MD   cetirizine (ZYRTEC) 10 MG tablet TAKE 1 TABLET BY MOUTH DAILY (BED)  Reva Gaston MD   amLODIPine (NORVASC) 5 MG tablet Take 1 tablet by mouth daily  Reva Gaston MD   cetirizine (ZYRTEC) 10 MG tablet Take 10 mg by mouth daily  Historical Provider, MD   levETIRAcetam 750 MG TB3D Take by mouth  Historical Provider, MD   SITagliptin (JANUVIA) 100 MG tablet Take 1 tablet by mouth daily  Reva Gaston MD   Lactobacillus (ACIDOPHILUS) CAPS capsule Take 1 capsule by mouth daily  Reva Gaston MD   Incontinence Supply Disposable (DEPEND UNDERWEAR LARGE/XL) MISC 1 Units by Does not apply route 4 times daily  Reva Gaston MD   Disposable Gloves MISC 1 Units by Does not apply route 4 times daily  Reva Gaston MD   COMBIVENT RESPIMAT  MCG/ACT AERS inhaler INHALE 1 PUFF BY ORAL INHALATION EVERY 6 HOURS  Ross Gonsales MD   fluticasone (FLONASE) 50 MCG/ACT nasal spray 1 spray by Each Nostril route daily  Reva Gaston MD   ipratropium-albuterol (DUONEB) 0.5-2.5 (3) MG/3ML SOLN nebulizer solution Inhale 3 mLs into the lungs 4 times daily For 5 days before transition to INn  Franck Oneal MD   UNABLE TO FIND Fitted wheel chair  Reva Gaston MD   nystatin (MYCOSTATIN) 913320 UNIT/GM powder Apply topically 4 times daily. under the abdominal folds for 2 wks  Reva Gaston MD   Ondansetron HCl (ZOFRAN PO) Take 1 tablet by mouth every 8 hours Indications: per facility med list  Historical Provider, MD   docusate sodium (COLACE) 100 MG capsule Take 100 mg by mouth 2 times daily  Historical Provider, MD   diphenhydrAMINE (BENADRYL) 25 MG capsule Take 25 mg by mouth every 6 hours as needed for Itching  Historical Provider, MD   SUMAtriptan (IMITREX) 50 MG tablet Take 50 mg by mouth once as needed for Migraine  Historical Provider, MD   benzonatate (TESSALON) 100 MG capsule Take 100 mg by mouth 3 times daily as needed for Cough  Historical Provider, MD   aspirin 325 MG tablet Take 325 mg by mouth every 4 hours as needed for Pain  Historical status: Former Smoker     Packs/day: 1.50     Years: 20.00     Pack years: 30.00     Last attempt to quit: 2011     Years since quittin.1    Smokeless tobacco: Never Used   Substance Use Topics    Alcohol use: No     Alcohol/week: 0.0 standard drinks    Drug use: No   , No family history on file. PHYSICAL EXAMINATION:  [ INSTRUCTIONS:  \"[x]\" Indicates a positive item  \"[]\" Indicates a negative item  -- DELETE ALL ITEMS NOT EXAMINED]  Vital Signs: (As obtained by patient/caregiver or practitioner observation)    Blood pressure-  Heart rate-    Respiratory rate-    Temperature-  Pulse oximetry-     Constitutional: [x] Appears well-developed and well-nourished [] No apparent distress      [] Abnormal-   Mental status  [x] Alert and awake  [x] Oriented to person/place/time []Able to follow commands      Eyes:  EOM    []  Normal  [] Abnormal-  Sclera  []  Normal  [] Abnormal -         Discharge []  None visible  [] Abnormal -    HENT:   [x] Normocephalic, atraumatic. [] Abnormal   [] Mouth/Throat: Mucous membranes are moist.     External Ears [] Normal  [] Abnormal-     Neck: [] No visualized mass     Pulmonary/Chest: [x] Respiratory effort normal.  [x] No visualized signs of difficulty breathing or respiratory distress        [] Abnormal-      Musculoskeletal:   [] Normal gait with no signs of ataxia         [x] Normal range of motion of neck        [] Abnormal-       Neurological:        [] No Facial Asymmetry (Cranial nerve 7 motor function) (limited exam to video visit)          [] No gaze palsy        [] Abnormal-         Skin:        [] No significant exanthematous lesions or discoloration noted on facial skin         [] Abnormal-            Psychiatric:       [x] Normal Affect [] No Hallucinations        [] Abnormal-     Other pertinent observable physical exam findings-     ASSESSMENT/PLAN:  1. Diarrhea, unspecified type  - most likely viral cause  - C DIFF TOXIN/ANTIGEN;  Future  - lots of fluids    Return if symptoms worsen or fail to improve. Kassandra Recinos is a 64 y.o. female being evaluated by a Virtual Visit (video visit) encounter to address concerns as mentioned above. A caregiver was present when appropriate. Due to this being a TeleHealth encounter (During UXZRY-62 public [facility] emergency), evaluation of the following organ systems was limited: Vitals/Constitutional/EENT/Resp/CV/GI//MS/Neuro/Skin/Heme-Lymph-Imm. Pursuant to the emergency declaration under the [address] [place] [address], [address] [place] [address] authority and the [employer] Act, this Virtual Visit was conducted with patient's (and/or legal guardian's) consent, to reduce the patient's risk of exposure to COVID-19 and provide necessary medical care. The patient (and/or legal guardian) has also been advised to contact this office for worsening conditions or problems, and seek emergency medical treatment and/or call 911 if deemed necessary. Patient identification was verified at the start of the visit: Yes    Total time spent on this encounter: 15 minutes    Services were provided through a video synchronous discussion virtually to substitute for in-person clinic visit. Patient and provider were located at their individual homes. --Nahum Borjas MD on 11/1/2020 at 7:19 PM    An electronic signature was used to authenticate this note.

## 2020-11-01 ASSESSMENT — ENCOUNTER SYMPTOMS
COUGH: 0
NAUSEA: 0
ABDOMINAL PAIN: 0
DIARRHEA: 1
SHORTNESS OF BREATH: 0
VOMITING: 0
BLOOD IN STOOL: 0
CONSTIPATION: 0

## 2020-11-18 ENCOUNTER — HOSPITAL ENCOUNTER (OUTPATIENT)
Age: 60
Setting detail: SPECIMEN
Discharge: HOME | End: 2020-11-18
Payer: MEDICARE

## 2020-11-18 LAB
ALBUMIN SERPL-MCNC: 3.4 GM/DL (ref 3.4–5)
ALP BLD-CCNC: 90 IU/L (ref 40–129)
ALT SERPL-CCNC: 8 U/L (ref 10–40)
ANION GAP SERPL CALCULATED.3IONS-SCNC: 12 MMOL/L (ref 4–16)
AST SERPL-CCNC: 10 IU/L (ref 15–37)
BILIRUB SERPL-MCNC: 0.1 MG/DL (ref 0–1)
BUN BLDV-MCNC: 20 MG/DL (ref 6–23)
CALCIUM SERPL-MCNC: 8.6 MG/DL (ref 8.3–10.6)
CHLORIDE BLD-SCNC: 92 MMOL/L (ref 99–110)
CO2: 26 MMOL/L (ref 21–32)
CREAT SERPL-MCNC: 0.5 MG/DL (ref 0.6–1.1)
GFR AFRICAN AMERICAN: >60 ML/MIN/1.73M2
GFR NON-AFRICAN AMERICAN: >60 ML/MIN/1.73M2
GLUCOSE BLD-MCNC: 75 MG/DL (ref 70–99)
POTASSIUM SERPL-SCNC: 4.3 MMOL/L (ref 3.5–5.1)
SODIUM BLD-SCNC: 130 MMOL/L (ref 135–145)
T4 FREE: 0.9 NG/DL (ref 0.9–1.8)
TOTAL PROTEIN: 5.5 GM/DL (ref 6.4–8.2)
TSH HIGH SENSITIVITY: 1.87 UIU/ML (ref 0.27–4.2)

## 2020-11-18 PROCEDURE — 83721 ASSAY OF BLOOD LIPOPROTEIN: CPT

## 2020-11-18 PROCEDURE — 84439 ASSAY OF FREE THYROXINE: CPT

## 2020-11-18 PROCEDURE — 80061 LIPID PANEL: CPT

## 2020-11-18 PROCEDURE — 84443 ASSAY THYROID STIM HORMONE: CPT

## 2020-11-18 PROCEDURE — 80053 COMPREHEN METABOLIC PANEL: CPT

## 2020-11-18 PROCEDURE — 83036 HEMOGLOBIN GLYCOSYLATED A1C: CPT

## 2020-11-19 LAB
CHOLESTEROL: 155 MG/DL
ESTIMATED AVERAGE GLUCOSE: 100 MG/DL
HBA1C MFR BLD: 5.1 % (ref 4.2–6.3)
HDLC SERPL-MCNC: 49 MG/DL
LDL CHOLESTEROL DIRECT: 76 MG/DL
TRIGL SERPL-MCNC: 203 MG/DL

## 2020-12-07 RX ORDER — AMLODIPINE BESYLATE 5 MG/1
5 TABLET ORAL DAILY
Qty: 31 TABLET | Refills: 3 | Status: SHIPPED | OUTPATIENT
Start: 2020-12-07 | End: 2021-03-17

## 2021-01-07 DIAGNOSIS — E78.2 MIXED HYPERLIPIDEMIA: ICD-10-CM

## 2021-01-08 RX ORDER — ATORVASTATIN CALCIUM 40 MG/1
40 TABLET, FILM COATED ORAL DAILY
Qty: 31 TABLET | Refills: 5 | Status: SHIPPED | OUTPATIENT
Start: 2021-01-08 | End: 2021-07-04

## 2021-01-08 RX ORDER — CETIRIZINE HYDROCHLORIDE 10 MG/1
TABLET ORAL
Qty: 31 TABLET | Refills: 5 | Status: SHIPPED | OUTPATIENT
Start: 2021-01-08 | End: 2021-12-17

## 2021-01-14 ENCOUNTER — TELEPHONE (OUTPATIENT)
Dept: FAMILY MEDICINE CLINIC | Age: 61
End: 2021-01-14

## 2021-01-14 NOTE — TELEPHONE ENCOUNTER
Accressed this chart to verify patient is our patient for [facility] order  and to verify that orders were not previously signed. Outcome: We haven't be provided the orders that Elisa Suh is requesting we sign.  They will need to refax

## 2021-01-18 ENCOUNTER — TELEPHONE (OUTPATIENT)
Dept: FAMILY MEDICINE CLINIC | Age: 61
End: 2021-01-18

## 2021-01-18 NOTE — TELEPHONE ENCOUNTER
[facility] nurse called and stated that they need something in writing with parameters as to when pt should or should not take her Norvasc    Mirna Ivy can be reached at [phone]

## 2021-01-29 RX ORDER — CARBAMAZEPINE 300 MG/1
CAPSULE, EXTENDED RELEASE ORAL
Qty: 56 CAPSULE | Refills: 5 | Status: SHIPPED | OUTPATIENT
Start: 2021-01-29 | End: 2021-07-19

## 2021-02-11 RX ORDER — PANTOPRAZOLE SODIUM 40 MG/1
40 TABLET, DELAYED RELEASE ORAL
Qty: 31 TABLET | Refills: 5 | Status: SHIPPED | OUTPATIENT
Start: 2021-02-11 | End: 2021-09-01

## 2021-02-26 ENCOUNTER — HOSPITAL ENCOUNTER (OUTPATIENT)
Dept: GENERAL RADIOLOGY | Age: 61
Discharge: HOME | End: 2021-02-26
Payer: MEDICARE

## 2021-02-26 ENCOUNTER — HOSPITAL ENCOUNTER (OUTPATIENT)
Age: 61
Discharge: HOME | End: 2021-02-26
Payer: MEDICARE

## 2021-02-26 ENCOUNTER — OFFICE VISIT (OUTPATIENT)
Dept: FAMILY MEDICINE CLINIC | Age: 61
End: 2021-02-26
Payer: MEDICARE

## 2021-02-26 VITALS — DIASTOLIC BLOOD PRESSURE: 80 MMHG | SYSTOLIC BLOOD PRESSURE: 122 MMHG | HEART RATE: 71 BPM | OXYGEN SATURATION: 96 %

## 2021-02-26 DIAGNOSIS — M79.672 LEFT FOOT PAIN: Primary | ICD-10-CM

## 2021-02-26 DIAGNOSIS — M79.672 LEFT FOOT PAIN: ICD-10-CM

## 2021-02-26 PROCEDURE — G8417 CALC BMI ABV UP PARAM F/U: HCPCS | Performed by: FAMILY MEDICINE

## 2021-02-26 PROCEDURE — G8427 DOCREV CUR MEDS BY ELIG CLIN: HCPCS | Performed by: FAMILY MEDICINE

## 2021-02-26 PROCEDURE — 99214 OFFICE O/P EST MOD 30 MIN: CPT | Performed by: FAMILY MEDICINE

## 2021-02-26 PROCEDURE — 73630 X-RAY EXAM OF FOOT: CPT

## 2021-02-26 PROCEDURE — 96372 THER/PROPH/DIAG INJ SC/IM: CPT | Performed by: FAMILY MEDICINE

## 2021-02-26 PROCEDURE — G8484 FLU IMMUNIZE NO ADMIN: HCPCS | Performed by: FAMILY MEDICINE

## 2021-02-26 PROCEDURE — 1036F TOBACCO NON-USER: CPT | Performed by: FAMILY MEDICINE

## 2021-02-26 PROCEDURE — 3017F COLORECTAL CA SCREEN DOC REV: CPT | Performed by: FAMILY MEDICINE

## 2021-02-26 RX ORDER — NAPROXEN 500 MG/1
500 TABLET ORAL 2 TIMES DAILY WITH MEALS
Qty: 14 TABLET | Refills: 0 | Status: SHIPPED | OUTPATIENT
Start: 2021-02-26 | End: 2021-06-01

## 2021-02-26 RX ORDER — KETOROLAC TROMETHAMINE 30 MG/ML
60 INJECTION, SOLUTION INTRAMUSCULAR; INTRAVENOUS ONCE
Status: COMPLETED | OUTPATIENT
Start: 2021-02-26 | End: 2021-02-26

## 2021-02-26 RX ORDER — TRAMADOL HYDROCHLORIDE 50 MG/1
50 TABLET ORAL EVERY 4 HOURS PRN
Qty: 20 TABLET | Refills: 0 | Status: SHIPPED | OUTPATIENT
Start: 2021-02-26 | End: 2021-03-08

## 2021-02-26 RX ADMIN — KETOROLAC TROMETHAMINE 60 MG: 30 INJECTION, SOLUTION INTRAMUSCULAR; INTRAVENOUS at 13:31

## 2021-02-26 NOTE — PROGRESS NOTES
Kelly Vail  1960    Chief Complaint   Patient presents with    Foot Pain     Patient here for left foot pain and swelling           Patient here c/o left foot swelling and bruizing since Tuesday, per the care giver, no fall, but she stats may hit the wheel chair, difficult to get good history from the patient, patient she is not able to move her left foot. No fever. Past Medical History:   Diagnosis Date    Anxiety disorder     Back pain, chronic     Cerebral palsy (HCC)     COPD (chronic obstructive pulmonary disease) (HCC)     CVA (cerebral infarction) 2014 x2    Diabetes mellitus ([facility]     Diverticulosis     [facility]     GERD (gastroesophageal reflux disease)     Hyperlipidemia     Hypertension     Insomnia     Iron (Fe) deficiency anemia     Mental disability     Seizures (HCC)     Unspecified cerebral artery occlusion with cerebral infarction      Past Surgical History:   Procedure Laterality Date    GASTROSTOMY TUBE PLACEMENT       History reviewed. No pertinent family history.   Social History     Socioeconomic History    Marital status: Single     Spouse name: Not on file    Number of children: Not on file    Years of education: Not on file    Highest education level: Not on file   Occupational History    Not on file   Social Needs    Financial resource strain: Not on file    Food insecurity     Worry: Not on file     Inability: Not on file    Transportation needs     Medical: Not on file     Non-medical: Not on file   Tobacco Use    Smoking status: Former Smoker     Packs/day: 1.50     Years: 20.00     Pack years: 30.00     Quit date: 2011     Years since quittin.5    Smokeless tobacco: Never Used   Substance and Sexual Activity    Alcohol use: No     Alcohol/week: 0.0 standard drinks    Drug use: No    Sexual activity: Not on file   Lifestyle    Physical activity     Days per week: Not on file     Minutes per session: Not on file    Stress: Not on file   Relationships    Social connections     Talks on phone: Not on file     Gets together: Not on file     Attends Worship service: Not on file     Active member of club or organization: Not on file     Attends meetings of clubs or organizations: Not on file     Relationship status: Not on file    Intimate partner violence     Fear of current or ex partner: Not on file     Emotionally abused: Not on file     Physically abused: Not on file     Forced sexual activity: Not on file   Other Topics Concern    Not on file   Social History Narrative    ** Merged History Encounter **            Allergies   Allergen Reactions    Macrobid [Nitrofurantoin] Hives and Swelling     Hives     Current Outpatient Medications   Medication Sig Dispense Refill    naproxen (NAPROSYN) 500 MG tablet Take 1 tablet by mouth 2 times daily (with meals) for 7 days 14 tablet 0    traMADol (ULTRAM) 50 MG tablet Take 1 tablet by mouth every 4 hours as needed for Pain for up to 10 days. Intended supply: 3 days. Take lowest dose possible to manage pain 20 tablet 0    pantoprazole (PROTONIX) 40 MG tablet TAKE 1 TABLET BY MOUTH EVERY MORNING (BEFORE BREAKFAST) 31 tablet 5    fluticasone (FLONASE) 50 MCG/ACT nasal spray INSTILL 1 SPRAY INTO EACH NOSTRIL DAILY 16 g 5    Lactobacillus (ACIDOPHILUS) CAPS capsule TAKE 1 CAPSULE BY MOUTH DAILY 28 capsule 5    carBAMazepine (CARBATROL) 300 MG extended release capsule TAKE 1 CAPSULE BY MOUTH TWICE A DAY 56 capsule 5    COMBIVENT RESPIMAT  MCG/ACT AERS inhaler INHALE 1 PUFF BY ORAL INHALATION EVERY 6 HOURS 4 g 5    cetirizine (ZYRTEC) 10 MG tablet TAKE 1 TABLET BY MOUTH EVERY DAY AT BEDTIME 31 tablet 5    atorvastatin (LIPITOR) 40 MG tablet TAKE 1 TABLET BY MOUTH DAILY 31 tablet 5    amLODIPine (NORVASC) 5 MG tablet TAKE 1 TABLET BY MOUTH DAILY 31 tablet 3    methylPREDNISolone (MEDROL, ASTRID,) 4 MG tablet Take by mouth.  As prescribed 1 kit 0    lisinopril (PRINIVIL;ZESTRIL) 40 MG tablet TAKE 1 TABLET BY MOUTH DAILY 31 tablet 5    Diapers & Supplies MISC 1 each by Does not apply route daily as needed (4-5 times daily) 100 each 5    nystatin (MYCOSTATIN) 950339 UNIT/GM cream Apply topically 2 times daily. To the affected area for 2 wks 60 g 1    mirtazapine (REMERON) 30 MG tablet TAKE 1 TABLET BY MOUTH NIGHTLY 31 tablet 5    cetirizine (ZYRTEC) 10 MG tablet Take 10 mg by mouth daily      levETIRAcetam 750 MG TB3D Take by mouth      SITagliptin (JANUVIA) 100 MG tablet Take 1 tablet by mouth daily 31 tablet 5    Incontinence Supply Disposable (DEPEND UNDERWEAR LARGE/XL) MISC 1 Units by Does not apply route 4 times daily 100 Package 5    Disposable Gloves MISC 1 Units by Does not apply route 4 times daily 100 each 5    ipratropium-albuterol (DUONEB) 0.5-2.5 (3) MG/3ML SOLN nebulizer solution Inhale 3 mLs into the lungs 4 times daily For 5 days before transition to prn 360 mL 5    UNABLE TO FIND Fitted wheel chair 1 Units 0    nystatin (MYCOSTATIN) 914532 UNIT/GM powder Apply topically 4 times daily. under the abdominal folds for 2 wks 1 Bottle 1    Ondansetron HCl (ZOFRAN PO) Take 1 tablet by mouth every 8 hours Indications: per facility med list      docusate sodium (COLACE) 100 MG capsule Take 100 mg by mouth 2 times daily      diphenhydrAMINE (BENADRYL) 25 MG capsule Take 25 mg by mouth every 6 hours as needed for Itching      SUMAtriptan (IMITREX) 50 MG tablet Take 50 mg by mouth once as needed for Migraine      aspirin 325 MG tablet Take 325 mg by mouth every 4 hours as needed for Pain      blood glucose monitor strips Test 3 times a day & as needed for symptoms of irregular blood glucose.  90 strip 0    lactulose (CHRONULAC) 10 GM/15ML solution Take 20 g by mouth 3 times daily as needed       levothyroxine (SYNTHROID) 50 MCG tablet Take 50 mcg by mouth Daily      busPIRone (BUSPAR) 10 MG tablet Take 1 tablet by mouth 2 times daily 60 tablet 5    divalproex (DEPAKOTE) 500 MG DR tablet Take 4 tablets by mouth nightly 90 tablet 0    metFORMIN (GLUCOPHAGE) 500 MG tablet Take 2 tablets by mouth daily (with breakfast) 30 tablet 0    risperiDONE (RISPERDAL) 1 MG tablet Take 1 tablet by mouth 3 times daily 1 tablet in am, 1 tablet at 4pm, 1 tablet at bedtime 60 tablet 5    sertraline (ZOLOFT) 100 MG tablet Take 2 tablets by mouth nightly 30 tablet 5    benzonatate (TESSALON) 100 MG capsule Take 100 mg by mouth 3 times daily as needed for Cough       No current facility-administered medications for this visit. Review of Systems   Constitutional: Positive for activity change. Negative for appetite change, chills and fever. Musculoskeletal: Positive for arthralgias (left foot).        Lab Results   Component Value Date    WBC 7.7 05/11/2020    HGB 12.5 05/11/2020    HCT 41.4 05/11/2020    MCV 96.3 05/11/2020     05/11/2020     Lab Results   Component Value Date     (L) 11/18/2020    K 4.3 11/18/2020    CL 92 (L) 11/18/2020    CO2 26 11/18/2020    BUN 20 11/18/2020    CREATININE 0.5 (L) 11/18/2020    GLUCOSE 75 11/18/2020    CALCIUM 8.6 11/18/2020    PROT 5.5 (L) 11/18/2020    LABALBU 3.4 11/18/2020    BILITOT 0.1 11/18/2020    ALKPHOS 90 11/18/2020    AST 10 (L) 11/18/2020    ALT 8 (L) 11/18/2020    LABGLOM >60 11/18/2020    GFRAA >60 11/18/2020     Lab Results   Component Value Date    CHOL 155 11/18/2020    CHOL 161 05/21/2020    CHOL 157 02/20/2020     Lab Results   Component Value Date    TRIG 203 (H) 11/18/2020    TRIG 319 (H) 05/21/2020    TRIG 318 (H) 02/20/2020     Lab Results   Component Value Date    HDL 49 11/18/2020    HDL 36 (L) 05/21/2020    HDL 36 (L) 02/20/2020     No results found for: LDLCALC, LDLCHOLESTEROL  Lab Results   Component Value Date    LABA1C 5.1 11/18/2020     Lab Results   Component Value Date    TSHHS 1.870 11/18/2020         /80 (Site: Right Upper Arm, Position: Sitting, Cuff Size: Medium Adult)   Pulse 71   SpO2 96%     BP Readings from Last 3 Encounters:   02/26/21 122/80   09/18/20 116/78   05/12/20 130/70       Wt Readings from Last 3 Encounters:   05/11/20 147 lb (66.7 kg)   04/28/20 147 lb 14.9 oz (67.1 kg)   12/17/19 148 lb (67.1 kg)         Physical Exam  Constitutional:       Appearance: She is not ill-appearing. Musculoskeletal:      Left foot: Decreased range of motion. Tenderness and swelling present. Feet:    Neurological:      Mental Status: She is alert. ASSESSMENT/ PLAN:    1. Left foot pain  - start:  - naproxen (NAPROSYN) 500 MG tablet; Take 1 tablet by mouth 2 times daily (with meals) for 7 days  Dispense: 14 tablet; Refill: 0  - traMADol (ULTRAM) 50 MG tablet; Take 1 tablet by mouth every 4 hours as needed for Pain for up to 10 days. Intended supply: 3 days. Take lowest dose possible to manage pain  Dispense: 20 tablet; Refill: 0  - XR FOOT LEFT (MIN 3 VIEWS); Future  - ketorolac (TORADOL) injection 60 mg            - Appropriate prescription are addressed. - After visit summery provided. - Questions answered and patient verbalizes understanding.  - Call for any problem, questions, or concerns.  - RTC if symptoms worse. Return in about 1 month (around 3/26/2021).

## 2021-03-01 RX ORDER — LISINOPRIL 40 MG/1
TABLET ORAL
Qty: 31 TABLET | Refills: 5 | Status: SHIPPED | OUTPATIENT
Start: 2021-03-01

## 2021-03-02 ENCOUNTER — TELEPHONE (OUTPATIENT)
Dept: FAMILY MEDICINE CLINIC | Age: 61
End: 2021-03-02

## 2021-03-02 RX ORDER — AMOXICILLIN AND CLAVULANATE POTASSIUM 875; 125 MG/1; MG/1
1 TABLET, FILM COATED ORAL 2 TIMES DAILY
Qty: 14 TABLET | Refills: 0 | Status: SHIPPED | OUTPATIENT
Start: 2021-03-02 | End: 2021-03-09

## 2021-03-02 NOTE — TELEPHONE ENCOUNTER
[facility]Atient's home care called and stated that the patient's left foot is still swollen and very painful. Patient now has a red area on the top of her foot and warm to the touch. Home care is keeping the patients' foot elevated but still no change. Please advise.  Please call [phone]

## 2021-03-04 ENCOUNTER — OFFICE VISIT (OUTPATIENT)
Dept: FAMILY MEDICINE CLINIC | Age: 61
End: 2021-03-04
Payer: MEDICARE

## 2021-03-04 VITALS — OXYGEN SATURATION: 95 % | SYSTOLIC BLOOD PRESSURE: 114 MMHG | DIASTOLIC BLOOD PRESSURE: 78 MMHG | HEART RATE: 76 BPM

## 2021-03-04 DIAGNOSIS — M79.672 LEFT FOOT PAIN: Primary | ICD-10-CM

## 2021-03-04 PROCEDURE — 99213 OFFICE O/P EST LOW 20 MIN: CPT | Performed by: FAMILY MEDICINE

## 2021-03-04 PROCEDURE — G8417 CALC BMI ABV UP PARAM F/U: HCPCS | Performed by: FAMILY MEDICINE

## 2021-03-04 PROCEDURE — G8484 FLU IMMUNIZE NO ADMIN: HCPCS | Performed by: FAMILY MEDICINE

## 2021-03-04 PROCEDURE — 1036F TOBACCO NON-USER: CPT | Performed by: FAMILY MEDICINE

## 2021-03-04 PROCEDURE — 3017F COLORECTAL CA SCREEN DOC REV: CPT | Performed by: FAMILY MEDICINE

## 2021-03-04 PROCEDURE — G8427 DOCREV CUR MEDS BY ELIG CLIN: HCPCS | Performed by: FAMILY MEDICINE

## 2021-03-04 RX ORDER — HYDROCODONE BITARTRATE AND ACETAMINOPHEN 5; 325 MG/1; MG/1
1 TABLET ORAL 2 TIMES DAILY PRN
Qty: 28 TABLET | Refills: 0 | Status: SHIPPED | OUTPATIENT
Start: 2021-03-04 | End: 2021-03-17 | Stop reason: ALTCHOICE
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
## 2021-03-08 ENCOUNTER — TELEPHONE (OUTPATIENT)
Dept: FAMILY MEDICINE CLINIC | Age: 61
End: 2021-03-08

## 2021-03-16 DIAGNOSIS — I10 HTN (HYPERTENSION), BENIGN: ICD-10-CM

## 2021-03-17 ENCOUNTER — APPOINTMENT (OUTPATIENT)
Dept: GENERAL RADIOLOGY | Age: 61
End: 2021-03-17
Payer: MEDICARE

## 2021-03-17 ENCOUNTER — APPOINTMENT (OUTPATIENT)
Dept: CT IMAGING | Age: 61
End: 2021-03-17
Payer: MEDICARE

## 2021-03-17 ENCOUNTER — HOSPITAL ENCOUNTER (EMERGENCY)
Age: 61
Discharge: HOME | End: 2021-03-17
Attending: EMERGENCY MEDICINE
Payer: MEDICARE

## 2021-03-17 VITALS
SYSTOLIC BLOOD PRESSURE: 122 MMHG | WEIGHT: 140 LBS | OXYGEN SATURATION: 96 % | HEART RATE: 80 BPM | DIASTOLIC BLOOD PRESSURE: 78 MMHG | TEMPERATURE: 98 F | RESPIRATION RATE: 16 BRPM | BODY MASS INDEX: 29.39 KG/M2 | HEIGHT: 58 IN

## 2021-03-17 DIAGNOSIS — S42.202A CLOSED FRACTURE OF PROXIMAL END OF LEFT HUMERUS, UNSPECIFIED FRACTURE MORPHOLOGY, INITIAL ENCOUNTER: Primary | ICD-10-CM

## 2021-03-17 PROCEDURE — 71045 X-RAY EXAM CHEST 1 VIEW: CPT

## 2021-03-17 PROCEDURE — 6370000000 HC RX 637 (ALT 250 FOR IP): Performed by: EMERGENCY MEDICINE

## 2021-03-17 PROCEDURE — 73590 X-RAY EXAM OF LOWER LEG: CPT

## 2021-03-17 PROCEDURE — 73060 X-RAY EXAM OF HUMERUS: CPT

## 2021-03-17 PROCEDURE — 73564 X-RAY EXAM KNEE 4 OR MORE: CPT

## 2021-03-17 PROCEDURE — 73200 CT UPPER EXTREMITY W/O DYE: CPT

## 2021-03-17 PROCEDURE — 73552 X-RAY EXAM OF FEMUR 2/>: CPT

## 2021-03-17 PROCEDURE — 99284 EMERGENCY DEPT VISIT MOD MDM: CPT

## 2021-03-17 PROCEDURE — 72170 X-RAY EXAM OF PELVIS: CPT

## 2021-03-17 PROCEDURE — 70450 CT HEAD/BRAIN W/O DYE: CPT

## 2021-03-17 RX ORDER — AMLODIPINE BESYLATE 5 MG/1
5 TABLET ORAL DAILY
Qty: 30 TABLET | Refills: 3 | Status: SHIPPED | OUTPATIENT
Start: 2021-03-17 | End: 2021-07-01 | Stop reason: SDUPTHER

## 2021-03-17 RX ORDER — HYDROCODONE BITARTRATE AND ACETAMINOPHEN 5; 325 MG/1; MG/1
1 TABLET ORAL EVERY 6 HOURS PRN
Qty: 10 TABLET | Refills: 0 | Status: SHIPPED | OUTPATIENT
Start: 2021-03-17 | End: 2021-03-20

## 2021-03-17 RX ORDER — SITAGLIPTIN 100 MG/1
TABLET, FILM COATED ORAL
Qty: 30 TABLET | Refills: 5 | Status: SHIPPED | OUTPATIENT
Start: 2021-03-17

## 2021-03-17 RX ORDER — HYDROCODONE BITARTRATE AND ACETAMINOPHEN 5; 325 MG/1; MG/1
1 TABLET ORAL ONCE
Status: COMPLETED | OUTPATIENT
Start: 2021-03-17 | End: 2021-03-17

## 2021-03-17 RX ADMIN — HYDROCODONE BITARTRATE AND ACETAMINOPHEN 1 TABLET: 5; 325 TABLET ORAL at 18:11
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
## 2021-03-17 NOTE — ED NOTES
Pt provided meal per request and orders. Pt able to eat with right arm.      Mariela Wells RN  03/17/21 [id number]

## 2021-03-17 NOTE — ED NOTES
Pt changed and cleaned of BM. Pt tolerated moving and rolling in bed; pt states \"its stupid\".      Mila Tena RN  03/17/21 0935
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
## 2021-03-18 ENCOUNTER — TELEPHONE (OUTPATIENT)
Dept: FAMILY MEDICINE CLINIC | Age: 61
End: 2021-03-18

## 2021-03-18 NOTE — TELEPHONE ENCOUNTER
Patient has a fractured left humerus and is having a lot of pain and home care would like to know if she can take something in between the [address]? Please advise

## 2021-03-19 NOTE — TELEPHONE ENCOUNTER
Called to inform pt, a woman named Deb Betancur answered the phone. I asked if I could speak with Leidychuck Nandini as Deb Betancur is not on pt most recent HIPAA form, Deb Betancur proceeded to tell me I can speak with her because she handles all of [address] [facility] appointments. I told her she is not on the pt HIPAA form so I am unable to give her any information, Calderon Shaw became angry and very argumentative about this. I informed her once more that I cannot giver her any information because na has not given us permission to do so. Calderon Shaw continued to argue and started yelling in the phone to someone else in the background so I hung up the phone.

## 2021-03-23 ENCOUNTER — OFFICE VISIT (OUTPATIENT)
Dept: ORTHOPEDIC SURGERY | Age: 61
End: 2021-03-23
Payer: MEDICARE

## 2021-03-23 VITALS
RESPIRATION RATE: 14 BRPM | BODY MASS INDEX: 29.26 KG/M2 | WEIGHT: 140 LBS | HEART RATE: 68 BPM | OXYGEN SATURATION: 92 %

## 2021-03-23 DIAGNOSIS — S42.202A CLOSED FRACTURE OF PROXIMAL END OF LEFT HUMERUS, UNSPECIFIED FRACTURE MORPHOLOGY, INITIAL ENCOUNTER: Primary | ICD-10-CM

## 2021-03-23 PROCEDURE — 99202 OFFICE O/P NEW SF 15 MIN: CPT | Performed by: ORTHOPAEDIC SURGERY

## 2021-03-23 PROCEDURE — 1036F TOBACCO NON-USER: CPT | Performed by: ORTHOPAEDIC SURGERY

## 2021-03-23 PROCEDURE — G8484 FLU IMMUNIZE NO ADMIN: HCPCS | Performed by: ORTHOPAEDIC SURGERY

## 2021-03-23 PROCEDURE — G8427 DOCREV CUR MEDS BY ELIG CLIN: HCPCS | Performed by: ORTHOPAEDIC SURGERY

## 2021-03-23 PROCEDURE — 3017F COLORECTAL CA SCREEN DOC REV: CPT | Performed by: ORTHOPAEDIC SURGERY

## 2021-03-23 PROCEDURE — G8417 CALC BMI ABV UP PARAM F/U: HCPCS | Performed by: ORTHOPAEDIC SURGERY

## 2021-03-23 PROCEDURE — 23600 CLTX PROX HUMRL FX W/O MNPJ: CPT | Performed by: ORTHOPAEDIC SURGERY
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
## 2021-03-26 ENCOUNTER — VIRTUAL VISIT (OUTPATIENT)
Dept: FAMILY MEDICINE CLINIC | Age: 61
End: 2021-03-26
Payer: MEDICARE

## 2021-03-26 DIAGNOSIS — Z00.00 ROUTINE GENERAL MEDICAL EXAMINATION AT A HEALTH CARE FACILITY: Primary | ICD-10-CM

## 2021-03-26 PROCEDURE — G0439 PPPS, SUBSEQ VISIT: HCPCS | Performed by: FAMILY MEDICINE

## 2021-03-26 PROCEDURE — 3017F COLORECTAL CA SCREEN DOC REV: CPT | Performed by: FAMILY MEDICINE

## 2021-03-26 PROCEDURE — G8484 FLU IMMUNIZE NO ADMIN: HCPCS | Performed by: FAMILY MEDICINE

## 2021-03-26 RX ORDER — CETIRIZINE HYDROCHLORIDE 10 MG/1
10 TABLET ORAL DAILY
Qty: 30 TABLET | Refills: 5 | Status: ON HOLD | OUTPATIENT
Start: 2021-03-26 | End: 2021-09-25 | Stop reason: HOSPADM

## 2021-03-26 ASSESSMENT — LIFESTYLE VARIABLES
AUDIT TOTAL SCORE: INCOMPLETE
HOW OFTEN DO YOU HAVE A DRINK CONTAINING ALCOHOL: NEVER
AUDIT-C TOTAL SCORE: INCOMPLETE

## 2021-03-26 ASSESSMENT — PATIENT HEALTH QUESTIONNAIRE - PHQ9
SUM OF ALL RESPONSES TO PHQ QUESTIONS 1-9: 0
SUM OF ALL RESPONSES TO PHQ9 QUESTIONS 1 & 2: 0
SUM OF ALL RESPONSES TO PHQ QUESTIONS 1-9: 0

## 2021-03-26 NOTE — PROGRESS NOTES
Medicare Annual Wellness Visit  Name: Alanna Roblero Date: 3/28/2021   MRN: [id number] Sex: Female   Age: 64 y.o. Ethnicity: Non-/Non    : 1960 Race: Abisai Duong is here for Medicare AWV      Screenings for behavioral, psychosocial and functional/safety risks, and cognitive dysfunction are all negative except as indicated below. These results, as well as other patient data from the [address] form, are documented in Flowsheets linked to this Encounter. Allergies   Allergen Reactions    Macrobid [Nitrofurantoin] Hives and Swelling     Hives         Prior to Visit Medications    Medication Sig Taking? Authorizing Provider   cetirizine (ZYRTEC) 10 MG tablet Take 1 tablet by mouth daily Yes Jeanette Rao MD   JANUVIA 100 MG tablet TAKE 1 TABLET BY MOUTH DAILY  Jeanette Rao MD   amLODIPine (NORVASC) 5 MG tablet TAKE 1 TABLET BY MOUTH DAILY  Jeanette Rao MD   lisinopril (PRINIVIL;ZESTRIL) 40 MG tablet TAKE 1 TABLET BY MOUTH DAILY  Jennie Chowdhury MD   naproxen (NAPROSYN) 500 MG tablet Take 1 tablet by mouth 2 times daily (with meals) for 7 days  Jennie Chowdhury MD   pantoprazole (PROTONIX) 40 MG tablet TAKE 1 TABLET BY MOUTH EVERY MORNING (BEFORE BREAKFAST)  Jennie Chowdhury MD   fluticasone (FLONASE) 50 MCG/ACT nasal spray INSTILL 1 SPRAY INTO EACH NOSTRIL DAILY  Jennie Chowdhury MD   Lactobacillus (ACIDOPHILUS) CAPS capsule TAKE 1 CAPSULE BY MOUTH DAILY  Jennie Chowdhury MD   carBAMazepine (CARBATROL) 300 MG extended release capsule TAKE 1 CAPSULE BY MOUTH TWICE A DAY  Jeanette Rao MD   COMBIVENT RESPIMAT  MCG/ACT AERS inhaler INHALE 1 PUFF BY ORAL INHALATION EVERY 6 HOURS  Ross Sousa MD   cetirizine (ZYRTEC) 10 MG tablet TAKE 1 TABLET BY MOUTH EVERY DAY AT BEDTIME  Jennie Chowdhury MD   atorvastatin (LIPITOR) 40 MG tablet TAKE 1 TABLET BY MOUTH DAILY  Jennie Chowdhury MD   methylPREDNISolone (MEDROL, ASTRID,) 4 MG tablet Take by mouth.  As prescribed Anabella Molina MD   Diapers & Supplies MISC 1 each by Does not apply route daily as needed (4-5 times daily)  Anabella Molina MD   nystatin (MYCOSTATIN) 832399 UNIT/GM cream Apply topically 2 times daily. To the affected area for 2 wks  Anabella Molina MD   mirtazapine (REMERON) 30 MG tablet TAKE 1 TABLET BY MOUTH NIGHTLY  Anabella Molina MD   levETIRAcetam 750 MG TB3D Take by mouth  Historical Provider, MD   Incontinence Supply Disposable (DEPEND UNDERWEAR LARGE/XL) MISC 1 Units by Does not apply route 4 times daily  Anabella Molina MD   Disposable Gloves MISC 1 Units by Does not apply route 4 times daily  Anabella Molina MD   ipratropium-albuterol (DUONEB) 0.5-2.5 (3) MG/3ML SOLN nebulizer solution Inhale 3 mLs into the lungs 4 times daily For 5 days before transition to prn  Shayy Jasmine MD   UNABLE TO FIND Fitted wheel chair  Anabella Molina MD   nystatin (MYCOSTATIN) 872052 UNIT/GM powder Apply topically 4 times daily. under the abdominal folds for 2 wks  Anabella Molina MD   Ondansetron HCl (ZOFRAN PO) Take 1 tablet by mouth every 8 hours Indications: per facility med list  Historical Provider, MD   docusate sodium (COLACE) 100 MG capsule Take 100 mg by mouth 2 times daily  Historical Provider, MD   diphenhydrAMINE (BENADRYL) 25 MG capsule Take 25 mg by mouth every 6 hours as needed for Itching  Historical Provider, MD   SUMAtriptan (IMITREX) 50 MG tablet Take 50 mg by mouth once as needed for Migraine  Historical Provider, MD   benzonatate (TESSALON) 100 MG capsule Take 100 mg by mouth 3 times daily as needed for Cough  Historical Provider, MD   aspirin 325 MG tablet Take 325 mg by mouth every 4 hours as needed for Pain  Historical Provider, MD   blood glucose monitor strips Test 3 times a day & as needed for symptoms of irregular blood glucose.   Alan Farley MD   lactulose (CHRONULAC) 10 GM/15ML solution Take 20 g by mouth 3 times daily as needed   Historical Provider, MD   levothyroxine (SYNTHROID) 50 MCG tablet Take 50 mcg by mouth Daily  Historical Provider, MD   busPIRone (BUSPAR) 10 MG tablet Take 1 tablet by mouth 2 times daily  Anabella Molina MD   divalproex (DEPAKOTE) 500 MG DR tablet Take 4 tablets by mouth nightly  Anabella Molina MD   metFORMIN (GLUCOPHAGE) 500 MG tablet Take 2 tablets by mouth daily (with breakfast)  Anabella Molina MD   risperiDONE (RISPERDAL) 1 MG tablet Take 1 tablet by mouth 3 times daily 1 tablet in am, 1 tablet at 4pm, 1 tablet at bedtime  Anabella Molina MD   sertraline (ZOLOFT) 100 MG tablet Take 2 tablets by mouth nightly  Anabella Molina MD         Past Medical History:   Diagnosis Date    Anxiety disorder     Back pain, chronic     Cerebral palsy ([address]     COPD (chronic obstructive pulmonary disease) ([address]     CVA (cerebral infarction) 2014 x2    Diabetes mellitus ([address]     Diverticulosis     [facility]     GERD (gastroesophageal reflux disease)     Hyperlipidemia     Hypertension     Insomnia     Iron (Fe) deficiency anemia     Mental disability     Seizures ([address]     Unspecified cerebral artery occlusion with cerebral infarction        Past Surgical History:   Procedure Laterality Date    GASTROSTOMY TUBE PLACEMENT         No family history on file. CareTeam (Including outside providers/suppliers regularly involved in providing care):   Patient Care Team:  Anabella Molina MD as PCP - General (Family Medicine)  Anabella Molina MD as PCP - [facility]  Rose Barrera MD as Consulting Physician (Endocrinology)    Wt Readings from Last 3 Encounters:   03/23/21 140 lb (63.5 kg)   03/17/21 140 lb (63.5 kg)   05/11/20 147 lb (66.7 kg)     No flowsheet data found. There is no height or weight on file to calculate BMI. Based upon direct observation of the patient, evaluation of cognition reveals difficult to asses. Patient's complete Health Risk Assessment and screening values have been reviewed and are found in Flowsheets.  The following problems were reviewed today and where indicated follow up appointments were made and/or referrals ordered. Positive Risk Factor Screenings with Interventions:     Fall Risk:  2 or more falls in past year?: (!) yes  Fall with injury in past year?: (!) yes  Fall Risk Interventions:    · wheel chair bound          General Health and ACP:  General  In general, how would you say your health is?: (!) Poor  In the past 7 days, have you experienced any of the following? New or Increased Pain, New or Increased Fatigue, Loneliness, Social Isolation, Stress or Anger?: (!) New or Increased Pain  Do you get the social and emotional support that you need?: (!) No  Do you have a Living Will?: (!) No  Advance Directives     Power of  Living Will ACP-Advance Directive ACP-Power of     Not on File Not on File Not on File Not on File      General Health Risk Interventions:  · she dose have 24 hrs care giver    Health Habits/Nutrition:  Health Habits/Nutrition  Do you exercise for at least 20 minutes 2-3 times per week?: (!) No  Have you lost any weight without trying in the past 3 months?: No  Do you eat only one meal per day?: No  Have you seen the dentist within the past year?: Yes     Health Habits/Nutrition Interventions:  · doing fine    Hearing/Vision:  No exam data present  Hearing/Vision  Do you or your family notice any trouble with your hearing that hasn't been managed with hearing aids?: (!) Yes  Do you have difficulty driving, watching TV, or doing any of your daily activities because of your eyesight?: (!) Yes  Have you had an eye exam within the past year?: (!) No  Hearing/Vision Interventions:  · doing fine     ADL:  ADLs  In the past 7 days, did you need help from others to perform any of the following everyday activities?  Eating, dressing, grooming, bathing, toileting, or walking/balance?: (!) Eating, Dressing, Grooming, Bathing, Toileting, Walking/Balance  In the past 7 days, did you need help from others to take care of any of the following? Laundry, housekeeping, banking/finances, shopping, telephone use, food preparation, transportation, or taking medications?: [employer], Housekeeping, Banking/Finances, Shopping, Telephone Use, Food Preparation, Transportation, Taking Medications  ADL Interventions:  · doing fine    Personalized Preventive Plan   Current Health Maintenance Status  Immunization History   Administered Date(s) Administered    Influenza Vaccine, unspecified formulation 10/21/2016    Influenza Virus Vaccine 11/11/2012, 10/01/2013, 11/28/2015    Influenza, Quadv, IM, PF (6 mo and older Fluzone, Flulaval, Fluarix, and 3 yrs and older Afluria) 10/23/2018, 12/01/2019    Pneumococcal Polysaccharide ([id number]) 01/01/2011, 11/27/2015        Health Maintenance   Topic Date Due    Hepatitis C screen  Never done    Diabetic foot exam  Never done    Diabetic retinal exam  Never done    HIV screen  Never done    Hepatitis B vaccine (1 of 3 - Risk 3-dose series) Never done    DTaP/Tdap/Td vaccine (1 - Tdap) Never done    Cervical cancer screen  Never done    Breast cancer screen  Never done    Shingles Vaccine (1 of 2) Never done    Colon cancer screen colonoscopy  Never done    Annual Wellness Visit (AWV)  Never done    Diabetic microalbuminuria test  08/28/2019    Low dose CT lung screening  05/20/2020    Flu vaccine (1) 09/01/2020    A1C test (Diabetic or Prediabetic)  11/18/2021    Lipid screen  11/18/2021    TSH testing  11/18/2021    Potassium monitoring  11/18/2021    Creatinine monitoring  11/18/2021    Pneumococcal 0-64 years Vaccine  Completed    COVID-19 Vaccine  Completed    Hepatitis A vaccine  Aged Out    Hib vaccine  Aged Out    Meningococcal (ACWY) vaccine  Aged Out     Recommendations for [employer] Due: see orders and patient instructions/AVS.  .   Recommended screening schedule for the next 5-10 years is provided to the patient in written form: see Patient Instructions/AVS.    Kristy Kennedy was seen today for medicare awv. Diagnoses and all orders for this visit:    Routine general medical examination at a health care facility    Other orders  -     cetirizine (ZYRTEC) 10 MG tablet; Take 1 tablet by mouth daily               Alondra Solitario is a 64 y.o. female being evaluated by a Virtual Visit (video and audio) encounter to address concerns as mentioned above. A caregiver was present when appropriate. Due to this being a TeleHealth encounter (During [facility] public health emergency), evaluation of the following organ systems was limited: Vitals/Constitutional/EENT/Resp/CV/GI//MS/Neuro/Skin/Heme-Lymph-Imm. Pursuant to the emergency declaration under the [address] [place] [address], [address] [place] [address] authority and the [employer] and [employer] Act, this Virtual Visit was conducted with patient's (and/or legal guardian's) consent, to reduce the patient's risk of exposure to COVID-19 and provide necessary medical care. The patient (and/or legal guardian) has also been advised to contact this office for worsening conditions or problems, and seek emergency medical treatment and/or call 911 if deemed necessary. Patient identification was verified at the start of the visit: Yes    Services were provided through a video synchronous discussion virtually to substitute for in-person clinic visit. Patient and provider were located at their individual homes. --Izaiah Knight MD on 3/28/2021 at 11:22 PM    An electronic signature was used to authenticate this note.

## 2021-04-01 ENCOUNTER — HOSPITAL ENCOUNTER (OUTPATIENT)
Age: 61
Setting detail: SPECIMEN
Discharge: HOME | End: 2021-04-01
Payer: MEDICARE

## 2021-04-01 LAB
ALBUMIN SERPL-MCNC: 3.4 GM/DL (ref 3.4–5)
ALP BLD-CCNC: 76 IU/L (ref 40–128)
ALT SERPL-CCNC: <5 U/L (ref 10–40)
ANION GAP SERPL CALCULATED.3IONS-SCNC: 8 MMOL/L (ref 4–16)
AST SERPL-CCNC: 9 IU/L (ref 15–37)
BILIRUB SERPL-MCNC: 0.2 MG/DL (ref 0–1)
BUN BLDV-MCNC: 19 MG/DL (ref 6–23)
CALCIUM SERPL-MCNC: 8.7 MG/DL (ref 8.3–10.6)
CHLORIDE BLD-SCNC: 103 MMOL/L (ref 99–110)
CHOLESTEROL: 160 MG/DL
CO2: 27 MMOL/L (ref 21–32)
CREAT SERPL-MCNC: 0.5 MG/DL (ref 0.6–1.1)
ESTIMATED AVERAGE GLUCOSE: 100 MG/DL
GFR AFRICAN AMERICAN: >60 ML/MIN/1.73M2
GFR NON-AFRICAN AMERICAN: >60 ML/MIN/1.73M2
GLUCOSE BLD-MCNC: 97 MG/DL (ref 70–99)
HBA1C MFR BLD: 5.1 % (ref 4.2–6.3)
HDLC SERPL-MCNC: 37 MG/DL
LDL CHOLESTEROL DIRECT: 79 MG/DL
POTASSIUM SERPL-SCNC: 4.5 MMOL/L (ref 3.5–5.1)
SODIUM BLD-SCNC: 138 MMOL/L (ref 135–145)
T4 FREE: 0.9 NG/DL (ref 0.9–1.8)
TOTAL PROTEIN: 5.6 GM/DL (ref 6.4–8.2)
TRIGL SERPL-MCNC: 313 MG/DL
TSH HIGH SENSITIVITY: 0.9 UIU/ML (ref 0.27–4.2)

## 2021-04-01 PROCEDURE — 36415 COLL VENOUS BLD VENIPUNCTURE: CPT

## 2021-04-01 PROCEDURE — 80061 LIPID PANEL: CPT

## 2021-04-01 PROCEDURE — 83721 ASSAY OF BLOOD LIPOPROTEIN: CPT

## 2021-04-01 PROCEDURE — 84443 ASSAY THYROID STIM HORMONE: CPT

## 2021-04-01 PROCEDURE — 80053 COMPREHEN METABOLIC PANEL: CPT

## 2021-04-01 PROCEDURE — 83036 HEMOGLOBIN GLYCOSYLATED A1C: CPT

## 2021-04-01 PROCEDURE — 84439 ASSAY OF FREE THYROXINE: CPT

## 2021-04-20 ENCOUNTER — OFFICE VISIT (OUTPATIENT)
Dept: ORTHOPEDIC SURGERY | Age: 61
End: 2021-04-20

## 2021-04-20 VITALS — WEIGHT: 140 LBS | RESPIRATION RATE: 18 BRPM | BODY MASS INDEX: 29.26 KG/M2

## 2021-04-20 DIAGNOSIS — S42.292D CLOSED 3-PART FRACTURE OF PROXIMAL HUMERUS, LEFT, WITH ROUTINE HEALING, SUBSEQUENT ENCOUNTER: Primary | ICD-10-CM

## 2021-04-20 PROCEDURE — 99024 POSTOP FOLLOW-UP VISIT: CPT | Performed by: ORTHOPAEDIC SURGERY
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
## 2021-06-01 ENCOUNTER — OFFICE VISIT (OUTPATIENT)
Dept: ORTHOPEDIC SURGERY | Age: 61
End: 2021-06-01

## 2021-06-01 VITALS — WEIGHT: 140 LBS | BODY MASS INDEX: 29.39 KG/M2 | HEIGHT: 58 IN | RESPIRATION RATE: 14 BRPM

## 2021-06-01 DIAGNOSIS — S42.292D CLOSED 3-PART FRACTURE OF PROXIMAL HUMERUS, LEFT, WITH ROUTINE HEALING, SUBSEQUENT ENCOUNTER: Primary | ICD-10-CM

## 2021-06-01 PROCEDURE — 99024 POSTOP FOLLOW-UP VISIT: CPT | Performed by: ORTHOPAEDIC SURGERY
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
## 2021-06-16 ENCOUNTER — TELEPHONE (OUTPATIENT)
Dept: FAMILY MEDICINE CLINIC | Age: 61
End: 2021-06-16

## 2021-06-16 DIAGNOSIS — R53.1 WEAKNESS: Primary | ICD-10-CM

## 2021-06-16 DIAGNOSIS — R26.2 UNABLE TO WALK: ICD-10-CM

## 2021-06-16 NOTE — TELEPHONE ENCOUNTER
Inez Mckinley called and she is asking for an order for a lift chair.  She can be reached at [phone]

## 2021-07-01 ENCOUNTER — TELEPHONE (OUTPATIENT)
Dept: FAMILY MEDICINE CLINIC | Age: 61
End: 2021-07-01

## 2021-07-01 DIAGNOSIS — I10 HTN (HYPERTENSION), BENIGN: ICD-10-CM

## 2021-07-01 RX ORDER — AMLODIPINE BESYLATE 10 MG/1
10 TABLET ORAL DAILY
Qty: 90 TABLET | Refills: 3 | Status: SHIPPED | OUTPATIENT
Start: 2021-07-01 | End: 2022-06-22

## 2021-07-01 NOTE — TELEPHONE ENCOUNTER
Susana Teran from [facility] home care called and stated that the patient's blood pressure is up even after the patient was given her Bp medication. First time the Bp was taken this morning 156/83 then after the Bp medication 177/90. Nurse states that Bp was high on Tuesday night.  Please advise

## 2021-07-02 DIAGNOSIS — E78.2 MIXED HYPERLIPIDEMIA: ICD-10-CM

## 2021-07-04 RX ORDER — ATORVASTATIN CALCIUM 40 MG/1
40 TABLET, FILM COATED ORAL DAILY
Qty: 31 TABLET | Refills: 5 | Status: SHIPPED | OUTPATIENT
Start: 2021-07-04 | End: 2021-12-17

## 2021-07-12 ENCOUNTER — TELEPHONE (OUTPATIENT)
Dept: FAMILY MEDICINE CLINIC | Age: 61
End: 2021-07-12

## 2021-07-19 ENCOUNTER — TELEPHONE (OUTPATIENT)
Dept: FAMILY MEDICINE CLINIC | Age: 61
End: 2021-07-19

## 2021-07-19 RX ORDER — SELENIUM 50 MCG
1 TABLET ORAL DAILY
Qty: 31 CAPSULE | Refills: 5 | Status: SHIPPED | OUTPATIENT
Start: 2021-07-19 | End: 2022-01-20

## 2021-07-19 RX ORDER — CARBAMAZEPINE 300 MG/1
CAPSULE, EXTENDED RELEASE ORAL
Qty: 62 CAPSULE | Refills: 5 | Status: SHIPPED | OUTPATIENT
Start: 2021-07-19 | End: 2022-01-20

## 2021-07-19 NOTE — TELEPHONE ENCOUNTER
[facility] care called this morning and stated that there was a medication error. Patient was given 2 Norvasc instead of 1.  If you have any questions call [phone]

## 2021-08-09 ENCOUNTER — TELEPHONE (OUTPATIENT)
Dept: FAMILY MEDICINE CLINIC | Age: 61
End: 2021-08-09

## 2021-08-09 NOTE — TELEPHONE ENCOUNTER
Accessed this chart to verify this is our patient to see if DME supply form was needed.  Outcome:      Sent to Provider

## 2021-09-01 RX ORDER — PANTOPRAZOLE SODIUM 40 MG/1
40 TABLET, DELAYED RELEASE ORAL
Qty: 30 TABLET | Refills: 0 | Status: SHIPPED | OUTPATIENT
Start: 2021-09-01 | End: 2021-10-05

## 2021-09-07 ENCOUNTER — TELEPHONE (OUTPATIENT)
Dept: FAMILY MEDICINE CLINIC | Age: 61
End: 2021-09-07

## 2021-09-07 NOTE — TELEPHONE ENCOUNTER
----- Message from Luisito Gray sent at 9/7/2021 11:22 AM EDT -----  Subject: Message to Provider    QUESTIONS  Information for Provider? Needs verbal start of care or plan of care to be   signed and dated and sent back to fax [phone]. POC has already been   faxed to Dr moura. Moose Dianaent can be reached at [phone]  ---------------------------------------------------------------------------  --------------  CALL BACK INFO  What is the best way for the office to contact you? OK to leave message on   voicemail  Preferred Call Back Phone Number?  [phone]  ---------------------------------------------------------------------------  --------------  SCRIPT ANSWERS  undefined

## 2021-09-23 ENCOUNTER — HOSPITAL ENCOUNTER (INPATIENT)
Age: 61
LOS: 2 days | Discharge: HOME | DRG: 193 | End: 2021-09-25
Attending: EMERGENCY MEDICINE | Admitting: INTERNAL MEDICINE
Payer: MEDICARE

## 2021-09-23 ENCOUNTER — APPOINTMENT (OUTPATIENT)
Dept: CT IMAGING | Age: 61
DRG: 193 | End: 2021-09-23
Payer: MEDICARE

## 2021-09-23 DIAGNOSIS — R09.02 HYPOXIA: Primary | ICD-10-CM

## 2021-09-23 DIAGNOSIS — R93.89 ABNORMAL CHEST CT: ICD-10-CM

## 2021-09-23 DIAGNOSIS — N28.9 RENAL LESION: ICD-10-CM

## 2021-09-23 LAB
ADENOVIRUS DETECTION BY PCR: NOT DETECTED
ALBUMIN SERPL-MCNC: 3.9 GM/DL (ref 3.4–5)
ALP BLD-CCNC: 104 IU/L (ref 40–129)
ALT SERPL-CCNC: 12 U/L (ref 10–40)
ANION GAP SERPL CALCULATED.3IONS-SCNC: 14 MMOL/L (ref 4–16)
AST SERPL-CCNC: 15 IU/L (ref 15–37)
BACTERIA: ABNORMAL /HPF
BASE EXCESS: 1 (ref 0–2.4)
BASOPHILS ABSOLUTE: 0 K/CU MM
BASOPHILS RELATIVE PERCENT: 0.5 % (ref 0–1)
BILIRUB SERPL-MCNC: 0.1 MG/DL (ref 0–1)
BILIRUBIN URINE: NEGATIVE MG/DL
BLOOD, URINE: NEGATIVE
BORDETELLA PARAPERTUSSIS BY PCR: NOT DETECTED
BORDETELLA PERTUSSIS PCR: NOT DETECTED
BUN BLDV-MCNC: 17 MG/DL (ref 6–23)
CALCIUM SERPL-MCNC: 8.5 MG/DL (ref 8.3–10.6)
CHLAMYDOPHILA PNEUMONIA PCR: NOT DETECTED
CHLORIDE BLD-SCNC: 105 MMOL/L (ref 99–110)
CLARITY: CLEAR
CO2: 23 MMOL/L (ref 21–32)
COLOR: YELLOW
COMMENT: ABNORMAL
CORONAVIRUS 229E PCR: NOT DETECTED
CORONAVIRUS HKU1 PCR: NOT DETECTED
CORONAVIRUS NL63 PCR: NOT DETECTED
CORONAVIRUS OC43 PCR: NOT DETECTED
CREAT SERPL-MCNC: 0.4 MG/DL (ref 0.6–1.1)
DIFFERENTIAL TYPE: ABNORMAL
EOSINOPHILS ABSOLUTE: 0.1 K/CU MM
EOSINOPHILS RELATIVE PERCENT: 0.9 % (ref 0–3)
GFR AFRICAN AMERICAN: >60 ML/MIN/1.73M2
GFR NON-AFRICAN AMERICAN: >60 ML/MIN/1.73M2
GLUCOSE BLD-MCNC: 123 MG/DL (ref 70–99)
GLUCOSE, URINE: NEGATIVE MG/DL
HCO3 VENOUS: 25.3 MMOL/L (ref 19–25)
HCT VFR BLD CALC: 35.3 % (ref 37–47)
HEMOGLOBIN: 9.9 GM/DL (ref 12.5–16)
HUMAN METAPNEUMOVIRUS PCR: NOT DETECTED
IMMATURE NEUTROPHIL %: 1.6 % (ref 0–0.43)
INFLUENZA A BY PCR: NOT DETECTED
INFLUENZA A H1 (2009) PCR: NOT DETECTED
INFLUENZA A H1 PANDEMIC PCR: NOT DETECTED
INFLUENZA A H3 PCR: NOT DETECTED
INFLUENZA B BY PCR: NOT DETECTED
KETONES, URINE: ABNORMAL MG/DL
LEUKOCYTE ESTERASE, URINE: ABNORMAL
LIPASE: 27 IU/L (ref 13–60)
LYMPHOCYTES ABSOLUTE: 1.6 K/CU MM
LYMPHOCYTES RELATIVE PERCENT: 25 % (ref 24–44)
MCH RBC QN AUTO: 24.8 PG (ref 27–31)
MCHC RBC AUTO-ENTMCNC: 28 % (ref 32–36)
MCV RBC AUTO: 88.5 FL (ref 78–100)
MONOCYTES ABSOLUTE: 0.5 K/CU MM
MONOCYTES RELATIVE PERCENT: 8 % (ref 0–4)
MUCUS: ABNORMAL HPF
MYCOPLASMA PNEUMONIAE PCR: NOT DETECTED
NITRITE URINE, QUANTITATIVE: POSITIVE
NUCLEATED RBC %: 0 %
O2 SAT, VEN: 93.6 % (ref 50–70)
PARAINFLUENZA 1 PCR: NOT DETECTED
PARAINFLUENZA 2 PCR: NOT DETECTED
PARAINFLUENZA 3 PCR: NOT DETECTED
PARAINFLUENZA 4 PCR: NOT DETECTED
PCO2, VEN: 48 MMHG (ref 38–52)
PDW BLD-RTO: 16.9 % (ref 11.7–14.9)
PH VENOUS: 7.33 (ref 7.32–7.42)
PH, URINE: 5 (ref 5–8)
PLATELET # BLD: 282 K/CU MM (ref 140–440)
PMV BLD AUTO: 8.5 FL (ref 7.5–11.1)
PO2, VEN: 107 MMHG (ref 28–48)
POTASSIUM SERPL-SCNC: 4.1 MMOL/L (ref 3.5–5.1)
PRO-BNP: 122.2 PG/ML
PROTEIN UA: NEGATIVE MG/DL
RBC # BLD: 3.99 M/CU MM (ref 4.2–5.4)
RBC URINE: 1 /HPF (ref 0–6)
RHINOVIRUS ENTEROVIRUS PCR: NOT DETECTED
RSV PCR: NOT DETECTED
SARS-COV-2, NAAT: NOT DETECTED
SARS-COV-2: NOT DETECTED
SEGMENTED NEUTROPHILS ABSOLUTE COUNT: 4.1 K/CU MM
SEGMENTED NEUTROPHILS RELATIVE PERCENT: 64 % (ref 36–66)
SODIUM BLD-SCNC: 142 MMOL/L (ref 135–145)
SOURCE: NORMAL
SPECIFIC GRAVITY UA: 1.03 (ref 1–1.03)
TOTAL IMMATURE NEUTOROPHIL: 0.1 K/CU MM
TOTAL NUCLEATED RBC: 0 K/CU MM
TOTAL PROTEIN: 6.6 GM/DL (ref 6.4–8.2)
TRICHOMONAS: ABNORMAL /HPF
TROPONIN T: <0.01 NG/ML
UROBILINOGEN, URINE: NEGATIVE MG/DL (ref 0.2–1)
WBC # BLD: 6.4 K/CU MM (ref 4–10.5)
WBC CLUMP: ABNORMAL /HPF
WBC UA: 2 /HPF (ref 0–5)

## 2021-09-23 PROCEDURE — 6370000000 HC RX 637 (ALT 250 FOR IP): Performed by: INTERNAL MEDICINE

## 2021-09-23 PROCEDURE — 83880 ASSAY OF NATRIURETIC PEPTIDE: CPT

## 2021-09-23 PROCEDURE — 70450 CT HEAD/BRAIN W/O DYE: CPT

## 2021-09-23 PROCEDURE — 87635 SARS-COV-2 COVID-19 AMP PRB: CPT

## 2021-09-23 PROCEDURE — 99285 EMERGENCY DEPT VISIT HI MDM: CPT

## 2021-09-23 PROCEDURE — 82805 BLOOD GASES W/O2 SATURATION: CPT

## 2021-09-23 PROCEDURE — 2700000000 HC OXYGEN THERAPY PER DAY

## 2021-09-23 PROCEDURE — 96375 TX/PRO/DX INJ NEW DRUG ADDON: CPT

## 2021-09-23 PROCEDURE — 84484 ASSAY OF TROPONIN QUANT: CPT

## 2021-09-23 PROCEDURE — 94640 AIRWAY INHALATION TREATMENT: CPT

## 2021-09-23 PROCEDURE — 6360000002 HC RX W HCPCS: Performed by: EMERGENCY MEDICINE

## 2021-09-23 PROCEDURE — 96374 THER/PROPH/DIAG INJ IV PUSH: CPT

## 2021-09-23 PROCEDURE — 0202U NFCT DS 22 TRGT SARS-COV-2: CPT

## 2021-09-23 PROCEDURE — 74177 CT ABD & PELVIS W/CONTRAST: CPT

## 2021-09-23 PROCEDURE — 81001 URINALYSIS AUTO W/SCOPE: CPT

## 2021-09-23 PROCEDURE — 87186 SC STD MICRODIL/AGAR DIL: CPT

## 2021-09-23 PROCEDURE — 6360000004 HC RX CONTRAST MEDICATION: Performed by: EMERGENCY MEDICINE

## 2021-09-23 PROCEDURE — 80053 COMPREHEN METABOLIC PANEL: CPT

## 2021-09-23 PROCEDURE — P9612 CATHETERIZE FOR URINE SPEC: HCPCS

## 2021-09-23 PROCEDURE — 2580000003 HC RX 258: Performed by: INTERNAL MEDICINE

## 2021-09-23 PROCEDURE — 1200000000 HC SEMI PRIVATE

## 2021-09-23 PROCEDURE — 83690 ASSAY OF LIPASE: CPT

## 2021-09-23 PROCEDURE — 71275 CT ANGIOGRAPHY CHEST: CPT

## 2021-09-23 PROCEDURE — 93005 ELECTROCARDIOGRAM TRACING: CPT | Performed by: EMERGENCY MEDICINE

## 2021-09-23 PROCEDURE — 85025 COMPLETE CBC W/AUTO DIFF WBC: CPT

## 2021-09-23 PROCEDURE — 87086 URINE CULTURE/COLONY COUNT: CPT

## 2021-09-23 PROCEDURE — 87088 URINE BACTERIA CULTURE: CPT

## 2021-09-23 RX ORDER — LEVETIRACETAM 250 MG/1
750 TABLET ORAL 2 TIMES DAILY
Status: DISCONTINUED | OUTPATIENT
Start: 2021-09-23 | End: 2021-09-25 | Stop reason: HOSPADM

## 2021-09-23 RX ORDER — DIVALPROEX SODIUM 500 MG/1
2000 TABLET, DELAYED RELEASE ORAL NIGHTLY
Status: DISCONTINUED | OUTPATIENT
Start: 2021-09-23 | End: 2021-09-25 | Stop reason: HOSPADM

## 2021-09-23 RX ORDER — ONDANSETRON 2 MG/ML
4 INJECTION INTRAMUSCULAR; INTRAVENOUS EVERY 6 HOURS PRN
Status: DISCONTINUED | OUTPATIENT
Start: 2021-09-23 | End: 2021-09-25 | Stop reason: HOSPADM

## 2021-09-23 RX ORDER — ACETAMINOPHEN 650 MG/1
650 SUPPOSITORY RECTAL EVERY 6 HOURS PRN
Status: DISCONTINUED | OUTPATIENT
Start: 2021-09-23 | End: 2021-09-25 | Stop reason: HOSPADM

## 2021-09-23 RX ORDER — SODIUM CHLORIDE 9 MG/ML
25 INJECTION, SOLUTION INTRAVENOUS PRN
Status: DISCONTINUED | OUTPATIENT
Start: 2021-09-23 | End: 2021-09-25 | Stop reason: HOSPADM

## 2021-09-23 RX ORDER — ACETAMINOPHEN 325 MG/1
650 TABLET ORAL EVERY 6 HOURS PRN
Status: DISCONTINUED | OUTPATIENT
Start: 2021-09-23 | End: 2021-09-25 | Stop reason: HOSPADM

## 2021-09-23 RX ORDER — ONDANSETRON 4 MG/1
4 TABLET, ORALLY DISINTEGRATING ORAL EVERY 8 HOURS PRN
Status: DISCONTINUED | OUTPATIENT
Start: 2021-09-23 | End: 2021-09-25 | Stop reason: HOSPADM

## 2021-09-23 RX ORDER — LACTOBACILLUS RHAMNOSUS GG 10B CELL
1 CAPSULE ORAL DAILY
Status: DISCONTINUED | OUTPATIENT
Start: 2021-09-24 | End: 2021-09-25 | Stop reason: HOSPADM

## 2021-09-23 RX ORDER — MIRTAZAPINE 15 MG/1
30 TABLET, FILM COATED ORAL NIGHTLY
Status: DISCONTINUED | OUTPATIENT
Start: 2021-09-23 | End: 2021-09-25 | Stop reason: HOSPADM

## 2021-09-23 RX ORDER — ALBUTEROL SULFATE 2.5 MG/3ML
2.5 SOLUTION RESPIRATORY (INHALATION) ONCE
Status: COMPLETED | OUTPATIENT
Start: 2021-09-23 | End: 2021-09-23

## 2021-09-23 RX ORDER — BUSPIRONE HYDROCHLORIDE 10 MG/1
10 TABLET ORAL 2 TIMES DAILY
Status: DISCONTINUED | OUTPATIENT
Start: 2021-09-23 | End: 2021-09-25 | Stop reason: HOSPADM

## 2021-09-23 RX ORDER — ONDANSETRON 2 MG/ML
4 INJECTION INTRAMUSCULAR; INTRAVENOUS ONCE
Status: COMPLETED | OUTPATIENT
Start: 2021-09-23 | End: 2021-09-23

## 2021-09-23 RX ORDER — SODIUM CHLORIDE 0.9 % (FLUSH) 0.9 %
10 SYRINGE (ML) INJECTION EVERY 12 HOURS SCHEDULED
Status: DISCONTINUED | OUTPATIENT
Start: 2021-09-23 | End: 2021-09-25 | Stop reason: HOSPADM

## 2021-09-23 RX ORDER — LISINOPRIL 40 MG/1
40 TABLET ORAL DAILY
Status: DISCONTINUED | OUTPATIENT
Start: 2021-09-24 | End: 2021-09-25 | Stop reason: HOSPADM

## 2021-09-23 RX ORDER — PANTOPRAZOLE SODIUM 40 MG/1
40 TABLET, DELAYED RELEASE ORAL
Status: DISCONTINUED | OUTPATIENT
Start: 2021-09-24 | End: 2021-09-25 | Stop reason: HOSPADM

## 2021-09-23 RX ORDER — IPRATROPIUM BROMIDE AND ALBUTEROL SULFATE 2.5; .5 MG/3ML; MG/3ML
1 SOLUTION RESPIRATORY (INHALATION) EVERY 4 HOURS PRN
Status: DISCONTINUED | OUTPATIENT
Start: 2021-09-23 | End: 2021-09-25 | Stop reason: HOSPADM

## 2021-09-23 RX ORDER — METHYLPREDNISOLONE SODIUM SUCCINATE 125 MG/2ML
60 INJECTION, POWDER, LYOPHILIZED, FOR SOLUTION INTRAMUSCULAR; INTRAVENOUS ONCE
Status: COMPLETED | OUTPATIENT
Start: 2021-09-23 | End: 2021-09-23

## 2021-09-23 RX ORDER — AMLODIPINE BESYLATE 10 MG/1
10 TABLET ORAL DAILY
Status: DISCONTINUED | OUTPATIENT
Start: 2021-09-24 | End: 2021-09-25 | Stop reason: HOSPADM

## 2021-09-23 RX ORDER — SERTRALINE HYDROCHLORIDE 100 MG/1
200 TABLET, FILM COATED ORAL NIGHTLY
Status: DISCONTINUED | OUTPATIENT
Start: 2021-09-23 | End: 2021-09-25 | Stop reason: HOSPADM

## 2021-09-23 RX ORDER — LEVOTHYROXINE SODIUM 0.05 MG/1
50 TABLET ORAL DAILY
Status: DISCONTINUED | OUTPATIENT
Start: 2021-09-24 | End: 2021-09-25 | Stop reason: HOSPADM

## 2021-09-23 RX ORDER — ALOGLIPTIN 25 MG/1
25 TABLET, FILM COATED ORAL DAILY
Status: DISCONTINUED | OUTPATIENT
Start: 2021-09-24 | End: 2021-09-25 | Stop reason: HOSPADM

## 2021-09-23 RX ORDER — SODIUM CHLORIDE 0.9 % (FLUSH) 0.9 %
10 SYRINGE (ML) INJECTION PRN
Status: DISCONTINUED | OUTPATIENT
Start: 2021-09-23 | End: 2021-09-25 | Stop reason: HOSPADM

## 2021-09-23 RX ORDER — MORPHINE SULFATE 2 MG/ML
2 INJECTION, SOLUTION INTRAMUSCULAR; INTRAVENOUS ONCE
Status: COMPLETED | OUTPATIENT
Start: 2021-09-23 | End: 2021-09-23

## 2021-09-23 RX ORDER — ATORVASTATIN CALCIUM 40 MG/1
40 TABLET, FILM COATED ORAL DAILY
Status: DISCONTINUED | OUTPATIENT
Start: 2021-09-24 | End: 2021-09-25 | Stop reason: HOSPADM

## 2021-09-23 RX ORDER — DIPHENHYDRAMINE HCL 25 MG
25 CAPSULE ORAL EVERY 6 HOURS PRN
Status: DISCONTINUED | OUTPATIENT
Start: 2021-09-23 | End: 2021-09-25 | Stop reason: HOSPADM

## 2021-09-23 RX ORDER — FLUTICASONE PROPIONATE 50 MCG
1 SPRAY, SUSPENSION (ML) NASAL DAILY
Status: DISCONTINUED | OUTPATIENT
Start: 2021-09-24 | End: 2021-09-25 | Stop reason: HOSPADM

## 2021-09-23 RX ORDER — CETIRIZINE HYDROCHLORIDE 10 MG/1
10 TABLET ORAL DAILY
Status: DISCONTINUED | OUTPATIENT
Start: 2021-09-24 | End: 2021-09-25 | Stop reason: HOSPADM

## 2021-09-23 RX ORDER — CARBAMAZEPINE 300 MG/1
300 CAPSULE, EXTENDED RELEASE ORAL 2 TIMES DAILY
Status: DISCONTINUED | OUTPATIENT
Start: 2021-09-23 | End: 2021-09-25 | Stop reason: HOSPADM

## 2021-09-23 RX ORDER — ASPIRIN 325 MG
325 TABLET ORAL EVERY 4 HOURS PRN
Status: DISCONTINUED | OUTPATIENT
Start: 2021-09-23 | End: 2021-09-25 | Stop reason: HOSPADM

## 2021-09-23 RX ORDER — RISPERIDONE 0.5 MG/1
1 TABLET, FILM COATED ORAL 3 TIMES DAILY
Status: DISCONTINUED | OUTPATIENT
Start: 2021-09-23 | End: 2021-09-25 | Stop reason: HOSPADM

## 2021-09-23 RX ADMIN — METHYLPREDNISOLONE SODIUM SUCCINATE 60 MG: 125 INJECTION, POWDER, LYOPHILIZED, FOR SOLUTION INTRAMUSCULAR; INTRAVENOUS at 18:55

## 2021-09-23 RX ADMIN — LEVETIRACETAM 750 MG: 250 TABLET ORAL at 23:58

## 2021-09-23 RX ADMIN — RISPERIDONE 1 MG: 0.5 TABLET ORAL at 23:01

## 2021-09-23 RX ADMIN — CARBAMAZEPINE 300 MG: 300 CAPSULE, EXTENDED RELEASE ORAL at 23:58

## 2021-09-23 RX ADMIN — ALBUTEROL SULFATE 2.5 MG: 2.5 SOLUTION RESPIRATORY (INHALATION) at 19:32

## 2021-09-23 RX ADMIN — MORPHINE SULFATE 2 MG: 2 INJECTION, SOLUTION INTRAMUSCULAR; INTRAVENOUS at 14:41

## 2021-09-23 RX ADMIN — SERTRALINE HYDROCHLORIDE 200 MG: 100 TABLET ORAL at 23:01

## 2021-09-23 RX ADMIN — IOPAMIDOL 80 ML: 755 INJECTION, SOLUTION INTRAVENOUS at 13:01

## 2021-09-23 RX ADMIN — BUSPIRONE HYDROCHLORIDE 10 MG: 10 TABLET ORAL at 23:01

## 2021-09-23 RX ADMIN — SODIUM CHLORIDE, PRESERVATIVE FREE 10 ML: 5 INJECTION INTRAVENOUS at 23:15

## 2021-09-23 RX ADMIN — ONDANSETRON 4 MG: 2 INJECTION INTRAMUSCULAR; INTRAVENOUS at 14:41

## 2021-09-23 RX ADMIN — MIRTAZAPINE 30 MG: 15 TABLET, FILM COATED ORAL at 23:01

## 2021-09-23 RX ADMIN — DIVALPROEX SODIUM 2000 MG: 500 TABLET, DELAYED RELEASE ORAL at 23:02

## 2021-09-23 ASSESSMENT — PAIN DESCRIPTION - LOCATION: LOCATION: HEAD

## 2021-09-23 ASSESSMENT — PAIN SCALES - GENERAL
PAINLEVEL_OUTOF10: 0
PAINLEVEL_OUTOF10: 10
PAINLEVEL_OUTOF10: 10

## 2021-09-23 ASSESSMENT — PAIN DESCRIPTION - DESCRIPTORS: DESCRIPTORS: ACHING

## 2021-09-23 NOTE — LETTER
[place] [address] [address]  Dept: [phone]  Loc: [phone]                                                                     [facility]   [id number] [facility] [address]           RETURN TO WORK STATUS  9/25/2021          These are your [employer] Instructions. It may contain personal and confidential  information about your health. It is up to you to share  these instructions as necessary with your employer(s) as required by their policies for you to return to work. WORK STATUS: May return to work without modifications.  Return to work date: 09/27/21    (PLEASE NOTE: If modified work is not available, this patient is then unable to work for this period of time.)          Monica Salomon RN

## 2021-09-23 NOTE — ED PROVIDER NOTES
EMERGENCY DEPARTMENT ENCOUNTER    Patient: aR Taylor  MRN: [id number]  : 1960  Date of Evaluation: 2021  ED Provider:  Lissette Kim MD    CHIEF COMPLAINT  Chief Complaint   Patient presents with    Other     Not feeling well    Headache       HPI  Ra Taylor is a 64 y.o. female with history of cerebral palsy and mental disability who presents with complaints of \"not feeling well. \"  Patient has difficult time providing specifics however does report she has a headache and does affirm feeling short of breath. On examination of her abdomen she does affirm diffuse abdominal pain and tenderness with palpation. She is able to tell me her name but otherwise it is very difficult to get any other answers out of her. REVIEW OF SYSTEMS  Unable to obtain full review of systems secondary to patient's mental disability      PAST MEDICAL HISTORY  Past Medical History:   Diagnosis Date    Anxiety disorder     Back pain, chronic     [facility]     COPD [facility]chronic obstructive pulmonary disease) [facility]     CVA (cerebral infarction) 2014 x2    Diabetes mellitus ([facility]     Diverticulosis     Epilepsy ([facility]     GERD (gastroesophageal reflux disease)     Hyperlipidemia     Hypertension     Insomnia     Iron (Fe) deficiency anemia     Mental disability     Seizures (HCC)     Unspecified cerebral artery occlusion with cerebral infarction        CURRENT MEDICATIONS  [unfilled]    ALLERGIES  Allergies   Allergen Reactions    Macrobid [Nitrofurantoin] Hives and Swelling     Hives       SURGICAL HISTORY  Past Surgical History:   Procedure Laterality Date    GASTROSTOMY TUBE PLACEMENT         FAMILY HISTORY  History reviewed. No pertinent family history.     SOCIAL HISTORY  Social History     Socioeconomic History    Marital status: Single     Spouse name: None    Number of children: None    Years of education: None    Highest education level: None   Occupational History    None Tobacco Use    Smoking status: Former Smoker     Packs/day: 1.50     Years: 20.00     Pack years: 30.00     Quit date: 8/28/2011     Years since quitting: 10.0    Smokeless tobacco: Never Used   Vaping Use    Vaping Use: Never used   Substance and Sexual Activity    Alcohol use: No     Alcohol/week: 0.0 standard drinks    Drug use: No    Sexual activity: None   Other Topics Concern    None   Social History Narrative    ** Merged History Encounter **          Social Determinants of Health     Financial Resource Strain:     Difficulty of Paying Living Expenses:    Food Insecurity:     Worried About Running Out of Food in the Last Year:     Ran Out of Food in the Last Year:    Transportation Needs:     Lack of Transportation (Medical):  Lack of Transportation (Non-Medical):    Physical Activity:     Days of Exercise per Week:     Minutes of Exercise per Session:    Stress:     Feeling of Stress :    Social Connections:     Frequency of Communication with Friends and Family:     Frequency of Social Gatherings with Friends and Family:     Attends Sabianism Services:     Active Member of Clubs or Organizations:     Attends Club or Organization Meetings:     Marital Status:    Intimate Partner Violence:     Fear of Current or Ex-Partner:     Emotionally Abused:     Physically Abused:     Sexually Abused:          **Past medical, family and social histories, and nursing notes reviewed and verified by me**      PHYSICAL EXAM  VITAL SIGNS:   ED Triage Vitals   Enc Vitals Group      BP 09/23/21 1023 139/75      Pulse 09/23/21 1023 85      Resp 09/23/21 1023 18      Temp 09/23/21 1023 98.1 °F (36.7 °C)      Temp Source 09/23/21 1023 Oral      SpO2 09/23/21 1025 91 %      Weight 09/23/21 1023 140 lb (63.5 kg)      Height 09/23/21 1023 4' 10\" (1.473 m)      Head Circumference --       Peak Flow --       Pain Score --       Pain Loc --       Pain Edu? --       Excl.  in [address] during ED course were reviewed and are as charted. Constitutional: Minimal distress, Non-toxic appearance  Eyes: Conjunctiva normal, No discharge, PERRL, EOMI   HENT: Normocephalic, Atraumatic, bilateral external ears normal, bilateral TMs appear normal, no tenderness to percussion over the bilateral mastoid processes, no tenderness to percussion over the frontal or bilateral maxillary sinuses, posterior oropharynx is nonerythematous and without exudate, uvula is midline, oropharynx moist  Neck: Supple, no nuchal rigidity/meningismus, no stridor, no grossly visible or palpable masses  Cardiovascular: Regular rate and rhythm, No murmurs, No rubs, No gallops  Pulmonary/Chest: Normal breath sounds, No respiratory distress or accessory muscle use, No wheezing, crackles or rhonchi. Abdomen: Soft, nondistended and nonrigid, No tenderness or peritoneal signs, No masses, normal bowel sounds  Back: No midline point tenderness, No paraspinous muscle tenderness.  No CVA tenderness  Extremities: No gross deformities, no edema, no tenderness  Neurologic: Normal motor function, Normal sensory function, No focal deficits  Skin: Warm, Dry, No erythema, No rash, No cyanosis, No mottling  Lymphatic: No lymphadenopathy in the following location(s): cervical  Psychiatric: Alert and oriented x3, Affect normal      EKG Interpretation    Interpreted by emergency department physician    Rhythm: normal sinus   Rate: normal  Axis: normal  Ectopy: none  Conduction: normal  ST Segments: normal  T Waves: normal  Q Waves: none    Clinical Impression: Normal EKG            RADIOLOGY/PROCEDURES/LABS/MEDICATIONS ADMINISTERED:    I have reviewed and interpreted all of the currently available lab results from this visit (if applicable):  Results for orders placed or performed during the hospital encounter of 09/23/21   COVID-19, Rapid    Specimen: Nasopharyngeal   Result Value Ref Range    Source THROAT     SARS-CoV-2, NAAT NOT DETECTED NOT DETECTED Respiratory Panel, Molecular, with COVID-19 (Restricted: peds pts or suitable admitted adults)    Specimen: Nasopharyngeal   Result Value Ref Range    Adenovirus Detection by PCR NOT DETECTED NOT DETECTED    Coronavirus 229E PCR NOT DETECTED NOT DETECTED    Coronavirus HKU1 PCR NOT DETECTED NOT DETECTED    Coronavirus NL63 PCR NOT DETECTED NOT DETECTED    Coronavirus OC43 PCR NOT DETECTED NOT DETECTED    SARS-CoV-2 NOT DETECTED NOT DETECTED    Human Metapneumovirus PCR NOT DETECTED NOT DETECTED    Rhinovirus Enterovirus PCR NOT DETECTED NOT DETECTED    Influenza A by PCR NOT DETECTED NOT DETECTED    Influenza A H1 Pandemic PCR NOT DETECTED NOT DETECTED    Influenza A H1 (2009) PCR NOT DETECTED NOT DETECTED    Influenza A H3 PCR NOT DETECTED NOT DETECTED    Influenza B by PCR NOT DETECTED NOT DETECTED    Parainfluenza 1 PCR NOT DETECTED NOT DETECTED    Parainfluenza 2 PCR NOT DETECTED NOT DETECTED    Parainfluenza 3 PCR NOT DETECTED NOT DETECTED    Parainfluenza 4 PCR NOT DETECTED NOT DETECTED    RSV PCR NOT DETECTED NOT DETECTED    Bordetella parapertussis by PCR NOT DETECTED NOT DETECTED    B Pertussis by PCR NOT DETECTED NOT DETECTED    Chlamydophila Pneumonia PCR NOT DETECTED NOT DETECTED    Mycoplasma pneumo by PCR NOT DETECTED NOT DETECTED   CBC Auto Differential   Result Value Ref Range    WBC 6.4 4.0 - 10.5 K/CU MM    RBC 3.99 (L) 4.2 - 5.4 M/CU MM    Hemoglobin 9.9 (L) 12.5 - 16.0 GM/DL    Hematocrit 35.3 (L) 37 - 47 %    MCV 88.5 78 - 100 FL    MCH 24.8 (L) 27 - 31 PG    MCHC 28.0 (L) 32.0 - 36.0 %    RDW 16.9 (H) 11.7 - 14.9 %    Platelets 143 889 - 370 K/CU MM    MPV 8.5 7.5 - 11.1 FL    Differential Type AUTOMATED DIFFERENTIAL     Segs Relative 64.0 36 - 66 %    Lymphocytes % 25.0 24 - 44 %    Monocytes % 8.0 (H) 0 - 4 %    Eosinophils % 0.9 0 - 3 %    Basophils % 0.5 0 - 1 %    Segs Absolute 4.1 K/CU MM    Lymphocytes Absolute 1.6 K/CU MM    Monocytes Absolute 0.5 K/CU MM    Eosinophils Absolute 0.1 K/CU MM    Basophils Absolute 0.0 K/CU MM    Nucleated RBC % 0.0 %    Total Nucleated RBC 0.0 K/CU MM    Total Immature Neutrophil 0.10 K/CU MM    Immature Neutrophil % 1.6 (H) 0 - 0.43 %   Comprehensive Metabolic Panel w/ Reflex to MG   Result Value Ref Range    Sodium 142 135 - 145 MMOL/L    Potassium 4.1 3.5 - 5.1 MMOL/L    Chloride 105 99 - 110 mMol/L    CO2 23 21 - 32 MMOL/L    BUN 17 6 - 23 MG/DL    CREATININE 0.4 (L) 0.6 - 1.1 MG/DL    Glucose 123 (H) 70 - 99 MG/DL    Calcium 8.5 8.3 - 10.6 MG/DL    Albumin 3.9 3.4 - 5.0 GM/DL    Total Protein 6.6 6.4 - 8.2 GM/DL    Total Bilirubin 0.1 0.0 - 1.0 MG/DL    ALT 12 10 - 40 U/L    AST 15 15 - 37 IU/L    Alkaline Phosphatase 104 40 - 129 IU/L    GFR Non-African American >60 >60 mL/min/1.73m2    GFR African American >60 >60 mL/min/1.73m2    Anion Gap 14 4 - 16   Troponin   Result Value Ref Range    Troponin T <0.010 <0.01 NG/ML   Brain Natriuretic Peptide   Result Value Ref Range    Pro-.2 <300 PG/ML   Blood Gas, Venous   Result Value Ref Range    pH, Lan 7.33 7.32 - 7.42    pCO2, Lan 48 38 - 52 mmHG    pO2, Lan 107 (H) 28 - 48 mmHG    Base Excess 1 0 - 2.4    HCO3, Venous 25.3 (H) 19 - 25 MMOL/L    O2 Sat, Lan 93.6 (H) 50 - 70 %    Comment VBG    Urinalysis, reflex to microscopic   Result Value Ref Range    Color, UA YELLOW YELLOW    Clarity, UA CLEAR CLEAR    Glucose, Urine NEGATIVE NEGATIVE MG/DL    Bilirubin Urine NEGATIVE NEGATIVE MG/DL    Ketones, Urine MODERATE (A) NEGATIVE MG/DL    Specific Gravity, UA 1.028 1.001 - 1.035    Blood, Urine NEGATIVE NEGATIVE    pH, Urine 5.0 5.0 - 8.0    Protein, UA NEGATIVE NEGATIVE MG/DL    Urobilinogen, Urine NEGATIVE 0.2 - 1.0 MG/DL    Nitrite Urine, Quantitative POSITIVE (A) NEGATIVE    Leukocyte Esterase, Urine TRACE (A) NEGATIVE    RBC, UA 1 0 - 6 /HPF    WBC, UA 2 0 - 5 /HPF    Bacteria, UA OCCASIONAL (A) NEGATIVE /HPF    WBC Clumps, UA RARE /HPF    Mucus, UA RARE (A) NEGATIVE HPF    Trichomonas, UA NONE SEEN NONE SEEN /HPF   Lipase   Result Value Ref Range    Lipase 27 13 - 60 IU/L   EKG 12 Lead   Result Value Ref Range    Ventricular Rate 82 BPM    Atrial Rate 82 BPM    P-R Interval 156 ms    QRS Duration 82 ms    Q-T Interval 388 ms    QTc Calculation (Bazett) 453 ms    P Axis 50 degrees    R Axis 41 degrees    T Axis 48 degrees    Diagnosis       Normal sinus rhythm  Normal ECG  When compared with ECG of 11-MAY-2020 21:58,  Nonspecific T wave abnormality, improved in Inferior leads  Nonspecific T wave abnormality no longer evident in Lateral leads            ABNORMAL LABS:  Labs Reviewed   CBC WITH AUTO DIFFERENTIAL - Abnormal; Notable for the following components:       Result Value    RBC 3.99 (*)     Hemoglobin 9.9 (*)     Hematocrit 35.3 (*)     MCH 24.8 (*)     MCHC 28.0 (*)     RDW 16.9 (*)     Monocytes % 8.0 (*)     Immature Neutrophil % 1.6 (*)     All other components within normal limits   COMPREHENSIVE METABOLIC PANEL W/ REFLEX TO MG FOR LOW K - Abnormal; Notable for the following components:    CREATININE 0.4 (*)     Glucose 123 (*)     All other components within normal limits   BLOOD GAS, VENOUS - Abnormal; Notable for the following components:    pO2, Lan 107 (*)     HCO3, Venous 25.3 (*)     O2 Sat, Lan 93.6 (*)     All other components within normal limits   URINALYSIS - Abnormal; Notable for the following components:    Ketones, Urine MODERATE (*)     Nitrite Urine, Quantitative POSITIVE (*)     Leukocyte Esterase, Urine TRACE (*)     Bacteria, UA OCCASIONAL (*)     Mucus, UA RARE (*)     All other components within normal limits   COVID-19, RAPID   RESPIRATORY PANEL, MOLECULAR, WITH COVID-19   CULTURE, URINE   TROPONIN   BRAIN NATRIURETIC PEPTIDE   LIPASE         IMAGING STUDIES ORDERED:  CT HEAD WO CONTRAST  CTA PULMONARY W CONTRAST  CT ABDOMEN PELVIS W IV CONTRAST  STRAIGHT CATH  IP CONSULT TO HOSPITALIST  PATIENT STATUS (FROM ED OR OR/PROCEDURAL)  VITAL SIGNS  TELEMETRY MONITORING  NOTIFY PHYSICIAN (SPECIFY)  ACTIVITY AS TOLERATED  DAILY WEIGHTS  IP CONSULT TO NEPHROLOGY  FULL CODE  ADULT DIET  REASON FOR NO MECHANICAL VTE PROPHYLAXIS    I have personally viewed the imaging studies. The radiologist interpretation is:   CT ABDOMEN PELVIS W IV CONTRAST Additional Contrast? None   Final Result   No pulmonary emboli are identified. Patchy ground-glass and nodular appearing airspace disease is identified in   the lungs, which could be related to bronchiolitis/bronchopneumonia. No   spiculated lung mass or lymphadenopathy in the chest.      Diffuse atherosclerotic disease including coronary artery involvement. There is an 11 x 11 mm lesion in the medial aspect of the upper pole the   right kidney, which demonstrates mild enhancement and has been seen on   previous CT and MR imaging studies. This has increased slightly in size,   previously measuring 8 mm in size. As discussed, this could represent a   small complex cyst, though an indolent renal cell carcinoma is in the   differential.      Diverticulosis without acute diverticulitis. No acute inflammatory bowel   process is identified. CTA PULMONARY W CONTRAST   Final Result   No pulmonary emboli are identified. Patchy ground-glass and nodular appearing airspace disease is identified in   the lungs, which could be related to bronchiolitis/bronchopneumonia. No   spiculated lung mass or lymphadenopathy in the chest.      Diffuse atherosclerotic disease including coronary artery involvement. There is an 11 x 11 mm lesion in the medial aspect of the upper pole the   right kidney, which demonstrates mild enhancement and has been seen on   previous CT and MR imaging studies. This has increased slightly in size,   previously measuring 8 mm in size. As discussed, this could represent a   small complex cyst, though an indolent renal cell carcinoma is in the   differential.      Diverticulosis without acute diverticulitis.   No acute inflammatory bowel   process is identified. CT Head WO Contrast   Final Result   No acute intracranial abnormality. MEDICATIONS ADMINISTERED:  Medications   morphine (PF) injection 2 mg (2 mg IntraVENous Given 9/23/21 1441)   ondansetron (ZOFRAN) injection 4 mg (4 mg IntraVENous Given 9/23/21 1441)   iopamidol (ISOVUE-370) 76 % injection 80 mL (80 mLs IntraVENous Given 9/23/21 1301)   albuterol (PROVENTIL) nebulizer solution 2.5 mg (2.5 mg Nebulization Given 9/23/21 1932)   methylPREDNISolone sodium (SOLU-MEDROL) injection 60 mg (60 mg IntraVENous Given 9/23/21 1855)         COURSE & MEDICAL DECISION MAKING  Last vitals: BP (!) 147/87   Pulse 81   Temp 98.1 °F (36.7 °C) (Oral)   Resp 18   Ht 4' 10\" (1.473 m)   Wt 140 lb (63.5 kg)   SpO2 96%   BMI 29.26 kg/m²     62-year-old female who has difficulty providing history who presents with hypoxia of unclear etiology. Noted to have some groundglass opacities on imaging study concerning for possible viral infection versus pneumonitis with bronchiolitis. Is negative for COVID-19. Viral respiratory panel was obtained and is unremarkable. She is afebrile with a leukocytosis and does have a suspicion for bacterial pneumonia at present time. She is satting well on 2 L of O2 via nasal cannula. She did have some tenderness of the abdomen on palpation however CT of the abdomen pelvis was obtained and was otherwise rather unremarkable other than an incidental finding of a right renal lesion. Not totally clear what this is at this time. Additional workup and treatment in the ED as documented above. Pt will be admitted for further evaluation and treatment. I discussed the case with Dr. Jeremie Cruz, who agreed to admit the patient. Plan discussed with pt and/or family who expressed understanding and agreement with plan. Admitted in stable condition. Clinical Impression:  1. Hypoxia    2. Abnormal chest CT    3.  Renal lesion Disposition referral (if applicable):  No follow-up provider specified. Disposition medications (if applicable):  New Prescriptions    No medications on file       ED Provider Disposition Time  DISPOSITION            Electronically signed by: Wilberto Elam M.D., 9/23/2021 7:59 PM      This dictation was created with voice recognition software. While attempts have been made to review the dictation as it is transcribed, on occasion the spoken word can be misinterpreted by the technology leading to omissions or inappropriate words, phrases or sentences.         Baldomero Reis MD  09/23/21 2000

## 2021-09-23 NOTE — ED NOTES
Pt moved into room 17. Incontinent of stool. Pt had very large amount of soft brown stool. No sign of skin issues at this time. Pt cleansed using wipes and barrier cream applied. Pt tolerated very well. Straight cath completed and urine sample obtained, sent to lab. Pt tolerated this fairly well. Clean depends placed onto pt. Pt assisted to position of comfort and placed onto bp/pulse ox monitor. Report to [employer].       [address], RN  09/23/21 4359

## 2021-09-24 LAB
ANION GAP SERPL CALCULATED.3IONS-SCNC: 10 MMOL/L (ref 4–16)
BASOPHILS ABSOLUTE: 0 K/CU MM
BASOPHILS RELATIVE PERCENT: 0.6 % (ref 0–1)
BUN BLDV-MCNC: 16 MG/DL (ref 6–23)
CALCIUM SERPL-MCNC: 8.6 MG/DL (ref 8.3–10.6)
CHLORIDE BLD-SCNC: 106 MMOL/L (ref 99–110)
CO2: 28 MMOL/L (ref 21–32)
CREAT SERPL-MCNC: 0.5 MG/DL (ref 0.6–1.1)
DIFFERENTIAL TYPE: ABNORMAL
EKG ATRIAL RATE: 82 BPM
EKG DIAGNOSIS: NORMAL
EKG P AXIS: 50 DEGREES
EKG P-R INTERVAL: 156 MS
EKG Q-T INTERVAL: 388 MS
EKG QRS DURATION: 82 MS
EKG QTC CALCULATION (BAZETT): 453 MS
EKG R AXIS: 41 DEGREES
EKG T AXIS: 48 DEGREES
EKG VENTRICULAR RATE: 82 BPM
EOSINOPHILS ABSOLUTE: 0.1 K/CU MM
EOSINOPHILS RELATIVE PERCENT: 1.2 % (ref 0–3)
ESTIMATED AVERAGE GLUCOSE: 114 MG/DL
GFR AFRICAN AMERICAN: >60 ML/MIN/1.73M2
GFR NON-AFRICAN AMERICAN: >60 ML/MIN/1.73M2
GLUCOSE BLD-MCNC: 108 MG/DL (ref 70–99)
GLUCOSE BLD-MCNC: 124 MG/DL (ref 70–99)
GLUCOSE BLD-MCNC: 142 MG/DL (ref 70–99)
GLUCOSE BLD-MCNC: 246 MG/DL (ref 70–99)
HBA1C MFR BLD: 5.6 % (ref 4.2–6.3)
HCT VFR BLD CALC: 31.4 % (ref 37–47)
HEMOGLOBIN: 9 GM/DL (ref 12.5–16)
IMMATURE NEUTROPHIL %: 1.2 % (ref 0–0.43)
LYMPHOCYTES ABSOLUTE: 1.9 K/CU MM
LYMPHOCYTES RELATIVE PERCENT: 37.3 % (ref 24–44)
MCH RBC QN AUTO: 25.3 PG (ref 27–31)
MCHC RBC AUTO-ENTMCNC: 28.7 % (ref 32–36)
MCV RBC AUTO: 88.2 FL (ref 78–100)
MONOCYTES ABSOLUTE: 0.4 K/CU MM
MONOCYTES RELATIVE PERCENT: 6.9 % (ref 0–4)
NUCLEATED RBC %: 0 %
PDW BLD-RTO: 16.9 % (ref 11.7–14.9)
PLATELET # BLD: 281 K/CU MM (ref 140–440)
PMV BLD AUTO: 8.5 FL (ref 7.5–11.1)
POTASSIUM SERPL-SCNC: 4.2 MMOL/L (ref 3.5–5.1)
PROCALCITONIN: 0.04
RBC # BLD: 3.56 M/CU MM (ref 4.2–5.4)
SEGMENTED NEUTROPHILS ABSOLUTE COUNT: 2.7 K/CU MM
SEGMENTED NEUTROPHILS RELATIVE PERCENT: 52.8 % (ref 36–66)
SODIUM BLD-SCNC: 144 MMOL/L (ref 135–145)
TOTAL IMMATURE NEUTOROPHIL: 0.06 K/CU MM
TOTAL NUCLEATED RBC: 0 K/CU MM
TSH HIGH SENSITIVITY: 0.68 UIU/ML (ref 0.27–4.2)
WBC # BLD: 5.1 K/CU MM (ref 4–10.5)

## 2021-09-24 PROCEDURE — 92610 EVALUATE SWALLOWING FUNCTION: CPT

## 2021-09-24 PROCEDURE — 2580000003 HC RX 258: Performed by: INTERNAL MEDICINE

## 2021-09-24 PROCEDURE — 84145 PROCALCITONIN (PCT): CPT

## 2021-09-24 PROCEDURE — 82962 GLUCOSE BLOOD TEST: CPT

## 2021-09-24 PROCEDURE — 94761 N-INVAS EAR/PLS OXIMETRY MLT: CPT

## 2021-09-24 PROCEDURE — 6370000000 HC RX 637 (ALT 250 FOR IP): Performed by: INTERNAL MEDICINE

## 2021-09-24 PROCEDURE — 80048 BASIC METABOLIC PNL TOTAL CA: CPT

## 2021-09-24 PROCEDURE — 93010 ELECTROCARDIOGRAM REPORT: CPT | Performed by: INTERNAL MEDICINE

## 2021-09-24 PROCEDURE — 83036 HEMOGLOBIN GLYCOSYLATED A1C: CPT

## 2021-09-24 PROCEDURE — 6360000002 HC RX W HCPCS: Performed by: INTERNAL MEDICINE

## 2021-09-24 PROCEDURE — 2700000000 HC OXYGEN THERAPY PER DAY

## 2021-09-24 PROCEDURE — 85025 COMPLETE CBC W/AUTO DIFF WBC: CPT

## 2021-09-24 PROCEDURE — 36415 COLL VENOUS BLD VENIPUNCTURE: CPT

## 2021-09-24 PROCEDURE — 1200000000 HC SEMI PRIVATE

## 2021-09-24 PROCEDURE — 84443 ASSAY THYROID STIM HORMONE: CPT

## 2021-09-24 PROCEDURE — 94640 AIRWAY INHALATION TREATMENT: CPT

## 2021-09-24 RX ORDER — NICOTINE POLACRILEX 4 MG
15 LOZENGE BUCCAL PRN
Status: DISCONTINUED | OUTPATIENT
Start: 2021-09-24 | End: 2021-09-25 | Stop reason: HOSPADM

## 2021-09-24 RX ORDER — METHYLPREDNISOLONE SODIUM SUCCINATE 40 MG/ML
40 INJECTION, POWDER, LYOPHILIZED, FOR SOLUTION INTRAMUSCULAR; INTRAVENOUS EVERY 12 HOURS
Status: DISCONTINUED | OUTPATIENT
Start: 2021-09-24 | End: 2021-09-25 | Stop reason: HOSPADM

## 2021-09-24 RX ORDER — ALBUTEROL SULFATE 2.5 MG/3ML
2.5 SOLUTION RESPIRATORY (INHALATION) EVERY 4 HOURS PRN
Status: DISCONTINUED | OUTPATIENT
Start: 2021-09-24 | End: 2021-09-25 | Stop reason: HOSPADM

## 2021-09-24 RX ORDER — DEXTROSE MONOHYDRATE 25 G/50ML
12.5 INJECTION, SOLUTION INTRAVENOUS PRN
Status: DISCONTINUED | OUTPATIENT
Start: 2021-09-24 | End: 2021-09-25 | Stop reason: HOSPADM

## 2021-09-24 RX ORDER — ALBUTEROL SULFATE 90 UG/1
2 AEROSOL, METERED RESPIRATORY (INHALATION) 4 TIMES DAILY
Status: DISCONTINUED | OUTPATIENT
Start: 2021-09-24 | End: 2021-09-25 | Stop reason: HOSPADM

## 2021-09-24 RX ORDER — AZITHROMYCIN 250 MG/1
500 TABLET, FILM COATED ORAL NIGHTLY
Status: DISCONTINUED | OUTPATIENT
Start: 2021-09-24 | End: 2021-09-25 | Stop reason: HOSPADM

## 2021-09-24 RX ORDER — ALBUTEROL SULFATE 2.5 MG/3ML
2.5 SOLUTION RESPIRATORY (INHALATION)
Status: DISCONTINUED | OUTPATIENT
Start: 2021-09-24 | End: 2021-09-24

## 2021-09-24 RX ORDER — DEXTROSE MONOHYDRATE 50 MG/ML
100 INJECTION, SOLUTION INTRAVENOUS PRN
Status: DISCONTINUED | OUTPATIENT
Start: 2021-09-24 | End: 2021-09-25 | Stop reason: HOSPADM

## 2021-09-24 RX ADMIN — CARBAMAZEPINE 300 MG: 300 CAPSULE, EXTENDED RELEASE ORAL at 21:15

## 2021-09-24 RX ADMIN — LISINOPRIL 40 MG: 40 TABLET ORAL at 09:44

## 2021-09-24 RX ADMIN — RISPERIDONE 1 MG: 0.5 TABLET ORAL at 21:16

## 2021-09-24 RX ADMIN — DIVALPROEX SODIUM 2000 MG: 500 TABLET, DELAYED RELEASE ORAL at 21:15

## 2021-09-24 RX ADMIN — LEVETIRACETAM 750 MG: 250 TABLET ORAL at 09:44

## 2021-09-24 RX ADMIN — ALOGLIPTIN 25 MG: 25 TABLET, FILM COATED ORAL at 09:44

## 2021-09-24 RX ADMIN — RISPERIDONE 1 MG: 0.5 TABLET ORAL at 14:07

## 2021-09-24 RX ADMIN — BUSPIRONE HYDROCHLORIDE 10 MG: 10 TABLET ORAL at 21:16

## 2021-09-24 RX ADMIN — DIPHENHYDRAMINE HYDROCHLORIDE 25 MG: 25 CAPSULE ORAL at 09:44

## 2021-09-24 RX ADMIN — LEVOTHYROXINE SODIUM 50 MCG: 0.05 TABLET ORAL at 06:23

## 2021-09-24 RX ADMIN — BUSPIRONE HYDROCHLORIDE 10 MG: 10 TABLET ORAL at 09:44

## 2021-09-24 RX ADMIN — RISPERIDONE 1 MG: 0.5 TABLET ORAL at 09:44

## 2021-09-24 RX ADMIN — SODIUM CHLORIDE, PRESERVATIVE FREE 10 ML: 5 INJECTION INTRAVENOUS at 21:16

## 2021-09-24 RX ADMIN — SODIUM CHLORIDE, PRESERVATIVE FREE 10 ML: 5 INJECTION INTRAVENOUS at 09:45

## 2021-09-24 RX ADMIN — LEVETIRACETAM 750 MG: 250 TABLET ORAL at 21:16

## 2021-09-24 RX ADMIN — CARBAMAZEPINE 300 MG: 300 CAPSULE, EXTENDED RELEASE ORAL at 09:44

## 2021-09-24 RX ADMIN — Medication 1 CAPSULE: at 09:48

## 2021-09-24 RX ADMIN — SERTRALINE HYDROCHLORIDE 200 MG: 100 TABLET ORAL at 21:16

## 2021-09-24 RX ADMIN — AZITHROMYCIN 500 MG: 250 TABLET, FILM COATED ORAL at 03:10

## 2021-09-24 RX ADMIN — AZITHROMYCIN 500 MG: 250 TABLET, FILM COATED ORAL at 22:50

## 2021-09-24 RX ADMIN — CETIRIZINE HYDROCHLORIDE 10 MG: 10 TABLET, FILM COATED ORAL at 09:44

## 2021-09-24 RX ADMIN — CEFTRIAXONE 1000 MG: 1 INJECTION, POWDER, FOR SOLUTION INTRAMUSCULAR; INTRAVENOUS at 03:11

## 2021-09-24 RX ADMIN — FLUTICASONE PROPIONATE 1 SPRAY: 50 SPRAY, METERED NASAL at 09:44

## 2021-09-24 RX ADMIN — METHYLPREDNISOLONE SODIUM SUCCINATE 40 MG: 40 INJECTION, POWDER, FOR SOLUTION INTRAMUSCULAR; INTRAVENOUS at 14:06

## 2021-09-24 RX ADMIN — MIRTAZAPINE 30 MG: 15 TABLET, FILM COATED ORAL at 21:16

## 2021-09-24 RX ADMIN — AMLODIPINE BESYLATE 10 MG: 10 TABLET ORAL at 09:44

## 2021-09-24 RX ADMIN — PANTOPRAZOLE SODIUM 40 MG: 40 TABLET, DELAYED RELEASE ORAL at 06:22

## 2021-09-24 RX ADMIN — ENOXAPARIN SODIUM 40 MG: 40 INJECTION SUBCUTANEOUS at 09:46

## 2021-09-24 RX ADMIN — CEFTRIAXONE 1000 MG: 1 INJECTION, POWDER, FOR SOLUTION INTRAMUSCULAR; INTRAVENOUS at 22:48

## 2021-09-24 RX ADMIN — ALBUTEROL SULFATE 2 PUFF: 90 AEROSOL, METERED RESPIRATORY (INHALATION) at 21:07

## 2021-09-24 RX ADMIN — ATORVASTATIN CALCIUM 40 MG: 40 TABLET, FILM COATED ORAL at 09:44

## 2021-09-24 ASSESSMENT — PAIN SCALES - GENERAL
PAINLEVEL_OUTOF10: 0
PAINLEVEL_OUTOF10: 0

## 2021-09-24 NOTE — H&P
HISTORY AND PHYSICAL  (Hospitalist, Internal Medicine)  IDENTIFYING INFORMATION   PATIENT:  Ramonita Gonzalez  MRN:  [id number]  ADMIT DATE: 9/23/2021  TIME OF EVALUATION: 9/24/2021 1:47 AM    CHIEF COMPLAINT     SOB  HISTORY OF PRESENT ILLNESS   Ramonita Gonzalez is a 64 y.o. female cerebral palsy, admitted for shortness of breath. However during my examination patient did not have any complaints, she had reported shortness of breath at her facility. Patient was noted to be hypoxic in the high 80s, here at the hospital only complaint she had was a mild headache. Patient is oriented to herself, somewhat to hospital, however has poor insight into her health. Patient is a abdominal pain, chest pain, or pain elsewhere. PMH listed below:    PAST MEDICAL, SURGICAL, FAMILY, and SOCIAL HISTORY     Past Medical History:   Diagnosis Date    Anxiety disorder     Back pain, chronic     Cerebral palsy (HCC)     [facility]     CVA (cerebral infarction) 2014 x2    Diabetes mellitus ([facility]     Diverticulosis     Epilepsy ([facility]     GERD (gastroesophageal reflux disease)     Hyperlipidemia     Hypertension     Insomnia     Iron (Fe) deficiency anemia     Mental disability     Seizures (HCC)     Unspecified cerebral artery occlusion with cerebral infarction      Past Surgical History:   Procedure Laterality Date    GASTROSTOMY TUBE PLACEMENT       History reviewed. No pertinent family history.   Family Hx of HTN  Family Hx as reviewed above, otherwise non-contributory  Social History     Socioeconomic History    Marital status: Single     Spouse name: None    Number of children: None    Years of education: None    Highest education level: None   Occupational History    None   Tobacco Use    Smoking status: Former Smoker     Packs/day: 1.50     Years: 20.00     Pack years: 30.00     Quit date: 8/28/2011     Years since quitting: 10.0    Smokeless tobacco: Never Used   Vaping Use    Vaping Use: Never used   Substance and Sexual Activity    Alcohol use: No     Alcohol/week: 0.0 standard drinks    Drug use: No    Sexual activity: None   Other Topics Concern    None   Social History Narrative    ** Merged History Encounter **          Social Determinants of Health     Financial Resource Strain:     Difficulty of Paying Living Expenses:    Food Insecurity:     Worried About Running Out of Food in the Last Year:     Ran Out of Food in the Last Year:    Transportation Needs:     Lack of Transportation (Medical):      Lack of Transportation (Non-Medical):    Physical Activity:     Days of Exercise per Week:     Minutes of Exercise per Session:    Stress:     Feeling of Stress :    Social Connections:     Frequency of Communication with Friends and Family:     Frequency of Social Gatherings with Friends and Family:     Attends [employer] Services:     Active Member of Clubs or Organizations:     Attends Club or Organization Meetings:     Marital Status:    Intimate Partner Violence:     Fear of Current or Ex-Partner:     Emotionally Abused:     Physically Abused:     Sexually Abused:        MEDICATIONS   Medications Prior to Admission  Medications Prior to Admission: pantoprazole (PROTONIX) 40 MG tablet, TAKE 1 TABLET BY MOUTH EVERY MORNING (BEFORE BREAKFAST)  COMBIVENT RESPIMAT  MCG/ACT AERS inhaler, INHALE 1 PUFF BY ORAL INHALATION EVERY 6 HOURS  Lactobacillus (ACIDOPHILUS) CAPS capsule, TAKE 1 CAPSULE BY MOUTH DAILY  carBAMazepine (CARBATROL) 300 MG extended release capsule, TAKE 1 CAPSULE BY MOUTH TWICE A DAY  atorvastatin (LIPITOR) 40 MG tablet, TAKE 1 TABLET BY MOUTH DAILY  amLODIPine (NORVASC) 10 MG tablet, Take 1 tablet by mouth daily  JANUVIA 100 MG tablet, TAKE 1 TABLET BY MOUTH DAILY  lisinopril (PRINIVIL;ZESTRIL) 40 MG tablet, TAKE 1 TABLET BY MOUTH DAILY  fluticasone (FLONASE) 50 MCG/ACT nasal spray, INSTILL 1 SPRAY INTO EACH NOSTRIL DAILY  cetirizine (ZYRTEC) 10 MG tablet, TAKE 1 TABLET BY MOUTH EVERY DAY AT BEDTIME  mirtazapine (REMERON) 30 MG tablet, TAKE 1 TABLET BY MOUTH NIGHTLY  levETIRAcetam 750 MG TB3D, Take by mouth  blood glucose monitor strips, Test 3 times a day & as needed for symptoms of irregular blood glucose. levothyroxine (SYNTHROID) 50 MCG tablet, Take 50 mcg by mouth Daily  busPIRone (BUSPAR) 10 MG tablet, Take 1 tablet by mouth 2 times daily  divalproex (DEPAKOTE) 500 MG DR tablet, Take 4 tablets by mouth nightly  metFORMIN (GLUCOPHAGE) 500 MG tablet, Take 2 tablets by mouth daily (with breakfast)  risperiDONE (RISPERDAL) 1 MG tablet, Take 1 tablet by mouth 3 times daily 1 tablet in am, 1 tablet at 4pm, 1 tablet at bedtime  sertraline (ZOLOFT) 100 MG tablet, Take 2 tablets by mouth nightly  UNABLE TO FIND, Left chair  cetirizine (ZYRTEC) 10 MG tablet, Take 1 tablet by mouth daily  Diapers & Supplies MISC, 1 each by Does not apply route daily as needed (4-5 times daily)  nystatin (MYCOSTATIN) 647668 UNIT/GM cream, Apply topically 2 times daily. To the affected area for 2 wks  Incontinence Supply Disposable (DEPEND UNDERWEAR LARGE/XL) MISC, 1 Units by Does not apply route 4 times daily  Disposable Gloves MISC, 1 Units by Does not apply route 4 times daily  ipratropium-albuterol (DUONEB) 0.5-2.5 (3) MG/3ML SOLN nebulizer solution, Inhale 3 mLs into the lungs 4 times daily For 5 days before transition to prn  UNABLE TO FIND, Fitted wheel chair  nystatin (MYCOSTATIN) 350436 UNIT/GM powder, Apply topically 4 times daily. under the abdominal folds for 2 wks  Ondansetron HCl (ZOFRAN PO), Take 1 tablet by mouth every 8 hours Indications: per facility med list  docusate sodium (COLACE) 100 MG capsule, Take 100 mg by mouth 2 times daily  diphenhydrAMINE (BENADRYL) 25 MG capsule, Take 25 mg by mouth every 6 hours as needed for Itching  SUMAtriptan (IMITREX) 50 MG tablet, Take 50 mg by mouth once as needed for Migraine  benzonatate (TESSALON) 100 MG capsule, Take 100 mg by mouth 3 times daily as needed for Cough (Patient not taking: Reported on 6/1/2021)  aspirin 325 MG tablet, Take 325 mg by mouth every 4 hours as needed for Pain (Patient not taking: Reported on 6/1/2021)  lactulose (CHRONULAC) 10 GM/15ML solution, Take 20 g by mouth 3 times daily as needed     Current Medications  Current Facility-Administered Medications   Medication Dose Route Frequency Provider Last Rate Last Admin    carBAMazepine (CARBATROL) extended release capsule 300 mg  300 mg Oral BID Janeen Frame V, MD   300 mg at 09/23/21 2358    divalproex (DEPAKOTE) DR tablet 2,000 mg  2,000 mg Oral Nightly Janeen Frame V, MD   2,000 mg at 09/23/21 2302    levETIRAcetam (KEPPRA) tablet 750 mg  750 mg Oral BID Janeen Frame V, MD   750 mg at 09/23/21 2358    levothyroxine (SYNTHROID) tablet 50 mcg  50 mcg Oral Daily Ford Jenaro Higginbotham MD        risperiDONE (RISPERDAL) tablet 1 mg  1 mg Oral TID Ford Jenaro Higginbotham MD   1 mg at 09/23/21 2301    sertraline (ZOLOFT) tablet 200 mg  200 mg Oral Nightly Janeen Frame V, MD   200 mg at 09/23/21 2301    pantoprazole (PROTONIX) tablet 40 mg  40 mg Oral QAM AC Fordspencer Higginbotham MD        alogliptin (NESINA) tablet 25 mg  25 mg Oral Daily Ford Jenaro Higginbotham MD        busPIRone (BUSPAR) tablet 10 mg  10 mg Oral BID Janeen Frame ALEENA MD   10 mg at 09/23/21 2301    cetirizine (ZYRTEC) tablet 10 mg  10 mg Oral Daily Ford Jenaro Higginbotham MD        diphenhydrAMINE (BENADRYL) capsule 25 mg  25 mg Oral Q6H PRN Janeen Bola FLOOD MD        ipratropium-albuterol (DUONEB) nebulizer solution 3 mL  1 vial Inhalation Q4H PRN Ford Jenaro Higginbotham MD        lisinopril (PRINIVIL;ZESTRIL) tablet 40 mg  40 mg Oral Daily Ford Jenaro Higginbotham MD        mirtazapine (REMERON) tablet 30 mg  30 mg Oral Nightly Ford Jenaro Higginbotham MD   30 mg at 09/23/21 2301    lactobacillus (CULTURELLE) capsule 1 capsule  1 capsule Oral Daily Ford Higginbotham MD        fluticasone (FLONASE) 50 MCG/ACT nasal spray 1 spray 1 spray Nasal Daily Ford Iniguez MD        atorvastatin (LIPITOR) tablet 40 mg  40 mg Oral Daily Ford Iniguez MD        aspirin tablet 325 mg  325 mg Oral Q4H PRN Ford Iniguez MD        amLODIPine (NORVASC) tablet 10 mg  10 mg Oral Daily Ford Iniguez MD        sodium chloride flush 0.9 % injection 10 mL  10 mL IntraVENous 2 times per day Ganga Santos MD   10 mL at 09/23/21 2315    sodium chloride flush 0.9 % injection 10 mL  10 mL IntraVENous PRN Ford Iniguez MD        0.9 % sodium chloride infusion  25 mL IntraVENous PRN Ford Iniguez MD        ondansetron (ZOFRAN-ODT) disintegrating tablet 4 mg  4 mg Oral Q8H PRN Ford Iniguez MD        Or    ondansetron (ZOFRAN) injection 4 mg  4 mg IntraVENous Q6H PRN Ford Iniguez MD        bisacodyl (DULCOLAX) EC tablet 5 mg  5 mg Oral Daily PRN Ford Iniguez MD        acetaminophen (TYLENOL) tablet 650 mg  650 mg Oral Q6H PRN Ford Iniguez MD        Or    acetaminophen (TYLENOL) suppository 650 mg  650 mg Rectal Q6H PRN Ford Iniguez MD        enoxaparin (LOVENOX) injection 40 mg  40 mg SubCUTAneous Daily Ford Iniguez MD             Allergies  Allergies   Allergen Reactions    Macrobid [Nitrofurantoin] Hives and Swelling     Hives       REVIEW OF SYSTEMS   Within above limitations. 14 point review of systems reviewed. Pertinent positive or negative as per HPI or otherwise negative per 14 point systems review. PHYSICAL EXAM     Wt Readings from Last 3 Encounters:   09/23/21 131 lb 14.4 oz (59.8 kg)   06/01/21 140 lb (63.5 kg)   04/20/21 140 lb (63.5 kg)       Blood pressure 124/87, pulse 78, temperature 97.5 °F (36.4 °C), temperature source Axillary, resp. rate 18, height 4' 10\" (1.473 m), weight 131 lb 14.4 oz (59.8 kg), SpO2 94 %, not currently breastfeeding. General - AAO x 1 and hospital  Psych - Appropriate affect/speech. No agitation, cooperative  Eyes - Eye lids intact. No scleral icterus  ENT - Lips wnl.  External ear clear/dry/intact. No thyromegaly on inspection  Neuro - No gross peripheral or central neuro deficits on inspection  Heart - Sinus. RRR. S1 and S2 present. No added HS/murmurs appreciated. No elevated JVD appreciated  Lung - Adequate air entry b/l, No crackles/wheezes appreciated  GI - Soft. No guarding/rigidity. No hepatosplenomegaly/ascites. BS+   - No CVA/suprapubic tenderness or palpable bladder distension  Skin - Intact. No rash/petechiae/ecchymosis. Warm extremities  MSK - Joints with normal ROM.  No joint swellings    Lines/Drains/Airways/Wounds:  [unfilled]    LABS AND IMAGING   CBC  [unfilled]    Last 3 Hemoglobin  Lab Results   Component Value Date    HGB 9.9 09/23/2021    HGB 12.5 05/11/2020    HGB 11.6 04/28/2020     Last 3 WBC/ANC  Lab Results   Component Value Date    WBC 6.4 09/23/2021    WBC 7.7 05/11/2020    WBC 5.6 04/28/2020     No components found for: GRNLOCTYABS  Last 3 Platelets  No results found for: PLATELET  Chemistry  [unfilled]  [unfilled]  No results found for: LDH  Coagulation Studies  Lab Results   Component Value Date    INR 0.82 12/04/2017     Liver Function Studies  Lab Results   Component Value Date    ALT 12 09/23/2021    AST 15 09/23/2021    ALKPHOS 104 09/23/2021       Recent Imaging        Relevant labs and imaging reviewed    ASSESSMENT AND PLAN   Jen Carranza is a 64 y.o. female p/w      Reported SOB with hypoxia, requiring oxygen, of note patient is limited historian, has no acute complaints at time of examination  CT findings with patchy ground-glass and nodular appearing airspace disease is identified in  the lungs, which could be related to bronchiolitis/bronchopneumonia  -Continuous pulse ox, desaturating to 88% on room air at rest  -Procalcitonin  -Started on community-acquired pneumonia treatment   - ceftriaxone and azithromycin   - sputum cuture, , legionella antigen, strep pneumonia antigen  - PRN O2 via NC  - pulmonary consult       Other chronic conditions, other than cerebral palsy  1. HTN (hypertension), benign  -Stable    2. Acquired hypothyroidism  -Follow-up TSH    3. Mixed hyperlipidemia  -Continue with home meds    4. H/O: stroke  -Stable, no reported new focal motor deficits    5. Lesion of right native kidney  -The MRI reviewed with the patient did show the same lesion, repeat CT shows enlargement, needs to be monitored    6. Chronic obstructive pulmonary disease, unspecified COPD type  -Follow-up with pulmonologist    7. DM under control, A1c last was 5.5  ISS, TIDAC finger stick  Hypoglycemic protocol  F/u A1c    Case d/w ED provider    DVT ppx: Lovenox  Code status: Full code?   Bedside sitter did not have this information, will need to call group home in a.m. to confirm      [facility], Internal Medicine  9/24/2021 at 1:47 AM

## 2021-09-24 NOTE — CARE COORDINATION
Reviewed chart and spoke with Tu Juares at [facility]. Pt lives in a home that [facility] provides caregivers. [facility] M-[facility] 930 til 330 then [facility] care givers are with pt the remaining time 24/7. Pt has been on home O2 in past , so would be able to return to home if needed again. Pt will also need a release at discharge to be able to return to Adult day [facility]. Pt's Guardian is [facility] and will need to be called prior to [facility]. Along with [facility]. 1230 Received a vm from Greta, called her back at [phone], updated her and she states would like discharge info faxed to her office at [phone].

## 2021-09-24 NOTE — ED NOTES
Report given to [facility] on 4N at this time.       Maryann Hargrove RN  09/23/21 1260

## 2021-09-24 NOTE — PLAN OF CARE
Problem: Skin Integrity:  Goal: Will show no infection signs and symptoms  Description: Will show no infection signs and symptoms  9/24/2021 1106 by Dennis Chan RN  Outcome: Ongoing  9/24/2021 0458 by Raenell Soulier, RN  Outcome: Ongoing  Goal: Absence of new skin breakdown  Description: Absence of new skin breakdown  9/24/2021 1106 by Dennis Chan RN  Outcome: Ongoing  9/24/2021 0458 by Raenell Soulier, RN  Outcome: Ongoing     Problem: Falls - Risk of:  Goal: Will remain free from falls  Description: Will remain free from falls  9/24/2021 1106 by Dennis Chan RN  Outcome: Ongoing  9/24/2021 0458 by Raenell Soulier, RN  Outcome: Ongoing  Goal: Absence of physical injury  Description: Absence of physical injury  9/24/2021 1106 by Dennis Chan RN  Outcome: Ongoing  9/24/2021 0458 by Raenell Soulier, RN  Outcome: Ongoing

## 2021-09-24 NOTE — PROGRESS NOTES
Speech Language Pathology  Facility/Department: [facility]   CLINICAL BEDSIDE SWALLOW EVALUATION    NAME: Hanny Taylor  : 1960  MRN: [id number]    ADMISSION DATE: 2021  ADMITTING DIAGNOSIS: has Pneumonia; HTN (hypertension), benign; Seizure disorder ([address] Altered mental status; Chest pain; MR (mental retardation); Chronic obstructive pulmonary disease ([address] Left hemiplegia ([address] H/O: stroke; Mixed hyperlipidemia; Acquired hypothyroidism; Sepsis ([address] Acute bronchitis; Acute respiratory failure with hypoxia ([address] Acute respiratory failure ([address] Anxiety and depression; Anemia; Controlled type 2 diabetes mellitus without complication, without long-term current use of insulin ([address] Diverticulosis; Lesion of right native kidney; Black stool; and Hypoxia on their problem list.       Impressions: Hanny Taylor was seen for a bedside swallowing evaluation after being admitted to [facility] with hypoxia. Pt was alert and cooperative throughout assessment. Relevant medical includes MRDD, cerebral palsy, GERD, epilepsy, COPD and anxiety. Nursing reports pt was coughing with solids during meals. Pt was positioned upright in bed and presented with a clear vocal quality and weak volitional cough. She refused to follow commands to complete oral mechanism examination at this time. PO trials of puree, soft solids, nectar thick liquids and thin liquids by cup/straw sips were given. Reduced mastication and moderate lingual residue was observed with trials of soft solids. Suspect mild-moderate pharyngeal dysphagia characterized by delayed swallow initiation and reduced laryngeal elevation. Pt demonstrated a delayed cough with trials of thin liquids by straw sips x1. Clear vocal quality and 0 overt s/s of aspiration were observed with trials of thin liquids by cup sips, nectar thick liquids, puree and soft solids. Recommend minced and moist solids/thin liquids by cup sips with strict aspiration precautions.   No straws. Pt requires assistance with meals. SLP will continue to follow. ONSET DATE: this admission   Date of Eval: 9/24/2021  Evaluating Therapist: ZAIN Damian    Current Diet level:  Current Diet : Regular  Current Liquid Diet : Thin      Primary Complaint       Pain:  Pain Assessment  Pain Level: 0  Pain Location: Head  Pain Descriptors: Aching    Reason for Referral  Halle Bah was referred for a bedside swallow evaluation to assess the efficiency of her swallow function, identify signs and symptoms of aspiration and make recommendations regarding safe dietary consistencies, effective compensatory strategies, and safe eating environment. Impression  Dysphagia Diagnosis: Mild to moderate oral stage dysphagia;Mild to moderate pharyngeal stage dysphagia  Dysphagia Outcome Severity Scale: Level 3: Moderate dysphagia- Total assisstance, supervision or strategies. Two or more diet consistencies restricted     Treatment Plan  Requires SLP Intervention: Yes  Duration/Frequency of Treatment: 2-3xs weekly/ LOS or until goals are met  D/C Recommendations: To be determined       Recommended Diet and Intervention  Diet Solids Recommendation: Dysphagia Minced and Moist (Dysphagia II)  Liquid Consistency Recommendation: Thin  Recommended Form of Meds: Meds in puree     Therapeutic Interventions: Diet tolerance monitoring; Therapeutic PO trials with SLP    Compensatory Swallowing Strategies  Compensatory Swallowing Strategies: Upright as possible for all oral intake;Eat/Feed slowly    Treatment/Goals  Short-term Goals  Timeframe for Short-term Goals: LOS or until goals are met  Goal 1: Pt will tolerate minced and moist diet level/ thin liquids by cup sips without clinical evidence of aspiration 100%  Goal 2: Pt/caregivers will demonstrate comprehension of recommendations/POC    General  Chart Reviewed: Yes  Behavior/Cognition: Alert; Cooperative;Pleasant mood  Respiratory Status: O2 via nasual cannula  O2 Device: Nasal cannula  Communication Observation: Functional  Follows Directions: Simple  Dentition: Some missing teeth  Patient Positioning: Upright in bed  Baseline Vocal Quality: Normal  Volitional Cough: Weak  Prior Dysphagia History: Soft solids  Consistencies Administered: Reg solid; Dysphagia Minced and Moist (Dysphagia II); Dysphagia Pureed (Dysphagia I); Thin - straw; Thin - cup;Nectar - straw;Nectar - cup           Vision/Hearing  Vision  Vision: Within Functional Limits  Hearing  Hearing: Within functional limits    Oral Motor Deficits  Oral/Motor  Oral Motor: Within functional limits    Oral Phase Dysfunction  Oral Phase  Oral Phase: Exceptions     Indicators of Pharyngeal Phase Dysfunction   Pharyngeal Phase  Pharyngeal Phase: Exceptions    Prognosis  Prognosis  Prognosis for safe diet advancement: fair  Barriers to reach goals: cognitive deficits;severity of dysphagia;behavior  Individuals consulted  Consulted and agree with results and recommendations: Patient;RN    Education  Patient Education: recommendations/POC  Patient Education Response: Verbalizes understanding  Safety Devices in place: Yes  Type of devices:  All fall risk precautions in place       Therapy Time  SLP Individual Minutes  Time In: 1330  Time Out: Cynthia 73  Minutes: [address] [facility], [facility] [address], [address] [facility], 9/24/2021

## 2021-09-24 NOTE — CONSULTS
[address], [address] [place] [address]                                  CONSULTATION    PATIENT NAME: Radha Tena                       :        1960  MED REC NO:   [id number]                          ROOM:       [id number]  ACCOUNT NO:   [de-identified]                           ADMIT DATE: 2021  PROVIDER:     Jossie Puri MD    CONSULT DATE:  2021    HISTORY OF PRESENT ILLNESS:  The patient is a 51-year-old lady with  multiple medical problems including cerebral palsy, COPD,  gastroesophageal reflux disease, seizure disorder, hypertension,  hyperlipidemia, and diabetes, who was admitted through the emergency  room after she was complaining of shortness of breath. At her facility,  the patient's saturation was in the 80s. She was complaining of some  mild headache. She had a CT scan of the chest, which showed some mild  ground-glass. There was no evidence of PE. She was subsequently  admitted to the hospital.  The patient has been on oxygen in the past,  but it was discontinued. The patient is not a good historian. She  denies any significant cough. She denies any fever or chills. She  denies any nausea or vomiting. She denies any chest pains. PAST MEDICAL HISTORY:  Significant for cerebral palsy, COPD,  gastroesophageal reflux disease, seizure disorder, hypertension, anxiety  disorder, back pain, and CVA. PAST SURGICAL HISTORY:  Remarkable for G-tube placement. FAMILY HISTORY:  Noncontributory. SOCIAL HISTORY:  Reveals that she quit smoking in , but used to  smoke one and a half packs per day for 20 years prior to that. No  history of alcohol or drug abuse. MEDICATIONS:  Reviewed. ALLERGIES:  She is allergic to [facility]. REVIEW OF SYSTEMS:  A 10-to 14-point review of systems were reviewed and  are negative except for what is mentioned in the history of present  illness.     PHYSICAL EXAMINATION:  GENERAL:  The patient is alert and responsive, in no acute respiratory  distress. VITAL SIGNS:  Blood pressure is 113/65 mmHg, pulse of 81 per minute, and  respiratory rate of 18 per minute. Her temperature is 99.9 degrees  Fahrenheit. Her saturation is 98% on 3 liters nasal cannula. HEENT:  Essentially unremarkable. There is no JVD, no lymphadenopathy. NECK:  The neck is supple. LUNG:  Reveals diminished breath sounds, very occasional rhonchi. HEART:  Showed normal S1, S2. There was no S3, S4 noted. ABDOMEN:  Benign. There is no evidence of any organomegaly. The bowel  sounds are present. NEUROLOGIC:  Reveals that she is awake and responsive. DIAGNOSTIC STUDIES:  Her CT scan of the chest as mentioned in the  history of present illness. Her respiratory disease panel PCR including  COVID-19 was negative. Her CBC showed a white count of 5.1, hemoglobin  9.0, hematocrit 31.4. Her electrolytes were unremarkable. Her TSH was  0.680. IMPRESSION:  1. Acute exacerbation of COPD. 2.  Possible underlying obstructive sleep apnea. 3.  History of tobacco abuse in the past.    PLAN:  1. Continue present antibiotic therapy. 2.  We will start Stiolto once a day. 3.  Solu-Medrol 40 q. 12 hours. 4.  We will check ApneaLink. 5.  Check mag and phos. 6.  Check procalcitonin level. 7.  Sputum for culture and sensitivity. 8.  As per orders.         Jeremías Soni MD    D: 09/24/2021 12:21:28       T: 09/24/2021 12:24:51     [email or web address]  Job#: [id number]     Doc#: [id number]    CC:

## 2021-09-24 NOTE — PROGRESS NOTES
Hospitalist Progress Note      Name:  Demetrius Elkins /Age/Sex: 1960  (64 y.o. female)   MRN & CSN:  [id number] & [id number] Admission Date/Time: 2021 10:21 AM   Location:  [facility] PCP: Jasvir Faulkner MD         Hospital Day: 2    Assessment and Plan:   Demetrius Elkins is a 64 y.o.  female  who presents with Hypoxia    Acute Hypoxic and Hypercapneic Respiratory Failure:   due to pneumonia /COPD exacerbation  Requiring O2 via nasal cannula  CT chest with patchy groundglass and nodular appearing airspace disease  Continue antibiotics  Continue bronchodilators  Monitor saturation  Wean O2 as tolerated    Hypertension:   Blood pressure controlled  Continue medications. Hypothyroidism:   last TSH. continue Synthroid. Hyperlipidemia: continue statin    History of stroke  Lesion of right native kidney    Diet ADULT DIET; Regular   DVT Prophylaxis [] Lovenox, []  Heparin, [] SCDs, [] Warfarin  [] NOAC     GI Prophylaxis [] PPI,  [] H2 Blocker,  [] Carafate,  [] Diet/Tube Feeds   Code Status Full Code   MDM [] Low, [] Moderate,[]  High     History of Present Illness:     Chief Complaint: Hypoxia    Seen and examined today. On oxygen via nasal cannula. No shortness of breath or cough. Ten point ROS reviewed negative, unless as noted above    Objective: Intake/Output Summary (Last 24 hours) at 2021 1331  Last data filed at 2021 1104  Gross per 24 hour   Intake 240 ml   Output --   Net 240 ml      Vitals:   Vitals:    21 0755   BP: 133/65   Pulse: 81   Resp: 18   Temp: 99.9 °F (37.7 °C)   SpO2: 98%     Physical Exam:   GEN Awake female, sitting upright in bed in no apparent distress. Appears given age. On oxygen via nasal cannula  EYES Pupils are equally round. No scleral erythema, discharge, or conjunctivitis. HENT Mucous membranes are moist. Oral pharynx without exudates, no evidence of thrush. NECK Supple, no apparent thyromegaly or masses.   RESP Clear to auscultation, no wheezes, rales or rhonchi. Symmetric chest movement while on room air. CARDIO/VASC S1/S2 auscultated. Regular rate without appreciable murmurs, rubs, or gallops. No JVD or carotid bruits. Peripheral pulses equal bilaterally and palpable. No peripheral edema. GI Abdomen is soft without significant tenderness, masses, or guarding. Bowel sounds are normoactive. Rectal exam deferred. MSK No gross joint deformities. SKIN Normal coloration, warm, dry.     Medications:   Medications:    insulin lispro  0-6 Units SubCUTAneous TID WC    insulin lispro  0-3 Units SubCUTAneous Nightly    [Held by provider] insulin lispro  0-6 Units SubCUTAneous Q4H    cefTRIAXone (ROCEPHIN) IV  1,000 mg IntraVENous Nightly    And    azithromycin  500 mg Oral Nightly    albuterol sulfate HFA  2 puff Inhalation 4x daily    tiotropium-olodaterol  2 puff Inhalation Daily    methylPREDNISolone  40 mg IntraVENous Q12H    carBAMazepine  300 mg Oral BID    divalproex  2,000 mg Oral Nightly    levETIRAcetam  750 mg Oral BID    levothyroxine  50 mcg Oral Daily    risperiDONE  1 mg Oral TID    sertraline  200 mg Oral Nightly    pantoprazole  40 mg Oral QAM AC    alogliptin  25 mg Oral Daily    busPIRone  10 mg Oral BID    cetirizine  10 mg Oral Daily    lisinopril  40 mg Oral Daily    mirtazapine  30 mg Oral Nightly    lactobacillus  1 capsule Oral Daily    fluticasone  1 spray Nasal Daily    atorvastatin  40 mg Oral Daily    amLODIPine  10 mg Oral Daily    sodium chloride flush  10 mL IntraVENous 2 times per day    enoxaparin  40 mg SubCUTAneous Daily      Infusions:    dextrose      sodium chloride       PRN Meds: glucose, 15 g, PRN  dextrose, 12.5 g, PRN  glucagon (rDNA), 1 mg, PRN  dextrose, 100 mL/hr, PRN  albuterol, 2.5 mg, Q4H PRN  diphenhydrAMINE, 25 mg, Q6H PRN  ipratropium-albuterol, 1 vial, Q4H PRN  aspirin, 325 mg, Q4H PRN  sodium chloride flush, 10 mL, PRN  sodium chloride, 25 mL, PRN  ondansetron, 4 mg, Q8H PRN   Or  ondansetron, 4 mg, Q6H PRN  bisacodyl, 5 mg, Daily PRN  acetaminophen, 650 mg, Q6H PRN   Or  acetaminophen, 650 mg, Q6H PRN        Recent Labs     09/23/21  1118 09/24/21  0652   WBC 6.4 5.1   HGB 9.9* 9.0*   HCT 35.3* 31.4*    281      Recent Labs     09/23/21  1118 09/24/21  0652    144   K 4.1 4.2    106   CO2 23 28   BUN 17 16   CREATININE 0.4* 0.5*     Recent Labs     09/23/21  1118   AST 15   ALT 12   BILITOT 0.1   ALKPHOS 104     No results for input(s): INR in the last 72 hours.   Recent Labs     09/23/21  1118   TROPONINT <0.010      Imaging reviewed    Electronically signed by Vilma Tracy MD on 9/24/2021 at 1:31 PM

## 2021-09-25 VITALS
RESPIRATION RATE: 18 BRPM | BODY MASS INDEX: 27.79 KG/M2 | HEART RATE: 71 BPM | WEIGHT: 132.4 LBS | OXYGEN SATURATION: 96 % | TEMPERATURE: 97.3 F | SYSTOLIC BLOOD PRESSURE: 129 MMHG | HEIGHT: 58 IN | DIASTOLIC BLOOD PRESSURE: 66 MMHG

## 2021-09-25 LAB
ALBUMIN SERPL-MCNC: 3.7 GM/DL (ref 3.4–5)
ALP BLD-CCNC: 68 IU/L (ref 40–129)
ALT SERPL-CCNC: 9 U/L (ref 10–40)
ANION GAP SERPL CALCULATED.3IONS-SCNC: 13 MMOL/L (ref 4–16)
AST SERPL-CCNC: 10 IU/L (ref 15–37)
BASOPHILS ABSOLUTE: 0 K/CU MM
BASOPHILS RELATIVE PERCENT: 0.3 % (ref 0–1)
BILIRUB SERPL-MCNC: 0.1 MG/DL (ref 0–1)
BUN BLDV-MCNC: 18 MG/DL (ref 6–23)
CALCIUM SERPL-MCNC: 8.9 MG/DL (ref 8.3–10.6)
CHLORIDE BLD-SCNC: 104 MMOL/L (ref 99–110)
CO2: 25 MMOL/L (ref 21–32)
CREAT SERPL-MCNC: 0.4 MG/DL (ref 0.6–1.1)
CULTURE: ABNORMAL
CULTURE: ABNORMAL
DIFFERENTIAL TYPE: ABNORMAL
EOSINOPHILS ABSOLUTE: 0 K/CU MM
EOSINOPHILS RELATIVE PERCENT: 0 % (ref 0–3)
GFR AFRICAN AMERICAN: >60 ML/MIN/1.73M2
GFR NON-AFRICAN AMERICAN: >60 ML/MIN/1.73M2
GLUCOSE BLD-MCNC: 146 MG/DL (ref 70–99)
GLUCOSE BLD-MCNC: 155 MG/DL (ref 70–99)
GLUCOSE BLD-MCNC: 168 MG/DL (ref 70–99)
HCT VFR BLD CALC: 33.6 % (ref 37–47)
HEMOGLOBIN: 9.3 GM/DL (ref 12.5–16)
IMMATURE NEUTROPHIL %: 1.8 % (ref 0–0.43)
LYMPHOCYTES ABSOLUTE: 1.2 K/CU MM
LYMPHOCYTES RELATIVE PERCENT: 30.8 % (ref 24–44)
Lab: ABNORMAL
MAGNESIUM: 2 MG/DL (ref 1.8–2.4)
MCH RBC QN AUTO: 24.3 PG (ref 27–31)
MCHC RBC AUTO-ENTMCNC: 27.7 % (ref 32–36)
MCV RBC AUTO: 88 FL (ref 78–100)
MONOCYTES ABSOLUTE: 0.1 K/CU MM
MONOCYTES RELATIVE PERCENT: 2.5 % (ref 0–4)
NUCLEATED RBC %: 0 %
PDW BLD-RTO: 16.4 % (ref 11.7–14.9)
PHOSPHORUS: 3.6 MG/DL (ref 2.5–4.9)
PLATELET # BLD: 268 K/CU MM (ref 140–440)
PMV BLD AUTO: 8.8 FL (ref 7.5–11.1)
POTASSIUM SERPL-SCNC: 4.7 MMOL/L (ref 3.5–5.1)
PRO-BNP: 150.7 PG/ML
RBC # BLD: 3.82 M/CU MM (ref 4.2–5.4)
SEGMENTED NEUTROPHILS ABSOLUTE COUNT: 2.6 K/CU MM
SEGMENTED NEUTROPHILS RELATIVE PERCENT: 64.6 % (ref 36–66)
SODIUM BLD-SCNC: 142 MMOL/L (ref 135–145)
SPECIMEN: ABNORMAL
TOTAL IMMATURE NEUTOROPHIL: 0.07 K/CU MM
TOTAL NUCLEATED RBC: 0 K/CU MM
TOTAL PROTEIN: 6 GM/DL (ref 6.4–8.2)
WBC # BLD: 4 K/CU MM (ref 4–10.5)

## 2021-09-25 PROCEDURE — 94761 N-INVAS EAR/PLS OXIMETRY MLT: CPT

## 2021-09-25 PROCEDURE — 94640 AIRWAY INHALATION TREATMENT: CPT

## 2021-09-25 PROCEDURE — 6360000002 HC RX W HCPCS: Performed by: INTERNAL MEDICINE

## 2021-09-25 PROCEDURE — 85025 COMPLETE CBC W/AUTO DIFF WBC: CPT

## 2021-09-25 PROCEDURE — 2700000000 HC OXYGEN THERAPY PER DAY

## 2021-09-25 PROCEDURE — 80053 COMPREHEN METABOLIC PANEL: CPT

## 2021-09-25 PROCEDURE — 94762 N-INVAS EAR/PLS OXIMTRY CONT: CPT

## 2021-09-25 PROCEDURE — 36415 COLL VENOUS BLD VENIPUNCTURE: CPT

## 2021-09-25 PROCEDURE — 83880 ASSAY OF NATRIURETIC PEPTIDE: CPT

## 2021-09-25 PROCEDURE — 6370000000 HC RX 637 (ALT 250 FOR IP): Performed by: INTERNAL MEDICINE

## 2021-09-25 PROCEDURE — 2580000003 HC RX 258: Performed by: INTERNAL MEDICINE

## 2021-09-25 PROCEDURE — 92526 ORAL FUNCTION THERAPY: CPT | Performed by: SPEECH-LANGUAGE PATHOLOGIST

## 2021-09-25 PROCEDURE — 83735 ASSAY OF MAGNESIUM: CPT

## 2021-09-25 PROCEDURE — 82962 GLUCOSE BLOOD TEST: CPT

## 2021-09-25 PROCEDURE — 84100 ASSAY OF PHOSPHORUS: CPT

## 2021-09-25 RX ORDER — ALBUTEROL SULFATE 90 UG/1
2 AEROSOL, METERED RESPIRATORY (INHALATION) 4 TIMES DAILY
Qty: 18 G | Refills: 3 | Status: SHIPPED | OUTPATIENT
Start: 2021-09-25 | End: 2022-08-24

## 2021-09-25 RX ORDER — CIPROFLOXACIN 500 MG/1
500 TABLET, FILM COATED ORAL 2 TIMES DAILY
Qty: 14 TABLET | Refills: 0 | Status: SHIPPED | OUTPATIENT
Start: 2021-09-25 | End: 2021-10-02

## 2021-09-25 RX ORDER — PREDNISONE 20 MG/1
40 TABLET ORAL DAILY
Qty: 20 TABLET | Refills: 0 | Status: SHIPPED | OUTPATIENT
Start: 2021-09-25 | End: 2021-09-30

## 2021-09-25 RX ADMIN — METHYLPREDNISOLONE SODIUM SUCCINATE 40 MG: 40 INJECTION, POWDER, FOR SOLUTION INTRAMUSCULAR; INTRAVENOUS at 00:23

## 2021-09-25 RX ADMIN — ALBUTEROL SULFATE 2 PUFF: 90 AEROSOL, METERED RESPIRATORY (INHALATION) at 15:36

## 2021-09-25 RX ADMIN — SODIUM CHLORIDE, PRESERVATIVE FREE 10 ML: 5 INJECTION INTRAVENOUS at 11:49

## 2021-09-25 RX ADMIN — RISPERIDONE 1 MG: 0.5 TABLET ORAL at 11:48

## 2021-09-25 RX ADMIN — Medication 1 CAPSULE: at 12:35

## 2021-09-25 RX ADMIN — LISINOPRIL 40 MG: 40 TABLET ORAL at 11:48

## 2021-09-25 RX ADMIN — METHYLPREDNISOLONE SODIUM SUCCINATE 40 MG: 40 INJECTION, POWDER, FOR SOLUTION INTRAMUSCULAR; INTRAVENOUS at 12:36

## 2021-09-25 RX ADMIN — AMLODIPINE BESYLATE 10 MG: 10 TABLET ORAL at 11:48

## 2021-09-25 RX ADMIN — PANTOPRAZOLE SODIUM 40 MG: 40 TABLET, DELAYED RELEASE ORAL at 06:33

## 2021-09-25 RX ADMIN — CARBAMAZEPINE 300 MG: 300 CAPSULE, EXTENDED RELEASE ORAL at 12:35

## 2021-09-25 RX ADMIN — BUSPIRONE HYDROCHLORIDE 10 MG: 10 TABLET ORAL at 11:48

## 2021-09-25 RX ADMIN — LEVOTHYROXINE SODIUM 50 MCG: 0.05 TABLET ORAL at 06:33

## 2021-09-25 RX ADMIN — ALBUTEROL SULFATE 2 PUFF: 90 AEROSOL, METERED RESPIRATORY (INHALATION) at 07:32

## 2021-09-25 RX ADMIN — ALBUTEROL SULFATE 2 PUFF: 90 AEROSOL, METERED RESPIRATORY (INHALATION) at 11:22

## 2021-09-25 RX ADMIN — TIOTROPIUM BROMIDE AND OLODATEROL 2 PUFF: 3.124; 2.736 SPRAY, METERED RESPIRATORY (INHALATION) at 07:32

## 2021-09-25 RX ADMIN — CETIRIZINE HYDROCHLORIDE 10 MG: 10 TABLET, FILM COATED ORAL at 11:47

## 2021-09-25 RX ADMIN — ENOXAPARIN SODIUM 40 MG: 40 INJECTION SUBCUTANEOUS at 11:48

## 2021-09-25 RX ADMIN — ALOGLIPTIN 25 MG: 25 TABLET, FILM COATED ORAL at 12:35

## 2021-09-25 RX ADMIN — RISPERIDONE 1 MG: 0.5 TABLET ORAL at 15:15

## 2021-09-25 RX ADMIN — LEVETIRACETAM 750 MG: 250 TABLET ORAL at 12:35

## 2021-09-25 RX ADMIN — ATORVASTATIN CALCIUM 40 MG: 40 TABLET, FILM COATED ORAL at 11:48

## 2021-09-25 RX ADMIN — FLUTICASONE PROPIONATE 1 SPRAY: 50 SPRAY, METERED NASAL at 11:50

## 2021-09-25 NOTE — PROGRESS NOTES
Patient has discharge order. [facility] is scheduled to pick her up at 9:45pm.  Jason Jacki at [facility] is checking to see if they are able to get [place] and transport her sooner. Update:  Rolando Baugh at [facility] has arranged for patient to be picked up by staff round 1800 today. They will bring her wheelchair and then [facility] staff will assist in loading patient.     [facility] notified that transport is cancelled

## 2021-09-25 NOTE — DISCHARGE SUMMARY
Discharge Summary    Name:  Poppy Ramos /Age/Sex: 1960  (64 y.o. female)   MRN & CSN:  [id number] & [id number] Admission Date/Time: 2021 10:21 AM   Attending:  Tony Singh MD Discharging Physician: Tony Singh MD     Hospital Course:   Poppy Ramos is a 64 y.o.  female  who presents with Hypoxia    Acute Hypoxic and Hypercapneic Respiratory Failure:   due to pneumonia /COPD exacerbation  Requiring O2 via nasal cannula  CT chest with patchy groundglass and nodular appearing airspace disease  DC on Prednisone  Continue bronchodilators  Monitor saturation  Wean O2 as tolerated    UTI - DC on Ciprofloxacin.     Hypertension:   Blood pressure controlled  Continue medications.      Hypothyroidism:   last TSH. continue Synthroid.      Hyperlipidemia: continue statin     History of stroke  Lesion of right native kidney    The patient expressed appropriate understanding of and agreement with the discharge recommendations, medications, and plan.      Consults this admission:  IP CONSULT TO HOSPITALIST  IP CONSULT TO PULMONOLOGY    Discharge Instruction:   Follow up appointments:   Primary care physician:  within 2 weeks    Diet:  regular diet   Activity: activity as tolerated  Disposition: Discharged to:   [x]Home, [][facility], []SNF, []Acute Rehab, []Hospice   Condition on discharge: Stable    Discharge Medications:      [address] Medication Instructions SLX:[id number]    Printed on:21 1426   Medication Information                      albuterol sulfate  (90 Base) MCG/ACT inhaler  Inhale 2 puffs into the lungs 4 times daily             amLODIPine (NORVASC) 10 MG tablet  Take 1 tablet by mouth daily             aspirin 325 MG tablet  Take 325 mg by mouth every 4 hours as needed for Pain             atorvastatin (LIPITOR) 40 MG tablet  TAKE 1 TABLET BY MOUTH DAILY             blood glucose monitor strips  Test 3 times a day & as needed for symptoms of irregular blood glucose. busPIRone (BUSPAR) 10 MG tablet  Take 1 tablet by mouth 2 times daily             carBAMazepine (CARBATROL) 300 MG extended release capsule  TAKE 1 CAPSULE BY MOUTH TWICE A DAY             cetirizine (ZYRTEC) 10 MG tablet  TAKE 1 TABLET BY MOUTH EVERY DAY AT BEDTIME             ciprofloxacin (CIPRO) 500 MG tablet  Take 1 tablet by mouth 2 times daily for 7 days             Diapers & Supplies MISC  1 each by Does not apply route daily as needed (4-5 times daily)             diphenhydrAMINE (BENADRYL) 25 MG capsule  Take 25 mg by mouth every 6 hours as needed for Itching             Disposable Gloves MISC  1 Units by Does not apply route 4 times daily             divalproex (DEPAKOTE) 500 MG DR tablet  Take 4 tablets by mouth nightly             fluticasone (FLONASE) 50 MCG/ACT nasal spray  INSTILL 1 SPRAY INTO EACH NOSTRIL DAILY             Incontinence Supply Disposable (DEPEND UNDERWEAR LARGE/XL) MISC  1 Units by Does not apply route 4 times daily             ipratropium-albuterol (DUONEB) 0.5-2.5 (3) MG/3ML SOLN nebulizer solution  Inhale 3 mLs into the lungs 4 times daily For 5 days before transition to prn             JANUVIA 100 MG tablet  TAKE 1 TABLET BY MOUTH DAILY             Lactobacillus (ACIDOPHILUS) CAPS capsule  TAKE 1 CAPSULE BY MOUTH DAILY             levETIRAcetam 750 MG TB3D  Take by mouth             levothyroxine (SYNTHROID) 50 MCG tablet  Take 50 mcg by mouth Daily             lisinopril (PRINIVIL;ZESTRIL) 40 MG tablet  TAKE 1 TABLET BY MOUTH DAILY             metFORMIN (GLUCOPHAGE) 500 MG tablet  Take 2 tablets by mouth daily (with breakfast)             mirtazapine (REMERON) 30 MG tablet  TAKE 1 TABLET BY MOUTH NIGHTLY             nystatin (MYCOSTATIN) 589897 UNIT/GM cream  Apply topically 2 times daily.  To the affected area for 2 wks             pantoprazole (PROTONIX) 40 MG tablet  TAKE 1 TABLET BY MOUTH EVERY MORNING (BEFORE BREAKFAST)             predniSONE (DELTASONE) 20 MG tablet  Take 2 tablets by mouth daily for 5 days             risperiDONE (RISPERDAL) 1 MG tablet  Take 1 tablet by mouth 3 times daily 1 tablet in am, 1 tablet at 4pm, 1 tablet at bedtime             sertraline (ZOLOFT) 100 MG tablet  Take 2 tablets by mouth nightly             tiotropium-olodaterol (STIOLTO) 2.5-2.5 MCG/ACT AERS  Inhale 2 puffs into the lungs daily                 Objective Findings at Discharge:   /63   Pulse 71   Temp 97.5 °F (36.4 °C) (Oral)   Resp 16   Ht 4' 10\" (1.473 m)   Wt 132 lb 6.4 oz (60.1 kg)   SpO2 95%   BMI 27.67 kg/m²            PHYSICAL EXAM   GEN Awake female, sitting upright in bed in no apparent distress. Appears given age. EYES Pupils are equally round. No scleral erythema, discharge, or conjunctivitis. HENT Mucous membranes are moist. Oral pharynx without exudates, no evidence of thrush. NECK Supple, no apparent thyromegaly or masses. RESP Clear to auscultation, no wheezes, rales or rhonchi. Symmetric chest movement while on room air. CARDIO/VASC S1/S2 auscultated. Regular rate without appreciable murmurs, rubs, or gallops. No JVD or carotid bruits. Peripheral pulses equal bilaterally and palpable. No peripheral edema. GI Abdomen is soft without significant tenderness, masses, or guarding. Bowel sounds are normoactive. Rectal exam deferred. SKIN Normal coloration, warm, dry. NEURO Cranial nerves appear grossly intact, + atrophy of lower extremities  PSYCH Awake, alert, oriented x 4. Affect appropriate.     BMP/CBC  Recent Labs     09/23/21  1118 09/24/21  0652 09/25/21  0619    144 142   K 4.1 4.2 4.7    106 104   CO2 23 28 25   BUN 17 16 18   CREATININE 0.4* 0.5* 0.4*   WBC 6.4 5.1 4.0   HCT 35.3* 31.4* 33.6*    281 268       IMAGING:    CT Head WO Contrast    Result Date: 9/23/2021  EXAMINATION: CT OF THE HEAD WITHOUT CONTRAST  9/23/2021 12:58 pm TECHNIQUE: CT of the head was performed without the administration of intravenous contrast. Dose modulation, iterative reconstruction, and/or weight based adjustment of the mA/kV was utilized to reduce the radiation dose to as low as reasonably achievable. COMPARISON: Head CT performed on March 17, 2021 HISTORY: ORDERING SYSTEM PROVIDED HISTORY: headache TECHNOLOGIST PROVIDED HISTORY: Reason for exam:->headache Has a \"code stroke\" or \"stroke alert\" been called? ->No Decision Support Exception - unselect if not a suspected or confirmed emergency medical condition->Emergency Medical Condition (MA) Reason for Exam: headache Acuity: Acute Type of Exam: Initial FINDINGS: BRAIN/VENTRICLES: No intracranial hemorrhage. No acute findings throughout the brain. Severe deformity and atrophy of the right hemisphere with ventriculomegaly on the right again noted as well as advanced atrophy of the cerebellum. ORBITS: The visualized portion of the orbits demonstrate no acute abnormality. SINUSES: The visualized paranasal sinuses and mastoid air cells demonstrate no acute abnormality. There is a 1.8 cm right maxillary mucous retention cyst, unchanged from May 12, 2020 head CT SOFT TISSUES/SKULL:  Bilateral focal craniectomy defects again noted. No acute osseous findings. No acute intracranial abnormality. CT ABDOMEN PELVIS W IV CONTRAST Additional Contrast? None    Result Date: 9/23/2021  EXAMINATION: CTA OF THE CHEST; CT OF THE ABDOMEN AND PELVIS WITH CONTRAST 9/23/2021 1:02 pm; 9/23/2021 1:04 pm: TECHNIQUE: CTA of the chest was performed after the administration of intravenous contrast.  Multiplanar reformatted images are provided for review. MIP images are provided for review. Dose modulation, iterative reconstruction, and/or weight based adjustment of the mA/kV was utilized to reduce the radiation dose to as low as reasonably achievable.; CT of the abdomen and pelvis was performed with the administration of intravenous contrast. Multiplanar reformatted images are provided for review.  Dose is identified. No acute osseous abnormality is identified. Multilevel degenerative changes are seen in the spine. No acute fracture is identified. No osseous destructive process. Small multilevel osteophytes are seen throughout the thoracic spine. CT ABDOMEN: Organs: No enhancing mass identified in the liver or spleen. The adrenal glands, pancreas, and gallbladder appear unremarkable. There is a benign exophytic cyst arising from the interpolar region of the left kidney. There is an 11 x 11 mm hypodense lesion arising from the medial aspect of the upper pole of the right kidney, previously measuring 8 x 9 mm in size. It measures approximately 40 Hounsfield units on postcontrast imaging. GI/Bowel: Colonic diverticulosis is identified. No acute diverticulitis. The appendix appears normal.  No acute inflammatory bowel process is identified. Peritoneum/Retroperitoneum: No retroperitoneal or mesenteric lymphadenopathy is identified. Diffuse atherosclerotic disease seen in the aorta. No aortic aneurysm or dissection is identified. Bones/Soft Tissues: No acute subcutaneous soft tissue abnormality is identified. Multilevel degenerative changes are seen in the spine. CT PELVIS: Other: The uterus, bladder, and other pelvic structures appear overall unremarkable without acute abnormality. No free fluid is identified. No pelvic mass is identified. Bones/Soft Tissues: No acute osseous abnormality is identified. No acute soft tissue abnormality is identified. Degenerative changes are seen in the spine. No pulmonary emboli are identified. Patchy ground-glass and nodular appearing airspace disease is identified in the lungs, which could be related to bronchiolitis/bronchopneumonia. No spiculated lung mass or lymphadenopathy in the chest. Diffuse atherosclerotic disease including coronary artery involvement.  There is an 11 x 11 mm lesion in the medial aspect of the upper pole the right kidney, which demonstrates mild enhancement and has been seen on previous CT and MR imaging studies. This has increased slightly in size, previously measuring 8 mm in size. As discussed, this could represent a small complex cyst, though an indolent renal cell carcinoma is in the differential. Diverticulosis without acute diverticulitis. No acute inflammatory bowel process is identified. CTA PULMONARY W CONTRAST    Result Date: 9/23/2021  EXAMINATION: CTA OF THE CHEST; CT OF THE ABDOMEN AND PELVIS WITH CONTRAST 9/23/2021 1:02 pm; 9/23/2021 1:04 pm: TECHNIQUE: CTA of the chest was performed after the administration of intravenous contrast.  Multiplanar reformatted images are provided for review. MIP images are provided for review. Dose modulation, iterative reconstruction, and/or weight based adjustment of the mA/kV was utilized to reduce the radiation dose to as low as reasonably achievable.; CT of the abdomen and pelvis was performed with the administration of intravenous contrast. Multiplanar reformatted images are provided for review. Dose modulation, iterative reconstruction, and/or weight based adjustment of the mA/kV was utilized to reduce the radiation dose to as low as reasonably achievable.  COMPARISON: 05/11/2020, 05/20/2019, MRI on 08/19/2020 HISTORY: ORDERING SYSTEM PROVIDED HISTORY: SOB, PE TECHNOLOGIST PROVIDED HISTORY: Reason for exam:->SOB, PE Decision Support Exception - unselect if not a suspected or confirmed emergency medical condition->Emergency Medical Condition (MA) Reason for Exam: SOB, PE Acuity: Acute Type of Exam: Initial Relevant Medical/Surgical History: 80 ML ISOVUE 370; ORDERING SYSTEM PROVIDED HISTORY: diffuse abdominal pain TECHNOLOGIST PROVIDED HISTORY: Reason for exam:->diffuse abdominal pain Additional Contrast?->None Decision Support Exception - unselect if not a suspected or confirmed emergency medical condition->Emergency Medical Condition (MA) Reason for Exam: diffuse abdominal pain Acuity: Acute mesenteric lymphadenopathy is identified. Diffuse atherosclerotic disease seen in the aorta. No aortic aneurysm or dissection is identified. Bones/Soft Tissues: No acute subcutaneous soft tissue abnormality is identified. Multilevel degenerative changes are seen in the spine. CT PELVIS: Other: The uterus, bladder, and other pelvic structures appear overall unremarkable without acute abnormality. No free fluid is identified. No pelvic mass is identified. Bones/Soft Tissues: No acute osseous abnormality is identified. No acute soft tissue abnormality is identified. Degenerative changes are seen in the spine. No pulmonary emboli are identified. Patchy ground-glass and nodular appearing airspace disease is identified in the lungs, which could be related to bronchiolitis/bronchopneumonia. No spiculated lung mass or lymphadenopathy in the chest. Diffuse atherosclerotic disease including coronary artery involvement. There is an 11 x 11 mm lesion in the medial aspect of the upper pole the right kidney, which demonstrates mild enhancement and has been seen on previous CT and MR imaging studies. This has increased slightly in size, previously measuring 8 mm in size. As discussed, this could represent a small complex cyst, though an indolent renal cell carcinoma is in the differential. Diverticulosis without acute diverticulitis. No acute inflammatory bowel process is identified.        Discharge Time of 20 minutes    Electronically signed by Slava Nieves MD on 9/25/2021 at 2:26 PM

## 2021-09-25 NOTE — PROGRESS NOTES
Pulmonary and Critical Care  Progress Note    Subjective: The patient has improved. Apnea link neg. Shortness of breath has improved  Chest pain none  Addressing respiratory complaints Patient is negative for  hemoptysis and cyanosis  CONSTITUTIONAL:  negative for fevers and chills      Past Medical History:     has a past medical history of Anxiety disorder, Back pain, chronic, Cerebral palsy (HCC), COPD (chronic obstructive pulmonary disease) ([facility] [address] CVA (cerebral infarction), Diabetes mellitus ([facility] [address] Diverticulosis, Epilepsy ([facility] [address] GERD (gastroesophageal reflux disease), Hyperlipidemia, Hypertension, Insomnia, Iron (Fe) deficiency anemia, Mental disability, Seizures ([facility] [address] and Unspecified cerebral artery occlusion with cerebral infarction. has a past surgical history that includes Gastrostomy tube placement. reports that she quit smoking about 10 years ago. She has a 30.00 pack-year smoking history. She has never used smokeless tobacco. She reports that she does not drink alcohol and does not use drugs. Family history:  family history is not on file. Allergies   Allergen Reactions    Macrobid [Nitrofurantoin] Hives and Swelling     Hives     Social History:    Reviewed; no changes    Objective:   PHYSICAL EXAM:        VITALS:  /63   Pulse 71   Temp 97.5 °F (36.4 °C) (Oral)   Resp 16   Ht 4' 10\" (1.473 m)   Wt 132 lb 6.4 oz (60.1 kg)   SpO2 95%   BMI 27.67 kg/m²     24HR INTAKE/OUTPUT:      Intake/Output Summary (Last 24 hours) at 9/25/2021 1234  Last data filed at 9/24/2021 1758  Gross per 24 hour   Intake 250 ml   Output --   Net 250 ml       CONSTITUTIONAL:  Awake. LUNGS:  decreased breath sounds, occ basilar crackles. CARDIOVASCULAR:  normal S1 and S2 and negative JVD  ABD:Abdomen soft, non-tender. BS normal. No masses,  No organomegaly  NEURO:Alert.   DATA:    CBC:  Recent Labs     09/23/21  1118 09/24/21  0652 09/25/21  0619   WBC 6.4 5.1 4.0   RBC 3.99* 3.56* 3.82*   HGB 9.9* 9.0* 9.3*   HCT 35.3* 31.4* 33.6*    281 268   MCV 88.5 88.2 88.0   MCH 24.8* 25.3* 24.3*   MCHC 28.0* 28.7* 27.7*   RDW 16.9* 16.9* 16.4*   SEGSPCT 64.0 52.8 64.6      BMP:  Recent Labs     09/23/21  1118 09/24/21  0652 09/25/21  0619    144 142   K 4.1 4.2 4.7    106 104   CO2 23 28 25   BUN 17 16 18   CREATININE 0.4* 0.5* 0.4*   CALCIUM 8.5 8.6 8.9   GLUCOSE 123* 124* 168*      ABG:  No results for input(s): PH, PO2ART, UQX4XKL, HCO3, BEART, O2SAT in the last 72 hours. Lab Results   Component Value Date    PROBNP 150.7 09/25/2021    PROBNP 122.2 09/23/2021    PROBNP 243.7 12/25/2019     No results found for: [facility]    Radiology Review:  Pertinent images / reports were reviewed as a part of this visit. Assessment:     Patient Active Problem List   Diagnosis    Pneumonia    HTN (hypertension), benign    Seizure disorder ([address]    Altered mental status    Chest pain    MR (mental retardation)    Chronic obstructive pulmonary disease ([address]    Left hemiplegia ([address]    H/O: stroke    Mixed hyperlipidemia    Acquired hypothyroidism    Sepsis ([address]    Acute bronchitis    Acute respiratory failure with hypoxia ([address]    Acute respiratory failure (HCC)    Anxiety and depression    Anemia    Controlled type 2 diabetes mellitus without complication, without long-term current use of insulin ([address]    Diverticulosis    Lesion of right native kidney    Black stool    Hypoxia       Plan:   1. Overall the patient has improved. 2. Inc. activity.       Claudia De Leon MD   9/25/2021  12:34 PM

## 2021-09-25 NOTE — PROGRESS NOTES
Speech Language Pathology  [address] [facility]  DEPARTMENT OF SPEECH/LANGUAGE PATHOLOGY  DAILY PROGRESS NOTE  Kassandra Recinos  9/25/2021  [id number]  Hypoxia [R09.02]  Abnormal chest CT [R93.89]  Renal lesion [N28.9]  Allergies   Allergen Reactions    Macrobid [Nitrofurantoin] Hives and Swelling     Hives         Pt was seen this date for dysphagia treatment. IMPRESSION AND RECOMMENDATIONS:   Pt seen this date for diet tolerance monitoring (Minced/moist and Thins by straw). Pt was alert and cooperative and states she is ready to go home. PO trials of thins by cup and straw, pureed and soft solids completed. The oral phase was [facility] for adequate mastication and clearance for solids. Delayed cough observed for thins with progressive trials by straw x 2/10 and 0 s/s aspiration for thins by cup which she self-fed. Recommend:  Continue Minced/moist and Thins by CUP. Aspiration precautions. Spoke with nsg. GOALS (current status in bold):  Short-term Goals  Timeframe for Short-term Goals: LOS or until goals are met  Goal 1: Pt will tolerate minced and moist diet level/ thin liquids by cup sips without clinical evidence of aspiration 100% Meeting, Continue  Goal 2: Pt/caregivers will demonstrate comprehension of recommendations/POC Straws in cups on tray, spoke with nsg who verbalized understanding    EDUCATION: Spoke with nsg regarding recommendations for no straws.       PAIN RATING (0-10 Scale): does not express pain  Time in/Time out: SLP Individual Minutes  Time In: 1030  Time Out: 1100  Minutes: 30    Visit number: Mark Lombardo, SLP  9/25/2021  11:12 AM

## 2021-09-28 ENCOUNTER — TELEPHONE (OUTPATIENT)
Dept: FAMILY MEDICINE CLINIC | Age: 61
End: 2021-09-28

## 2021-09-29 ENCOUNTER — HOSPITAL ENCOUNTER (OUTPATIENT)
Age: 61
Setting detail: SPECIMEN
Discharge: HOME | End: 2021-09-29
Payer: MEDICARE

## 2021-09-29 LAB
ALBUMIN SERPL-MCNC: 3.2 GM/DL (ref 3.4–5)
ALP BLD-CCNC: 78 IU/L (ref 40–128)
ALT SERPL-CCNC: 8 U/L (ref 10–40)
ANION GAP SERPL CALCULATED.3IONS-SCNC: 12 MMOL/L (ref 4–16)
AST SERPL-CCNC: 10 IU/L (ref 15–37)
BILIRUB SERPL-MCNC: 0.2 MG/DL (ref 0–1)
BUN BLDV-MCNC: 24 MG/DL (ref 6–23)
CALCIUM SERPL-MCNC: 8.4 MG/DL (ref 8.3–10.6)
CHLORIDE BLD-SCNC: 103 MMOL/L (ref 99–110)
CHOLESTEROL: 161 MG/DL
CO2: 26 MMOL/L (ref 21–32)
CREAT SERPL-MCNC: 0.5 MG/DL (ref 0.6–1.1)
ESTIMATED AVERAGE GLUCOSE: 126 MG/DL
GFR AFRICAN AMERICAN: >60 ML/MIN/1.73M2
GFR NON-AFRICAN AMERICAN: >60 ML/MIN/1.73M2
GLUCOSE BLD-MCNC: 115 MG/DL (ref 70–99)
HBA1C MFR BLD: 6 % (ref 4.2–6.3)
HDLC SERPL-MCNC: 42 MG/DL
LDL CHOLESTEROL DIRECT: 64 MG/DL
POTASSIUM SERPL-SCNC: 4.1 MMOL/L (ref 3.5–5.1)
SODIUM BLD-SCNC: 141 MMOL/L (ref 135–145)
T4 FREE: 0.83 NG/DL (ref 0.9–1.8)
TOTAL PROTEIN: 5.2 GM/DL (ref 6.4–8.2)
TRIGL SERPL-MCNC: 345 MG/DL
TSH HIGH SENSITIVITY: 5.22 UIU/ML (ref 0.27–4.2)

## 2021-09-29 PROCEDURE — 36415 COLL VENOUS BLD VENIPUNCTURE: CPT

## 2021-09-29 PROCEDURE — 80053 COMPREHEN METABOLIC PANEL: CPT

## 2021-09-29 PROCEDURE — 83036 HEMOGLOBIN GLYCOSYLATED A1C: CPT

## 2021-09-29 PROCEDURE — 84439 ASSAY OF FREE THYROXINE: CPT

## 2021-09-29 PROCEDURE — 84443 ASSAY THYROID STIM HORMONE: CPT

## 2021-09-29 PROCEDURE — 80061 LIPID PANEL: CPT

## 2021-09-29 PROCEDURE — 83721 ASSAY OF BLOOD LIPOPROTEIN: CPT

## 2021-10-01 ENCOUNTER — OFFICE VISIT (OUTPATIENT)
Dept: FAMILY MEDICINE CLINIC | Age: 61
End: 2021-10-01
Payer: MEDICARE

## 2021-10-01 VITALS — OXYGEN SATURATION: 92 % | SYSTOLIC BLOOD PRESSURE: 126 MMHG | DIASTOLIC BLOOD PRESSURE: 84 MMHG | HEART RATE: 84 BPM

## 2021-10-01 DIAGNOSIS — E03.9 ACQUIRED HYPOTHYROIDISM: ICD-10-CM

## 2021-10-01 DIAGNOSIS — D64.9 ANEMIA, UNSPECIFIED TYPE: ICD-10-CM

## 2021-10-01 DIAGNOSIS — J18.9 PNEUMONIA DUE TO INFECTIOUS ORGANISM, UNSPECIFIED LATERALITY, UNSPECIFIED PART OF LUNG: ICD-10-CM

## 2021-10-01 DIAGNOSIS — J44.1 COPD EXACERBATION (HCC): Primary | ICD-10-CM

## 2021-10-01 DIAGNOSIS — Z09 HOSPITAL DISCHARGE FOLLOW-UP: ICD-10-CM

## 2021-10-01 PROCEDURE — 1036F TOBACCO NON-USER: CPT | Performed by: FAMILY MEDICINE

## 2021-10-01 PROCEDURE — 1111F DSCHRG MED/CURRENT MED MERGE: CPT | Performed by: FAMILY MEDICINE

## 2021-10-01 PROCEDURE — G8484 FLU IMMUNIZE NO ADMIN: HCPCS | Performed by: FAMILY MEDICINE

## 2021-10-01 PROCEDURE — G8417 CALC BMI ABV UP PARAM F/U: HCPCS | Performed by: FAMILY MEDICINE

## 2021-10-01 PROCEDURE — 3017F COLORECTAL CA SCREEN DOC REV: CPT | Performed by: FAMILY MEDICINE

## 2021-10-01 PROCEDURE — 3023F SPIROM DOC REV: CPT | Performed by: FAMILY MEDICINE

## 2021-10-01 PROCEDURE — G8427 DOCREV CUR MEDS BY ELIG CLIN: HCPCS | Performed by: FAMILY MEDICINE

## 2021-10-01 PROCEDURE — G8926 SPIRO NO PERF OR DOC: HCPCS | Performed by: FAMILY MEDICINE

## 2021-10-01 PROCEDURE — 99214 OFFICE O/P EST MOD 30 MIN: CPT | Performed by: FAMILY MEDICINE

## 2021-10-01 ASSESSMENT — ENCOUNTER SYMPTOMS
WHEEZING: 0
COUGH: 0
ABDOMINAL PAIN: 0
CONSTIPATION: 0
SHORTNESS OF BREATH: 0
NAUSEA: 0
VOMITING: 0
DIARRHEA: 0

## 2021-10-01 NOTE — PROGRESS NOTES
Post-Discharge Transitional Care Management Services or Hospital Follow Up      Mary Jo Mccauley   YOB: 1960    Date of Office Visit:  10/1/2021  Date of Hospital Admission: 9/23/21  Date of Hospital Discharge: 9/25/21  Readmission Risk Score(high >=14%.  Medium >=10%):Readmission Risk Score: 24      Care management risk score Rising risk (score 2-5) and Complex Care (Scores >=6): 6     Non face to face  following discharge, date last encounter closed (first attempt may have been earlier): *No documented post hospital discharge outreach found in the last 14 days *No documented post hospital discharge outreach found in the last 14 days    Call initiated 2 business days of discharge: *No response recorded in the last 14 days     Patient Active Problem List   Diagnosis    Pneumonia due to infectious organism    HTN (hypertension), benign    Seizure disorder ([address]    Altered mental status    Chest pain    MR (mental retardation)    COPD exacerbation ([address]    Left hemiplegia ([address]    H/O: stroke    Mixed hyperlipidemia    Acquired hypothyroidism    Sepsis ([address]    Acute bronchitis    Acute respiratory failure with hypoxia ([address]    Acute respiratory failure ([address]    Anxiety and depression    Anemia    Controlled type 2 diabetes mellitus without complication, without long-term current use of insulin ([address]    Diverticulosis    Lesion of right native kidney    Black stool    Hypoxia       Allergies   Allergen Reactions    Macrobid [Nitrofurantoin] Hives and Swelling     Hives       Medications listed as ordered at the time of discharge from hospital   [address] Medication Instructions ANGELICA:    Printed on:10/01/21 0139   Medication Information                      albuterol sulfate  (90 Base) MCG/ACT inhaler  Inhale 2 puffs into the lungs 4 times daily             amLODIPine (NORVASC) 10 MG tablet  Take 1 tablet by mouth daily             aspirin 325 MG tablet  Take 325 mg by mouth every 4 hours as needed for Pain              atorvastatin (LIPITOR) 40 MG tablet  TAKE 1 TABLET BY MOUTH DAILY             blood glucose monitor strips  Test 3 times a day & as needed for symptoms of irregular blood glucose.              busPIRone (BUSPAR) 10 MG tablet  Take 1 tablet by mouth 2 times daily             carBAMazepine (CARBATROL) 300 MG extended release capsule  TAKE 1 CAPSULE BY MOUTH TWICE A DAY             cetirizine (ZYRTEC) 10 MG tablet  TAKE 1 TABLET BY MOUTH EVERY DAY AT BEDTIME             ciprofloxacin (CIPRO) 500 MG tablet  Take 1 tablet by mouth 2 times daily for 7 days             Diapers & Supplies MISC  1 each by Does not apply route daily as needed (4-5 times daily)             diphenhydrAMINE (BENADRYL) 25 MG capsule  Take 25 mg by mouth every 6 hours as needed for Itching             Disposable Gloves MISC  1 Units by Does not apply route 4 times daily             divalproex (DEPAKOTE) 500 MG DR tablet  Take 4 tablets by mouth nightly             fluticasone (FLONASE) 50 MCG/ACT nasal spray  INSTILL 1 SPRAY INTO EACH NOSTRIL DAILY             Incontinence Supply Disposable (DEPEND UNDERWEAR LARGE/XL) MISC  1 Units by Does not apply route 4 times daily             ipratropium-albuterol (DUONEB) 0.5-2.5 (3) MG/3ML SOLN nebulizer solution  Inhale 3 mLs into the lungs 4 times daily For 5 days before transition to prn             JANUVIA 100 MG tablet  TAKE 1 TABLET BY MOUTH DAILY             Lactobacillus (ACIDOPHILUS) CAPS capsule  TAKE 1 CAPSULE BY MOUTH DAILY             levETIRAcetam 750 MG TB3D  Take by mouth             levothyroxine (SYNTHROID) 50 MCG tablet  Take 50 mcg by mouth Daily             lisinopril (PRINIVIL;ZESTRIL) 40 MG tablet  TAKE 1 TABLET BY MOUTH DAILY             metFORMIN (GLUCOPHAGE) 500 MG tablet  Take 2 tablets by mouth daily (with breakfast)             mirtazapine (REMERON) 30 MG tablet  TAKE 1 TABLET BY MOUTH NIGHTLY             nystatin (MYCOSTATIN) 764108 UNIT/GM cream  Apply topically 2 times daily.  To the affected area for 2 wks             pantoprazole (PROTONIX) 40 MG tablet  TAKE 1 TABLET BY MOUTH EVERY MORNING (BEFORE BREAKFAST)             risperiDONE (RISPERDAL) 1 MG tablet  Take 1 tablet by mouth 3 times daily 1 tablet in am, 1 tablet at 4pm, 1 tablet at bedtime             sertraline (ZOLOFT) 100 MG tablet  Take 2 tablets by mouth nightly             tiotropium-olodaterol (STIOLTO) 2.5-2.5 MCG/ACT AERS  Inhale 2 puffs into the lungs daily                   Medications marked \"taking\" at this time  Outpatient Medications Marked as Taking for the 10/1/21 encounter (Office Visit) with Eder Torres MD   Medication Sig Dispense Refill    albuterol sulfate  (90 Base) MCG/ACT inhaler Inhale 2 puffs into the lungs 4 times daily 18 g 3    ciprofloxacin (CIPRO) 500 MG tablet Take 1 tablet by mouth 2 times daily for 7 days 14 tablet 0    tiotropium-olodaterol (STIOLTO) 2.5-2.5 MCG/ACT AERS Inhale 2 puffs into the lungs daily 1 each 0    pantoprazole (PROTONIX) 40 MG tablet TAKE 1 TABLET BY MOUTH EVERY MORNING (BEFORE BREAKFAST) 30 tablet 0    Lactobacillus (ACIDOPHILUS) CAPS capsule TAKE 1 CAPSULE BY MOUTH DAILY 31 capsule 5    carBAMazepine (CARBATROL) 300 MG extended release capsule TAKE 1 CAPSULE BY MOUTH TWICE A DAY 62 capsule 5    atorvastatin (LIPITOR) 40 MG tablet TAKE 1 TABLET BY MOUTH DAILY 31 tablet 5    amLODIPine (NORVASC) 10 MG tablet Take 1 tablet by mouth daily 90 tablet 3    JANUVIA 100 MG tablet TAKE 1 TABLET BY MOUTH DAILY 30 tablet 5    lisinopril (PRINIVIL;ZESTRIL) 40 MG tablet TAKE 1 TABLET BY MOUTH DAILY 31 tablet 5    fluticasone (FLONASE) 50 MCG/ACT nasal spray INSTILL 1 SPRAY INTO EACH NOSTRIL DAILY 16 g 5    cetirizine (ZYRTEC) 10 MG tablet TAKE 1 TABLET BY MOUTH EVERY DAY AT BEDTIME 31 tablet 5    Diapers & Supplies MISC 1 each by Does not apply route daily as needed (4-5 times daily) 100 each 5    nystatin (MYCOSTATIN) 909971 UNIT/GM cream Apply topically 2 times daily. To the affected area for 2 wks 60 g 1    mirtazapine (REMERON) 30 MG tablet TAKE 1 TABLET BY MOUTH NIGHTLY 31 tablet 5    levETIRAcetam 750 MG TB3D Take by mouth      Incontinence Supply Disposable (DEPEND UNDERWEAR LARGE/XL) MISC 1 Units by Does not apply route 4 times daily 100 Package 5    Disposable Gloves MISC 1 Units by Does not apply route 4 times daily 100 each 5    ipratropium-albuterol (DUONEB) 0.5-2.5 (3) MG/3ML SOLN nebulizer solution Inhale 3 mLs into the lungs 4 times daily For 5 days before transition to prn 360 mL 5    diphenhydrAMINE (BENADRYL) 25 MG capsule Take 25 mg by mouth every 6 hours as needed for Itching      aspirin 325 MG tablet Take 325 mg by mouth every 4 hours as needed for Pain       blood glucose monitor strips Test 3 times a day & as needed for symptoms of irregular blood glucose. 90 strip 0    levothyroxine (SYNTHROID) 50 MCG tablet Take 50 mcg by mouth Daily      busPIRone (BUSPAR) 10 MG tablet Take 1 tablet by mouth 2 times daily 60 tablet 5    divalproex (DEPAKOTE) 500 MG DR tablet Take 4 tablets by mouth nightly 90 tablet 0    metFORMIN (GLUCOPHAGE) 500 MG tablet Take 2 tablets by mouth daily (with breakfast) 30 tablet 0    risperiDONE (RISPERDAL) 1 MG tablet Take 1 tablet by mouth 3 times daily 1 tablet in am, 1 tablet at 4pm, 1 tablet at bedtime 60 tablet 5    sertraline (ZOLOFT) 100 MG tablet Take 2 tablets by mouth nightly 30 tablet 5        Medications patient taking as of now reconciled against medications ordered at time of hospital discharge: Yes    Chief Complaint   Patient presents with    Follow-Up from Hospital       Patient here for f/ufrom hospitalization for COPD exacerbation, and pneumonia, discharged on cipro, patient came with the care giver, patient stats doing better no cough, no SOB, no chest pain, still taking the cipro, eating well and sleeping good.       Inpatient course: Discharge summary reviewed- see chart. Interval history/Current status: stable    Review of Systems   Constitutional: Negative for activity change, chills, fatigue and fever. HENT: Negative for congestion. Respiratory: Negative for cough, shortness of breath and wheezing. Cardiovascular: Negative for chest pain and leg swelling. Gastrointestinal: Negative for abdominal pain, constipation, diarrhea, nausea and vomiting. Genitourinary: Negative for dysuria. Neurological: Negative for dizziness and headaches. Psychiatric/Behavioral: Negative for agitation. The patient is not nervous/anxious. Vitals:    10/01/21 0816   BP: 126/84   Site: Left Upper Arm   Position: Sitting   Cuff Size: Medium Adult   Pulse: 84   SpO2: 92%     There is no height or weight on file to calculate BMI. Wt Readings from Last 3 Encounters:   09/25/21 132 lb 6.4 oz (60.1 kg)   06/01/21 140 lb (63.5 kg)   04/20/21 140 lb (63.5 kg)     BP Readings from Last 3 Encounters:   10/01/21 126/84   09/25/21 129/66   03/17/21 122/78       Physical Exam  Constitutional:       Appearance: Normal appearance. She is well-developed. She is obese. She is not ill-appearing. Comments: Using the wheel chair   HENT:      Head: Normocephalic and atraumatic. Cardiovascular:      Rate and Rhythm: Normal rate and regular rhythm. Heart sounds: Normal heart sounds. No murmur heard. Pulmonary:      Effort: Pulmonary effort is normal.      Breath sounds: Normal breath sounds. No wheezing or rhonchi. Musculoskeletal:         General: Normal range of motion. Cervical back: Normal range of motion and neck supple. No rigidity. Right lower leg: No edema. Left lower leg: No edema. Neurological:      Mental Status: She is alert and oriented to person, place, and time. Psychiatric:         Mood and Affect: Mood normal.         Behavior: Behavior normal.             Assessment/Plan:  1.  Hospital discharge follow-up  - [facility] DISCHARGE MEDS RECONCILED W/ CURRENT OUTPATIENT MED LIST    2. COPD exacerbation ([facility] [address]  - VA DISCHARGE MEDS RECONCILED W/ CURRENT OUTPATIENT MED LIST  - improving    3. Pneumonia due to infectious organism, unspecified laterality, unspecified part of lung  - doing much better  - VA DISCHARGE MEDS RECONCILED W/ CURRENT OUTPATIENT MED LIST    4. Acquired hypothyroidism  - f/u with the endocrinology    5.  Anemia, unspecified type  - hg little low, will watch        Medical Decision Making: moderate complexity    All the labs and the imaging reviewed with the patient

## 2021-10-12 ENCOUNTER — APPOINTMENT (OUTPATIENT)
Dept: GENERAL RADIOLOGY | Age: 61
DRG: 871 | End: 2021-10-12
Payer: MEDICARE

## 2021-10-12 ENCOUNTER — HOSPITAL ENCOUNTER (INPATIENT)
Age: 61
LOS: 2 days | Discharge: HOME | DRG: 871 | End: 2021-10-17
Attending: STUDENT IN AN ORGANIZED HEALTH CARE EDUCATION/TRAINING PROGRAM | Admitting: INTERNAL MEDICINE
Payer: MEDICARE

## 2021-10-12 ENCOUNTER — APPOINTMENT (OUTPATIENT)
Dept: CT IMAGING | Age: 61
DRG: 871 | End: 2021-10-12
Payer: MEDICARE

## 2021-10-12 DIAGNOSIS — U07.1 COVID-19: Primary | ICD-10-CM

## 2021-10-12 LAB
ALBUMIN SERPL-MCNC: 3.8 GM/DL (ref 3.4–5)
ALP BLD-CCNC: 68 IU/L (ref 40–129)
ALT SERPL-CCNC: 14 U/L (ref 10–40)
ANION GAP SERPL CALCULATED.3IONS-SCNC: 12 MMOL/L (ref 4–16)
AST SERPL-CCNC: 19 IU/L (ref 15–37)
BACTERIA: NEGATIVE /HPF
BASE EXCESS MIXED: 3 (ref 0–2.3)
BASOPHILS ABSOLUTE: 0 K/CU MM
BASOPHILS RELATIVE PERCENT: 0.2 % (ref 0–1)
BILIRUB SERPL-MCNC: 0.1 MG/DL (ref 0–1)
BILIRUBIN URINE: NEGATIVE MG/DL
BLOOD, URINE: NEGATIVE
BUN BLDV-MCNC: 19 MG/DL (ref 6–23)
CALCIUM SERPL-MCNC: 8.5 MG/DL (ref 8.3–10.6)
CHLORIDE BLD-SCNC: 105 MMOL/L (ref 99–110)
CLARITY: CLEAR
CO2: 24 MMOL/L (ref 21–32)
COLOR: YELLOW
COMMENT: ABNORMAL
CREAT SERPL-MCNC: 0.4 MG/DL (ref 0.6–1.1)
DIFFERENTIAL TYPE: ABNORMAL
EKG ATRIAL RATE: 89 BPM
EKG DIAGNOSIS: NORMAL
EKG P AXIS: 6 DEGREES
EKG P-R INTERVAL: 116 MS
EKG Q-T INTERVAL: 350 MS
EKG QRS DURATION: 76 MS
EKG QTC CALCULATION (BAZETT): 425 MS
EKG R AXIS: 15 DEGREES
EKG T AXIS: 24 DEGREES
EKG VENTRICULAR RATE: 89 BPM
EOSINOPHILS ABSOLUTE: 0 K/CU MM
EOSINOPHILS RELATIVE PERCENT: 0.2 % (ref 0–3)
GFR AFRICAN AMERICAN: >60 ML/MIN/1.73M2
GFR NON-AFRICAN AMERICAN: >60 ML/MIN/1.73M2
GLUCOSE BLD-MCNC: 147 MG/DL (ref 70–99)
GLUCOSE BLD-MCNC: 160 MG/DL (ref 70–99)
GLUCOSE, URINE: NEGATIVE MG/DL
HCO3 VENOUS: 28.5 MMOL/L (ref 19–25)
HCT VFR BLD CALC: 31.8 % (ref 37–47)
HEMOGLOBIN: 9 GM/DL (ref 12.5–16)
IMMATURE NEUTROPHIL %: 1.8 % (ref 0–0.43)
INR BLD: 0.91 INDEX
KETONES, URINE: ABNORMAL MG/DL
LEUKOCYTE ESTERASE, URINE: NEGATIVE
LYMPHOCYTES ABSOLUTE: 1 K/CU MM
LYMPHOCYTES RELATIVE PERCENT: 18.3 % (ref 24–44)
MCH RBC QN AUTO: 24.7 PG (ref 27–31)
MCHC RBC AUTO-ENTMCNC: 28.3 % (ref 32–36)
MCV RBC AUTO: 87.4 FL (ref 78–100)
MONOCYTES ABSOLUTE: 1 K/CU MM
MONOCYTES RELATIVE PERCENT: 18.7 % (ref 0–4)
NITRITE URINE, QUANTITATIVE: NEGATIVE
NUCLEATED RBC %: 0 %
O2 SAT, VEN: 87.6 % (ref 50–70)
PCO2, VEN: 46 MMHG (ref 38–52)
PDW BLD-RTO: 17.7 % (ref 11.7–14.9)
PH VENOUS: 7.4 (ref 7.32–7.42)
PH, URINE: 6 (ref 5–8)
PLATELET # BLD: 198 K/CU MM (ref 140–440)
PMV BLD AUTO: 8.4 FL (ref 7.5–11.1)
PO2, VEN: 57 MMHG (ref 28–48)
POTASSIUM SERPL-SCNC: 3.8 MMOL/L (ref 3.5–5.1)
PRO-BNP: 407.1 PG/ML
PROTEIN UA: NEGATIVE MG/DL
PROTHROMBIN TIME: 11.7 SECONDS (ref 11.7–14.5)
RBC # BLD: 3.64 M/CU MM (ref 4.2–5.4)
RBC URINE: 1 /HPF (ref 0–6)
SARS-COV-2, NAAT: DETECTED
SEGMENTED NEUTROPHILS ABSOLUTE COUNT: 3.4 K/CU MM
SEGMENTED NEUTROPHILS RELATIVE PERCENT: 60.8 % (ref 36–66)
SODIUM BLD-SCNC: 141 MMOL/L (ref 135–145)
SOURCE: ABNORMAL
SPECIFIC GRAVITY UA: >1.05 (ref 1–1.03)
TOTAL IMMATURE NEUTOROPHIL: 0.1 K/CU MM
TOTAL NUCLEATED RBC: 0 K/CU MM
TOTAL PROTEIN: 6 GM/DL (ref 6.4–8.2)
TRICHOMONAS: ABNORMAL /HPF
TROPONIN T: <0.01 NG/ML
UROBILINOGEN, URINE: NEGATIVE MG/DL (ref 0.2–1)
WBC # BLD: 5.6 K/CU MM (ref 4–10.5)
WBC UA: <1 /HPF (ref 0–5)

## 2021-10-12 PROCEDURE — 94640 AIRWAY INHALATION TREATMENT: CPT

## 2021-10-12 PROCEDURE — 6360000002 HC RX W HCPCS: Performed by: STUDENT IN AN ORGANIZED HEALTH CARE EDUCATION/TRAINING PROGRAM

## 2021-10-12 PROCEDURE — G0378 HOSPITAL OBSERVATION PER HR: HCPCS

## 2021-10-12 PROCEDURE — 96374 THER/PROPH/DIAG INJ IV PUSH: CPT

## 2021-10-12 PROCEDURE — 82805 BLOOD GASES W/O2 SATURATION: CPT

## 2021-10-12 PROCEDURE — 93005 ELECTROCARDIOGRAM TRACING: CPT | Performed by: STUDENT IN AN ORGANIZED HEALTH CARE EDUCATION/TRAINING PROGRAM

## 2021-10-12 PROCEDURE — 87635 SARS-COV-2 COVID-19 AMP PRB: CPT

## 2021-10-12 PROCEDURE — 99285 EMERGENCY DEPT VISIT HI MDM: CPT

## 2021-10-12 PROCEDURE — 85025 COMPLETE CBC W/AUTO DIFF WBC: CPT

## 2021-10-12 PROCEDURE — 71275 CT ANGIOGRAPHY CHEST: CPT

## 2021-10-12 PROCEDURE — 71045 X-RAY EXAM CHEST 1 VIEW: CPT

## 2021-10-12 PROCEDURE — 6360000002 HC RX W HCPCS: Performed by: NURSE PRACTITIONER

## 2021-10-12 PROCEDURE — 6370000000 HC RX 637 (ALT 250 FOR IP): Performed by: STUDENT IN AN ORGANIZED HEALTH CARE EDUCATION/TRAINING PROGRAM

## 2021-10-12 PROCEDURE — 96372 THER/PROPH/DIAG INJ SC/IM: CPT

## 2021-10-12 PROCEDURE — 6370000000 HC RX 637 (ALT 250 FOR IP): Performed by: NURSE PRACTITIONER

## 2021-10-12 PROCEDURE — 2580000003 HC RX 258: Performed by: STUDENT IN AN ORGANIZED HEALTH CARE EDUCATION/TRAINING PROGRAM

## 2021-10-12 PROCEDURE — 6360000004 HC RX CONTRAST MEDICATION: Performed by: STUDENT IN AN ORGANIZED HEALTH CARE EDUCATION/TRAINING PROGRAM

## 2021-10-12 PROCEDURE — 36415 COLL VENOUS BLD VENIPUNCTURE: CPT

## 2021-10-12 PROCEDURE — 85610 PROTHROMBIN TIME: CPT

## 2021-10-12 PROCEDURE — 93010 ELECTROCARDIOGRAM REPORT: CPT | Performed by: INTERNAL MEDICINE

## 2021-10-12 PROCEDURE — 84484 ASSAY OF TROPONIN QUANT: CPT

## 2021-10-12 PROCEDURE — 83880 ASSAY OF NATRIURETIC PEPTIDE: CPT

## 2021-10-12 PROCEDURE — 82962 GLUCOSE BLOOD TEST: CPT

## 2021-10-12 PROCEDURE — 81001 URINALYSIS AUTO W/SCOPE: CPT

## 2021-10-12 PROCEDURE — 80053 COMPREHEN METABOLIC PANEL: CPT

## 2021-10-12 RX ORDER — CETIRIZINE HYDROCHLORIDE 10 MG/1
10 TABLET ORAL DAILY
Status: DISCONTINUED | OUTPATIENT
Start: 2021-10-12 | End: 2021-10-17 | Stop reason: HOSPADM

## 2021-10-12 RX ORDER — ALBUTEROL SULFATE 90 UG/1
2 AEROSOL, METERED RESPIRATORY (INHALATION) EVERY 4 HOURS PRN
Status: DISCONTINUED | OUTPATIENT
Start: 2021-10-12 | End: 2021-10-17 | Stop reason: HOSPADM

## 2021-10-12 RX ORDER — SODIUM CHLORIDE 9 MG/ML
25 INJECTION, SOLUTION INTRAVENOUS PRN
Status: DISCONTINUED | OUTPATIENT
Start: 2021-10-12 | End: 2021-10-17 | Stop reason: HOSPADM

## 2021-10-12 RX ORDER — AMLODIPINE BESYLATE 5 MG/1
10 TABLET ORAL DAILY
Status: DISCONTINUED | OUTPATIENT
Start: 2021-10-12 | End: 2021-10-17 | Stop reason: HOSPADM

## 2021-10-12 RX ORDER — ATORVASTATIN CALCIUM 40 MG/1
40 TABLET, FILM COATED ORAL NIGHTLY
Status: DISCONTINUED | OUTPATIENT
Start: 2021-10-12 | End: 2021-10-17 | Stop reason: HOSPADM

## 2021-10-12 RX ORDER — ALBUTEROL SULFATE 90 UG/1
2 AEROSOL, METERED RESPIRATORY (INHALATION) ONCE
Status: COMPLETED | OUTPATIENT
Start: 2021-10-12 | End: 2021-10-12

## 2021-10-12 RX ORDER — BUSPIRONE HYDROCHLORIDE 10 MG/1
10 TABLET ORAL 2 TIMES DAILY
Status: DISCONTINUED | OUTPATIENT
Start: 2021-10-12 | End: 2021-10-17 | Stop reason: HOSPADM

## 2021-10-12 RX ORDER — FLUTICASONE PROPIONATE 50 MCG
1 SPRAY, SUSPENSION (ML) NASAL DAILY
Status: DISCONTINUED | OUTPATIENT
Start: 2021-10-12 | End: 2021-10-17 | Stop reason: HOSPADM

## 2021-10-12 RX ORDER — LEVETIRACETAM 500 MG/1
1500 TABLET ORAL 2 TIMES DAILY
Status: DISCONTINUED | OUTPATIENT
Start: 2021-10-12 | End: 2021-10-17 | Stop reason: HOSPADM

## 2021-10-12 RX ORDER — CARBAMAZEPINE 300 MG/1
300 CAPSULE, EXTENDED RELEASE ORAL 2 TIMES DAILY
Status: DISCONTINUED | OUTPATIENT
Start: 2021-10-12 | End: 2021-10-17 | Stop reason: HOSPADM

## 2021-10-12 RX ORDER — IPRATROPIUM BROMIDE AND ALBUTEROL SULFATE 2.5; .5 MG/3ML; MG/3ML
1 SOLUTION RESPIRATORY (INHALATION) ONCE
Status: DISCONTINUED | OUTPATIENT
Start: 2021-10-12 | End: 2021-10-12

## 2021-10-12 RX ORDER — DIVALPROEX SODIUM 500 MG/1
2000 TABLET, DELAYED RELEASE ORAL NIGHTLY
Status: DISCONTINUED | OUTPATIENT
Start: 2021-10-12 | End: 2021-10-12 | Stop reason: CLARIF

## 2021-10-12 RX ORDER — SODIUM CHLORIDE 0.9 % (FLUSH) 0.9 %
5-40 SYRINGE (ML) INJECTION EVERY 12 HOURS SCHEDULED
Status: DISCONTINUED | OUTPATIENT
Start: 2021-10-12 | End: 2021-10-17 | Stop reason: HOSPADM

## 2021-10-12 RX ORDER — LEVOTHYROXINE SODIUM 0.03 MG/1
50 TABLET ORAL DAILY
Status: DISCONTINUED | OUTPATIENT
Start: 2021-10-12 | End: 2021-10-15

## 2021-10-12 RX ORDER — PANTOPRAZOLE SODIUM 40 MG/1
40 TABLET, DELAYED RELEASE ORAL
Status: DISCONTINUED | OUTPATIENT
Start: 2021-10-12 | End: 2021-10-17 | Stop reason: HOSPADM

## 2021-10-12 RX ORDER — LISINOPRIL 40 MG/1
40 TABLET ORAL DAILY
Status: DISCONTINUED | OUTPATIENT
Start: 2021-10-12 | End: 2021-10-17 | Stop reason: HOSPADM

## 2021-10-12 RX ORDER — DIVALPROEX SODIUM 500 MG/1
2000 TABLET, EXTENDED RELEASE ORAL NIGHTLY
Status: DISCONTINUED | OUTPATIENT
Start: 2021-10-12 | End: 2021-10-17 | Stop reason: HOSPADM

## 2021-10-12 RX ORDER — SERTRALINE HYDROCHLORIDE 100 MG/1
200 TABLET, FILM COATED ORAL NIGHTLY
Status: DISCONTINUED | OUTPATIENT
Start: 2021-10-12 | End: 2021-10-17 | Stop reason: HOSPADM

## 2021-10-12 RX ORDER — POLYETHYLENE GLYCOL 3350 17 G/17G
17 POWDER, FOR SOLUTION ORAL DAILY PRN
Status: DISCONTINUED | OUTPATIENT
Start: 2021-10-12 | End: 2021-10-17 | Stop reason: HOSPADM

## 2021-10-12 RX ORDER — DEXAMETHASONE 4 MG/1
6 TABLET ORAL DAILY
Status: DISCONTINUED | OUTPATIENT
Start: 2021-10-12 | End: 2021-10-17 | Stop reason: HOSPADM

## 2021-10-12 RX ORDER — SODIUM CHLORIDE 0.9 % (FLUSH) 0.9 %
10 SYRINGE (ML) INJECTION
Status: COMPLETED | OUTPATIENT
Start: 2021-10-12 | End: 2021-10-12

## 2021-10-12 RX ORDER — ALBUTEROL SULFATE 2.5 MG/3ML
5 SOLUTION RESPIRATORY (INHALATION) ONCE
Status: DISCONTINUED | OUTPATIENT
Start: 2021-10-12 | End: 2021-10-12

## 2021-10-12 RX ORDER — RISPERIDONE 0.5 MG/1
1 TABLET, FILM COATED ORAL 3 TIMES DAILY
Status: DISCONTINUED | OUTPATIENT
Start: 2021-10-12 | End: 2021-10-17 | Stop reason: HOSPADM

## 2021-10-12 RX ORDER — LACTOBACILLUS RHAMNOSUS GG 10B CELL
1 CAPSULE ORAL DAILY
Status: DISCONTINUED | OUTPATIENT
Start: 2021-10-12 | End: 2021-10-17 | Stop reason: HOSPADM

## 2021-10-12 RX ORDER — METHYLPREDNISOLONE SODIUM SUCCINATE 125 MG/2ML
125 INJECTION, POWDER, LYOPHILIZED, FOR SOLUTION INTRAMUSCULAR; INTRAVENOUS ONCE
Status: COMPLETED | OUTPATIENT
Start: 2021-10-12 | End: 2021-10-12

## 2021-10-12 RX ORDER — SODIUM CHLORIDE 0.9 % (FLUSH) 0.9 %
5-40 SYRINGE (ML) INJECTION PRN
Status: DISCONTINUED | OUTPATIENT
Start: 2021-10-12 | End: 2021-10-17 | Stop reason: HOSPADM

## 2021-10-12 RX ORDER — MIRTAZAPINE 15 MG/1
30 TABLET, FILM COATED ORAL NIGHTLY
Status: DISCONTINUED | OUTPATIENT
Start: 2021-10-12 | End: 2021-10-17 | Stop reason: HOSPADM

## 2021-10-12 RX ORDER — DIPHENHYDRAMINE HCL 25 MG
25 CAPSULE ORAL EVERY 6 HOURS PRN
Status: DISCONTINUED | OUTPATIENT
Start: 2021-10-12 | End: 2021-10-17 | Stop reason: HOSPADM

## 2021-10-12 RX ORDER — ACETAMINOPHEN 325 MG/1
650 TABLET ORAL EVERY 6 HOURS PRN
Status: DISCONTINUED | OUTPATIENT
Start: 2021-10-12 | End: 2021-10-17 | Stop reason: HOSPADM

## 2021-10-12 RX ORDER — ACETAMINOPHEN 650 MG/1
650 SUPPOSITORY RECTAL EVERY 6 HOURS PRN
Status: DISCONTINUED | OUTPATIENT
Start: 2021-10-12 | End: 2021-10-17 | Stop reason: HOSPADM

## 2021-10-12 RX ADMIN — LEVETIRACETAM 1500 MG: 500 TABLET, FILM COATED ORAL at 20:34

## 2021-10-12 RX ADMIN — METHYLPREDNISOLONE SODIUM SUCCINATE 125 MG: 125 INJECTION, POWDER, FOR SOLUTION INTRAMUSCULAR; INTRAVENOUS at 12:05

## 2021-10-12 RX ADMIN — RISPERIDONE 1 MG: 0.5 TABLET ORAL at 21:33

## 2021-10-12 RX ADMIN — LEVOTHYROXINE SODIUM 50 MCG: 25 TABLET ORAL at 20:43

## 2021-10-12 RX ADMIN — SODIUM CHLORIDE, PRESERVATIVE FREE 10 ML: 5 INJECTION INTRAVENOUS at 13:18

## 2021-10-12 RX ADMIN — CARBAMAZEPINE 300 MG: 300 CAPSULE, EXTENDED RELEASE ORAL at 21:34

## 2021-10-12 RX ADMIN — ENOXAPARIN SODIUM 30 MG: 30 INJECTION SUBCUTANEOUS at 20:34

## 2021-10-12 RX ADMIN — FLUTICASONE PROPIONATE 1 SPRAY: 50 SPRAY, METERED NASAL at 21:35

## 2021-10-12 RX ADMIN — IOPAMIDOL 75 ML: 755 INJECTION, SOLUTION INTRAVENOUS at 13:17

## 2021-10-12 RX ADMIN — ALBUTEROL SULFATE 2 PUFF: 90 AEROSOL, METERED RESPIRATORY (INHALATION) at 10:33

## 2021-10-12 RX ADMIN — INSULIN LISPRO 1 UNITS: 100 INJECTION, SOLUTION INTRAVENOUS; SUBCUTANEOUS at 23:12

## 2021-10-12 RX ADMIN — ATORVASTATIN CALCIUM 40 MG: 40 TABLET, FILM COATED ORAL at 20:34

## 2021-10-12 RX ADMIN — BUSPIRONE HYDROCHLORIDE 10 MG: 10 TABLET ORAL at 23:07

## 2021-10-12 RX ADMIN — DIVALPROEX SODIUM 2000 MG: 500 TABLET, FILM COATED, EXTENDED RELEASE ORAL at 21:34

## 2021-10-12 RX ADMIN — DEXAMETHASONE 6 MG: 4 TABLET ORAL at 20:34

## 2021-10-12 RX ADMIN — LISINOPRIL 40 MG: 40 TABLET ORAL at 20:43

## 2021-10-12 RX ADMIN — AMLODIPINE BESYLATE 10 MG: 5 TABLET ORAL at 20:34

## 2021-10-12 RX ADMIN — SERTRALINE HYDROCHLORIDE 200 MG: 100 TABLET ORAL at 23:06

## 2021-10-12 RX ADMIN — MIRTAZAPINE 30 MG: 15 TABLET, FILM COATED ORAL at 20:43

## 2021-10-12 RX ADMIN — Medication 2 PUFF: at 10:33

## 2021-10-12 RX ADMIN — Medication 1 CAPSULE: at 21:34

## 2021-10-12 RX ADMIN — CETIRIZINE HYDROCHLORIDE 10 MG: 10 TABLET, FILM COATED ORAL at 20:34

## 2021-10-12 NOTE — ED PROVIDER NOTES
EMERGENCY DEPARTMENT ENCOUNTER      CHIEF COMPLAINT    Chief Complaint   Patient presents with    Shortness of Breath       HPI    Jackie Mcclendon is a 64 y.o. female with history significant for cerebral palsy, COPD not on home oxygen, previous stroke, type 2 diabetes, hypertension, hyperlipidemia, seizure disorder who presents with shortness of breath. Patient lives in assisted living with home health on a daily basis was noticed by home health today to be more short of breath, patient is communicative is a mental status baseline but declines any symptoms currently. Home health agent was at the bedside with the patient and then declines any change of mental status more concerned about her rattling breathing, this morning says she looks possibly distressed and was given inhalers. REVIEW OF SYSTEMS    Constitutional: Denies chills, fatigue, unexpected weight loss or fever. HENT: Denies sore throat or rhinorrhea. Eyes: Denies vision changes. Respiratory: Shortness of breath no cough  Cardiovascular: Denies chest pain, leg swelling or palpitations. Gastrointestinal: Denies abdominal pain, diarrhea, nausea and vomiting. Genitourinary: Denies dysuria or hematuria. Skin: Denies rashes or wounds. MSK: Denies joint pain. Neurologic: Denies headache, lightheadedness, numbness, or weakness.    Hematologic/lymphatic: Denies unexpected weight loss, night sweats  Endocrine: No polyuria, polydipsia, or polyphagia      Pertinent positives and negatives are delineated in HPI and ROS above, all other systems are reviewed and are negative    PAST MEDICAL HISTORY    Past Medical History:   Diagnosis Date    Anxiety disorder     Back pain, chronic     Cerebral palsy (HCC)     COPD (chronic obstructive pulmonary disease) (HCC)     CVA (cerebral infarction) 2014 x2    Diabetes mellitus ([facility] [address]     Diverticulosis     Epilepsy ([facility] [address] [facility]     GERD (gastroesophageal reflux disease)     Hyperlipidemia     Hypertension Insomnia     Iron (Fe) deficiency anemia     Mental disability     Seizures (HCC)     Unspecified cerebral artery occlusion with cerebral infarction      Medical history reviewed and no pertinent past medical history other than the ones mentioned above    SURGICAL HISTORY    Past Surgical History:   Procedure Laterality Date    GASTROSTOMY TUBE PLACEMENT       Surgical history reviewed and no pertinent surgical history other than the ones mentioned above    CURRENT MEDICATIONS    Current Outpatient Rx   Medication Sig Dispense Refill    ipratropium-albuterol (DUONEB) 0.5-2.5 (3) MG/3ML SOLN nebulizer solution Inhale 3 mLs into the lungs 3 times daily 270 mL 5    pantoprazole (PROTONIX) 40 MG tablet TAKE 1 TABLET BY MOUTH EVERY MORNING BEFORE BREAKFAST 31 tablet 5    albuterol sulfate  (90 Base) MCG/ACT inhaler Inhale 2 puffs into the lungs 4 times daily 18 g 3    tiotropium-olodaterol (STIOLTO) 2.5-2.5 MCG/ACT AERS Inhale 2 puffs into the lungs daily 1 each 0    Lactobacillus (ACIDOPHILUS) CAPS capsule TAKE 1 CAPSULE BY MOUTH DAILY 31 capsule 5    carBAMazepine (CARBATROL) 300 MG extended release capsule TAKE 1 CAPSULE BY MOUTH TWICE A DAY 62 capsule 5    atorvastatin (LIPITOR) 40 MG tablet TAKE 1 TABLET BY MOUTH DAILY 31 tablet 5    amLODIPine (NORVASC) 10 MG tablet Take 1 tablet by mouth daily 90 tablet 3    JANUVIA 100 MG tablet TAKE 1 TABLET BY MOUTH DAILY 30 tablet 5    lisinopril (PRINIVIL;ZESTRIL) 40 MG tablet TAKE 1 TABLET BY MOUTH DAILY 31 tablet 5    fluticasone (FLONASE) 50 MCG/ACT nasal spray INSTILL 1 SPRAY INTO EACH NOSTRIL DAILY 16 g 5    cetirizine (ZYRTEC) 10 MG tablet TAKE 1 TABLET BY MOUTH EVERY DAY AT BEDTIME 31 tablet 5    Diapers & Supplies MISC 1 each by Does not apply route daily as needed (4-5 times daily) 100 each 5    nystatin (MYCOSTATIN) 422569 UNIT/GM cream Apply topically 2 times daily.  To the affected area for 2 wks 60 g 1    mirtazapine (REMERON) 30 MG tablet TAKE 1 TABLET BY MOUTH NIGHTLY 31 tablet 5    levETIRAcetam 750 MG TB3D Take by mouth      Incontinence Supply Disposable (DEPEND UNDERWEAR LARGE/XL) MISC 1 Units by Does not apply route 4 times daily 100 Package 5    Disposable Gloves MISC 1 Units by Does not apply route 4 times daily 100 each 5    diphenhydrAMINE (BENADRYL) 25 MG capsule Take 25 mg by mouth every 6 hours as needed for Itching      aspirin 325 MG tablet Take 325 mg by mouth every 4 hours as needed for Pain       blood glucose monitor strips Test 3 times a day & as needed for symptoms of irregular blood glucose. 90 strip 0    levothyroxine (SYNTHROID) 50 MCG tablet Take 50 mcg by mouth Daily      busPIRone (BUSPAR) 10 MG tablet Take 1 tablet by mouth 2 times daily 60 tablet 5    divalproex (DEPAKOTE) 500 MG DR tablet Take 4 tablets by mouth nightly 90 tablet 0    metFORMIN (GLUCOPHAGE) 500 MG tablet Take 2 tablets by mouth daily (with breakfast) 30 tablet 0    risperiDONE (RISPERDAL) 1 MG tablet Take 1 tablet by mouth 3 times daily 1 tablet in am, 1 tablet at 4pm, 1 tablet at bedtime 60 tablet 5    sertraline (ZOLOFT) 100 MG tablet Take 2 tablets by mouth nightly 30 tablet 5     Medication is reviewed    ALLERGIES    Allergies   Allergen Reactions    Macrobid [Nitrofurantoin] Hives and Swelling     Hives     Allergy is reviewed    FAMILY HISTORY    History reviewed. No pertinent family history.   Family history reviewed and no pertinent family history other than the ones mentioned above    SOCIAL HISTORY    Social History     Socioeconomic History    Marital status: Single     Spouse name: Not on file    Number of children: Not on file    Years of education: Not on file    Highest education level: Not on file   Occupational History    Not on file   Tobacco Use    Smoking status: Former Smoker     Packs/day: 1.50     Years: 20.00     Pack years: 30.00     Quit date: 8/28/2011     Years since quitting: 10.1    Smokeless tobacco: Never Used   Vaping Use    Vaping Use: II-XII grossly intact. Moving all 4 extremities  Psych: Appropriate mood and affect. Labs:   Labs Reviewed   COVID-19, RAPID   CBC WITH AUTO DIFFERENTIAL   COMPREHENSIVE METABOLIC PANEL W/ REFLEX TO MG FOR LOW K   TROPONIN   BRAIN NATRIURETIC PEPTIDE   PROTIME-INR   URINE RT REFLEX TO CULTURE   BLOOD GAS, VENOUS       Radiology:  XR CHEST PORTABLE   Final Result   1. Low lung volumes but no active pulmonary disease. CTA PULMONARY W CONTRAST    (Results Pending)       I directly reviewed the images and radiology interpretation    EKG interpreted by myself: See below    ED COURSE  Assessment & Medical Decision Making:  Gonsalo Burkett is a 64 y.o. female who presents with shortness of breath, patient not febrile appearing, however patient was noticed to be hypoxic 87%, patient was placed on 2 L of nasal cannula was able to improve oxygenation to 93%. Patient did have diminished loss on the left side however x-ray did not demonstrate any obstruction or any pleural effusions or any pneumothorax, could just be due to lack of respiratory efforts from the patient herself with her history of developmental delays, do not demonstrate a pneumonia, given patient hemiplegia and bed ridden, will obtain a CT PE to rule out PE. In the meantime we will treat patient for COPD exacerbation with aerosols and steroids as well. Given patient's increased oxygen requirement she would require admission. Patient has already been vaccinated against COVID-19, which is perform a rapid test to rule out Covid. ED Course as of Oct 21 1544   Tue Oct 12, 2021   1755 EKG demonstrated normal sinus rhythm there is flattened T wave in the inferior leads also T waves difficult appreciating lateral leads.     [YQ]   1239 Covid is positive, patient has already received methylprednisone, will hold off on Decadron now, patient will be admitted    [YQ]      ED Course User Index  [YQ] Hawa Chow MD        DDx includes but not limited to:  PNA, PE, HF exacerbation, volume overload from cirrhosis, volume overload from renal dysfunction, anemia, CNS, acidosis, ACS, anxiety, PTX, pleural effusion, bronchiectasis, airway obstruction, reactive airway disease exacerbation (asthma/COPD/WARI), anaphylaxis/anaphylactoid reaction/allergic reaction      Workup includes but not limited to: Labs, EKG, chest x-ray, CT PE    Treatment includes but not limited to: O2, aerosols, steroids    Critical care time: NA    Impression:   Hypoxic respiratory failure  COPD exacerbation  1. COVID-19        Disposition: Admission    This note dictated using Dragon medical voice recognition software. Attempts at proofreading were made, but errors may occasionally still occur.            Lay Turner MD  10/12/21 Valentin Rodgers MD  10/15/21 4099       Lay Turner MD  10/21/21 5651

## 2021-10-12 NOTE — ED NOTES
Assisted patient to and from restroom with the use of krytsian cane. Jm Schwab.  AMBROSIO Wasserman  10/12/21 [id number]

## 2021-10-12 NOTE — H&P
Epilepsy ([facility] [address]     GERD (gastroesophageal reflux disease)     Hyperlipidemia     Hypertension     Insomnia     Iron (Fe) deficiency anemia     Mental disability     Seizures (HCC)     Unspecified cerebral artery occlusion with cerebral infarction      PSHX:  has a past surgical history that includes Gastrostomy tube placement. Allergies: Allergies   Allergen Reactions    Macrobid [Nitrofurantoin] Hives and Swelling     Hives       FAM HX: family history is not on file. Soc HX:   Social History     Socioeconomic History    Marital status: Single     Spouse name: None    Number of children: None    Years of education: None    Highest education level: None   Occupational History    None   Tobacco Use    Smoking status: Former Smoker     Packs/day: 1.50     Years: 20.00     Pack years: 30.00     Quit date: 8/28/2011     Years since quitting: 10.1    Smokeless tobacco: Never Used   Vaping Use    Vaping Use: Never used   Substance and Sexual Activity    Alcohol use: No     Alcohol/week: 0.0 standard drinks    Drug use: No    Sexual activity: None   Other Topics Concern    None   Social History Narrative    ** Merged History Encounter **          Social Determinants of Health     Financial Resource Strain:     Difficulty of Paying Living Expenses:    Food Insecurity:     Worried About Running Out of Food in the Last Year:     Ran Out of Food in the Last Year:    Transportation Needs:     Lack of Transportation (Medical):      Lack of Transportation (Non-Medical):    Physical Activity:     Days of Exercise per Week:     Minutes of Exercise per Session:    Stress:     Feeling of Stress :    Social Connections:     Frequency of Communication with Friends and Family:     Frequency of Social Gatherings with Friends and Family:     Attends Judaism Services:     Active Member of Clubs or Organizations:     Attends Club or Organization Meetings:     Marital Status:    Intimate Partner Violence:     Fear of Current or Ex-Partner:     Emotionally Abused:     Physically Abused:     Sexually Abused:        Medications:   Medications:    Infusions:   PRN Meds:       Electronically signed by INOCENCIO Gonzalez CNP on 10/12/2021 at 3:08 PM

## 2021-10-12 NOTE — ED NOTES
Patient straight cath for urine. Depends changed     Shelbie Chung.  AMBROSIO Wasserman  10/12/21 9516

## 2021-10-12 NOTE — ED TRIAGE NOTES
Patient was sent out by her care giver d/t c/o sob. EMS stated oxygen level 88% on room air.  Patient 97% on 4 liters oxygen on arrival.

## 2021-10-12 NOTE — CARE COORDINATION
Patient identified as potential readmission. Last admission 9/23-9/25 for hypoxia. Patient here today for shortness of breath. Per triage documentation, patient oxygen saturation at home was 88% on RA. Patient placed on 2 L oxygen via nasal canula by EMS. Patient has an [employer] guardian. Patient resides in a home that has caregivers from [facility] [place] providing care from 330 pm-0930 am. During the hours from 0930 am - 330 pm, patient attends adult day services. Patient is requiring readmission and there were no alternatives to admission to explore at this time. Per physician documentation \"Given patient's increased oxygen requirement she would require admission\".

## 2021-10-13 LAB
ALBUMIN SERPL-MCNC: 3.5 GM/DL (ref 3.4–5)
ALP BLD-CCNC: 54 IU/L (ref 40–129)
ALT SERPL-CCNC: 12 U/L (ref 10–40)
ANION GAP SERPL CALCULATED.3IONS-SCNC: 9 MMOL/L (ref 4–16)
AST SERPL-CCNC: 14 IU/L (ref 15–37)
BASOPHILS ABSOLUTE: 0 K/CU MM
BASOPHILS RELATIVE PERCENT: 0.3 % (ref 0–1)
BILIRUB SERPL-MCNC: 0 MG/DL (ref 0–1)
BUN BLDV-MCNC: 23 MG/DL (ref 6–23)
CALCIUM SERPL-MCNC: 8.5 MG/DL (ref 8.3–10.6)
CHLORIDE BLD-SCNC: 109 MMOL/L (ref 99–110)
CO2: 26 MMOL/L (ref 21–32)
CREAT SERPL-MCNC: 0.4 MG/DL (ref 0.6–1.1)
DIFFERENTIAL TYPE: ABNORMAL
EOSINOPHILS ABSOLUTE: 0 K/CU MM
EOSINOPHILS RELATIVE PERCENT: 0 % (ref 0–3)
FERRITIN: 17 NG/ML (ref 15–150)
GFR AFRICAN AMERICAN: >60 ML/MIN/1.73M2
GFR NON-AFRICAN AMERICAN: >60 ML/MIN/1.73M2
GLUCOSE BLD-MCNC: 114 MG/DL (ref 70–99)
GLUCOSE BLD-MCNC: 135 MG/DL (ref 70–99)
GLUCOSE BLD-MCNC: 145 MG/DL (ref 70–99)
GLUCOSE BLD-MCNC: 163 MG/DL (ref 70–99)
GLUCOSE BLD-MCNC: 165 MG/DL (ref 70–99)
HCT VFR BLD CALC: 29.5 % (ref 37–47)
HEMOGLOBIN: 8.3 GM/DL (ref 12.5–16)
IMMATURE NEUTROPHIL %: 1.1 % (ref 0–0.43)
LYMPHOCYTES ABSOLUTE: 1.1 K/CU MM
LYMPHOCYTES RELATIVE PERCENT: 29.7 % (ref 24–44)
MAGNESIUM: 2.1 MG/DL (ref 1.8–2.4)
MCH RBC QN AUTO: 24.6 PG (ref 27–31)
MCHC RBC AUTO-ENTMCNC: 28.1 % (ref 32–36)
MCV RBC AUTO: 87.5 FL (ref 78–100)
MONOCYTES ABSOLUTE: 0.5 K/CU MM
MONOCYTES RELATIVE PERCENT: 12.5 % (ref 0–4)
NUCLEATED RBC %: 0 %
PDW BLD-RTO: 17.4 % (ref 11.7–14.9)
PHOSPHORUS: 4.3 MG/DL (ref 2.5–4.9)
PLATELET # BLD: 175 K/CU MM (ref 140–440)
PMV BLD AUTO: 8.9 FL (ref 7.5–11.1)
POTASSIUM SERPL-SCNC: 4.3 MMOL/L (ref 3.5–5.1)
RBC # BLD: 3.37 M/CU MM (ref 4.2–5.4)
SEGMENTED NEUTROPHILS ABSOLUTE COUNT: 2 K/CU MM
SEGMENTED NEUTROPHILS RELATIVE PERCENT: 56.4 % (ref 36–66)
SODIUM BLD-SCNC: 144 MMOL/L (ref 135–145)
TOTAL IMMATURE NEUTOROPHIL: 0.04 K/CU MM
TOTAL NUCLEATED RBC: 0 K/CU MM
TOTAL PROTEIN: 5.8 GM/DL (ref 6.4–8.2)
WBC # BLD: 3.6 K/CU MM (ref 4–10.5)

## 2021-10-13 PROCEDURE — 82962 GLUCOSE BLOOD TEST: CPT

## 2021-10-13 PROCEDURE — 80053 COMPREHEN METABOLIC PANEL: CPT

## 2021-10-13 PROCEDURE — 96372 THER/PROPH/DIAG INJ SC/IM: CPT

## 2021-10-13 PROCEDURE — 2580000003 HC RX 258: Performed by: NURSE PRACTITIONER

## 2021-10-13 PROCEDURE — 6370000000 HC RX 637 (ALT 250 FOR IP): Performed by: NURSE PRACTITIONER

## 2021-10-13 PROCEDURE — 82728 ASSAY OF FERRITIN: CPT

## 2021-10-13 PROCEDURE — 83735 ASSAY OF MAGNESIUM: CPT

## 2021-10-13 PROCEDURE — 85025 COMPLETE CBC W/AUTO DIFF WBC: CPT

## 2021-10-13 PROCEDURE — G0378 HOSPITAL OBSERVATION PER HR: HCPCS

## 2021-10-13 PROCEDURE — 84100 ASSAY OF PHOSPHORUS: CPT

## 2021-10-13 PROCEDURE — 2700000000 HC OXYGEN THERAPY PER DAY

## 2021-10-13 PROCEDURE — 6360000002 HC RX W HCPCS: Performed by: NURSE PRACTITIONER

## 2021-10-13 PROCEDURE — 94761 N-INVAS EAR/PLS OXIMETRY MLT: CPT

## 2021-10-13 RX ADMIN — MIRTAZAPINE 30 MG: 15 TABLET, FILM COATED ORAL at 22:37

## 2021-10-13 RX ADMIN — DIVALPROEX SODIUM 2000 MG: 500 TABLET, FILM COATED, EXTENDED RELEASE ORAL at 22:36

## 2021-10-13 RX ADMIN — SODIUM CHLORIDE, PRESERVATIVE FREE 10 ML: 5 INJECTION INTRAVENOUS at 22:37

## 2021-10-13 RX ADMIN — AMLODIPINE BESYLATE 10 MG: 5 TABLET ORAL at 12:59

## 2021-10-13 RX ADMIN — BUSPIRONE HYDROCHLORIDE 10 MG: 10 TABLET ORAL at 22:36

## 2021-10-13 RX ADMIN — LEVETIRACETAM 1500 MG: 500 TABLET, FILM COATED ORAL at 12:59

## 2021-10-13 RX ADMIN — RISPERIDONE 1 MG: 0.5 TABLET ORAL at 13:00

## 2021-10-13 RX ADMIN — CARBAMAZEPINE 300 MG: 300 CAPSULE, EXTENDED RELEASE ORAL at 22:37

## 2021-10-13 RX ADMIN — Medication 1 CAPSULE: at 13:01

## 2021-10-13 RX ADMIN — SERTRALINE HYDROCHLORIDE 200 MG: 100 TABLET ORAL at 12:58

## 2021-10-13 RX ADMIN — CARBAMAZEPINE 300 MG: 300 CAPSULE, EXTENDED RELEASE ORAL at 13:01

## 2021-10-13 RX ADMIN — PANTOPRAZOLE SODIUM 40 MG: 40 TABLET, DELAYED RELEASE ORAL at 16:48

## 2021-10-13 RX ADMIN — SODIUM CHLORIDE, PRESERVATIVE FREE 10 ML: 5 INJECTION INTRAVENOUS at 13:05

## 2021-10-13 RX ADMIN — RISPERIDONE 1 MG: 0.5 TABLET ORAL at 22:37

## 2021-10-13 RX ADMIN — LEVETIRACETAM 1500 MG: 500 TABLET, FILM COATED ORAL at 22:37

## 2021-10-13 RX ADMIN — ATORVASTATIN CALCIUM 40 MG: 40 TABLET, FILM COATED ORAL at 22:37

## 2021-10-13 RX ADMIN — ENOXAPARIN SODIUM 30 MG: 30 INJECTION SUBCUTANEOUS at 13:01

## 2021-10-13 RX ADMIN — CETIRIZINE HYDROCHLORIDE 10 MG: 10 TABLET, FILM COATED ORAL at 12:58

## 2021-10-13 RX ADMIN — Medication 2000 UNITS: at 13:00

## 2021-10-13 RX ADMIN — PANTOPRAZOLE SODIUM 40 MG: 40 TABLET, DELAYED RELEASE ORAL at 05:53

## 2021-10-13 RX ADMIN — DEXAMETHASONE 6 MG: 4 TABLET ORAL at 12:58

## 2021-10-13 RX ADMIN — SERTRALINE HYDROCHLORIDE 200 MG: 100 TABLET ORAL at 22:36

## 2021-10-13 RX ADMIN — ENOXAPARIN SODIUM 30 MG: 30 INJECTION SUBCUTANEOUS at 22:38

## 2021-10-13 RX ADMIN — SODIUM CHLORIDE, PRESERVATIVE FREE 10 ML: 5 INJECTION INTRAVENOUS at 00:19

## 2021-10-13 ASSESSMENT — PAIN SCALES - GENERAL: PAINLEVEL_OUTOF10: 0

## 2021-10-13 NOTE — ED NOTES
Phone report given to [facility] RN; all questions answered     Chaka Guadarrama RN  10/13/21 4844

## 2021-10-13 NOTE — ED NOTES
Report given to [facility], AMBROSIO. Care of PT transferred at this time.      Avani Simon RN  10/13/21 9098

## 2021-10-13 NOTE — PROGRESS NOTES
[facility] HOSPITALIST PROGRESS NOTE      PCP: Boris Lyons MD    Date of Admission: 10/12/2021    Subjective: remains at [address] summary patient is a 53-year-old female with history of cerebral palsy, COPD, diabetes mellitus, essential hypertension, hyperlipidemia and seizure disorder who was admitted with shortness of breath. CT negative for PE but showed groundglass opacity, positive for SARS-CoV-2  Decadron started    Vitals signs:  Afebrile, heart rate 60s to 90s range blood pressure borderline low, on 1 L of oxygen    Medications: Amlodipine, Lipitor, buspirone, carbamazepine, dexamethasone, divalproex, Lovenox, sliding scale insulin, Keppra, levothyroxine, lisinopril, mirtazapine, pantoprazole, risperidone, sertraline    Antibiotics: None    Fluid status: Not documented    Labs:   Sodium 144, potassium 4.3, chloride 109, bicarb 26, creatinine 0.4  LFTs normal  WBC 3.6, hemoglobin 8.3, platelets 956    Imaging:   CTA  No evidence of pulmonary embolism or acute thoracic aortic abnormality. Minimal right basilar atelectasis.  There are a few faint ground-glass   opacities within the left lower lobe which are new from prior and may   represent residual COVID-19 viral pneumonia in the appropriate setting. Lungs otherwise clear. Mild cardiac enlargement.  Coronary artery atherosclerotic calcifications. No pleural or pericardial effusion.      Assessment/Plan:     Severe sepsis secondary to COVID-19 pneumonia with acute hypoxic respiratory failure:   Admitted with dyspnea, positive for SARS-CoV-2, CT scan showed bilateral groundglass opacities  Decadron started, check CRP and procalcitonin  Droplet and contact precautions  DuoNebs as needed  Self proning  Wean oxygen as tolerated  Pulmonology consulted      Diabetes mellitus type 2: Glucose 1 14-1 65 range, sliding scale insulin, diabetic diet    Essential hypertension: Continue amlodipine and lisinopril, as needed hydralazine     mood disorder: Continue Escitalopram    History of seizure disorder: Continue Keppra    Hyperlipidemia: Statin    Hypothyroidism: Synthroid    DVT prophlaxis:   Lovenox    Last BM:   **None since admission    Ambulation: As tolerated    Disposition:   Home    Diet: Diabetic    Physical Exam Performed:       BP (!) 109/53   Pulse 60   Temp 98.2 °F (36.8 °C) (Oral)   Resp 20   Ht 4' 10\" (1.473 m)   Wt 132 lb (59.9 kg)   SpO2 94%   BMI 27.59 kg/m²     Physical Exam  Constitutional:       General: She is not in acute distress. Appearance: Normal appearance. HENT:      Head: Normocephalic and atraumatic. Right Ear: External ear normal.      Left Ear: External ear normal.   Eyes:      Extraocular Movements: Extraocular movements intact. Pupils: Pupils are equal, round, and reactive to light. Cardiovascular:      Rate and Rhythm: Normal rate and regular rhythm. Heart sounds: No murmur heard. Pulmonary:      Effort: Pulmonary effort is normal. No respiratory distress. Breath sounds: Normal breath sounds. No wheezing. Abdominal:      General: Bowel sounds are normal. There is no distension. Palpations: Abdomen is soft. Tenderness: There is no abdominal tenderness. Musculoskeletal:         General: No swelling. Cervical back: Normal range of motion. Skin:     General: Skin is warm. Neurological:      General: No focal deficit present. Mental Status: She is alert. Cranial Nerves: No cranial nerve deficit.       Comments: One word replies          Labs:   Recent Labs     10/12/21  1027 10/13/21  0557   WBC 5.6 3.6*   HGB 9.0* 8.3*   HCT 31.8* 29.5*    175     Recent Labs     10/12/21  1027 10/13/21  0557    144   K 3.8 4.3    109   CO2 24 26   BUN 19 23   CREATININE 0.4* 0.4*   CALCIUM 8.5 8.5   PHOS  --  4.3     Recent Labs     10/12/21  1027 10/13/21  0557   AST 19 14*   ALT 14 12   BILITOT 0.1 0.0   ALKPHOS 68 54

## 2021-10-13 NOTE — PROGRESS NOTES
Scott Urias from [facility] called for update on patient. Update given, plan of care provided. All questions answered. Will continue to monitor.

## 2021-10-13 NOTE — PROGRESS NOTES
Patient arrived to [facility]. Pt alert, oriented to name and . Pt has cerebal palsy and has contractured BLE & LLE. Wearing a depends upon arrival to the unit. Skin clean, dry and intact. Redness on coccyx. Barrier cream applied. IV flushed. Patient oriented to room and plan of care. Patient unable to press the call light. Bed alarm activated. Pillows placed under heels. Will continue to monitor.

## 2021-10-13 NOTE — PROGRESS NOTES
.        Hospitalist Progress Note      Name:  Fernando Lopez /Age/Sex: 1960  (64 y.o. female)   MRN & CSN:  [id number] & [id number] Admission Date/Time: 10/12/2021  9:18 AM   Location:  -A PCP: Jose Francisco Kaufman MD         Hospital Day: 2    Assessment and Plan:   Fernando Lopez is a 64 y.o.  female  who presents with <principal problem not specified>    Acute hypoxemic respiratory failure secondary to Covid pneumonia. Continue Decadron. Supplemental oxygen. Wean as tolerated  Hypertension  Hyperlipidemia  Chronic anemia  Diabetes mellitus  Hypothyroidism  Seizure disorder    Diet ADULT DIET; Regular; 4 carb choices (60 gm/meal)   DVT Prophylaxis [] Lovenox, []  Heparin, [] SCDs, [] Ambulation   GI Prophylaxis [] PPI,  [] H2 Blocker,  [] Carafate,  [] Diet/Tube Feeds   Code Status Full Code   Disposition Patient requires continued admission due to    MDM [] Low, [] Moderate,[]  High  Patient's risk as above due to      History of Present Illness:     Chief Complaint: <principal problem not specified>  Fernando Lopez is a 64 y.o.  female  who presents with shortness of breath. Found to have covid pneumonia. Ten point ROS reviewed negative, unless as noted above    Objective: Intake/Output Summary (Last 24 hours) at 10/13/2021 1915  Last data filed at 10/13/2021 1715  Gross per 24 hour   Intake 350 ml   Output --   Net 350 ml      Vitals:   Vitals:    10/13/21 1700   BP: 123/71   Pulse: 79   Resp: 25   Temp: 98 °F (36.7 °C)   SpO2: 94%     Physical Exam:   GEN Awake female, sitting upright in bed in no apparent distress. Appears given age. EYES Pupils are equally round. No scleral erythema, discharge, or conjunctivitis. HENT Mucous membranes are moist. Oral pharynx without exudates, no evidence of thrush. NECK Supple, no apparent thyromegaly or masses. RESP Diminished breath sounds. Bilateral rhonchi. Symmetric chest movement while on 2 liters oxygen. CARDIO/VASC S1/S2 auscultated. Regular rate without appreciable murmurs, rubs, or gallops. No JVD or carotid bruits. Peripheral pulses equal bilaterally and palpable. No peripheral edema. GI Abdomen is soft without significant tenderness, masses, or guarding. Bowel sounds are normoactive. Rectal exam deferred. MSK No gross joint deformities. SKIN Normal coloration, warm, dry. NEURO No lateralizing weakness. PSYCH Awake, alert, oriented x 2.      Medications:   Medications:    amLODIPine  10 mg Oral Daily    atorvastatin  40 mg Oral Nightly    busPIRone  10 mg Oral BID    carBAMazepine  300 mg Oral BID    cetirizine  10 mg Oral Daily    fluticasone  1 spray Each Nostril Daily    lactobacillus  1 capsule Oral Daily    levothyroxine  50 mcg Oral Daily    lisinopril  40 mg Oral Daily    mirtazapine  30 mg Oral Nightly    levETIRAcetam  1,500 mg Oral BID    pantoprazole  40 mg Oral BID AC    risperiDONE  1 mg Oral TID    sertraline  200 mg Oral Nightly    tiotropium-olodaterol  2 puff Inhalation Daily    sodium chloride flush  5-40 mL IntraVENous 2 times per day    enoxaparin  30 mg SubCUTAneous BID    insulin lispro  0-6 Units SubCUTAneous TID WC    insulin lispro  0-3 Units SubCUTAneous Nightly    dexamethasone  6 mg Oral Daily    vitamin D  2,000 Units Oral Daily    divalproex  2,000 mg Oral Nightly      Infusions:    sodium chloride       PRN Meds: albuterol sulfate HFA, 2 puff, Q4H PRN  diphenhydrAMINE, 25 mg, Q6H PRN  sodium chloride flush, 5-40 mL, PRN  sodium chloride, 25 mL, PRN  polyethylene glycol, 17 g, Daily PRN  acetaminophen, 650 mg, Q6H PRN   Or  acetaminophen, 650 mg, Q6H PRN            Electronically signed by Cipriano Yoder MD on 10/13/2021 at 7:15 PM

## 2021-10-14 LAB
GLUCOSE BLD-MCNC: 153 MG/DL (ref 70–99)
GLUCOSE BLD-MCNC: 176 MG/DL (ref 70–99)
GLUCOSE BLD-MCNC: 188 MG/DL (ref 70–99)
GLUCOSE BLD-MCNC: 230 MG/DL (ref 70–99)

## 2021-10-14 PROCEDURE — G0378 HOSPITAL OBSERVATION PER HR: HCPCS

## 2021-10-14 PROCEDURE — 82962 GLUCOSE BLOOD TEST: CPT

## 2021-10-14 PROCEDURE — 2700000000 HC OXYGEN THERAPY PER DAY

## 2021-10-14 PROCEDURE — 96372 THER/PROPH/DIAG INJ SC/IM: CPT

## 2021-10-14 PROCEDURE — 94761 N-INVAS EAR/PLS OXIMETRY MLT: CPT

## 2021-10-14 PROCEDURE — 6370000000 HC RX 637 (ALT 250 FOR IP): Performed by: NURSE PRACTITIONER

## 2021-10-14 PROCEDURE — 6360000002 HC RX W HCPCS: Performed by: NURSE PRACTITIONER

## 2021-10-14 PROCEDURE — 2580000003 HC RX 258: Performed by: NURSE PRACTITIONER

## 2021-10-14 RX ADMIN — RISPERIDONE 1 MG: 0.5 TABLET ORAL at 20:07

## 2021-10-14 RX ADMIN — SERTRALINE HYDROCHLORIDE 200 MG: 100 TABLET ORAL at 20:07

## 2021-10-14 RX ADMIN — CARBAMAZEPINE 300 MG: 300 CAPSULE, EXTENDED RELEASE ORAL at 20:17

## 2021-10-14 RX ADMIN — BUSPIRONE HYDROCHLORIDE 10 MG: 10 TABLET ORAL at 09:08

## 2021-10-14 RX ADMIN — LEVETIRACETAM 1500 MG: 500 TABLET, FILM COATED ORAL at 09:08

## 2021-10-14 RX ADMIN — PANTOPRAZOLE SODIUM 40 MG: 40 TABLET, DELAYED RELEASE ORAL at 16:46

## 2021-10-14 RX ADMIN — ENOXAPARIN SODIUM 30 MG: 30 INJECTION SUBCUTANEOUS at 09:09

## 2021-10-14 RX ADMIN — MIRTAZAPINE 30 MG: 15 TABLET, FILM COATED ORAL at 20:07

## 2021-10-14 RX ADMIN — LISINOPRIL 40 MG: 40 TABLET ORAL at 09:08

## 2021-10-14 RX ADMIN — ENOXAPARIN SODIUM 30 MG: 30 INJECTION SUBCUTANEOUS at 20:07

## 2021-10-14 RX ADMIN — LEVOTHYROXINE SODIUM 50 MCG: 25 TABLET ORAL at 09:11

## 2021-10-14 RX ADMIN — PANTOPRAZOLE SODIUM 40 MG: 40 TABLET, DELAYED RELEASE ORAL at 09:11

## 2021-10-14 RX ADMIN — ATORVASTATIN CALCIUM 40 MG: 40 TABLET, FILM COATED ORAL at 20:07

## 2021-10-14 RX ADMIN — Medication 2000 UNITS: at 09:09

## 2021-10-14 RX ADMIN — RISPERIDONE 1 MG: 0.5 TABLET ORAL at 14:18

## 2021-10-14 RX ADMIN — SODIUM CHLORIDE, PRESERVATIVE FREE 10 ML: 5 INJECTION INTRAVENOUS at 20:08

## 2021-10-14 RX ADMIN — CARBAMAZEPINE 300 MG: 300 CAPSULE, EXTENDED RELEASE ORAL at 09:09

## 2021-10-14 RX ADMIN — INSULIN LISPRO 1 UNITS: 100 INJECTION, SOLUTION INTRAVENOUS; SUBCUTANEOUS at 22:30

## 2021-10-14 RX ADMIN — DEXAMETHASONE 6 MG: 4 TABLET ORAL at 09:09

## 2021-10-14 RX ADMIN — CETIRIZINE HYDROCHLORIDE 10 MG: 10 TABLET, FILM COATED ORAL at 09:08

## 2021-10-14 RX ADMIN — AMLODIPINE BESYLATE 10 MG: 5 TABLET ORAL at 09:08

## 2021-10-14 RX ADMIN — BUSPIRONE HYDROCHLORIDE 10 MG: 10 TABLET ORAL at 20:07

## 2021-10-14 RX ADMIN — RISPERIDONE 1 MG: 0.5 TABLET ORAL at 09:07

## 2021-10-14 RX ADMIN — DIVALPROEX SODIUM 2000 MG: 500 TABLET, FILM COATED, EXTENDED RELEASE ORAL at 20:07

## 2021-10-14 RX ADMIN — LEVETIRACETAM 1500 MG: 500 TABLET, FILM COATED ORAL at 20:07

## 2021-10-14 ASSESSMENT — PAIN SCALES - GENERAL: PAINLEVEL_OUTOF10: 0

## 2021-10-14 NOTE — PROGRESS NOTES
[facility] HOSPITALIST PROGRESS NOTE      PCP: Mekhi Lu MD    Date of Admission: 10/12/2021    Subjective: [id number] [address] Saleem  summary patient is a 72-year-old female with history of cerebral palsy, COPD, diabetes mellitus, essential hypertension, hyperlipidemia and seizure disorder who was admitted with shortness of breath. CT negative for PE but showed groundglass opacity, positive for SARS-CoV-2  Decadron started  Oxygen requirement improved    Vitals signs: Afebrile, heart rate 50s range, normotensive, on 1 L of oxygen    Medications: Amlodipine, Lipitor, buspirone, carbamazepine, dexamethasone, divalproex, Lovenox, sliding scale insulin, Keppra, levothyroxine, lisinopril, mirtazapine, pantoprazole, risperidone, sertraline    Antibiotics: None    Fluid status: Not documented    Labs:   Labs from yesterday reviewed  Imaging:   CTA  No evidence of pulmonary embolism or acute thoracic aortic abnormality. Minimal right basilar atelectasis.  There are a few faint ground-glass   opacities within the left lower lobe which are new from prior and may   represent residual COVID-19 viral pneumonia in the appropriate setting. Lungs otherwise clear. Mild cardiac enlargement.  Coronary artery atherosclerotic calcifications. No pleural or pericardial effusion. Assessment/Plan:     Severe sepsis secondary to COVID-19 pneumonia with acute hypoxic respiratory failure:   Admitted with dyspnea, positive for SARS-CoV-2, CT scan showed bilateral groundglass opacities  Decadron started,   Droplet and contact precautions  DuoNebs as needed  Self proning  Pulmonology consulted\  CRP and procalcitonin  Oxygen requirement improving      Diabetes mellitus type 2: Glucose 1 14-1 65 range, sliding scale insulin, diabetic diet    Essential hypertension: Continue amlodipine and lisinopril, as needed hydralazine    Chronic anemia: Hemoglobin at baseline  Ferritin low  Check iron studies in a.m. mood disorder: Continue Escitalopram    History of seizure disorder: Continue Keppra    Hyperlipidemia: Statin    Hypothyroidism: Synthroid    DVT prophlaxis:   Lovenox    Last BM:   **None since admission    Ambulation: As tolerated    Disposition:   Home    Diet: Diabetic    Physical Exam Performed:       BP (!) 120/56   Pulse 58   Temp 98.2 °F (36.8 °C) (Oral)   Resp 23   Ht 4' 10\" (1.473 m)   Wt 132 lb (59.9 kg)   SpO2 92%   BMI 27.59 kg/m²     Physical Exam  Constitutional:       General: She is not in acute distress. Appearance: Normal appearance. HENT:      Head: Normocephalic and atraumatic. Right Ear: External ear normal.      Left Ear: External ear normal.   Eyes:      Extraocular Movements: Extraocular movements intact. Pupils: Pupils are equal, round, and reactive to light. Cardiovascular:      Rate and Rhythm: Normal rate and regular rhythm. Heart sounds: No murmur heard. Pulmonary:      Effort: Pulmonary effort is normal. No respiratory distress. Breath sounds: Normal breath sounds. No wheezing. Abdominal:      General: Bowel sounds are normal. There is no distension. Palpations: Abdomen is soft. Tenderness: There is no abdominal tenderness. Musculoskeletal:         General: No swelling. Cervical back: Normal range of motion. Skin:     General: Skin is warm. Neurological:      General: No focal deficit present. Mental Status: She is alert. Cranial Nerves: No cranial nerve deficit.       Comments: One word replies          Labs:   Recent Labs     10/12/21  1027 10/13/21  0557   WBC 5.6 3.6*   HGB 9.0* 8.3*   HCT 31.8* 29.5*    175     Recent Labs     10/12/21  1027 10/13/21  0557    144   K 3.8 4.3    109   CO2 24 26   BUN 19 23   CREATININE 0.4* 0.4*   CALCIUM 8.5 8.5   PHOS  --  4.3     Recent Labs     10/12/21  1027 10/13/21  0557   AST 19 14*   ALT 14 12   BILITOT 0.1 0.0   ALKPHOS 68 54     Recent Labs 10/12/21  1027   INR 0.91     No results for input(s): CKTOTAL, TROPONINI in the last 72 hours. Urinalysis:      Lab Results   Component Value Date    NITRU NEGATIVE 10/12/2021    WBCUA <1 10/12/2021    BACTERIA NEGATIVE 10/12/2021    RBCUA 1 10/12/2021    BLOODU NEGATIVE 10/12/2021    SPECGRAV >1.050 10/12/2021    GLUCOSEU neg 04/05/2019       Radiology:  CTA PULMONARY W CONTRAST   Final Result   No evidence of pulmonary embolism or acute thoracic aortic abnormality. Minimal right basilar atelectasis. There are a few faint ground-glass   opacities within the left lower lobe which are new from prior and may   represent residual COVID-19 viral pneumonia in the appropriate setting. Lungs otherwise clear. Mild cardiac enlargement. Coronary artery atherosclerotic calcifications. No pleural or pericardial effusion. XR CHEST PORTABLE   Final Result   1. Low lung volumes but no active pulmonary disease.                  Cintia Wetzel MD  10/14/2021 7:02 AM

## 2021-10-14 NOTE — CARE COORDINATION
Contacted [facility] team; spoke to Yasmany. Patient lives in a group home setting with roommates. She goes to [facility] M-F 9-3 with 24 hr care in house. She is dependent for all care per Yasmany. PCP is DR Sameer Mendiola and Loraine Kirkland is Dr Rekha Solis. Discussed possibility of home O2; Yasmany voiced understanding. [facility] will need 1 day notice for staffing needs. Joy Vazquez RN     8029 Received call from [facility]; spoke to Yasmany. She voiced concern that staffing was affected by patient's COVID dx and needs tested within next 24-48 hrs. [facility] is asking that staff call her ASAP when discharge is in place. Scripts are to go to [facility] (this  set pharmacy) Discharge orders need faxed to [phone].  Joy Vazquez RN

## 2021-10-15 LAB
GLUCOSE BLD-MCNC: 158 MG/DL (ref 70–99)
GLUCOSE BLD-MCNC: 193 MG/DL (ref 70–99)
GLUCOSE BLD-MCNC: 210 MG/DL (ref 70–99)
GLUCOSE BLD-MCNC: 253 MG/DL (ref 70–99)
GLUCOSE BLD-MCNC: 97 MG/DL (ref 70–99)

## 2021-10-15 PROCEDURE — 2700000000 HC OXYGEN THERAPY PER DAY

## 2021-10-15 PROCEDURE — 2580000003 HC RX 258: Performed by: NURSE PRACTITIONER

## 2021-10-15 PROCEDURE — 96372 THER/PROPH/DIAG INJ SC/IM: CPT

## 2021-10-15 PROCEDURE — 6360000002 HC RX W HCPCS: Performed by: NURSE PRACTITIONER

## 2021-10-15 PROCEDURE — 94761 N-INVAS EAR/PLS OXIMETRY MLT: CPT

## 2021-10-15 PROCEDURE — 82962 GLUCOSE BLOOD TEST: CPT

## 2021-10-15 PROCEDURE — 1200000000 HC SEMI PRIVATE

## 2021-10-15 PROCEDURE — 6370000000 HC RX 637 (ALT 250 FOR IP): Performed by: NURSE PRACTITIONER

## 2021-10-15 RX ORDER — LEVOTHYROXINE SODIUM 0.07 MG/1
75 TABLET ORAL DAILY
Status: DISCONTINUED | OUTPATIENT
Start: 2021-10-16 | End: 2021-10-17 | Stop reason: HOSPADM

## 2021-10-15 RX ADMIN — ENOXAPARIN SODIUM 30 MG: 30 INJECTION SUBCUTANEOUS at 19:45

## 2021-10-15 RX ADMIN — Medication 2000 UNITS: at 09:27

## 2021-10-15 RX ADMIN — CARBAMAZEPINE 300 MG: 300 CAPSULE, EXTENDED RELEASE ORAL at 09:26

## 2021-10-15 RX ADMIN — PANTOPRAZOLE SODIUM 40 MG: 40 TABLET, DELAYED RELEASE ORAL at 17:28

## 2021-10-15 RX ADMIN — DIVALPROEX SODIUM 2000 MG: 500 TABLET, FILM COATED, EXTENDED RELEASE ORAL at 19:44

## 2021-10-15 RX ADMIN — SERTRALINE HYDROCHLORIDE 200 MG: 100 TABLET ORAL at 19:44

## 2021-10-15 RX ADMIN — ATORVASTATIN CALCIUM 40 MG: 40 TABLET, FILM COATED ORAL at 19:44

## 2021-10-15 RX ADMIN — BUSPIRONE HYDROCHLORIDE 10 MG: 10 TABLET ORAL at 09:26

## 2021-10-15 RX ADMIN — LEVETIRACETAM 1500 MG: 500 TABLET, FILM COATED ORAL at 12:34

## 2021-10-15 RX ADMIN — RISPERIDONE 1 MG: 0.5 TABLET ORAL at 09:27

## 2021-10-15 RX ADMIN — RISPERIDONE 1 MG: 0.5 TABLET ORAL at 15:23

## 2021-10-15 RX ADMIN — SODIUM CHLORIDE, PRESERVATIVE FREE 10 ML: 5 INJECTION INTRAVENOUS at 09:28

## 2021-10-15 RX ADMIN — LEVOTHYROXINE SODIUM 50 MCG: 25 TABLET ORAL at 05:53

## 2021-10-15 RX ADMIN — AMLODIPINE BESYLATE 10 MG: 5 TABLET ORAL at 09:27

## 2021-10-15 RX ADMIN — BUSPIRONE HYDROCHLORIDE 10 MG: 10 TABLET ORAL at 19:44

## 2021-10-15 RX ADMIN — PANTOPRAZOLE SODIUM 40 MG: 40 TABLET, DELAYED RELEASE ORAL at 05:53

## 2021-10-15 RX ADMIN — MIRTAZAPINE 30 MG: 15 TABLET, FILM COATED ORAL at 19:43

## 2021-10-15 RX ADMIN — SODIUM CHLORIDE, PRESERVATIVE FREE 10 ML: 5 INJECTION INTRAVENOUS at 19:45

## 2021-10-15 RX ADMIN — RISPERIDONE 1 MG: 0.5 TABLET ORAL at 19:44

## 2021-10-15 RX ADMIN — LEVETIRACETAM 1500 MG: 500 TABLET, FILM COATED ORAL at 19:44

## 2021-10-15 RX ADMIN — Medication 1 CAPSULE: at 09:27

## 2021-10-15 RX ADMIN — LISINOPRIL 40 MG: 40 TABLET ORAL at 09:27

## 2021-10-15 RX ADMIN — INSULIN LISPRO 1 UNITS: 100 INJECTION, SOLUTION INTRAVENOUS; SUBCUTANEOUS at 21:45

## 2021-10-15 RX ADMIN — DEXAMETHASONE 6 MG: 4 TABLET ORAL at 09:26

## 2021-10-15 RX ADMIN — CARBAMAZEPINE 300 MG: 300 CAPSULE, EXTENDED RELEASE ORAL at 19:45

## 2021-10-15 RX ADMIN — CETIRIZINE HYDROCHLORIDE 10 MG: 10 TABLET, FILM COATED ORAL at 09:26

## 2021-10-15 RX ADMIN — ENOXAPARIN SODIUM 30 MG: 30 INJECTION SUBCUTANEOUS at 09:28

## 2021-10-15 ASSESSMENT — PAIN SCALES - GENERAL: PAINLEVEL_OUTOF10: 0

## 2021-10-15 NOTE — PROGRESS NOTES
Physician Progress Note      PATIENT:               [facility]  CSN #:                  [id number]  :                       1960  ADMIT DATE:       10/12/2021 9:18 AM  DISCH DATE:  RESPONDING  PROVIDER #:        Jimi Lyons MD          QUERY TEXT:    Patient admitted on 10/12 with Covid-19 pneumonia. Attending progress note on   10/13 states, \"Severe sepsis secondary to COVID-19 pneumonia with acute   hypoxic respiratory failure. \" If possible, please document in the progress   notes and discharge summary if sepsis was: The medical record reflects the following:  Risk Factors: pneumonia, Covid-19  Clinical Indicators: WBC 5.6/3.6. Tmax 100.0, HR 99, resp 22  Treatment: labs, IV Decadron    Thank you,  MELVIN JacksonN, RN  Clinical   [phone]  Options provided:  -- Sepsis, POA, confirmed after study  -- Sepsis ruled out after study  -- Other - I will add my own diagnosis  -- Disagree - Not applicable / Not valid  -- Disagree - Clinically unable to determine / Unknown  -- Refer to Clinical Documentation Reviewer    PROVIDER RESPONSE TEXT:    Provider is clinically unable to determine a response to this query.     Query created by: Deborah Jay on 10/15/2021 11:13 AM      Electronically signed by:  Jimi Lyons MD 10/15/2021 6:28 PM

## 2021-10-15 NOTE — PLAN OF CARE
Problem: Falls - Risk of:  Goal: Will remain free from falls  Description: Will remain free from falls  10/15/2021 1149 by Gaby Villasenor RN  Outcome: Ongoing  10/15/2021 0605 by Joaquín Flores RN  Outcome: Ongoing  Goal: Absence of physical injury  Description: Absence of physical injury  10/15/2021 1149 by Gaby Villasenor RN  Outcome: Ongoing  10/15/2021 0605 by Joaquín Flores RN  Outcome: Ongoing     Problem: Airway Clearance - Ineffective  Goal: Achieve or maintain patent airway  10/15/2021 1149 by Gaby Villasenor RN  Outcome: Ongoing  10/15/2021 0605 by Joaquín Flores RN  Outcome: Ongoing     Problem: Gas Exchange - Impaired  Goal: Absence of hypoxia  10/15/2021 1149 by Gaby Villasenor RN  Outcome: Ongoing  10/15/2021 0605 by Joaquín Flores RN  Outcome: Ongoing  Goal: Promote optimal lung function  10/15/2021 1149 by Gaby Villasenor RN  Outcome: Ongoing  10/15/2021 0605 by Joaquín Flores RN  Outcome: Ongoing     Problem:  Body Temperature -  Risk of, Imbalanced  Goal: Ability to maintain a body temperature within defined limits  10/15/2021 1149 by Gaby Villasenor RN  Outcome: Ongoing  10/15/2021 0605 by Joaquín Flores RN  Outcome: Ongoing  Goal: Will regain or maintain usual level of consciousness  10/15/2021 1149 by Gaby Villasenor RN  Outcome: Ongoing  10/15/2021 0605 by Joaquín Flores RN  Outcome: Ongoing  Goal: Complications related to the disease process, condition or treatment will be avoided or minimized  10/15/2021 1149 by Gaby Villasenor RN  Outcome: Ongoing  10/15/2021 0605 by Joaquín Flores RN  Outcome: Ongoing     Problem: Risk for Fluid Volume Deficit  Goal: Maintain normal heart rhythm  10/15/2021 1149 by Gaby Villasenor RN  Outcome: Ongoing  10/15/2021 0605 by Joaquín Flores RN  Outcome: Ongoing  Goal: Maintain absence of muscle cramping  10/15/2021 1149 by Gaby Villasenor RN  Outcome: Ongoing  10/15/2021 0605 by Joaquín Flores RN  Outcome: Ongoing  Goal: Maintain normal serum potassium, sodium, calcium, phosphorus, and pH  10/15/2021 1149 by Flaquito Chand RN  Outcome: Ongoing  10/15/2021 0605 by Poli Tabor RN  Outcome: Ongoing

## 2021-10-15 NOTE — CARE COORDINATION
Received call from Rob Conde asking for update. She stated \"appreciate the updates\". Will call [facility] when discharge needs are confirmed.  Aiyana Hinds RN     IF DISCHARGED THIS WEEKEND- Sonya STONE [phone]  FAX AVS [phone]  WILL NEED TRANSPORT HOME

## 2021-10-15 NOTE — PROGRESS NOTES
10/13/2021    CREATININE 0.4 10/13/2021    GFRAA >60 10/13/2021    LABGLOM >60 10/13/2021    GLUCOSE 165 10/13/2021    PROT 5.8 10/13/2021    PROT 6.5 11/12/2012    LABALBU 3.5 10/13/2021    CALCIUM 8.5 10/13/2021    BILITOT 0.0 10/13/2021    ALKPHOS 54 10/13/2021    AST 14 10/13/2021    ALT 12 10/13/2021     Recent Labs     10/12/21  1027   TROPONINT <0.010     Lab Results   Component Value Date    TSHHS 5.220 09/29/2021         sodium chloride        amLODIPine  10 mg Oral Daily    atorvastatin  40 mg Oral Nightly    busPIRone  10 mg Oral BID    carBAMazepine  300 mg Oral BID    cetirizine  10 mg Oral Daily    fluticasone  1 spray Each Nostril Daily    lactobacillus  1 capsule Oral Daily    levothyroxine  50 mcg Oral Daily    lisinopril  40 mg Oral Daily    mirtazapine  30 mg Oral Nightly    levETIRAcetam  1,500 mg Oral BID    pantoprazole  40 mg Oral BID AC    risperiDONE  1 mg Oral TID    sertraline  200 mg Oral Nightly    tiotropium-olodaterol  2 puff Inhalation Daily    sodium chloride flush  5-40 mL IntraVENous 2 times per day    enoxaparin  30 mg SubCUTAneous BID    insulin lispro  0-6 Units SubCUTAneous TID WC    insulin lispro  0-3 Units SubCUTAneous Nightly    dexamethasone  6 mg Oral Daily    vitamin D  2,000 Units Oral Daily    divalproex  2,000 mg Oral Nightly         Assessment:       Patient Active Problem List    Diagnosis Date Noted    COVID 10/12/2021    Hypoxia 09/23/2021    Diverticulosis 05/05/2020    Lesion of right native kidney 05/05/2020    Black stool 05/05/2020    Anxiety and depression 03/10/2020    Anemia 03/10/2020    Controlled type 2 diabetes mellitus without complication, without long-term current use of insulin ([facility] [address] 03/10/2020    Acute respiratory failure with hypoxia ([facility] [address] 06/28/2019    Acute respiratory failure ([address] 06/28/2019    Acute bronchitis 12/21/2018    Sepsis ([facility] [address] 12/18/2018    Mixed hyperlipidemia 04/24/2018    Acquired hypothyroidism 04/24/2018    Left hemiplegia ([facility] [address] 07/24/2017    H/O: stroke 07/24/2017    COPD exacerbation ([facility] [address] 10/21/2015    Chest pain 03/13/2015    MR (mental retardation) 03/13/2015    Seizure disorder ([facility] [address] 03/10/2014    Altered mental status 03/10/2014    Pneumonia due to infectious organism 11/10/2012    HTN (hypertension), benign 11/10/2012       Plan:     Problems being addressed this admission:   Acute hypoxic respiratory failure / COVID-19 pneumonia   DM 2  HTN  Chronic anemia  MDD Cassandra Danielle disorder/MRDD/cerebral palsy   Hypothyroid  SARS-CoV-2 positive    She was admitted with shortness of breath was positive for SARS-CoV-2. CT chest showed groundglass opacities, on Decadron not on any antibiotics check with pulmonary to see if he is a candidate for Zithromax for possible superinfection. She is on Lovenox 30 mg twice a day    Her sugar seems well controlled at 97 we will continue to monitor. He is on a sliding scale. Check A1c. Blood pressure is 113/49 continue to monitor. She is on Norvasc 10 mg once a day. Lisinopril 40 mg once a day    Last hemoglobin done was 10/13/2021 it was 8.3, she has been labeled as anemia of chronic disease check anemia panel continue to monitor. Patient has depression seizure disorder cerebral palsy and MRDD from before continue to monitor and continue with her home medicines as before. For hypothyroidism she is on levothyroxine 50 mcg once a day a TSH and a free T4 done 9/29/2021 shows a TSH of 5.220 and a free T4 of 0.83 we will up her levothyroxine to 75 mcg once a day then repeat thyroid function tests in a month's time. Disposition  10/15/2021-patient is from a group home may need to return upon getting better.  spoke with [facility].     Consultants:  Pulmonary    General Orders:  Repeat basic labs again in am.  I have explained to the patient and discussed with him/her the treatment plan.   Not sure if the patient understands. Also discussed with RN. The above chart was generated partly using Dragon dictation system, it may contain dictation errors given the limitations of this technology.      Annette Damon MD, [address]

## 2021-10-16 LAB
ANION GAP SERPL CALCULATED.3IONS-SCNC: 9 MMOL/L (ref 4–16)
BASOPHILS ABSOLUTE: 0 K/CU MM
BASOPHILS RELATIVE PERCENT: 0 % (ref 0–1)
BUN BLDV-MCNC: 11 MG/DL (ref 6–23)
CALCIUM SERPL-MCNC: 7.7 MG/DL (ref 8.3–10.6)
CHLORIDE BLD-SCNC: 104 MMOL/L (ref 99–110)
CO2: 29 MMOL/L (ref 21–32)
CREAT SERPL-MCNC: 0.3 MG/DL (ref 0.6–1.1)
DIFFERENTIAL TYPE: ABNORMAL
EOSINOPHILS ABSOLUTE: 0 K/CU MM
EOSINOPHILS RELATIVE PERCENT: 0.3 % (ref 0–3)
ESTIMATED AVERAGE GLUCOSE: 134 MG/DL
FERRITIN: 25 NG/ML (ref 15–150)
FOLATE: 12.2 NG/ML (ref 3.1–17.5)
GFR AFRICAN AMERICAN: >60 ML/MIN/1.73M2
GFR NON-AFRICAN AMERICAN: >60 ML/MIN/1.73M2
GLUCOSE BLD-MCNC: 121 MG/DL (ref 70–99)
GLUCOSE BLD-MCNC: 167 MG/DL (ref 70–99)
GLUCOSE BLD-MCNC: 179 MG/DL (ref 70–99)
HBA1C MFR BLD: 6.3 % (ref 4.2–6.3)
HCT VFR BLD CALC: 28.5 % (ref 37–47)
HEMOGLOBIN: 8.1 GM/DL (ref 12.5–16)
IMMATURE NEUTROPHIL %: 0.6 % (ref 0–0.43)
IRON: 33 UG/DL (ref 37–145)
LYMPHOCYTES ABSOLUTE: 1.3 K/CU MM
LYMPHOCYTES RELATIVE PERCENT: 38 % (ref 24–44)
MCH RBC QN AUTO: 24.7 PG (ref 27–31)
MCHC RBC AUTO-ENTMCNC: 28.4 % (ref 32–36)
MCV RBC AUTO: 86.9 FL (ref 78–100)
MONOCYTES ABSOLUTE: 0.4 K/CU MM
MONOCYTES RELATIVE PERCENT: 11.9 % (ref 0–4)
NUCLEATED RBC %: 0 %
PCT TRANSFERRIN: 11 % (ref 10–44)
PDW BLD-RTO: 16.5 % (ref 11.7–14.9)
PLATELET # BLD: 211 K/CU MM (ref 140–440)
PMV BLD AUTO: 9.3 FL (ref 7.5–11.1)
POTASSIUM SERPL-SCNC: 3.7 MMOL/L (ref 3.5–5.1)
RBC # BLD: 3.28 M/CU MM (ref 4.2–5.4)
RETICULOCYTE COUNT PCT: 1.2 % (ref 0.2–2.2)
SEGMENTED NEUTROPHILS ABSOLUTE COUNT: 1.6 K/CU MM
SEGMENTED NEUTROPHILS RELATIVE PERCENT: 49.2 % (ref 36–66)
SODIUM BLD-SCNC: 142 MMOL/L (ref 135–145)
TOTAL IMMATURE NEUTOROPHIL: 0.02 K/CU MM
TOTAL IRON BINDING CAPACITY: 287 UG/DL (ref 250–450)
TOTAL NUCLEATED RBC: 0 K/CU MM
UNSATURATED IRON BINDING CAPACITY: 254 UG/DL (ref 110–370)
VITAMIN B-12: 1067 PG/ML (ref 211–911)
WBC # BLD: 3.3 K/CU MM (ref 4–10.5)

## 2021-10-16 PROCEDURE — 6360000002 HC RX W HCPCS: Performed by: NURSE PRACTITIONER

## 2021-10-16 PROCEDURE — 82728 ASSAY OF FERRITIN: CPT

## 2021-10-16 PROCEDURE — 83550 IRON BINDING TEST: CPT

## 2021-10-16 PROCEDURE — 94640 AIRWAY INHALATION TREATMENT: CPT

## 2021-10-16 PROCEDURE — 2700000000 HC OXYGEN THERAPY PER DAY

## 2021-10-16 PROCEDURE — 6370000000 HC RX 637 (ALT 250 FOR IP): Performed by: INTERNAL MEDICINE

## 2021-10-16 PROCEDURE — 6370000000 HC RX 637 (ALT 250 FOR IP): Performed by: NURSE PRACTITIONER

## 2021-10-16 PROCEDURE — 85025 COMPLETE CBC W/AUTO DIFF WBC: CPT

## 2021-10-16 PROCEDURE — 2580000003 HC RX 258: Performed by: NURSE PRACTITIONER

## 2021-10-16 PROCEDURE — 94761 N-INVAS EAR/PLS OXIMETRY MLT: CPT

## 2021-10-16 PROCEDURE — 80048 BASIC METABOLIC PNL TOTAL CA: CPT

## 2021-10-16 PROCEDURE — 1200000000 HC SEMI PRIVATE

## 2021-10-16 PROCEDURE — 82746 ASSAY OF FOLIC ACID SERUM: CPT

## 2021-10-16 PROCEDURE — 83036 HEMOGLOBIN GLYCOSYLATED A1C: CPT

## 2021-10-16 PROCEDURE — 82607 VITAMIN B-12: CPT

## 2021-10-16 PROCEDURE — 82962 GLUCOSE BLOOD TEST: CPT

## 2021-10-16 PROCEDURE — 36415 COLL VENOUS BLD VENIPUNCTURE: CPT

## 2021-10-16 PROCEDURE — 83540 ASSAY OF IRON: CPT

## 2021-10-16 PROCEDURE — 85045 AUTOMATED RETICULOCYTE COUNT: CPT

## 2021-10-16 RX ADMIN — LEVOTHYROXINE SODIUM 75 MCG: 0.07 TABLET ORAL at 05:24

## 2021-10-16 RX ADMIN — CARBAMAZEPINE 300 MG: 300 CAPSULE, EXTENDED RELEASE ORAL at 20:07

## 2021-10-16 RX ADMIN — LISINOPRIL 40 MG: 40 TABLET ORAL at 09:30

## 2021-10-16 RX ADMIN — PANTOPRAZOLE SODIUM 40 MG: 40 TABLET, DELAYED RELEASE ORAL at 16:00

## 2021-10-16 RX ADMIN — RISPERIDONE 1 MG: 0.5 TABLET ORAL at 14:00

## 2021-10-16 RX ADMIN — BUSPIRONE HYDROCHLORIDE 10 MG: 10 TABLET ORAL at 20:08

## 2021-10-16 RX ADMIN — DIVALPROEX SODIUM 2000 MG: 500 TABLET, FILM COATED, EXTENDED RELEASE ORAL at 20:08

## 2021-10-16 RX ADMIN — CARBAMAZEPINE 300 MG: 300 CAPSULE, EXTENDED RELEASE ORAL at 12:26

## 2021-10-16 RX ADMIN — CETIRIZINE HYDROCHLORIDE 10 MG: 10 TABLET, FILM COATED ORAL at 10:00

## 2021-10-16 RX ADMIN — TIOTROPIUM BROMIDE AND OLODATEROL 2 PUFF: 3.124; 2.736 SPRAY, METERED RESPIRATORY (INHALATION) at 08:27

## 2021-10-16 RX ADMIN — SODIUM CHLORIDE, PRESERVATIVE FREE 10 ML: 5 INJECTION INTRAVENOUS at 20:07

## 2021-10-16 RX ADMIN — MIRTAZAPINE 30 MG: 15 TABLET, FILM COATED ORAL at 20:08

## 2021-10-16 RX ADMIN — SERTRALINE HYDROCHLORIDE 200 MG: 100 TABLET ORAL at 20:08

## 2021-10-16 RX ADMIN — Medication 2000 UNITS: at 09:30

## 2021-10-16 RX ADMIN — INSULIN LISPRO 1 UNITS: 100 INJECTION, SOLUTION INTRAVENOUS; SUBCUTANEOUS at 20:11

## 2021-10-16 RX ADMIN — RISPERIDONE 1 MG: 0.5 TABLET ORAL at 09:00

## 2021-10-16 RX ADMIN — BUSPIRONE HYDROCHLORIDE 10 MG: 10 TABLET ORAL at 10:00

## 2021-10-16 RX ADMIN — ATORVASTATIN CALCIUM 40 MG: 40 TABLET, FILM COATED ORAL at 20:08

## 2021-10-16 RX ADMIN — DEXAMETHASONE 6 MG: 4 TABLET ORAL at 10:00

## 2021-10-16 RX ADMIN — ENOXAPARIN SODIUM 30 MG: 30 INJECTION SUBCUTANEOUS at 09:30

## 2021-10-16 RX ADMIN — ACETAMINOPHEN 650 MG: 325 TABLET ORAL at 12:30

## 2021-10-16 RX ADMIN — AMLODIPINE BESYLATE 10 MG: 5 TABLET ORAL at 12:27

## 2021-10-16 RX ADMIN — Medication 1 CAPSULE: at 12:26

## 2021-10-16 RX ADMIN — LEVETIRACETAM 1500 MG: 500 TABLET, FILM COATED ORAL at 20:07

## 2021-10-16 RX ADMIN — LEVETIRACETAM 1500 MG: 500 TABLET, FILM COATED ORAL at 09:30

## 2021-10-16 RX ADMIN — ENOXAPARIN SODIUM 30 MG: 30 INJECTION SUBCUTANEOUS at 20:07

## 2021-10-16 RX ADMIN — FLUTICASONE PROPIONATE 1 SPRAY: 50 SPRAY, METERED NASAL at 09:30

## 2021-10-16 RX ADMIN — PANTOPRAZOLE SODIUM 40 MG: 40 TABLET, DELAYED RELEASE ORAL at 05:24

## 2021-10-16 RX ADMIN — RISPERIDONE 1 MG: 0.5 TABLET ORAL at 20:08

## 2021-10-16 ASSESSMENT — PAIN SCALES - GENERAL
PAINLEVEL_OUTOF10: 4
PAINLEVEL_OUTOF10: 0

## 2021-10-16 NOTE — PROGRESS NOTES
El Mijares MD, [address]                Internal Medicine Hospitalist             Daily Progress  Note   Subjective:     Chief Complaint   Patient presents with    Shortness of Breath     Ms. Garcia Complains of nil, wants to go home. Objective:    /63   Pulse 66   Temp 97.8 °F (36.6 °C) (Oral)   Resp (!) 31   Ht 4' 10\" (1.473 m)   Wt 134 lb 0.6 oz (60.8 kg)   SpO2 94%   BMI 28.01 kg/m²      Intake/Output Summary (Last 24 hours) at 10/16/2021 0757  Last data filed at 10/16/2021 0500  Gross per 24 hour   Intake 380 ml   Output 400 ml   Net -20 ml      Physical Exam:  Heart: Distant heart sounds poor quality stethoscope  Lungs: Mostly clear to auscultation, decreased breath sounds at bases. No wheezes appreciated no crackles heard. Difficult to evaluate poor quality stethoscope. Abdomen: Soft, non distended. Bowel sounds not appreciated. No obvious liver or spleen enlargement. Non tender, no rebound noted. Extremities: Non tender, trace swelling noted, minimal movement lower limbs. CNS: Grossly unchanged from before.     Labs:  CBC with Differential:    Lab Results   Component Value Date    WBC 3.6 10/13/2021    RBC 3.37 10/13/2021    HGB 8.3 10/13/2021    HCT 29.5 10/13/2021     10/13/2021    MCV 87.5 10/13/2021    MCH 24.6 10/13/2021    MCHC 28.1 10/13/2021    RDW 17.4 10/13/2021    SEGSPCT 56.4 10/13/2021    BANDSPCT 5 12/01/2019    LYMPHOPCT 29.7 10/13/2021    MONOPCT 12.5 10/13/2021    MYELOPCT 1 12/01/2019    EOSPCT 0.8 01/26/2011    BASOPCT 0.3 10/13/2021    MONOSABS 0.5 10/13/2021    LYMPHSABS 1.1 10/13/2021    EOSABS 0.0 10/13/2021    BASOSABS 0.0 10/13/2021    DIFFTYPE AUTOMATED DIFFERENTIAL 10/13/2021     CMP:    Lab Results   Component Value Date     10/13/2021    K 4.3 10/13/2021     10/13/2021    CO2 26 10/13/2021    BUN 23 10/13/2021    CREATININE 0.4 10/13/2021    GFRAA >60 10/13/2021    LABGLOM >60 10/13/2021    GLUCOSE 165 10/13/2021    PROT 5.8 10/13/2021 PROT 6.5 11/12/2012    LABALBU 3.5 10/13/2021    CALCIUM 8.5 10/13/2021    BILITOT 0.0 10/13/2021    ALKPHOS 54 10/13/2021    AST 14 10/13/2021    ALT 12 10/13/2021     No results for input(s): TROPONINT in the last 72 hours.   Lab Results   Component Value Date    [facility] 5.220 09/29/2021         sodium chloride        levothyroxine  75 mcg Oral Daily    amLODIPine  10 mg Oral Daily    atorvastatin  40 mg Oral Nightly    busPIRone  10 mg Oral BID    carBAMazepine  300 mg Oral BID    cetirizine  10 mg Oral Daily    fluticasone  1 spray Each Nostril Daily    lactobacillus  1 capsule Oral Daily    lisinopril  40 mg Oral Daily    mirtazapine  30 mg Oral Nightly    levETIRAcetam  1,500 mg Oral BID    pantoprazole  40 mg Oral BID AC    risperiDONE  1 mg Oral TID    sertraline  200 mg Oral Nightly    tiotropium-olodaterol  2 puff Inhalation Daily    sodium chloride flush  5-40 mL IntraVENous 2 times per day    enoxaparin  30 mg SubCUTAneous BID    insulin lispro  0-6 Units SubCUTAneous TID WC    insulin lispro  0-3 Units SubCUTAneous Nightly    dexamethasone  6 mg Oral Daily    vitamin D  2,000 Units Oral Daily    divalproex  2,000 mg Oral Nightly         Assessment:       Patient Active Problem List    Diagnosis Date Noted    COVID 10/12/2021    Hypoxia 09/23/2021    Diverticulosis 05/05/2020    Lesion of right native kidney 05/05/2020    Black stool 05/05/2020    Anxiety and depression 03/10/2020    Anemia 03/10/2020    Controlled type 2 diabetes mellitus without complication, without long-term current use of insulin ([address] 03/10/2020    Acute respiratory failure with hypoxia ([address] 06/28/2019    Acute respiratory failure ([address] 06/28/2019    Acute bronchitis 12/21/2018    Sepsis ([address] 12/18/2018    Mixed hyperlipidemia 04/24/2018    Acquired hypothyroidism 04/24/2018    Left hemiplegia ([address] 07/24/2017    H/O: stroke 07/24/2017    COPD exacerbation ([address] 10/21/2015    Chest pain 03/13/2015    MR (mental retardation) 03/13/2015    Seizure disorder [facility] 03/10/2014    Altered mental status 03/10/2014    Pneumonia due to infectious organism 11/10/2012    HTN (hypertension), benign 11/10/2012       Plan:     Problems being addressed this admission:   Acute hypoxic respiratory failure / COVID-19 pneumonia / COPD  10/15/2021-She was admitted with shortness of breath was positive for SARS-CoV-2. CT chest showed groundglass opacities, on Decadron not on any antibiotics check with pulmonary to see if he is a candidate for Zithromax for possible superinfection. She is on Lovenox 30 mg twice a day. 10/16/2021-she seems to have responded to the steroids does not seem like she is having any consultants on the case I will put in pulmonary to see if she is to be more aggressively treated with her antiviral are is able to go home. She is not requiring much oxygen as well. DM 2  10/15/2021-Her sugar seems well controlled at 97 we will continue to monitor. He is on a sliding scale. Check A1c.   10/16/2021-sugars 193 today continue to monitor A1c is not back nor her other labs. HTN  10/15/2021-Blood pressure is 113/49 continue to monitor. She is on Norvasc 10 mg once a day. Lisinopril 40 mg once a day  10/16/2021-blood pressure is 117/63 continue to monitor    Chronic anemia  10/15/2021-Last hemoglobin done was 10/13/2021 it was 8.3, she has been labeled as anemia of chronic disease check anemia panel continue to monitor. 10/16/2021-awaiting labs from today      MDD /seizure disorder/MRDD/cerebral palsy   10/15/2021-Patient has depression seizure disorder cerebral palsy and MRDD from before continue to monitor and continue with her home medicines as before.      Hypothyroid  10/15/2021-For hypothyroidism she is on levothyroxine 50 mcg once a day a TSH and a free T4 done 9/29/2021 shows a TSH of 5.220 and a free T4 of 0.83 we will up her levothyroxine to 75 mcg once a day then repeat thyroid function tests in a month's time. Disposition  10/15/2021-patient is from a group home may need to return upon getting better.  spoke with self-reliance team.   10/16/2021- needs to check if her home is ready for quarantine. We will need to have an approval from pulmonary before discharge.      Consultants:  Pulmonary    General Orders:  Repeat basic labs again in am.  I have explained to the patient and discussed with him/her the treatment plan. The above chart was generated partly using Dragon dictation system, it may contain dictation errors given the limitations of this technology.      Brock Carreno MD, [place]

## 2021-10-16 NOTE — PROGRESS NOTES
10/16/21 0830   Oxygen Therapy/Pulse Ox   O2 Therapy Oxygen   $Oxygen $Daily Charge   O2 Device Nasal cannula   O2 Flow Rate (L/min) 2 L/min   FiO2  28 %   Resp 19   SpO2 96 %   Pulse Oximeter Device Mode Continuous   Pulse Oximeter Device Location Finger   $Pulse Oximeter $Spot check (multiple/continuous)   Blood Gas  Performed?  No

## 2021-10-17 VITALS
OXYGEN SATURATION: 86 % | HEART RATE: 69 BPM | BODY MASS INDEX: 28.18 KG/M2 | WEIGHT: 134.26 LBS | DIASTOLIC BLOOD PRESSURE: 51 MMHG | HEIGHT: 58 IN | SYSTOLIC BLOOD PRESSURE: 107 MMHG | TEMPERATURE: 98.8 F | RESPIRATION RATE: 22 BRPM

## 2021-10-17 LAB
ANION GAP SERPL CALCULATED.3IONS-SCNC: 8 MMOL/L (ref 4–16)
BASOPHILS ABSOLUTE: 0 K/CU MM
BASOPHILS RELATIVE PERCENT: 0 % (ref 0–1)
BUN BLDV-MCNC: 12 MG/DL (ref 6–23)
CALCIUM SERPL-MCNC: 8.3 MG/DL (ref 8.3–10.6)
CHLORIDE BLD-SCNC: 105 MMOL/L (ref 99–110)
CO2: 30 MMOL/L (ref 21–32)
CREAT SERPL-MCNC: 0.6 MG/DL (ref 0.6–1.1)
DIFFERENTIAL TYPE: ABNORMAL
EOSINOPHILS ABSOLUTE: 0 K/CU MM
EOSINOPHILS RELATIVE PERCENT: 0 % (ref 0–3)
GFR AFRICAN AMERICAN: >60 ML/MIN/1.73M2
GFR NON-AFRICAN AMERICAN: >60 ML/MIN/1.73M2
GLUCOSE BLD-MCNC: 106 MG/DL (ref 70–99)
GLUCOSE BLD-MCNC: 131 MG/DL (ref 70–99)
GLUCOSE BLD-MCNC: 147 MG/DL (ref 70–99)
HCT VFR BLD CALC: 27.8 % (ref 37–47)
HEMOGLOBIN: 7.9 GM/DL (ref 12.5–16)
IMMATURE NEUTROPHIL %: 0.9 % (ref 0–0.43)
LYMPHOCYTES ABSOLUTE: 1.3 K/CU MM
LYMPHOCYTES RELATIVE PERCENT: 36.4 % (ref 24–44)
MCH RBC QN AUTO: 24.9 PG (ref 27–31)
MCHC RBC AUTO-ENTMCNC: 28.4 % (ref 32–36)
MCV RBC AUTO: 87.7 FL (ref 78–100)
MONOCYTES ABSOLUTE: 0.4 K/CU MM
MONOCYTES RELATIVE PERCENT: 10.2 % (ref 0–4)
NUCLEATED RBC %: 0 %
PDW BLD-RTO: 16.3 % (ref 11.7–14.9)
PLATELET # BLD: 151 K/CU MM (ref 140–440)
PMV BLD AUTO: 9.9 FL (ref 7.5–11.1)
POTASSIUM SERPL-SCNC: 3.7 MMOL/L (ref 3.5–5.1)
RBC # BLD: 3.17 M/CU MM (ref 4.2–5.4)
SEGMENTED NEUTROPHILS ABSOLUTE COUNT: 1.8 K/CU MM
SEGMENTED NEUTROPHILS RELATIVE PERCENT: 52.5 % (ref 36–66)
SODIUM BLD-SCNC: 143 MMOL/L (ref 135–145)
TOTAL IMMATURE NEUTOROPHIL: 0.03 K/CU MM
TOTAL NUCLEATED RBC: 0 K/CU MM
WBC # BLD: 3.4 K/CU MM (ref 4–10.5)

## 2021-10-17 PROCEDURE — 94761 N-INVAS EAR/PLS OXIMETRY MLT: CPT

## 2021-10-17 PROCEDURE — 36415 COLL VENOUS BLD VENIPUNCTURE: CPT

## 2021-10-17 PROCEDURE — 85025 COMPLETE CBC W/AUTO DIFF WBC: CPT

## 2021-10-17 PROCEDURE — 2700000000 HC OXYGEN THERAPY PER DAY

## 2021-10-17 PROCEDURE — 2580000003 HC RX 258: Performed by: NURSE PRACTITIONER

## 2021-10-17 PROCEDURE — 80048 BASIC METABOLIC PNL TOTAL CA: CPT

## 2021-10-17 PROCEDURE — 6370000000 HC RX 637 (ALT 250 FOR IP): Performed by: INTERNAL MEDICINE

## 2021-10-17 PROCEDURE — 6360000002 HC RX W HCPCS: Performed by: NURSE PRACTITIONER

## 2021-10-17 PROCEDURE — 6370000000 HC RX 637 (ALT 250 FOR IP): Performed by: NURSE PRACTITIONER

## 2021-10-17 PROCEDURE — 94618 PULMONARY STRESS TESTING: CPT

## 2021-10-17 PROCEDURE — 82962 GLUCOSE BLOOD TEST: CPT

## 2021-10-17 RX ORDER — LEVETIRACETAM 750 MG/1
1500 TABLET ORAL 2 TIMES DAILY
Qty: 120 TABLET | Refills: 0 | Status: SHIPPED | OUTPATIENT
Start: 2021-10-17

## 2021-10-17 RX ORDER — LEVOTHYROXINE SODIUM 0.07 MG/1
75 TABLET ORAL DAILY
Qty: 30 TABLET | Refills: 0 | Status: SHIPPED | OUTPATIENT
Start: 2021-10-17 | End: 2021-11-21 | Stop reason: SDUPTHER

## 2021-10-17 RX ORDER — METHYLPREDNISOLONE 4 MG/1
TABLET ORAL
Qty: 21 TABLET | Refills: 0 | Status: SHIPPED | OUTPATIENT
Start: 2021-10-17 | End: 2021-10-23

## 2021-10-17 RX ADMIN — LEVETIRACETAM 1500 MG: 500 TABLET, FILM COATED ORAL at 08:50

## 2021-10-17 RX ADMIN — SODIUM CHLORIDE, PRESERVATIVE FREE 10 ML: 5 INJECTION INTRAVENOUS at 08:51

## 2021-10-17 RX ADMIN — FLUTICASONE PROPIONATE 1 SPRAY: 50 SPRAY, METERED NASAL at 08:49

## 2021-10-17 RX ADMIN — PANTOPRAZOLE SODIUM 40 MG: 40 TABLET, DELAYED RELEASE ORAL at 15:55

## 2021-10-17 RX ADMIN — LEVOTHYROXINE SODIUM 75 MCG: 0.07 TABLET ORAL at 08:49

## 2021-10-17 RX ADMIN — RISPERIDONE 1 MG: 0.5 TABLET ORAL at 15:55

## 2021-10-17 RX ADMIN — Medication 1 CAPSULE: at 08:50

## 2021-10-17 RX ADMIN — RISPERIDONE 1 MG: 0.5 TABLET ORAL at 08:49

## 2021-10-17 RX ADMIN — ENOXAPARIN SODIUM 30 MG: 30 INJECTION SUBCUTANEOUS at 08:50

## 2021-10-17 RX ADMIN — CETIRIZINE HYDROCHLORIDE 10 MG: 10 TABLET, FILM COATED ORAL at 08:50

## 2021-10-17 RX ADMIN — CARBAMAZEPINE 300 MG: 300 CAPSULE, EXTENDED RELEASE ORAL at 08:49

## 2021-10-17 RX ADMIN — LISINOPRIL 40 MG: 40 TABLET ORAL at 08:49

## 2021-10-17 RX ADMIN — BUSPIRONE HYDROCHLORIDE 10 MG: 10 TABLET ORAL at 08:50

## 2021-10-17 RX ADMIN — DEXAMETHASONE 6 MG: 4 TABLET ORAL at 08:50

## 2021-10-17 RX ADMIN — PANTOPRAZOLE SODIUM 40 MG: 40 TABLET, DELAYED RELEASE ORAL at 08:49

## 2021-10-17 RX ADMIN — AMLODIPINE BESYLATE 10 MG: 5 TABLET ORAL at 08:50

## 2021-10-17 RX ADMIN — Medication 2000 UNITS: at 08:49

## 2021-10-17 ASSESSMENT — PAIN SCALES - GENERAL: PAINLEVEL_OUTOF10: 0

## 2021-10-17 NOTE — PROGRESS NOTES
Doctor Please copy and paste below in your progress note per DME requirements. Patient was seen in hospital for COPD, COVID-19   . I am prescribing oxygen because the diagnosis and testing requires the patient to have oxygen in the home. Conditions will improve or be benefited by oxygen use. The patient is able to perform good mobility and therefore requires the use of a portable oxygen system for ambulation.

## 2021-10-17 NOTE — PROGRESS NOTES
Transportation set up via [employer] . Namrata Garcia from the group home notified who updated us that the o2 was delivered and her house was ready. P/u at 1700. D/C paperwork also faxed to [facility].

## 2021-10-17 NOTE — CONSULTS
Subjective:   CHIEF COMPLAINT / HPI:  64year old female with covid pneumonia. She has been treated and relatively stable with out any issues. She qualified for home o2 and she lives in a group home setting.       Past Medical History:  Past Medical History:   Diagnosis Date    Anxiety disorder     Back pain, chronic     Cerebral palsy (HCC)     COPD [facility]ch[facility] obstructive [facility] disease) ([facility]     COVID-19 10/12/2021    CVA (cerebral infarction) 2014 x2    Diabetes mellitus (HCC)     Diverticulosis     Epilepsy ([facility]     GERD (gastroesophageal reflux disease)     Hyperlipidemia     Hypertension     Insomnia     Iron (Fe) deficiency anemia     Mental disability     Seizures (HCC)     Unspecified cerebral artery occlusion with cerebral infarction        Past Surgical History:        Procedure Laterality Date    GASTROSTOMY TUBE PLACEMENT         Current Medications:    Current Facility-Administered Medications: levothyroxine (SYNTHROID) tablet 75 mcg, 75 mcg, Oral, Daily  albuterol sulfate  (90 Base) MCG/ACT inhaler 2 puff, 2 puff, Inhalation, Q4H PRN  amLODIPine (NORVASC) tablet 10 mg, 10 mg, Oral, Daily  atorvastatin (LIPITOR) tablet 40 mg, 40 mg, Oral, Nightly  busPIRone (BUSPAR) tablet 10 mg, 10 mg, Oral, BID  carBAMazepine (CARBATROL) extended release capsule 300 mg, 300 mg, Oral, BID  cetirizine (ZYRTEC) tablet 10 mg, 10 mg, Oral, Daily  diphenhydrAMINE (BENADRYL) capsule 25 mg, 25 mg, Oral, Q6H PRN  fluticasone (FLONASE) 50 MCG/ACT nasal spray 1 spray, 1 spray, Each Nostril, Daily  lactobacillus (CULTURELLE) capsule 1 capsule, 1 capsule, Oral, Daily  lisinopril (PRINIVIL;ZESTRIL) tablet 40 mg, 40 mg, Oral, Daily  mirtazapine (REMERON) tablet 30 mg, 30 mg, Oral, Nightly  levETIRAcetam (KEPPRA) tablet 1,500 mg, 1,500 mg, Oral, BID  pantoprazole (PROTONIX) tablet 40 mg, 40 mg, Oral, BID AC  risperiDONE (RISPERDAL) tablet 1 mg, 1 mg, Oral, TID  sertraline (ZOLOFT) tablet 200 mg, 200 mg, Oral, Nightly  tiotropium-olodaterol (STIOLTO) 2.5-2.5 MCG/ACT inhaler 2 puff, 2 puff, Inhalation, Daily  sodium chloride flush 0.9 % injection 5-40 mL, 5-40 mL, IntraVENous, 2 times per day  sodium chloride flush 0.9 % injection 5-40 mL, 5-40 mL, IntraVENous, PRN  0.9 % sodium chloride infusion, 25 mL, IntraVENous, PRN  enoxaparin (LOVENOX) injection 30 mg, 30 mg, SubCUTAneous, BID  polyethylene glycol (GLYCOLAX) packet 17 g, 17 g, Oral, Daily PRN  acetaminophen (TYLENOL) tablet 650 mg, 650 mg, Oral, Q6H PRN **OR** acetaminophen (TYLENOL) suppository 650 mg, 650 mg, Rectal, Q6H PRN  insulin lispro (HUMALOG) injection vial 0-6 Units, 0-6 Units, SubCUTAneous, TID WC  insulin lispro (HUMALOG) injection vial 0-3 Units, 0-3 Units, SubCUTAneous, Nightly  dexamethasone (DECADRON) tablet 6 mg, 6 mg, Oral, Daily  vitamin D CAPS 2,000 Units, 2,000 Units, Oral, Daily  divalproex (DEPAKOTE ER) extended release tablet 2,000 mg, 2,000 mg, Oral, Nightly    Allergies   Allergen Reactions    Macrobid [Nitrofurantoin] Hives and Swelling     Hives       Social History:    Social History     Socioeconomic History    Marital status: Single     Spouse name: None    Number of children: None    Years of education: None    Highest education level: None   Occupational History    None   Tobacco Use    Smoking status: Former Smoker     Packs/day: 1.50     Years: 20.00     Pack years: 30.00     Quit date: 8/28/2011     Years since quitting: 10.1    Smokeless tobacco: Never Used   Vaping Use    Vaping Use: Never used   Substance and Sexual Activity    Alcohol use: No     Alcohol/week: 0.0 standard drinks    Drug use: No    Sexual activity: None   Other Topics Concern    None   Social History Narrative    ** Merged History Encounter **          Social Determinants of Health     Financial Resource Strain:     Difficulty of Paying Living Expenses:    Food Insecurity:     Worried About Running Out of Food in the Last Year:     Ran bleeding and lymphadenopathy  ALLERGIC/IMMUNOLOGIC:  negative for recurrent infections, angioedema, anaphylaxis and drug reactions  ENDOCRINE:  negative for weight changes and diabetic symptoms including polyuria, polydipsia and polyphagia    MUSCULOSKELETAL:  negative for  pain, joint swelling, decreased range of motion and muscle weakness  NEUROLOGICAL:  negative for headaches, slurred speech, unilateral weakness  PSYCHIATRIC/BEHAVIORAL: negative for hallucinations, behavioral problems, confusion and agitation. Objective:   PHYSICAL EXAM:      VITALS:    Vitals:    10/17/21 0200 10/17/21 0358 10/17/21 0850 10/17/21 1200   BP: (!) 111/48 (!) 119/57 (!) 118/54 (!) 107/51   Pulse: 55 62 67 60   Resp: 23 24 29 21   Temp:  97.8 °F (36.6 °C) 98.9 °F (37.2 °C) 98.8 °F (37.1 °C)   TempSrc:  Oral Oral Axillary   SpO2: 91% 95%     Weight:  134 lb 4.2 oz (60.9 kg)     Height:             CONSTITUTIONAL:  awake, alert, cooperative, no apparent distress, and appears stated age  NECK:  Supple, symmetrical, trachea midline, no adenopathy, thyroid symmetric, not enlarged and no tenderness  CHEST: Chest expansion equal and symmetrical, no intercostal retraction. LUNGS:  no increased work of breathing, has expiratory wheezes both lungs, no crackles. CARDIOVASCULAR: S1 and S2, no edema and no JVD  ABDOMEN:  normal bowel sounds, non-distended and no masses palpated, and no tenderness to palpation. No hepatospleenomegaly  LYMPHADENOPATHY:  no axillary or supraclavicular adenopathy. No cervical adnenopathy  PSYCHIATRIC: Oriented to person place and time. No obvious depression or anxiety. MUSCULOSKELETAL: No obvious misalignment or effusion of the joints. No clubbing, cyanosis of the digits. RIGHT AND LEFT LOWER EXTREMITIES: No edema, no inflammation, no tenderness. SKIN:  normal skin color, texture, turgor and no redness, warmth, or swelling.  No palpable nodules    DATA:       EXAMINATION:   CTA OF THE CHEST 10/12/2021 11:22 am       TECHNIQUE:   CTA of the chest was performed after the administration of intravenous   contrast.  Multiplanar reformatted images are provided for review.  MIP   images are provided for review. Dose modulation, iterative reconstruction,   and/or weight based adjustment of the mA/kV was utilized to reduce the   radiation dose to as low as reasonably achievable.       COMPARISON:   CT study from September 23, 2021.       HISTORY:   ORDERING SYSTEM PROVIDED HISTORY: hypoxia   TECHNOLOGIST PROVIDED HISTORY:   Reason for exam:->hypoxia   Decision Support Exception - unselect if not a suspected or confirmed   emergency medical condition->Emergency Medical Condition (MA)   Reason for Exam: hypoxia   Acuity: Unknown   Type of Exam: Unknown   Additional signs and symptoms: POOR HISTORIAN, UNABLE TO FOLLOW COMMANDS, MRDD   Relevant Medical/Surgical History: [address]ML [address]       FINDINGS:   Pulmonary Arteries: Pulmonary arteries are adequately opacified for   evaluation. No evidence of intraluminal filling defect to suggest pulmonary   embolism.  Main pulmonary artery is normal in caliber.       Mediastinum: The heart is mildly enlarged.  Coronary artery atherosclerotic   calcifications are present.  The thoracic aorta and proximal great vessels   are patent and of normal caliber. No pericardial effusion. No enlarged or   suspicious axillary, hilar, or mediastinal lymphadenopathy.       Lungs/pleura: The trachea and mainstem bronchi are widely patent.  Minimal   atelectasis is present at the right lung base.  A few faint ground-glass   nodular opacities are present within the left lower lobe, new from prior. The lungs are otherwise grossly clear.  No cavitary lesions.  No discrete   pulmonary nodule or mass. No pleural effusion or pneumothorax.       Soft Tissues/Bones: Degenerative changes are present throughout the thoracic   spine.       Upper Abdomen:  The visualized upper abdomen is unremarkable.         Impression   No evidence of pulmonary embolism or acute thoracic aortic abnormality.       Minimal right basilar atelectasis.  There are a few faint ground-glass   opacities within the left lower lobe which are new from prior and may   represent residual COVID-19 viral pneumonia in the appropriate setting. Lungs otherwise clear.       Mild cardiac enlargement.  Coronary artery atherosclerotic calcifications.    No pleural or pericardial effusion.           Order History    Open Order Details   PACS Images     Show images for CTA PULMONARY W CONTRAST                         Assessment:     covid pneumonia  copd          Plan:     Adequate o2 adm  She qualifies for home o2 so arrangements needs to be made before she is discharged  Need to make sure that she can be quarantined in a group home setting  Bd rx  If above can be arranged then she can be dc and I can fu as outpt  Thanks will follow

## 2021-10-17 NOTE — DISCHARGE SUMMARY
Campos Lynne MD, [facility]   Internal Medicine Hospitalist  Discharge Summary    Roseanne Jean  :  1960  MRN:  [id number]    Admit date:  10/12/2021  Discharge date:  10/17/2021    Admitting Physician:  Jayro Harper MD    Discharge Diagnoses:    Patient Active Problem List   Diagnosis    Pneumonia due to infectious organism    HTN (hypertension), benign    Seizure disorder ([address]    Altered mental status    Chest pain    MR (mental retardation)    COPD exacerbation ([address]    Left hemiplegia ([address]    H/O: stroke    Mixed hyperlipidemia    Acquired hypothyroidism    Sepsis ([address]    Acute bronchitis    Acute respiratory failure with hypoxia ([address]    Acute respiratory failure ([address]    Anxiety and depression    Anemia    Controlled type 2 diabetes mellitus without complication, without long-term current use of insulin ([address]    Diverticulosis    Lesion of right native kidney    Black stool    Hypoxia    COVID       Admission Condition:  fair    Discharged Condition:  stable    Hospital Course:     (copied from admission history)  Roseanne Jean is a 64 y.o.  female  who presents with history significant for cerebral palsy, COPD not on home oxygen, previous stroke, type 2 diabetes, hypertension, hyperlipidemia, seizure disorder, hypothyroidism, anemia  who presents with increased  shortness of breath. She was recently DC on  with a dx of PNA and  ex. Patient can communicate but does have a mental disability and lives in group home with home health help. Her home health nurse reported rattling breathing after inhalers and took her to ED. Care giver not at bedside, patient's communication is limited. She denied pain or SOB. She did question why she has to stay in hospital.    10/17/2021-I saw the patient today she is doing much better wants to go home. She is on oxygen 2 L/min otherwise not short of breath.   I have asked for pulmonary to see her but they are not been able to see her I would like their opinion before she is discharged see if anything else needs to be done if not then she can still be discharged on the medication as below. She would need to have a thyroid function test done again in about a month's time. She also would need follow-up with her other consultants or other medications he was seen before admission. Patient was seen in hospital for COPD, COVID-19   . I am prescribing oxygen because the diagnosis and testing requires the patient to have oxygen in the home. Conditions will improve or be benefited by oxygen use. The patient is able to perform good mobility and therefore requires the use of a portable oxygen system for ambulation. Hospital Course:  (copied from last soap) 10/16/2021  Acute hypoxic respiratory failure / COVID-19 pneumonia / COPD  10/15/2021-She was admitted with shortness of breath was positive for SARS-CoV-2.  CT chest showed groundglass opacities, on Decadron not on any antibiotics check with pulmonary to see if he is a candidate for Zithromax for possible superinfection.  She is on Lovenox 30 mg twice a day. 10/16/2021-she seems to have responded to the steroids does not seem like she is having any consultants on the case I will put in pulmonary to see if she is to be more aggressively treated with her antiviral are is able to go home.   She is not requiring much oxygen as well.      DM 2  10/15/2021-Her sugar seems well controlled at 97 we will continue to [facility] is on a sliding scale.  Check A1c.   10/16/2021-sugars 193 today continue to monitor A1c is not back nor her other labs.        HTN  10/15/2021-Blood pressure is 113/49 continue to monitor.  She is on Norvasc 10 mg once a day.  Lisinopril 40 mg once a day  10/16/2021-blood pressure is 117/63 continue to monitor     Chronic anemia  10/15/2021-Last hemoglobin done was 10/13/2021 it was 8.3, she has been labeled as anemia of chronic disease check anemia panel continue to monitor. 10/16/2021-awaiting labs from today        MDD /seizure disorder/MRDD/cerebral palsy   10/15/2021-Patient has depression seizure disorder cerebral palsy and MRDD from before continue to monitor and continue with her home medicines as before.      Hypothyroid  10/15/2021-For hypothyroidism she is on levothyroxine 50 mcg once a day a TSH and a free T4 done 9/29/2021 shows a TSH of 5.220 and a free T4 of 0.83 we will up her levothyroxine to 75 mcg once a day then repeat thyroid function tests in a month's time.         Disposition  10/15/2021-patient is from a group home may need to return upon getting better.   spoke with [facility] team.   10/16/2021- needs to check if her home is ready for quarantine. We will need to have an approval from pulmonary before discharge.      Consultants:  Pulmonary    Follow up appointment / plans: Needs to be seen within 7 days by his/her physician, patient to call for an appointment. On examination today  Heart is RRR, Lungs are CTA, abdomen is soft and non tender, CNS is grossly intact. Please see detailed consultation notes and radiology dictations as below. Vitals: Blood pressure (!) 119/57, pulse 62, temperature 97.8 °F (36.6 °C), temperature source Oral, resp. rate 24, height 4' 10\" (1.473 m), weight 134 lb 4.2 oz (60.9 kg), SpO2 95 %, not currently breastfeeding. Discharge Medications:        Medication List      ASK your doctor about these medications    acidophilus Caps capsule  TAKE 1 CAPSULE BY MOUTH DAILY     albuterol sulfate  (90 Base) MCG/ACT inhaler  Inhale 2 puffs into the lungs 4 times daily     amLODIPine 10 MG tablet  Commonly known as: NORVASC  Take 1 tablet by mouth daily     aspirin 325 MG tablet     atorvastatin 40 MG tablet  Commonly known as: LIPITOR  TAKE 1 TABLET BY MOUTH DAILY     blood glucose test strips  Test 3 times a day & as needed for symptoms of irregular blood glucose.      busPIRone 10 MG known as: STIOLTO  Inhale 2 puffs into the lungs daily                 Significant Diagnostic Studies:   Blood work reviewed. CBC with Differential:    Lab Results   Component Value Date    WBC 3.3 10/16/2021    RBC 3.28 10/16/2021    HGB 8.1 10/16/2021    HCT 28.5 10/16/2021     10/16/2021    MCV 86.9 10/16/2021    MCH 24.7 10/16/2021    MCHC 28.4 10/16/2021    RDW 16.5 10/16/2021    SEGSPCT 49.2 10/16/2021    BANDSPCT 5 12/01/2019    LYMPHOPCT 38.0 10/16/2021    MONOPCT 11.9 10/16/2021    MYELOPCT 1 12/01/2019    EOSPCT 0.8 01/26/2011    BASOPCT 0.0 10/16/2021    MONOSABS 0.4 10/16/2021    LYMPHSABS 1.3 10/16/2021    EOSABS 0.0 10/16/2021    BASOSABS 0.0 10/16/2021    DIFFTYPE AUTOMATED DIFFERENTIAL 10/16/2021     CMP:    Lab Results   Component Value Date     10/16/2021    K 3.7 10/16/2021     10/16/2021    CO2 29 10/16/2021    BUN 11 10/16/2021    CREATININE 0.3 10/16/2021    GFRAA >60 10/16/2021    LABGLOM >60 10/16/2021    GLUCOSE 121 10/16/2021    PROT 5.8 10/13/2021    PROT 6.5 11/12/2012    LABALBU 3.5 10/13/2021    CALCIUM 7.7 10/16/2021    BILITOT 0.0 10/13/2021    ALKPHOS 54 10/13/2021    AST 14 10/13/2021    ALT 12 10/13/2021     CTA PULMONARY W CONTRAST [[id number]] Collected: 10/12/21 1336      Order Status: Completed Updated: 10/12/21 1342     Narrative:       EXAMINATION:   CTA OF THE CHEST 10/12/2021 11:22 am     TECHNIQUE:   CTA of the chest was performed after the administration of intravenous   contrast.  Multiplanar reformatted images are provided for review. MIP   images are provided for review. Dose modulation, iterative reconstruction,   and/or weight based adjustment of the mA/kV was utilized to reduce the   radiation dose to as low as reasonably achievable. COMPARISON:   CT study from September 23, 2021.      HISTORY:   ORDERING SYSTEM PROVIDED HISTORY: hypoxia   TECHNOLOGIST PROVIDED HISTORY:   Reason for exam:->hypoxia   Decision Support Exception - unselect if not a suspected or confirmed   emergency medical condition->Emergency Medical Condition (MA)   Reason for Exam: hypoxia   Acuity: Unknown   Type of Exam: Unknown   Additional signs and symptoms: POOR HISTORIAN, UNABLE TO FOLLOW COMMANDS, MRDD   Relevant Medical/Surgical History: 75ML ISOVUE 370     FINDINGS:   Pulmonary Arteries: Pulmonary arteries are adequately opacified for   evaluation. No evidence of intraluminal filling defect to suggest pulmonary   embolism. Main pulmonary artery is normal in caliber. Mediastinum: The heart is mildly enlarged. Coronary artery atherosclerotic   calcifications are present. The thoracic aorta and proximal great vessels   are patent and of normal caliber. No pericardial effusion. No enlarged or   suspicious axillary, hilar, or mediastinal lymphadenopathy. Lungs/pleura: The trachea and mainstem bronchi are widely patent. Minimal   atelectasis is present at the right lung base. A few faint ground-glass   nodular opacities are present within the left lower lobe, new from prior. The lungs are otherwise grossly clear. No cavitary lesions. No discrete   pulmonary nodule or mass. No pleural effusion or pneumothorax. Soft Tissues/Bones: Degenerative changes are present throughout the thoracic   spine. Upper Abdomen: The visualized upper abdomen is unremarkable. Impression:       No evidence of pulmonary embolism or acute thoracic aortic abnormality. Minimal right basilar atelectasis. There are a few faint ground-glass   opacities within the left lower lobe which are new from prior and may   represent residual COVID-19 viral pneumonia in the appropriate setting. Lungs otherwise clear. Mild cardiac enlargement. Coronary artery atherosclerotic calcifications. No pleural or pericardial effusion.       XR CHEST PORTABLE [[id number]] Collected: 10/12/21 0947     Order Status: Completed Updated: 10/12/21 0950     Narrative:       EXAMINATION:   ONE XRAY VIEW OF THE CHEST     10/12/2021 9:35 am     COMPARISON:   03/17/2021. HISTORY:   ORDERING SYSTEM PROVIDED HISTORY: SOB, hypoxia   TECHNOLOGIST PROVIDED HISTORY:   Reason for exam:->SOB, hypoxia   Reason for Exam: SOB, hypoxia     FINDINGS:   The heart size is within normal limits. The pulmonary vasculature is also   within normal limits. No acute infiltrates are seen. The costophrenic angles   are sharp bilaterally. No pneumothoraces are noted. There are low lung volumes. Impression:       1. Low lung volumes but no active pulmonary disease. Disposition: To her group home  All the above has been explained to patient and or family. Patient would need F/U with his/her PCP in 7 days. Explained that it is the patients responsibility to have blood work or radiology testing done as an out patient. He/She has to take the discharge papers from the hospital for out patient follow up visit with the PCP/Physician. Time spent  >30 minutes. The above chart was partly created by using Dragon dictation system, it may contain dictation errors given the limitations of this technology.      Signed:  Shona Palma MD MD, [address]  10/17/2021, 8:02 AM

## 2021-10-17 NOTE — PROGRESS NOTES
All paperwork has been signed and faxed to [facility]). Please DO NOT let patient leave without their portable O2 tank. Bernard is aware of patients discharge. If O2 tank is NOT delivered before patient is discharged, please call [facility] at .   Thank you

## 2021-10-19 ENCOUNTER — CARE COORDINATION (OUTPATIENT)
Dept: CASE MANAGEMENT | Age: 61
End: 2021-10-19

## 2021-10-19 NOTE — CARE COORDINATION
[address] [facility] Transitions Initial Follow Up Call    Call within 2 business days of discharge: Yes    Patient: Marissa Gruber Patient : 1960   MRN: [id number]  Reason for Admission:  Covid PNA  Discharge Date: 10/17/21 RARS: Readmission Risk Score: 22      Last Discharge [facility]       Complaint Diagnosis Description Type Department Provider    10/12/21 Shortness of Breath COVID-19 ED to Hosp-Admission (Discharged) (ADMITTED) [address] Malorie Lassiter MD; Pancho Lira MD; C... Spoke with:  Antoinette BONILLA from [address] [place] [address] form verified. Pt admitted for Covid PNA. Pt with MRDD, has Cerebral Palsy. Pt is using home O2 2L/NC provided by [employer] upon DC, has O2 concentrator/portable tanks. Pt denies shortness of breath. Per Jennifer Perez,  pt is doing better, is back to her \"feisty self\" in the last couple days. Lives in an apt provided by [facility] that [facility] [place] provides 24/7 inhome caregivers for pt. Adele Hudson is Legal Guardian. Pt also has [id number] nurses that give pt her meds as prescribed. Reviews AVS & DC meds with Nanci. Emphasized importance of adhering to Covid CDC guidelines, ie mask wearing, social distancing, freq handwashing. Jennifer Perez  aware of follow up appt for pt (VV) on 10/29/2021 with PCP Dr Elvia Marcelo.        Facility: [facility]    Non-face-to-face services provided:  Communication with home health agencies or other community services the patient is currently using-done  Education of patient/family/caregiver/guardian to support self-management-done  Assessment and support for treatment adherence and medication management-done  Assistance in accessing community resources-done    Care Transitions 24 Hour Call    Schedule Follow Up Appointment with PCP: Completed  Do you have any ongoing symptoms?: No  Do you have a copy of your discharge instructions?: Yes  Do you have all of your prescriptions and are they filled?: Yes  Have you been contacted by a [address]?: No  Have you scheduled your follow up appointment?: Yes  How are you going to get to your appointment?: Car - family or friend to transport (Comment: Provided by [address], follow up appt is VV)  Were you discharged with any Home Care or Post Acute Services: Yes  Post Acute Services: Home Health (Comment: Stephanie Poster as PTA)  Do you feel like you have everything you need to keep you well at home?: Yes  Care Transitions Interventions   Home Care Waiver: Completed        Transportation Support: Declined      DME Assistance: Completed   Disease Association: Completed             Follow Up  Future Appointments   Date Time Provider Nicole Chapin   10/29/2021 12:45 PM MD Giorgio Vázquez   11/15/2021 10:15 MD Vamsi Monet [facility]   2022  2:30 PM MD Vamsi Turner [facility]   3/29/2022 10:30 AM MD Giorgio Vázquez     Transitions of Care Initial Call    Was this an external facility discharge? No Discharge Facility: NA    Challenges to be reviewed by the provider   Additional needs identified to be addressed with provider: No  none             Method of communication with provider : none      Advance Care Planning:   Does patient have an Advance Directive: not on file; education provided. Was this a readmission? Yes  Patient stated reason for admission:  Hypoxia Shortness of breath, Covid PNA. Patients top risk factors for readmission: medical condition-Cerebral Palsy, MRDD, HTN, CVA, DM2, COPD. Care Transition Nurse (CTN) contacted [facility] by telephone to perform post hospital discharge assessment. Verified name and  with Chao Cheung as identifiers. Provided introduction to self, and explanation of the CTN role.      CTN reviewed discharge instructions, medical action plan and red flags with Chao Cheung who verbalized understanding & given an opportunity to ask questions and does not have any further questions or concerns at this time. Were discharge instructions available to patient? Yes. Reviewed appropriate site of care based on symptoms and resources available to patient including: PCP, Specialist, Urgent care clinics, Home health, Provider home visits and When to call 911. The caregiver agrees to contact the PCP office for questions related to their healthcare. Medication reconciliation was performed with caregiver, who verbalizes understanding of administration of home medications. Advised obtaining a 90-day supply of all daily and as-needed medications. Covid Risk Education     Educated patient about risk for severe COVID-19 due to risk factors according to CDC guidelines. CTN reviewed discharge instructions, medical action plan and red flag symptoms with [facility] [place] manager Agnel Rey who verbalized understanding. Discussed COVID vaccination status: Yes. Pfizer series beginning of 2021. Education provided on COVID-19 vaccination as appropriate. Discussed exposure protocols and quarantine with CDC Guidelines. Angel Rey was given an opportunity to verbalize any questions and concerns and agrees to contact CTN or health care provider for questions related to their healthcare. Reviewed and educated Angel Rey  on any new and changed medications related to discharge diagnosis. CTN provided contact information. Plan for follow-up call in 5-7 days based on severity of symptoms and risk factors.   Plan for next call: symptom management-- and follow up appointment--        Regina Gutierrez RN [phone]

## 2021-10-27 ENCOUNTER — CARE COORDINATION (OUTPATIENT)
Dept: CASE MANAGEMENT | Age: 61
End: 2021-10-27

## 2021-10-27 NOTE — CARE COORDINATION
[facility] Transitions/COVID Follow Up Call     10/27/2021    Patient: Rebecca Lindsey  Patient : 1960   MRN: [id number]  Reason for Admission: Covid PNA  Discharge Date: 10/17/21 RARS: Readmission Risk Score: 22         Spoke with: [facility] [place] supervisor, Madeleine Fernandes reports pt is doing very well, no issues with breathing, no cough, continues to use home o2. Reiterated importance of CDC guidelines for Covid ie continued home isolation/social distancing/mask wearing, frequent handwashing/ As well as the Green, yellow/red Zones for Covid: ie when to call 911 & seek emergent care. Raquel Hutchinson understanding. Per Shiv Fairbanks, pt comprehends simple/basic instructions only (due to MRDD/Cerebral Palsy)    Reminded Shiv Fairbanks of pt's VV (due to Covid) on 2021 @ [phone] with PCP Dr Blas Nicholson. Shiv Fairbanks states [facility] [place] staff sets pt up with Zoom call with PCP. Shiv Fairbanks states [facility] [place] provides the  care for pt in an apt & Priscilla Pack provides intermittent skilled nursing visits to give pt her meds & assists with nursing care. Shiv Fairbanks declines the need for any further asssitance for pt @ this time. Care Transitions Subsequent and Final Call    Schedule Follow Up Appointment with PCP: Completed  Subsequent and Final Calls  Do you have any ongoing symptoms?: No  Have your medications changed?: No  Do you have any questions related to your medications?: No  Do you currently have any active services?: Yes  Are you currently active with any services?: Home Health  Do you have any needs or concerns that I can assist you with?: No  Identified Barriers: Impairment  Care Transitions Interventions   Home Care Waiver: Completed        Transportation Support: Declined      DME Assistance: Completed   Disease Association: Completed      Other Interventions:            Follow Up  Future Appointments   Date Time Provider Nicole Chapin   10/29/2021 12:45 PM Aiden Pemberton MD [facility]   11/15/2021 10:15 AM Velma Gardner

## 2021-10-29 ENCOUNTER — VIRTUAL VISIT (OUTPATIENT)
Dept: FAMILY MEDICINE CLINIC | Age: 61
End: 2021-10-29
Payer: MEDICARE

## 2021-10-29 DIAGNOSIS — J18.9 PNEUMONIA DUE TO INFECTIOUS ORGANISM, UNSPECIFIED LATERALITY, UNSPECIFIED PART OF LUNG: ICD-10-CM

## 2021-10-29 DIAGNOSIS — Z09 HOSPITAL DISCHARGE FOLLOW-UP: Primary | ICD-10-CM

## 2021-10-29 DIAGNOSIS — U07.1 COVID-19 VIRUS INFECTION: ICD-10-CM

## 2021-10-29 PROCEDURE — 99495 TRANSJ CARE MGMT MOD F2F 14D: CPT | Performed by: FAMILY MEDICINE

## 2021-10-29 PROCEDURE — 1111F DSCHRG MED/CURRENT MED MERGE: CPT | Performed by: FAMILY MEDICINE

## 2021-10-29 ASSESSMENT — ENCOUNTER SYMPTOMS
NAUSEA: 0
WHEEZING: 0
ABDOMINAL PAIN: 0
SHORTNESS OF BREATH: 0
DIARRHEA: 0
COUGH: 0
VOMITING: 0

## 2021-10-29 NOTE — PROGRESS NOTES
Post-Discharge Transitional Care Management Services or Hospital Follow Up    Done by a virtual visit, after agree with the patient    Natalia Álvarez   YOB: 1960    Date of Office Visit:  10/29/2021  Date of Hospital Admission: 10/12/21  Date of Hospital Discharge: 10/17/21  Readmission Risk Score(high >=14%.  Medium >=10%):Readmission Risk Score: 22        Care management risk score Rising risk (score 2-5) and Complex Care (Scores >=6): 10     Non face to face  following discharge, date last encounter closed (first attempt may have been earlier): 10/19/2021  4:48 PM 10/19/2021  4:48 PM    Call initiated 2 business days of discharge: Yes     Patient Active Problem List   Diagnosis    Pneumonia due to infectious organism    HTN (hypertension), benign    Seizure disorder ([address]    Altered mental status    Chest pain    MR (mental retardation)    COPD exacerbation ([address]    Left hemiplegia ([address]    H/O: stroke    Mixed hyperlipidemia    Acquired hypothyroidism    Sepsis ([address]    Acute bronchitis    Acute respiratory failure with hypoxia ([address]    Acute respiratory failure ([address]    Anxiety and depression    Anemia    Controlled type 2 diabetes mellitus without complication, without long-term current use of insulin ([address]    Diverticulosis    Lesion of right native kidney    Black stool    Hypoxia    COVID-19 virus infection       Allergies   Allergen Reactions    Macrobid [Nitrofurantoin] Hives and Swelling     Hives       Medications listed as ordered at the time of discharge from hospital   Shashank RICARDO   Home Medication Instructions ANGELICA:    Printed on:10/29/21 0896   Medication Information                      albuterol sulfate  (90 Base) MCG/ACT inhaler  Inhale 2 puffs into the lungs 4 times daily             amLODIPine (NORVASC) 10 MG tablet  Take 1 tablet by mouth daily             atorvastatin (LIPITOR) 40 MG tablet  TAKE 1 TABLET BY MOUTH DAILY             blood glucose (REMERON) 30 MG tablet TAKE 1 TABLET BY MOUTH NIGHTLY 31 tablet 5    Incontinence Supply Disposable (DEPEND UNDERWEAR LARGE/XL) MISC 1 Units by Does not apply route 4 times daily 100 Package 5    Disposable Gloves MISC 1 Units by Does not apply route 4 times daily 100 each 5    diphenhydrAMINE (BENADRYL) 25 MG capsule Take 25 mg by mouth every 6 hours as needed for Itching      blood glucose monitor strips Test 3 times a day & as needed for symptoms of irregular blood glucose. 90 strip 0    busPIRone (BUSPAR) 10 MG tablet Take 1 tablet by mouth 2 times daily 60 tablet 5    divalproex (DEPAKOTE) 500 MG DR tablet Take 4 tablets by mouth nightly 90 tablet 0    metFORMIN (GLUCOPHAGE) 500 MG tablet Take 2 tablets by mouth daily (with breakfast) 30 tablet 0    risperiDONE (RISPERDAL) 1 MG tablet Take 1 tablet by mouth 3 times daily 1 tablet in am, 1 tablet at 4pm, 1 tablet at bedtime 60 tablet 5    sertraline (ZOLOFT) 100 MG tablet Take 2 tablets by mouth nightly 30 tablet 5        Medications patient taking as of now reconciled against medications ordered at time of hospital discharge: Yes    Chief Complaint   Patient presents with    Follow-Up from Hospital       Patient here for f/u visit from hospitalization was having covid pneumonia, patient doing much better, no chest pain or SOB, sent her home with oxygen 2 L per NC, eating well and sleeping well, we discuss with the . No medication changes except increase the thyroid medication. Inpatient course: Discharge summary reviewed- see chart. Interval history/Current status: stable    Review of Systems   Constitutional: Negative for activity change, chills, fatigue and fever. HENT: Negative for congestion. Respiratory: Negative for cough, shortness of breath and wheezing. Cardiovascular: Negative for chest pain and leg swelling. Gastrointestinal: Negative for abdominal pain, diarrhea, nausea and vomiting.    Genitourinary: Negative for dysuria. Neurological: Negative for dizziness and headaches. Psychiatric/Behavioral: Negative for agitation and sleep disturbance. The patient is not nervous/anxious. 93% pulse oxygen, 145/97    Physical Exam  Constitutional:       Appearance: She is well-developed. She is not ill-appearing. Comments: Looks alert   HENT:      Head: Normocephalic and atraumatic. Pulmonary:      Effort: Pulmonary effort is normal.   Musculoskeletal:      Cervical back: Normal range of motion. Neurological:      Mental Status: She is alert. Psychiatric:         Mood and Affect: Mood normal.         Behavior: Behavior normal.             Assessment/Plan:  1. Hospital discharge follow-up  - stable    2. COVID-19 virus infection  - doing better    3.  Pneumonia due to infectious organism, unspecified laterality, unspecified part of lung  - improving        Medical Decision Making: moderate complexity

## 2021-10-30 ENCOUNTER — HOSPITAL ENCOUNTER (EMERGENCY)
Age: 61
Discharge: HOME | End: 2021-10-31
Payer: MEDICARE

## 2021-10-30 ENCOUNTER — APPOINTMENT (OUTPATIENT)
Dept: GENERAL RADIOLOGY | Age: 61
End: 2021-10-30
Payer: MEDICARE

## 2021-10-30 VITALS
SYSTOLIC BLOOD PRESSURE: 137 MMHG | RESPIRATION RATE: 16 BRPM | OXYGEN SATURATION: 95 % | TEMPERATURE: 97.7 F | HEART RATE: 93 BPM | DIASTOLIC BLOOD PRESSURE: 65 MMHG

## 2021-10-30 DIAGNOSIS — R06.02 SHORTNESS OF BREATH: Primary | ICD-10-CM

## 2021-10-30 LAB
ALBUMIN SERPL-MCNC: 3.7 GM/DL (ref 3.4–5)
ALP BLD-CCNC: 81 IU/L (ref 40–129)
ALT SERPL-CCNC: 7 U/L (ref 10–40)
ANION GAP SERPL CALCULATED.3IONS-SCNC: 15 MMOL/L (ref 4–16)
AST SERPL-CCNC: 8 IU/L (ref 15–37)
BASE EXCESS MIXED: 3.6 (ref 0–2.3)
BASOPHILS ABSOLUTE: 0 K/CU MM
BASOPHILS RELATIVE PERCENT: 0.4 % (ref 0–1)
BILIRUB SERPL-MCNC: 0.1 MG/DL (ref 0–1)
BUN BLDV-MCNC: 16 MG/DL (ref 6–23)
CALCIUM SERPL-MCNC: 9 MG/DL (ref 8.3–10.6)
CHLORIDE BLD-SCNC: 103 MMOL/L (ref 99–110)
CO2: 23 MMOL/L (ref 21–32)
CREAT SERPL-MCNC: 0.3 MG/DL (ref 0.6–1.1)
D DIMER: 217 NG/ML(DDU)
DIFFERENTIAL TYPE: ABNORMAL
EOSINOPHILS ABSOLUTE: 0.1 K/CU MM
EOSINOPHILS RELATIVE PERCENT: 0.9 % (ref 0–3)
GFR AFRICAN AMERICAN: >60 ML/MIN/1.73M2
GFR NON-AFRICAN AMERICAN: >60 ML/MIN/1.73M2
GLUCOSE BLD-MCNC: 221 MG/DL (ref 70–99)
HCO3 VENOUS: 26.3 MMOL/L (ref 19–25)
HCT VFR BLD CALC: 30.9 % (ref 37–47)
HEMOGLOBIN: 8.7 GM/DL (ref 12.5–16)
IMMATURE NEUTROPHIL %: 1.1 % (ref 0–0.43)
LACTATE: 3.5 MMOL/L (ref 0.4–2)
LYMPHOCYTES ABSOLUTE: 2.1 K/CU MM
LYMPHOCYTES RELATIVE PERCENT: 38.6 % (ref 24–44)
MCH RBC QN AUTO: 24.8 PG (ref 27–31)
MCHC RBC AUTO-ENTMCNC: 28.2 % (ref 32–36)
MCV RBC AUTO: 88 FL (ref 78–100)
MONOCYTES ABSOLUTE: 0.7 K/CU MM
MONOCYTES RELATIVE PERCENT: 13.9 % (ref 0–4)
NUCLEATED RBC %: 0 %
O2 SAT, VEN: 90.4 % (ref 50–70)
PCO2, VEN: 33 MMHG (ref 38–52)
PDW BLD-RTO: 16.3 % (ref 11.7–14.9)
PH VENOUS: 7.51 (ref 7.32–7.42)
PLATELET # BLD: 294 K/CU MM (ref 140–440)
PMV BLD AUTO: 9.1 FL (ref 7.5–11.1)
PO2, VEN: 145 MMHG (ref 28–48)
POTASSIUM SERPL-SCNC: 3.9 MMOL/L (ref 3.5–5.1)
PRO-BNP: 115.8 PG/ML
RBC # BLD: 3.51 M/CU MM (ref 4.2–5.4)
SEGMENTED NEUTROPHILS ABSOLUTE COUNT: 2.4 K/CU MM
SEGMENTED NEUTROPHILS RELATIVE PERCENT: 45.1 % (ref 36–66)
SODIUM BLD-SCNC: 141 MMOL/L (ref 135–145)
TOTAL IMMATURE NEUTOROPHIL: 0.06 K/CU MM
TOTAL NUCLEATED RBC: 0 K/CU MM
TOTAL PROTEIN: 6.1 GM/DL (ref 6.4–8.2)
TROPONIN T: <0.01 NG/ML
WBC # BLD: 5.3 K/CU MM (ref 4–10.5)

## 2021-10-30 PROCEDURE — 82805 BLOOD GASES W/O2 SATURATION: CPT

## 2021-10-30 PROCEDURE — 85379 FIBRIN DEGRADATION QUANT: CPT

## 2021-10-30 PROCEDURE — 71045 X-RAY EXAM CHEST 1 VIEW: CPT

## 2021-10-30 PROCEDURE — 0202U NFCT DS 22 TRGT SARS-COV-2: CPT

## 2021-10-30 PROCEDURE — 83605 ASSAY OF LACTIC ACID: CPT

## 2021-10-30 PROCEDURE — 80053 COMPREHEN METABOLIC PANEL: CPT

## 2021-10-30 PROCEDURE — 96360 HYDRATION IV INFUSION INIT: CPT

## 2021-10-30 PROCEDURE — 99283 EMERGENCY DEPT VISIT LOW MDM: CPT

## 2021-10-30 PROCEDURE — P9612 CATHETERIZE FOR URINE SPEC: HCPCS

## 2021-10-30 PROCEDURE — 83880 ASSAY OF NATRIURETIC PEPTIDE: CPT

## 2021-10-30 PROCEDURE — 85025 COMPLETE CBC W/AUTO DIFF WBC: CPT

## 2021-10-30 PROCEDURE — 84484 ASSAY OF TROPONIN QUANT: CPT

## 2021-10-31 LAB
ADENOVIRUS DETECTION BY PCR: NOT DETECTED
BACTERIA: NEGATIVE /HPF
BILIRUBIN URINE: NEGATIVE MG/DL
BLOOD, URINE: NEGATIVE
BORDETELLA PARAPERTUSSIS BY PCR: NOT DETECTED
BORDETELLA PERTUSSIS PCR: NOT DETECTED
CHLAMYDOPHILA PNEUMONIA PCR: NOT DETECTED
CLARITY: CLEAR
COLOR: YELLOW
CORONAVIRUS 229E PCR: NOT DETECTED
CORONAVIRUS HKU1 PCR: NOT DETECTED
CORONAVIRUS NL63 PCR: NOT DETECTED
CORONAVIRUS OC43 PCR: NOT DETECTED
GLUCOSE, URINE: NEGATIVE MG/DL
HUMAN METAPNEUMOVIRUS PCR: NOT DETECTED
INFLUENZA A BY PCR: NOT DETECTED
INFLUENZA A H1 (2009) PCR: NOT DETECTED
INFLUENZA A H1 PANDEMIC PCR: NOT DETECTED
INFLUENZA A H3 PCR: NOT DETECTED
INFLUENZA B BY PCR: NOT DETECTED
KETONES, URINE: ABNORMAL MG/DL
LACTATE: 1.6 MMOL/L (ref 0.4–2)
LEUKOCYTE ESTERASE, URINE: ABNORMAL
MUCUS: ABNORMAL HPF
MYCOPLASMA PNEUMONIAE PCR: NOT DETECTED
NITRITE URINE, QUANTITATIVE: NEGATIVE
PARAINFLUENZA 1 PCR: NOT DETECTED
PARAINFLUENZA 2 PCR: NOT DETECTED
PARAINFLUENZA 3 PCR: NOT DETECTED
PARAINFLUENZA 4 PCR: NOT DETECTED
PH, URINE: 5 (ref 5–8)
PROTEIN UA: NEGATIVE MG/DL
RBC URINE: ABNORMAL /HPF (ref 0–6)
RHINOVIRUS ENTEROVIRUS PCR: NOT DETECTED
RSV PCR: NOT DETECTED
SARS-COV-2: NOT DETECTED
SPECIFIC GRAVITY UA: 1.03 (ref 1–1.03)
TRICHOMONAS: ABNORMAL /HPF
UROBILINOGEN, URINE: NEGATIVE MG/DL (ref 0.2–1)
WBC UA: 8 /HPF (ref 0–5)

## 2021-10-31 PROCEDURE — 96360 HYDRATION IV INFUSION INIT: CPT

## 2021-10-31 PROCEDURE — 93005 ELECTROCARDIOGRAM TRACING: CPT | Performed by: PHYSICIAN ASSISTANT

## 2021-10-31 PROCEDURE — 83605 ASSAY OF LACTIC ACID: CPT

## 2021-10-31 PROCEDURE — 2580000003 HC RX 258: Performed by: PHYSICIAN ASSISTANT

## 2021-10-31 PROCEDURE — 81001 URINALYSIS AUTO W/SCOPE: CPT

## 2021-10-31 RX ORDER — 0.9 % SODIUM CHLORIDE 0.9 %
1000 INTRAVENOUS SOLUTION INTRAVENOUS ONCE
Status: COMPLETED | OUTPATIENT
Start: 2021-10-31 | End: 2021-10-31

## 2021-10-31 RX ADMIN — SODIUM CHLORIDE 1000 ML: 9 INJECTION, SOLUTION INTRAVENOUS at 01:01

## 2021-10-31 NOTE — ED PROVIDER NOTES
Emergency 3130  27[facility] EMERGENCY DEPARTMENT    Patient: Volodymyr Dickson  MRN: [id number]  : 1960  Date of Evaluation: 10/30/2021  ED Provider: Ton Hall PA-C    Chief Complaint     No chief complaint on file. Danie Ma is a 64 y.o. female who presents to the emergency department for labored breathing. Patient is MRDD, brought in by caretaker. Caretaker states she put patient to bed and when she went to check on her, her breathing seemed labored. Patient wears 2L nasal canula oxygen at all times. Caretaker denies any fever, cough/congestion. Denies vomiting or diarrhea. She reports patient had [place] a couple weeks ago and was just cleared from quarantine last Friday. ROS     CONSTITUTIONAL:  Denies fever. ENT:  Denies nasal congestion. RESPIRATORY:  + shortness of breath. CARDIOVASCULAR:  Denies chest pain. GI:  Denies nausea or vomiting. MUSCULOSKELETAL:  Denies extremity pain or swelling. INTEGUMENT:  Denies skin changes. NEUROLOGIC:  Denies any change in mental status.     Past History     Past Medical History:   Diagnosis Date    Anxiety disorder     Back pain, chronic     Cerebral palsy (HCC)     [facility]     COVID-19 10/12/2021    CVA (cerebral infarction) 2014 x2    Diabetes mellitus ([facility]     Diverticulosis     Epilepsy ([facility] [facility]     GERD (gastroesophageal reflux disease)     Hyperlipidemia     Hypertension     Insomnia     Iron (Fe) deficiency anemia     Mental disability     Seizures (HCC)     Unspecified cerebral artery occlusion with cerebral infarction      Past Surgical History:   Procedure Laterality Date    GASTROSTOMY TUBE PLACEMENT       Social History     Socioeconomic History    Marital status: Single     Spouse name: Not on file    Number of children: Not on file    Years of education: Not on file    Highest education level: Not on file   Occupational History    Not on file   Tobacco Use    Smoking status: Former Smoker     Packs/day: 1.50     Years: 20.00     Pack years: 30.00     Quit date: 8/28/2011     Years since quitting: 10.1    Smokeless tobacco: Never Used   Vaping Use    Vaping Use: Never used   Substance and Sexual Activity    Alcohol use: No     Alcohol/week: 0.0 standard drinks    Drug use: No    Sexual activity: Not on file   Other Topics Concern    Not on file   Social History Narrative    ** Merged History Encounter **          Social Determinants of Health     Financial Resource Strain:     Difficulty of Paying Living Expenses:    Food Insecurity:     Worried About Running Out of Food in the Last Year:     Ran Out of Food in the Last Year:    Transportation Needs:     Lack of Transportation (Medical):      Lack of Transportation (Non-Medical):    Physical Activity:     Days of Exercise per Week:     Minutes of Exercise per Session:    Stress:     Feeling of Stress :    Social Connections:     Frequency of Communication with Friends and Family:     Frequency of Social Gatherings with Friends and Family:     Attends [employer] Services:     Active Member of Clubs or Organizations:     Attends Club or Organization Meetings:     Marital Status:    Intimate Partner Violence:     Fear of Current or Ex-Partner:     Emotionally Abused:     Physically Abused:     Sexually Abused:        Medications/Allergies     Discharge Medication List as of 10/31/2021  2:50 AM      CONTINUE these medications which have NOT CHANGED    Details   levETIRAcetam (KEPPRA) 750 MG tablet Take 2 tablets by mouth 2 times daily, Disp-120 tablet, R-0Print      levothyroxine (SYNTHROID) 75 MCG tablet Take 1 tablet by mouth Daily, Disp-30 tablet, R-0Print      vitamin D 25 MCG (1000 UT) CAPS Take 2 capsules by mouth daily, Disp-60 capsule, R-0Print      ipratropium-albuterol (DUONEB) 0.5-2.5 (3) MG/3ML SOLN nebulizer solution Inhale 3 mLs Take 1 tablet by mouth 2 times daily, Disp-60 tablet, R-5Normal      divalproex (DEPAKOTE) 500 MG DR tablet Take 4 tablets by mouth nightly, Disp-90 tablet, R-0Normal      metFORMIN (GLUCOPHAGE) 500 MG tablet Take 2 tablets by mouth daily (with breakfast), Disp-30 tablet, R-0Normal      risperiDONE (RISPERDAL) 1 MG tablet Take 1 tablet by mouth 3 times daily 1 tablet in am, 1 tablet at 4pm, 1 tablet at bedtime, Disp-60 tablet, R-5Normal      sertraline (ZOLOFT) 100 MG tablet Take 2 tablets by mouth nightly, Disp-30 tablet, R-5Normal           Allergies   Allergen Reactions    Macrobid [Nitrofurantoin] Hives and Swelling     Hives        Physical Exam       ED Triage Vitals   BP Temp Temp src Pulse Resp SpO2 Height Weight   10/30/21 2130 -- -- -- 10/30/21 2138 10/30/21 2130 -- --   137/65    16 98 %       GENERAL APPEARANCE:  Well-developed, well-nourished, no acute distress. HEAD:  NC/AT. EYES:  Sclera anicteric. ENT:  Ears, nose, mouth normal.     NECK:  Supple. CARDIO:  RRR. LUNGS:   Diminished. Respirations unlabored. ABDOMEN:  Soft, non-distended, non-tender. BS active. EXTREMITIES:  No acute deformities. No edema. SKIN:  Warm and dry. NEUROLOGICAL:  Alert.     Diagnostics     Labs:  Results for orders placed or performed during the hospital encounter of 10/30/21   Respiratory Panel, Molecular, with COVID-19 (Restricted: peds pts or suitable admitted adults)    Specimen: Nasopharyngeal   Result Value Ref Range    Adenovirus Detection by PCR NOT DETECTED NOT DETECTED    Coronavirus 229E PCR NOT DETECTED NOT DETECTED    Coronavirus HKU1 PCR NOT DETECTED NOT DETECTED    Coronavirus NL63 PCR NOT DETECTED NOT DETECTED    Coronavirus OC43 PCR NOT DETECTED NOT DETECTED    SARS-CoV-2 NOT DETECTED NOT DETECTED    Human Metapneumovirus PCR NOT DETECTED NOT DETECTED    Rhinovirus Enterovirus PCR NOT DETECTED NOT DETECTED    Influenza A by PCR NOT DETECTED NOT DETECTED    Influenza A H1 Pandemic PCR NOT DETECTED NOT DETECTED    Influenza A H1 (2009) PCR NOT DETECTED NOT DETECTED    Influenza A H3 PCR NOT DETECTED NOT DETECTED    Influenza B by PCR NOT DETECTED NOT DETECTED    Parainfluenza 1 PCR NOT DETECTED NOT DETECTED    Parainfluenza 2 PCR NOT DETECTED NOT DETECTED    Parainfluenza 3 PCR NOT DETECTED NOT DETECTED    Parainfluenza 4 PCR NOT DETECTED NOT DETECTED    RSV PCR NOT DETECTED NOT DETECTED    Bordetella parapertussis by PCR NOT DETECTED NOT DETECTED    B Pertussis by PCR NOT DETECTED NOT DETECTED    Chlamydophila Pneumonia PCR NOT DETECTED NOT DETECTED    Mycoplasma pneumo by PCR NOT DETECTED NOT DETECTED   CBC Auto Differential   Result Value Ref Range    WBC 5.3 4.0 - 10.5 K/CU MM    RBC 3.51 (L) 4.2 - 5.4 M/CU MM    Hemoglobin 8.7 (L) 12.5 - 16.0 GM/DL    Hematocrit 30.9 (L) 37 - 47 %    MCV 88.0 78 - 100 FL    MCH 24.8 (L) 27 - 31 PG    MCHC 28.2 (L) 32.0 - 36.0 %    RDW 16.3 (H) 11.7 - 14.9 %    Platelets 970 771 - 010 K/CU MM    MPV 9.1 7.5 - 11.1 FL    Differential Type AUTOMATED DIFFERENTIAL     Segs Relative 45.1 36 - 66 %    Lymphocytes % 38.6 24 - 44 %    Monocytes % 13.9 (H) 0 - 4 %    Eosinophils % 0.9 0 - 3 %    Basophils % 0.4 0 - 1 %    Segs Absolute 2.4 K/CU MM    Lymphocytes Absolute 2.1 K/CU MM    Monocytes Absolute 0.7 K/CU MM    Eosinophils Absolute 0.1 K/CU MM    Basophils Absolute 0.0 K/CU MM    Nucleated RBC % 0.0 %    Total Nucleated RBC 0.0 K/CU MM    Total Immature Neutrophil 0.06 K/CU MM    Immature Neutrophil % 1.1 (H) 0 - 0.43 %   Comprehensive Metabolic Panel w/ Reflex to MG   Result Value Ref Range    Sodium 141 135 - 145 MMOL/L    Potassium 3.9 3.5 - 5.1 MMOL/L    Chloride 103 99 - 110 mMol/L    CO2 23 21 - 32 MMOL/L    BUN 16 6 - 23 MG/DL    CREATININE 0.3 (L) 0.6 - 1.1 MG/DL    Glucose 221 (H) 70 - 99 MG/DL    Calcium 9.0 8.3 - 10.6 MG/DL    Albumin 3.7 3.4 - 5.0 GM/DL    Total Protein 6.1 (L) 6.4 - 8.2 GM/DL    Total Bilirubin 0.1 0.0 - 1.0 MG/DL    ALT 7 (L) 10 - 40 U/L    AST 8 (L) 15 - 37 IU/L    Alkaline Phosphatase 81 40 - 129 IU/L    GFR Non-African American >60 >60 mL/min/1.73m2    GFR African American >60 >60 mL/min/1.73m2    Anion Gap 15 4 - 16   Troponin   Result Value Ref Range    Troponin T <0.010 <0.01 NG/ML   Brain Natriuretic Peptide   Result Value Ref Range    Pro-.8 <300 PG/ML   D-Dimer, Quantitative   Result Value Ref Range    D-Dimer, Quant 217 <230 NG/mL(DDU)   Lactic Acid, Plasma   Result Value Ref Range    Lactate 3.5 (HH) 0.4 - 2.0 mMOL/L   Urinalysis Reflex to Culture    Specimen: Urine   Result Value Ref Range    Color, UA YELLOW YELLOW    Clarity, UA CLEAR CLEAR    Glucose, Urine NEGATIVE NEGATIVE MG/DL    Bilirubin Urine NEGATIVE NEGATIVE MG/DL    Ketones, Urine SMALL (A) NEGATIVE MG/DL    Specific Gravity, UA 1.028 1.001 - 1.035    Blood, Urine NEGATIVE NEGATIVE    pH, Urine 5.0 5.0 - 8.0    Protein, UA NEGATIVE NEGATIVE MG/DL    Urobilinogen, Urine NEGATIVE 0.2 - 1.0 MG/DL    Nitrite Urine, Quantitative NEGATIVE NEGATIVE    Leukocyte Esterase, Urine SMALL (A) NEGATIVE    RBC, UA NONE SEEN 0 - 6 /HPF    WBC, UA 8 (H) 0 - 5 /HPF    Bacteria, UA NEGATIVE NEGATIVE /HPF    Mucus, UA RARE (A) NEGATIVE HPF    Trichomonas, UA NONE SEEN NONE SEEN /HPF   Blood Gas, Venous   Result Value Ref Range    pH, Lan 7.51 (H) 7.32 - 7.42    pCO2, Lan 33 (L) 38 - 52 mmHG    pO2, Lan 145 (H) 28 - 48 mmHG    Base Exc, Mixed 3.6 (H) 0 - 2.3    HCO3, Venous 26.3 (H) 19 - 25 MMOL/L    O2 Sat, Lan 90.4 (H) 50 - 70 %   Lactic Acid, Plasma   Result Value Ref Range    Lactate 1.6 0.4 - 2.0 mMOL/L   EKG 12 Lead   Result Value Ref Range    Ventricular Rate 89 BPM    Atrial Rate 89 BPM    P-R Interval 128 ms    QRS Duration 84 ms    Q-T Interval 372 ms    QTc Calculation (Bazett) 452 ms    P Axis 7 degrees    R Axis 34 degrees    T Axis 19 degrees    Diagnosis       Normal sinus rhythm  Normal ECG  When compared with ECG of 12-OCT-2021 09:32,  Nonspecific T wave abnormality no longer evident in Lateral leads         Radiographs:  XR CHEST PORTABLE    Result Date: 10/30/2021  EXAMINATION: ONE XRAY VIEW OF THE CHEST 10/30/2021 9:49 pm COMPARISON: Chest radiograph and CT chest angiogram 10/12/2021. HISTORY: ORDERING SYSTEM PROVIDED HISTORY: sob TECHNOLOGIST PROVIDED HISTORY: Reason for exam:->sob Reason for Exam: sob Acuity: Unknown Type of Exam: Initial FINDINGS: The cardiomediastinal silhouette is unchanged. Mild left basilar opacities. No pneumothorax, vascular congestion, consolidation, or pleural effusion is identified. No acute osseous abnormality. Mild left basilar opacities compatible with atelectasis. In the appropriate clinical setting pneumonia is not excluded. Procedures/EKG:   Please see attending physician's note for interpretation. ED Course and MDM   -  Patient seen and evaluated in the emergency department. -  Triage and nursing notes reviewed and incorporated. -  Old chart records reviewed and incorporated. -  Supervising physician was Dr. Magui Suero. Patient was seen independently. -  Work-up included:  See above  -  ED medications:  NS  -  Results discussed with patient and caretaker. CXR with atelectasis. No leukocytosis. Respiratory panel is negative. Initial lactic 3.5--improved to 1.6. D-dimer and troponin are negative. Patient in no distress throughout ED course. Appears well. Oxygen sat in high 90s on her normal O2. Will dc home--advised on close FU with PCP, return as needed. Caretaker agreeable with plan of care and disposition.  -  Disposition:  Home    In light of current events, I did utilize appropriate PPE (including N95 and surgical face mask, safety glasses, and gloves, as recommended by the health facility/national standard best practice, during my bedside interactions with the patient. Final Impression      1.  Shortness of breath          DISPOSITION Decision To Discharge 10/31/2021 02:45:42 AM      Kimber Red PUMA  [address] [place] [address]  10/31/21 3686

## 2021-10-31 NOTE — ED NOTES
12 lead EKG as interpreted by me reveals normal sinus rhythm. Axis is normal. There are no ischemic ST elevations or other suspicious ST changes;  QRS interval is narrow, QT interval is not prolonged. Final interpretation: Normal sinus rhythm.        Orma Phoenix, MD  10/31/21 9333

## 2021-10-31 NOTE — ED NOTES
EMS reports they were called for \"labored breathing\". Pt normally wears 2L NC and was 98%. Pt recently had COVID and was cleared from quarantine. Pt in no distress and only complaints to EMS were [place].       Ra Perez RN  10/30/21 0020

## 2021-11-01 LAB
EKG ATRIAL RATE: 89 BPM
EKG DIAGNOSIS: NORMAL
EKG P AXIS: 7 DEGREES
EKG P-R INTERVAL: 128 MS
EKG Q-T INTERVAL: 372 MS
EKG QRS DURATION: 84 MS
EKG QTC CALCULATION (BAZETT): 452 MS
EKG R AXIS: 34 DEGREES
EKG T AXIS: 19 DEGREES
EKG VENTRICULAR RATE: 89 BPM

## 2021-11-01 PROCEDURE — 93010 ELECTROCARDIOGRAM REPORT: CPT | Performed by: INTERNAL MEDICINE

## 2021-11-05 ENCOUNTER — CARE COORDINATION (OUTPATIENT)
Dept: CASE MANAGEMENT | Age: 61
End: 2021-11-05

## 2021-11-05 NOTE — CARE COORDINATION
[facility] Transitions Follow Up Call    2021    Patient: Maciel De Oliveira  Patient : 1960   MRN: <[id number]>  Reason for Admission:   Discharge Date: 10/31/21 RARS: Readmission Risk Score: 22         Spoke with: Yashira Paulino Supervisor    Contacted patient for [employer] and ER follow visit for 10/30/21. Spoke with [employer]. She states that [place] is doing fine. She denies [place] having any c/o chest pain/discomfort increased shortness of breath, cough, wheezing, fever, chills. Jami Soliman states that they were told she does not have to follow up with PCP and to follow up with pulmonology. Patient has follow up on 11/15/21. Jami Soliman confirmed [place] has all of her medications. No needs or concerns at this time. Will continue to follow. Care Transitions Subsequent and Final Call    Subsequent and Final Calls  Do you have any ongoing symptoms?: No  Have your medications changed?: No  Do you have any questions related to your medications?: No  Are you currently active with any services?: Home Health  Do you have any needs or concerns that I can assist you with?: No  Identified Barriers: None  Care Transitions Interventions   Home Care Waiver: Completed        Transportation Support: Declined      DME Assistance: Completed   Disease Association: Completed      Other Interventions:            Follow Up  Future Appointments   Date Time Provider Nicole Chapin   11/15/2021 10:15 MD Vamsi Granado [facility]   2022  2:30 PM MD aVmsi Conner [facility]   3/29/2022 10:30 AM [facility] Flanagan [facility]

## 2021-11-17 ENCOUNTER — CARE COORDINATION (OUTPATIENT)
Dept: CASE MANAGEMENT | Age: 61
End: 2021-11-17

## 2021-11-17 NOTE — CARE COORDINATION
Covid-19 Subsequent Follow Up Call    You Patient resolved from the Care Transitions episode on 11/17/21  Discussed COVID-19 related testing which was available at this time. Test results were positive. Patient informed of results, if available? Yes    Patient/family has been provided the following resources and education related to COVID-19:                         Signs, symptoms and red flags related to COVID-19            [facility] exposure and quarantine guidelines            [facility] exposure contact - [phone]            Contact for their local Department of Health                 Patient currently reports that the following symptoms have improved:  fever, fatigue, pain or aching joints, cough, shortness of breath, confusion or unusual change in mental status, chills or shaking, sweating, fast heart rate, fast breathing, dizziness/lightheadedness, less urine output, cold, clammy, and pale skin, low body temperature and no new/worsening symptoms     Spoke with Santi BONILLA from [facility]. Said that Ulisses Portillo is doing fine, no issues. No further outreach scheduled with this CTN/ACM. Episode of Care resolved. Patient has this CTN/ACM contact information if future needs arise.

## 2021-11-21 RX ORDER — LEVOTHYROXINE SODIUM 0.07 MG/1
75 TABLET ORAL DAILY
Qty: 30 TABLET | Refills: 5 | Status: ON HOLD | OUTPATIENT
Start: 2021-11-21 | End: 2022-11-02 | Stop reason: SDUPTHER

## 2021-12-08 ENCOUNTER — HOSPITAL ENCOUNTER (OUTPATIENT)
Dept: PULMONOLOGY | Age: 61
Discharge: HOME | End: 2021-12-08
Payer: MEDICARE

## 2021-12-08 PROCEDURE — 94618 PULMONARY STRESS TESTING: CPT

## 2021-12-08 NOTE — PROGRESS NOTES
12/8/2021 4:51 PM  Patient Room #: OUT PATIENT  Patient Name: Rebecca Lindsey    (Step 1 Done by RN if possible otherwise call Pulmonary Diagnostics)  1. Place patient on room air at rest for at least 30 minutes. If patient falls below 88% before 30 minutes then you can record the level and stop. Record room air saturation level _88_ %. If patient is at 88% or below, they will qualify for home oxygen and you can stop. If level does not fall below 88%, fill in level above. If indicated continue to Step 2. Signature:_Radha Thurman, RRT_ Date: _12/08/2021_  (Step 2&3 Done by [facility])  2. Ambulate patient on room air until saturation falls below 89%. Record level of room air saturation with ambulation___ %. Next, place patient back on ___lpm oxygen and ambulate, record level __%. (Note:  this level must show improvement from room air level done with ambulation.)  If patients saturation on room air with ambulation is 88% or below AND patient shows improvement with oxygen during ambulation, they will qualify for home oxygen and you can stop. If patient does not drop below 89%, then patient should have an overnight oximetry trending on room air to see if level falls below 88%. Complete level in Step 3 below. 3. Room air overnight oximetry level 88 % for___  cumulative minutes. If patients room air oxygen level is < 89% for at least 5 cumulative minutes, patient will qualify for home oxygen and you can stop. (Attach Night Trending Report)    Complete order below: Diagnosis:_COPD___  Home oxygen at:  Length of Need: ?X Lifetime ?  3 Months     ___lpm or __%   via  [x] nasal cannula  []mask  [x] other: Conserving Device         [x]continuous [x]  with activity  [x]  Nocturnal   [x] Portable Tanks [x]  Concentrator  [x] Conserving Device        Therapist Signature:_Radha Thurman, RRT_     Date:  12/08/2021_    Physician Signature:  __________________________    Date:__  Physician Printed Name:  Willian Regalado MD___   NPI # _[id number]_    [x] Patient Qualifies      [] Patient Does NOT qualify

## 2021-12-15 LAB
AVERAGE GLUCOSE: NORMAL
HBA1C MFR BLD: 6 %

## 2021-12-16 DIAGNOSIS — E78.2 MIXED HYPERLIPIDEMIA: ICD-10-CM

## 2021-12-17 RX ORDER — ATORVASTATIN CALCIUM 40 MG/1
40 TABLET, FILM COATED ORAL DAILY
Qty: 31 TABLET | Refills: 5 | Status: SHIPPED | OUTPATIENT
Start: 2021-12-17

## 2021-12-17 RX ORDER — CETIRIZINE HYDROCHLORIDE 10 MG/1
TABLET ORAL
Qty: 31 TABLET | Refills: 5 | Status: SHIPPED | OUTPATIENT
Start: 2021-12-17 | End: 2022-06-22

## 2021-12-23 ENCOUNTER — TELEPHONE (OUTPATIENT)
Dept: FAMILY MEDICINE CLINIC | Age: 61
End: 2021-12-23

## 2021-12-29 ENCOUNTER — TELEPHONE (OUTPATIENT)
Dept: FAMILY MEDICINE CLINIC | Age: 61
End: 2021-12-29

## 2021-12-29 NOTE — TELEPHONE ENCOUNTER
Needs to have verbal  To continue with skilled nursing. Needs to be completed by Doctor. eyeglasses/contact lenses

## 2022-01-20 RX ORDER — CARBAMAZEPINE 300 MG/1
CAPSULE, EXTENDED RELEASE ORAL
Qty: 56 CAPSULE | Refills: 5 | Status: SHIPPED | OUTPATIENT
Start: 2022-01-20

## 2022-01-20 RX ORDER — SELENIUM 50 MCG
1 TABLET ORAL DAILY
Qty: 28 CAPSULE | Refills: 5 | Status: SHIPPED | OUTPATIENT
Start: 2022-01-20

## 2022-01-28 ENCOUNTER — OFFICE VISIT (OUTPATIENT)
Dept: FAMILY MEDICINE CLINIC | Age: 62
End: 2022-01-28
Payer: MEDICARE

## 2022-01-28 VITALS — HEART RATE: 73 BPM | SYSTOLIC BLOOD PRESSURE: 122 MMHG | DIASTOLIC BLOOD PRESSURE: 78 MMHG | OXYGEN SATURATION: 98 %

## 2022-01-28 DIAGNOSIS — D64.9 ANEMIA, UNSPECIFIED TYPE: ICD-10-CM

## 2022-01-28 DIAGNOSIS — G40.909 SEIZURE DISORDER (HCC): ICD-10-CM

## 2022-01-28 DIAGNOSIS — J44.9 CHRONIC OBSTRUCTIVE PULMONARY DISEASE, UNSPECIFIED COPD TYPE (HCC): ICD-10-CM

## 2022-01-28 DIAGNOSIS — E11.9 CONTROLLED TYPE 2 DIABETES MELLITUS WITHOUT COMPLICATION, WITHOUT LONG-TERM CURRENT USE OF INSULIN (HCC): ICD-10-CM

## 2022-01-28 DIAGNOSIS — G80.8 OTHER CEREBRAL PALSY (HCC): ICD-10-CM

## 2022-01-28 DIAGNOSIS — I10 HTN (HYPERTENSION), BENIGN: Primary | ICD-10-CM

## 2022-01-28 DIAGNOSIS — E78.2 MIXED HYPERLIPIDEMIA: ICD-10-CM

## 2022-01-28 DIAGNOSIS — E03.9 ACQUIRED HYPOTHYROIDISM: ICD-10-CM

## 2022-01-28 DIAGNOSIS — N30.00 ACUTE CYSTITIS WITHOUT HEMATURIA: ICD-10-CM

## 2022-01-28 DIAGNOSIS — G81.94 LEFT HEMIPLEGIA (HCC): ICD-10-CM

## 2022-01-28 DIAGNOSIS — F41.9 ANXIETY AND DEPRESSION: ICD-10-CM

## 2022-01-28 DIAGNOSIS — F32.A ANXIETY AND DEPRESSION: ICD-10-CM

## 2022-01-28 LAB
BASOPHILS ABSOLUTE: 0 K/UL (ref 0–0.2)
BASOPHILS RELATIVE PERCENT: 0.4 %
EOSINOPHILS ABSOLUTE: 0 K/UL (ref 0–0.6)
EOSINOPHILS RELATIVE PERCENT: 0.9 %
FOLATE: 6.92 NG/ML (ref 4.78–24.2)
HCT VFR BLD CALC: 29.4 % (ref 36–48)
HEMOGLOBIN: 9 G/DL (ref 12–16)
LYMPHOCYTES ABSOLUTE: 1.5 K/UL (ref 1–5.1)
LYMPHOCYTES RELATIVE PERCENT: 29.4 %
MCH RBC QN AUTO: 25 PG (ref 26–34)
MCHC RBC AUTO-ENTMCNC: 30.8 G/DL (ref 31–36)
MCV RBC AUTO: 81.3 FL (ref 80–100)
MONOCYTES ABSOLUTE: 0.4 K/UL (ref 0–1.3)
MONOCYTES RELATIVE PERCENT: 8.6 %
NEUTROPHILS ABSOLUTE: 3 K/UL (ref 1.7–7.7)
NEUTROPHILS RELATIVE PERCENT: 60.7 %
PDW BLD-RTO: 18.6 % (ref 12.4–15.4)
PLATELET # BLD: 300 K/UL (ref 135–450)
PMV BLD AUTO: 6.9 FL (ref 5–10.5)
RBC # BLD: 3.61 M/UL (ref 4–5.2)
VITAMIN B-12: 827 PG/ML (ref 211–911)
WBC # BLD: 5 K/UL (ref 4–11)

## 2022-01-28 PROCEDURE — 2022F DILAT RTA XM EVC RTNOPTHY: CPT | Performed by: FAMILY MEDICINE

## 2022-01-28 PROCEDURE — 3023F SPIROM DOC REV: CPT | Performed by: FAMILY MEDICINE

## 2022-01-28 PROCEDURE — G8484 FLU IMMUNIZE NO ADMIN: HCPCS | Performed by: FAMILY MEDICINE

## 2022-01-28 PROCEDURE — 1036F TOBACCO NON-USER: CPT | Performed by: FAMILY MEDICINE

## 2022-01-28 PROCEDURE — G8427 DOCREV CUR MEDS BY ELIG CLIN: HCPCS | Performed by: FAMILY MEDICINE

## 2022-01-28 PROCEDURE — 99214 OFFICE O/P EST MOD 30 MIN: CPT | Performed by: FAMILY MEDICINE

## 2022-01-28 PROCEDURE — 3046F HEMOGLOBIN A1C LEVEL >9.0%: CPT | Performed by: FAMILY MEDICINE

## 2022-01-28 PROCEDURE — 3017F COLORECTAL CA SCREEN DOC REV: CPT | Performed by: FAMILY MEDICINE

## 2022-01-28 PROCEDURE — G8417 CALC BMI ABV UP PARAM F/U: HCPCS | Performed by: FAMILY MEDICINE

## 2022-01-28 RX ORDER — FERROUS SULFATE 325(65) MG
325 TABLET ORAL
Qty: 30 TABLET | Refills: 5 | Status: SHIPPED | OUTPATIENT
Start: 2022-01-28 | End: 2022-06-22

## 2022-01-28 RX ORDER — SULFAMETHOXAZOLE AND TRIMETHOPRIM 800; 160 MG/1; MG/1
1 TABLET ORAL 2 TIMES DAILY
Qty: 20 TABLET | Refills: 0 | Status: SHIPPED | OUTPATIENT
Start: 2022-01-28 | End: 2022-02-07

## 2022-01-28 NOTE — PROGRESS NOTES
Kaylin Keller  1960 01/30/22    Chief Complaint   Patient presents with    6 Month Follow-Up    Hyperlipidemia    Hypertension    Diabetes    COPD    Gastroesophageal Reflux    Anxiety    Depression    Hypothyroidism           Patient here for 6 months f/u regarding HTN, HLD, DM, GERD, hypothyroidism, anxiety, depression, and hemiplegia, patient came with her care giver. The patient is taking hypertensive medications compliantly without side effects. Denies chest pain, edema, she is taking her cholesterol medication with no side effects, her DM under control, f/u with the endocrinology, her thyroid under control, GERD under control. F/u with the mental health for her anxiety and depression. Patient also c/o burning urination, no fever, no n/v or abdominal pain, going on for few days. No diarrhea or constipation. Past Medical History:   Diagnosis Date    Anxiety disorder     Back pain, chronic     Cerebral palsy (HCC)     COPD [facility]ronic obstructive [facility] disease) ([facility]     COVID-19 10/12/2021    CVA (cerebral infarction) 2014 x2    Diabetes mellitus ([facility]     Diverticulosis     Epilepsy ([facility]     GERD (gastroesophageal reflux disease)     Hyperlipidemia     Hypertension     Insomnia     Iron (Fe) deficiency anemia     Mental disability     Seizures (HCC)     Unspecified cerebral artery occlusion with cerebral infarction      Past Surgical History:   Procedure Laterality Date    GASTROSTOMY TUBE PLACEMENT       No family history on file.   Social History     Socioeconomic History    Marital status: Single     Spouse name: Not on file    Number of children: Not on file    Years of education: Not on file    Highest education level: Not on file   Occupational History    Not on file   Tobacco Use    Smoking status: Former Smoker     Packs/day: 1.50     Years: 20.00     Pack years: 30.00     Quit date: 8/28/2011     Years since quitting: 10.4    Smokeless tobacco: Never Used Vaping Use    Vaping Use: Never used   Substance and Sexual Activity    Alcohol use: No     Alcohol/week: 0.0 standard drinks    Drug use: No    Sexual activity: Not on file   Other Topics Concern    Not on file   Social History Narrative    ** Merged History Encounter **          Social Determinants of Health     Financial Resource Strain:     Difficulty of Paying Living Expenses: Not on file   Food Insecurity:     Worried About Running Out of Food in the Last Year: Not on file    Shahid of Food in the Last Year: Not on file   Transportation Needs:     Lack of Transportation (Medical): Not on file    Lack of Transportation (Non-Medical):  Not on file   Physical Activity:     Days of Exercise per Week: Not on file    Minutes of Exercise per Session: Not on file   Stress:     Feeling of Stress : Not on file   Social Connections:     Frequency of Communication with Friends and Family: Not on file    Frequency of Social Gatherings with Friends and Family: Not on file    Attends Yazdanism Services: Not on file    Active Member of [address] [place] [address] or Organizations: Not on file    Attends Club or Organization Meetings: Not on file    Marital Status: Not on file   Intimate Partner Violence:     Fear of Current or Ex-Partner: Not on file    Emotionally Abused: Not on file    Physically Abused: Not on file    Sexually Abused: Not on file   Housing Stability:     Unable to Pay for Housing in the Last Year: Not on file    Number of [place] in the Last Year: Not on file    Unstable Housing in the Last Year: Not on file       Allergies   Allergen Reactions    Macrobid [Nitrofurantoin] Hives and Swelling     Hives     Current Outpatient Medications   Medication Sig Dispense Refill    ferrous sulfate (IRON 325) 325 (65 Fe) MG tablet Take 1 tablet by mouth daily (with breakfast) 30 tablet 5    sulfamethoxazole-trimethoprim (BACTRIM DS;SEPTRA DS) 800-160 MG per tablet Take 1 tablet by mouth 2 times daily for 10 days 20 tablet 0    carBAMazepine (CARBATROL) 300 MG extended release capsule TAKE 1 CAPSULE BY MOUTH TWICE A DAY 56 capsule 5    Lactobacillus (ACIDOPHILUS) CAPS capsule TAKE 1 CAPSULE BY MOUTH DAILY 28 capsule 5    cetirizine (ZYRTEC) 10 MG tablet TAKE 1 TABLET BY MOUTH DAILY 31 tablet 5    atorvastatin (LIPITOR) 40 MG tablet TAKE 1 TABLET BY MOUTH DAILY 31 tablet 5    levothyroxine (SYNTHROID) 75 MCG tablet Take 1 tablet by mouth Daily 30 tablet 5    vitamin D 25 MCG (1000 UT) CAPS Take 2 capsules by mouth daily 60 capsule 5    vitamin D (CHOLECALCIFEROL) 25 MCG (1000 UT) TABS tablet TAKE 2 CAPSULES BY MOUTH DAILY      levETIRAcetam (KEPPRA) 750 MG tablet Take 2 tablets by mouth 2 times daily 120 tablet 0    ipratropium-albuterol (DUONEB) 0.5-2.5 (3) MG/3ML SOLN nebulizer solution Inhale 3 mLs into the lungs 3 times daily 270 mL 5    pantoprazole (PROTONIX) 40 MG tablet TAKE 1 TABLET BY MOUTH EVERY MORNING BEFORE BREAKFAST 31 tablet 5    albuterol sulfate  (90 Base) MCG/ACT inhaler Inhale 2 puffs into the lungs 4 times daily 18 g 3    tiotropium-olodaterol (STIOLTO) 2.5-2.5 MCG/ACT AERS Inhale 2 puffs into the lungs daily 1 each 0    amLODIPine (NORVASC) 10 MG tablet Take 1 tablet by mouth daily 90 tablet 3    JANUVIA 100 MG tablet TAKE 1 TABLET BY MOUTH DAILY 30 tablet 5    lisinopril (PRINIVIL;ZESTRIL) 40 MG tablet TAKE 1 TABLET BY MOUTH DAILY 31 tablet 5    fluticasone (FLONASE) 50 MCG/ACT nasal spray INSTILL 1 SPRAY INTO EACH NOSTRIL DAILY 16 g 5    Diapers & Supplies MISC 1 each by Does not apply route daily as needed (4-5 times daily) 100 each 5    mirtazapine (REMERON) 30 MG tablet TAKE 1 TABLET BY MOUTH NIGHTLY 31 tablet 5    Incontinence Supply Disposable (DEPEND UNDERWEAR LARGE/XL) MISC 1 Units by Does not apply route 4 times daily 100 Package 5    Disposable Gloves MISC 1 Units by Does not apply route 4 times daily 100 each 5    diphenhydrAMINE (BENADRYL) 25 MG capsule Component Value Date    CHOL 161 09/29/2021    CHOL 160 04/01/2021    CHOL 155 11/18/2020     Lab Results   Component Value Date    TRIG 345 (H) 09/29/2021    TRIG 313 (H) 04/01/2021    TRIG 203 (H) 11/18/2020     Lab Results   Component Value Date    HDL 42 09/29/2021    HDL 37 (L) 04/01/2021    HDL 49 11/18/2020     No results found for: LDLCALC, LDLCHOLESTEROL  Lab Results   Component Value Date    LABA1C 6.3 10/16/2021     Lab Results   Component Value Date    TSHHS 5.220 (H) 09/29/2021         /78   Pulse 73   SpO2 98%     BP Readings from Last 3 Encounters:   01/28/22 122/78   10/30/21 137/65   10/17/21 (!) 107/51       Wt Readings from Last 3 Encounters:   11/15/21 132 lb (59.9 kg)   10/17/21 134 lb 4.2 oz (60.9 kg)   10/04/21 132 lb (59.9 kg)         Physical Exam  Constitutional:       Appearance: Normal appearance. She is well-developed. Comments: Using the wheel chair   HENT:      Head: Normocephalic and atraumatic. Cardiovascular:      Rate and Rhythm: Normal rate and regular rhythm. Heart sounds: Normal heart sounds. No murmur heard. Pulmonary:      Effort: Pulmonary effort is normal.      Breath sounds: Normal breath sounds. No wheezing or rhonchi. Abdominal:      General: There is no distension. Palpations: Abdomen is soft. Tenderness: There is no abdominal tenderness. Musculoskeletal:         General: Normal range of motion. Neurological:      Mental Status: She is alert and oriented to person, place, and time. Psychiatric:         Mood and Affect: Mood normal.         Behavior: Behavior normal.         ASSESSMENT/ PLAN:    1. HTN (hypertension), benign  - stable    2. Mixed hyperlipidemia  - stable    3. Controlled type 2 diabetes mellitus without complication, without long-term current use of insulin (HCC)  - stable, f/u with the endocrinology    4. Acquired hypothyroidism  - f/u with the endocrinology    5.  Chronic obstructive pulmonary disease, unspecified COPD type ([facility]  - stable    6. Anxiety and depression  - stable    7. Anemia, unspecified type  - add the iron tabs  - CBC Auto Differential; Future  - Vitamin B12 & Folate; Future  - ferrous sulfate (IRON 325) 325 (65 Fe) MG tablet; Take 1 tablet by mouth daily (with breakfast)  Dispense: 30 tablet; Refill: 5    8. Other cerebral palsy (HCC)  - stable    9. Left hemiplegia (HCC)  - stable    10. Seizure disorder (HCC)  - stable    11. Acute cystitis without hematuria  - start:  - sulfamethoxazole-trimethoprim (BACTRIM DS;SEPTRA DS) 800-160 MG per tablet; Take 1 tablet by mouth 2 times daily for 10 days  Dispense: 20 tablet; Refill: 0      Dee Cam was evaluated today and a DME order was entered for XL twin mattress  because she requires assistance for her hospital bed Patient requires frequent and immediate positioning changes for relief of pain and care needs related to medical diagnoses. .  The need for this equipment was discussed with the patient and she understands and is in agreement. - All old blood work reviewed with the patient  - Appropriate prescription are addressed. - After visit summery provided. - Questions answered and patient verbalizes understanding.  - Call for any problem, questions, or concerns.  - RTC if symptoms worse. Return in about 6 months (around 7/28/2022).

## 2022-01-30 ASSESSMENT — ENCOUNTER SYMPTOMS
COUGH: 0
VOMITING: 0
ABDOMINAL PAIN: 0
CONSTIPATION: 0
DIARRHEA: 0
RECTAL PAIN: 0
SHORTNESS OF BREATH: 0

## 2022-02-07 ENCOUNTER — TELEPHONE (OUTPATIENT)
Dept: FAMILY MEDICINE CLINIC | Age: 62
End: 2022-02-07

## 2022-02-07 NOTE — TELEPHONE ENCOUNTER
Received page from Radha from [facility] care  [phone] who needs clarification on her lisinopril dosing parameters.       Will route to PCP to clarify with their team

## 2022-02-18 ENCOUNTER — TELEPHONE (OUTPATIENT)
Dept: FAMILY MEDICINE CLINIC | Age: 62
End: 2022-02-18

## 2022-02-21 ENCOUNTER — TELEPHONE (OUTPATIENT)
Dept: FAMILY MEDICINE CLINIC | Age: 62
End: 2022-02-21

## 2022-02-21 NOTE — TELEPHONE ENCOUNTER
----- Message from Jing Robb sent at 2/21/2022  7:42 AM EST -----  Subject: Message to Provider    QUESTIONS  Information for Provider? Cheri--[facility] calling in to let the   PCP know that they are recertifying pt for another 60 days of skilled   nursing services-there is no change in her careplan and once they get   everything typed up they will send over to PCP for signature. If any   questions office can call her at [phone]  ---------------------------------------------------------------------------  --------------  [address]  What is the best way for the office to contact you? OK to leave message on   voicemail  Preferred Call Back Phone Number? [phone]  ---------------------------------------------------------------------------  --------------  SCRIPT ANSWERS  Relationship to Patient? Third Party  Representative Name?  Cheri--[facility] No

## 2022-03-16 ENCOUNTER — HOSPITAL ENCOUNTER (OUTPATIENT)
Age: 62
Setting detail: SPECIMEN
Discharge: HOME | End: 2022-03-16
Payer: MEDICARE

## 2022-03-16 LAB
ALBUMIN SERPL-MCNC: 3.5 GM/DL (ref 3.4–5)
ALP BLD-CCNC: 78 IU/L (ref 40–128)
ALT SERPL-CCNC: 9 U/L (ref 10–40)
ANION GAP SERPL CALCULATED.3IONS-SCNC: 14 MMOL/L (ref 4–16)
AST SERPL-CCNC: 11 IU/L (ref 15–37)
BILIRUB SERPL-MCNC: 0.2 MG/DL (ref 0–1)
BUN BLDV-MCNC: 21 MG/DL (ref 6–23)
CALCIUM SERPL-MCNC: 9.5 MG/DL (ref 8.3–10.6)
CHLORIDE BLD-SCNC: 102 MMOL/L (ref 99–110)
CHOLESTEROL: 187 MG/DL
CO2: 28 MMOL/L (ref 21–32)
CREAT SERPL-MCNC: 0.5 MG/DL (ref 0.6–1.1)
ESTIMATED AVERAGE GLUCOSE: 97 MG/DL
GFR AFRICAN AMERICAN: >60 ML/MIN/1.73M2
GFR NON-AFRICAN AMERICAN: >60 ML/MIN/1.73M2
GLUCOSE BLD-MCNC: 119 MG/DL (ref 70–99)
HBA1C MFR BLD: 5 % (ref 4.2–6.3)
HDLC SERPL-MCNC: 41 MG/DL
LDL CHOLESTEROL CALCULATED: 69 MG/DL
LDL CHOLESTEROL DIRECT: 99 MG/DL
POTASSIUM SERPL-SCNC: 4.3 MMOL/L (ref 3.5–5.1)
SODIUM BLD-SCNC: 144 MMOL/L (ref 135–145)
T4 FREE: 0.95 NG/DL (ref 0.9–1.8)
TOTAL PROTEIN: 5.7 GM/DL (ref 6.4–8.2)
TRIGL SERPL-MCNC: 386 MG/DL
TSH HIGH SENSITIVITY: 1.74 UIU/ML (ref 0.27–4.2)

## 2022-03-16 PROCEDURE — 80053 COMPREHEN METABOLIC PANEL: CPT

## 2022-03-16 PROCEDURE — 36415 COLL VENOUS BLD VENIPUNCTURE: CPT

## 2022-03-16 PROCEDURE — 80061 LIPID PANEL: CPT

## 2022-03-16 PROCEDURE — 84439 ASSAY OF FREE THYROXINE: CPT

## 2022-03-16 PROCEDURE — 84443 ASSAY THYROID STIM HORMONE: CPT

## 2022-03-16 PROCEDURE — 83721 ASSAY OF BLOOD LIPOPROTEIN: CPT

## 2022-03-16 PROCEDURE — 83036 HEMOGLOBIN GLYCOSYLATED A1C: CPT

## 2022-03-16 RX ORDER — PANTOPRAZOLE SODIUM 40 MG/1
TABLET, DELAYED RELEASE ORAL
Qty: 30 TABLET | Refills: 5 | Status: SHIPPED | OUTPATIENT
Start: 2022-03-16 | End: 2022-09-15

## 2022-03-22 ENCOUNTER — TELEPHONE (OUTPATIENT)
Dept: FAMILY MEDICINE CLINIC | Age: 62
End: 2022-03-22

## 2022-03-22 NOTE — TELEPHONE ENCOUNTER
Per [facility], patient needing new mattress for hospital bed.  Needing a twin XL mattress sent to [facility].    [facility] -[phone]

## 2022-03-31 ENCOUNTER — TELEPHONE (OUTPATIENT)
Dept: FAMILY MEDICINE CLINIC | Age: 62
End: 2022-03-31

## 2022-03-31 NOTE — TELEPHONE ENCOUNTER
[facility] [address] called to inform provider that they are not able to supply the mattress for this bed, due to they did not provide the bed frame.

## 2022-03-31 NOTE — TELEPHONE ENCOUNTER
I called and spoke to [facility] and informed her that [facility] would not fill the script for the mattress because they bed did not come from them. [facility] said she would try and find where the bed came from and if not she would go another route.

## 2022-04-22 ENCOUNTER — TELEPHONE (OUTPATIENT)
Dept: FAMILY MEDICINE CLINIC | Age: 62
End: 2022-04-22

## 2022-04-22 NOTE — TELEPHONE ENCOUNTER
[address] [facility] [address] states that they are entering a new cert period and will be faxing orders for PCP to sign. Zach Jenkins wanted PCP advised that glucose from last cert period ranged from  with only 1 episode of glucose being 200. This info to be faxed.

## 2022-04-26 NOTE — TELEPHONE ENCOUNTER
[address] called and stated that they have sent over the new forms that need to be signed for new Cert. Stated that they will have to call everyday to ck to see if this has been completed.

## 2022-04-29 NOTE — TELEPHONE ENCOUNTER
Received a call from [facility], they need to have the paperwork completed 5-3-22. They requested this the week of 4/22/22 and have not had it completed still as of 4/29/22. Paper work is on providers desk for completion.

## 2022-05-05 RX ORDER — CHOLECALCIFEROL (VITAMIN D3) 125 MCG
CAPSULE ORAL
Qty: 31 CAPSULE | Refills: 10 | Status: SHIPPED | OUTPATIENT
Start: 2022-05-05

## 2022-05-10 ENCOUNTER — TELEPHONE (OUTPATIENT)
Dept: FAMILY MEDICINE CLINIC | Age: 62
End: 2022-05-10

## 2022-05-10 DIAGNOSIS — F69 BEHAVIOR PROBLEM, ADULT: Primary | ICD-10-CM

## 2022-05-10 DIAGNOSIS — R53.1 WEAKNESS: ICD-10-CM

## 2022-05-10 NOTE — TELEPHONE ENCOUNTER
Home supervisior called requesting labs to be drawn for behavior, aggressive, physical and verbal towards the staff. It can happen at different time, but recently has become worse and more often. Fax to [phone]    Orders for PT, so she can try to gain her strength back. Sent over two weeks ago a standing physician order, needs completed and returned.

## 2022-05-12 NOTE — TELEPHONE ENCOUNTER
----- Message from Doris Palmer sent at 5/11/2022  2:28 PM EDT -----  Subject: Message to Provider    QUESTIONS  Information for Provider? Home supervisior called requesting labs to be   drawn for behavior, aggressive, physical and verbal towards the staff. It   can happen at different time, but recently has become worse and more   often. Fax to [phone]   Orders for PT, so she can try to gain her   strength back.   Sent over two weeks ago a standing physician order, needs   completed and returned. Called on 5/10/22 with same message. ---------------------------------------------------------------------------  --------------  Jolly JEAN  What is the best way for the office to contact you? OK to leave message on   voicemail  Preferred Call Back Phone Number? [phone]  ---------------------------------------------------------------------------  --------------  SCRIPT ANSWERS  Relationship to Patient? Third Party  Third Party Type? Home Health Care? Representative Name?  Lucila Mendoza

## 2022-05-16 PROBLEM — J96.10 CHRONIC RESPIRATORY FAILURE (HCC): Status: ACTIVE | Noted: 2022-05-16

## 2022-05-16 NOTE — TELEPHONE ENCOUNTER
Order for the blood work and for PT done, but needs also to discuss the behavior problems with her psychatrist

## 2022-05-24 RX ORDER — FLUTICASONE PROPIONATE 50 MCG
SPRAY, SUSPENSION (ML) NASAL
Qty: 16 G | Refills: 5 | Status: SHIPPED | OUTPATIENT
Start: 2022-05-24

## 2022-05-26 ENCOUNTER — APPOINTMENT (OUTPATIENT)
Dept: CT IMAGING | Age: 62
End: 2022-05-26
Payer: MEDICARE

## 2022-05-26 ENCOUNTER — APPOINTMENT (OUTPATIENT)
Dept: GENERAL RADIOLOGY | Age: 62
End: 2022-05-26
Payer: MEDICARE

## 2022-05-26 ENCOUNTER — HOSPITAL ENCOUNTER (EMERGENCY)
Age: 62
Discharge: HOME | End: 2022-05-26
Attending: EMERGENCY MEDICINE
Payer: MEDICARE

## 2022-05-26 ENCOUNTER — TELEPHONE (OUTPATIENT)
Dept: FAMILY MEDICINE CLINIC | Age: 62
End: 2022-05-26

## 2022-05-26 VITALS
SYSTOLIC BLOOD PRESSURE: 129 MMHG | HEART RATE: 88 BPM | RESPIRATION RATE: 15 BRPM | DIASTOLIC BLOOD PRESSURE: 58 MMHG | OXYGEN SATURATION: 98 % | TEMPERATURE: 99 F

## 2022-05-26 DIAGNOSIS — J45.901 ACUTE EXACERBATION OF COPD WITH ASTHMA (HCC): Primary | ICD-10-CM

## 2022-05-26 DIAGNOSIS — J98.8 RESPIRATORY TRACT INFECTION: ICD-10-CM

## 2022-05-26 DIAGNOSIS — E86.0 DEHYDRATION: ICD-10-CM

## 2022-05-26 DIAGNOSIS — J44.1 ACUTE EXACERBATION OF COPD WITH ASTHMA (HCC): Primary | ICD-10-CM

## 2022-05-26 LAB
ALBUMIN SERPL-MCNC: 4.1 GM/DL (ref 3.4–5)
ALP BLD-CCNC: 85 IU/L (ref 40–129)
ALT SERPL-CCNC: 12 U/L (ref 10–40)
ANION GAP SERPL CALCULATED.3IONS-SCNC: 14 MMOL/L (ref 4–16)
AST SERPL-CCNC: 19 IU/L (ref 15–37)
BASE EXCESS MIXED: 6.5 (ref 0–2.3)
BASOPHILS ABSOLUTE: 0 K/CU MM
BASOPHILS RELATIVE PERCENT: 0.3 % (ref 0–1)
BILIRUB SERPL-MCNC: 0.1 MG/DL (ref 0–1)
BUN BLDV-MCNC: 17 MG/DL (ref 6–23)
CALCIUM SERPL-MCNC: 9.5 MG/DL (ref 8.3–10.6)
CHLORIDE BLD-SCNC: 98 MMOL/L (ref 99–110)
CO2: 28 MMOL/L (ref 21–32)
COMMENT: ABNORMAL
CREAT SERPL-MCNC: 0.5 MG/DL (ref 0.6–1.1)
D DIMER: <200 NG/ML(DDU)
DIFFERENTIAL TYPE: ABNORMAL
EKG ATRIAL RATE: 96 BPM
EKG DIAGNOSIS: NORMAL
EKG P AXIS: 41 DEGREES
EKG P-R INTERVAL: 146 MS
EKG Q-T INTERVAL: 332 MS
EKG QRS DURATION: 76 MS
EKG QTC CALCULATION (BAZETT): 419 MS
EKG R AXIS: 51 DEGREES
EKG T AXIS: -87 DEGREES
EKG VENTRICULAR RATE: 96 BPM
EOSINOPHILS ABSOLUTE: 0.1 K/CU MM
EOSINOPHILS RELATIVE PERCENT: 1.1 % (ref 0–3)
GFR AFRICAN AMERICAN: >60 ML/MIN/1.73M2
GFR NON-AFRICAN AMERICAN: >60 ML/MIN/1.73M2
GLUCOSE BLD-MCNC: 129 MG/DL (ref 70–99)
HCO3 VENOUS: 35 MMOL/L (ref 19–25)
HCT VFR BLD CALC: 43.5 % (ref 37–47)
HEMOGLOBIN: 13.4 GM/DL (ref 12.5–16)
IMMATURE NEUTROPHIL %: 1.1 % (ref 0–0.43)
LACTATE: 1.8 MMOL/L (ref 0.4–2)
LACTATE: 2.8 MMOL/L (ref 0.4–2)
LYMPHOCYTES ABSOLUTE: 1.3 K/CU MM
LYMPHOCYTES RELATIVE PERCENT: 20.4 % (ref 24–44)
MCH RBC QN AUTO: 30.1 PG (ref 27–31)
MCHC RBC AUTO-ENTMCNC: 30.8 % (ref 32–36)
MCV RBC AUTO: 97.8 FL (ref 78–100)
MONOCYTES ABSOLUTE: 0.6 K/CU MM
MONOCYTES RELATIVE PERCENT: 9.5 % (ref 0–4)
NUCLEATED RBC %: 0 %
O2 SAT, VEN: 76.1 % (ref 50–70)
PCO2, VEN: 68 MMHG (ref 38–52)
PDW BLD-RTO: 13.5 % (ref 11.7–14.9)
PH VENOUS: 7.32 (ref 7.32–7.42)
PLATELET # BLD: 222 K/CU MM (ref 140–440)
PMV BLD AUTO: 8.5 FL (ref 7.5–11.1)
PO2, VEN: 50 MMHG (ref 28–48)
POTASSIUM SERPL-SCNC: 3.8 MMOL/L (ref 3.5–5.1)
PRO-BNP: 161.2 PG/ML
RAPID INFLUENZA  B AGN: NEGATIVE
RAPID INFLUENZA A AGN: NEGATIVE
RBC # BLD: 4.45 M/CU MM (ref 4.2–5.4)
SARS-COV-2, NAAT: NOT DETECTED
SEGMENTED NEUTROPHILS ABSOLUTE COUNT: 4.2 K/CU MM
SEGMENTED NEUTROPHILS RELATIVE PERCENT: 67.6 % (ref 36–66)
SODIUM BLD-SCNC: 140 MMOL/L (ref 135–145)
SOURCE: NORMAL
TOTAL IMMATURE NEUTOROPHIL: 0.07 K/CU MM
TOTAL NUCLEATED RBC: 0 K/CU MM
TOTAL PROTEIN: 7 GM/DL (ref 6.4–8.2)
TROPONIN T: <0.01 NG/ML
WBC # BLD: 6.2 K/CU MM (ref 4–10.5)

## 2022-05-26 PROCEDURE — 93005 ELECTROCARDIOGRAM TRACING: CPT | Performed by: EMERGENCY MEDICINE

## 2022-05-26 PROCEDURE — 87804 INFLUENZA ASSAY W/OPTIC: CPT

## 2022-05-26 PROCEDURE — 84484 ASSAY OF TROPONIN QUANT: CPT

## 2022-05-26 PROCEDURE — 71275 CT ANGIOGRAPHY CHEST: CPT

## 2022-05-26 PROCEDURE — 85379 FIBRIN DEGRADATION QUANT: CPT

## 2022-05-26 PROCEDURE — 71045 X-RAY EXAM CHEST 1 VIEW: CPT

## 2022-05-26 PROCEDURE — 82805 BLOOD GASES W/O2 SATURATION: CPT

## 2022-05-26 PROCEDURE — 99285 EMERGENCY DEPT VISIT HI MDM: CPT

## 2022-05-26 PROCEDURE — 83605 ASSAY OF LACTIC ACID: CPT

## 2022-05-26 PROCEDURE — 93010 ELECTROCARDIOGRAM REPORT: CPT | Performed by: INTERNAL MEDICINE

## 2022-05-26 PROCEDURE — 94640 AIRWAY INHALATION TREATMENT: CPT

## 2022-05-26 PROCEDURE — 94761 N-INVAS EAR/PLS OXIMETRY MLT: CPT

## 2022-05-26 PROCEDURE — 80053 COMPREHEN METABOLIC PANEL: CPT

## 2022-05-26 PROCEDURE — 6360000004 HC RX CONTRAST MEDICATION: Performed by: EMERGENCY MEDICINE

## 2022-05-26 PROCEDURE — 2580000003 HC RX 258: Performed by: EMERGENCY MEDICINE

## 2022-05-26 PROCEDURE — 6370000000 HC RX 637 (ALT 250 FOR IP): Performed by: EMERGENCY MEDICINE

## 2022-05-26 PROCEDURE — 85025 COMPLETE CBC W/AUTO DIFF WBC: CPT

## 2022-05-26 PROCEDURE — 87635 SARS-COV-2 COVID-19 AMP PRB: CPT

## 2022-05-26 PROCEDURE — 83880 ASSAY OF NATRIURETIC PEPTIDE: CPT

## 2022-05-26 RX ORDER — ALBUTEROL SULFATE 90 UG/1
2 AEROSOL, METERED RESPIRATORY (INHALATION) ONCE
Status: COMPLETED | OUTPATIENT
Start: 2022-05-26 | End: 2022-05-26

## 2022-05-26 RX ORDER — AZITHROMYCIN 250 MG/1
250 TABLET, FILM COATED ORAL SEE ADMIN INSTRUCTIONS
Qty: 6 TABLET | Refills: 0 | Status: SHIPPED | OUTPATIENT
Start: 2022-05-26 | End: 2022-05-31

## 2022-05-26 RX ORDER — PREDNISONE 20 MG/1
40 TABLET ORAL DAILY
Qty: 10 TABLET | Refills: 0 | Status: SHIPPED | OUTPATIENT
Start: 2022-05-26 | End: 2022-05-31

## 2022-05-26 RX ORDER — SODIUM CHLORIDE 9 MG/ML
1000 INJECTION, SOLUTION INTRAVENOUS ONCE
Status: COMPLETED | OUTPATIENT
Start: 2022-05-26 | End: 2022-05-26

## 2022-05-26 RX ADMIN — ALBUTEROL SULFATE 2 PUFF: 90 AEROSOL, METERED RESPIRATORY (INHALATION) at 09:48

## 2022-05-26 RX ADMIN — SODIUM CHLORIDE 1000 ML: 9 INJECTION, SOLUTION INTRAVENOUS at 10:29

## 2022-05-26 RX ADMIN — IOPAMIDOL 80 ML: 755 INJECTION, SOLUTION INTRAVENOUS at 10:29

## 2022-05-26 ASSESSMENT — PAIN - FUNCTIONAL ASSESSMENT: PAIN_FUNCTIONAL_ASSESSMENT: NONE - DENIES PAIN

## 2022-05-26 NOTE — TELEPHONE ENCOUNTER
Elmer Busch from [facility] called to inform the provider that patient is being sent to the ER for SOB, Wheezing. Stated that her O 2 was 88, 2 liters of continuous oxygen. resilient/elastic

## 2022-05-26 NOTE — ED TRIAGE NOTES
Patient from home. EMS report caregiver called saying patient is short of breath. EMS also report that caregiver said patient is due for a breathing treatment. No caregiver or family arrived with patient. Patient arrived on 2L NC and patient wears 2L NC at baseline.

## 2022-05-26 NOTE — ED NOTES
Report given to [facility]. Care transferred at this time.      Stefan Blancas RN  05/26/22 0439

## 2022-05-26 NOTE — ED PROVIDER NOTES
Emergency Department Encounter    Patient: Cris Galo  MRN: [id number]  : 1960  Date of Evaluation: 2022  ED Provider:  Marifer Quiroz MD      Triage Chief Complaint:   Shortness of Breath      [place]:  Cris Galo is a 63 y.o. female that presents to the emergency department with report from EMS and caregivers. Patient has been showing signs of increasing shortness of breath since early this morning. Lung sounds are coarse, but there is no report of cough or recent fevers. Patient was apparently due for her next breathing treatment, but this was not administered prior to calling 911. Patient is on routine oxygen 2 L by nasal cannula. Symptoms appear constant in timing. There is no recent fever reported. No provocative or alleviating factors are reported. Patient unable to assist with history and physical exam due to underlying cerebral palsy and developmental delay. ROS additionally provided by care provider and within confines of the available report- see HPI, below listed is current ROS at time of my eval:  CONSTITUTIONAL: No fevers, chills, or sweats. EYES: No eye redness or drainage. HENT: No rhinorrhea or difficulty swallowing. RESPIRATORY: No shortness of breath, cough, or sputum production. CARDIOVASCULAR: No anginal-type chest pain, orthopnea, or edema. GASTROINTESTINAL: No nausea, vomiting, or abdominal pain. GENITOURINARY: No frequency, urgency, or dysuria. No hematuria. MUSCULOSKELETAL: No recent injury. No neck, back, or extremity pain. NEUROLOGICAL: No focal weakness, numbness, or tingling. SKIN: No rashes or other lesions reported. No yellowing of the skin.     Medical history:  Past Medical History:   Diagnosis Date    Anxiety disorder     Back pain, chronic     Cerebral palsy (HCC)     [facility]     COVID-19 10/12/2021    CVA (cerebral infarction) 2014 x2    Diabetes mellitus ([facility]     Diverticulosis     [facility]     GERD (gastroesophageal reflux disease)     Hyperlipidemia     Hypertension     Insomnia     Iron (Fe) deficiency anemia     Mental disability     Seizures (HCC)     Unspecified cerebral artery occlusion with cerebral infarction      Past Surgical History:   Procedure Laterality Date    GASTROSTOMY TUBE PLACEMENT       No family history on file. Social History     Socioeconomic History    Marital status: Single     Spouse name: Not on file    Number of children: Not on file    Years of education: Not on file    Highest education level: Not on file   Occupational History    Not on file   Tobacco Use    Smoking status: Former Smoker     Packs/day: 1.50     Years: 20.00     Pack years: 30.00     Quit date: 8/28/2011     Years since quitting: 10.7    Smokeless tobacco: Never Used   Vaping Use    Vaping Use: Never used   Substance and Sexual Activity    Alcohol use: No     Alcohol/week: 0.0 standard drinks    Drug use: No    Sexual activity: Not on file   Other Topics Concern    Not on file   Social History Narrative    ** Merged History Encounter **          Social Determinants of Health     Financial Resource Strain:     Difficulty of Paying Living Expenses: Not on file   Food Insecurity:     Worried About [address] in the Last Year: Not on file    [address] in the Last Year: Not on file   Transportation Needs:     Lack of Transportation (Medical): Not on file    Lack of Transportation (Non-Medical):  Not on file   Physical Activity:     Days of Exercise per Week: Not on file    Minutes of Exercise per Session: Not on file   Stress:     Feeling of Stress : Not on file   Social Connections:     Frequency of Communication with Friends and Family: Not on file    Frequency of Social Gatherings with Friends and Family: Not on file    Attends Alevism Services: Not on file    Active Member of Clubs or Organizations: Not on file    Attends Club or Organization Meetings: Not on file    Marital Status: Not on file   Intimate Partner Violence:     Fear of Current or Ex-Partner: Not on file    Emotionally Abused: Not on file    Physically Abused: Not on file    Sexually Abused: Not on file   Housing Stability:     Unable to Pay for Housing in the Last Year: Not on file    Number of [place] in the Last Year: Not on file    Unstable Housing in the Last Year: Not on file     No current facility-administered medications for this encounter.      Current Outpatient Medications   Medication Sig Dispense Refill    azithromycin (ZITHROMAX) 250 MG tablet Take 1 tablet by mouth See Admin Instructions for 5 days 500mg on day 1 followed by 250mg on days 2 - 5 6 tablet 0    predniSONE (DELTASONE) 20 MG tablet Take 2 tablets by mouth daily for 5 days 10 tablet 0    fluticasone (FLONASE) 50 MCG/ACT nasal spray INSTILL 1 SPRAY INTO EACH NOSTRIL DAILY 16 g 5    ipratropium-albuterol (DUONEB) 0.5-2.5 (3) MG/3ML SOLN nebulizer solution Inhale 3 mLs into the lungs every 4 hours 120 each 5    albuterol sulfate HFA (VENTOLIN HFA) 108 (90 Base) MCG/ACT inhaler Inhale 2 puffs into the lungs 4 times daily as needed for Wheezing 54 g 1    D3 HIGH POTENCY 50 MCG (2000 UT) CAPS TAKE 1 CAPSULE BY MOUTH DAILY 31 capsule 10    ipratropium-albuterol (DUONEB) 0.5-2.5 (3) MG/3ML SOLN nebulizer solution Inhale 3 mLs into the lungs 3 times daily 270 mL 5    UNABLE TO FIND hospital bed mattress XL twin 1 Device 0    pantoprazole (PROTONIX) 40 MG tablet TAKE 1 TABLET BY MOUTH EVERY MORNING BEFORE BREAKFAST 30 tablet 5    COMBIVENT RESPIMAT  MCG/ACT AERS inhaler INHALE 1 PUFF BY ORAL INHALATION EVERY 6 HOURS 4 g 5    ferrous sulfate (IRON 325) 325 (65 Fe) MG tablet Take 1 tablet by mouth daily (with breakfast) 30 tablet 5    carBAMazepine (CARBATROL) 300 MG extended release capsule TAKE 1 CAPSULE BY MOUTH TWICE A DAY 56 capsule 5    Lactobacillus (ACIDOPHILUS) CAPS capsule TAKE 1 CAPSULE BY MOUTH DAILY 28 capsule 5    cetirizine (ZYRTEC) 10 MG tablet TAKE 1 TABLET BY MOUTH DAILY 31 tablet 5    atorvastatin (LIPITOR) 40 MG tablet TAKE 1 TABLET BY MOUTH DAILY 31 tablet 5    levothyroxine (SYNTHROID) 75 MCG tablet Take 1 tablet by mouth Daily 30 tablet 5    levETIRAcetam (KEPPRA) 750 MG tablet Take 2 tablets by mouth 2 times daily 120 tablet 0    albuterol sulfate  (90 Base) MCG/ACT inhaler Inhale 2 puffs into the lungs 4 times daily 18 g 3    tiotropium-olodaterol (STIOLTO) 2.5-2.5 MCG/ACT AERS Inhale 2 puffs into the lungs daily 1 each 0    amLODIPine (NORVASC) 10 MG tablet Take 1 tablet by mouth daily 90 tablet 3    JANUVIA 100 MG tablet TAKE 1 TABLET BY MOUTH DAILY 30 tablet 5    lisinopril (PRINIVIL;ZESTRIL) 40 MG tablet TAKE 1 TABLET BY MOUTH DAILY 31 tablet 5    Diapers & Supplies MISC 1 each by Does not apply route daily as needed (4-5 times daily) 100 each 5    mirtazapine (REMERON) 30 MG tablet TAKE 1 TABLET BY MOUTH NIGHTLY 31 tablet 5    Incontinence Supply Disposable (DEPEND UNDERWEAR LARGE/XL) MISC 1 Units by Does not apply route 4 times daily 100 Package 5    Disposable Gloves MISC 1 Units by Does not apply route 4 times daily 100 each 5    diphenhydrAMINE (BENADRYL) 25 MG capsule Take 25 mg by mouth every 6 hours as needed for Itching      blood glucose monitor strips Test 3 times a day & as needed for symptoms of irregular blood glucose.  90 strip 0    busPIRone (BUSPAR) 10 MG tablet Take 1 tablet by mouth 2 times daily 60 tablet 5    divalproex (DEPAKOTE) 500 MG DR tablet Take 4 tablets by mouth nightly 90 tablet 0    metFORMIN (GLUCOPHAGE) 500 MG tablet Take 2 tablets by mouth daily (with breakfast) 30 tablet 0    risperiDONE (RISPERDAL) 1 MG tablet Take 1 tablet by mouth 3 times daily 1 tablet in am, 1 tablet at 4pm, 1 tablet at bedtime 60 tablet 5    sertraline (ZOLOFT) 100 MG tablet Take 2 tablets by mouth nightly 30 tablet 5     Allergies Allergen Reactions    Macrobid [Nitrofurantoin] Hives and Swelling     Hives       Nursing Notes Reviewed    Physical Exam:  Triage VS:    ED Triage Vitals   Enc Vitals Group      BP       Pulse       Resp       Temp       Temp src       SpO2       Weight       Height       Head Circumference       Peak Flow       Pain Score       Pain Loc       Pain Edu? Excl. in [address]? My pulse ox interpretation is -93% on 2 L by nasal cannula    GENERAL: Patient is awake, alert, and oriented to self and staff. Patient will not report year, day, date, or month. Patient is resting in a still position on the exam table. Patient speaking in short phrases without obvious distress. HEENT: Normocephalic and atraumatic. Pupils equal, round, and reactive to light. No redness or matting. Bilateral external ears are unremarkable. Nasal mucosa is pink without purulence. Oral mucosa is mildly dry. NECK: Supple with normal range of motion. No Kernig's or Brudzinski sign. Short neck with no visible JVD. RESPIRATORY: Moderately diminished aeration, worse on the right. Coarse lung sounds with rhonchi most predominant in the right anterior fields. No wheeze or stridor. Chest wall stable and nontender. CARDIOVASCULAR: Regular rate and rhythm. No audible murmurs, rubs, or gallops. No central or peripheral cyanosis. GASTROINTESTINAL: Soft, nontender, and nondistended. No McBurney's or Brennan's point tenderness. No other focal tenderness. No involuntary guarding, rebound, or rigidity. No mass or pulsatile mass. Bowel sounds normal.    NEUROLOGICAL: Awake, alert and oriented x 3. GCS 15. Cranial nerves III through XII are grossly intact as tested without facial droop or dermatomal paresthesias. Of note, forehead wrinkles are symmetric and intact. Conjugate gaze without entrapment. No asymmetry of the corners of the mouth or nasolabial folds.   Patient is seen to move upper extremities spontaneously and symmetrically. Markedly diminished motion in the lower extremities symmetrically. BACK/MUSCULOSKELETAL: Markedly diminished muscle mass and tone in the lower extremities. No asymmetric edema. SKIN: Normal tone for ethnicity. Normal turgor and brisk capillary refill peripherally. No petechiae, purpura, vesicles, bullae, or other lesions. No icterus. Emergency department course. Patient is brought to bed 14 and assessed and reassessed by me. After initial evaluation, orders are placed for medical screening studies including CBC, metabolic panel, first troponin, venous blood gas, and rapid COVID swabs among others. Albuterol MDI treatments are ordered. Portable chest x-ray is ordered. Lung sounds are somewhat coarse, more so on the right. These may represent upper respiratory sounds as she does appear to have a fairly weak cough at this time. Patient is agreeable to continuing plan. As of approximately [phone], patient is noted to have elevated lactate per laboratory report. She does have mild tachycardia, but blood pressures have been acceptable. With coarse lung sounds and concern for occult infection versus pulmonary embolism, CT chest PE protocol was added. Remainder of laboratory testing appears generally reassuring. Patient has been assessed and reassessed on several occasions. She remains generally stable. Heart rate has stabilized. There has been no respiratory distress, hypoxia, or cyanosis. Medical screening studies are generally quite reassuring. There is no clear evidence of focal pneumonia or septic process. Evaluation does not suggest congestive heart failure or acute coronary syndrome. There does not appear to be indication for admission to the hospital or transfer to higher level of care. Plan is discussed with the caregiver, and they are agreeable. We have discussed all available results. Patient is satisfied with evaluation and agreeable to recommendations.   Patient has had the opportunity to ask questions, and they have been answered to the best of my ability. Instructions are given to follow-up with primary care provider for reevaluation and further testing. Very strict return and follow-up instructions are provided. Patient seen during [facility], I did don appropriate PPE during my encounters with the patient, including n95 (when appropriate) mask and eye protection as appropriate.     I have reviewed and interpreted all of the currently available lab results from this visit (if applicable):  Results for orders placed or performed during the hospital encounter of 05/26/22   COVID-19, Rapid    Specimen: Nasopharyngeal   Result Value Ref Range    Source UNKNOWN     SARS-CoV-2, NAAT NOT DETECTED NOT DETECTED   Rapid Flu Swab    Specimen: Nasopharyngeal   Result Value Ref Range    Rapid Influenza A Ag NEGATIVE NEGATIVE    Rapid Influenza B Ag NEGATIVE NEGATIVE   CBC with Auto Differential   Result Value Ref Range    WBC 6.2 4.0 - 10.5 K/CU MM    RBC 4.45 4.2 - 5.4 M/CU MM    Hemoglobin 13.4 12.5 - 16.0 GM/DL    Hematocrit 43.5 37 - 47 %    MCV 97.8 78 - 100 FL    MCH 30.1 27 - 31 PG    MCHC 30.8 (L) 32.0 - 36.0 %    RDW 13.5 11.7 - 14.9 %    Platelets 166 777 - 656 K/CU MM    MPV 8.5 7.5 - 11.1 FL    Differential Type AUTOMATED DIFFERENTIAL     Segs Relative 67.6 (H) 36 - 66 %    Lymphocytes % 20.4 (L) 24 - 44 %    Monocytes % 9.5 (H) 0 - 4 %    Eosinophils % 1.1 0 - 3 %    Basophils % 0.3 0 - 1 %    Segs Absolute 4.2 K/CU MM    Lymphocytes Absolute 1.3 K/CU MM    Monocytes Absolute 0.6 K/CU MM    Eosinophils Absolute 0.1 K/CU MM    Basophils Absolute 0.0 K/CU MM    Nucleated RBC % 0.0 %    Total Nucleated RBC 0.0 K/CU MM    Total Immature Neutrophil 0.07 K/CU MM    Immature Neutrophil % 1.1 (H) 0 - 0.43 %   Comprehensive Metabolic Panel w/ Reflex to MG   Result Value Ref Range    Sodium 140 135 - 145 MMOL/L    Potassium 3.8 3.5 - 5.1 MMOL/L    Chloride 98 (L) 99 - 110 mMol/L    CO2 28 21 - 32 MMOL/L    BUN 17 6 - 23 MG/DL    CREATININE 0.5 (L) 0.6 - 1.1 MG/DL    Glucose 129 (H) 70 - 99 MG/DL    Calcium 9.5 8.3 - 10.6 MG/DL    Albumin 4.1 3.4 - 5.0 GM/DL    Total Protein 7.0 6.4 - 8.2 GM/DL    Total Bilirubin 0.1 0.0 - 1.0 MG/DL    ALT 12 10 - 40 U/L    AST 19 15 - 37 IU/L    Alkaline Phosphatase 85 40 - 129 IU/L    GFR Non-African American >60 >60 mL/min/1.73m2    GFR African American >60 >60 mL/min/1.73m2    Anion Gap 14 4 - 16   Troponin   Result Value Ref Range    Troponin T <0.010 <0.01 NG/ML   Brain Natriuretic Peptide   Result Value Ref Range    Pro-.2 <300 PG/ML   D-Dimer, Quantitative   Result Value Ref Range    D-Dimer, Quant <200 <230 NG/mL(DDU)   Lactic Acid   Result Value Ref Range    Lactate 2.8 (HH) 0.4 - 2.0 mMOL/L   Blood Gas, Venous   Result Value Ref Range    pH, Lan 7.32 7.32 - 7.42    pCO2, Lan 68 (H) 38 - 52 mmHG    pO2, Lan 50 (H) 28 - 48 mmHG    Base Exc, Mixed 6.5 (H) 0 - 2.3    HCO3, Venous 35.0 (H) 19 - 25 MMOL/L    O2 Sat, Lan 76.1 (H) 50 - 70 %    Comment VBG ER    Lactic Acid   Result Value Ref Range    Lactate 1.8 0.4 - 2.0 mMOL/L   EKG 12 Lead   Result Value Ref Range    Ventricular Rate 96 BPM    Atrial Rate 96 BPM    P-R Interval 146 ms    QRS Duration 76 ms    Q-T Interval 332 ms    QTc Calculation (Bazett) 419 ms    P Axis 41 degrees    R Axis 51 degrees    T Axis -87 degrees    Diagnosis       Normal sinus rhythm  Nonspecific T wave abnormality  Abnormal ECG  When compared with ECG of 31-OCT-2021 00:06,  T wave inversion now evident in Lateral leads  Confirmed by Antony Gannon ([id number]) on 5/26/2022 3:34:51 PM          Radiographs (if obtained):  Radiologist's Report Reviewed:  XR CHEST PORTABLE    Result Date: 5/26/2022  EXAMINATION: ONE XRAY VIEW OF THE CHEST 5/26/2022 8:49 am COMPARISON: 10/30/2021 HISTORY: ORDERING SYSTEM PROVIDED HISTORY: Short of breath TECHNOLOGIST PROVIDED HISTORY: Reason for exam:->Short of breath Reason for Exam: Short of breath Additional signs and symptoms: Short of breath Relevant Medical/Surgical History: Short of breath FINDINGS: The heart size is normal.  There is no pneumothorax, edema or focal consolidation. The lung volumes are low. An acute osseous abnormality is not identified. Low lung volumes. Otherwise, no evidence of acute cardiopulmonary disease. CTA PULMONARY W CONTRAST    Result Date: 5/26/2022  EXAMINATION: CTA OF THE CHEST 5/26/2022 10:29 am TECHNIQUE: CTA of the chest was performed after the administration of intravenous contrast.  Multiplanar reformatted images are provided for review. MIP images are provided for review. Automated exposure control, iterative reconstruction, and/or weight based adjustment of the mA/kV was utilized to reduce the radiation dose to as low as reasonably achievable. COMPARISON: CT chest 10/12/2021. HISTORY: ORDERING SYSTEM PROVIDED HISTORY: Shortness of breath, tachycardia, elevated lactate, rule out occult pneumonia or PE TECHNOLOGIST PROVIDED HISTORY: Reason for exam:->Shortness of breath, tachycardia, elevated lactate, rule out occult pneumonia or PE Decision Support Exception - unselect if not a suspected or confirmed emergency medical condition->Emergency Medical Condition (MA) Reason for Exam: Shortness of breath, tachycardia, elevated lactate, rule out occult pneumonia or PE Relevant Medical/Surgical History: 80 ml isovue 370 FINDINGS: The examination is motion degraded. Pulmonary Arteries: Contrast bolus timing is adequate for the detection of pulmonary artery emboli, however motion significantly limits evaluation of the segmental and subsegmental arteries. No central pulmonary embolus is identified. Mediastinum: No evidence of no suspicious mediastinal lymphadenopathy. New suspicious similar right paraesophageasl partially calcified nodule, measuring 9 mm short axis, unchanged. The heart and pericardium demonstrate no acute abnormality.   There is no acute abnormality of the thoracic aorta. Mild cardiomegaly, unchanged. Lungs/pleura: The lungs are without acute process. Bibasilar scarring/atelectasis. No suspicious focal consolidation or pulmonary edema. No evidence of pleural effusion or pneumothorax. Upper Abdomen: Limited images of the upper abdomen are unremarkable. Soft Tissues/Bones: Multifocal degenerative changes without discrete acute osseous abnormality. No central pulmonary embolus. Please note limitations secondary to motion. No acute cardiopulmonary findings. EKG:  Twelve-lead EKG obtained at 0842 on 26 May 2022 and interpreted by me in the absence of a cardiologist.  There is no criteria ST elevation or reciprocal change. There are no hyperacute T wave changes. There is no sign of acute ischemia or infarction. This tracing shows a normal sinus rhythm with nonspecific T wave flattening diffusely. Rate and intervals are 96 beats per minute, [place] interval 146 milliseconds, QRS duration 76 milliseconds, QTc interval 419 milliseconds, and R axis normal at 51 degrees. There is no acute change compared with the most recent EKG dated October 31, 2021. Medical decision making:  Patient presents to the emergency department with concerns for worsening shortness of breath likely due to an exacerbation of underlying COPD. Consider any number of viral and bacterial infections. She patient will be placed on antibiotics to help lessen the risk of developing bronchitis or atypical pneumonia. Patient is saturating appropriately on room air. Hospitalization does not appear emergently indicated, so specific COVID-19 testing is deferred. Patient did have a mildly elevated lactate, perhaps due to some mild volume contraction and dehydration. Evaluation in general did not suggest a septic or ischemic process. Procedures: None. Consultations: None. Clinical Impression:  1. Acute exacerbation of COPD with asthma ([facility] [address]    2.  Respiratory tract infection    3. Dehydration      Disposition referral (if applicable):  Vaishnavi Delgado MD  [address]   [phone]    Schedule an appointment as soon as possible for a visit   For primary care reevaluation    [facility] Emergency Department  [address] [id number]  [phone]  Go to   As needed, If symptoms worsen    Disposition medications (if applicable):  Discharge Medication List as of 5/26/2022  2:09 PM      START taking these medications    Details   azithromycin (ZITHROMAX) 250 MG tablet Take 1 tablet by mouth See Admin Instructions for 5 days 500mg on day 1 followed by 250mg on days 2 - 5, Disp-6 tablet, R-0Normal      predniSONE (DELTASONE) 20 MG tablet Take 2 tablets by mouth daily for 5 days, Disp-10 tablet, R-0Normal           ED Provider Disposition Time  DISPOSITION Decision To Discharge 05/26/2022 01:45:06 PM      Comment: Please note this report has been produced using speech recognition software and may contain errors related to that system including errors in grammar, punctuation, and spelling, as well as words and phrases that may be inappropriate. Efforts were made to edit the dictations.         Preethi Lopez MD  05/27/22 [id number]

## 2022-05-26 NOTE — LETTER
[facility] Emergency Department  [address] [id number]  Phone: [phone]  Fax: [phone]               May 26, 2022    Patient: Halle Bah   YOB: 1960   Date of Visit: 5/26/2022       To Whom It May Concern:    Thais Lobo was seen and treated in our emergency department on 5/26/2022. She may return to all activities with no restrictions.       Sincerely,       Margarita Dennison RN         Signature:__________________________________

## 2022-05-31 ENCOUNTER — HOSPITAL ENCOUNTER (OUTPATIENT)
Dept: PHYSICAL THERAPY | Age: 62
Discharge: HOME | End: 2022-05-31

## 2022-06-14 ENCOUNTER — HOSPITAL ENCOUNTER (OUTPATIENT)
Age: 62
Setting detail: SPECIMEN
Discharge: HOME | End: 2022-06-14
Payer: MEDICARE

## 2022-06-14 LAB
ALBUMIN SERPL-MCNC: 3.4 GM/DL (ref 3.4–5)
ALP BLD-CCNC: 64 IU/L (ref 40–128)
ALT SERPL-CCNC: 9 U/L (ref 10–40)
ANION GAP SERPL CALCULATED.3IONS-SCNC: 11 MMOL/L (ref 4–16)
AST SERPL-CCNC: 14 IU/L (ref 15–37)
BILIRUB SERPL-MCNC: 0.2 MG/DL (ref 0–1)
BUN BLDV-MCNC: 18 MG/DL (ref 6–23)
CALCIUM SERPL-MCNC: 8.8 MG/DL (ref 8.3–10.6)
CHLORIDE BLD-SCNC: 105 MMOL/L (ref 99–110)
CHOLESTEROL: 155 MG/DL
CO2: 28 MMOL/L (ref 21–32)
CREAT SERPL-MCNC: 0.5 MG/DL (ref 0.6–1.1)
ESTIMATED AVERAGE GLUCOSE: 128 MG/DL
GFR AFRICAN AMERICAN: >60 ML/MIN/1.73M2
GFR NON-AFRICAN AMERICAN: >60 ML/MIN/1.73M2
GLUCOSE BLD-MCNC: 97 MG/DL (ref 70–99)
HBA1C MFR BLD: 6.1 % (ref 4.2–6.3)
HDLC SERPL-MCNC: 45 MG/DL
LDL CHOLESTEROL CALCULATED: 46 MG/DL
LDL CHOLESTEROL DIRECT: 70 MG/DL
POTASSIUM SERPL-SCNC: 4.5 MMOL/L (ref 3.5–5.1)
SODIUM BLD-SCNC: 144 MMOL/L (ref 135–145)
T4 FREE: 0.86 NG/DL (ref 0.9–1.8)
TOTAL PROTEIN: 5.5 GM/DL (ref 6.4–8.2)
TRIGL SERPL-MCNC: 318 MG/DL
TSH HIGH SENSITIVITY: 2.95 UIU/ML (ref 0.27–4.2)

## 2022-06-14 PROCEDURE — 83036 HEMOGLOBIN GLYCOSYLATED A1C: CPT

## 2022-06-14 PROCEDURE — 84439 ASSAY OF FREE THYROXINE: CPT

## 2022-06-14 PROCEDURE — 36415 COLL VENOUS BLD VENIPUNCTURE: CPT

## 2022-06-14 PROCEDURE — 80053 COMPREHEN METABOLIC PANEL: CPT

## 2022-06-14 PROCEDURE — 83721 ASSAY OF BLOOD LIPOPROTEIN: CPT

## 2022-06-14 PROCEDURE — 84443 ASSAY THYROID STIM HORMONE: CPT

## 2022-06-14 PROCEDURE — 80061 LIPID PANEL: CPT

## 2022-06-15 ENCOUNTER — HOSPITAL ENCOUNTER (OUTPATIENT)
Dept: PHYSICAL THERAPY | Age: 62
Setting detail: THERAPIES SERIES
Discharge: HOME | End: 2022-06-15
Payer: MEDICARE

## 2022-06-15 PROCEDURE — 97161 PT EVAL LOW COMPLEX 20 MIN: CPT

## 2022-06-15 PROCEDURE — 97110 THERAPEUTIC EXERCISES: CPT

## 2022-06-15 RX ORDER — NYSTATIN 100000 [USP'U]/G
POWDER TOPICAL
Qty: 1 EACH | Refills: 0 | Status: SHIPPED | OUTPATIENT
Start: 2022-06-15

## 2022-06-15 RX ORDER — NYSTATIN 100000 U/G
CREAM TOPICAL
Qty: 60 G | Refills: 0 | Status: SHIPPED | OUTPATIENT
Start: 2022-06-15 | End: 2022-07-25

## 2022-06-15 ASSESSMENT — PAIN DESCRIPTION - FREQUENCY: FREQUENCY: CONTINUOUS

## 2022-06-15 ASSESSMENT — PAIN DESCRIPTION - DESCRIPTORS: DESCRIPTORS: ACHING;SORE

## 2022-06-15 ASSESSMENT — PAIN DESCRIPTION - LOCATION: LOCATION: FOOT

## 2022-06-15 ASSESSMENT — PAIN SCALES - GENERAL: PAINLEVEL_OUTOF10: 4

## 2022-06-15 NOTE — PLAN OF CARE
Outpatient Physical Therapy           [place]           [] Phone: [phone]   Fax: [phone]  [place]           [] Phone: [phone]   Fax: [phone]     To: MD Dr. Lawyer Jairon Mai   From: Yolanda Wetzel, PT, PT     Patient: Paco Ly       : 1960  Diagnosis: Weakness [R53.1] Diagnosis: weakness  Treatment Diagnosis: Weakness  Date: 6/15/2022    Physical Therapy Certification/Re-Certification Form  Dear Dr. Lawyer De La O,  The following patient has been evaluated for physical therapy services and for therapy to continue, insurance requires physician review of the treatment plan initially and every 90 days. Please review the attached evaluation and/or summary of the patient's plan of care, and verify that you agree therapy should continue by signing the attached document and sending it back to our office. Assessment:    Assessment: Pt is 63year old female with sudden worsening of L sided weakness following history of several CVAs. Pt does not ambulate and requires assistance for transferring. Patient's caregiver reports that she is significantly weaker and has more difficulty assisting with transferring and hygiene tasks due to previous strokes. Pt demo deficits this date that include mod-maxA x1 for stand pivot transfers, significant weakness of BLE/BUE (L >R), impaired sitting balance requiring maxA to remain upright, ability to follow one step commands, pain in both feet, impaired ROM of all extremities (L > R). Pt will benefit with PT services with BLE/BUE strengthening seated, transfer training, seated balance, core strength, ROM to return to PLOF. Pt prior to onset of current condition had Saeid for transfers to/from W/C, required full assistance for hygiene and donning/doffing clothing.     Plan of Care/Treatment to date:  [x] Therapeutic Exercise  [] Modalities:  [x] Therapeutic Activity     [] Ultrasound  [] Electrical Stimulation  [] Gait Training      [] Cervical Traction [] Lumbar Traction  [x] Neuromuscular Re-education    [] Cold/hotpack [] Iontophoresis   [x] Instruction in HEP      [] Vasopneumatic    [] Dry Needling  [x] Manual Therapy               [] Aquatic Therapy       Other:          Frequency/Duration:  # Days per week: [] 1 day # Weeks: [] 1 week [] 5 weeks     [] 2 days   [] 2 weeks [] 6 weeks     [] 3 days   [] 3 weeks [] 7 weeks     [] 4 days   [] 4 weeks [] 8 weeks         [] 9 weeks [] 10 weeks         [] 11 weeks [] 12 weeks    Rehab Potential/Progress: [] Excellent [x] Good [] Fair  [] Poor     Goals:    Patient goals: improve strength  Short term goals  Time Frame for Short term goals: 6 weeks  Pt demo ability to compelte HEP 1x per day with caregiver  Pt demo ability to complete stand pviot transfers with Saeid to/from W/C  Pt demo >3+/5 BLE strength in all directions to improve tolerance to functional transfers  Pt demo ability to sit >1 minute with x2 UE assist on table to improve static sitting balance for bathing      Electronically signed by:  Yolanda Wetzel, PT, DPT, CSCS 6/15/2022, 5:45 PM        If you have any questions or concerns, please don't hesitate to call.   Thank you for your referral.      Physician Signature:________________________________Date:_________ TIME: _____  By signing above, therapists plan is approved by physician

## 2022-06-15 NOTE — PROGRESS NOTES
Physical Therapy: Initial Evaluation    Patient: Rudy Bacon (71 y.o. female)   Examination Date:   Plan of Care Certification Period: 6/15/2022 to        :  1960 ;    Confirmed: Yes MRN: [id number]  CSN: [id number]   Insurance: Payor: Imani Torre / Plan: Davidejoel Gallagherin / Product Type: *No Product type* /   Insurance ID: [id number] - (Medicare Managed) Secondary Insurance (if applicable): [place]ICA[place]   Referring Physician: MD Dr. Ayesha Ny   PCP: Venkatesh Cohen MD Visits to Date/Visits Approved:   /      No Show/Cancelled Appts:   /       Medical Diagnosis: Weakness [R53.1] weakness  Treatment Diagnosis: Weakness     PERTINENT MEDICAL HISTORY   Patient Assessed for Rehabilitation Services: Yes  Self reported health status[de-identified] Good    Medical History: Chart Reviewed: Yes   Past Medical History:   Diagnosis Date    Anxiety disorder     Back pain, chronic     [facility]     COPD (chronic obstructive pulmonary disease) ([address]     COVID-19 10/12/2021    CVA (cerebral infarction) 2014 x2    Diabetes mellitus ([address]     Diverticulosis     Epilepsy ([facility]     GERD (gastroesophageal reflux disease)     Hyperlipidemia     Hypertension     Insomnia     Iron (Fe) deficiency anemia     Mental disability     Seizures ([facility]     Unspecified cerebral artery occlusion with cerebral infarction      Surgical History:   Past Surgical History:   Procedure Laterality Date    GASTROSTOMY TUBE PLACEMENT         Medications:   Current Outpatient Medications:     nystatin (MYCOSTATIN) 309319 UNIT/GM powder, Apply topically 4 times daily. under the abdominal folds for 2 wks, Disp: 1 each, Rfl: 0    nystatin (MYCOSTATIN) 150104 UNIT/GM cream, Apply topically 2 times daily.  To the affected area for 2 wks, Disp: 60 g, Rfl: 0    fluticasone (FLONASE) 50 MCG/ACT nasal spray, INSTILL 1 SPRAY INTO EACH NOSTRIL DAILY, Disp: 16 g, Rfl: 5    ipratropium-albuterol (DUONEB) 0.5-2.5 (3) MG/3ML SOLN nebulizer solution, Inhale 3 mLs into the lungs every 4 hours, Disp: 120 each, Rfl: 5    albuterol sulfate HFA (VENTOLIN HFA) 108 (90 Base) MCG/ACT inhaler, Inhale 2 puffs into the lungs 4 times daily as needed for Wheezing, Disp: 54 g, Rfl: 1    D3 HIGH POTENCY 50 MCG (2000 UT) CAPS, TAKE 1 CAPSULE BY MOUTH DAILY, Disp: 31 capsule, Rfl: 10    ipratropium-albuterol (DUONEB) 0.5-2.5 (3) MG/3ML SOLN nebulizer solution, Inhale 3 mLs into the lungs 3 times daily, Disp: 270 mL, Rfl: 5    UNABLE TO FIND, hospital bed mattress XL twin, Disp: 1 Device, Rfl: 0    pantoprazole (PROTONIX) 40 MG tablet, TAKE 1 TABLET BY MOUTH EVERY MORNING BEFORE BREAKFAST, Disp: 30 tablet, Rfl: 5    COMBIVENT RESPIMAT  MCG/ACT AERS inhaler, INHALE 1 PUFF BY ORAL INHALATION EVERY 6 HOURS, Disp: 4 g, Rfl: 5    ferrous sulfate (IRON 325) 325 (65 Fe) MG tablet, Take 1 tablet by mouth daily (with breakfast), Disp: 30 tablet, Rfl: 5    carBAMazepine (CARBATROL) 300 MG extended release capsule, TAKE 1 CAPSULE BY MOUTH TWICE A DAY, Disp: 56 capsule, Rfl: 5    Lactobacillus (ACIDOPHILUS) CAPS capsule, TAKE 1 CAPSULE BY MOUTH DAILY, Disp: 28 capsule, Rfl: 5    cetirizine (ZYRTEC) 10 MG tablet, TAKE 1 TABLET BY MOUTH DAILY, Disp: 31 tablet, Rfl: 5    atorvastatin (LIPITOR) 40 MG tablet, TAKE 1 TABLET BY MOUTH DAILY, Disp: 31 tablet, Rfl: 5    levothyroxine (SYNTHROID) 75 MCG tablet, Take 1 tablet by mouth Daily, Disp: 30 tablet, Rfl: 5    levETIRAcetam (KEPPRA) 750 MG tablet, Take 2 tablets by mouth 2 times daily, Disp: 120 tablet, Rfl: 0    albuterol sulfate  (90 Base) MCG/ACT inhaler, Inhale 2 puffs into the lungs 4 times daily, Disp: 18 g, Rfl: 3    tiotropium-olodaterol (STIOLTO) 2.5-2.5 MCG/ACT AERS, Inhale 2 puffs into the lungs daily, Disp: 1 each, Rfl: 0    amLODIPine (NORVASC) 10 MG tablet, Take 1 tablet by mouth daily, Disp: 90 tablet, Rfl: 3    JANUVIA 100 MG tablet, TAKE 1 TABLET BY MOUTH DAILY, Disp: 30 tablet, Rfl: 5    lisinopril (PRINIVIL;ZESTRIL) 40 MG tablet, TAKE 1 TABLET BY MOUTH DAILY, Disp: 31 tablet, Rfl: 5    Diapers & Supplies MISC, 1 each by Does not apply route daily as needed (4-5 times daily), Disp: 100 each, Rfl: 5    mirtazapine (REMERON) 30 MG tablet, TAKE 1 TABLET BY MOUTH NIGHTLY, Disp: 31 tablet, Rfl: 5    Incontinence Supply Disposable (DEPEND UNDERWEAR LARGE/XL) MISC, 1 Units by Does not apply route 4 times daily, Disp: 100 Package, Rfl: 5    Disposable Gloves MISC, 1 Units by Does not apply route 4 times daily, Disp: 100 each, Rfl: 5    diphenhydrAMINE (BENADRYL) 25 MG capsule, Take 25 mg by mouth every 6 hours as needed for Itching, Disp: , Rfl:     blood glucose monitor strips, Test 3 times a day & as needed for symptoms of irregular blood glucose., Disp: 90 strip, Rfl: 0    busPIRone (BUSPAR) 10 MG tablet, Take 1 tablet by mouth 2 times daily, Disp: 60 tablet, Rfl: 5    divalproex (DEPAKOTE) 500 MG DR tablet, Take 4 tablets by mouth nightly, Disp: 90 tablet, Rfl: 0    metFORMIN (GLUCOPHAGE) 500 MG tablet, Take 2 tablets by mouth daily (with breakfast), Disp: 30 tablet, Rfl: 0    risperiDONE (RISPERDAL) 1 MG tablet, Take 1 tablet by mouth 3 times daily 1 tablet in am, 1 tablet at 4pm, 1 tablet at bedtime, Disp: 60 tablet, Rfl: 5    sertraline (ZOLOFT) 100 MG tablet, Take 2 tablets by mouth nightly, Disp: 30 tablet, Rfl: 5  Allergies: Macrobid [nitrofurantoin]      SUBJECTIVE EXAMINATION     History obtained from[de-identified] Chart Review,Patient,      Family/Caregiver Present: No    Subjective History:    Subjective: She is present with Angel Mario who works with [employer] in home. Patient has CP and has always been in her chair. States she used to do more and wants to be stronger. States she feels weaker in her legs. Can put weight on her R leg. Does not use a walker or cane. Had a stroke in 2014. Wants to help transfer easier. Additional Pertinent Hx (if applicable):      Any changes to allergies, medications, or have you had any medical procedures/ER visits since your last visit?: No      Learning/Language: Learning  Does the patient/guardian have any barriers to learning?: No barriers  Will there be a co-learner?: No  What is the preferred language of the patient/guardian?: English  Is an  required?: No  How does the patient/guardian prefer to learn new concepts?: Listening,Reading,Demonstration     Pain Screening   Pain Screening  Patient Currently in Pain: Yes  Pain Assessment: 0-10  Pain Level: 4  Best Pain Level: 2  Pain Location: Foot  Pain Descriptors: Aching,Sore  Pain Frequency: Continuous    Functional Status         Social History:   Lives in a 24/7 assisted living apartment, has care worker's constantly around to assist her; requires x1 to transfer and x2 for showering    Prior Level of Function:      Bathing: Dependent  Upper Body Dressing: Dependent  Lower Body Dressing: Dependent  Don/doff Socks/Shoes: Dependent  Toileting: Maximum Assist  Transfers (sit to stand): Moderate Assist  Transfers (stand to sit): Moderate Assist    Current Level of Function:  Bathing: Dependent  Upper Body Dressing: Dependent  Lower Body Dressing: Dependent  Don/doff Socks/Shoes: Dependent  Toileting: Maximum Assist  Transfers (sit to stand): Moderate Assist  Transfers (stand to sit): Moderate Assist    OBJECTIVE EXAMINATION   Restrictions: On 2L of O2    Review of Systems:  Follows Commands: Within Functional Limits    Observations:  General Observations  Description: Patient arrives with caregiver in her W/C, seat belt buckled; unable to complete W/C propulsion independently    Left AROM  Right AROM         AROM LUE (degrees)  LUE General AROM: More limitations than RUE with AROM, limtied shoulder abd/flex to <90 deg; claw-like hand presentation  AROM LLE (degrees)  LLE General AROM: Limited hip and foot/ankle ROM    AROM RUE (degrees)  RUE General AROM: able to achieve approx.  80 cognition,Decreased ROM    Statement of Medical Necessity: Physical Therapy is both indicated and medically necessary as outlined in the POC to increase the likelihood of meeting the functionally related goals stated below. Patient's Activity Tolerance: Patient tolerated treatment well      Patient's rehabilitation potential/prognosis is considered to be: Good    Factors which may impact rehabilitation potential include: Medical co-morbidities,Lack of motivation,Cognitive function  Measures taken to address barrier(s): Education to caregiver     GOALS   Patient Goal(s): improve strength  Short Term Goals Completed by 6 weeks Goal Status   Pt demo ability to compelte HEP 1x per day with caregiver New   Pt demo ability to complete stand pviot transfers with Saeid to/from W/C New   Pt demo >3+/5 BLE strength in all directions to improve tolerance to functional transfers New   Pt demo ability to sit >1 minute with x2 UE assist on table to improve static sitting balance for bathing New                                         TREATMENT PLAN       Requires PT Follow-Up: Yes    Pt. actively involved in establishing Plan of Care and Goals: Yes  Patient/ Caregiver education and instruction: Cloyde Suzy of Care,Evaluative findings,Home Exercise Program,Transfer Training         Treatment may include any combination of the following: Strengthening,ROM,Balance training,Neuromuscular re-education,Transfer training,Home exercise program,Safety education & training,Patient/Caregiver education & training,Therapeutic activities     Frequency / Duration:  Patient to be seen 1x for 6 weeks weeks      Eval Complexity:    Decision Making: Medium Complexity        Therapist Signature: Iraj Espinosa, PT    Date: 4/60/9680     I certify that the above Therapy Services are being furnished while the patient is under my care. I agree with the treatment plan and certify that this therapy is necessary.       Physician's Signature: ___________________________   Date:_______                                                                   Modesto Riley MD        Physician Comments: _______________________________________________    Please sign and return to   [facility]. Please fax to the location listed below.  [facility] for this referral!    [address] [facility] [address], [address] [address]  Dept: [phone]  Loc: [phone]

## 2022-06-15 NOTE — FLOWSHEET NOTE
Outpatient Physical Therapy  [place]           [x] Phone: [phone]   Fax: [phone]  [facility]           [] Phone: [phone]   Fax: [phone]        Physical Therapy Daily Treatment Note  Date:  6/15/2022    Patient Name:  Halle Bah    :  1960  MRN: [id number]  Restrictions/Precautions: on 2L of O2  Diagnosis:   Weakness [R53.1] Diagnosis: weakness  Date of Injury/Surgery: --  Treatment Diagnosis:  Weakness  Insurance/Certification information: [facility] Medicare  Referring Physician:  MD Dr. Mahamed Taylor   PCP: Keyshawn Chino MD  Next Doctor Visit:  --  Plan of care signed (Y/N):  N, sent 6/15/22   Outcome Measure: --  Visit# / total visits:   1/10  Pain level: /10 both feet   Goals:     Patient goals: improve strength  Short term goals  Time Frame for Short term goals: 6 weeks  Pt demo ability to compelte HEP 1x per day with caregiver  Pt demo ability to complete stand pviot transfers with Saeid to/from W/C  Pt demo >3+/5 BLE strength in all directions to improve tolerance to functional transfers  Pt demo ability to sit >1 minute with x2 UE assist on table to improve static sitting balance for bathing       Summary of Evaluation:   Pt is 63year old female with sudden worsening of L sided weakness following history of several CVAs. Pt does not ambulate and requires assistance for transferring. Patient's caregiver reports that she is significantly weaker and has more difficulty assisting with transferring and hygiene tasks due to previous strokes. Pt demo deficits this date that include mod-maxA x1 for stand pivot transfers, significant weakness of BLE/BUE (L >R), impaired sitting balance requiring maxA to remain upright, ability to follow one step commands, pain in both feet, impaired ROM of all extremities (L > R). Pt will benefit with PT services with BLE/BUE strengthening seated, transfer training, seated balance, core strength, ROM to return to PLOF.  Pt prior to onset of current condition had Saeid for transfers to/from W/C, required full assistance for hygiene and donning/doffing clothing. Subjective:  See franki         Any changes in Ambulatory Summary Sheet? None        Objective:  See franki   COVID screening questions were asked and patient attested that there had been no contact or symptoms        Exercises: (No more than 4 columns)   Exercise/Equipment 6/15/22 #1 Date Date           WARM UP                     TABLE      *Seated March in W/C      *Seated LAQ in W/C      *Seated Arm Raise in W/C      Sitting Balance; static, reaches, A/P leans      Bicep Curl          Stand Pivot Transfers      Adductor Squeeze                                               PROPRIOCEPTION                                    MODALITIES                      Other Therapeutic Activities/Education:  Patient received education on their current pathology and how their condition effects them with their functional activities. Patient understood discussion and questions were answered. Patient understands their activity limitations and understands rational for treatment progression. Home Exercise Program:  HO issued, reviewed and discussed with patient. Pt agreed to comply. Manual Treatments:  --      Modalities:  --      Communication with other providers:  franki sent 6/15/22      Assessment:  (Response towards treatment session) (Pain Rating)   Pt is 63year old female with sudden worsening of L sided weakness following history of several CVAs. Pt does not ambulate and requires assistance for transferring. Patient's caregiver reports that she is significantly weaker and has more difficulty assisting with transferring and hygiene tasks due to previous strokes.  Pt demo deficits this date that include mod-maxA x1 for stand pivot transfers, significant weakness of BLE/BUE (L >R), impaired sitting balance requiring maxA to remain upright, ability to follow one step commands, pain in both feet, impaired ROM of all extremities (L > R). Pt will benefit with PT services with BLE/BUE strengthening seated, transfer training, seated balance, core strength, ROM to return to PLOF. Pt prior to onset of current condition had Saeid for transfers to/from W/C, required full assistance for hygiene and donning/doffing clothing.       Plan for Next Session: --       Time In / Time Out:  2111-2690      If [employer] Please Indicate Units for Rx this date and running total for each and overall total for Rx to date:  CPT Code Units today Running Total Units Total approved    TE 15327  1 1    MAN 77521       Gait 82274      NR 00042      TA  68786      Estim Unatt [id number]       [address] [address]       ADL/Self care [id number]      DNT 1-2 72146      DNT 3-4 20561      Other:    1 PT eval 1           Total for episode of care   2         Timed Code/Total Treatment Minutes:  40'   (1) PT eval   (1) TE      Next Progress Note due:  10th visit      Plan of Care Interventions:  [x] Therapeutic Exercise  [] Modalities:  [x] Therapeutic Activity     [] Ultrasound  [] Estim  [x] Gait Training      [] Cervical Traction [] Lumbar Traction  [x] Neuromuscular Re-education    [] Cold/hotpack [] Iontophoresis   [x] Instruction in HEP      [x] Vasopneumatic   [] Dry Needling    [x] Manual Therapy               [] Aquatic Therapy              Electronically signed by:  Truman Cuevas PT, DPT, CSCS 6/15/2022, 8:35 AM

## 2022-06-18 DIAGNOSIS — I10 HTN (HYPERTENSION), BENIGN: ICD-10-CM

## 2022-06-18 DIAGNOSIS — D64.9 ANEMIA, UNSPECIFIED TYPE: ICD-10-CM

## 2022-06-21 ENCOUNTER — TELEPHONE (OUTPATIENT)
Dept: FAMILY MEDICINE CLINIC | Age: 62
End: 2022-06-21

## 2022-06-21 NOTE — TELEPHONE ENCOUNTER
----- Message from [facility] sent at 6/21/2022  1:26 PM EDT -----  Subject: Referral Request    QUESTIONS   Reason for referral request? Dalila Wright from [place] [address] would like referral   for a mammogram, a lung cancer screening and a urine test for kidney   issues. Has the physician seen you for this condition before? No   Preferred Specialist (if applicable)? Do you already have an appointment scheduled? No  Additional Information for Provider? Dr. Delsa Leventhal has seen the pt for th   kidney issues and not the mammogram or lung cancer screening. Dalila Wright can be   reached at [phone].  ---------------------------------------------------------------------------  --------------  Michelle Slot INFO  What is the best way for the office to contact you? OK to leave message on   voicemail  Preferred Call Back Phone Number? [phone]  ---------------------------------------------------------------------------  --------------  SCRIPT ANSWERS  Relationship to Patient? Guardian  Representative Name? Henri/ [employer]  Is the Representative on the appropriate HIPAA document in Epic?  Yes

## 2022-06-22 RX ORDER — FERROUS SULFATE 325(65) MG
TABLET ORAL
Qty: 31 TABLET | Refills: 0 | Status: SHIPPED | OUTPATIENT
Start: 2022-06-22 | End: 2022-08-02

## 2022-06-22 RX ORDER — AMLODIPINE BESYLATE 10 MG/1
10 TABLET ORAL DAILY
Qty: 30 TABLET | Refills: 0 | Status: SHIPPED | OUTPATIENT
Start: 2022-06-22 | End: 2022-07-18

## 2022-06-22 RX ORDER — CETIRIZINE HYDROCHLORIDE 10 MG/1
TABLET ORAL
Qty: 31 TABLET | Refills: 0 | Status: SHIPPED | OUTPATIENT
Start: 2022-06-22 | End: 2022-08-02

## 2022-06-23 ENCOUNTER — HOSPITAL ENCOUNTER (OUTPATIENT)
Dept: PHYSICAL THERAPY | Age: 62
Setting detail: THERAPIES SERIES
Discharge: HOME | End: 2022-06-23
Payer: MEDICARE

## 2022-06-23 PROCEDURE — 97110 THERAPEUTIC EXERCISES: CPT

## 2022-06-23 PROCEDURE — 97530 THERAPEUTIC ACTIVITIES: CPT

## 2022-06-23 NOTE — FLOWSHEET NOTE
Outpatient Physical Therapy  [place]           [x] Phone: [phone]   Fax: [phone]  [place]           [] Phone: [phone]   Fax: [phone]        Physical Therapy Daily Treatment Note  Date:  2022    Patient Name:  Jeanette Walden    :  1960  MRN: [id number]  Restrictions/Precautions: on 2L of O2  Diagnosis:   Weakness [R53.1] Diagnosis: weakness  Date of Injury/Surgery: --  Treatment Diagnosis:  Weakness  Insurance/Certification information: [facility] Medicare  Referring Physician:  MD Dr. Marvin Alberto   PCP: Ivan Frost MD  Next Doctor Visit:  --  Plan of care signed (Y/N):  N, sent 6/15/22   Outcome Measure: --  Visit# / total visits:   2/10  Pain level: 0/10 both feet   Goals:     Patient goals: improve strength  Short term goals  Time Frame for Short term goals: 6 weeks  Pt demo ability to compelte HEP 1x per day with caregiver  Pt demo ability to complete stand pviot transfers with Saeid to/from W/C  Pt demo >3+/5 BLE strength in all directions to improve tolerance to functional transfers  Pt demo ability to sit >1 minute with x2 UE assist on table to improve static sitting balance for bathing       Summary of Evaluation:   Pt is 63year old female with sudden worsening of L sided weakness following history of several CVAs. Pt does not ambulate and requires assistance for transferring. Patient's caregiver reports that she is significantly weaker and has more difficulty assisting with transferring and hygiene tasks due to previous strokes. Pt demo deficits this date that include mod-maxA x1 for stand pivot transfers, significant weakness of BLE/BUE (L >R), impaired sitting balance requiring maxA to remain upright, ability to follow one step commands, pain in both feet, impaired ROM of all extremities (L > R). Pt will benefit with PT services with BLE/BUE strengthening seated, transfer training, seated balance, core strength, ROM to return to PLOF.  Pt prior to onset of current condition had Saeid for transfers to/from W/C, required full assistance for hygiene and donning/doffing clothing. Subjective:  Pt's care taker stated she didn't know the patient had any exercises to do. She will look for them at her place. Any changes in Ambulatory Summary Sheet? None        Objective:    COVID screening questions were asked and patient attested that there had been no contact or symptoms    Gradual increase in L elbow ROM  post PROM   Low endurance to execises  Ability to lean forward but unable to perform trunk rotation with/without assist  Limited UE ROM L>R    Cued for technique       Exercises: (No more than 4 columns)   Exercise/Equipment 6/15/22 #1 6/23/22 #2 Date           WARM UP                     TABLE      *Seated March in W/C  x10    *Seated LAQ in W/C  x10    *Seated Arm Raise in W/C  AAROM/AROM L/R UE  10 x    Sitting Balance static      reaches  x10 AAROM     A/P leans  X 10 AROM    A/P lean with UE press into Playball  X 5 hold to tolerance    Look up w more erect posture in midline  X 4 hold to tolerance    Bicep Curl      R AROM 10x  PROM L 10x slow     Stand Pivot Transfers      Adductor Squeeze  x5     abd iso  x3                                       PROPRIOCEPTION                                    MODALITIES                      Other Therapeutic Activities/Education:  Patient received education on their current pathology and how their condition effects them with their functional activities. Patient understood discussion and questions were answered. Patient understands their activity limitations and understands rational for treatment progression. Home Exercise Program:  HO issued, reviewed and discussed with patient. Pt agreed to comply.         Manual Treatments:  --      Modalities:  --      Communication with other providers:  eval sent 6/15/22      Assessment:  (Response towards treatment session) (Pain Rating)  Pt demonstrated good tolerance to tx withtout any pain. She needs assist for exercises and noted decreased general endurance strength. Poor sitting posture. Reviewed importance of HEP. Pt would continue to benefit from skilled therapy interventions to address improve mobility, improve tolerance to functional transfers and endurance and progress toward goal completion to facilitate daily bathing, sitting balance and avoid further decline. 0/10 pain     Pt is 63year old female with sudden worsening of L sided weakness following history of several CVAs. Pt does not ambulate and requires assistance for transferring. Patient's caregiver reports that she is significantly weaker and has more difficulty assisting with transferring and hygiene tasks due to previous strokes. Pt demo deficits this date that include mod-maxA x1 for stand pivot transfers, significant weakness of BLE/BUE (L >R), impaired sitting balance requiring maxA to remain upright, ability to follow one step commands, pain in both feet, impaired ROM of all extremities (L > R). Pt will benefit with PT services with BLE/BUE strengthening seated, transfer training, seated balance, core strength, ROM to return to PLOF. Pt prior to onset of current condition had Saeid for transfers to/from W/C, required full assistance for hygiene and donning/doffing clothing.       Plan for Next Session: --       Time In / Time Out:  0582-8604     If [employer] Please Indicate Units for Rx this date and running total for each and overall total for Rx to date:  CPT Code Units today Running Total Units Total approved    TE 63152  1 1    MAN 60577       Gait 84678      NR 84765      TA  37848 1 1    Estim Unatt 84145       [address] [facility] [id number]      [facility] [address]       ADL/Self care 88 [phone]      DNT 1-2 58837      DNT 3-4 20561      Other:     1           Total for episode of care   4         Timed Code/Total Treatment Minutes:  25'/25'   (1) TA   (1) TE      Next Progress Note due:  10th visit      Plan of Care Interventions:  [x] Therapeutic Exercise  [] Modalities:  [x] Therapeutic Activity     [] Ultrasound  [] Estim  [x] Gait Training      [] Cervical Traction [] Lumbar Traction  [x] Neuromuscular Re-education    [] Cold/hotpack [] Iontophoresis   [x] Instruction in HEP      [x] Vasopneumatic   [] Dry Needling    [x] Manual Therapy               [] Aquatic Therapy              Electronically signed by:  Sherrill Galaviz PTA, CLT 6/23/22

## 2022-06-28 ENCOUNTER — TELEPHONE (OUTPATIENT)
Dept: FAMILY MEDICINE CLINIC | Age: 62
End: 2022-06-28

## 2022-06-28 NOTE — TELEPHONE ENCOUNTER
Accessed chart to see if there are recent [place] [id number] orders and [facility] RN got victorina with another MA in office. No orders since 5/2022. Maxim to be advised.

## 2022-07-07 ENCOUNTER — HOSPITAL ENCOUNTER (OUTPATIENT)
Dept: PHYSICAL THERAPY | Age: 62
Discharge: HOME | End: 2022-07-07

## 2022-07-07 NOTE — FLOWSHEET NOTE
Physical Therapy  Cancellation/No-show Note  Patient Name:  Julita Peterson  :  1960   Date:  2022  Cancelled visits to date: 1  No-shows to date: 1    For today's appointment patient:  [x]  Cancelled  []  Rescheduled appointment  []  No-show     Reason given by patient:  []  Patient ill  []  Conflicting appointment  []  No transportation    []  Conflict with work  []  No reason given  []  Other:     Comments:  Pt had issues with her oxygen tank.     Electronically signed by:  Namita Qureshi PT,

## 2022-07-09 ENCOUNTER — HOSPITAL ENCOUNTER (OUTPATIENT)
Dept: PHYSICAL THERAPY | Age: 62
Discharge: HOME | End: 2022-07-09

## 2022-07-09 NOTE — FLOWSHEET NOTE
Physical Therapy  Cancellation/No-show Note  Patient Name:  Hossein Barros  :  1960   Date:  2022  Cancelled visits to date: 1  No-shows to date: 2    For today's appointment patient:  []  Cancelled  []  Rescheduled appointment  [x]  No-show     Reason given by patient:  []  Patient ill  []  Conflicting appointment  []  No transportation    []  Conflict with work  []  No reason given  []  Other:     Comments:   Electronically signed by:   Hortencia Smith PTA

## 2022-07-16 DIAGNOSIS — I10 HTN (HYPERTENSION), BENIGN: ICD-10-CM

## 2022-07-18 RX ORDER — L. ACIDOPHILUS/PECTIN, CITRUS 25MM-100MG
TABLET ORAL
Qty: 31 TABLET | Refills: 5 | Status: SHIPPED | OUTPATIENT
Start: 2022-07-18 | End: 2022-08-24

## 2022-07-18 RX ORDER — AMLODIPINE BESYLATE 10 MG/1
10 TABLET ORAL DAILY
Qty: 31 TABLET | Refills: 5 | Status: SHIPPED | OUTPATIENT
Start: 2022-07-18 | End: 2022-07-19 | Stop reason: SDUPTHER

## 2022-07-19 DIAGNOSIS — I10 HTN (HYPERTENSION), BENIGN: ICD-10-CM

## 2022-07-19 RX ORDER — AMLODIPINE BESYLATE 10 MG/1
10 TABLET ORAL DAILY
Qty: 31 TABLET | Refills: 5 | Status: SHIPPED | OUTPATIENT
Start: 2022-07-19

## 2022-07-25 ENCOUNTER — TELEMEDICINE (OUTPATIENT)
Dept: FAMILY MEDICINE CLINIC | Age: 62
End: 2022-07-25
Payer: MEDICARE

## 2022-07-25 DIAGNOSIS — Z09 HOSPITAL DISCHARGE FOLLOW-UP: ICD-10-CM

## 2022-07-25 DIAGNOSIS — J44.9 CHRONIC OBSTRUCTIVE PULMONARY DISEASE, UNSPECIFIED COPD TYPE (HCC): Primary | ICD-10-CM

## 2022-07-25 DIAGNOSIS — Z12.31 ENCOUNTER FOR SCREENING MAMMOGRAM FOR BREAST CANCER: ICD-10-CM

## 2022-07-25 DIAGNOSIS — Z12.9 CANCER SCREENING: ICD-10-CM

## 2022-07-25 DIAGNOSIS — Z12.11 COLON CANCER SCREENING: ICD-10-CM

## 2022-07-25 DIAGNOSIS — N28.89 RENAL MASS: ICD-10-CM

## 2022-07-25 PROCEDURE — 1111F DSCHRG MED/CURRENT MED MERGE: CPT | Performed by: FAMILY MEDICINE

## 2022-07-25 PROCEDURE — 99214 OFFICE O/P EST MOD 30 MIN: CPT | Performed by: FAMILY MEDICINE

## 2022-07-25 NOTE — PROGRESS NOTES
2022    TELEHEALTH EVALUATION -- Audio/Visual (During NNTAH-45 public health emergency)    Chief Complaint   Patient presents with    Follow-Up from [address] she is doing well, would like to discuss testing that the hospital wants done, Mammogram, Pne vaccine. , kidney lesion( testing), Lung cancer screening    Discuss Medications     Metformin, [facility] was in and did a A1C , 5.4 does pt need to continue taking. HPI:    Diego Cheung (:  1960) has requested an audio/video evaluation for the following concern(s):    Patient seen today with the care giver, for f/u visit from the ED for SOB, patient stats doing fine, no SOB, no fever, no cough and chest pain, patient would like to all the preventive tests which were refused before, including the mammogram and the colonoscopy tests. Patient also had CT abdomin last year and did show kidney lesion, would like to know if getting bigger or not. Review of Systems   Constitutional:  Negative for activity change, appetite change, chills, fatigue and fever. HENT:  Negative for congestion. Respiratory:  Negative for cough, shortness of breath and wheezing. Cardiovascular:  Negative for chest pain and leg swelling. Gastrointestinal:  Negative for abdominal pain, constipation and diarrhea. Genitourinary:  Negative for dysuria and frequency. Musculoskeletal:  Negative for back pain. Skin:  Negative for rash. Neurological:  Negative for dizziness, weakness and headaches. Psychiatric/Behavioral:  Negative for agitation, behavioral problems and sleep disturbance. The patient is not nervous/anxious. Prior to Visit Medications    Medication Sig Taking? Authorizing Provider   amLODIPine (NORVASC) 10 MG tablet Take 1 tablet by mouth in the morning.  Yes Carolyn Aragon MD   Lactobacillus Acid-Pectin (ACIDOPHILUS/CITRUS PECTIN) TABS TAKE 1 CAPSULE BY MOUTH DAILY Yes Carolyn Aragon MD   cetirizine (ZYRTEC) 10 MG tablet TAKE 1 TABLET BY MOUTH DAILY Yes Mora Chapin MD   FEROSUL 325 (65 Fe) MG tablet TAKE 1 TABLET BY MOUTH DAILY WITH BREAKFAST Yes Mroa Chapin MD   nystatin (MYCOSTATIN) 157809 UNIT/GM powder Apply topically 4 times daily. under the abdominal folds for 2 wks Yes Mora Chapin MD   fluticasone (FLONASE) 50 MCG/ACT nasal spray INSTILL 1 SPRAY INTO EACH NOSTRIL DAILY Yes Mora Chapin MD   albuterol sulfate HFA (VENTOLIN HFA) 108 (90 Base) MCG/ACT inhaler Inhale 2 puffs into the lungs 4 times daily as needed for Wheezing Yes Jaime Monsivais MD   D3 HIGH POTENCY 48 MCG (2000 UT) CAPS TAKE 1 [address] Yes Carolyn Aragon MD   UNABLE TO FIND hospital bed mattress XL twin Yes Carolyn Aragon MD   pantoprazole (PROTONIX) 40 MG tablet TAKE 1 TABLET BY MOUTH EVERY MORNING BEFORE BREAKFAST Yes Carolyn Aragon MD   COMBIVENT RESPIMAT  MCG/ACT AERS inhaler INHALE 1 PUFF BY ORAL INHALATION EVERY 6 HOURS Yes Jaime Monsivais MD   carBAMazepine (CARBATROL) 300 MG extended release capsule TAKE 1 CAPSULE BY MOUTH TWICE A DAY Yes Carolyn Aragon MD   Lactobacillus (ACIDOPHILUS) CAPS capsule TAKE 1 CAPSULE BY MOUTH DAILY Yes Carolyn Aragon MD   atorvastatin (LIPITOR) 40 MG tablet TAKE 1 TABLET BY MOUTH DAILY Yes Carolyn Aragon MD   levothyroxine (SYNTHROID) 75 MCG tablet Take 1 tablet by mouth Daily Yes Carolyn Aragon MD   levETIRAcetam (KEPPRA) 750 MG tablet Take 2 tablets by mouth 2 times daily Yes Merline Pauling, MD   albuterol sulfate  (90 Base) MCG/ACT inhaler Inhale 2 puffs into the lungs 4 times daily Yes Kenneth Naqvi MD   tiotropium-olodaterol (STIOLTO) 2.5-2.5 MCG/ACT AERS Inhale 2 puffs into the lungs daily Yes Kenneth Naqvi MD   JANUVIA 100 MG tablet TAKE 1 TABLET BY MOUTH DAILY Yes Carolyn Aragon MD   lisinopril (PRINIVIL;ZESTRIL) 40 MG tablet TAKE 1 TABLET BY MOUTH DAILY Yes Carolyn Aragon MD   Diapers & Supplies MISC 1 each by Does not apply route daily as needed (4-5 times daily) Yes Carolyn Aragon, MD   mirtazapine (REMERON) 30 MG tablet TAKE 1 TABLET BY MOUTH NIGHTLY Yes Luis Ratliff MD   Disposable Gloves MISC 1 Units by Does not apply route 4 times daily Yes Luis Ratliff MD   diphenhydrAMINE (BENADRYL) 25 MG capsule Take 25 mg by mouth every 6 hours as needed for Itching Yes Historical Provider, MD   blood glucose monitor strips Test 3 times a day & as needed for symptoms of irregular blood glucose.  Yes Alan Farley MD   busPIRone (BUSPAR) 10 MG tablet Take 1 tablet by mouth 2 times daily Yes Luis Ratliff MD   divalproex (DEPAKOTE) 500 MG DR tablet Take 4 tablets by mouth nightly Yes Luis Ratliff MD   metFORMIN (GLUCOPHAGE) 500 MG tablet Take 2 tablets by mouth daily (with breakfast) Yes Luis Ratliff MD   risperiDONE (RISPERDAL) 1 MG tablet Take 1 tablet by mouth 3 times daily 1 tablet in am, 1 tablet at 4pm, 1 tablet at bedtime Yes Luis Ratliff MD   sertraline (ZOLOFT) 100 MG tablet Take 2 tablets by mouth nightly Yes Luis Ratliff MD   ipratropium-albuterol (DUONEB) 0.5-2.5 (3) MG/3ML SOLN nebulizer solution Inhale 3 mLs into the lungs every 4 hours  Carmen Nieves MD   ipratropium-albuterol (DUONEB) 0.5-2.5 (3) MG/3ML SOLN nebulizer solution Inhale 3 mLs into the lungs 3 times daily  Carmen Nieves MD   Incontinence Supply Disposable (DEPEND UNDERWEAR LARGE/XL) MISC 1 Units by Does not apply route 4 times daily  Luis Ratliff MD       Social History     Tobacco Use    Smoking status: Former     Packs/day: 1.50     Years: 20.00     Pack years: 30.00     Types: Cigarettes     Quit date: 8/28/2011     Years since quitting: 10.9    Smokeless tobacco: Never   Vaping Use    Vaping Use: Never used   Substance Use Topics    Alcohol use: No     Alcohol/week: 0.0 standard drinks    Drug use: No        Allergies   Allergen Reactions    Macrobid [Nitrofurantoin] Hives and Swelling     Hives   ,   Past Medical History:   Diagnosis Date    Anxiety disorder     Back pain, chronic     Cerebral palsy [facility])     COPD (chronic obstructive pulmonary disease) ([facility]     COVID-19 10/12/2021    CVA (cerebral infarction) 2014 x2    Diabetes mellitus (HCC)     Diverticulosis     Epilepsy ([facility]     GERD (gastroesophageal reflux disease)     Hyperlipidemia     Hypertension     Insomnia     Iron (Fe) deficiency anemia     Mental disability     Seizures (HCC)     Unspecified cerebral artery occlusion with cerebral infarction    ,   Past Surgical History:   Procedure Laterality Date    GASTROSTOMY TUBE PLACEMENT     ,   Social History     Tobacco Use    Smoking status: Former     Packs/day: 1.50     Years: 20.00     Pack years: 30.00     Types: Cigarettes     Quit date: 8/28/2011     Years since quitting: 10.9    Smokeless tobacco: Never   Vaping Use    Vaping Use: Never used   Substance Use Topics    Alcohol use: No     Alcohol/week: 0.0 standard drinks    Drug use: No   , No family history on file. PHYSICAL EXAMINATION:  [ INSTRUCTIONS:  \"[x]\" Indicates a positive item  \"[]\" Indicates a negative item  -- DELETE ALL ITEMS NOT EXAMINED]  Vital Signs: (As obtained by patient/caregiver or practitioner observation)    Blood pressure-  Heart rate-    Respiratory rate-    Temperature-  Pulse oximetry-     Constitutional: [x] Appears well-developed and well-nourished [] No apparent distress      [] Abnormal-   Mental status  [x] Alert and awake  [] Oriented to person/place/time []Able to follow commands      Eyes:  EOM    []  Normal  [] Abnormal-  Sclera  []  Normal  [] Abnormal -         Discharge []  None visible  [] Abnormal -    HENT:   [x] Normocephalic, atraumatic.   [] Abnormal   [] Mouth/Throat: Mucous membranes are moist.     External Ears [] Normal  [] Abnormal-     Neck: [] No visualized mass     Pulmonary/Chest: [x] Respiratory effort normal.  [] No visualized signs of difficulty breathing or respiratory distress        [] Abnormal-      Musculoskeletal:   [] Normal gait with no signs of ataxia         [] Normal range of motion of neck        [] Abnormal-       Neurological:        [] No Facial Asymmetry (Cranial nerve 7 motor function) (limited exam to video visit)          [] No gaze palsy        [] Abnormal-         Skin:        [] No significant exanthematous lesions or discoloration noted on facial skin         [] Abnormal-            Psychiatric:       [x] Normal Affect [] No Hallucinations        [] Abnormal-     Other pertinent observable physical exam findings-     ASSESSMENT/PLAN:  1. Chronic obstructive pulmonary disease, unspecified COPD type ([facility]  - stable    2. Renal mass  - CT before: There is an 11 x 11 mm lesion in the medial aspect of the upper pole the   right kidney, which demonstrates mild enhancement and has been seen on   previous CT and MR imaging studies. This has increased slightly in size,   previously measuring 8 mm in size. As discussed, this could represent a   small complex cyst, though an indolent renal cell carcinoma is in the   differential.       Diverticulosis without acute diverticulitis. No acute inflammatory bowel   process is identified.     - CT ABDOMEN PELVIS W IV CONTRAST Additional Contrast? Oral; Future    3. Cancer screening  - CT lung screen [Initial/Annual]; Future    4. Colon cancer screening  - Fecal DNA Colorectal cancer screening (Cologuard)    5. Encounter for screening mammogram for breast cancer  - LEVY DIGITAL SCREEN W CAD BILATERAL PER PROTOCOL; Future    Return in about 6 months (around 1/25/2023). Janette Abbott, was evaluated through a synchronous (real-time) audio-video encounter. The patient (or guardian if applicable) is aware that this is a billable service, which includes applicable co-pays. This Virtual Visit was conducted with patient's (and/or legal guardian's) consent.  The visit was conducted pursuant to the emergency declaration under the [address] [address] waiver authority and the [employer] and [employer] Supplemental Appropriations Act. Patient identification was verified, and a caregiver was present when appropriate. The patient was located at Home: [address] [id number]. Provider was located at [facility] ([place] [address]): [address] [place] [address] Doctor Jerson Lujan,  [address] [facility]. Total time spent on this encounter:  30 minutes    --Ivet Jackson MD on 7/31/2022 at 1:18 PM    An electronic signature was used to authenticate this note.

## 2022-07-31 ASSESSMENT — ENCOUNTER SYMPTOMS
ABDOMINAL PAIN: 0
CONSTIPATION: 0
COUGH: 0
SHORTNESS OF BREATH: 0
DIARRHEA: 0
BACK PAIN: 0
WHEEZING: 0

## 2022-08-02 DIAGNOSIS — D64.9 ANEMIA, UNSPECIFIED TYPE: ICD-10-CM

## 2022-08-02 RX ORDER — FERROUS SULFATE 325(65) MG
TABLET ORAL
Qty: 31 TABLET | Refills: 10 | Status: SHIPPED | OUTPATIENT
Start: 2022-08-02

## 2022-08-02 RX ORDER — CETIRIZINE HYDROCHLORIDE 10 MG/1
TABLET ORAL
Qty: 31 TABLET | Refills: 10 | Status: SHIPPED | OUTPATIENT
Start: 2022-08-02

## 2022-08-04 ENCOUNTER — TELEPHONE (OUTPATIENT)
Dept: FAMILY MEDICINE CLINIC | Age: 62
End: 2022-08-04

## 2022-08-04 NOTE — TELEPHONE ENCOUNTER
Pt needs new rx for metformin  with kthe reduced dose of 1 tab instead of 2 as discussed at last visit please advise

## 2022-08-10 ENCOUNTER — TELEPHONE (OUTPATIENT)
Dept: FAMILY MEDICINE CLINIC | Age: 62
End: 2022-08-10

## 2022-08-10 NOTE — TELEPHONE ENCOUNTER
----- Message from Palma Ortiz sent at 8/10/2022  2:50 PM EDT -----  Subject: Refill Request    QUESTIONS  Name of Medication? cetirizine (ZYRTEC) 10 MG tablet  Patient-reported dosage and instructions? 10 MG 1 x a day  How many days do you have left? 16  Preferred Pharmacy? [address] phone number (if available)? [phone]  ---------------------------------------------------------------------------  --------------,  Name of Medication? FEROSUL 325 (65 Fe) MG tablet  Patient-reported dosage and instructions? 325 MG 1 x a day  How many days do you have left? 17  Preferred Pharmacy? [address] phone number (if available)? [phone]  ---------------------------------------------------------------------------  --------------  CALL BACK INFO  What is the best way for the office to contact you? OK to leave message on   voicemail  Preferred Call Back Phone Number? [phone]  ---------------------------------------------------------------------------  --------------  SCRIPT ANSWERS  Relationship to Patient? Third Party  Third Party Type? Nursing Home? Representative Name?  Abram Michael

## 2022-08-15 ENCOUNTER — TELEPHONE (OUTPATIENT)
Dept: FAMILY MEDICINE CLINIC | Age: 62
End: 2022-08-15

## 2022-08-19 ENCOUNTER — TELEPHONE (OUTPATIENT)
Dept: FAMILY MEDICINE CLINIC | Age: 62
End: 2022-08-19

## 2022-08-19 ENCOUNTER — HOSPITAL ENCOUNTER (OUTPATIENT)
Age: 62
Setting detail: SPECIMEN
Discharge: HOME | End: 2022-08-19
Payer: MEDICARE

## 2022-08-19 LAB
ALBUMIN SERPL-MCNC: 3.4 GM/DL (ref 3.4–5)
ALP BLD-CCNC: 66 IU/L (ref 40–128)
ALT SERPL-CCNC: 9 U/L (ref 10–40)
ANION GAP SERPL CALCULATED.3IONS-SCNC: 8 MMOL/L (ref 4–16)
AST SERPL-CCNC: 12 IU/L (ref 15–37)
BILIRUB SERPL-MCNC: 0.2 MG/DL (ref 0–1)
BUN BLDV-MCNC: 20 MG/DL (ref 6–23)
CALCIUM SERPL-MCNC: 8.8 MG/DL (ref 8.3–10.6)
CHLORIDE BLD-SCNC: 104 MMOL/L (ref 99–110)
CHOLESTEROL: 161 MG/DL
CO2: 31 MMOL/L (ref 21–32)
CREAT SERPL-MCNC: 0.5 MG/DL (ref 0.6–1.1)
ESTIMATED AVERAGE GLUCOSE: 105 MG/DL
GFR AFRICAN AMERICAN: >60 ML/MIN/1.73M2
GFR NON-AFRICAN AMERICAN: >60 ML/MIN/1.73M2
GLUCOSE BLD-MCNC: 89 MG/DL (ref 70–99)
HBA1C MFR BLD: 5.3 % (ref 4.2–6.3)
HDLC SERPL-MCNC: 40 MG/DL
LDL CHOLESTEROL CALCULATED: 67 MG/DL
LDL CHOLESTEROL DIRECT: 77 MG/DL
POTASSIUM SERPL-SCNC: 4.2 MMOL/L (ref 3.5–5.1)
SODIUM BLD-SCNC: 143 MMOL/L (ref 135–145)
T4 FREE: 0.78 NG/DL (ref 0.9–1.8)
TOTAL PROTEIN: 5.5 GM/DL (ref 6.4–8.2)
TRIGL SERPL-MCNC: 269 MG/DL
TSH HIGH SENSITIVITY: 1.6 UIU/ML (ref 0.27–4.2)

## 2022-08-19 PROCEDURE — 36415 COLL VENOUS BLD VENIPUNCTURE: CPT

## 2022-08-19 PROCEDURE — 84443 ASSAY THYROID STIM HORMONE: CPT

## 2022-08-19 PROCEDURE — 80053 COMPREHEN METABOLIC PANEL: CPT

## 2022-08-19 PROCEDURE — 84439 ASSAY OF FREE THYROXINE: CPT

## 2022-08-19 PROCEDURE — 83036 HEMOGLOBIN GLYCOSYLATED A1C: CPT

## 2022-08-19 PROCEDURE — 80061 LIPID PANEL: CPT

## 2022-08-19 PROCEDURE — 83721 ASSAY OF BLOOD LIPOPROTEIN: CPT

## 2022-08-19 NOTE — TELEPHONE ENCOUNTER
RN from [place] called to advise they are going to send the patients cert for this period and to please have PCP sign asap. Glucose was also reported to range from  during this cert period.

## 2022-08-24 ENCOUNTER — OFFICE VISIT (OUTPATIENT)
Dept: FAMILY MEDICINE CLINIC | Age: 62
End: 2022-08-24
Payer: MEDICARE

## 2022-08-24 VITALS
SYSTOLIC BLOOD PRESSURE: 130 MMHG | HEART RATE: 83 BPM | TEMPERATURE: 98.1 F | BODY MASS INDEX: 27.59 KG/M2 | HEIGHT: 58 IN | OXYGEN SATURATION: 98 % | DIASTOLIC BLOOD PRESSURE: 80 MMHG

## 2022-08-24 DIAGNOSIS — Z00.00 MEDICARE ANNUAL WELLNESS VISIT, SUBSEQUENT: Primary | ICD-10-CM

## 2022-08-24 PROCEDURE — G0439 PPPS, SUBSEQ VISIT: HCPCS | Performed by: FAMILY MEDICINE

## 2022-08-24 PROCEDURE — 3017F COLORECTAL CA SCREEN DOC REV: CPT | Performed by: FAMILY MEDICINE

## 2022-08-24 ASSESSMENT — PATIENT HEALTH QUESTIONNAIRE - PHQ9
4. FEELING TIRED OR HAVING LITTLE ENERGY: 3
5. POOR APPETITE OR OVEREATING: 0
9. THOUGHTS THAT YOU WOULD BE BETTER OFF DEAD, OR OF HURTING YOURSELF: 0
7. TROUBLE CONCENTRATING ON THINGS, SUCH AS READING THE NEWSPAPER OR WATCHING TELEVISION: 0
8. MOVING OR SPEAKING SO SLOWLY THAT OTHER PEOPLE COULD HAVE NOTICED. OR THE OPPOSITE, BEING SO FIGETY OR RESTLESS THAT YOU HAVE BEEN MOVING AROUND A LOT MORE THAN USUAL: 0
SUM OF ALL RESPONSES TO PHQ QUESTIONS 1-9: 6
6. FEELING BAD ABOUT YOURSELF - OR THAT YOU ARE A FAILURE OR HAVE LET YOURSELF OR YOUR FAMILY DOWN: 0
2. FEELING DOWN, DEPRESSED OR HOPELESS: 0
10. IF YOU CHECKED OFF ANY PROBLEMS, HOW DIFFICULT HAVE THESE PROBLEMS MADE IT FOR YOU TO DO YOUR WORK, TAKE CARE OF THINGS AT HOME, OR GET ALONG WITH OTHER PEOPLE: 0
SUM OF ALL RESPONSES TO PHQ QUESTIONS 1-9: 6
3. TROUBLE FALLING OR STAYING ASLEEP: 3
SUM OF ALL RESPONSES TO PHQ QUESTIONS 1-9: 6
SUM OF ALL RESPONSES TO PHQ QUESTIONS 1-9: 6

## 2022-08-24 ASSESSMENT — LIFESTYLE VARIABLES
HOW OFTEN DO YOU HAVE A DRINK CONTAINING ALCOHOL: NEVER
HOW MANY STANDARD DRINKS CONTAINING ALCOHOL DO YOU HAVE ON A TYPICAL DAY: PATIENT DOES NOT DRINK

## 2022-08-24 NOTE — PATIENT INSTRUCTIONS
Personalized Preventive Plan for Angel Meier - 8/24/2022  Medicare offers a range of preventive health benefits. Some of the tests and screenings are paid in full while other may be subject to a deductible, co-insurance, and/or copay. Some of these benefits include a comprehensive review of your medical history including lifestyle, illnesses that may run in your family, and various assessments and screenings as appropriate. After reviewing your medical record and screening and assessments performed today your provider may have ordered immunizations, labs, imaging, and/or referrals for you. A list of these orders (if applicable) as well as your Preventive Care list are included within your After Visit Summary for your review. Other Preventive Recommendations:    A preventive eye exam performed by an eye specialist is recommended every 1-2 years to screen for glaucoma; cataracts, macular degeneration, and other eye disorders. A preventive dental visit is recommended every 6 months. Try to get at least 150 minutes of exercise per week or 10,000 steps per day on a pedometer . Order or download the FREE \"Exercise & Physical Activity: Your Everyday Guide\" from [employer]. Call 2-879.869.1341 or search [employer] online. You need 6513-7401 mg of calcium and 0907-9942 IU of vitamin D per day. It is possible to meet your calcium requirement with diet alone, but a vitamin D supplement is usually necessary to meet this goal.  When exposed to the sun, use a sunscreen that protects against both UVA and UVB radiation with an SPF of 30 or greater. Reapply every 2 to 3 hours or after sweating, drying off with a towel, or swimming. Always wear a seat belt when traveling in a car. Always wear a helmet when riding a bicycle or motorcycle.

## 2022-08-24 NOTE — PROGRESS NOTES
Flaquito Barr  1960 08/24/22    Chief Complaint   Patient presents with    Annual Exam     Pt is going to be doing bowling              HPI    Past Medical History:   Diagnosis Date    Anxiety disorder     Back pain, chronic     Cerebral palsy (HCC)     COPD (chronic obstructive pulmonary disease) ([facility] [address]     COVID-19 10/12/2021    CVA (cerebral infarction) 2014 x2    Diabetes mellitus ([facility] [address]     Diverticulosis     Epilepsy ([facility]     GERD (gastroesophageal reflux disease)     Hyperlipidemia     Hypertension     Insomnia     Iron (Fe) deficiency anemia     Mental disability     Seizures (HCC)     Unspecified cerebral artery occlusion with cerebral infarction      Past Surgical History:   Procedure Laterality Date    GASTROSTOMY TUBE PLACEMENT       No family history on file.   Social History     Socioeconomic History    Marital status: Single     Spouse name: Not on file    Number of children: Not on file    Years of education: Not on file    Highest education level: Not on file   Occupational History    Not on file   Tobacco Use    Smoking status: Former     Packs/day: 1.50     Years: 20.00     Pack years: 30.00     Types: Cigarettes     Quit date: 8/28/2011     Years since quitting: 10.9    Smokeless tobacco: Never   Vaping Use    Vaping Use: Never used   Substance and Sexual Activity    Alcohol use: No     Alcohol/week: 0.0 standard drinks    Drug use: No    Sexual activity: Not on file   Other Topics Concern    Not on file   Social History Narrative    ** Merged History Encounter **          Social Determinants of Health     Financial Resource Strain: Not on file   Food Insecurity: Not on file   Transportation Needs: Not on file   Physical Activity: Not on file   Stress: Not on file   Social Connections: Not on file   Intimate Partner Violence: Not on file   Housing Stability: Not on file       Allergies   Allergen Reactions    Macrobid [Nitrofurantoin] Hives and Swelling     Hives     Current Outpatient Medications   Medication Sig Dispense Refill    COMBIVENT RESPIMAT  MCG/ACT AERS inhaler INHALE 1 PUFF BY ORAL INHALATION EVERY 6 HOURS 4 g 5    metFORMIN (GLUCOPHAGE) 500 MG tablet Take 1 tablet by mouth in the morning and 1 tablet in the evening. Take with meals. 60 tablet 5    cetirizine (ZYRTEC) 10 MG tablet TAKE 1 TABLET BY MOUTH DAILY 31 tablet 10    FEROSUL 325 (65 Fe) MG tablet TAKE 1 TABLET BY MOUTH DAILY WITH BREAKFAST 31 tablet 10    nystatin (MYCOSTATIN) 754690 UNIT/GM powder Apply topically 4 times daily. under the abdominal folds for 2 wks 1 each 0    fluticasone (FLONASE) 50 MCG/ACT nasal spray INSTILL 1 SPRAY INTO EACH NOSTRIL DAILY 16 g 5    albuterol sulfate HFA (VENTOLIN HFA) 108 (90 Base) MCG/ACT inhaler Inhale 2 puffs into the lungs 4 times daily as needed for Wheezing 54 g 1    D3 HIGH POTENCY 50 MCG (2000 UT) CAPS TAKE 1 CAPSULE BY MOUTH DAILY 31 capsule 10    ipratropium-albuterol (DUONEB) 0.5-2.5 (3) MG/3ML SOLN nebulizer solution Inhale 3 mLs into the lungs 3 times daily 270 mL 5    UNABLE TO FIND hospital bed mattress XL twin 1 Device 0    pantoprazole (PROTONIX) 40 MG tablet TAKE 1 TABLET BY MOUTH EVERY MORNING BEFORE BREAKFAST 30 tablet 5    carBAMazepine (CARBATROL) 300 MG extended release capsule TAKE 1 CAPSULE BY MOUTH TWICE A DAY 56 capsule 5    Lactobacillus (ACIDOPHILUS) CAPS capsule TAKE 1 CAPSULE BY MOUTH DAILY 28 capsule 5    atorvastatin (LIPITOR) 40 MG tablet TAKE 1 TABLET BY MOUTH DAILY 31 tablet 5    levothyroxine (SYNTHROID) 75 MCG tablet Take 1 tablet by mouth Daily 30 tablet 5    levETIRAcetam (KEPPRA) 750 MG tablet Take 2 tablets by mouth 2 times daily 120 tablet 0    tiotropium-olodaterol (STIOLTO) 2.5-2.5 MCG/ACT AERS Inhale 2 puffs into the lungs daily 1 each 0    lisinopril (PRINIVIL;ZESTRIL) 40 MG tablet TAKE 1 TABLET BY MOUTH DAILY 31 tablet 5    Diapers & Supplies MISC 1 each by Does not apply route daily as needed (4-5 times daily) 100 each 5    mirtazapine (REMERON) 30 MG tablet TAKE 1 TABLET BY MOUTH NIGHTLY 31 tablet 5    Incontinence Supply Disposable (DEPEND UNDERWEAR LARGE/XL) MISC 1 Units by Does not apply route 4 times daily 100 Package 5    Disposable Gloves MISC 1 Units by Does not apply route 4 times daily 100 each 5    diphenhydrAMINE (BENADRYL) 25 MG capsule Take 25 mg by mouth every 6 hours as needed for Itching      blood glucose monitor strips Test 3 times a day & as needed for symptoms of irregular blood glucose. 90 strip 0    busPIRone (BUSPAR) 10 MG tablet Take 1 tablet by mouth 2 times daily 60 tablet 5    divalproex (DEPAKOTE) 500 MG DR tablet Take 4 tablets by mouth nightly 90 tablet 0    risperiDONE (RISPERDAL) 1 MG tablet Take 1 tablet by mouth 3 times daily 1 tablet in am, 1 tablet at 4pm, 1 tablet at bedtime 60 tablet 5    sertraline (ZOLOFT) 100 MG tablet Take 2 tablets by mouth nightly 30 tablet 5    amLODIPine (NORVASC) 10 MG tablet Take 1 tablet by mouth in the morning. (Patient not taking: Reported on 8/24/2022) 31 tablet 5    JANUVIA 100 MG tablet TAKE 1 TABLET BY MOUTH DAILY (Patient not taking: Reported on 8/24/2022) 30 tablet 5     No current facility-administered medications for this visit.        Review of Systems    Lab Results   Component Value Date    WBC 6.2 05/26/2022    HGB 13.4 05/26/2022    HCT 43.5 05/26/2022    MCV 97.8 05/26/2022     05/26/2022     Lab Results   Component Value Date     08/19/2022    K 4.2 08/19/2022     08/19/2022    CO2 31 08/19/2022    BUN 20 08/19/2022    CREATININE 0.5 (L) 08/19/2022    GLUCOSE 89 08/19/2022    CALCIUM 8.8 08/19/2022    PROT 5.5 (L) 08/19/2022    LABALBU 3.4 08/19/2022    BILITOT 0.2 08/19/2022    ALKPHOS 66 08/19/2022    AST 12 (L) 08/19/2022    ALT 9 (L) 08/19/2022    LABGLOM >60 08/19/2022    GFRAA >60 08/19/2022     Lab Results   Component Value Date    CHOL 161 08/19/2022    CHOL 155 06/14/2022    CHOL 187 03/16/2022     Lab Results   Component Value Date TRIG 269 (H) 08/19/2022    TRIG 318 (H) 06/14/2022    TRIG 386 (H) 03/16/2022     Lab Results   Component Value Date    HDL 40 (L) 08/19/2022    HDL 45 06/14/2022    HDL 41 03/16/2022     Lab Results   Component Value Date    LDLCALC 67 08/19/2022    LDLCALC 46 06/14/2022    LDLCALC 69 03/16/2022     Lab Results   Component Value Date    LABA1C 5.3 08/19/2022     Lab Results   Component Value Date    TSHHS 1.600 08/19/2022         Ht 4' 10\" (1.473 m)   BMI 27.59 kg/m²     BP Readings from Last 3 Encounters:   05/26/22 (!) 129/58   01/28/22 122/78   10/30/21 137/65       Wt Readings from Last 3 Encounters:   05/16/22 132 lb (59.9 kg)   11/15/21 132 lb (59.9 kg)   10/17/21 134 lb 4.2 oz (60.9 kg)         Physical Exam    ASSESSMENT/ PLAN:    There are no diagnoses linked to this encounter.            - All old blood work reviewed with the patient  - Appropriate prescription are addressed. - After visit summery provided. - Questions answered and patient verbalizes understanding.  - Call for any problem, questions, or concerns.  - RTC if symptoms worse. No follow-ups on file.     Time spent : {NUMBERS; 5-50 BY [id number]}

## 2022-08-24 NOTE — PROGRESS NOTES
Medicare Annual Wellness Visit    Flaquito Barr is here for Medicare AWV (Pt is going to be doing bowling /) and Other (Requesting a form to be filled out for DM shoes )    Assessment & Plan   Medicare annual wellness visit, subsequent    Patient doing fine has no concern came with the caregiver  Does use a wheelchair  Not able to walk or stand  Denies any bed sore  She does use diaper, not controlled bowel movement and urination  Does live with 24 hours caregiver    Recommendations for Preventive Services Due: see orders and patient instructions/AVS.  Recommended screening schedule for the next 5-10 years is provided to the patient in written form: see Patient Instructions/AVS.     Return for Medicare Annual Wellness Visit in 1 year. Subjective   The following acute and/or chronic problems were also addressed today:    Patient's complete Health Risk Assessment and screening values have been reviewed and are found in Flowsheets. The following problems were reviewed today and where indicated follow up appointments were made and/or referrals ordered. Positive Risk Factor Screenings with Interventions:     Cognitive:   Words recalled: 1 Word Recalled  Clock Drawing Test (CDT): (!) Abnormal  Total Score Interpretation: Abnormal Mini-Cog  Cognitive Impairment Interventions:  Doing fine    Depression:  PHQ-9 Total Score: 6    Severity:1-4 = minimal depression, 5-9 = mild depression, 10-14 = moderate depression, 15-19 = moderately severe depression, 20-27 = severe depression  Depression Interventions:  Doing fine          General Health and ACP:  General  In general, how would you say your health is?: Excellent  In the past 7 days, have you experienced any of the following: New or Increased Pain, New or Increased Fatigue, Loneliness, Social Isolation, Stress or Anger?: No  Do you get the social and emotional support that you need?: Yes    Advance Directives       Power of  Living Will ACP-Advance Directive ACP-Power of     Not on File Not on File Not on File Not on File        General Health Risk Interventions:  Needs to work on the living will      Safety:  Do you have working smoke detectors?: Yes  Do you have any tripping hazards - loose or unsecured carpets or rugs?: No  Do you have any tripping hazards - clutter in doorways, halls, or stairs?: No  Do you have either shower bars, grab bars, non-slip mats or non-slip surfaces in your shower or bathtub?: Yes  Do all of your stairways have a railing or banister?: (!) No  Do you always fasten your seatbelt when you are in a car?: Yes  Safety Interventions:  Needs to work on the living will    ADLs:  In the past 7 days, did you need help from others to perform any of the following everyday activities: Eating, dressing, grooming, bathing, toileting, or walking/balance?: (!) Yes  Select all that apply: (!) Eating, Dressing, Grooming, Bathing, Toileting, Walking/Balance  In the past 7 days, did you need help from others to take care of any of the following: Laundry, housekeeping, banking/finances, shopping, telephone use, food preparation, transportation, or taking medications?: (!) Yes  Select all that apply: [employer], Housekeeping, Banking/Finances, Shopping, Telephone Use, Food Preparation, Transportation, Taking Medications  ADL Interventions: It is handicapped using the wheelchair  She does have a 24 hours caregiver  We did the paperwork for her today for the be able to do bowling          Objective   Vitals:    08/24/22 0820   BP: 130/80   Site: Right Upper Arm   Position: Sitting   Cuff Size: Small Adult   Pulse: 83   Temp: 98.1 °F (36.7 °C)   TempSrc: Oral   SpO2: 98%   Height: 4' 10\" (1.473 m)      Body mass index is 27.59 kg/m².        Foot Exam: patient has flat feet, with no bone in the Fourth toes on both feet, with just soft tissues, has charcot feet      Allergies   Allergen Reactions    Macrobid [Nitrofurantoin] Hives and Swelling     Hives Prior to Visit Medications    Medication Sig Taking? Authorizing Provider   COMBIVENT RESPIMAT  MCG/ACT AERS inhaler INHALE 1 PUFF BY ORAL INHALATION EVERY 6 HOURS Yes Roberto Najera MD   metFORMIN (GLUCOPHAGE) 500 MG tablet Take 1 tablet by mouth in the morning and 1 tablet in the evening. Take with meals. Yes Dameon Maher MD   cetirizine (ZYRTEC) 10 MG tablet TAKE 1 TABLET BY MOUTH DAILY Yes Jeanette Rao MD   FEROSUL 325 (65 Fe) MG tablet TAKE 1 TABLET BY MOUTH DAILY WITH BREAKFAST Yes Dameon Maher MD   nystatin (MYCOSTATIN) 076361 UNIT/GM powder Apply topically 4 times daily. under the abdominal folds for 2 wks Yes Tiera Mckeon MD   fluticasone (FLONASE) 50 MCG/ACT nasal spray INSTILL 1 SPRAY INTO EACH NOSTRIL DAILY Yes Tiera Mckeon MD   albuterol sulfate HFA (VENTOLIN HFA) 108 (90 Base) MCG/ACT inhaler Inhale 2 puffs into the lungs 4 times daily as needed for Wheezing Yes Roberto Najera MD   D3 HIGH POTENCY 50 MCG (2000 UT) CAPS TAKE 1 CAPSULE BY MOUTH DAILY Yes Dameon Maher MD   ipratropium-albuterol (DUONEB) 0.5-2.5 (3) MG/3ML SOLN nebulizer solution Inhale 3 mLs into the lungs 3 times daily Yes Roberto Najera MD   UNABLE TO FIND hospital bed mattress XL twin Yes Dameon Maher MD   pantoprazole (PROTONIX) 40 MG tablet TAKE 1 TABLET BY MOUTH EVERY MORNING BEFORE BREAKFAST Yes Dameon Maher MD   carBAMazepine (CARBATROL) 300 MG extended release capsule TAKE 1 CAPSULE BY MOUTH TWICE A DAY Yes Dameon Maher MD   Lactobacillus (ACIDOPHILUS) CAPS capsule TAKE 1 CAPSULE BY MOUTH DAILY Yes Dameon Maher MD   atorvastatin (LIPITOR) 40 MG tablet TAKE 1 TABLET BY MOUTH DAILY Yes Dameon Maher MD   levothyroxine (SYNTHROID) 75 MCG tablet Take 1 tablet by mouth Daily Yes Dameon Maher MD   levETIRAcetam (KEPPRA) 750 MG tablet Take 2 tablets by mouth 2 times daily Yes Brock Carreno MD   tiotropium-olodaterol (STIOLTO) 2.5-2.5 MCG/ACT AERS Inhale 2 puffs into the lungs daily Yes D[facility] Underwood program and diabetes care management, this patient needs shoes and foot orthoses to protest the integrity of the compromised diabetic foot secondary to foot deformity.  Reducing shear forced and allowing adequate room within the shoe is imperative for the diabetic footI intend on the following up with this patient within the next 6 months

## 2022-08-29 RX ORDER — ALBUTEROL SULFATE 90 UG/1
2 AEROSOL, METERED RESPIRATORY (INHALATION) 4 TIMES DAILY PRN
Qty: 54 G | Refills: 5 | Status: SHIPPED | OUTPATIENT
Start: 2022-08-29 | End: 2022-09-28

## 2022-08-29 RX ORDER — IPRATROPIUM/ALBUTEROL SULFATE 20-100 MCG
MIST INHALER (GRAM) INHALATION
Qty: 4 G | Refills: 5 | Status: SHIPPED | OUTPATIENT
Start: 2022-08-29

## 2022-08-30 ENCOUNTER — TELEPHONE (OUTPATIENT)
Dept: FAMILY MEDICINE CLINIC | Age: 62
End: 2022-08-30

## 2022-09-02 ENCOUNTER — HOSPITAL ENCOUNTER (OUTPATIENT)
Dept: CT IMAGING | Age: 62
Discharge: HOME | End: 2022-09-02
Payer: MEDICARE

## 2022-09-02 ENCOUNTER — HOSPITAL ENCOUNTER (OUTPATIENT)
Dept: WOMENS IMAGING | Age: 62
Discharge: HOME | End: 2022-09-02
Payer: MEDICARE

## 2022-09-02 DIAGNOSIS — Z12.9 CANCER SCREENING: ICD-10-CM

## 2022-09-02 DIAGNOSIS — Z12.31 ENCOUNTER FOR SCREENING MAMMOGRAM FOR BREAST CANCER: ICD-10-CM

## 2022-09-02 DIAGNOSIS — N28.89 RENAL MASS: ICD-10-CM

## 2022-09-02 PROCEDURE — 74178 CT ABD&PLV WO CNTR FLWD CNTR: CPT

## 2022-09-02 PROCEDURE — 71271 CT THORAX LUNG CANCER SCR C-: CPT

## 2022-09-02 PROCEDURE — 2580000003 HC RX 258: Performed by: FAMILY MEDICINE

## 2022-09-02 PROCEDURE — 6360000004 HC RX CONTRAST MEDICATION: Performed by: FAMILY MEDICINE

## 2022-09-02 PROCEDURE — 74177 CT ABD & PELVIS W/CONTRAST: CPT

## 2022-09-02 RX ORDER — SODIUM CHLORIDE 0.9 % (FLUSH) 0.9 %
5-40 SYRINGE (ML) INJECTION PRN
Status: DISCONTINUED | OUTPATIENT
Start: 2022-09-02 | End: 2022-09-03 | Stop reason: HOSPADM

## 2022-09-02 RX ADMIN — IOPAMIDOL 80 ML: 755 INJECTION, SOLUTION INTRAVENOUS at 09:45

## 2022-09-02 RX ADMIN — SODIUM CHLORIDE, PRESERVATIVE FREE 10 ML: 5 INJECTION INTRAVENOUS at 09:45

## 2022-09-07 DIAGNOSIS — N28.89 RENAL MASS: Primary | ICD-10-CM

## 2022-09-15 ENCOUNTER — HOSPITAL ENCOUNTER (OUTPATIENT)
Age: 62
Setting detail: SPECIMEN
Discharge: HOME | End: 2022-09-15
Payer: MEDICARE

## 2022-09-15 LAB
CARBAMAZEPINE LEVEL: 6.8 UG/ML (ref 5–9)
DOSE AMOUNT: NORMAL
DOSE AMOUNT: NORMAL
DOSE TIME: NORMAL
DOSE TIME: NORMAL
VALPROIC ACID LEVEL: 76.7 UG/ML (ref 50–100)

## 2022-09-15 PROCEDURE — 80164 ASSAY DIPROPYLACETIC ACD TOT: CPT

## 2022-09-15 PROCEDURE — 36415 COLL VENOUS BLD VENIPUNCTURE: CPT

## 2022-09-15 PROCEDURE — 80156 ASSAY CARBAMAZEPINE TOTAL: CPT

## 2022-09-15 RX ORDER — PANTOPRAZOLE SODIUM 40 MG/1
TABLET, DELAYED RELEASE ORAL
Qty: 14 TABLET | Refills: 0 | Status: ON HOLD | OUTPATIENT
Start: 2022-09-15 | End: 2022-11-02 | Stop reason: HOSPADM

## 2022-09-28 ENCOUNTER — HOSPITAL ENCOUNTER (OUTPATIENT)
Dept: INFUSION THERAPY | Age: 62
Discharge: HOME | End: 2022-09-28
Payer: MEDICARE

## 2022-09-28 ENCOUNTER — INITIAL CONSULT (OUTPATIENT)
Dept: ONCOLOGY | Age: 62
End: 2022-09-28
Payer: MEDICARE

## 2022-09-28 VITALS
BODY MASS INDEX: 27.59 KG/M2 | RESPIRATION RATE: 18 BRPM | HEART RATE: 66 BPM | DIASTOLIC BLOOD PRESSURE: 74 MMHG | HEIGHT: 58 IN | SYSTOLIC BLOOD PRESSURE: 123 MMHG | OXYGEN SATURATION: 97 % | TEMPERATURE: 97.3 F

## 2022-09-28 DIAGNOSIS — N28.89 RIGHT RENAL MASS: Primary | ICD-10-CM

## 2022-09-28 PROCEDURE — 3017F COLORECTAL CA SCREEN DOC REV: CPT | Performed by: INTERNAL MEDICINE

## 2022-09-28 PROCEDURE — 99204 OFFICE O/P NEW MOD 45 MIN: CPT | Performed by: INTERNAL MEDICINE

## 2022-09-28 PROCEDURE — 1036F TOBACCO NON-USER: CPT | Performed by: INTERNAL MEDICINE

## 2022-09-28 PROCEDURE — G8417 CALC BMI ABV UP PARAM F/U: HCPCS | Performed by: INTERNAL MEDICINE

## 2022-09-28 PROCEDURE — G8427 DOCREV CUR MEDS BY ELIG CLIN: HCPCS | Performed by: INTERNAL MEDICINE

## 2022-09-28 PROCEDURE — 99211 OFF/OP EST MAY X REQ PHY/QHP: CPT

## 2022-09-28 NOTE — PROGRESS NOTES
Patient Name:  Angel Meier  Patient :  1960  Patient MRN:  [id number]     Primary Oncologist: Aron Del Angel MD  Referring Provider: Luis Ratliff MD     Date of Service: 2022      Reason for Consult: To evaluate the patient with right small kidney lesion. Chief Complaint:    Chief Complaint   Patient presents with    New Patient     Patient Active Problem List:     HTN (hypertension), benign     Seizure disorder ([address]     MR (mental retardation)     COPD exacerbation ([address]     Left hemiplegia ([address]     H/O: stroke     Mixed hyperlipidemia     Acquired hypothyroidism     Anxiety and depression     Anemia     Type 2 diabetes mellitus      Lesion of right native kidney     Other cerebral palsy ([address]    HPI:   Jm Garcia is a 16-year-old very pleasant female with medical history significant for hypertension, hyperlipidemia, diabetes mellitus, cerebral palsy, mental retardation, hypothyroidism, COPD, seizure disorder, history of stroke, anxiety/depression and iron deficiency anemia, referred to me on 2022 for evaluation of right kidney lesion. She has right kidney small mass and she has been followed closely. CT abdomen/pelvis done on 2022 showed 13 mm right renal small enhancing neoplasm with 2 mm of growth annually since 2020 [the previously 11 mm and  and 9 mm and ]. She was subsequently referred to me for evaluation of her right kidney lesion. She does not have any significant symptoms at today visit. Past Medical History:     Significant for  1. Hypertension  2. Hyperlipidemia  3. Diabetes mellitus  4. Hypothyroidism  5. COPD  6. Mental retardation  7. Seizure disorder  8. History of stroke  9. Anxiety/depression  10. History of iron deficiency anemia   11. Other cerebral palsy    Past Surgery History:    Significant for  1. Gastrostomy tube placement    Social History:   She is a former smoker and she quit smoking in 2011.   She used to smoke 1.5 pack a day for approximately 20 years. She denies alcohol drinking or illicit drug abuse. Family History:    No pertinent family history. Allergies   Allergen Reactions    Macrobid [Nitrofurantoin] Hives and Swelling     Hives       Current Outpatient Medications on File Prior to Visit   Medication Sig Dispense Refill    ipratropium-albuterol (DUONEB) 0.5-2.5 (3) MG/3ML SOLN nebulizer solution Inhale 3 mLs into the lungs every 4 hours 120 each 5    pantoprazole (PROTONIX) 40 MG tablet TAKE 1 TABLET BY MOUTH EVERY MORNING BEFORE BREAKFAST 14 tablet 0    COMBIVENT RESPIMAT  MCG/ACT AERS inhaler INHALE 1 PUFF BY ORAL INHALATION EVERY 6 HOURS 4 g 5    metFORMIN (GLUCOPHAGE) 500 MG tablet Take 1 tablet by mouth in the morning and 1 tablet in the evening. Take with meals. 60 tablet 5    cetirizine (ZYRTEC) 10 MG tablet TAKE 1 TABLET BY MOUTH DAILY 31 tablet 10    FEROSUL 325 (65 Fe) MG tablet TAKE 1 TABLET BY MOUTH DAILY WITH BREAKFAST 31 tablet 10    amLODIPine (NORVASC) 10 MG tablet Take 1 tablet by mouth in the morning. 31 tablet 5    nystatin (MYCOSTATIN) 481905 UNIT/GM powder Apply topically 4 times daily. under the abdominal folds for 2 wks 1 each 0    fluticasone (FLONASE) 50 MCG/ACT nasal spray INSTILL 1 SPRAY INTO EACH NOSTRIL DAILY 16 g 5    D3 HIGH POTENCY 50 MCG (2000 UT) CAPS TAKE 1 CAPSULE BY MOUTH DAILY 31 capsule 10    ipratropium-albuterol (DUONEB) 0.5-2.5 (3) MG/3ML SOLN nebulizer solution Inhale 3 mLs into the lungs 3 times daily 270 mL 5    UNABLE TO FIND hospital bed mattress XL twin 1 Device 0    carBAMazepine (CARBATROL) 300 MG extended release capsule TAKE 1 CAPSULE BY MOUTH TWICE A DAY 56 capsule 5    Lactobacillus (ACIDOPHILUS) CAPS capsule TAKE 1 CAPSULE BY MOUTH DAILY 28 capsule 5    atorvastatin (LIPITOR) 40 MG tablet TAKE 1 TABLET BY MOUTH DAILY 31 tablet 5    levothyroxine (SYNTHROID) 75 MCG tablet Take 1 tablet by mouth Daily 30 tablet 5    levETIRAcetam (KEPPRA) 750 MG tablet Take 2 tablets by mouth 2 times daily 120 tablet 0    tiotropium-olodaterol (STIOLTO) 2.5-2.5 MCG/ACT AERS Inhale 2 puffs into the lungs daily 1 each 0    JANUVIA 100 MG tablet TAKE 1 TABLET BY MOUTH DAILY 30 tablet 5    lisinopril (PRINIVIL;ZESTRIL) 40 MG tablet TAKE 1 TABLET BY MOUTH DAILY 31 tablet 5    Diapers & Supplies MISC 1 each by Does not apply route daily as needed (4-5 times daily) 100 each 5    mirtazapine (REMERON) 30 MG tablet TAKE 1 TABLET BY MOUTH NIGHTLY 31 tablet 5    Incontinence Supply Disposable (DEPEND UNDERWEAR LARGE/XL) MISC 1 Units by Does not apply route 4 times daily 100 Package 5    Disposable Gloves MISC 1 Units by Does not apply route 4 times daily 100 each 5    diphenhydrAMINE (BENADRYL) 25 MG capsule Take 25 mg by mouth every 6 hours as needed for Itching      blood glucose monitor strips Test 3 times a day & as needed for symptoms of irregular blood glucose. 90 strip 0    busPIRone (BUSPAR) 10 MG tablet Take 1 tablet by mouth 2 times daily 60 tablet 5    divalproex (DEPAKOTE) 500 MG DR tablet Take 4 tablets by mouth nightly 90 tablet 0    risperiDONE (RISPERDAL) 1 MG tablet Take 1 tablet by mouth 3 times daily 1 tablet in am, 1 tablet at 4pm, 1 tablet at bedtime 60 tablet 5    sertraline (ZOLOFT) 100 MG tablet Take 2 tablets by mouth nightly 30 tablet 5     No current facility-administered medications on file prior to visit. Review of Systems:  Constitutional:  No weight loss, No fever, No chills, No night sweats. Energy level good. Eyes:  No diplopia, No transient or permanent loss of vision, No scotomata. ENT / Mouth:  No epistaxis, No dysphagia, No hoarseness, No oral ulcers, No gingival bleeding. No sore throat, No postnasal drip, No nasal drip, No mouth pain, No sinus pain, No tinnitus, Normal hearing. Cardiovascular:  No chest pain, No palpitations, No syncope, No upper extremity edema, No lower extremity edema, No calf discomfort. Respiratory:  No cough.   No hemoptysis, No pleurisy, No wheezing, No dyspnea. Breast:  No breast mass, No pain, No nipple discharge, No change in size, No change in shape. Gastrointestinal:  No abdominal pain, No abdominal cramping, No nausea, No vomiting, No constipation, No diarrhea, No hematochezia, No melena, No jaundice, No dyspepsia, No dysphagia. Urinary:  No dysuria, No hematuria, No urinary incontinence. Gynecological:  No vaginal discharge, No suprapubic pain, No abnormal vaginal bleeding. Musculoskeletal:  No muscle pain, No swollen joints, No joint redness, No bone pain, No spine tenderness. Skin:  No rash, No nodules, No pruritus, No lesions. Neurologic:  No confusion, No seizures, No syncope, No tremor, No speech change, No headache, No hiccups, No abnormal gait, No sensory changes, No weakness. Psychiatric:  No depression, No anxiety, Concentration normal.  Endocrine:  No polyuria, No polydipsia, No hot flashes, No thyroid symptoms. Hematologic:  No epistaxis, No gingival bleeding, No petechiae, No ecchymosis. Lymphatic:  No lymphadenopathy, No lymphedema. Allergy / Immunologic:  No eczema, No frequent mucous infections, No frequent respiratory infections, No recurrent urticarial, No frequent skin infections.      Vital Signs: /74 (Site: Right Wrist, Position: Sitting, Cuff Size: Medium Adult)   Pulse 66   Temp 97.3 °F (36.3 °C) (Infrared)   Resp 18   Ht 4' 10\" (1.473 m)   SpO2 97%   PF (!) 2 L/min   BMI 27.59 kg/m²      Physical Exam:  CONSTITUTIONAL: awake, alert, cooperative, no apparent distress   EYES: pupils equal, round and reactive to light, sclera clear, normal conjunctiva  ENT: Normocephalic, without obvious abnormality, atraumatic  NECK: supple, symmetrical, no jugular venous distension, no carotid bruits   HEMATOLOGIC/LYMPHATIC: no cervical, supraclavicular or axillary lymphadenopathy   LUNGS: VBS, no wheezes, no increased work of breathing, no rhonchi, clear to auscultation, no crackles, Immunology:  Lab Results   Component Value Date    PROT 5.5 (L) 08/19/2022     No results found for: Kreg Pickup, KLFLCR  No results found for: B2M  Coagulation Panel:  Lab Results   Component Value Date    PROTIME 11.7 10/12/2021    INR 0.91 10/12/2021    APTT 28.1 03/14/2015    DDIMER <200 05/26/2022     Anemia Panel:  Lab Results   Component Value Date    JVOPGEJI52 227 01/28/2022    FOLATE 6.92 01/28/2022     Tumor Markers:  No results found for: , CEA, , LABCA2, PSA     Observations:  No data recorded     Assessment   Right kidney lesion    Plan:  Jm Brantley is a 68-year-old very pleasant female who has right kidney small mass and she has been followed closely. CT abdomen/pelvis done on 9/2/2022 showed 13 mm right renal small enhancing neoplasm with 2 mm of growth annually since 2020 [the previously 11 mm and 2021 and 9 mm and 2020]. CT findings are compatible with a small enhancing renal neoplasm. Since it is still small in size, I believe observation is appropriate. However, I will still recommend urology evaluation. After reviewing with her care giver, she is in agreement to see Urologist. I will refer to our urologist and I will follow up with their recommendations. I answered all her questions and concerns for today. I asked her to follow up with primary care physician on regular basis. I will continue to keep you updated on her progress. Thank you for allowing me to participate in the care of this very pleasant patient. Recent imaging and labs were reviewed and discussed with the patient.

## 2022-09-28 NOTE — PROGRESS NOTES
MA Rooming Questions  Patient: Flaquito Barr  MRN: [id number]    Date: 9/28/2022        ROB Christine MA

## 2022-10-13 ENCOUNTER — APPOINTMENT (OUTPATIENT)
Dept: GENERAL RADIOLOGY | Age: 62
End: 2022-10-13
Payer: MEDICARE

## 2022-10-13 ENCOUNTER — HOSPITAL ENCOUNTER (EMERGENCY)
Age: 62
Discharge: HOME | End: 2022-10-13
Payer: MEDICARE

## 2022-10-13 ENCOUNTER — APPOINTMENT (OUTPATIENT)
Dept: CT IMAGING | Age: 62
End: 2022-10-13
Payer: MEDICARE

## 2022-10-13 VITALS
DIASTOLIC BLOOD PRESSURE: 68 MMHG | SYSTOLIC BLOOD PRESSURE: 121 MMHG | HEART RATE: 76 BPM | TEMPERATURE: 98.1 F | OXYGEN SATURATION: 98 % | RESPIRATION RATE: 16 BRPM

## 2022-10-13 DIAGNOSIS — R31.9 URINARY TRACT INFECTION WITH HEMATURIA, SITE UNSPECIFIED: Primary | ICD-10-CM

## 2022-10-13 DIAGNOSIS — N39.0 URINARY TRACT INFECTION WITH HEMATURIA, SITE UNSPECIFIED: Primary | ICD-10-CM

## 2022-10-13 DIAGNOSIS — R53.83 FATIGUE, UNSPECIFIED TYPE: ICD-10-CM

## 2022-10-13 LAB
ADENOVIRUS DETECTION BY PCR: NOT DETECTED
ALBUMIN SERPL-MCNC: 3.5 GM/DL (ref 3.4–5)
ALP BLD-CCNC: 70 IU/L (ref 40–129)
ALT SERPL-CCNC: 9 U/L (ref 10–40)
ANION GAP SERPL CALCULATED.3IONS-SCNC: 10 MMOL/L (ref 4–16)
AST SERPL-CCNC: 19 IU/L (ref 15–37)
BACTERIA: ABNORMAL /HPF
BASOPHILS ABSOLUTE: 0 K/CU MM
BASOPHILS RELATIVE PERCENT: 0.2 % (ref 0–1)
BILIRUB SERPL-MCNC: 0.1 MG/DL (ref 0–1)
BILIRUBIN URINE: NEGATIVE MG/DL
BLOOD, URINE: ABNORMAL
BORDETELLA PARAPERTUSSIS BY PCR: NOT DETECTED
BORDETELLA PERTUSSIS PCR: NOT DETECTED
BUN BLDV-MCNC: 25 MG/DL (ref 6–23)
CALCIUM SERPL-MCNC: 9.3 MG/DL (ref 8.3–10.6)
CHLAMYDOPHILA PNEUMONIA PCR: NOT DETECTED
CHLORIDE BLD-SCNC: 105 MMOL/L (ref 99–110)
CLARITY: CLEAR
CO2: 29 MMOL/L (ref 21–32)
COLOR: YELLOW
CORONAVIRUS 229E PCR: NOT DETECTED
CORONAVIRUS HKU1 PCR: NOT DETECTED
CORONAVIRUS NL63 PCR: NOT DETECTED
CORONAVIRUS OC43 PCR: NOT DETECTED
CREAT SERPL-MCNC: 0.6 MG/DL (ref 0.6–1.1)
DIFFERENTIAL TYPE: ABNORMAL
EKG ATRIAL RATE: 65 BPM
EKG DIAGNOSIS: NORMAL
EKG P AXIS: 23 DEGREES
EKG P-R INTERVAL: 166 MS
EKG Q-T INTERVAL: 406 MS
EKG QRS DURATION: 80 MS
EKG QTC CALCULATION (BAZETT): 422 MS
EKG R AXIS: 36 DEGREES
EKG T AXIS: 37 DEGREES
EKG VENTRICULAR RATE: 65 BPM
EOSINOPHILS ABSOLUTE: 0.1 K/CU MM
EOSINOPHILS RELATIVE PERCENT: 1.2 % (ref 0–3)
GFR AFRICAN AMERICAN: >60 ML/MIN/1.73M2
GFR NON-AFRICAN AMERICAN: >60 ML/MIN/1.73M2
GLUCOSE BLD-MCNC: 180 MG/DL (ref 70–99)
GLUCOSE, URINE: NEGATIVE MG/DL
HCT VFR BLD CALC: 39.4 % (ref 37–47)
HEMOGLOBIN: 12.2 GM/DL (ref 12.5–16)
HUMAN METAPNEUMOVIRUS PCR: NOT DETECTED
IMMATURE NEUTROPHIL %: 1.4 % (ref 0–0.43)
INFLUENZA A BY PCR: NOT DETECTED
INFLUENZA A H1 (2009) PCR: NOT DETECTED
INFLUENZA A H1 PANDEMIC PCR: NOT DETECTED
INFLUENZA A H3 PCR: NOT DETECTED
INFLUENZA B BY PCR: NOT DETECTED
KETONES, URINE: ABNORMAL MG/DL
LEUKOCYTE ESTERASE, URINE: ABNORMAL
LYMPHOCYTES ABSOLUTE: 1.7 K/CU MM
LYMPHOCYTES RELATIVE PERCENT: 35.7 % (ref 24–44)
MCH RBC QN AUTO: 31.9 PG (ref 27–31)
MCHC RBC AUTO-ENTMCNC: 31 % (ref 32–36)
MCV RBC AUTO: 102.9 FL (ref 78–100)
MONOCYTES ABSOLUTE: 0.5 K/CU MM
MONOCYTES RELATIVE PERCENT: 9.7 % (ref 0–4)
MUCUS: ABNORMAL HPF
MYCOPLASMA PNEUMONIAE PCR: NOT DETECTED
NITRITE URINE, QUANTITATIVE: POSITIVE
NUCLEATED RBC %: 0 %
PARAINFLUENZA 1 PCR: NOT DETECTED
PARAINFLUENZA 2 PCR: NOT DETECTED
PARAINFLUENZA 3 PCR: NOT DETECTED
PARAINFLUENZA 4 PCR: NOT DETECTED
PDW BLD-RTO: 12.2 % (ref 11.7–14.9)
PH, URINE: 6.5 (ref 5–8)
PLATELET # BLD: 184 K/CU MM (ref 140–440)
PMV BLD AUTO: 8.9 FL (ref 7.5–11.1)
POTASSIUM SERPL-SCNC: 4.5 MMOL/L (ref 3.5–5.1)
PROTEIN UA: NEGATIVE MG/DL
RBC # BLD: 3.83 M/CU MM (ref 4.2–5.4)
RBC URINE: 17 /HPF (ref 0–6)
RHINOVIRUS ENTEROVIRUS PCR: NOT DETECTED
RSV PCR: NOT DETECTED
SARS-COV-2: NOT DETECTED
SEGMENTED NEUTROPHILS ABSOLUTE COUNT: 2.5 K/CU MM
SEGMENTED NEUTROPHILS RELATIVE PERCENT: 51.8 % (ref 36–66)
SODIUM BLD-SCNC: 144 MMOL/L (ref 135–145)
SPECIFIC GRAVITY UA: 1.02 (ref 1–1.03)
SQUAMOUS EPITHELIAL: 39 /HPF
TOTAL IMMATURE NEUTOROPHIL: 0.07 K/CU MM
TOTAL NUCLEATED RBC: 0 K/CU MM
TOTAL PROTEIN: 6.5 GM/DL (ref 6.4–8.2)
TRICHOMONAS: ABNORMAL /HPF
TROPONIN T: <0.01 NG/ML
UROBILINOGEN, URINE: 1 MG/DL (ref 0.2–1)
WBC # BLD: 4.9 K/CU MM (ref 4–10.5)
WBC UA: 12 /HPF (ref 0–5)

## 2022-10-13 PROCEDURE — 74177 CT ABD & PELVIS W/CONTRAST: CPT

## 2022-10-13 PROCEDURE — 0202U NFCT DS 22 TRGT SARS-COV-2: CPT

## 2022-10-13 PROCEDURE — 6360000004 HC RX CONTRAST MEDICATION: Performed by: PHYSICIAN ASSISTANT

## 2022-10-13 PROCEDURE — 2580000003 HC RX 258: Performed by: PHYSICIAN ASSISTANT

## 2022-10-13 PROCEDURE — 93010 ELECTROCARDIOGRAM REPORT: CPT | Performed by: INTERNAL MEDICINE

## 2022-10-13 PROCEDURE — 93005 ELECTROCARDIOGRAM TRACING: CPT | Performed by: PHYSICIAN ASSISTANT

## 2022-10-13 PROCEDURE — 87086 URINE CULTURE/COLONY COUNT: CPT

## 2022-10-13 PROCEDURE — 87088 URINE BACTERIA CULTURE: CPT

## 2022-10-13 PROCEDURE — 6360000002 HC RX W HCPCS: Performed by: PHYSICIAN ASSISTANT

## 2022-10-13 PROCEDURE — 84484 ASSAY OF TROPONIN QUANT: CPT

## 2022-10-13 PROCEDURE — 81003 URINALYSIS AUTO W/O SCOPE: CPT

## 2022-10-13 PROCEDURE — 99285 EMERGENCY DEPT VISIT HI MDM: CPT

## 2022-10-13 PROCEDURE — 87186 SC STD MICRODIL/AGAR DIL: CPT

## 2022-10-13 PROCEDURE — 96365 THER/PROPH/DIAG IV INF INIT: CPT

## 2022-10-13 PROCEDURE — 71045 X-RAY EXAM CHEST 1 VIEW: CPT

## 2022-10-13 PROCEDURE — 85025 COMPLETE CBC W/AUTO DIFF WBC: CPT

## 2022-10-13 PROCEDURE — 80053 COMPREHEN METABOLIC PANEL: CPT

## 2022-10-13 RX ORDER — SULFAMETHOXAZOLE AND TRIMETHOPRIM 800; 160 MG/1; MG/1
1 TABLET ORAL 2 TIMES DAILY
Qty: 10 TABLET | Refills: 0 | Status: SHIPPED | OUTPATIENT
Start: 2022-10-13 | End: 2022-10-18

## 2022-10-13 RX ADMIN — CEFTRIAXONE SODIUM 1000 MG: 1 INJECTION, POWDER, FOR SOLUTION INTRAMUSCULAR; INTRAVENOUS at 15:26

## 2022-10-13 RX ADMIN — IOPAMIDOL 80 ML: 755 INJECTION, SOLUTION INTRAVENOUS at 15:08

## 2022-10-13 NOTE — DISCHARGE INSTRUCTIONS
Please follow-up with your primary care provider next week for reevaluation of your symptoms and discuss the results of your urine culture. Meanwhile please take a new prescription antibiotic trimethoprim sulfamethoxazole (also known as Bactrim DS) as directed for entire duration even if your symptoms improved. Return to emergency department any abdominal pain, uncontrollable vomiting, nausea, vomiting, confusion, worsening symptoms or any new concerns.

## 2022-10-13 NOTE — ED NOTES
Removed iv in right hand. Pt denies any other needs at this time. Caregivers are at bedside and both verbalize understanding that pt is being treated for a UTI. Paperwork was given and they signed for it.      Gisele Girard RN  10/13/22 1283

## 2022-10-13 NOTE — ED PROVIDER NOTES
[address] ENCOUNTER        Pt Name: Kishor Waters  MRN: [id number]  [place] 1960  Date of evaluation: 10/13/2022  Provider: Manuela Anderson PA-C  PCP: Antonio Christopher MD  Note Started: 11:25 AM EDT      KAREN. I have evaluated this patient. My supervising physician was available for consultation. Triage CHIEF COMPLAINT       Chief Complaint   Patient presents with    Shortness of Breath     SOB since earlier this morning. Been complaining of not feeling well and a \"rhaspiness\" in her chest         HISTORY OF PRESENT ILLNESS   (Location/Symptom, Timing/Onset, Context/Setting, Quality, Duration, Modifying Factors, Severity)  Note limiting factors. Chief Complaint: fatigue    Kishor Waters is a 63 y.o. female who presents Known history of COPD, diabetes, mental disability, cerebral palsy presents with complaint of fatigue. Reportedly patient has complained of feeling \"sick \" she is accompanied by caretaker, as well as additional caretaker providing history via telephone. Reportedly patient over the last 5 days has felt more tired. Caretakers today had noticed that the patient's eyes had looked more tired today and they had heard rattling in her chest and a cough this morning this morning and that she has had decreased activity and speaking less which prompted the visit to the emergency department. They mention patient's urine has appeared more dark, and that patient is incontinent. Patient has seen oncologist  recently for kidney lesion, and since referred to urology and they have an upcoming appointment for that otherwise he denies any urinary changes. They mention patient had been taken off metformin few weeks ago and since then has not had any diarrhea otherwise they deny any bowel movement changes.   They mention patient is on 2 L nasal cannula for chronic hypoxia does not needed any additional oxygen requirements. Has seen pulmonologist in the past.  Few months ago she was placed on prednisone from the urgent care but otherwise denies any new medication changes or recent antibiotics. Mention patient has a chronic weakness on the left side otherwise denies any new weakness. They deny any abdominal pain, nausea, vomiting, fever, diarrhea      Nursing Notes were all reviewed and agreed with or any disagreements were addressed in the HPI. REVIEW OF SYSTEMS    (2-9 systems for level 4, 10 or more for level 5)     Review of Systems   Constitutional:  Positive for fatigue. Negative for chills and fever. HENT:  Negative for congestion and rhinorrhea. Eyes:  Negative for pain and visual disturbance. Respiratory:  Positive for cough. Negative for shortness of breath. Cardiovascular:  Negative for chest pain and leg swelling. Gastrointestinal:  Negative for abdominal pain, diarrhea, nausea and vomiting. Genitourinary:  Negative for dysuria and hematuria. Musculoskeletal:  Negative for back pain and myalgias. Skin:  Negative for rash and wound. Neurological:  Positive for weakness (chronic). Negative for dizziness and light-headedness.      PAST MEDICAL HISTORY     Past Medical History:   Diagnosis Date    Anxiety disorder     Back pain, chronic     Cerebral palsy (HCC)     COPD (chronic obstructive pulmonary disease) ([facility] [address]     COVID-19 10/12/2021    CVA (cerebral infarction) 2014 x2    Diabetes mellitus ([facility] [address]     Diverticulosis     Epilepsy ([facility] [address]     GERD (gastroesophageal reflux disease)     Hyperlipidemia     Hypertension     Insomnia     Iron (Fe) deficiency anemia     Mental disability     Seizures ([facility] [address]     Unspecified cerebral artery occlusion with cerebral infarction        SURGICAL HISTORY     Past Surgical History:   Procedure Laterality Date    GASTROSTOMY TUBE PLACEMENT         CURRENTMEDICATIONS       Discharge Medication List as of 10/13/2022  5:17 PM        CONTINUE these medications which have NOT CHANGED    Details   !! ipratropium-albuterol (DUONEB) 0.5-2.5 (3) MG/3ML SOLN nebulizer solution Inhale 3 mLs into the lungs every 4 hours, Disp-120 each, R-5Normal      pantoprazole (PROTONIX) 40 MG tablet TAKE 1 TABLET BY MOUTH EVERY MORNING BEFORE BREAKFAST, Disp-14 tablet, R-0Normal      COMBIVENT RESPIMAT  MCG/ACT AERS inhaler INHALE 1 PUFF BY ORAL INHALATION EVERY 6 HOURS, Disp-4 g, R-5, DAWNormal      metFORMIN (GLUCOPHAGE) 500 MG tablet Take 1 tablet by mouth in the morning and 1 tablet in the evening. Take with meals. , Disp-60 tablet, R-5Normal      cetirizine (ZYRTEC) 10 MG tablet TAKE 1 TABLET BY MOUTH DAILY, Disp-31 tablet, R-10Normal      FEROSUL 325 (65 Fe) MG tablet TAKE 1 TABLET BY MOUTH DAILY WITH BREAKFAST, Disp-31 tablet, R-10Normal      amLODIPine (NORVASC) 10 MG tablet Take 1 tablet by mouth in the morning., Disp-31 tablet, R-5Normal      nystatin (MYCOSTATIN) 339038 UNIT/GM powder Apply topically 4 times daily. under the abdominal folds for 2 wks, Disp-1 each, R-0, Normal      fluticasone (FLONASE) 50 MCG/ACT nasal spray INSTILL 1 SPRAY INTO EACH NOSTRIL DAILY, Disp-16 g, R-5Normal      D3 HIGH POTENCY 50 MCG (2000 UT) CAPS TAKE 1 CAPSULE BY MOUTH DAILY, Disp-31 capsule, R-10Normal      !! ipratropium-albuterol (DUONEB) 0.5-2.5 (3) MG/3ML SOLN nebulizer solution Inhale 3 mLs into the lungs 3 times daily, Disp-270 mL, R-5Normal      UNABLE TO FIND hospital bed mattress XL twin, Disp-1 Device, R-0Normal      carBAMazepine (CARBATROL) 300 MG extended release capsule TAKE 1 CAPSULE BY MOUTH TWICE A DAY, Disp-56 capsule, R-5Normal      Lactobacillus (ACIDOPHILUS) CAPS capsule TAKE 1 CAPSULE BY MOUTH DAILY, Disp-28 capsule, R-5Normal      atorvastatin (LIPITOR) 40 MG tablet TAKE 1 TABLET BY MOUTH DAILY, Disp-31 tablet, R-5Normal      levothyroxine (SYNTHROID) 75 MCG tablet Take 1 tablet by mouth Daily, Disp-30 tablet, R-5Normal      levETIRAcetam (KEPPRA) 750 MG tablet Take 2 tablets by mouth 2 times daily, Disp-120 tablet, R-0Print      tiotropium-olodaterol (STIOLTO) 2.5-2.5 MCG/ACT AERS Inhale 2 puffs into the lungs daily, Disp-1 each, R-0Normal      JANUVIA 100 MG tablet TAKE 1 TABLET BY MOUTH DAILY, Disp-30 tablet, R-5Normal      lisinopril (PRINIVIL;ZESTRIL) 40 MG tablet TAKE 1 TABLET BY MOUTH DAILY, Disp-31 tablet, R-5Normal      Diapers & Supplies MISC DAILY PRN Starting Fri 7/24/2020, Disp-100 each,R-5, Print      mirtazapine (REMERON) 30 MG tablet TAKE 1 TABLET BY MOUTH NIGHTLY, Disp-31 tablet,R-5Normal      Incontinence Supply Disposable (DEPEND UNDERWEAR LARGE/XL) MISC 4 TIMES DAILY Starting Mon 4/6/2020, Disp-100 Package, R-5, Print      Disposable Gloves MISC 4 TIMES DAILY Starting Mon 4/6/2020, Disp-100 each, R-5, Print      diphenhydrAMINE (BENADRYL) 25 MG capsule Take 25 mg by mouth every 6 hours as needed for ItchingHistorical Med      blood glucose monitor strips Test 3 times a day & as needed for symptoms of irregular blood glucose., Disp-90 strip, R-0, Normal      busPIRone (BUSPAR) 10 MG tablet Take 1 tablet by mouth 2 times daily, Disp-60 tablet, R-5Normal      divalproex (DEPAKOTE) 500 MG DR tablet Take 4 tablets by mouth nightly, Disp-90 tablet, R-0Normal      risperiDONE (RISPERDAL) 1 MG tablet Take 1 tablet by mouth 3 times daily 1 tablet in am, 1 tablet at 4pm, 1 tablet at bedtime, Disp-60 tablet, R-5Normal      sertraline (ZOLOFT) 100 MG tablet Take 2 tablets by mouth nightly, Disp-30 tablet, R-5Normal       !! - Potential duplicate medications found. Please discuss with provider.           ALLERGIES     Macrobid [nitrofurantoin]    FAMILYHISTORY       Family History   Problem Relation Age of Onset    Breast Cancer Neg Hx         SOCIAL HISTORY       Social History     Socioeconomic History    Marital status: Single   Tobacco Use    Smoking status: Former     Packs/day: 1.50     Years: 20.00     Pack years: 30.00     Types: Cigarettes     Quit date: 2011     Years since quittin.1    Smokeless tobacco: Never   Vaping Use    Vaping Use: Never used   Substance and Sexual Activity    Alcohol use: No     Alcohol/week: 0.0 standard drinks    Drug use: No   Social History Narrative    ** Merged History Encounter **            SCREENINGS    [place] Coma Scale  Eye Opening: Spontaneous  Best Verbal Response: Oriented  Best Motor Response: Obeys commands  [place] Coma Scale Score: 15        PHYSICAL EXAM    (up to 7 for level 4, 8 or more for level 5)     ED Triage Vitals [10/13/22 1050]   BP Temp Temp Source Heart Rate Resp SpO2 Height Weight   127/75 97.9 °F (36.6 °C) Oral 74 16 98 % -- --       Physical Exam  Vitals and nursing note reviewed. Constitutional:       Appearance: Normal appearance. She is well-developed. She is not ill-appearing or diaphoretic. HENT:      Head: Normocephalic and atraumatic. Right Ear: External ear normal.      Left Ear: External ear normal.      Nose: Nose normal.   Eyes:      General:         Right eye: No discharge. Left eye: No discharge. Pulmonary:      Effort: Pulmonary effort is normal. No respiratory distress. Breath sounds: No stridor. Musculoskeletal:         General: Normal range of motion. Cervical back: Normal range of motion and neck supple. Skin:     General: Skin is warm and dry. Coloration: Skin is not pale. Neurological:      General: No focal deficit present. Mental Status: She is alert and oriented to person, place, and time. Psychiatric:         Mood and Affect: Mood normal.         Behavior: Behavior normal.       EMERGENCY DEPARTMENT COURSE:             Pt is a 63 y.o. female who presents with above HPI. Emergent conditions considered.      Patient was given thefollowing medications:  Medications   cefTRIAXone (ROCEPHIN) 1,000 mg in dextrose 5 % 50 mL IVPB mini-bag (0 mg IntraVENous Stopped 10/13/22 1643)   iopamidol (ISOVUE-370) 76 % injection 80 mL (80 mLs IntraVENous Given 10/13/22 1508)           DIFFERENTIAL DIAGNOSIS/MDM:       Patient's urinalysis does have urinary tract infection. We will she is tolerating p.o. I do feel she is safe for outpatient management. CT scan shows a kidney lesion but otherwise no acute findings. This kidney lesion has been demonstrated on previous CT scans, and patient has been referred to oncology had seen Dr. Carlitos Santos who is referred the patient to urology. Patient and caregivers are aware that potential for some malignancy. Patient's vitals are stable. This point, safe for outpatient management. Recommendations for p.o. antibiotics, return precautions, and follow-up with oncology tolerated were discussed. Is this patient to be included in the SEP-1 Core Measure due to severe sepsis or septic shock?    No   Exclusion criteria - the patient is NOT to be included for SEP-1 Core Measure due to:  2+ SIRS criteria are not met    Vitals:    Vitals:    10/13/22 1050 10/13/22 1537 10/13/22 1735   BP: 127/75 124/76 121/68   Pulse: 74 78 76   Resp: 16 16 16   Temp: 97.9 °F (36.6 °C) 98 °F (36.7 °C) 98.1 °F (36.7 °C)   TempSrc: Oral Axillary Oral   SpO2: 98% 98% 98%       CONSULTS:   None     CRITICAL CARE TIME   N/A      DIAGNOSTIC RESULTS   LABS:    Labs Reviewed   CULTURE, URINE - Abnormal; Notable for the following components:       Result Value    Culture ESCHERICHIA COLI >100,000 CFU/ml (*)     All other components within normal limits    Narrative:     SETUP DATE/TIME:  10/13/2022 6768   CBC WITH AUTO DIFFERENTIAL - Abnormal; Notable for the following components:    RBC 3.83 (*)     Hemoglobin 12.2 (*)     .9 (*)     MCH 31.9 (*)     MCHC 31.0 (*)     Monocytes % 9.7 (*)     Immature Neutrophil % 1.4 (*)     All other components within normal limits   COMPREHENSIVE METABOLIC PANEL - Abnormal; Notable for the following components:    BUN 25 (*)     Glucose 180 (*)     ALT 9 (*)     All other components within normal limits   URINALYSIS WITH REFLEX TO CULTURE - Abnormal; Notable for the following components:    Ketones, Urine TRACE (*)     Blood, Urine SMALL (*)     Nitrite Urine, Quantitative POSITIVE (*)     Leukocyte Esterase, Urine SMALL (*)     RBC, UA 17 (*)     WBC, UA 12 (*)     Bacteria, UA MANY (*)     Mucus, UA OCCASIONAL (*)     All other components within normal limits   RESPIRATORY PANEL, MOLECULAR, WITH COVID-19   TROPONIN       When ordered, only abnormal lab results are displayed. All other labs were within normal range or not returned as of this dictation. EKG: When ordered, EKG's are interpreted by the Emergency Department Physician in the absence of a cardiologist.  Please see their note for interpretation of EKG. RADIOLOGY:   Non-plain film images such as CT, Ultrasound and MRI are read by the radiologist. Plain radiographic images are visualized andpreliminarily interpreted by the  ED Provider with the below findings:      Interpretation perthe Radiologist below, if available at the time of this note:    CT ABDOMEN PELVIS W IV CONTRAST Additional Contrast? None   Final Result   1. No acute intraabdominal process identified. 2. No obstructive uropathy identified. 3. Colonic diverticulosis without CT evidence of diverticulitis. 4. Moderate volume of stool within the distal colon. 5. Redemonstration of hyperdense lesion of the right kidney which is   described in detail on comparison exam from September 2, 2022 in remains   suspicious for small neoplasm. XR CHEST PORTABLE   Final Result   No acute process. No results found. PROCEDURES   Unless otherwise noted below, none     Procedures      FINAL IMPRESSION      1. Urinary tract infection with hematuria, site unspecified    2. Fatigue, unspecified type          DISPOSITION/PLAN   DISPOSITION Decision To Discharge 10/13/2022 05:11:50 PM      PATIENT REFERREDTO:  No follow-up provider specified.     DISCHARGE MEDICATIONS:  Discharge Medication List as of 10/13/2022  5:17 PM        START taking these medications    Details   sulfamethoxazole-trimethoprim (BACTRIM DS) 800-160 MG per tablet Take 1 tablet by mouth 2 times daily for 5 days, Disp-10 tablet, R-0Normal             DISCONTINUED MEDICATIONS:  Discharge Medication List as of 10/13/2022  5:17 PM                 (Please note that portions ofthis note were completed with a voice recognition program.  Efforts were made to edit the dictations but occasionally words are mis-transcribed.)    Kizzy Cates PA-C (electronically signed)             Kizzy Cates PA-C  10/15/22 [id number]

## 2022-10-15 LAB
CULTURE: ABNORMAL
CULTURE: ABNORMAL
Lab: ABNORMAL
SPECIMEN: ABNORMAL

## 2022-10-15 ASSESSMENT — ENCOUNTER SYMPTOMS
ABDOMINAL PAIN: 0
BACK PAIN: 0
VOMITING: 0
NAUSEA: 0
EYE PAIN: 0
COUGH: 1
RHINORRHEA: 0
DIARRHEA: 0
SHORTNESS OF BREATH: 0

## 2022-10-17 ENCOUNTER — CARE COORDINATION (OUTPATIENT)
Dept: CARE COORDINATION | Age: 62
End: 2022-10-17

## 2022-10-17 SDOH — SOCIAL STABILITY: SOCIAL NETWORK: HOW OFTEN DO YOU GET TOGETHER WITH FRIENDS OR RELATIVES?: THREE TIMES A WEEK

## 2022-10-17 SDOH — HEALTH STABILITY: MENTAL HEALTH
STRESS IS WHEN SOMEONE FEELS TENSE, NERVOUS, ANXIOUS, OR CAN'T SLEEP AT NIGHT BECAUSE THEIR MIND IS TROUBLED. HOW STRESSED ARE YOU?: ONLY A LITTLE

## 2022-10-17 SDOH — HEALTH STABILITY: PHYSICAL HEALTH: ON AVERAGE, HOW MANY DAYS PER WEEK DO YOU ENGAGE IN MODERATE TO STRENUOUS EXERCISE (LIKE A BRISK WALK)?: 0 DAYS

## 2022-10-17 SDOH — SOCIAL STABILITY: SOCIAL NETWORK
DO YOU BELONG TO ANY CLUBS OR ORGANIZATIONS SUCH AS CHURCH GROUPS UNIONS, FRATERNAL OR ATHLETIC GROUPS, OR SCHOOL GROUPS?: NO

## 2022-10-17 SDOH — ECONOMIC STABILITY: HOUSING INSECURITY: IN THE LAST 12 MONTHS, HOW MANY PLACES HAVE YOU LIVED?: 1

## 2022-10-17 SDOH — ECONOMIC STABILITY: FOOD INSECURITY: WITHIN THE PAST 12 MONTHS, THE FOOD YOU BOUGHT JUST DIDN'T LAST AND YOU DIDN'T HAVE MONEY TO GET MORE.: NEVER TRUE

## 2022-10-17 SDOH — HEALTH STABILITY: MENTAL HEALTH: HOW MANY STANDARD DRINKS CONTAINING ALCOHOL DO YOU HAVE ON A TYPICAL DAY?: PATIENT DOES NOT DRINK

## 2022-10-17 SDOH — ECONOMIC STABILITY: TRANSPORTATION INSECURITY
IN THE PAST 12 MONTHS, HAS THE LACK OF TRANSPORTATION KEPT YOU FROM MEDICAL APPOINTMENTS OR FROM GETTING MEDICATIONS?: NO

## 2022-10-17 SDOH — HEALTH STABILITY: MENTAL HEALTH: HOW OFTEN DO YOU HAVE A DRINK CONTAINING ALCOHOL?: NEVER

## 2022-10-17 SDOH — ECONOMIC STABILITY: FOOD INSECURITY: WITHIN THE PAST 12 MONTHS, YOU WORRIED THAT YOUR FOOD WOULD RUN OUT BEFORE YOU GOT MONEY TO BUY MORE.: NEVER TRUE

## 2022-10-17 SDOH — HEALTH STABILITY: PHYSICAL HEALTH: ON AVERAGE, HOW MANY MINUTES DO YOU ENGAGE IN EXERCISE AT THIS LEVEL?: 0 MIN

## 2022-10-17 SDOH — SOCIAL STABILITY: SOCIAL NETWORK: HOW OFTEN DO YOU ATTENT MEETINGS OF THE CLUB OR ORGANIZATION YOU BELONG TO?: NEVER

## 2022-10-17 SDOH — SOCIAL STABILITY: SOCIAL NETWORK: HOW OFTEN DO YOU ATTEND CHURCH OR RELIGIOUS SERVICES?: NEVER

## 2022-10-17 SDOH — SOCIAL STABILITY: SOCIAL NETWORK: ARE YOU MARRIED, WIDOWED, DIVORCED, SEPARATED, NEVER MARRIED, OR LIVING WITH A PARTNER?: NEVER MARRIED

## 2022-10-17 SDOH — SOCIAL STABILITY: SOCIAL NETWORK: IN A TYPICAL WEEK, HOW MANY TIMES DO YOU TALK ON THE PHONE WITH FAMILY, FRIENDS, OR NEIGHBORS?: THREE TIMES A WEEK

## 2022-10-17 SDOH — ECONOMIC STABILITY: INCOME INSECURITY: IN THE LAST 12 MONTHS, WAS THERE A TIME WHEN YOU WERE NOT ABLE TO PAY THE MORTGAGE OR RENT ON TIME?: NO

## 2022-10-17 SDOH — ECONOMIC STABILITY: TRANSPORTATION INSECURITY
IN THE PAST 12 MONTHS, HAS LACK OF TRANSPORTATION KEPT YOU FROM MEETINGS, WORK, OR FROM GETTING THINGS NEEDED FOR DAILY LIVING?: NO

## 2022-10-17 SDOH — ECONOMIC STABILITY: INCOME INSECURITY: HOW HARD IS IT FOR YOU TO PAY FOR THE VERY BASICS LIKE FOOD, HOUSING, MEDICAL CARE, AND HEATING?: NOT VERY HARD

## 2022-10-17 SDOH — ECONOMIC STABILITY: HOUSING INSECURITY
IN THE LAST 12 MONTHS, WAS THERE A TIME WHEN YOU DID NOT HAVE A STEADY PLACE TO SLEEP OR SLEPT IN A SHELTER (INCLUDING NOW)?: NO

## 2022-10-17 NOTE — CARE COORDINATION
Ambulatory Care Coordination Note  10/17/2022    ACC: Atrhur Gilmore, RN      Spoke with patient caregiver for ACM follow up. Oriented to the role of ACM. Patient caregiver consents to ongoing follow up. Patient caregiver reports that patient is feeling better. Instructed on the recommendation for ER follow up. Patient caregiver confirms contact with PCP office. Plan for follow up with Urology 10/19/22. Medications:  medication reconciliation completed. Patient caregiver reports that she manages patient medications. Reports that patient is taking her medicine as directed. Denies any issue with the cost of co-pays. UTI:  Encouraged hydration. Instructed patient caregiver to have patient complete entire course of antibiotic as directed. UTI care/ prevention information sent via My Chart for reference. DM:  Most recent A1C  5.3. Patient BS running between 115-150. Instructed on low concentrated sugar/ low carb diet. Encouraged routine BS monitoring. Copy of DM zone tool sent via My Chart for reference. COPD:     Denies SOB, fever, chills, body aches. Patient has home oxygen at 2L/ NC cont. Patient is using long acting and rescue inhalers as directed. Patient has med neb if needed for increased SOB. Encouraged compliance with medications as directed. Copy of COPD zone tool sent to patient via My Chart for reference. Discussed support needs:  Patient lives in a group home. Transportation , meals and housekeeping is provided. Patient goes [facility] weekly. Patient caregiver reports that patient care needs are well met at this time. Offered patient enrollment in the Remote Patient Monitoring (RPM) program for in-home monitoring: NA. No questions or concerns noted.       Plan for next outreach:  Confirm Urology follow up, address support needs    Ambulatory Care Coordination Assessment    Care Coordination Protocol  Referral from Primary Care Provider: No  Week 1 - Initial Assessment     Do you have all of your prescriptions and are they filled?: Yes  Barriers to medication adherence: None  Are you able to afford your medications?: Yes     Do you have Home O2 Therapy?: Yes   Oxygen Regimen: Continuous Flow - Enter rate/FIO2: 2   Method of Delivery: Nasal Cannula      Ability to seek help/take action for Emergent Urgent situations i.e. fire, crime, inclement weather or health crisis.: Needs Assistance  Ability to ambulate to restroom: Needs Assistance  Ability handle personal hygeine needs (bathing/dressing/grooming): Needs Assistance  Ability to manage Medications: Dependent  Ability to prepare Food Preparation: Dependent  Ability to maintain home (clean home, laundry): Dependent  Ability to drive and/or has transportation: Dependent  Ability to do shopping: Dependent  Ability to manage finances: Dependent  Is patient able to live independently?: No     Current Housing: Group Home        Per the Fall Risk Screening, did the patient have 2 or more falls or 1 fall with injury in the past year?: No     Frequent urination at night?: Yes  Do you use rails/bars?: Yes  Do you have a non-slip tub mat?: Yes     Are you experiencing loss of meaning?: No  Are you experiencing loss of hope and peace?: No     Thinking about your patient's physical health needs, are there any symptoms or problems (risk indicators) you are unsure about that require further investigation?: No identified areas of uncertainly or problems already being investigated   Are the patients physical health problems impacting on their mental well-being?: No identified areas of concern   Are there any problems with your patients lifestyle behaviors (alcohol, drugs, diet, exercise) that are impacting on physical or mental well-being?: No identified areas of concern   Do you have any other concerns about your patients mental well-being?  How would you rate their severity and impact on the patient?: No identified areas of concern   How would you rate their home environment in terms of safety and stability (including domestic violence, insecure housing, neighbor harassment)?: Consistently safe, supportive, stable, no identified problems   How do daily activities impact on the patient's well-being? (include current or anticipated unemployment, work, caregiving, access to transportation or other): No identified problems or perceived positive benefits   How would you rate their social network (family, work, friends)?: Good participation with social networks   How would you rate their financial resources (including ability to afford all required medical care)?: Financially secure, resources adequate, no identified problems   How wells does the patient now understand their health and well-being (symptoms, signs or risk factors) and what they need to do to manage their health?: Reasonable to good understanding and already engages in managing health or is willing to undertake better management   How well do you think your patient can engage in healthcare discussions? (Barriers include language, deafness, aphasia, alcohol or drug problems, learning difficulties, concentration): Clear and open communication, no identified barriers   Do other services need to be involved to help this patient?: Other care/services not required at this time   Are current services involved with this patient well-coordinated? (Include coordination with other services you are now recommendation): All required care/services in place and well-coordinated   Suggested Interventions and Community Resources  Fall Risk Prevention: In Process Registered Dietician: Declined   Zone Management Tools: In Process         Set up/Review an Education Plan, Set up/Review Goals              Prior to Admission medications    Medication Sig Start Date End Date Taking?  Authorizing Provider   sulfamethoxazole-trimethoprim (BACTRIM DS) 800-160 MG per tablet Take 1 tablet by mouth 2 times daily for 5 days 10/13/22 10/18/22 Yes Eunice Mosquera PA-C   ipratropium-albuterol (DUONEB) 0.5-2.5 (3) MG/3ML SOLN nebulizer solution Inhale 3 mLs into the lungs every 4 hours 9/26/22  Yes Brandon Vargas MD   pantoprazole (PROTONIX) 40 MG tablet TAKE 1 TABLET BY MOUTH EVERY MORNING BEFORE BREAKFAST 9/15/22 10/17/22 Yes INOCENCIO Manuel - ROB   COMBIVENT RESPIMAT  MCG/ACT AERS inhaler INHALE 1 PUFF BY ORAL INHALATION EVERY 6 HOURS 8/29/22  Yes Lawrence Johnson MD   metFORMIN (GLUCOPHAGE) 500 MG tablet Take 1 tablet by mouth in the morning and 1 tablet in the evening. Take with meals. 8/9/22  Yes Lawrence Johnson MD   cetirizine (ZYRTEC) 10 MG tablet TAKE 1 TABLET BY MOUTH DAILY 8/2/22  Yes Lawrence Johnson MD   FEROSUL 325 (65 Fe) MG tablet TAKE 1 TABLET BY MOUTH DAILY WITH BREAKFAST 8/2/22  Yes Lawrence Johnson MD   amLODIPine (NORVASC) 10 MG tablet Take 1 tablet by mouth in the morning. 7/19/22  Yes Lawrence Johnson MD   nystatin (MYCOSTATIN) 595351 UNIT/GM powder Apply topically 4 times daily. under the abdominal folds for 2 wks 6/15/22  Yes Carl Harp MD   fluticasone [facility]) 50 MCG/ACT nasal spray INSTILL 1 SPRAY INTO EACH NOSTRIL DAILY 5/24/22  Yes Carl Harp MD   D3 HIGH POTENCY 50 MCG (2000 UT) [facility] DAILY 5/5/22  Yes Lawrence Johnson MD   UNABLE TO FIND hospital bed mattress XL twin 3/31/22  Yes Lawrence Johnson MD   carBAMazepine (CARBATROL) 300 MG extended release capsule TAKE 1 CAPSULE BY MOUTH TWICE A DAY 1/20/22  Yes Lawrence Johnson MD   Lactobacillus (ACIDOPHILUS) CAPS capsule TAKE 1 CAPSULE BY MOUTH DAILY 1/20/22  Yes Lawrence Johnson MD   atorvastatin (LIPITOR) 40 MG tablet TAKE 1 TABLET BY MOUTH DAILY 12/17/21  Yes Lawrence Johnson MD   levothyroxine (SYNTHROID) 75 MCG tablet Take 1 tablet by mouth Daily 11/21/21  Yes Lawrence Johnson MD   levETIRAcetam (KEPPRA) 750 MG tablet Take 2 tablets by mouth 2 times daily 10/17/21  Yes Costa Chan MD   tiotropium-olodaterol (STIOLTO) 2.5-2.5 MCG/ACT AERS Inhale 2 puffs into the lungs daily 9/26/21  Yes Tyrel Rolon MD   JANUVIA 100 MG tablet TAKE 1 TABLET BY MOUTH DAILY 3/17/21  Yes Lawrence Johnson MD   lisinopril (PRINIVIL;ZESTRIL) 40 MG tablet TAKE 1 TABLET BY MOUTH DAILY 3/1/21  Yes Lawrence Johnson MD   Diapers & Supplies MISC 1 each by Does not apply route daily as needed (4-5 times daily) 7/24/20  Yes Lawrence Johnson MD   mirtazapine (REMERON) 30 MG tablet TAKE 1 TABLET BY MOUTH NIGHTLY 6/5/20  Yes Lawrence Johnson MD   Incontinence Supply Disposable (DEPEND UNDERWEAR LARGE/XL) MISC 1 Units by Does not apply route 4 times daily 4/6/20  Yes Lawrence Johnson MD   Disposable Gloves MISC 1 Units by Does not apply route 4 times daily 4/6/20  Yes Lawrence Johnson MD   diphenhydrAMINE (BENADRYL) 25 MG capsule Take 25 mg by mouth every 6 hours as needed for Itching   Yes Historical Provider, MD   blood glucose monitor strips Test 3 times a day & as needed for symptoms of irregular blood glucose.  12/26/18  Yes Alan Farley MD   busPIRone (BUSPAR) 10 MG tablet Take 1 tablet by mouth 2 times daily 5/31/18  Yes Lawrence Johnson MD   divalproex (DEPAKOTE) 500 MG DR tablet Take 4 tablets by mouth nightly 5/31/18  Yes Lawrence Johnson MD   risperiDONE (RISPERDAL) 1 MG tablet Take 1 tablet by mouth 3 times daily 1 tablet in am, 1 tablet at 4pm, 1 tablet at bedtime  Patient taking differently: Take 1 mg by mouth 2 times daily Twice daily 5/31/18  Yes Lawrence Johnson MD   sertraline (ZOLOFT) 100 MG tablet Take 2 tablets by mouth nightly 5/31/18  Yes Lawrence Johnson MD   ipratropium-albuterol (DUONEB) 0.5-2.5 (3) MG/3ML SOLN nebulizer solution Inhale 3 mLs into the lungs 3 times daily 4/21/22   Brandon Vargas MD       Future Appointments   Date Time Provider Nicole Chapin   12/28/2022 11:30 AM [address] [facility] ONC NURSE [facility] MED ONC [facility]   12/28/2022 11:45 AM Bon Freedman MD [facility]   1/23/2023  1:00 PM Brandon Vargas MD [facility]   2/24/2023  8:45 AM MD Giorgio Morin   8/24/2023  8:30 AM MD Giorgio Morin     ,   Diabetes Assessment               ,   COPD Assessment              Symptoms:          , and   General Assessment    Do you have any symptoms that are causing concern?: No

## 2022-10-24 ENCOUNTER — CARE COORDINATION (OUTPATIENT)
Dept: CARE COORDINATION | Age: 62
End: 2022-10-24

## 2022-10-24 NOTE — CARE COORDINATION
Ambulatory Care Coordination Note  10/24/2022    ACC: Marlene Chaudhari, RN      Spoke with patient caregiver briefly for ACM follow up. Reports that patient is feeling better. Patient is taking medications as directed. Symptoms have returned to baseline. Patient caregiver denies any questions at this time. Offered patient enrollment in the Remote Patient Monitoring (RPM) program for in-home monitoring: NA.      Plan for next outreach:  DM zone, ACP support    Lab Results       None            Care Coordination Interventions    Referral from Primary Care Provider: No  Suggested Interventions and Community Resources  Fall Risk Prevention: In Process  Registered Dietician: Declined  Zone Management Tools: In Process          Goals Addressed                   This Visit's Progress     Wellness Goal   No change     Patient Self-Management Goal for Health Maintenance  Goal: I will follow a healthy diet as discussed by my provider. I will schedule a yearly preventative care visit. I will schedule routine mammograms as directed by my provider. I will schedule bone density testing as directed by my provider. I will schedule routine eye examinations with an eye specialist as directed by my provider. Barriers: overwhelmed by complexity of regimen  Plan for overcoming my barriers:  Patient to schedule routine Provider , Oncology follow up as directed. Confidence:  8/10  Anticipated Goal Completion Date:  1/17/22              Prior to Admission medications    Medication Sig Start Date End Date Taking?  Authorizing Provider   ipratropium-albuterol (DUONEB) 0.5-2.5 (3) MG/3ML SOLN nebulizer solution Inhale 3 mLs into the lungs every 4 hours 9/26/22   Dustin Henderson MD   pantoprazole (PROTONIX) 40 MG tablet TAKE 1 TABLET BY MOUTH EVERY MORNING BEFORE BREAKFAST 9/15/22 10/17/22  INOCENCIO Pete NP   COMBIVENT RESPIMAT  MCG/ACT AERS inhaler INHALE 1 PUFF BY ORAL INHALATION EVERY 6 HOURS 8/29/22   Jeanette Faby Adames MD   metFORMIN (GLUCOPHAGE) 500 MG tablet Take 1 tablet by mouth in the morning and 1 tablet in the evening. Take with meals. 8/9/22   Shraddha Benitez MD   cetirizine (ZYRTEC) 10 MG tablet TAKE 1 TABLET BY MOUTH DAILY 8/2/22   Shraddha Benitez MD   FEROSUL 325 (65 Fe) MG tablet TAKE 1 TABLET BY MOUTH DAILY WITH BREAKFAST 8/2/22   Shraddha Benitez MD   amLODIPine (NORVASC) 10 MG tablet Take 1 tablet by mouth in the morning. 7/19/22   Shraddha Benitez MD   nystatin (MYCOSTATIN) 881295 UNIT/GM powder Apply topically 4 times daily. under the abdominal folds for 2 wks 6/15/22   Alondra Bagley MD   fluticasone [facility]) 50 MCG/ACT nasal spray INSTILL 1 SPRAY INTO EACH NOSTRIL DAILY 5/24/22   Alondra Bagley MD   D3 HIGH POTENCY 50 MCG (2000 UT) CAPS TAKE 1 CAPSULE BY MOUTH DAILY 5/5/22   Shraddha Benitez MD   ipratropium-albuterol (DUONEB) 0.5-2.5 (3) MG/3ML SOLN nebulizer solution Inhale 3 mLs into the lungs 3 times daily 4/21/22   Yoselin Campos MD   UNABLE TO FIND hospital bed mattress XL twin 3/31/22   Shraddha Benitez MD   carBAMazepine (CARBATROL) 300 MG extended release capsule TAKE 1 CAPSULE BY MOUTH TWICE A DAY 1/20/22   Shraddha Benitez MD   Lactobacillus (ACIDOPHILUS) CAPS capsule TAKE 1 CAPSULE BY MOUTH DAILY 1/20/22   Shraddha Benitez MD   atorvastatin (LIPITOR) 40 MG tablet TAKE 1 TABLET BY MOUTH DAILY 12/17/21   Shraddha Benitez MD   levothyroxine (SYNTHROID) 75 MCG tablet Take 1 tablet by mouth Daily 11/21/21   Shraddha Benitez MD   levETIRAcetam (KEPPRA) 750 MG tablet Take 2 tablets by mouth 2 times daily 10/17/21   Issac Hall MD   tiotropium-olodaterol (STIOLTO) 2.5-2.5 MCG/ACT AERS Inhale 2 puffs into the lungs daily 9/26/21   Ricardo Portillo MD   JANUVIA 100 MG tablet TAKE 1 TABLET BY MOUTH DAILY 3/17/21   Shraddha Benitez MD   lisinopril (PRINIVIL;ZESTRIL) 40 MG tablet TAKE 1 TABLET BY MOUTH DAILY 3/1/21   Shraddha Benitez MD   Diapers & Supplies MISC 1 each by Does not apply route daily as needed (4-5 times daily) 7/24/20   Rashawn Ramírez MD   mirtazapine (REMERON) 30 MG tablet TAKE 1 TABLET BY MOUTH NIGHTLY 6/5/20   Rashawn Ramírez MD   Incontinence Supply Disposable (DEPEND UNDERWEAR LARGE/XL) MISC 1 Units by Does not apply route 4 times daily 4/6/20   Rashawn Ramírez MD   Disposable Gloves MISC 1 Units by Does not apply route 4 times daily 4/6/20   Rashawn Ramírez MD   diphenhydrAMINE (BENADRYL) 25 MG capsule Take 25 mg by mouth every 6 hours as needed for Itching    Historical Provider, MD   blood glucose monitor strips Test 3 times a day & as needed for symptoms of irregular blood glucose.  12/26/18   Alan Farley MD   busPIRone (BUSPAR) 10 MG tablet Take 1 tablet by mouth 2 times daily 5/31/18   Rashawn Ramírez MD   divalproex (DEPAKOTE) 500 MG DR tablet Take 4 tablets by mouth nightly 5/31/18   Rashawn Ramírez MD   risperiDONE (RISPERDAL) 1 MG tablet Take 1 tablet by mouth 3 times daily 1 tablet in am, 1 tablet at 4pm, 1 tablet at bedtime  Patient taking differently: Take 1 mg by mouth 2 times daily Twice daily 5/31/18   Rashawn Ramírez MD   sertraline (ZOLOFT) 100 MG tablet Take 2 tablets by mouth nightly 5/31/18   Rashawn Ramírez MD       Future Appointments   Date Time Provider Nicole Chapin   12/28/2022 11:30 AM 1200 [facility], MED ONC NURSE [facility] MED ONC [facility]   12/28/2022 11:45 AM Giselle Langley MD [address] [place] [address]   1/23/2023  1:00 PM Nicole Roman MD [facility]   2/24/2023  8:45 AM MD Giorgio Stanley [facility]   8/24/2023  8:30 AM MD Giorgio Stanley   ,   Diabetes Assessment               , and   General Assessment    Do you have any symptoms that are causing concern?: No

## 2022-10-26 ENCOUNTER — COMMUNITY OUTREACH (OUTPATIENT)
Dept: FAMILY MEDICINE CLINIC | Age: 62
End: 2022-10-26

## 2022-10-26 NOTE — PROGRESS NOTES
Patient's HM shows they are overdue for Mammogram and Colorectal Screening. [facility] and  files searched. No results to attach to order nor HM updated.

## 2022-10-29 ENCOUNTER — APPOINTMENT (OUTPATIENT)
Dept: CT IMAGING | Age: 62
DRG: 871 | End: 2022-10-29
Payer: MEDICARE

## 2022-10-29 ENCOUNTER — APPOINTMENT (OUTPATIENT)
Dept: GENERAL RADIOLOGY | Age: 62
DRG: 871 | End: 2022-10-29
Payer: MEDICARE

## 2022-10-29 ENCOUNTER — HOSPITAL ENCOUNTER (INPATIENT)
Age: 62
LOS: 1 days | Discharge: HOME | DRG: 871 | End: 2022-11-02
Attending: STUDENT IN AN ORGANIZED HEALTH CARE EDUCATION/TRAINING PROGRAM | Admitting: STUDENT IN AN ORGANIZED HEALTH CARE EDUCATION/TRAINING PROGRAM
Payer: MEDICARE

## 2022-10-29 DIAGNOSIS — E83.42 HYPOMAGNESEMIA: ICD-10-CM

## 2022-10-29 DIAGNOSIS — U07.1 COVID-19: ICD-10-CM

## 2022-10-29 DIAGNOSIS — J18.9 PNEUMONIA OF BOTH LUNGS DUE TO INFECTIOUS ORGANISM, UNSPECIFIED PART OF LUNG: ICD-10-CM

## 2022-10-29 DIAGNOSIS — R94.31 PROLONGED Q-T INTERVAL ON ECG: ICD-10-CM

## 2022-10-29 DIAGNOSIS — R06.02 SHORTNESS OF BREATH: Primary | ICD-10-CM

## 2022-10-29 PROBLEM — I49.8 BRADYARRHYTHMIA: Status: ACTIVE | Noted: 2020-02-04

## 2022-10-29 PROBLEM — R32 URINARY INCONTINENCE: Status: ACTIVE | Noted: 2022-10-29

## 2022-10-29 PROBLEM — N93.9 ABNORMAL UTERINE AND VAGINAL BLEEDING, UNSPECIFIED: Status: ACTIVE | Noted: 2022-10-29

## 2022-10-29 LAB
ADENOVIRUS DETECTION BY PCR: NOT DETECTED
ALBUMIN SERPL-MCNC: 3.5 GM/DL (ref 3.4–5)
ALP BLD-CCNC: 61 IU/L (ref 40–129)
ALT SERPL-CCNC: 12 U/L (ref 10–40)
ANION GAP SERPL CALCULATED.3IONS-SCNC: 9 MMOL/L (ref 4–16)
AST SERPL-CCNC: 17 IU/L (ref 15–37)
BACTERIA: ABNORMAL /HPF
BASE EXCESS MIXED: 6.6 (ref 0–2.3)
BASOPHILS ABSOLUTE: 0 K/CU MM
BASOPHILS RELATIVE PERCENT: 0.4 % (ref 0–1)
BILIRUB SERPL-MCNC: 0.2 MG/DL (ref 0–1)
BILIRUBIN URINE: ABNORMAL MG/DL
BLOOD, URINE: ABNORMAL
BORDETELLA PARAPERTUSSIS BY PCR: NOT DETECTED
BORDETELLA PERTUSSIS PCR: NOT DETECTED
BUN BLDV-MCNC: 20 MG/DL (ref 6–23)
CALCIUM SERPL-MCNC: 9.1 MG/DL (ref 8.3–10.6)
CHLAMYDOPHILA PNEUMONIA PCR: NOT DETECTED
CHLORIDE BLD-SCNC: 106 MMOL/L (ref 99–110)
CLARITY: CLEAR
CO2: 29 MMOL/L (ref 21–32)
COLOR: YELLOW
COMMENT: ABNORMAL
CORONAVIRUS 229E PCR: NOT DETECTED
CORONAVIRUS HKU1 PCR: NOT DETECTED
CORONAVIRUS NL63 PCR: NOT DETECTED
CORONAVIRUS OC43 PCR: NOT DETECTED
CREAT SERPL-MCNC: 0.5 MG/DL (ref 0.6–1.1)
D DIMER: 323 NG/ML(DDU)
DIFFERENTIAL TYPE: ABNORMAL
DOSE AMOUNT: NORMAL
DOSE TIME: NORMAL
EKG ATRIAL RATE: 94 BPM
EKG DIAGNOSIS: NORMAL
EKG P AXIS: 1 DEGREES
EKG P-R INTERVAL: 150 MS
EKG Q-T INTERVAL: 566 MS
EKG QRS DURATION: 72 MS
EKG QTC CALCULATION (BAZETT): 707 MS
EKG R AXIS: 37 DEGREES
EKG T AXIS: 25 DEGREES
EKG VENTRICULAR RATE: 94 BPM
EOSINOPHILS ABSOLUTE: 0 K/CU MM
EOSINOPHILS RELATIVE PERCENT: 0.5 % (ref 0–3)
ESTIMATED AVERAGE GLUCOSE: 103 MG/DL
FERRITIN: 107 NG/ML (ref 15–150)
GFR SERPL CREATININE-BSD FRML MDRD: >60 ML/MIN/1.73M2
GLUCOSE BLD-MCNC: 151 MG/DL (ref 70–99)
GLUCOSE BLD-MCNC: 155 MG/DL (ref 70–99)
GLUCOSE BLD-MCNC: 167 MG/DL (ref 70–99)
GLUCOSE, URINE: NEGATIVE MG/DL
HBA1C MFR BLD: 5.2 % (ref 4.2–6.3)
HCO3 VENOUS: 32.4 MMOL/L (ref 19–25)
HCT VFR BLD CALC: 34.1 % (ref 37–47)
HEMOGLOBIN: 10.5 GM/DL (ref 12.5–16)
HIGH SENSITIVE C-REACTIVE PROTEIN: 70.9 MG/L (ref 0–5)
HUMAN METAPNEUMOVIRUS PCR: NOT DETECTED
IMMATURE NEUTROPHIL %: 2.1 % (ref 0–0.43)
INFLUENZA A BY PCR: NOT DETECTED
INFLUENZA A H1 (2009) PCR: NOT DETECTED
INFLUENZA A H1 PANDEMIC PCR: NOT DETECTED
INFLUENZA A H3 PCR: NOT DETECTED
INFLUENZA B BY PCR: NOT DETECTED
KETONES, URINE: 15 MG/DL
LACTATE DEHYDROGENASE: 178 IU/L (ref 120–246)
LACTATE: 1.2 MMOL/L (ref 0.4–2)
LEUKOCYTE ESTERASE, URINE: NEGATIVE
LYMPHOCYTES ABSOLUTE: 0.9 K/CU MM
LYMPHOCYTES RELATIVE PERCENT: 15.5 % (ref 24–44)
MAGNESIUM: 1.7 MG/DL (ref 1.8–2.4)
MCH RBC QN AUTO: 31.6 PG (ref 27–31)
MCHC RBC AUTO-ENTMCNC: 30.8 % (ref 32–36)
MCV RBC AUTO: 102.7 FL (ref 78–100)
MONOCYTES ABSOLUTE: 0.8 K/CU MM
MONOCYTES RELATIVE PERCENT: 14.3 % (ref 0–4)
MUCUS: ABNORMAL HPF
MYCOPLASMA PNEUMONIAE PCR: NOT DETECTED
NITRITE URINE, QUANTITATIVE: NEGATIVE
NUCLEATED RBC %: 0 %
O2 SAT, VEN: 77.6 % (ref 50–70)
PARAINFLUENZA 1 PCR: NOT DETECTED
PARAINFLUENZA 2 PCR: NOT DETECTED
PARAINFLUENZA 3 PCR: NOT DETECTED
PARAINFLUENZA 4 PCR: NOT DETECTED
PCO2, VEN: 50 MMHG (ref 38–52)
PDW BLD-RTO: 12.4 % (ref 11.7–14.9)
PH VENOUS: 7.42 (ref 7.32–7.42)
PH, URINE: 5.5 (ref 5–8)
PLATELET # BLD: 160 K/CU MM (ref 140–440)
PMV BLD AUTO: 8.7 FL (ref 7.5–11.1)
PO2, VEN: 44 MMHG (ref 28–48)
POTASSIUM SERPL-SCNC: 3.8 MMOL/L (ref 3.5–5.1)
PRO-BNP: 357.7 PG/ML
PROCALCITONIN: 0.04
PROTEIN UA: NEGATIVE MG/DL
RBC # BLD: 3.32 M/CU MM (ref 4.2–5.4)
RBC URINE: 4 /HPF (ref 0–6)
RHINOVIRUS ENTEROVIRUS PCR: NOT DETECTED
RSV PCR: NOT DETECTED
SARS-COV-2: ABNORMAL
SEGMENTED NEUTROPHILS ABSOLUTE COUNT: 3.8 K/CU MM
SEGMENTED NEUTROPHILS RELATIVE PERCENT: 67.2 % (ref 36–66)
SODIUM BLD-SCNC: 144 MMOL/L (ref 135–145)
SPECIFIC GRAVITY UA: 1.02 (ref 1–1.03)
SQUAMOUS EPITHELIAL: 1 /HPF
TOTAL IMMATURE NEUTOROPHIL: 0.12 K/CU MM
TOTAL NUCLEATED RBC: 0 K/CU MM
TOTAL PROTEIN: 6.4 GM/DL (ref 6.4–8.2)
TRICHOMONAS: ABNORMAL /HPF
TROPONIN T: <0.01 NG/ML
UROBILINOGEN, URINE: 1 MG/DL (ref 0.2–1)
VALPROIC ACID LEVEL: 70.4 UG/ML (ref 50–100)
WBC # BLD: 5.7 K/CU MM (ref 4–10.5)
WBC UA: <1 /HPF (ref 0–5)

## 2022-10-29 PROCEDURE — 81001 URINALYSIS AUTO W/SCOPE: CPT

## 2022-10-29 PROCEDURE — 80164 ASSAY DIPROPYLACETIC ACD TOT: CPT

## 2022-10-29 PROCEDURE — 96361 HYDRATE IV INFUSION ADD-ON: CPT

## 2022-10-29 PROCEDURE — 96374 THER/PROPH/DIAG INJ IV PUSH: CPT

## 2022-10-29 PROCEDURE — 87086 URINE CULTURE/COLONY COUNT: CPT

## 2022-10-29 PROCEDURE — 96372 THER/PROPH/DIAG INJ SC/IM: CPT

## 2022-10-29 PROCEDURE — 2580000003 HC RX 258: Performed by: PHYSICIAN ASSISTANT

## 2022-10-29 PROCEDURE — 84484 ASSAY OF TROPONIN QUANT: CPT

## 2022-10-29 PROCEDURE — 6370000000 HC RX 637 (ALT 250 FOR IP): Performed by: NURSE PRACTITIONER

## 2022-10-29 PROCEDURE — 83880 ASSAY OF NATRIURETIC PEPTIDE: CPT

## 2022-10-29 PROCEDURE — G0378 HOSPITAL OBSERVATION PER HR: HCPCS

## 2022-10-29 PROCEDURE — 85025 COMPLETE CBC W/AUTO DIFF WBC: CPT

## 2022-10-29 PROCEDURE — 6360000002 HC RX W HCPCS: Performed by: PHYSICIAN ASSISTANT

## 2022-10-29 PROCEDURE — 2700000000 HC OXYGEN THERAPY PER DAY

## 2022-10-29 PROCEDURE — 94761 N-INVAS EAR/PLS OXIMETRY MLT: CPT

## 2022-10-29 PROCEDURE — 93005 ELECTROCARDIOGRAM TRACING: CPT | Performed by: PHYSICIAN ASSISTANT

## 2022-10-29 PROCEDURE — 83735 ASSAY OF MAGNESIUM: CPT

## 2022-10-29 PROCEDURE — 96368 THER/DIAG CONCURRENT INF: CPT

## 2022-10-29 PROCEDURE — 87040 BLOOD CULTURE FOR BACTERIA: CPT

## 2022-10-29 PROCEDURE — 84145 PROCALCITONIN (PCT): CPT

## 2022-10-29 PROCEDURE — 85379 FIBRIN DEGRADATION QUANT: CPT

## 2022-10-29 PROCEDURE — 71045 X-RAY EXAM CHEST 1 VIEW: CPT

## 2022-10-29 PROCEDURE — 96375 TX/PRO/DX INJ NEW DRUG ADDON: CPT

## 2022-10-29 PROCEDURE — 71275 CT ANGIOGRAPHY CHEST: CPT

## 2022-10-29 PROCEDURE — 94640 AIRWAY INHALATION TREATMENT: CPT

## 2022-10-29 PROCEDURE — 87150 DNA/RNA AMPLIFIED PROBE: CPT

## 2022-10-29 PROCEDURE — 96367 TX/PROPH/DG ADDL SEQ IV INF: CPT

## 2022-10-29 PROCEDURE — 83605 ASSAY OF LACTIC ACID: CPT

## 2022-10-29 PROCEDURE — 51701 INSERT BLADDER CATHETER: CPT

## 2022-10-29 PROCEDURE — 2580000003 HC RX 258: Performed by: NURSE PRACTITIONER

## 2022-10-29 PROCEDURE — 82728 ASSAY OF FERRITIN: CPT

## 2022-10-29 PROCEDURE — 82962 GLUCOSE BLOOD TEST: CPT

## 2022-10-29 PROCEDURE — 96365 THER/PROPH/DIAG IV INF INIT: CPT

## 2022-10-29 PROCEDURE — 80177 DRUG SCRN QUAN LEVETIRACETAM: CPT

## 2022-10-29 PROCEDURE — 6370000000 HC RX 637 (ALT 250 FOR IP): Performed by: PHYSICIAN ASSISTANT

## 2022-10-29 PROCEDURE — 93010 ELECTROCARDIOGRAM REPORT: CPT | Performed by: INTERNAL MEDICINE

## 2022-10-29 PROCEDURE — 83036 HEMOGLOBIN GLYCOSYLATED A1C: CPT

## 2022-10-29 PROCEDURE — 99285 EMERGENCY DEPT VISIT HI MDM: CPT

## 2022-10-29 PROCEDURE — 6360000002 HC RX W HCPCS: Performed by: NURSE PRACTITIONER

## 2022-10-29 PROCEDURE — 82805 BLOOD GASES W/O2 SATURATION: CPT

## 2022-10-29 PROCEDURE — 6360000004 HC RX CONTRAST MEDICATION: Performed by: PHYSICIAN ASSISTANT

## 2022-10-29 PROCEDURE — 0202U NFCT DS 22 TRGT SARS-COV-2: CPT

## 2022-10-29 PROCEDURE — 83615 LACTATE (LD) (LDH) ENZYME: CPT

## 2022-10-29 PROCEDURE — 96366 THER/PROPH/DIAG IV INF ADDON: CPT

## 2022-10-29 PROCEDURE — 80053 COMPREHEN METABOLIC PANEL: CPT

## 2022-10-29 PROCEDURE — 86141 C-REACTIVE PROTEIN HS: CPT

## 2022-10-29 RX ORDER — FLUTICASONE PROPIONATE 50 MCG
1 SPRAY, SUSPENSION (ML) NASAL DAILY
Status: DISCONTINUED | OUTPATIENT
Start: 2022-10-29 | End: 2022-11-02 | Stop reason: HOSPADM

## 2022-10-29 RX ORDER — ONDANSETRON 4 MG/1
4 TABLET, ORALLY DISINTEGRATING ORAL EVERY 8 HOURS PRN
Status: CANCELLED | OUTPATIENT
Start: 2022-10-29

## 2022-10-29 RX ORDER — DEXAMETHASONE SODIUM PHOSPHATE 10 MG/ML
6 INJECTION, SOLUTION INTRAMUSCULAR; INTRAVENOUS EVERY 6 HOURS
Status: DISCONTINUED | OUTPATIENT
Start: 2022-10-29 | End: 2022-10-29 | Stop reason: SDUPTHER

## 2022-10-29 RX ORDER — LEVETIRACETAM 500 MG/1
1500 TABLET ORAL 2 TIMES DAILY
Status: DISCONTINUED | OUTPATIENT
Start: 2022-10-29 | End: 2022-11-02 | Stop reason: HOSPADM

## 2022-10-29 RX ORDER — DEXAMETHASONE 4 MG/1
4 TABLET ORAL DAILY
Status: DISCONTINUED | OUTPATIENT
Start: 2022-10-30 | End: 2022-10-30

## 2022-10-29 RX ORDER — DEXTROSE MONOHYDRATE 100 MG/ML
1000 INJECTION, SOLUTION INTRAVENOUS CONTINUOUS PRN
Status: DISCONTINUED | OUTPATIENT
Start: 2022-10-29 | End: 2022-11-02 | Stop reason: HOSPADM

## 2022-10-29 RX ORDER — PANTOPRAZOLE SODIUM 40 MG/1
40 TABLET, DELAYED RELEASE ORAL DAILY
COMMUNITY

## 2022-10-29 RX ORDER — MAGNESIUM HYDROXIDE/ALUMINUM HYDROXICE/SIMETHICONE 120; 1200; 1200 MG/30ML; MG/30ML; MG/30ML
30 SUSPENSION ORAL EVERY 6 HOURS PRN
Status: DISCONTINUED | OUTPATIENT
Start: 2022-10-29 | End: 2022-11-02 | Stop reason: HOSPADM

## 2022-10-29 RX ORDER — CETIRIZINE HYDROCHLORIDE 10 MG/1
10 TABLET ORAL DAILY
Status: DISCONTINUED | OUTPATIENT
Start: 2022-10-29 | End: 2022-11-02 | Stop reason: HOSPADM

## 2022-10-29 RX ORDER — LACTOBACILLUS RHAMNOSUS GG 10B CELL
1 CAPSULE ORAL
Status: DISCONTINUED | OUTPATIENT
Start: 2022-10-30 | End: 2022-11-02 | Stop reason: HOSPADM

## 2022-10-29 RX ORDER — ALBUTEROL SULFATE 90 UG/1
2 AEROSOL, METERED RESPIRATORY (INHALATION) 4 TIMES DAILY
COMMUNITY
Start: 2022-10-10

## 2022-10-29 RX ORDER — SODIUM CHLORIDE 0.9 % (FLUSH) 0.9 %
5-40 SYRINGE (ML) INJECTION EVERY 12 HOURS SCHEDULED
Status: DISCONTINUED | OUTPATIENT
Start: 2022-10-29 | End: 2022-11-02 | Stop reason: HOSPADM

## 2022-10-29 RX ORDER — SENNA AND DOCUSATE SODIUM 50; 8.6 MG/1; MG/1
1 TABLET, FILM COATED ORAL 2 TIMES DAILY
Status: DISCONTINUED | OUTPATIENT
Start: 2022-10-29 | End: 2022-11-02 | Stop reason: HOSPADM

## 2022-10-29 RX ORDER — INSULIN LISPRO 100 [IU]/ML
0-4 INJECTION, SOLUTION INTRAVENOUS; SUBCUTANEOUS
Status: DISCONTINUED | OUTPATIENT
Start: 2022-10-29 | End: 2022-11-02

## 2022-10-29 RX ORDER — DIVALPROEX SODIUM 500 MG/1
1000 TABLET, DELAYED RELEASE ORAL 2 TIMES DAILY
Status: DISCONTINUED | OUTPATIENT
Start: 2022-10-29 | End: 2022-11-02 | Stop reason: HOSPADM

## 2022-10-29 RX ORDER — MIRTAZAPINE 15 MG/1
30 TABLET, FILM COATED ORAL NIGHTLY
Status: DISCONTINUED | OUTPATIENT
Start: 2022-10-29 | End: 2022-11-02 | Stop reason: HOSPADM

## 2022-10-29 RX ORDER — PROMETHAZINE HYDROCHLORIDE 12.5 MG/1
12.5 TABLET ORAL EVERY 6 HOURS PRN
Status: DISCONTINUED | OUTPATIENT
Start: 2022-10-29 | End: 2022-11-02 | Stop reason: HOSPADM

## 2022-10-29 RX ORDER — ACETAMINOPHEN 650 MG/1
650 SUPPOSITORY RECTAL EVERY 6 HOURS PRN
Status: DISCONTINUED | OUTPATIENT
Start: 2022-10-29 | End: 2022-11-02 | Stop reason: HOSPADM

## 2022-10-29 RX ORDER — GUAIFENESIN/DEXTROMETHORPHAN 100-10MG/5
5 SYRUP ORAL EVERY 4 HOURS PRN
Status: DISCONTINUED | OUTPATIENT
Start: 2022-10-29 | End: 2022-11-02 | Stop reason: HOSPADM

## 2022-10-29 RX ORDER — LISINOPRIL 20 MG/1
40 TABLET ORAL DAILY
Status: DISCONTINUED | OUTPATIENT
Start: 2022-10-29 | End: 2022-11-02

## 2022-10-29 RX ORDER — BUSPIRONE HYDROCHLORIDE 5 MG/1
10 TABLET ORAL 2 TIMES DAILY
Status: DISCONTINUED | OUTPATIENT
Start: 2022-10-29 | End: 2022-11-02 | Stop reason: HOSPADM

## 2022-10-29 RX ORDER — LANOLIN ALCOHOL/MO/W.PET/CERES
3 CREAM (GRAM) TOPICAL NIGHTLY
Status: DISCONTINUED | OUTPATIENT
Start: 2022-10-29 | End: 2022-11-02 | Stop reason: HOSPADM

## 2022-10-29 RX ORDER — RISPERIDONE 0.5 MG/1
1 TABLET ORAL 2 TIMES DAILY
Status: DISCONTINUED | OUTPATIENT
Start: 2022-10-29 | End: 2022-11-02 | Stop reason: HOSPADM

## 2022-10-29 RX ORDER — AMLODIPINE BESYLATE 10 MG/1
10 TABLET ORAL DAILY
Status: DISCONTINUED | OUTPATIENT
Start: 2022-10-29 | End: 2022-11-01

## 2022-10-29 RX ORDER — INSULIN LISPRO 100 [IU]/ML
0-4 INJECTION, SOLUTION INTRAVENOUS; SUBCUTANEOUS NIGHTLY
Status: DISCONTINUED | OUTPATIENT
Start: 2022-10-29 | End: 2022-11-02

## 2022-10-29 RX ORDER — DIPHENHYDRAMINE HCL 25 MG
25 TABLET ORAL EVERY 6 HOURS PRN
Status: DISCONTINUED | OUTPATIENT
Start: 2022-10-29 | End: 2022-11-02 | Stop reason: HOSPADM

## 2022-10-29 RX ORDER — ONDANSETRON 2 MG/ML
4 INJECTION INTRAMUSCULAR; INTRAVENOUS EVERY 6 HOURS PRN
Status: CANCELLED | OUTPATIENT
Start: 2022-10-29

## 2022-10-29 RX ORDER — 0.9 % SODIUM CHLORIDE 0.9 %
1000 INTRAVENOUS SOLUTION INTRAVENOUS ONCE
Status: COMPLETED | OUTPATIENT
Start: 2022-10-29 | End: 2022-10-29

## 2022-10-29 RX ORDER — CARBAMAZEPINE 300 MG/1
300 CAPSULE, EXTENDED RELEASE ORAL 2 TIMES DAILY
Status: DISCONTINUED | OUTPATIENT
Start: 2022-10-29 | End: 2022-11-02 | Stop reason: HOSPADM

## 2022-10-29 RX ORDER — AZITHROMYCIN 250 MG/1
500 TABLET, FILM COATED ORAL EVERY 24 HOURS
Status: COMPLETED | OUTPATIENT
Start: 2022-10-30 | End: 2022-11-01

## 2022-10-29 RX ORDER — SODIUM CHLORIDE 9 MG/ML
INJECTION, SOLUTION INTRAVENOUS CONTINUOUS
Status: DISPENSED | OUTPATIENT
Start: 2022-10-29 | End: 2022-10-29

## 2022-10-29 RX ORDER — ACETAMINOPHEN 325 MG/1
650 TABLET ORAL EVERY 6 HOURS PRN
Status: DISCONTINUED | OUTPATIENT
Start: 2022-10-29 | End: 2022-11-02 | Stop reason: HOSPADM

## 2022-10-29 RX ORDER — ALBUTEROL SULFATE 90 UG/1
1 AEROSOL, METERED RESPIRATORY (INHALATION) EVERY 6 HOURS PRN
Status: DISCONTINUED | OUTPATIENT
Start: 2022-10-29 | End: 2022-11-02 | Stop reason: HOSPADM

## 2022-10-29 RX ORDER — MAGNESIUM SULFATE IN WATER 40 MG/ML
2000 INJECTION, SOLUTION INTRAVENOUS ONCE
Status: COMPLETED | OUTPATIENT
Start: 2022-10-29 | End: 2022-10-29

## 2022-10-29 RX ORDER — ATORVASTATIN CALCIUM 40 MG/1
40 TABLET, FILM COATED ORAL NIGHTLY
Status: DISCONTINUED | OUTPATIENT
Start: 2022-10-29 | End: 2022-11-02 | Stop reason: HOSPADM

## 2022-10-29 RX ORDER — ENOXAPARIN SODIUM 100 MG/ML
40 INJECTION SUBCUTANEOUS DAILY
Status: DISCONTINUED | OUTPATIENT
Start: 2022-10-29 | End: 2022-11-02 | Stop reason: HOSPADM

## 2022-10-29 RX ORDER — ALBUTEROL SULFATE 90 UG/1
2 AEROSOL, METERED RESPIRATORY (INHALATION) 4 TIMES DAILY
Status: DISCONTINUED | OUTPATIENT
Start: 2022-10-29 | End: 2022-11-02 | Stop reason: HOSPADM

## 2022-10-29 RX ORDER — ACETAMINOPHEN 500 MG
1000 TABLET ORAL ONCE
Status: COMPLETED | OUTPATIENT
Start: 2022-10-29 | End: 2022-10-29

## 2022-10-29 RX ORDER — PANTOPRAZOLE SODIUM 40 MG/1
40 TABLET, DELAYED RELEASE ORAL DAILY
Status: DISCONTINUED | OUTPATIENT
Start: 2022-10-29 | End: 2022-11-02 | Stop reason: HOSPADM

## 2022-10-29 RX ORDER — POLYETHYLENE GLYCOL 3350 17 G/17G
17 POWDER, FOR SOLUTION ORAL DAILY PRN
Status: DISCONTINUED | OUTPATIENT
Start: 2022-10-29 | End: 2022-11-02 | Stop reason: HOSPADM

## 2022-10-29 RX ORDER — SODIUM CHLORIDE 9 MG/ML
500 INJECTION, SOLUTION INTRAVENOUS PRN
Status: DISCONTINUED | OUTPATIENT
Start: 2022-10-29 | End: 2022-11-02 | Stop reason: HOSPADM

## 2022-10-29 RX ORDER — SODIUM CHLORIDE 0.9 % (FLUSH) 0.9 %
5-40 SYRINGE (ML) INJECTION PRN
Status: DISCONTINUED | OUTPATIENT
Start: 2022-10-29 | End: 2022-11-02 | Stop reason: HOSPADM

## 2022-10-29 RX ORDER — POLYETHYLENE GLYCOL 3350 17 G/17G
17 POWDER, FOR SOLUTION ORAL DAILY
Status: DISCONTINUED | OUTPATIENT
Start: 2022-10-29 | End: 2022-11-02 | Stop reason: HOSPADM

## 2022-10-29 RX ORDER — DEXAMETHASONE 2 MG/1
2 TABLET ORAL DAILY
Status: DISCONTINUED | OUTPATIENT
Start: 2022-10-30 | End: 2022-10-30

## 2022-10-29 RX ORDER — FERROUS SULFATE 325(65) MG
325 TABLET ORAL
Status: DISCONTINUED | OUTPATIENT
Start: 2022-10-30 | End: 2022-11-02 | Stop reason: HOSPADM

## 2022-10-29 RX ORDER — DEXAMETHASONE 4 MG/1
6 TABLET ORAL DAILY
Status: DISCONTINUED | OUTPATIENT
Start: 2022-10-30 | End: 2022-10-29 | Stop reason: ALTCHOICE

## 2022-10-29 RX ADMIN — DEXTROSE MONOHYDRATE 500 MG: 50 INJECTION, SOLUTION INTRAVENOUS at 12:52

## 2022-10-29 RX ADMIN — ALBUTEROL SULFATE 2 PUFF: 90 AEROSOL, METERED RESPIRATORY (INHALATION) at 20:00

## 2022-10-29 RX ADMIN — SODIUM CHLORIDE 1000 ML: 9 INJECTION, SOLUTION INTRAVENOUS at 10:02

## 2022-10-29 RX ADMIN — ATORVASTATIN CALCIUM 40 MG: 40 TABLET, FILM COATED ORAL at 21:10

## 2022-10-29 RX ADMIN — SODIUM CHLORIDE, PRESERVATIVE FREE 10 ML: 5 INJECTION INTRAVENOUS at 21:11

## 2022-10-29 RX ADMIN — CEFTRIAXONE SODIUM 1000 MG: 1 INJECTION, POWDER, FOR SOLUTION INTRAMUSCULAR; INTRAVENOUS at 14:37

## 2022-10-29 RX ADMIN — MIRTAZAPINE 30 MG: 15 TABLET, FILM COATED ORAL at 21:10

## 2022-10-29 RX ADMIN — PANTOPRAZOLE SODIUM 40 MG: 40 TABLET, DELAYED RELEASE ORAL at 17:06

## 2022-10-29 RX ADMIN — BUSPIRONE HYDROCHLORIDE 10 MG: 5 TABLET ORAL at 21:10

## 2022-10-29 RX ADMIN — LEVETIRACETAM 1500 MG: 500 TABLET, FILM COATED ORAL at 21:10

## 2022-10-29 RX ADMIN — ENOXAPARIN SODIUM 40 MG: 40 INJECTION SUBCUTANEOUS at 17:06

## 2022-10-29 RX ADMIN — Medication 3 MG: at 21:10

## 2022-10-29 RX ADMIN — ACETAMINOPHEN 1000 MG: 500 TABLET ORAL at 10:04

## 2022-10-29 RX ADMIN — IOPAMIDOL 80 ML: 755 INJECTION, SOLUTION INTRAVENOUS at 11:54

## 2022-10-29 RX ADMIN — DIVALPROEX SODIUM 1000 MG: 500 TABLET, DELAYED RELEASE ORAL at 21:10

## 2022-10-29 RX ADMIN — POLYETHYLENE GLYCOL (3350) 17 G: 17 POWDER, FOR SOLUTION ORAL at 17:06

## 2022-10-29 RX ADMIN — MAGNESIUM SULFATE HEPTAHYDRATE 2000 MG: 2 INJECTION, SOLUTION INTRAVENOUS at 11:06

## 2022-10-29 RX ADMIN — DEXAMETHASONE SODIUM PHOSPHATE 6 MG: 10 INJECTION, SOLUTION INTRAMUSCULAR; INTRAVENOUS at 11:06

## 2022-10-29 RX ADMIN — MAGNESIUM SULFATE HEPTAHYDRATE 2000 MG: 2 INJECTION, SOLUTION INTRAVENOUS at 17:12

## 2022-10-29 RX ADMIN — CETIRIZINE HYDROCHLORIDE 10 MG: 10 TABLET, FILM COATED ORAL at 17:06

## 2022-10-29 RX ADMIN — SERTRALINE HYDROCHLORIDE 150 MG: 50 TABLET ORAL at 21:10

## 2022-10-29 RX ADMIN — SENNOSIDES AND DOCUSATE SODIUM 1 TABLET: 50; 8.6 TABLET ORAL at 21:10

## 2022-10-29 RX ADMIN — SODIUM CHLORIDE: 9 INJECTION, SOLUTION INTRAVENOUS at 12:59

## 2022-10-29 RX ADMIN — RISPERIDONE 1 MG: 0.5 TABLET ORAL at 21:10

## 2022-10-29 RX ADMIN — CARBAMAZEPINE 300 MG: 300 CAPSULE, EXTENDED RELEASE ORAL at 21:09

## 2022-10-29 ASSESSMENT — PAIN - FUNCTIONAL ASSESSMENT: PAIN_FUNCTIONAL_ASSESSMENT: 0-10

## 2022-10-29 ASSESSMENT — PAIN SCALES - GENERAL
PAINLEVEL_OUTOF10: 0
PAINLEVEL_OUTOF10: 0

## 2022-10-29 NOTE — ED PROVIDER NOTES
I independently examined and evaluated Rosemary Murphy. I personally saw the patient and performed a substantive portion of the visit including all aspects of the medical decision making. In brief their history revealed 82-year-old female with past medical history of cerebral palsy, seizures, diabetes, hypertension, hyperlipidemia, presenting to the ED for evaluation of hypoxia and cough. Patient is hypoxic on presentation to the ED and is placed on oxygen via nasal cannula. Laboratory evaluation significant for positive COVID. Radiological evaluation is significant for bilateral bronchopneumonia. Patient scheduled for admission. Patient given antibiotics    All decisions regarding differential diagnosis, lab/radiology/EKG interpretation, risk of significant illness, specific reasons for performing tests, management/treatment, response to management/treatment, disposition, reasons for consults, results of consults, etc. were made by myself in conjunction with the Advanced Practice Provider. For all further details of the patient's emergency department visit, please see the Advanced Practice Provider's documentation.       EKG:   Normal sinus rhythm, prolonged QT  Ventricular rate 94  OH interval 150  QRS duration 72  QT/QTc 566/707  PRT axes 1 37 25  No STEMI    Labs Reviewed   RESPIRATORY PANEL, MOLECULAR, WITH COVID-19 - Abnormal; Notable for the following components:       Result Value    SARS-CoV-2 DETECTED BY PCR (*)     All other components within normal limits   CBC WITH AUTO DIFFERENTIAL - Abnormal; Notable for the following components:    RBC 3.32 (*)     Hemoglobin 10.5 (*)     Hematocrit 34.1 (*)     .7 (*)     MCH 31.6 (*)     MCHC 30.8 (*)     Segs Relative 67.2 (*)     Lymphocytes % 15.5 (*)     Monocytes % 14.3 (*)     Immature Neutrophil % 2.1 (*)     All other components within normal limits   COMPREHENSIVE METABOLIC PANEL - Abnormal; Notable for the following components: Creatinine 0.5 (*)     Glucose 151 (*)     All other components within normal limits   BRAIN NATRIURETIC PEPTIDE - Abnormal; Notable for the following components:    Pro-.7 (*)     All other components within normal limits   MAGNESIUM - Abnormal; Notable for the following components:    Magnesium 1.7 (*)     All other components within normal limits   URINALYSIS - Abnormal; Notable for the following components:    Ketones, Urine 15 (*)     All other components within normal limits   BLOOD GAS, VENOUS - Abnormal; Notable for the following components:    Base Exc, Mixed 6.6 (*)     HCO3, Venous 32.4 (*)     O2 Sat, Lan 77.6 (*)     All other components within normal limits   MICROSCOPIC URINALYSIS - Abnormal; Notable for the following components:    Bacteria, UA RARE (*)     Mucus, UA FEW (*)     All other components within normal limits   CULTURE, BLOOD 1   CULTURE, BLOOD 2   CULTURE, URINE   TROPONIN   LACTIC ACID   BLOOD GAS, VENOUS     CTA PULMONARY W CONTRAST   Preliminary Result   1. No acute pulmonary emboli. 2. Acute bilateral findings consistent bronchitis/bronchopneumonia   predominately involving the lower lobes. 3. Mild symmetric bilateral hilar lymphadenopathy likely representing   reactive nodes. XR CHEST PORTABLE   Final Result   Subjective central bronchial pulmonary marking prominence partially   reflecting low lung volumes however, central bronchitis and underlying   chronic lung disease may contribute to this appearance. Otherwise,   radiographically nonacute portable chest.      Subjective skeletal osteopenia.                 [address] [facility],[address], DO  10/29/22 [id number]

## 2022-10-29 NOTE — ED NOTES
Care giver at bedside,updated on plan of care,[employer] aware of treatment     Hallie Hubbard RN  10/29/22 [id number]

## 2022-10-29 NOTE — LETTER
[facility]  [address] [place] [address]  Phone: [phone]    No name on file. November 2, 2022     Patient: Author Augustin   YOB: 1960   Date of Visit: 10/29/2022       To Whom It May Concern:      Charli Godoy was discharged from hospital not wearing oxygen at this time. Pt is able to currently maintain o2 at 88% or above.      Sincerely     Link Lazara

## 2022-10-29 NOTE — CARE COORDINATION
CM - Er room # [address] [place] room # 2001---CM placed a call to the pt guardian -MARIANNAI @  [phone] -spoke to \"Francisca \"--permission was already granted for \"permission to treat\". The caretaker from [employer] was at bedside -they were requesting to gain access to their client (pt)  Permission was granted by [employer]  for all information to be allowed to the caretaker employees of [employer]. FRACISCO contacted Eugenia Rogers @ [phone].      The agreed upon code for the staff access for information is  [address] / [facility] / RN

## 2022-10-29 NOTE — ED NOTES
ED TO INPATIENT SBAR HANDOFF    Patient Name: Amador Joshua   :  1960  63 y.o. MRN:  [id number]  Preferred Name  na  ED Room #:  ED14/ED-14  Family/Caregiver Present yes   Restraints no   Sitter no   Sepsis Risk Score Sepsis Risk Score: 2.44    Situation  Code Status: Prior No additional code details. Allergies: Macrobid [nitrofurantoin]  Weight: Patient Vitals for the past 96 hrs (Last 3 readings):   Weight   10/29/22 0902 132 lb (59.9 kg)     Arrived from: home  Chief Complaint:   Chief Complaint   Patient presents with    Respiratory Distress     Home health care nurse reports decreased oxygen saturation ongoing for the past week. Patient coughing but denies shortness of breath. Hospital Problem/Diagnosis:  Principal Problem:    COVID-19  Resolved Problems:    * No resolved hospital problems. *    Imaging:   CTA PULMONARY W CONTRAST   Preliminary Result   1. No acute pulmonary emboli. 2. Acute bilateral findings consistent bronchitis/bronchopneumonia   predominately involving the lower lobes. 3. Mild symmetric bilateral hilar lymphadenopathy likely representing   reactive nodes. XR CHEST PORTABLE   Final Result   Subjective central bronchial pulmonary marking prominence partially   reflecting low lung volumes however, central bronchitis and underlying   chronic lung disease may contribute to this appearance. Otherwise,   radiographically nonacute portable chest.      Subjective skeletal osteopenia.            Abnormal labs:   Abnormal Labs Reviewed   RESPIRATORY PANEL, MOLECULAR, WITH COVID-19 - Abnormal; Notable for the following components:       Result Value    SARS-CoV-2 DETECTED BY PCR (*)     All other components within normal limits   CBC WITH AUTO DIFFERENTIAL - Abnormal; Notable for the following components:    RBC 3.32 (*)     Hemoglobin 10.5 (*)     Hematocrit 34.1 (*)     .7 (*)     MCH 31.6 (*)     MCHC 30.8 (*)     Segs Relative 67.2 (*)     Lymphocytes % 15.5 (*)     Monocytes % 14.3 (*)     Immature Neutrophil % 2.1 (*)     All other components within normal limits   COMPREHENSIVE METABOLIC PANEL - Abnormal; Notable for the following components:    Creatinine 0.5 (*)     Glucose 151 (*)     All other components within normal limits   BRAIN NATRIURETIC PEPTIDE - Abnormal; Notable for the following components:    Pro-.7 (*)     All other components within normal limits   MAGNESIUM - Abnormal; Notable for the following components:    Magnesium 1.7 (*)     All other components within normal limits   URINALYSIS - Abnormal; Notable for the following components:    Ketones, Urine 15 (*)     All other components within normal limits   BLOOD GAS, VENOUS - Abnormal; Notable for the following components:    Base Exc, Mixed 6.6 (*)     HCO3, Venous 32.4 (*)     O2 Sat, Lan 77.6 (*)     All other components within normal limits   MICROSCOPIC URINALYSIS - Abnormal; Notable for the following components:    Bacteria, UA RARE (*)     Mucus, UA FEW (*)     All other components within normal limits     Critical values: no     Abnormal Assessment Findings: covid positive    Background  History:   Past Medical History:   Diagnosis Date    Anxiety disorder     Back pain, chronic     Cerebral palsy (HCC)     COPD (chronic obstructive pulmonary disease) ([facility] [address]     COVID-19 10/12/2021    CVA (cerebral infarction) 2014 x2    Diabetes mellitus (HCC)     Diverticulosis     Epilepsy (HCC)     GERD (gastroesophageal reflux disease)     Hyperlipidemia     Hypertension     Insomnia     Iron (Fe) deficiency anemia     Mental disability     Seizures (HCC)     Unspecified cerebral artery occlusion with cerebral infarction        Assessment    Vitals/MEWS: MEWS Score: 1  Level of Consciousness: Alert (0)   Vitals:    10/29/22 1100 10/29/22 1115 10/29/22 1200 10/29/22 1215   BP: (!) 140/61 139/62 128/73 134/75   Pulse: 80 80 73 68   Resp: 25 25 24 21   Temp:       TempSrc:       SpO2: 96% 96% 99% 100%   Weight:         FiO2 (%): nasal cannula for respiratory distress  O2 Flow Rate: O2 Device: Nasal cannula O2 Flow Rate (L/min): 2 L/min  Cardiac Rhythm:    Pain Assessment:  [x] Verbal [] Anthony Sharp Scale  Pain Scale: Pain Assessment  Pain Assessment: 0-10  Pain Level: 0  Last documented pain score (0-10 scale) Pain Level: 0  Last documented pain medication administered:   Mental Status: oriented  NIH Score:    C-SSRS:    Bedside swallow:    Groveton Coma Scale (GCS): Jerome Coma Scale  Eye Opening: Spontaneous  Best Verbal Response: Oriented  Best Motor Response: Obeys commands  Groveton Coma Scale Score: 15  Active LDA's:   Peripheral IV 10/29/22 Right Antecubital (Active)   Site Assessment Clean, dry & intact 10/29/22 0939   Line Status Blood return noted 10/29/22 0939     PO Status: Regular  Pertinent or High Risk Medications/Drips: no   If Yes, please provide details:   Pending Blood Product Administration: no     You may also review the ED PT Care Timeline found under the Summary Nursing Index tab. Recommendation    Pending orders   Plan for Discharge (if known):    Additional Comments: APSI patient   If any further questions, please call Sending RN at [phone]    Electronically signed by: Electronically signed by Judy Valentine RN on 10/29/2022 at 12:39 PM      Judy Valentine [facility]  10/29/22 [phone]

## 2022-10-29 NOTE — H&P
V2.0  History and Physical      Name:  Author Augustin /Age/Sex: 1960  (63 y.o. female)   MRN & CSN:  [id number] & [id number] Encounter Date/Time: 10/29/2022 1:20 PM EDT   Location:  -A PCP: Flavia Priest MD       Hospital Day: 1    Assessment and Plan:   Author Augustin is a 63 y.o. female with a pmh as noted below presents with shortness of breath, hypoxia, found to have covid 23    COVID 19  COPD Exacerbation with acute bronchitis/ pneumonia  Acute on Chronic Respiratory Failure with Hypoxia   Admit to inpatient , COVID 19 unit    -CTA pulmonary negative for pulmonary emboli. Acute bilateral findings consistent with a bronchitis/bronchopneumonia protanomaly lower   -Blood cultures x2 check urine for Legionella antigen, Strep PNA    -Start Decadron, continue to azithromycin and Rocephin as initiated in the ED   -Wean oxygen as tolerated   -Albuterol as needed for wheezing   Daily CBC, CMP, CRP, INR, D-dimer, procalcitonin q48hr   Telemetry and continuous pulse ox   Droplet plus precaution   Check LDH, D Dimer, ferritin, CRP  Currently requiring 3 L NC L O2 NC. 2 L NC oxygen at baseline  Continuous pulse ox  Schedule albuterol/ combivent inhalers     Non-Insulin dependent type 2 diabetes   -sliding scale with hypoglycemia protocol   Hold home oral antihyperglycemic's for renal protection, check hemoglobin A1c    Seizure disorder   On home Tegretol,, Depakote, Keppra, check levels     Left Hemiplegia s/p CVA  Mixed hyperlipidemia  Acquired hypothyroid   Contine home medications  Mental Disability   Organic Personality Syndrome  Intermittent Explosive Disorder   Fall precautions    Prolonged QT intervals on EKG  Hypomagnesemia   Repletion magnesium IV   Complicated by multiple antipsychotics, antiseizure meds given patient history   EKG reviewed, corrected QTC approximately 500, not 700 as calculated per monitor    Baseline QTC around 440   Repeat lites this afternoon.    Give additional 2 gm mag for total 4 gm    Telemetry monitoring          All testing  and results reviewed with patient . All questions answered. Patient and family voiced understanding    This patient was seen and examined in conjunction with Dr. Shruti Kerr. He/She was agreeable with the plan and management as dictated above. Disposition:   Expected Disposition: Home  Estimated D/C: 2 days    Diet ADULT DIET; Regular; 5 carb choices (75 gm/meal)   GI Prophylaxis  [x] PPI,  [] H2 Blocker,  [] Carafate,  [] Diet/Tube Feeds   DVT Prophylaxis [x] Lovenox, []  Heparin, [] SCDs, [] Ambulation,  [] NOAC   Code Status Full Code   Surrogate Decision Maker/ POA APSI , legal guardian         History from:     patient, electronic medical record,     History of Present Illness:     Chief Complaint: Ralph Silva is a 63 y.o. female with pmh of MRDD, who presented emergency department via EMS, sent in by home health nursing secondary to hypoxic episode, as well as increased Respirations, cough congestion and increased oxygenation at home. Patient with generalized cough, congestion over the last 3, found to be off her oxygen in the low 60s because her oxygen had been maxed off overnight. Has been increasingly using her home O2 according to home health nursing. Patient is nonverbal, no other significant information can be given. Developmentally delayed. Limited history able to be obtained.      Review of Systems: Need 10 Elements   Review of Systems   Reason unable to perform ROS: Developmental delay, limited verbal.          Objective:   No intake or output data in the 24 hours ending 10/29/22 1345   Vitals:   Vitals:    10/29/22 1200 10/29/22 1215 10/29/22 1245 10/29/22 1330   BP: 128/73 134/75 138/75 (!) 148/86   Pulse: 73 68 68 73   Resp: 24 21 25 22   Temp:   99.4 °F (37.4 °C) 97.8 °F (36.6 °C)   TempSrc:   Oral Oral   SpO2: 99% 100% 97% 100%   Weight:           Medications Prior to Admission     Prior to Admission medications    Medication Sig Start Date End Date Taking? Authorizing Provider   pantoprazole (PROTONIX) 40 MG tablet Take 40 mg by mouth daily   Yes Historical Provider, MD   albuterol sulfate HFA (PROVENTIL;VENTOLIN;PROAIR) 108 (90 Base) MCG/ACT inhaler Inhale 2 puffs into the lungs in the morning, at noon, in the evening, and at bedtime 10/10/22  Yes Historical Provider, MD   ipratropium-albuterol (DUONEB) 0.5-2.5 (3) MG/3ML SOLN nebulizer solution Inhale 3 mLs into the lungs every 4 hours 9/26/22   Luca Allen MD   pantoprazole (PROTONIX) 40 MG tablet TAKE 1 TABLET BY MOUTH EVERY MORNING BEFORE BREAKFAST 9/15/22 10/17/22  INOCENCIO Velasquez NP   COMBIVENT RESPIMAT  MCG/ACT AERS inhaler INHALE 1 PUFF BY ORAL INHALATION EVERY 6 HOURS 8/29/22   Robin Paul MD   metFORMIN (GLUCOPHAGE) 500 MG tablet Take 1 tablet by mouth in the morning and 1 tablet in the evening. Take with meals. Patient taking differently: Take 1,000 mg by mouth daily (with breakfast) 8/9/22   Robin Paul MD   cetirizine (ZYRTEC) 10 MG tablet TAKE 1 TABLET BY MOUTH DAILY 8/2/22   Robin Paul MD   FEROSUL 325 (65 Fe) MG tablet TAKE 1 TABLET BY MOUTH DAILY WITH BREAKFAST 8/2/22   Robin Paul MD   amLODIPine (NORVASC) 10 MG tablet Take 1 tablet by mouth in the morning. 7/19/22   Robin Paul MD   nystatin (MYCOSTATIN) 552294 UNIT/GM powder Apply topically 4 times daily. under the abdominal folds for 2 wks 6/15/22   Gilbert Thomas MD   fluticasone [facility]) 50 MCG/ACT nasal spray INSTILL 1 SPRAY INTO EACH NOSTRIL DAILY 5/24/22   Gilbert Thomas MD   D3 HIGH POTENCY 50 MCG (2000 UT) CAPS TAKE 1 CAPSULE BY MOUTH DAILY 5/5/22   Robin Paul MD   ipratropium-albuterol (DUONEB) 0.5-2.5 (3) MG/3ML SOLN nebulizer solution Inhale 3 mLs into the lungs 3 times daily 4/21/22   Luca Allen MD   UNABLE TO FIND hospital bed mattress XL twin 3/31/22   Robin Paul MD   carBAMazepine (CARBATROL) 300 MG extended release capsule TAKE 1 CAPSULE BY MOUTH TWICE A DAY 1/20/22   Kim Ventuar MD   Lactobacillus (ACIDOPHILUS) CAPS capsule TAKE 1 CAPSULE BY MOUTH DAILY 1/20/22   Kim Ventura MD   atorvastatin (LIPITOR) 40 MG tablet TAKE 1 TABLET BY MOUTH DAILY 12/17/21   Kim Ventura MD   levothyroxine (SYNTHROID) 75 MCG tablet Take 1 tablet by mouth Daily  Patient taking differently: Take 50 mcg by mouth Daily 11/21/21   Kim Ventura MD   levETIRAcetam (KEPPRA) 750 MG tablet Take 2 tablets by mouth 2 times daily 10/17/21   Srinath Todd MD   tiotropium-olodaterol (STIOLTO) 2.5-2.5 MCG/ACT AERS Inhale 2 puffs into the lungs daily 9/26/21   Elidia Garsia MD   JANUVIA 100 MG tablet TAKE 1 TABLET BY MOUTH DAILY  Patient taking differently: 100 mg 2 times daily 3/17/21   Kim Ventura MD   lisinopril (PRINIVIL;ZESTRIL) 40 MG tablet TAKE 1 TABLET BY MOUTH DAILY 3/1/21   Kim Ventura MD   Diapers & Supplies MISC 1 each by Does not apply route daily as needed (4-5 times daily) 7/24/20   Kim Ventura MD   mirtazapine (REMERON) 30 MG tablet TAKE 1 TABLET BY MOUTH NIGHTLY 6/5/20   Kim Ventura MD   Incontinence Supply Disposable (DEPEND UNDERWEAR LARGE/XL) MISC 1 Units by Does not apply route 4 times daily 4/6/20   Kim Ventura MD   Disposable Gloves MISC 1 Units by Does not apply route 4 times daily 4/6/20   Kim Ventura MD   diphenhydrAMINE (BENADRYL) 25 MG capsule Take 25 mg by mouth every 6 hours as needed for Itching    Historical MD Oswald   blood glucose monitor strips Test 3 times a day & as needed for symptoms of irregular blood glucose.  12/26/18   Alan Farley MD   busPIRone (BUSPAR) 10 MG tablet Take 1 tablet by mouth 2 times daily 5/31/18   Kim Ventura MD   divalproex (DEPAKOTE) 500 MG DR tablet Take 4 tablets by mouth nightly  Patient taking differently: Take 1,000 mg by mouth in the morning and 1,000 mg in the evening. 5/31/18   Kim Ventura MD   risperiDONE (RISPERDAL) 1 MG tablet Take 1 tablet by mouth 3 times daily 1 tablet in am, 1 tablet at 4pm, 1 tablet at bedtime  Patient taking differently: Take 1 mg by mouth 2 times daily Twice daily 5/31/18   Cony Jacinto MD   sertraline (ZOLOFT) 100 MG tablet Take 2 tablets by mouth nightly  Patient taking differently: Take 150 mg by mouth nightly 5/31/18   Cony Jacinto MD       Physical Exam: Need 8 Elements   Physical Exam  Vitals and nursing note reviewed. Constitutional:       General: She is not in acute distress. Appearance: Normal appearance. HENT:      Head: Normocephalic. Nose: Congestion present. Mouth/Throat:      Mouth: Mucous membranes are moist.      Pharynx: Oropharynx is clear. Eyes:      Pupils: Pupils are equal, round, and reactive to light. Cardiovascular:      Rate and Rhythm: Normal rate and regular rhythm. Pulses: Normal pulses. Pulmonary:      Effort: Pulmonary effort is normal. No respiratory distress. Breath sounds: Examination of the left-upper field reveals rhonchi. Wheezing and rhonchi present. Abdominal:      General: Abdomen is flat. Bowel sounds are normal. There is no distension. Musculoskeletal:         General: Normal range of motion. Cervical back: Normal range of motion. Skin:     General: Skin is warm and dry. Capillary Refill: Capillary refill takes less than 2 seconds. Neurological:      General: No focal deficit present. Mental Status: She is alert. Mental status is at baseline.    Psychiatric:         Mood and Affect: Mood normal.          Past Medical History:   PMHx   Past Medical History:   Diagnosis Date    Anxiety disorder     Back pain, chronic     Cerebral palsy (HCC)     COPD [facility]chronic obstructive [facility] disease[facility] ([facility]     COVID-19 10/12/2021    CVA (cerebral infarction) 2014 x2    Diabetes mellitus ([facility]     Diverticulosis     Epilepsy ([facility]     GERD (gastroesophageal reflux disease)     Hyperlipidemia     Hypertension     Insomnia     Iron (Fe) deficiency anemia     Mental disability dexamethasone  6 mg IntraVENous Q6H    azithromycin  500 mg IntraVENous Once    cefTRIAXone (ROCEPHIN) IV  1,000 mg IntraVENous Once    sodium chloride flush  5-40 mL IntraVENous 2 times per day    enoxaparin  40 mg SubCUTAneous Daily    polyethylene glycol  17 g Oral Daily    sennosides-docusate sodium  1 tablet Oral BID    insulin lispro  0-4 Units SubCUTAneous TID WC    insulin lispro  0-4 Units SubCUTAneous Nightly    [START ON 10/30/2022] cefTRIAXone (ROCEPHIN) IV  1,000 mg IntraVENous Q24H    And    [START ON 10/30/2022] azithromycin  500 mg Oral Q24H    melatonin  3 mg Oral Nightly    [START ON 10/30/2022] dexamethasone  6 mg Oral Daily    albuterol sulfate HFA  2 puff Inhalation 4x daily    amLODIPine  10 mg Oral Daily    atorvastatin  1 tablet Oral Daily    busPIRone  10 mg Oral BID    carBAMazepine  300 mg Oral BID    cetirizine  1 tablet Oral Daily    divalproex  1,000 mg Oral BID    [START ON 10/30/2022] ferrous sulfate  325 mg Oral Daily with breakfast    fluticasone  1 spray Each Nostril Daily    acidophilus  1 capsule Oral Daily    levETIRAcetam  1,500 mg Oral BID    lisinopril  1 tablet Oral Daily    mirtazapine  30 mg Oral Nightly    miconazole   Topical BID    pantoprazole  40 mg Oral Daily    risperiDONE  1 mg Oral BID    sertraline  150 mg Oral Nightly    tiotropium-olodaterol  2 puff Inhalation Daily      Infusions:    sodium chloride 100 mL/hr at 10/29/22 1259    dextrose      sodium chloride       PRN Meds: glucose, 4 tablet, PRN  dextrose bolus, 125 mL, PRN   Or  dextrose bolus, 250 mL, PRN  glucagon (rDNA), 1 mg, PRN  dextrose, , Continuous PRN  sodium chloride flush, 5-40 mL, PRN  sodium chloride, , PRN  polyethylene glycol, 17 g, Daily PRN  acetaminophen, 650 mg, Q6H PRN   Or  acetaminophen, 650 mg, Q6H PRN  promethazine, 12.5 mg, Q6H PRN  aluminum & magnesium hydroxide-simethicone, 30 mL, Q6H PRN  guaiFENesin-dextromethorphan, 5 mL, Q4H PRN  albuterol-ipratropium, 1 puff, Q6H PRN  diphenhydrAMINE, 25 mg, Q6H PRN      Labs      CBC:   Recent Labs     10/29/22  0935   WBC 5.7   HGB 10.5*        BMP:    Recent Labs     10/29/22  0935      K 3.8      CO2 29   BUN 20   CREATININE 0.5*   GLUCOSE 151*     Hepatic:   Recent Labs     10/29/22  0935   AST 17   ALT 12   BILITOT 0.2   ALKPHOS 61     Lipids:   Lab Results   Component Value Date/Time    CHOL 161 08/19/2022 07:02 AM    HDL 40 08/19/2022 07:02 AM    TRIG 269 08/19/2022 07:02 AM     Hemoglobin A1C:   Lab Results   Component Value Date/Time    LABA1C 5.3 08/19/2022 07:02 AM     TSH: No results found for: TSH  Troponin:   Lab Results   Component Value Date/Time    TROPONINT <0.010 10/29/2022 09:35 AM    TROPONINT <0.010 10/13/2022 11:15 AM    TROPONINT <0.010 05/26/2022 08:50 AM     Lactic Acid: No results for input(s): LACTA in the last 72 hours. BNP:   Recent Labs     10/29/22  0935   PROBNP 357.7*     UA:  Lab Results   Component Value Date/Time    NITRU NEGATIVE 10/29/2022 09:55 AM    COLORU YELLOW 10/29/2022 09:55 AM    PHUR 6 04/05/2019 03:53 PM    WBCUA <1 10/29/2022 09:55 AM    RBCUA 4 10/29/2022 09:55 AM    MUCUS FEW 10/29/2022 09:55 AM    TRICHOMONAS NONE SEEN 10/29/2022 09:55 AM    YEAST RARE 08/11/2018 04:20 PM    BACTERIA RARE 10/29/2022 09:55 AM    CLARITYU CLEAR 10/29/2022 09:55 AM    SPECGRAV 1.025 10/29/2022 09:55 AM    LEUKOCYTESUR NEGATIVE 10/29/2022 09:55 AM    UROBILINOGEN 1.0 10/29/2022 09:55 AM    BILIRUBINUR SMALL 10/29/2022 09:55 AM    BILIRUBINUR neg 04/05/2019 03:53 PM    BLOODU TRACE 10/29/2022 09:55 AM    GLUCOSEU neg 04/05/2019 03:53 PM    KETUA 15 10/29/2022 09:55 AM     Urine Cultures:   Lab Results   Component Value Date/Time    LABURIN  08/28/2018 01:17 PM     <10,000 CFU/ml mixed skin/urogenital jaycob.  No further workup    LABURIN 50,000 CFU/ml  Multiple Resistant Drug Organism   08/28/2018 01:17 PM     Blood Cultures: No results found for: BC  No results found for: BLOODCULT2  Organism:   Lab Results   Component Value Date/Time    ORG ECOL 12/18/2018 07:49 AM       Imaging/Diagnostics Last 24 Hours   XR CHEST PORTABLE    Result Date: 10/29/2022  EXAMINATION: ONE XRAY VIEW OF THE CHEST 10/29/2022 9:17 am COMPARISON: 10/13/2022 HISTORY: ORDERING SYSTEM PROVIDED HISTORY: SOB TECHNOLOGIST PROVIDED HISTORY: Reason for exam:->SOB Reason for Exam: SOB Additional signs and symptoms: SOB Relevant Medical/Surgical History: SOB FINDINGS: Subjective central bronchial pulmonary marking prominence partially reflecting low lung volumes however, central bronchitis cannot be excluded. Otherwise, lung fields are clear. No detectable pleural effusion, pneumothorax, pulmonary edema, cardiomegaly or mediastinal widening. Subjective skeletal osteopenia noted. Subjective central bronchial pulmonary marking prominence partially reflecting low lung volumes however, central bronchitis and underlying chronic lung disease may contribute to this appearance. Otherwise, radiographically nonacute portable chest. Subjective skeletal osteopenia. CTA PULMONARY W CONTRAST    Result Date: 10/29/2022  EXAMINATION: CTA OF THE CHEST 10/29/2022 11:53 am TECHNIQUE: CTA of the chest was performed after the administration of intravenous contrast.  Multiplanar reformatted images are provided for review. MIP images are provided for review. Automated exposure control, iterative reconstruction, and/or weight based adjustment of the mA/kV was utilized to reduce the radiation dose to as low as reasonably achievable. COMPARISON: Lung cancer screening CT 09/02/2022. CT PA 05/26/2022. Chest radiograph 10/29/2022. HISTORY: ORDERING SYSTEM PROVIDED HISTORY: Shortness of breath, increased oxygenation, COVID-19, rule out acute intrathoracic pathology. TECHNOLOGIST PROVIDED HISTORY: Reason for exam:->Shortness of breath, increased oxygenation, COVID-19, rule out acute intrathoracic pathology.  Decision Support Exception - unselect if not a suspected or confirmed emergency medical condition->Emergency Medical Condition (MA) Reason for Exam: Shortness of breath, increased oxygenation, COVID-19, rule out acute intrathoracic pathology. Additional signs and symptoms: 80ml Isovue 370 FINDINGS: Pulmonary Arteries: The pulmonary arteries are normal in size with adequate opacification to the subsegmental level. No acute intraluminal filling defect. Mediastinum: Normal heart size with no significant pericardial effusion. Extensive coronary artery calcifications. Mild thoracic aortic atherosclerotic calcifications with no aneurysmal dilatation or dissection. The esophagus appears unremarkable. Calcified mediastinal and hilar lymph nodes consistent with remote healed granulomatous disease. Bilateral symmetric enlarged hilar lymph nodes. Stable borderline enlarged precarinal and lateral recess lymph nodes compared to the prior CT's. Lungs/pleura: The trachea is patent. The bilateral bronchi predominately involving the lower lobes demonstrate progressive circumferential wall thickening with mild patchy peribronchial opacities. Normal lung volumes. No effusion or pneumothorax. No lobar consolidation. No spiculated mass. Acute right lower lobe posterior subpleural patchy consolidation. Upper Abdomen: Abdominal aortic atherosclerosis. There is mild reflux of contrast into the hepatic veins. Colonic diverticulosis present. No acute abnormality. Soft Tissues/Bones: The body wall soft tissues demonstrate no edema. No significant axillary lymphadenopathy. Normal thoracic spine kyphotic curvature, alignment, and vertebral body heights. Evidence of thoracic spine DISH. Lower cervical degenerative disc and joint disease. No acute osseous abnormality. 1. No acute pulmonary emboli. 2. Acute bilateral findings consistent bronchitis/bronchopneumonia predominately involving the lower lobes.  3. Mild symmetric bilateral hilar lymphadenopathy likely representing reactive nodes. Electronically signed by INOCENCIO Merchant CNP on 10/29/2022 at 1:45 PM          This dictation was created with voice recognition software. While attempts have been made to review the dictation as it is transcribed, on occasion the spoken word can be misinterpreted by the technology leading to omissions or inappropriate words, phrases or sentences.      Electronically signed by INOCENCIO Merchant CNP on 10/29/2022 at 1:45 PM

## 2022-10-29 NOTE — ED PROVIDER NOTES
[address] ENCOUNTER        Pt Name: Gracie Rainey  MRN: [id number]  [place] 1960  Date of evaluation: 10/29/2022  Provider: SRI Colby  PCP: Charla Fuentes MD     I have seen and evaluated this patient with my supervising physician Talia Hoffman DO. Triage CHIEF COMPLAINT       Chief Complaint   Patient presents with    Respiratory Distress     Home health care nurse reports decreased oxygen saturation ongoing for the past week. Patient coughing but denies shortness of breath. HISTORY OF PRESENT ILLNESS      Chief Complaint: Shortness of breath    Gracie Rainey is a 63 y.o. female who presents to the emergency department today via EMS called in by the home health nurse for shortness of breath. Patient has had generalized cough congestion over the last week, was found to be off her oxygen that she wears at baseline overnight and was found to be in the low 60s because her oxygen had been knocked off. Has been increasing oxygenation according to home health nursing. Patient is nonverbal, no other significant information could be given. Patient is nonverbal, developmental delayed. Limited history able to be obtained. Nursing Notes were all reviewed and agreed with or any disagreements were addressed in the HPI.     REVIEW OF SYSTEMS     Unable to obtain    PAST MEDICAL HISTORY     Past Medical History:   Diagnosis Date    Anxiety disorder     Back pain, chronic     Cerebral palsy (HCC)     COPD (chronic obstructive pulmonary disease) ([facility] [address]     COVID-19 10/12/2021    CVA (cerebral infarction) 2014 x2    Diabetes mellitus ([facility] [address]     Diverticulosis     Epilepsy ([facility] [address]     GERD (gastroesophageal reflux disease)     Hyperlipidemia     Hypertension     Insomnia     Iron (Fe) deficiency anemia     Mental disability     Seizures (HCC)     Unspecified cerebral artery occlusion with cerebral infarction        SURGICAL HISTORY     Past Surgical History:   Procedure Laterality Date    GASTROSTOMY TUBE PLACEMENT         CURRENTMEDICATIONS       Previous Medications    AMLODIPINE (NORVASC) 10 MG TABLET    Take 1 tablet by mouth in the morning. ATORVASTATIN (LIPITOR) 40 MG TABLET    TAKE 1 TABLET BY MOUTH DAILY    BLOOD GLUCOSE MONITOR STRIPS    Test 3 times a day & as needed for symptoms of irregular blood glucose.     BUSPIRONE (BUSPAR) 10 MG TABLET    Take 1 tablet by mouth 2 times daily    CARBAMAZEPINE (CARBATROL) 300 MG EXTENDED RELEASE CAPSULE    TAKE 1 CAPSULE BY MOUTH TWICE A DAY    CETIRIZINE (ZYRTEC) 10 MG TABLET    TAKE 1 TABLET BY MOUTH DAILY    COMBIVENT RESPIMAT  MCG/ACT AERS INHALER    INHALE 1 PUFF BY ORAL INHALATION EVERY 6 HOURS    D3 HIGH POTENCY 50 MCG (2000 UT) CAPS    TAKE 1 CAPSULE BY MOUTH DAILY    DIAPERS & SUPPLIES MISC    1 each by Does not apply route daily as needed (4-5 times daily)    DIPHENHYDRAMINE (BENADRYL) 25 MG CAPSULE    Take 25 mg by mouth every 6 hours as needed for Itching    DISPOSABLE GLOVES MISC    1 Units by Does not apply route 4 times daily    DIVALPROEX (DEPAKOTE) 500 MG DR TABLET    Take 4 tablets by mouth nightly    FEROSUL 325 (65 FE) MG TABLET    TAKE 1 TABLET BY MOUTH DAILY WITH BREAKFAST    FLUTICASONE (FLONASE) 50 MCG/ACT NASAL SPRAY    INSTILL 1 SPRAY INTO EACH NOSTRIL DAILY    INCONTINENCE SUPPLY DISPOSABLE (DEPEND UNDERWEAR LARGE/XL) MISC    1 Units by Does not apply route 4 times daily    IPRATROPIUM-ALBUTEROL (DUONEB) 0.5-2.5 (3) MG/3ML SOLN NEBULIZER SOLUTION    Inhale 3 mLs into the lungs 3 times daily    IPRATROPIUM-ALBUTEROL (DUONEB) 0.5-2.5 (3) MG/3ML SOLN NEBULIZER SOLUTION    Inhale 3 mLs into the lungs every 4 hours    JANUVIA 100 MG TABLET    TAKE 1 TABLET BY MOUTH DAILY    LACTOBACILLUS (ACIDOPHILUS) CAPS CAPSULE    TAKE 1 CAPSULE BY MOUTH DAILY    LEVETIRACETAM (KEPPRA) 750 MG TABLET    Take 2 tablets by mouth 2 times daily LEVOTHYROXINE (SYNTHROID) 75 MCG TABLET    Take 1 tablet by mouth Daily    LISINOPRIL (PRINIVIL;ZESTRIL) 40 MG TABLET    TAKE 1 TABLET BY MOUTH DAILY    METFORMIN (GLUCOPHAGE) 500 MG TABLET    Take 1 tablet by mouth in the morning and 1 tablet in the evening. Take with meals. MIRTAZAPINE (REMERON) 30 MG TABLET    TAKE 1 TABLET BY MOUTH NIGHTLY    NYSTATIN (MYCOSTATIN) 334852 UNIT/GM POWDER    Apply topically 4 times daily. under the abdominal folds for 2 wks    PANTOPRAZOLE (PROTONIX) 40 MG TABLET    TAKE 1 TABLET BY MOUTH EVERY MORNING BEFORE BREAKFAST    PANTOPRAZOLE (PROTONIX) 40 MG TABLET    Take 40 mg by mouth daily    RISPERIDONE (RISPERDAL) 1 MG TABLET    Take 1 tablet by mouth 3 times daily 1 tablet in am, 1 tablet at 4pm, 1 tablet at bedtime    SERTRALINE (ZOLOFT) 100 MG TABLET    Take 2 tablets by mouth nightly    TIOTROPIUM-OLODATEROL (STIOLTO) 2.5-2.5 MCG/ACT AERS    Inhale 2 puffs into the lungs daily    UNABLE TO FIND    hospital bed mattress XL twin       ALLERGIES     Macrobid [nitrofurantoin]    FAMILYHISTORY       Family History   Problem Relation Age of Onset    Breast Cancer Neg Hx         SOCIAL HISTORY       Social History     Socioeconomic History    Marital status: Single   Tobacco Use    Smoking status: Former     Packs/day: 1.50     Years: 20.00     Pack years: 30.00     Types: Cigarettes     Quit date: 2011     Years since quittin.1    Smokeless tobacco: Never   Vaping Use    Vaping Use: Never used   Substance and Sexual Activity    Alcohol use: No     Alcohol/week: 0.0 standard drinks    Drug use: No   Social History Narrative    ** Merged History Encounter **          Social Determinants of Health     Financial Resource Strain: Low Risk     Difficulty of Paying Living Expenses: Not very hard   Food Insecurity: No Food Insecurity    Worried About Running Out of Food in the Last Year: Never true    Ran Out of Food in the Last Year: Never true   Transportation Needs: No Transportation Needs    Lack of Transportation (Medical): No    Lack of Transportation (Non-Medical): No   Physical Activity: Inactive    Days of Exercise per Week: 0 days    Minutes of Exercise per Session: 0 min   Stress: No Stress Concern Present    Feeling of Stress : Only a little   Social Connections: Socially Isolated    Frequency of Communication with Friends and Family: Three times a week    Frequency of Social Gatherings with Friends and Family: Three times a week    Attends Judaism Services: Never    Active Member of Clubs or Organizations: No    Attends Club or Organization Meetings: Never    Marital Status: Never    Housing Stability: Low Risk     Unable to Pay for Housing in the Last Year: No    Number of [place] in the Last Year: 1    Unstable Housing in the Last Year: No       SCREENINGS    Jerome Coma Scale  Eye Opening: Spontaneous  Best Verbal Response: Oriented  Best Motor Response: Obeys commands  Jerome Coma Scale Score: 15      PHYSICAL EXAM       ED Triage Vitals [10/29/22 0902]   BP Temp Temp Source Heart Rate Resp SpO2 Height Weight   (!) 155/85 100.4 °F (38 °C) Oral 94 17 92 % -- 132 lb (59.9 kg)      Constitutional:  Well developed, Well nourished. No distress  HENT:  Normocephalic, Atraumatic, PERRL. EOMI. Sclera clear. Conjunctiva normal, No discharge. Neck/Lymphatics: supple, no JVD, no swollen nodes  Cardiovascular:   RRR,  no murmurs/rubs/gallops. Respiratory:   Nonlabored breathing. Normal breath sounds, No wheezing  Abdomen: Bowel sounds normal, Soft, No tenderness, no masses. Musculoskeletal:    There is no edema, asymmetry, or calf / thigh tenderness bilaterally. No cyanosis. No cool or pale-appearing limb. Distal cap refill and pulses intact bilateral upper and lower extremities  Bilateral upper and lower extremity ROM intact without pain or obvious deficit  Integument:   Warm, Dry  Neurologic:  Alert  , No focal deficits noted.    Cranial nerves II through XII grossly intact. Normal gross motor coordination & motor strength bilateral upper and lower extremities  Sensation intact. Psychiatric:  Affect normal, Mood normal.     DIAGNOSTIC RESULTS   LABS:    Labs Reviewed   RESPIRATORY PANEL, MOLECULAR, WITH COVID-19 - Abnormal; Notable for the following components:       Result Value    SARS-CoV-2 DETECTED BY PCR (*)     All other components within normal limits   CBC WITH AUTO DIFFERENTIAL - Abnormal; Notable for the following components:    RBC 3.32 (*)     Hemoglobin 10.5 (*)     Hematocrit 34.1 (*)     .7 (*)     MCH 31.6 (*)     MCHC 30.8 (*)     Segs Relative 67.2 (*)     Lymphocytes % 15.5 (*)     Monocytes % 14.3 (*)     Immature Neutrophil % 2.1 (*)     All other components within normal limits   COMPREHENSIVE METABOLIC PANEL - Abnormal; Notable for the following components:    Creatinine 0.5 (*)     Glucose 151 (*)     All other components within normal limits   BRAIN NATRIURETIC PEPTIDE - Abnormal; Notable for the following components:    Pro-.7 (*)     All other components within normal limits   MAGNESIUM - Abnormal; Notable for the following components:    Magnesium 1.7 (*)     All other components within normal limits   URINALYSIS - Abnormal; Notable for the following components:    Ketones, Urine 15 (*)     All other components within normal limits   BLOOD GAS, VENOUS - Abnormal; Notable for the following components:    Base Exc, Mixed 6.6 (*)     HCO3, Venous 32.4 (*)     O2 Sat, Lan 77.6 (*)     All other components within normal limits   MICROSCOPIC URINALYSIS - Abnormal; Notable for the following components:    Bacteria, UA RARE (*)     Mucus, UA FEW (*)     All other components within normal limits   CULTURE, BLOOD 1   CULTURE, BLOOD 2   CULTURE, URINE   TROPONIN   LACTIC ACID   BLOOD GAS, VENOUS       When ordered, only abnormal lab results are displayed.  All other labs were within normal range or not returned as of this dictation. EKG: When ordered, EKG's are interpreted by the Emergency Department Physician in the absence of a cardiologist.  Please see their note for interpretation of EKG. RADIOLOGY:   Non-plain film images such as CT, Ultrasound and MRI are read by the radiologist. Plain radiographic images are visualized and preliminarily interpreted by the  ED Provider with the below findings:    Interpretation perthe Radiologist below, if available at the time of this note:    CTA PULMONARY W CONTRAST   Preliminary Result   1. No acute pulmonary emboli. 2. Acute bilateral findings consistent bronchitis/bronchopneumonia   predominately involving the lower lobes. 3. Mild symmetric bilateral hilar lymphadenopathy likely representing   reactive nodes. XR CHEST PORTABLE   Final Result   Subjective central bronchial pulmonary marking prominence partially   reflecting low lung volumes however, central bronchitis and underlying   chronic lung disease may contribute to this appearance. Otherwise,   radiographically nonacute portable chest.      Subjective skeletal osteopenia. XR CHEST PORTABLE    Result Date: 10/29/2022  EXAMINATION: ONE XRAY VIEW OF THE CHEST 10/29/2022 9:17 am COMPARISON: 10/13/2022 HISTORY: ORDERING SYSTEM PROVIDED HISTORY: SOB TECHNOLOGIST PROVIDED HISTORY: Reason for exam:->SOB Reason for Exam: SOB Additional signs and symptoms: SOB Relevant Medical/Surgical History: SOB FINDINGS: Subjective central bronchial pulmonary marking prominence partially reflecting low lung volumes however, central bronchitis cannot be excluded. Otherwise, lung fields are clear. No detectable pleural effusion, pneumothorax, pulmonary edema, cardiomegaly or mediastinal widening. Subjective skeletal osteopenia noted.      Subjective central bronchial pulmonary marking prominence partially reflecting low lung volumes however, central bronchitis and underlying chronic lung disease may contribute to this appearance. Otherwise, radiographically nonacute portable chest. Subjective skeletal osteopenia. PROCEDURES   Unless otherwise noted below, none      CRITICAL CARE   CRITICAL CARE NOTE:   N/A    CONSULTS:  IP CONSULT TO HOSPITALIST      EMERGENCY DEPARTMENT COURSE and MDM:   Vitals:    Vitals:    10/29/22 1100 10/29/22 1115 10/29/22 1200 10/29/22 1215   BP: (!) 140/61 139/62 128/73 134/75   Pulse: 80 80 73 68   Resp: 25 25 24 21   Temp:       TempSrc:       SpO2: 96% 96% 99% 100%   Weight:           Patient was given thefollowing medications:  Medications   0.9 % sodium chloride infusion (has no administration in time range)   dexamethasone (PF) (DECADRON) injection 6 mg (6 mg IntraVENous Given 10/29/22 1106)   magnesium sulfate 2000 mg in 50 mL IVPB premix (2,000 mg IntraVENous New Bag 10/29/22 1106)   azithromycin (ZITHROMAX) 500 mg in dextrose 5 % 250 mL IVPB ([id number]) (has no administration in time range)   cefTRIAXone (ROCEPHIN) 1,000 mg in dextrose 5 % 50 mL IVPB mini-bag (has no administration in time range)   0.9 % sodium chloride bolus (1,000 mLs IntraVENous New Bag 10/29/22 1002)   acetaminophen (TYLENOL) tablet 1,000 mg (1,000 mg Oral Given 10/29/22 1004)   iopamidol (ISOVUE-370) 76 % injection 80 mL (80 mLs IntraVENous Given 10/29/22 1154)         Is this patient to be included in the SEP-1 Core Measure due to severe sepsis or septic shock? No   Exclusion criteria - the patient is NOT to be included for SEP-1 Core Measure due to:  Viral etiology found or highly suspected (including COVID-19) without concomitant bacterial infection    MDM:  Patient presents as above. Emergent etiologies considered. Patient seen and examined. Work-up initiated secondary to presentation, physical exam findings, vital signs and medical chart review.     In brief, 53-year-old female who is developmentally delayed presented emergency department today via EMS sent in by home health nursing secondary to a hypoxic episode as well as increased respirations, cough congestion, increased oxygenation. No overt wheezing, rhonchi or rales noted on auscultation. No abdominal tenderness. Does have a strong smell of urine    A broad work-up was initiated including EKG, chest x-ray, lab work including blood cultures, urinary cultures. IV fluids initiated, antipyretics initiated    Patient is COVID-positive. Does have some central bronchial markings without obvious signs of infiltrate. EKG did show QT C prolongation without obvious signs of ischemia or life-threatening arrhythmia. Magnesium was 1.7 which will be supplemented. Patient was given IV Decadron, CTA pulmonary study ordered to evaluate the extent of possible intrathoracic infection versus other pathology. Patient's CTA is showing bilateral bronchopneumonia of uncertain etiology. We will initiate broad-spectrum antibiotics although this could be related to patient's COVID-19. Was given fluids, was supplemented magnesium. Otherwise remained stable. Secondary to her developmentally delayed status, her increasing hypoxia, increased oxygenation in the setting of COVID-19 we will look to admit for further monitoring and management patient's symptoms. Patient was seen and evaluated by attending physician, Abdelrahman Brandon. CLINICAL IMPRESSION      1. Shortness of breath    2. COVID-19    3. Prolonged Q-T interval on ECG    4. Hypomagnesemia    5. Pneumonia of both lungs due to infectious organism, unspecified part of lung          DISPOSITION/PLAN   DISPOSITION Admitted 10/29/2022 12:38:27 PM      (Please note that portions ofthis note were completed with a voice recognition program.  Efforts were made to edit the dictations but occasionally words are mis-transcribed. )    SRI Tobar (electronically signed)            SRI iZmmer  10/29/22 4566

## 2022-10-29 NOTE — PROGRESS NOTES
4 Eyes Skin Assessment     NAME:  Kamryn Tate OF BIRTH:  1960  MEDICAL RECORD NUMBER:  [id number]    The patient is being assess for  Admission    I agree that 2 RN's have performed a thorough Head to Toe Skin Assessment on the patient. ALL assessment sites listed below have been assessed. Areas assessed by both nurses:    Head, Face, Ears, Shoulders, Back, Chest, Arms, Elbows, Hands, Sacrum. Buttock, Coccyx, Ischium, and Legs. Feet and Heels        Does the Patient have a Wound?  No noted wound(s)       [place] Prevention initiated:  Yes   Wound Care Orders initiated:  No    Pressure Injury (Stage 3,4, Unstageable, DTI, NWPT, and Complex wounds) if present place referral/consult order under [de-identified] No    New and Established Ostomies if present place consult order under : No      Nurse 1 eSignature: Electronically signed by Belgica Gar RN on 10/29/22 at 2:07 PM EDT    **SHARE this note so that the co-signing nurse is able to place an eSignature**    Nurse 2 eSignature: {Esignature:[id number]}

## 2022-10-30 LAB
ALBUMIN SERPL-MCNC: 2.9 GM/DL (ref 3.4–5)
ALP BLD-CCNC: 50 IU/L (ref 40–129)
ALT SERPL-CCNC: 10 U/L (ref 10–40)
ANION GAP SERPL CALCULATED.3IONS-SCNC: 8 MMOL/L (ref 4–16)
AST SERPL-CCNC: 13 IU/L (ref 15–37)
BASOPHILS ABSOLUTE: 0 K/CU MM
BASOPHILS RELATIVE PERCENT: 0.6 % (ref 0–1)
BILIRUB SERPL-MCNC: 0.1 MG/DL (ref 0–1)
BUN BLDV-MCNC: 13 MG/DL (ref 6–23)
CALCIUM SERPL-MCNC: 8.4 MG/DL (ref 8.3–10.6)
CHLORIDE BLD-SCNC: 108 MMOL/L (ref 99–110)
CO2: 30 MMOL/L (ref 21–32)
CREAT SERPL-MCNC: 0.5 MG/DL (ref 0.6–1.1)
CULTURE: NORMAL
DIFFERENTIAL TYPE: ABNORMAL
EKG ATRIAL RATE: 58 BPM
EKG DIAGNOSIS: NORMAL
EKG P AXIS: 113 DEGREES
EKG P-R INTERVAL: 164 MS
EKG Q-T INTERVAL: 430 MS
EKG QRS DURATION: 82 MS
EKG QTC CALCULATION (BAZETT): 422 MS
EKG R AXIS: 54 DEGREES
EKG T AXIS: 41 DEGREES
EKG VENTRICULAR RATE: 58 BPM
EOSINOPHILS ABSOLUTE: 0 K/CU MM
EOSINOPHILS RELATIVE PERCENT: 1.1 % (ref 0–3)
GFR SERPL CREATININE-BSD FRML MDRD: >60 ML/MIN/1.73M2
GLUCOSE BLD-MCNC: 101 MG/DL (ref 70–99)
GLUCOSE BLD-MCNC: 110 MG/DL (ref 70–99)
GLUCOSE BLD-MCNC: 119 MG/DL (ref 70–99)
GLUCOSE BLD-MCNC: 186 MG/DL (ref 70–99)
GLUCOSE BLD-MCNC: 188 MG/DL (ref 70–99)
GLUCOSE BLD-MCNC: 94 MG/DL (ref 70–99)
HCT VFR BLD CALC: 31.5 % (ref 37–47)
HEMOGLOBIN: 9.5 GM/DL (ref 12.5–16)
IMMATURE NEUTROPHIL %: 4.8 % (ref 0–0.43)
LYMPHOCYTES ABSOLUTE: 1.5 K/CU MM
LYMPHOCYTES RELATIVE PERCENT: 41.2 % (ref 24–44)
Lab: NORMAL
MCH RBC QN AUTO: 31.7 PG (ref 27–31)
MCHC RBC AUTO-ENTMCNC: 30.2 % (ref 32–36)
MCV RBC AUTO: 105 FL (ref 78–100)
MONOCYTES ABSOLUTE: 0.5 K/CU MM
MONOCYTES RELATIVE PERCENT: 13.9 % (ref 0–4)
NUCLEATED RBC %: 0.6 %
PDW BLD-RTO: 12.2 % (ref 11.7–14.9)
PLATELET # BLD: 130 K/CU MM (ref 140–440)
PMV BLD AUTO: 8.7 FL (ref 7.5–11.1)
POTASSIUM SERPL-SCNC: 4.1 MMOL/L (ref 3.5–5.1)
RBC # BLD: 3 M/CU MM (ref 4.2–5.4)
SEGMENTED NEUTROPHILS ABSOLUTE COUNT: 1.4 K/CU MM
SEGMENTED NEUTROPHILS RELATIVE PERCENT: 38.4 % (ref 36–66)
SODIUM BLD-SCNC: 146 MMOL/L (ref 135–145)
SPECIMEN: NORMAL
TOTAL IMMATURE NEUTOROPHIL: 0.17 K/CU MM
TOTAL NUCLEATED RBC: 0 K/CU MM
TOTAL PROTEIN: 5.1 GM/DL (ref 6.4–8.2)
VITAMIN D 25-HYDROXY: 39.34 NG/ML
WBC # BLD: 3.5 K/CU MM (ref 4–10.5)

## 2022-10-30 PROCEDURE — 96367 TX/PROPH/DG ADDL SEQ IV INF: CPT

## 2022-10-30 PROCEDURE — 2580000003 HC RX 258: Performed by: STUDENT IN AN ORGANIZED HEALTH CARE EDUCATION/TRAINING PROGRAM

## 2022-10-30 PROCEDURE — 94761 N-INVAS EAR/PLS OXIMETRY MLT: CPT

## 2022-10-30 PROCEDURE — 94640 AIRWAY INHALATION TREATMENT: CPT

## 2022-10-30 PROCEDURE — 2500000003 HC RX 250 WO HCPCS: Performed by: NURSE PRACTITIONER

## 2022-10-30 PROCEDURE — 80053 COMPREHEN METABOLIC PANEL: CPT

## 2022-10-30 PROCEDURE — 87081 CULTURE SCREEN ONLY: CPT

## 2022-10-30 PROCEDURE — 82805 BLOOD GASES W/O2 SATURATION: CPT

## 2022-10-30 PROCEDURE — 6370000000 HC RX 637 (ALT 250 FOR IP): Performed by: NURSE PRACTITIONER

## 2022-10-30 PROCEDURE — 85025 COMPLETE CBC W/AUTO DIFF WBC: CPT

## 2022-10-30 PROCEDURE — 96376 TX/PRO/DX INJ SAME DRUG ADON: CPT

## 2022-10-30 PROCEDURE — 2580000003 HC RX 258: Performed by: NURSE PRACTITIONER

## 2022-10-30 PROCEDURE — 6360000002 HC RX W HCPCS: Performed by: STUDENT IN AN ORGANIZED HEALTH CARE EDUCATION/TRAINING PROGRAM

## 2022-10-30 PROCEDURE — 96372 THER/PROPH/DIAG INJ SC/IM: CPT

## 2022-10-30 PROCEDURE — 93005 ELECTROCARDIOGRAM TRACING: CPT | Performed by: INTERNAL MEDICINE

## 2022-10-30 PROCEDURE — 82962 GLUCOSE BLOOD TEST: CPT

## 2022-10-30 PROCEDURE — 2700000000 HC OXYGEN THERAPY PER DAY

## 2022-10-30 PROCEDURE — 6360000002 HC RX W HCPCS: Performed by: NURSE PRACTITIONER

## 2022-10-30 PROCEDURE — G0378 HOSPITAL OBSERVATION PER HR: HCPCS

## 2022-10-30 PROCEDURE — 36415 COLL VENOUS BLD VENIPUNCTURE: CPT

## 2022-10-30 PROCEDURE — 82306 VITAMIN D 25 HYDROXY: CPT

## 2022-10-30 PROCEDURE — 93010 ELECTROCARDIOGRAM REPORT: CPT | Performed by: INTERNAL MEDICINE

## 2022-10-30 PROCEDURE — 96366 THER/PROPH/DIAG IV INF ADDON: CPT

## 2022-10-30 RX ORDER — DEXAMETHASONE SODIUM PHOSPHATE 10 MG/ML
6 INJECTION, SOLUTION INTRAMUSCULAR; INTRAVENOUS DAILY
Status: DISCONTINUED | OUTPATIENT
Start: 2022-10-30 | End: 2022-10-31

## 2022-10-30 RX ADMIN — PIPERACILLIN AND TAZOBACTAM 3375 MG: 3; .375 INJECTION, POWDER, LYOPHILIZED, FOR SOLUTION INTRAVENOUS at 10:33

## 2022-10-30 RX ADMIN — Medication 3 MG: at 21:16

## 2022-10-30 RX ADMIN — CARBAMAZEPINE 300 MG: 300 CAPSULE, EXTENDED RELEASE ORAL at 10:24

## 2022-10-30 RX ADMIN — ATORVASTATIN CALCIUM 40 MG: 40 TABLET, FILM COATED ORAL at 21:16

## 2022-10-30 RX ADMIN — ALBUTEROL SULFATE 2 PUFF: 90 AEROSOL, METERED RESPIRATORY (INHALATION) at 11:36

## 2022-10-30 RX ADMIN — MICONAZOLE NITRATE: 2 POWDER TOPICAL at 10:23

## 2022-10-30 RX ADMIN — MIRTAZAPINE 30 MG: 15 TABLET, FILM COATED ORAL at 21:17

## 2022-10-30 RX ADMIN — ALBUTEROL SULFATE 2 PUFF: 90 AEROSOL, METERED RESPIRATORY (INHALATION) at 19:20

## 2022-10-30 RX ADMIN — FLUTICASONE PROPIONATE 1 SPRAY: 50 SPRAY, METERED NASAL at 10:23

## 2022-10-30 RX ADMIN — ENOXAPARIN SODIUM 40 MG: 40 INJECTION SUBCUTANEOUS at 10:25

## 2022-10-30 RX ADMIN — AZITHROMYCIN MONOHYDRATE 500 MG: 250 TABLET ORAL at 10:23

## 2022-10-30 RX ADMIN — CETIRIZINE HYDROCHLORIDE 10 MG: 10 TABLET, FILM COATED ORAL at 10:23

## 2022-10-30 RX ADMIN — SENNOSIDES AND DOCUSATE SODIUM 1 TABLET: 50; 8.6 TABLET ORAL at 10:24

## 2022-10-30 RX ADMIN — FERROUS SULFATE TAB 325 MG (65 MG ELEMENTAL FE) 325 MG: 325 (65 FE) TAB at 10:23

## 2022-10-30 RX ADMIN — LEVETIRACETAM 1500 MG: 500 TABLET, FILM COATED ORAL at 21:17

## 2022-10-30 RX ADMIN — POLYETHYLENE GLYCOL (3350) 17 G: 17 POWDER, FOR SOLUTION ORAL at 10:24

## 2022-10-30 RX ADMIN — RISPERIDONE 1 MG: 0.5 TABLET ORAL at 10:24

## 2022-10-30 RX ADMIN — SODIUM CHLORIDE, PRESERVATIVE FREE 10 ML: 5 INJECTION INTRAVENOUS at 10:25

## 2022-10-30 RX ADMIN — SERTRALINE HYDROCHLORIDE 150 MG: 50 TABLET ORAL at 21:16

## 2022-10-30 RX ADMIN — PANTOPRAZOLE SODIUM 40 MG: 40 TABLET, DELAYED RELEASE ORAL at 10:23

## 2022-10-30 RX ADMIN — CARBAMAZEPINE 300 MG: 300 CAPSULE, EXTENDED RELEASE ORAL at 21:24

## 2022-10-30 RX ADMIN — DIVALPROEX SODIUM 1000 MG: 500 TABLET, DELAYED RELEASE ORAL at 10:24

## 2022-10-30 RX ADMIN — ALBUTEROL SULFATE 2 PUFF: 90 AEROSOL, METERED RESPIRATORY (INHALATION) at 15:20

## 2022-10-30 RX ADMIN — TIOTROPIUM BROMIDE AND OLODATEROL 2 PUFF: 3.124; 2.736 SPRAY, METERED RESPIRATORY (INHALATION) at 11:36

## 2022-10-30 RX ADMIN — RISPERIDONE 1 MG: 0.5 TABLET ORAL at 21:16

## 2022-10-30 RX ADMIN — DIVALPROEX SODIUM 1000 MG: 500 TABLET, DELAYED RELEASE ORAL at 21:17

## 2022-10-30 RX ADMIN — Medication 1 CAPSULE: at 10:24

## 2022-10-30 RX ADMIN — SODIUM CHLORIDE, PRESERVATIVE FREE 10 ML: 5 INJECTION INTRAVENOUS at 21:17

## 2022-10-30 RX ADMIN — BUSPIRONE HYDROCHLORIDE 10 MG: 5 TABLET ORAL at 21:16

## 2022-10-30 RX ADMIN — DEXAMETHASONE SODIUM PHOSPHATE 6 MG: 10 INJECTION, SOLUTION INTRAMUSCULAR; INTRAVENOUS at 10:25

## 2022-10-30 RX ADMIN — MICONAZOLE NITRATE: 2 POWDER TOPICAL at 21:18

## 2022-10-30 RX ADMIN — PIPERACILLIN AND TAZOBACTAM 3375 MG: 3; .375 INJECTION, POWDER, LYOPHILIZED, FOR SOLUTION INTRAVENOUS at 17:54

## 2022-10-30 RX ADMIN — LEVETIRACETAM 1500 MG: 500 TABLET, FILM COATED ORAL at 10:24

## 2022-10-30 RX ADMIN — ALBUTEROL SULFATE 2 PUFF: 90 AEROSOL, METERED RESPIRATORY (INHALATION) at 07:30

## 2022-10-30 RX ADMIN — GUAIFENESIN AND DEXTROMETHORPHAN 5 ML: 100; 10 SYRUP ORAL at 21:16

## 2022-10-30 RX ADMIN — BUSPIRONE HYDROCHLORIDE 10 MG: 5 TABLET ORAL at 10:24

## 2022-10-30 ASSESSMENT — ENCOUNTER SYMPTOMS
WHEEZING: 1
COUGH: 1
SINUS PRESSURE: 0
DIARRHEA: 0
BACK PAIN: 0
SORE THROAT: 0
VOMITING: 0
CONSTIPATION: 0
ABDOMINAL PAIN: 0
COLOR CHANGE: 0
SHORTNESS OF BREATH: 1
SINUS PAIN: 0
NAUSEA: 0

## 2022-10-30 ASSESSMENT — PAIN SCALES - GENERAL
PAINLEVEL_OUTOF10: 0
PAINLEVEL_OUTOF10: 0

## 2022-10-30 NOTE — PROGRESS NOTES
Tatiana Rios, from [address] to check on patient and would like a call at discharge at [phone].

## 2022-10-30 NOTE — PLAN OF CARE
Problem: Pain  Goal: Verbalizes/displays adequate comfort level or baseline comfort level  10/30/2022 1137 by Destinee Ho RN  Outcome: Progressing  10/30/2022 0052 by Alea Johnson RN  Outcome: Progressing     Problem: Skin/Tissue Integrity  Goal: Absence of new skin breakdown  Description: 1. Monitor for areas of redness and/or skin breakdown  2. Assess vascular access sites hourly  3. Every 4-6 hours minimum:  Change oxygen saturation probe site  4. Every 4-6 hours:  If on nasal continuous positive airway pressure, respiratory therapy assess nares and determine need for appliance change or resting period.   10/30/2022 1137 by Destinee Ho RN  Outcome: Progressing  10/30/2022 0052 by Alea Johnson RN  Outcome: Progressing

## 2022-10-30 NOTE — PROGRESS NOTES
V2.0  [facility] [place]      Name:  Oneida Henry /Age/Sex: 1960  (63 y.o. female)   MRN & CSN:  [id number] & [id number] Encounter Date/Time: 10/30/2022 10:43 AM EDT    Location:  -A PCP: Immanuel Bain MD       Hospital Day: 2    Assessment and Plan:   Oneida Henry is a 63 y.o. female with pmh of COPD, seizure disorder, noninsulin-dependent type 2 diabetes, prior CVA, hypothyroidism, and developmental delay who presents with shortness of breath and cough. Plan:    Acute on chronic hypoxic respiratory failure. Secondary COVID-19 and COPD exacerbation  Patient increasing her home oxygen on presentation. Continue supplemental oxygen  Wean to keep sats greater than 90  Treatment for COVID-19 and COPD exacerbation    COPD exacerbation, secondary to COVID-19 with bronchitis  Steroids: Decadron 6 mg daily  Breathing treatments  Antibiotic coverage: Azithromycin and Zosyn to cover Pseudomonas    COVID-19 infection  Decadron 6 mg daily  Trend inflammatory markers  Monitor oxygenation  Patient on her baseline oxygen so we will not start baricitinib at this time    Seizure disorder  Continue home Tegretol, Depakote, and Keppra  Monitor closely as the patient states that her threshold is lowered with acute infection    Non-insulin-dependent type 2 diabetes  Sliding scale  Point-of-care glucose checks  Hypoglycemia protocol    Hypothyroidism  Synthroid    Left hand aphasia secondary to CVA  Continue Lipitor    Diet ADULT DIET;  Dysphagia - Minced and Moist; 5 carb choices (75 gm/meal)   DVT Prophylaxis [x] Lovenox, []  Heparin, [] SCDs, [] Ambulation,    [] Eliquis, [] Xarelto  [] Coumadin [] other   Code Status Full Code   Disposition From: home  Expected Disposition: home vs SNF  Estimated Date of Discharge: 2-3 days  Patient requires continued admission due to copd exacerbation + covid   Surrogate Decision Maker/ POA legal guardian     Subjective:     Chief Complaint: Respiratory Distress (Home health care nurse reports decreased oxygen saturation ongoing for the past week. Patient coughing but denies shortness of breath.)     Patient states that her breathing is better than it was yesterday. She still does endorse congestion and cough. Review of Systems:    Review of Systems   Constitutional:  Negative for activity change, appetite change, chills, fatigue and fever. HENT:  Negative for congestion, sinus pressure, sinus pain and sore throat. Eyes:  Negative for visual disturbance. Respiratory:  Positive for cough, shortness of breath and wheezing. Cardiovascular:  Negative for chest pain, palpitations and leg swelling. Gastrointestinal:  Negative for abdominal pain, constipation, diarrhea, nausea and vomiting. Endocrine: Negative for polydipsia and polyuria. Genitourinary:  Negative for dysuria. Musculoskeletal:  Negative for arthralgias and back pain. Skin:  Negative for color change. Neurological:  Negative for dizziness, weakness and headaches. Psychiatric/Behavioral:  Negative for agitation, behavioral problems and confusion. Objective:   No intake or output data in the 24 hours ending 10/30/22 1043     Vitals:   Vitals:    10/30/22 1015   BP: 114/60   Pulse: 65   Resp: 29   Temp:    SpO2: 100%       Physical Exam:     Physical Exam  Vitals reviewed. Constitutional:       Appearance: Normal appearance. She is normal weight. HENT:      Head: Normocephalic and atraumatic. Mouth/Throat:      Mouth: Mucous membranes are moist.      Pharynx: Oropharynx is clear. Eyes:      Extraocular Movements: Extraocular movements intact. Conjunctiva/sclera: Conjunctivae normal.      Pupils: Pupils are equal, round, and reactive to light. Cardiovascular:      Rate and Rhythm: Normal rate and regular rhythm. Pulses: Normal pulses. Heart sounds: Normal heart sounds.    Pulmonary:      Effort: Pulmonary effort is normal.      Breath sounds: Wheezing present. Abdominal:      General: Abdomen is flat. Bowel sounds are normal.      Palpations: Abdomen is soft. Musculoskeletal:         General: No swelling. Normal range of motion. Cervical back: Normal range of motion and neck supple. Skin:     General: Skin is warm and dry. Neurological:      General: No focal deficit present. Mental Status: She is alert and oriented to person, place, and time. Mental status is at baseline. Psychiatric:         Mood and Affect: Mood normal.         Behavior: Behavior normal.         Thought Content:  Thought content normal.         Judgment: Judgment normal.       Medications:   Medications:    dexamethasone  6 mg IntraVENous Daily    piperacillin-tazobactam  3,375 mg IntraVENous Q8H    sodium chloride flush  5-40 mL IntraVENous 2 times per day    enoxaparin  40 mg SubCUTAneous Daily    polyethylene glycol  17 g Oral Daily    sennosides-docusate sodium  1 tablet Oral BID    insulin lispro  0-4 Units SubCUTAneous TID WC    insulin lispro  0-4 Units SubCUTAneous Nightly    azithromycin  500 mg Oral Q24H    melatonin  3 mg Oral Nightly    albuterol sulfate HFA  2 puff Inhalation 4x daily    [Held by provider] amLODIPine  10 mg Oral Daily    atorvastatin  40 mg Oral Nightly    busPIRone  10 mg Oral BID    carBAMazepine  300 mg Oral BID    cetirizine  10 mg Oral Daily    divalproex  1,000 mg Oral BID    ferrous sulfate  325 mg Oral Daily with breakfast    fluticasone  1 spray Each Nostril Daily    lactobacillus  1 capsule Oral Daily with breakfast    levETIRAcetam  1,500 mg Oral BID    [Held by provider] lisinopril  40 mg Oral Daily    mirtazapine  30 mg Oral Nightly    miconazole   Topical BID    pantoprazole  40 mg Oral Daily    risperiDONE  1 mg Oral BID    sertraline  150 mg Oral Nightly    tiotropium-olodaterol  2 puff Inhalation Daily      Infusions:    dextrose      sodium chloride       PRN Meds: glucose, 4 tablet, PRN  dextrose bolus, 125 mL, PRN Or  dextrose bolus, 250 mL, PRN  glucagon (rDNA), 1 mg, PRN  dextrose, 1,000 mL, Continuous PRN  sodium chloride flush, 5-40 mL, PRN  sodium chloride, 500 mL, PRN  polyethylene glycol, 17 g, Daily PRN  acetaminophen, 650 mg, Q6H PRN   Or  acetaminophen, 650 mg, Q6H PRN  promethazine, 12.5 mg, Q6H PRN  aluminum & magnesium hydroxide-simethicone, 30 mL, Q6H PRN  guaiFENesin-dextromethorphan, 5 mL, Q4H PRN  diphenhydrAMINE, 25 mg, Q6H PRN  albuterol sulfate HFA, 1 puff, Q6H PRN   And  ipratropium, 1 puff, Q6H PRN        Labs      Recent Results (from the past 24 hour(s))   D-Dimer, Quantitative    Collection Time: 10/29/22  3:52 PM   Result Value Ref Range    D-Dimer, Quant 323 (H) <230 NG/mL(DDU)   Hemoglobin A1c    Collection Time: 10/29/22  3:52 PM   Result Value Ref Range    Hemoglobin A1C 5.2 4.2 - 6.3 %    eAG 103 mg/dL   POCT Glucose    Collection Time: 10/29/22  4:14 PM   Result Value Ref Range    POC Glucose 155 (H) 70 - 99 MG/DL   POCT Glucose    Collection Time: 10/29/22  7:49 PM   Result Value Ref Range    POC Glucose 167 (H) 70 - 99 MG/DL   Vitamin D 25 Hydroxy    Collection Time: 10/30/22  6:10 AM   Result Value Ref Range    Vit D, 25-Hydroxy 39.34 >20 NG/ML   Comprehensive Metabolic Panel w/ Reflex to MG    Collection Time: 10/30/22  6:10 AM   Result Value Ref Range    Sodium 146 (H) 135 - 145 MMOL/L    Potassium 4.1 3.5 - 5.1 MMOL/L    Chloride 108 99 - 110 mMol/L    CO2 30 21 - 32 MMOL/L    BUN 13 6 - 23 MG/DL    Creatinine 0.5 (L) 0.6 - 1.1 MG/DL    Est, Glom Filt Rate >60 >60 mL/min/1.73m2    Glucose 101 (H) 70 - 99 MG/DL    Calcium 8.4 8.3 - 10.6 MG/DL    Albumin 2.9 (L) 3.4 - 5.0 GM/DL    Total Protein 5.1 (L) 6.4 - 8.2 GM/DL    Total Bilirubin 0.1 0.0 - 1.0 MG/DL    ALT 10 10 - 40 U/L    AST 13 (L) 15 - 37 IU/L    Alkaline Phosphatase 50 40 - 129 IU/L    Anion Gap 8 4 - 16   CBC with Auto Differential    Collection Time: 10/30/22  6:10 AM   Result Value Ref Range    WBC 3.5 (L) 4.0 - 10.5 K/CU MM RBC 3.00 (L) 4.2 - 5.4 M/CU MM    Hemoglobin 9.5 (L) 12.5 - 16.0 GM/DL    Hematocrit 31.5 (L) 37 - 47 %    .0 (H) 78 - 100 FL    MCH 31.7 (H) 27 - 31 PG    MCHC 30.2 (L) 32.0 - 36.0 %    RDW 12.2 11.7 - 14.9 %    Platelets 871 (L) 186 - 440 K/CU MM    MPV 8.7 7.5 - 11.1 FL    Differential Type AUTOMATED DIFFERENTIAL     Segs Relative 38.4 36 - 66 %    Lymphocytes % 41.2 24 - 44 %    Monocytes % 13.9 (H) 0 - 4 %    Eosinophils % 1.1 0 - 3 %    Basophils % 0.6 0 - 1 %    Segs Absolute 1.4 K/CU MM    Lymphocytes Absolute 1.5 K/CU MM    Monocytes Absolute 0.5 K/CU MM    Eosinophils Absolute 0.0 K/CU MM    Basophils Absolute 0.0 K/CU MM    Nucleated RBC % 0.6 %    Total Nucleated RBC 0.0 K/CU MM    Total Immature Neutrophil 0.17 K/CU MM    Immature Neutrophil % 4.8 (H) 0 - 0.43 %   EKG 12 Lead    Collection Time: 10/30/22  6:37 AM   Result Value Ref Range    Ventricular Rate 58 BPM    Atrial Rate 58 BPM    P-R Interval 164 ms    QRS Duration 82 ms    Q-T Interval 430 ms    QTc Calculation (Bazett) 422 ms    P Axis 113 degrees    R Axis 54 degrees    T Axis 41 degrees    Diagnosis       Sinus bradycardia  Otherwise normal ECG  When compared with ECG of 29-OCT-2022 09:11,  Vent.  rate has decreased BY  36 BPM  Nonspecific T wave abnormality, improved in Inferior leads  Nonspecific T wave abnormality no longer evident in Anterior leads  QT has shortened     POCT Glucose    Collection Time: 10/30/22  7:50 AM   Result Value Ref Range    POC Glucose 94 70 - 99 MG/DL        Imaging/Diagnostics Last 24 Hours   XR CHEST PORTABLE    Result Date: 10/29/2022  EXAMINATION: ONE XRAY VIEW OF THE CHEST 10/29/2022 9:17 am COMPARISON: 10/13/2022 HISTORY: ORDERING SYSTEM PROVIDED HISTORY: SOB TECHNOLOGIST PROVIDED HISTORY: Reason for exam:->SOB Reason for Exam: SOB Additional signs and symptoms: SOB Relevant Medical/Surgical History: SOB FINDINGS: Subjective central bronchial pulmonary marking prominence partially reflecting low lung volumes however, central bronchitis cannot be excluded. Otherwise, lung fields are clear. No detectable pleural effusion, pneumothorax, pulmonary edema, cardiomegaly or mediastinal widening. Subjective skeletal osteopenia noted. Subjective central bronchial pulmonary marking prominence partially reflecting low lung volumes however, central bronchitis and underlying chronic lung disease may contribute to this appearance. Otherwise, radiographically nonacute portable chest. Subjective skeletal osteopenia. CTA PULMONARY W CONTRAST    Result Date: 10/29/2022  EXAMINATION: CTA OF THE CHEST 10/29/2022 11:53 am TECHNIQUE: CTA of the chest was performed after the administration of intravenous contrast.  Multiplanar reformatted images are provided for review. MIP images are provided for review. Automated exposure control, iterative reconstruction, and/or weight based adjustment of the mA/kV was utilized to reduce the radiation dose to as low as reasonably achievable. COMPARISON: Lung cancer screening CT 09/02/2022. CT PA 05/26/2022. Chest radiograph 10/29/2022. HISTORY: ORDERING SYSTEM PROVIDED HISTORY: Shortness of breath, increased oxygenation, COVID-19, rule out acute intrathoracic pathology. TECHNOLOGIST PROVIDED HISTORY: Reason for exam:->Shortness of breath, increased oxygenation, COVID-19, rule out acute intrathoracic pathology. Decision Support Exception - unselect if not a suspected or confirmed emergency medical condition->Emergency Medical Condition (MA) Reason for Exam: Shortness of breath, increased oxygenation, COVID-19, rule out acute intrathoracic pathology. Additional signs and symptoms: 80ml Isovue 370 FINDINGS: Pulmonary Arteries: The pulmonary arteries are normal in size with adequate opacification to the subsegmental level. No acute intraluminal filling defect. Mediastinum: Normal heart size with no significant pericardial effusion. Extensive coronary artery calcifications.   Mild thoracic aortic atherosclerotic calcifications with no aneurysmal dilatation or dissection. The esophagus appears unremarkable. Calcified mediastinal and hilar lymph nodes consistent with remote healed granulomatous disease. Bilateral symmetric enlarged hilar lymph nodes. Stable borderline enlarged precarinal and lateral recess lymph nodes compared to the prior CT's. Lungs/pleura: The trachea is patent. The bilateral bronchi predominately involving the lower lobes demonstrate progressive circumferential wall thickening with mild patchy peribronchial opacities. Normal lung volumes. No effusion or pneumothorax. No lobar consolidation. No spiculated mass. Acute right lower lobe posterior subpleural patchy consolidation. Upper Abdomen: Abdominal aortic atherosclerosis. There is mild reflux of contrast into the hepatic veins. Colonic diverticulosis present. No acute abnormality. Soft Tissues/Bones: The body wall soft tissues demonstrate no edema. No significant axillary lymphadenopathy. Normal thoracic spine kyphotic curvature, alignment, and vertebral body heights. Evidence of thoracic spine DISH. Lower cervical degenerative disc and joint disease. No acute osseous abnormality. 1. No acute pulmonary emboli. 2. Acute bilateral findings consistent bronchitis/bronchopneumonia predominately involving the lower lobes. 3. Mild symmetric bilateral hilar lymphadenopathy likely representing reactive nodes. Comment: Please note this report has been produced using speech recognition software and may contain errors related to that system including errors in grammar, punctuation, and spelling, as well as words and phrases that may be inappropriate. If there are any questions or concerns please feel free to contact the dictating provider for clarification.      Electronically signed by Irma Monique MD on 10/30/2022 at 10:43 AM Minocycline Pregnancy And Lactation Text: This medication is Pregnancy Category D and not consider safe during pregnancy. It is also excreted in breast milk.

## 2022-10-31 ENCOUNTER — CARE COORDINATION (OUTPATIENT)
Dept: CARE COORDINATION | Age: 62
End: 2022-10-31

## 2022-10-31 LAB
ANION GAP SERPL CALCULATED.3IONS-SCNC: 8 MMOL/L (ref 4–16)
ANISOCYTOSIS: ABNORMAL
BANDED NEUTROPHILS ABSOLUTE COUNT: 0.19 K/CU MM
BANDED NEUTROPHILS RELATIVE PERCENT: 5 % (ref 5–11)
BUN BLDV-MCNC: 14 MG/DL (ref 6–23)
CALCIUM SERPL-MCNC: 8.7 MG/DL (ref 8.3–10.6)
CHLORIDE BLD-SCNC: 103 MMOL/L (ref 99–110)
CO2: 33 MMOL/L (ref 21–32)
CREAT SERPL-MCNC: 0.5 MG/DL (ref 0.6–1.1)
DIFFERENTIAL TYPE: ABNORMAL
GFR SERPL CREATININE-BSD FRML MDRD: >60 ML/MIN/1.73M2
GLUCOSE BLD-MCNC: 120 MG/DL (ref 70–99)
GLUCOSE BLD-MCNC: 178 MG/DL (ref 70–99)
GLUCOSE BLD-MCNC: 92 MG/DL (ref 70–99)
GLUCOSE BLD-MCNC: 94 MG/DL (ref 70–99)
GLUCOSE BLD-MCNC: 98 MG/DL (ref 70–99)
HCT VFR BLD CALC: 29.9 % (ref 37–47)
HEMOGLOBIN: 9.5 GM/DL (ref 12.5–16)
HIGH SENSITIVE C-REACTIVE PROTEIN: 41.2 MG/L (ref 0–5)
HYPOCHROMIA: ABNORMAL
LYMPHOCYTES ABSOLUTE: 2.5 K/CU MM
LYMPHOCYTES RELATIVE PERCENT: 67 % (ref 24–44)
MCH RBC QN AUTO: 31.6 PG (ref 27–31)
MCHC RBC AUTO-ENTMCNC: 31.8 % (ref 32–36)
MCV RBC AUTO: 99.3 FL (ref 78–100)
MONOCYTES ABSOLUTE: 0.3 K/CU MM
MONOCYTES RELATIVE PERCENT: 7 % (ref 0–4)
NUCLEATED RED BLOOD CELLS: 2
PDW BLD-RTO: 12.3 % (ref 11.7–14.9)
PLATELET # BLD: 141 K/CU MM (ref 140–440)
PMV BLD AUTO: 8.9 FL (ref 7.5–11.1)
POLYCHROMASIA: ABNORMAL
POTASSIUM SERPL-SCNC: 3.9 MMOL/L (ref 3.5–5.1)
RBC # BLD: 3.01 M/CU MM (ref 4.2–5.4)
SEGMENTED NEUTROPHILS ABSOLUTE COUNT: 0.8 K/CU MM
SEGMENTED NEUTROPHILS RELATIVE PERCENT: 21 % (ref 36–66)
SODIUM BLD-SCNC: 144 MMOL/L (ref 135–145)
WBC # BLD: 3.8 K/CU MM (ref 4–10.5)

## 2022-10-31 PROCEDURE — 85027 COMPLETE CBC AUTOMATED: CPT

## 2022-10-31 PROCEDURE — 96366 THER/PROPH/DIAG IV INF ADDON: CPT

## 2022-10-31 PROCEDURE — 2580000003 HC RX 258: Performed by: STUDENT IN AN ORGANIZED HEALTH CARE EDUCATION/TRAINING PROGRAM

## 2022-10-31 PROCEDURE — 6360000002 HC RX W HCPCS: Performed by: STUDENT IN AN ORGANIZED HEALTH CARE EDUCATION/TRAINING PROGRAM

## 2022-10-31 PROCEDURE — G0378 HOSPITAL OBSERVATION PER HR: HCPCS

## 2022-10-31 PROCEDURE — 82962 GLUCOSE BLOOD TEST: CPT

## 2022-10-31 PROCEDURE — 80048 BASIC METABOLIC PNL TOTAL CA: CPT

## 2022-10-31 PROCEDURE — 6370000000 HC RX 637 (ALT 250 FOR IP): Performed by: NURSE PRACTITIONER

## 2022-10-31 PROCEDURE — 94761 N-INVAS EAR/PLS OXIMETRY MLT: CPT

## 2022-10-31 PROCEDURE — 36415 COLL VENOUS BLD VENIPUNCTURE: CPT

## 2022-10-31 PROCEDURE — 2700000000 HC OXYGEN THERAPY PER DAY

## 2022-10-31 PROCEDURE — 2500000003 HC RX 250 WO HCPCS: Performed by: NURSE PRACTITIONER

## 2022-10-31 PROCEDURE — 86141 C-REACTIVE PROTEIN HS: CPT

## 2022-10-31 PROCEDURE — 94640 AIRWAY INHALATION TREATMENT: CPT

## 2022-10-31 PROCEDURE — 85007 BL SMEAR W/DIFF WBC COUNT: CPT

## 2022-10-31 PROCEDURE — 6360000002 HC RX W HCPCS: Performed by: NURSE PRACTITIONER

## 2022-10-31 PROCEDURE — 2580000003 HC RX 258: Performed by: NURSE PRACTITIONER

## 2022-10-31 PROCEDURE — 96372 THER/PROPH/DIAG INJ SC/IM: CPT

## 2022-10-31 PROCEDURE — 96376 TX/PRO/DX INJ SAME DRUG ADON: CPT

## 2022-10-31 PROCEDURE — 92610 EVALUATE SWALLOWING FUNCTION: CPT | Performed by: SPEECH-LANGUAGE PATHOLOGIST

## 2022-10-31 RX ORDER — DEXAMETHASONE 4 MG/1
6 TABLET ORAL DAILY
Status: DISCONTINUED | OUTPATIENT
Start: 2022-11-01 | End: 2022-11-02 | Stop reason: HOSPADM

## 2022-10-31 RX ADMIN — MIRTAZAPINE 30 MG: 15 TABLET, FILM COATED ORAL at 21:13

## 2022-10-31 RX ADMIN — FLUTICASONE PROPIONATE 1 SPRAY: 50 SPRAY, METERED NASAL at 08:14

## 2022-10-31 RX ADMIN — Medication 3 MG: at 21:13

## 2022-10-31 RX ADMIN — ENOXAPARIN SODIUM 40 MG: 40 INJECTION SUBCUTANEOUS at 08:09

## 2022-10-31 RX ADMIN — SODIUM CHLORIDE, PRESERVATIVE FREE 10 ML: 5 INJECTION INTRAVENOUS at 21:23

## 2022-10-31 RX ADMIN — PIPERACILLIN AND TAZOBACTAM 3375 MG: 3; .375 INJECTION, POWDER, LYOPHILIZED, FOR SOLUTION INTRAVENOUS at 02:50

## 2022-10-31 RX ADMIN — GUAIFENESIN AND DEXTROMETHORPHAN 5 ML: 100; 10 SYRUP ORAL at 08:14

## 2022-10-31 RX ADMIN — SERTRALINE HYDROCHLORIDE 150 MG: 50 TABLET ORAL at 21:12

## 2022-10-31 RX ADMIN — PIPERACILLIN AND TAZOBACTAM 3375 MG: 3; .375 INJECTION, POWDER, LYOPHILIZED, FOR SOLUTION INTRAVENOUS at 08:14

## 2022-10-31 RX ADMIN — Medication 1 CAPSULE: at 08:09

## 2022-10-31 RX ADMIN — FERROUS SULFATE TAB 325 MG (65 MG ELEMENTAL FE) 325 MG: 325 (65 FE) TAB at 08:09

## 2022-10-31 RX ADMIN — SENNOSIDES AND DOCUSATE SODIUM 1 TABLET: 50; 8.6 TABLET ORAL at 21:13

## 2022-10-31 RX ADMIN — LEVETIRACETAM 1500 MG: 500 TABLET, FILM COATED ORAL at 21:12

## 2022-10-31 RX ADMIN — ALBUTEROL SULFATE 2 PUFF: 90 AEROSOL, METERED RESPIRATORY (INHALATION) at 11:37

## 2022-10-31 RX ADMIN — ATORVASTATIN CALCIUM 40 MG: 40 TABLET, FILM COATED ORAL at 21:13

## 2022-10-31 RX ADMIN — CARBAMAZEPINE 300 MG: 300 CAPSULE, EXTENDED RELEASE ORAL at 08:14

## 2022-10-31 RX ADMIN — SENNOSIDES AND DOCUSATE SODIUM 1 TABLET: 50; 8.6 TABLET ORAL at 08:09

## 2022-10-31 RX ADMIN — ALBUTEROL SULFATE 2 PUFF: 90 AEROSOL, METERED RESPIRATORY (INHALATION) at 19:21

## 2022-10-31 RX ADMIN — ALBUTEROL SULFATE 2 PUFF: 90 AEROSOL, METERED RESPIRATORY (INHALATION) at 15:13

## 2022-10-31 RX ADMIN — PANTOPRAZOLE SODIUM 40 MG: 40 TABLET, DELAYED RELEASE ORAL at 08:09

## 2022-10-31 RX ADMIN — CARBAMAZEPINE 300 MG: 300 CAPSULE, EXTENDED RELEASE ORAL at 21:12

## 2022-10-31 RX ADMIN — DEXAMETHASONE SODIUM PHOSPHATE 6 MG: 10 INJECTION, SOLUTION INTRAMUSCULAR; INTRAVENOUS at 05:56

## 2022-10-31 RX ADMIN — MICONAZOLE NITRATE: 2 POWDER TOPICAL at 21:22

## 2022-10-31 RX ADMIN — POLYETHYLENE GLYCOL (3350) 17 G: 17 POWDER, FOR SOLUTION ORAL at 08:10

## 2022-10-31 RX ADMIN — LEVETIRACETAM 1500 MG: 500 TABLET, FILM COATED ORAL at 08:08

## 2022-10-31 RX ADMIN — CETIRIZINE HYDROCHLORIDE 10 MG: 10 TABLET, FILM COATED ORAL at 08:09

## 2022-10-31 RX ADMIN — SODIUM CHLORIDE, PRESERVATIVE FREE 10 ML: 5 INJECTION INTRAVENOUS at 08:10

## 2022-10-31 RX ADMIN — DIVALPROEX SODIUM 1000 MG: 500 TABLET, DELAYED RELEASE ORAL at 08:09

## 2022-10-31 RX ADMIN — RISPERIDONE 1 MG: 0.5 TABLET ORAL at 21:12

## 2022-10-31 RX ADMIN — BUSPIRONE HYDROCHLORIDE 10 MG: 5 TABLET ORAL at 08:09

## 2022-10-31 RX ADMIN — AZITHROMYCIN MONOHYDRATE 500 MG: 250 TABLET ORAL at 08:09

## 2022-10-31 RX ADMIN — BUSPIRONE HYDROCHLORIDE 10 MG: 5 TABLET ORAL at 21:13

## 2022-10-31 RX ADMIN — RISPERIDONE 1 MG: 0.5 TABLET ORAL at 08:09

## 2022-10-31 RX ADMIN — PIPERACILLIN AND TAZOBACTAM 3375 MG: 3; .375 INJECTION, POWDER, LYOPHILIZED, FOR SOLUTION INTRAVENOUS at 17:26

## 2022-10-31 RX ADMIN — DIVALPROEX SODIUM 1000 MG: 500 TABLET, DELAYED RELEASE ORAL at 21:13

## 2022-10-31 ASSESSMENT — ENCOUNTER SYMPTOMS
NAUSEA: 0
SHORTNESS OF BREATH: 1
COUGH: 1
COLOR CHANGE: 0
DIARRHEA: 0
CONSTIPATION: 0
VOMITING: 0
SINUS PAIN: 0
SORE THROAT: 0
BACK PAIN: 0
SINUS PRESSURE: 0
WHEEZING: 1
ABDOMINAL PAIN: 0

## 2022-10-31 ASSESSMENT — PAIN SCALES - GENERAL
PAINLEVEL_OUTOF10: 0
PAINLEVEL_OUTOF10: 0

## 2022-10-31 NOTE — CONSULTS
IV TO PO EVALUATION  -Patients considered for IV to PO conversion should meet all of the   following inclusion criteria and none of the exclusion criteria. MEDICATION(S) CONSIDERED FOR CONVERSION:  -Dexamethasone 6mg ivp daily for 7 more days    EXCLUSION CRITERIA:  -Criteria that the patient is NOT a candidate for conversion. .. [] Infections requiring IV therapy  [] Recent therapeutic failure on oral formulation  [] Meningitis  [] Osteomyelitis  [] Other  [] Patients with unreliable response to oral medication  [] Nausea and/or vomiting or diarrhea within previous 24 hours  [] Malabsorption disorders  [] Motility disorders of GI tract including ileus or bowel obstruction  [] Patients who cannot use oral route  [] Painful oral ulcerations  [] Unable to swallow  [] NPO  [] Clinical deterioration requiring vasopressor therapy for blood pressure support  [] Active bleeding (Proton Pump Inhibitors/H2 receptor blockers only)  [x] NO EXCLUSION CRITERIA NOTED      INCLUSION CRITERIA:   -Criteria to initiate medication route switch. .. [x] No exclusion criteria met as described above  [] IV therapy for >24 hours, afebrile, improving trend in WBC (antibiotics only)  [x] Tolerating oral diet, may include  Clear liquids >1000 mL/day  Tube feeds without high residuals (<150 mL) at least 40 mL/hr  [x] Tolerating oral medications  [] No seizures for 72 hours (antiepileptic medications only)    Please note, patients may be given suppositories when available for ordered product if no contraindications exist (ie. .. rectal surgery or infection). Oral solutions/suspensions may be considered for patients with a functioning enteral tube not on continuous suction, if available. MEDICATION(S) TO BE CONVERTED:  [] Patient does NOT meet criteria for conversion    -Change dexamethasone 6mg ivp daily to 6mg po daily for remaining 7 days of therapy. Evens Pulido [facility]   10/31/22 3:07 PM

## 2022-10-31 NOTE — CARE COORDINATION
Chart reviewed. Patient is from [address] [place] [address]. Patient has [employer] guardian. Plan return to Group Home at this time.  Abelino Mejia RN

## 2022-10-31 NOTE — PROGRESS NOTES
V2.0  [facility] Hospitalist Progress Note      Name:  Kaya Hebert /Age/Sex: 1960  (63 y.o. female)   MRN & CSN:  [id number] & [id number] Encounter Date/Time: 10/31/2022 10:43 AM EDT    Location:  -A PCP: Ericka Alves MD       Hospital Day: 3    Assessment and Plan:   Kaya Hebert is a 63 y.o. female with pmh of COPD, seizure disorder, noninsulin-dependent type 2 diabetes, prior CVA, hypothyroidism, and developmental delay who presents with shortness of breath and cough. Plan:    Acute on chronic hypoxic respiratory failure. Secondary COVID-19 and COPD exacerbation  Patient increasing her home oxygen on presentation. Continue supplemental oxygen  Wean to keep sats greater than 90  Treatment for COVID-19 and COPD exacerbation    COPD exacerbation, secondary to COVID-19 with bronchitis  Steroids: Decadron 6 mg daily  Breathing treatments  Antibiotic coverage: Azithromycin and Zosyn to cover Pseudomonas    COVID-19 infection  Decadron 6 mg daily  Trend inflammatory markers  Monitor oxygenation  Patient on her baseline oxygen so we will not start baricitinib at this time    Seizure disorder  Continue home Tegretol, Depakote, and Keppra  Monitor closely as the patient states that her threshold is lowered with acute infection    Non-insulin-dependent type 2 diabetes  Sliding scale  Point-of-care glucose checks  Hypoglycemia protocol    Hypothyroidism  Synthroid    Left hand aphasia secondary to CVA  Continue Lipitor    Diet ADULT DIET;  Dysphagia - Minced and Moist; 5 carb choices (75 gm/meal)   DVT Prophylaxis [x] Lovenox, []  Heparin, [] SCDs, [] Ambulation,    [] Eliquis, [] Xarelto  [] Coumadin [] other   Code Status Full Code   Disposition From: home  Expected Disposition: home vs SNF  Estimated Date of Discharge: 1-2 days  Patient requires continued admission due to copd exacerbation + covid   Surrogate Decision Maker/ POA legal guardian     Subjective:     Chief Complaint: Respiratory Distress (Home health care nurse reports decreased oxygen saturation ongoing for the past week. Patient coughing but denies shortness of breath.)     Patient continues to have a cough and states her shortness of breath is getting better. She is now down to her home oxygen requirement. Review of Systems:    Review of Systems   Constitutional:  Negative for activity change, appetite change, chills, fatigue and fever. HENT:  Negative for congestion, sinus pressure, sinus pain and sore throat. Eyes:  Negative for visual disturbance. Respiratory:  Positive for cough, shortness of breath and wheezing. Cardiovascular:  Negative for chest pain, palpitations and leg swelling. Gastrointestinal:  Negative for abdominal pain, constipation, diarrhea, nausea and vomiting. Endocrine: Negative for polydipsia and polyuria. Genitourinary:  Negative for dysuria. Musculoskeletal:  Negative for arthralgias and back pain. Skin:  Negative for color change. Neurological:  Negative for dizziness, weakness and headaches. Psychiatric/Behavioral:  Negative for agitation, behavioral problems and confusion. Objective:   No intake or output data in the 24 hours ending 10/31/22 0712     Vitals:   Vitals:    10/31/22 0615   BP:    Pulse: 51   Resp: 15   Temp:    SpO2:        Physical Exam:     Physical Exam  Vitals reviewed. Constitutional:       Appearance: Normal appearance. She is normal weight. HENT:      Head: Normocephalic and atraumatic. Mouth/Throat:      Mouth: Mucous membranes are moist.      Pharynx: Oropharynx is clear. Eyes:      Extraocular Movements: Extraocular movements intact. Conjunctiva/sclera: Conjunctivae normal.      Pupils: Pupils are equal, round, and reactive to light. Cardiovascular:      Rate and Rhythm: Normal rate and regular rhythm. Pulses: Normal pulses. Heart sounds: Normal heart sounds.    Pulmonary:      Effort: Pulmonary effort is normal.      Breath sounds: Wheezing present. Abdominal:      General: Abdomen is flat. Bowel sounds are normal.      Palpations: Abdomen is soft. Musculoskeletal:         General: No swelling. Normal range of motion. Cervical back: Normal range of motion and neck supple. Skin:     General: Skin is warm and dry. Neurological:      General: No focal deficit present. Mental Status: She is alert and oriented to person, place, and time. Mental status is at baseline. Psychiatric:         Mood and Affect: Mood normal.         Behavior: Behavior normal.         Thought Content:  Thought content normal.         Judgment: Judgment normal.       Medications:   Medications:    dexamethasone  6 mg IntraVENous Daily    piperacillin-tazobactam  3,375 mg IntraVENous Q8H    sodium chloride flush  5-40 mL IntraVENous 2 times per day    enoxaparin  40 mg SubCUTAneous Daily    polyethylene glycol  17 g Oral Daily    sennosides-docusate sodium  1 tablet Oral BID    insulin lispro  0-4 Units SubCUTAneous TID WC    insulin lispro  0-4 Units SubCUTAneous Nightly    azithromycin  500 mg Oral Q24H    melatonin  3 mg Oral Nightly    albuterol sulfate HFA  2 puff Inhalation 4x daily    [Held by provider] amLODIPine  10 mg Oral Daily    atorvastatin  40 mg Oral Nightly    busPIRone  10 mg Oral BID    carBAMazepine  300 mg Oral BID    cetirizine  10 mg Oral Daily    divalproex  1,000 mg Oral BID    ferrous sulfate  325 mg Oral Daily with breakfast    fluticasone  1 spray Each Nostril Daily    lactobacillus  1 capsule Oral Daily with breakfast    levETIRAcetam  1,500 mg Oral BID    [Held by provider] lisinopril  40 mg Oral Daily    mirtazapine  30 mg Oral Nightly    miconazole   Topical BID    pantoprazole  40 mg Oral Daily    risperiDONE  1 mg Oral BID    sertraline  150 mg Oral Nightly    tiotropium-olodaterol  2 puff Inhalation Daily      Infusions:    dextrose      sodium chloride       PRN Meds: glucose, 4 tablet, PRN  dextrose bolus, 125 mL, PRN   Or  dextrose bolus, 250 mL, PRN  glucagon (rDNA), 1 mg, PRN  dextrose, 1,000 mL, Continuous PRN  sodium chloride flush, 5-40 mL, PRN  sodium chloride, 500 mL, PRN  polyethylene glycol, 17 g, Daily PRN  acetaminophen, 650 mg, Q6H PRN   Or  acetaminophen, 650 mg, Q6H PRN  promethazine, 12.5 mg, Q6H PRN  aluminum & magnesium hydroxide-simethicone, 30 mL, Q6H PRN  guaiFENesin-dextromethorphan, 5 mL, Q4H PRN  diphenhydrAMINE, 25 mg, Q6H PRN  albuterol sulfate HFA, 1 puff, Q6H PRN   And  ipratropium, 1 puff, Q6H PRN      Labs      Recent Results (from the past 24 hour(s))   POCT Glucose    Collection Time: 10/30/22  7:50 AM   Result Value Ref Range    POC Glucose 94 70 - 99 MG/DL   POCT Glucose    Collection Time: 10/30/22 11:33 AM   Result Value Ref Range    POC Glucose 186 (H) 70 - 99 MG/DL   POCT Glucose    Collection Time: 10/30/22  4:18 PM   Result Value Ref Range    POC Glucose 188 (H) 70 - 99 MG/DL   POCT Glucose    Collection Time: 10/30/22  9:07 PM   Result Value Ref Range    POC Glucose 119 (H) 70 - 99 MG/DL   POCT Glucose    Collection Time: 10/30/22  9:20 PM   Result Value Ref Range    POC Glucose 110 (H) 70 - 99 MG/DL   POCT Glucose    Collection Time: 10/31/22  6:00 AM   Result Value Ref Range    POC Glucose 92 70 - 99 MG/DL        Imaging/Diagnostics Last 24 Hours   XR CHEST PORTABLE    Result Date: 10/29/2022  EXAMINATION: ONE XRAY VIEW OF THE CHEST 10/29/2022 9:17 am COMPARISON: 10/13/2022 HISTORY: ORDERING SYSTEM PROVIDED HISTORY: SOB TECHNOLOGIST PROVIDED HISTORY: Reason for exam:->SOB Reason for Exam: SOB Additional signs and symptoms: SOB Relevant Medical/Surgical History: SOB FINDINGS: Subjective central bronchial pulmonary marking prominence partially reflecting low lung volumes however, central bronchitis cannot be excluded. Otherwise, lung fields are clear. No detectable pleural effusion, pneumothorax, pulmonary edema, cardiomegaly or mediastinal widening.  Subjective skeletal osteopenia noted.     Subjective central bronchial pulmonary marking prominence partially reflecting low lung volumes however, central bronchitis and underlying chronic lung disease may contribute to this appearance. Otherwise, radiographically nonacute portable chest. Subjective skeletal osteopenia. CTA PULMONARY W CONTRAST    Result Date: 10/29/2022  EXAMINATION: CTA OF THE CHEST 10/29/2022 11:53 am TECHNIQUE: CTA of the chest was performed after the administration of intravenous contrast.  Multiplanar reformatted images are provided for review. MIP images are provided for review. Automated exposure control, iterative reconstruction, and/or weight based adjustment of the mA/kV was utilized to reduce the radiation dose to as low as reasonably achievable. COMPARISON: Lung cancer screening CT 09/02/2022. CT PA 05/26/2022. Chest radiograph 10/29/2022. HISTORY: ORDERING SYSTEM PROVIDED HISTORY: Shortness of breath, increased oxygenation, COVID-19, rule out acute intrathoracic pathology. TECHNOLOGIST PROVIDED HISTORY: Reason for exam:->Shortness of breath, increased oxygenation, COVID-19, rule out acute intrathoracic pathology. Decision Support Exception - unselect if not a suspected or confirmed emergency medical condition->Emergency Medical Condition (MA) Reason for Exam: Shortness of breath, increased oxygenation, COVID-19, rule out acute intrathoracic pathology. Additional signs and symptoms: 80ml Isovue 370 FINDINGS: Pulmonary Arteries: The pulmonary arteries are normal in size with adequate opacification to the subsegmental level. No acute intraluminal filling defect. Mediastinum: Normal heart size with no significant pericardial effusion. Extensive coronary artery calcifications. Mild thoracic aortic atherosclerotic calcifications with no aneurysmal dilatation or dissection. The esophagus appears unremarkable. Calcified mediastinal and hilar lymph nodes consistent with remote healed granulomatous disease. Bilateral symmetric enlarged hilar lymph nodes. Stable borderline enlarged precarinal and lateral recess lymph nodes compared to the prior CT's. Lungs/pleura: The trachea is patent. The bilateral bronchi predominately involving the lower lobes demonstrate progressive circumferential wall thickening with mild patchy peribronchial opacities. Normal lung volumes. No effusion or pneumothorax. No lobar consolidation. No spiculated mass. Acute right lower lobe posterior subpleural patchy consolidation. Upper Abdomen: Abdominal aortic atherosclerosis. There is mild reflux of contrast into the hepatic veins. Colonic diverticulosis present. No acute abnormality. Soft Tissues/Bones: The body wall soft tissues demonstrate no edema. No significant axillary lymphadenopathy. Normal thoracic spine kyphotic curvature, alignment, and vertebral body heights. Evidence of thoracic spine DISH. Lower cervical degenerative disc and joint disease. No acute osseous abnormality. 1. No acute pulmonary emboli. 2. Acute bilateral findings consistent bronchitis/bronchopneumonia predominately involving the lower lobes. 3. Mild symmetric bilateral hilar lymphadenopathy likely representing reactive nodes. Comment: Please note this report has been produced using speech recognition software and may contain errors related to that system including errors in grammar, punctuation, and spelling, as well as words and phrases that may be inappropriate. If there are any questions or concerns please feel free to contact the dictating provider for clarification.      Electronically signed by Niki Mead MD on 10/31/2022 at 7:12 AM

## 2022-10-31 NOTE — PROGRESS NOTES
Speech Language Pathology  Facility/Department: [facility] CV[facility]   CLINICAL BEDSIDE SWALLOW EVALUATION    NAME: Bety Danielson  : 1960  MRN: [id number]    ADMISSION DATE: 10/29/2022  ADMITTING DIAGNOSIS: has Pneumonia due to infectious organism; HTN (hypertension), benign; Seizure disorder ([address] Altered mental status; Chest pain; MR (mental retardation); COPD exacerbation ([address] Left hemiplegia ([address] H/O: stroke; Mixed hyperlipidemia; Acquired hypothyroidism; Sepsis ([address] Acute bronchitis; Acute respiratory failure with hypoxia ([address] Acute respiratory failure ([address] Anxiety and depression; Anemia; Controlled type 2 diabetes mellitus without complication, without long-term current use of insulin ([address] Diverticulosis; Lesion of right native kidney; Black stool; Hypoxia; COVID-19 virus infection; Other cerebral palsy ([address] Chronic respiratory failure ([address] COVID-19; Abnormal uterine and vaginal bleeding, unspecified; Bradyarrhythmia; Dyskinesia; Hemiplegia affecting left nondominant side ([address] Insomnia; Intermittent explosive disorder; Intraparenchymal hemorrhage of brain ([address] Iron deficiency anemia, unspecified; Organic personality syndrome; Subdural hematoma; and Urinary incontinence on their problem list.    Impression  Dysphagia Diagnosis: Mild to moderate oral stage dysphagia;Mild pharyngeal stage dysphagia  Dysphagia Outcome Severity Scale: Level 5: Mild dysphagia- Distant supervision. May need one diet consistency restricted     Bety Danielson was seen for a clinical bedside swallow evaluation following admission for Acute on chronic respiratory failure and COPD exacerbation / Covid-19. H/o CP, intellectual disability, CVA x 2 , Dysphagia, Epilepsy and previous G-tube. Pt is known to this speech pathology team with multiple assessments completed on prior admissions. Nsg reports consistent cough for regular solids and no concerns for intake of liquids.   Pt followed commands for the oral motor exam which demonstrated reduced lingual coordination and reduced bilabial closure. Dentition is scattered and pt reports she \"can't chew. \"  Vocal quality was dry/clear, strained c/w her baseline and cough strength was normal.  Speech was dysarthric also c/w her baseline. Pt's oral phase was mild to moderately impaired, characterized by absent bilabial closure and lingual protrusion for bolus acceptance (pt completes bolus acceptance from spoon and straw by pressing tongue to upper lip), mild anterior loss for liquids by spoon, alternating mastication and tongue mashing for solids, and functional oral clearance. Suspect mild pharyngeal dysphagia due to immediate cough for trials of regular and bite size solids possible r/t reduced mastication. Pt demonstrated 0 s/s aspiration for all trials of thins by spoon and straw. Recommend:  Continue Minced and moist/Thins. Aspiration precautions. Assist with feeding/total feed. No needs identified at this time. ONSET DATE: 10/29/2022     Recent Chest Xray/CT of Chest:  see chart    Date of Eval: 10/31/2022  Evaluating Therapist: ZAIN Jay    Current Diet level:  Current Diet : Minced and Moist    Primary Complaint  Patient Complaint: c/o minced texture    Pain:  Pain Assessment  Pain Assessment: None - Denies Pain  Pain Level: 0    Reason for Referral  Tracy Law was referred for a bedside swallow evaluation to assess the efficiency of her swallow function, identify signs and symptoms of aspiration and make recommendations regarding safe dietary consistencies, effective compensatory strategies, and safe eating environment. Treatment Plan  Requires SLP Intervention: No  Duration of Treatment: n/a  D/C Recommendations: No follow up therapy recommended post discharge     Recommended Diet and Intervention  Recommended Form of Meds: PO  Recommendations: Assistance with meals; Total feed     Compensatory Swallowing Strategies  Compensatory Swallowing Strategies : Upright as possible for all oral intake    Treatment/Goals  Short-term Goals  Timeframe for Short-term Goals: n/a  Long-term Goals  Timeframe for Long-term Goals: n/a    General  Chart Reviewed: Yes  Behavior/Cognition: Alert; Cooperative;Pleasant mood  Respiratory Status: O2 via nasual cannula  Communication Observation: Functional;Dysarthria  Follows Directions: Simple  Dentition: Some missing teeth;Poor dental/oral hygeine  Patient Positioning: Upright in bed  Baseline Vocal Quality: Normal  Volitional Cough: Strong  Prior Dysphagia History: h/o prior assessments with this speech pathology department. Latest recommendation is for Minced/moist and Thins  Consistencies Administered: Regular; Soft and Bite-Sized;Pureed; Thin - straw; Thin - cup; Ice Chips    Vision/Hearing  Vision  Vision: Impaired  Hearing  Hearing: Within functional limits    Oral Motor Deficits  Oral/Motor  Oral Hygiene: Moist;Clean    Oral Phase Dysfunction  Oral Phase  Oral Phase: Exceptions  Oral Phase  Oral Phase - Comment: Mild     Indicators of Pharyngeal Phase Dysfunction   Pharyngeal Phase   Pharyngeal Phase: Mild    Prognosis  Individuals consulted  Consulted and agree with results and recommendations: Patient;RN  RN Name: [address] [place] [address]    Education  Patient Education: results and recommendations  Patient Education Response: Verbalizes understanding  Safety Devices in place: Yes  Type of devices: All fall risk precautions in place; Left in bed       Therapy Time  1300/1330    ZAIN Briscoe  10/31/2022 2:37 PM

## 2022-10-31 NOTE — PROGRESS NOTES
Spoke with Orly Birmingham from [facility] that provides med passes for the pt at her apartment. Orly Birmingham asked for an update on the pt's status which she was given. Orly Birmingham gave name of 24 hr house staff: [facility] [place] office number [phone] ext. [phone]. With  being [facility] [place] [phone]. Orly Birmingham also asked that at pt discharge that a callie be faxed to [phone].

## 2022-10-31 NOTE — PLAN OF CARE
[address] [facility]  YOB: 1950          Assessment & Plan:     ESRD: MWF:  HD  Today  - DR. Norah Kidd  HD Planned for today,Timothy aware  PNA  - ABX  H/O Failed KT  - On steroids for presumptive subclinical adrenal insuff/chrornic rejection       Subjective:   CC:ESRD  HPI: Patient seen   Due for HD toady  He was wonderin about BB,CCB and his low bp. ROS:no cp.sob  Current Facility-Administered Medications   Medication Dose Route Frequency    acetaminophen (TYLENOL) tablet 1,000 mg  1,000 mg Oral Q8H PRN    lidocaine 4 % patch 1 Patch  1 Patch TransDERmal Q24H    0.9% sodium chloride infusion 250 mL  250 mL IntraVENous PRN    megestroL (MEGACE) tablet 40 mg  40 mg Oral DAILY    0.9% sodium chloride infusion 250 mL  250 mL IntraVENous PRN    sodium chloride (NS) flush 5-40 mL  5-40 mL IntraVENous Q8H    sodium chloride (NS) flush 5-40 mL  5-40 mL IntraVENous PRN    cefTRIAXone (ROCEPHIN) 1 g in 0.9% sodium chloride (MBP/ADV) 50 mL  1 g IntraVENous Q24H    azithromycin (ZITHROMAX) 500 mg in 0.9% sodium chloride (MBP/ADV) 250 mL  500 mg IntraVENous Q24H    pantoprazole (PROTONIX) tablet 40 mg  40 mg Oral ACB&D    midodrine (PROAMITINE) tablet 15 mg  15 mg Oral TID    predniSONE (DELTASONE) tablet 10 mg  10 mg Oral DAILY WITH BREAKFAST    dilTIAZem (CARDIZEM) IR tablet 30 mg  30 mg Oral TIDAC          Objective:     Vitals:  Blood pressure (!) 109/96, pulse 99, temperature 98.3 °F (36.8 °C), resp. rate 18, height 5' 10\" (1.778 m), weight 90.2 kg (198 lb 12.8 oz), SpO2 97 %.   Temp (24hrs), Av.4 °F (36.9 °C), Min:98.1 °F (36.7 °C), Max:98.7 °F (37.1 °C)      Intake and Output:  701 - 1900  In: 360 [P.O.:360]  Out: -   1901 -  0700  In: 575 [P.O.:575]  Out: -     Physical Exam:                Patient is intubated:  no    Physical Examination:   GENERAL ASSESSMENT: NAD  HEENT:Nontraumatic Problem: Pain  Goal: Verbalizes/displays adequate comfort level or baseline comfort level  Outcome: Progressing     Problem: Skin/Tissue Integrity  Goal: Absence of new skin breakdown  Description: 1. Monitor for areas of redness and/or skin breakdown  2. Assess vascular access sites hourly  3. Every 4-6 hours minimum:  Change oxygen saturation probe site  4. Every 4-6 hours:  If on nasal continuous positive airway pressure, respiratory therapy assess nares and determine need for appliance change or resting period.   Outcome: Progressing     Problem: Safety - Adult  Goal: Free from fall injury  Outcome: Progressing     Problem: Chronic Conditions and Co-morbidities  Goal: Patient's chronic conditions and co-morbidity symptoms are monitored and maintained or improved  Outcome: Progressing     Problem: ABCDS Injury Assessment  Goal: Absence of physical injury  Outcome: Progressing     Problem: Discharge Planning  Goal: Discharge to home or other facility with appropriate resources  Outcome: Progressing CHEST: CTA  HEART: S1S2  ABDOMEN: Soft,NT,  :Clemens: n  EXTREMITY: EDEMA 1  NEURO:Grossly non focal          ECG/rhythm:    Data Review      No results for input(s): TNIPOC in the last 72 hours. No lab exists for component: ITNL   Recent Labs     03/14/20 1922 03/14/20  1412 03/14/20  0852   TROIQ 0.07* 0.07* 0.09*     Recent Labs     03/15/20  0537 03/14/20 1922 03/14/20  0110   *  --  136   K 3.5  --  3.7   CL 99  --  100   CO2 28  --  31   BUN 29*  --  20   CREA 7.43*  --  4.90*   GLU 94  --  80   PHOS  --   --  3.2   MG  --   --  1.8   CA 8.0*  --  8.6   ALB 1.9*  --  2.5*   WBC 11.6*  --  17.5*   HGB 7.1* 7.5* 7.1*   HCT 23.4* 23.9* 23.1*   *  --  156      Recent Labs     03/14/20  0110   INR 1.1   PTP 11.0   APTT 28.1     Needs: urine analysis, urine sodium, protein and creatinine  No results found for: ALCIDES GRAYSON      Discussed with:  Family    : Jeff Bolden MD  3/16/2020        [facility]:  [email or web address]  [place] office:  [address] [place] [address] [address] [place] [address]  Phone: [phone]  Fax :     [phone]    [place] office:  [address] [facility]  Phone - [phone]  Fax - [phone]

## 2022-11-01 LAB
ANION GAP SERPL CALCULATED.3IONS-SCNC: 11 MMOL/L (ref 4–16)
ANISOCYTOSIS: ABNORMAL
ATYPICAL LYMPHOCYTE ABSOLUTE COUNT: ABNORMAL
BANDED NEUTROPHILS ABSOLUTE COUNT: 0.13 K/CU MM
BANDED NEUTROPHILS RELATIVE PERCENT: 3 % (ref 5–11)
BUN BLDV-MCNC: 15 MG/DL (ref 6–23)
CALCIUM SERPL-MCNC: 8.8 MG/DL (ref 8.3–10.6)
CHLORIDE BLD-SCNC: 102 MMOL/L (ref 99–110)
CO2: 32 MMOL/L (ref 21–32)
CREAT SERPL-MCNC: 0.4 MG/DL (ref 0.6–1.1)
CULTURE: ABNORMAL
CULTURE: ABNORMAL
CULTURE: NORMAL
DIFFERENTIAL TYPE: ABNORMAL
GFR SERPL CREATININE-BSD FRML MDRD: >60 ML/MIN/1.73M2
GLUCOSE BLD-MCNC: 102 MG/DL (ref 70–99)
GLUCOSE BLD-MCNC: 109 MG/DL (ref 70–99)
GLUCOSE BLD-MCNC: 166 MG/DL (ref 70–99)
GLUCOSE BLD-MCNC: 187 MG/DL (ref 70–99)
HCT VFR BLD CALC: 31.6 % (ref 37–47)
HEMOGLOBIN: 9.9 GM/DL (ref 12.5–16)
HIGH SENSITIVE C-REACTIVE PROTEIN: 14.2 MG/L (ref 0–5)
LYMPHOCYTES ABSOLUTE: 2.3 K/CU MM
LYMPHOCYTES RELATIVE PERCENT: 52 % (ref 24–44)
Lab: ABNORMAL
Lab: NORMAL
MAGNESIUM: 1.9 MG/DL (ref 1.8–2.4)
MCH RBC QN AUTO: 31.6 PG (ref 27–31)
MCHC RBC AUTO-ENTMCNC: 31.3 % (ref 32–36)
MCV RBC AUTO: 101 FL (ref 78–100)
METAMYELOCYTES ABSOLUTE COUNT: 0.04 K/CU MM
METAMYELOCYTES PERCENT: 1 %
MONOCYTES ABSOLUTE: 0.4 K/CU MM
MONOCYTES RELATIVE PERCENT: 9 % (ref 0–4)
PDW BLD-RTO: 12.2 % (ref 11.7–14.9)
PLATELET # BLD: 162 K/CU MM (ref 140–440)
PMV BLD AUTO: 8.9 FL (ref 7.5–11.1)
POTASSIUM SERPL-SCNC: 3.5 MMOL/L (ref 3.5–5.1)
RBC # BLD: 3.13 M/CU MM (ref 4.2–5.4)
RBC # BLD: ABNORMAL 10*6/UL
SEGMENTED NEUTROPHILS ABSOLUTE COUNT: 1.5 K/CU MM
SEGMENTED NEUTROPHILS RELATIVE PERCENT: 35 % (ref 36–66)
SODIUM BLD-SCNC: 145 MMOL/L (ref 135–145)
SPECIMEN: ABNORMAL
SPECIMEN: NORMAL
WBC # BLD: 4.4 K/CU MM (ref 4–10.5)

## 2022-11-01 PROCEDURE — 96366 THER/PROPH/DIAG IV INF ADDON: CPT

## 2022-11-01 PROCEDURE — 6370000000 HC RX 637 (ALT 250 FOR IP): Performed by: NURSE PRACTITIONER

## 2022-11-01 PROCEDURE — 94761 N-INVAS EAR/PLS OXIMETRY MLT: CPT

## 2022-11-01 PROCEDURE — 82962 GLUCOSE BLOOD TEST: CPT

## 2022-11-01 PROCEDURE — 85007 BL SMEAR W/DIFF WBC COUNT: CPT

## 2022-11-01 PROCEDURE — 2700000000 HC OXYGEN THERAPY PER DAY

## 2022-11-01 PROCEDURE — 94640 AIRWAY INHALATION TREATMENT: CPT

## 2022-11-01 PROCEDURE — 36415 COLL VENOUS BLD VENIPUNCTURE: CPT

## 2022-11-01 PROCEDURE — G0378 HOSPITAL OBSERVATION PER HR: HCPCS

## 2022-11-01 PROCEDURE — 6360000002 HC RX W HCPCS: Performed by: STUDENT IN AN ORGANIZED HEALTH CARE EDUCATION/TRAINING PROGRAM

## 2022-11-01 PROCEDURE — 2580000003 HC RX 258: Performed by: NURSE PRACTITIONER

## 2022-11-01 PROCEDURE — 2500000003 HC RX 250 WO HCPCS: Performed by: NURSE PRACTITIONER

## 2022-11-01 PROCEDURE — 2580000003 HC RX 258: Performed by: STUDENT IN AN ORGANIZED HEALTH CARE EDUCATION/TRAINING PROGRAM

## 2022-11-01 PROCEDURE — 83735 ASSAY OF MAGNESIUM: CPT

## 2022-11-01 PROCEDURE — 80048 BASIC METABOLIC PNL TOTAL CA: CPT

## 2022-11-01 PROCEDURE — 86141 C-REACTIVE PROTEIN HS: CPT

## 2022-11-01 PROCEDURE — 85027 COMPLETE CBC AUTOMATED: CPT

## 2022-11-01 PROCEDURE — 6360000002 HC RX W HCPCS: Performed by: NURSE PRACTITIONER

## 2022-11-01 PROCEDURE — 6370000000 HC RX 637 (ALT 250 FOR IP): Performed by: STUDENT IN AN ORGANIZED HEALTH CARE EDUCATION/TRAINING PROGRAM

## 2022-11-01 PROCEDURE — 96372 THER/PROPH/DIAG INJ SC/IM: CPT

## 2022-11-01 RX ORDER — AMLODIPINE BESYLATE 10 MG/1
10 TABLET ORAL DAILY
Status: DISCONTINUED | OUTPATIENT
Start: 2022-11-01 | End: 2022-11-02 | Stop reason: HOSPADM

## 2022-11-01 RX ADMIN — ALBUTEROL SULFATE 2 PUFF: 90 AEROSOL, METERED RESPIRATORY (INHALATION) at 07:05

## 2022-11-01 RX ADMIN — ENOXAPARIN SODIUM 40 MG: 40 INJECTION SUBCUTANEOUS at 09:22

## 2022-11-01 RX ADMIN — MICONAZOLE NITRATE: 2 POWDER TOPICAL at 09:23

## 2022-11-01 RX ADMIN — FERROUS SULFATE TAB 325 MG (65 MG ELEMENTAL FE) 325 MG: 325 (65 FE) TAB at 09:22

## 2022-11-01 RX ADMIN — PIPERACILLIN AND TAZOBACTAM 3375 MG: 3; .375 INJECTION, POWDER, LYOPHILIZED, FOR SOLUTION INTRAVENOUS at 18:52

## 2022-11-01 RX ADMIN — LEVETIRACETAM 1500 MG: 500 TABLET, FILM COATED ORAL at 21:15

## 2022-11-01 RX ADMIN — PIPERACILLIN AND TAZOBACTAM 3375 MG: 3; .375 INJECTION, POWDER, LYOPHILIZED, FOR SOLUTION INTRAVENOUS at 09:29

## 2022-11-01 RX ADMIN — SODIUM CHLORIDE, PRESERVATIVE FREE 10 ML: 5 INJECTION INTRAVENOUS at 09:23

## 2022-11-01 RX ADMIN — Medication 3 MG: at 21:15

## 2022-11-01 RX ADMIN — ALBUTEROL SULFATE 2 PUFF: 90 AEROSOL, METERED RESPIRATORY (INHALATION) at 11:19

## 2022-11-01 RX ADMIN — DEXAMETHASONE 6 MG: 4 TABLET ORAL at 09:22

## 2022-11-01 RX ADMIN — ALBUTEROL SULFATE 2 PUFF: 90 AEROSOL, METERED RESPIRATORY (INHALATION) at 15:31

## 2022-11-01 RX ADMIN — AZITHROMYCIN MONOHYDRATE 500 MG: 250 TABLET ORAL at 09:22

## 2022-11-01 RX ADMIN — LEVETIRACETAM 1500 MG: 500 TABLET, FILM COATED ORAL at 09:22

## 2022-11-01 RX ADMIN — CETIRIZINE HYDROCHLORIDE 10 MG: 10 TABLET, FILM COATED ORAL at 09:21

## 2022-11-01 RX ADMIN — ATORVASTATIN CALCIUM 40 MG: 40 TABLET, FILM COATED ORAL at 21:15

## 2022-11-01 RX ADMIN — PANTOPRAZOLE SODIUM 40 MG: 40 TABLET, DELAYED RELEASE ORAL at 09:21

## 2022-11-01 RX ADMIN — RISPERIDONE 1 MG: 0.5 TABLET ORAL at 21:15

## 2022-11-01 RX ADMIN — TIOTROPIUM BROMIDE AND OLODATEROL 2 PUFF: 3.124; 2.736 SPRAY, METERED RESPIRATORY (INHALATION) at 07:05

## 2022-11-01 RX ADMIN — PIPERACILLIN AND TAZOBACTAM 3375 MG: 3; .375 INJECTION, POWDER, LYOPHILIZED, FOR SOLUTION INTRAVENOUS at 02:00

## 2022-11-01 RX ADMIN — ALBUTEROL SULFATE 2 PUFF: 90 AEROSOL, METERED RESPIRATORY (INHALATION) at 19:18

## 2022-11-01 RX ADMIN — SERTRALINE HYDROCHLORIDE 150 MG: 50 TABLET ORAL at 21:15

## 2022-11-01 RX ADMIN — MIRTAZAPINE 30 MG: 15 TABLET, FILM COATED ORAL at 21:15

## 2022-11-01 RX ADMIN — SODIUM CHLORIDE 500 ML: 9 INJECTION, SOLUTION INTRAVENOUS at 01:59

## 2022-11-01 RX ADMIN — BUSPIRONE HYDROCHLORIDE 10 MG: 5 TABLET ORAL at 09:22

## 2022-11-01 RX ADMIN — SENNOSIDES AND DOCUSATE SODIUM 1 TABLET: 50; 8.6 TABLET ORAL at 21:15

## 2022-11-01 RX ADMIN — Medication 1 CAPSULE: at 09:21

## 2022-11-01 RX ADMIN — DIVALPROEX SODIUM 1000 MG: 500 TABLET, DELAYED RELEASE ORAL at 21:15

## 2022-11-01 RX ADMIN — DIVALPROEX SODIUM 1000 MG: 500 TABLET, DELAYED RELEASE ORAL at 09:21

## 2022-11-01 RX ADMIN — BUSPIRONE HYDROCHLORIDE 10 MG: 5 TABLET ORAL at 21:15

## 2022-11-01 RX ADMIN — RISPERIDONE 1 MG: 0.5 TABLET ORAL at 09:21

## 2022-11-01 RX ADMIN — CARBAMAZEPINE 300 MG: 300 CAPSULE, EXTENDED RELEASE ORAL at 09:20

## 2022-11-01 RX ADMIN — AMLODIPINE BESYLATE 10 MG: 10 TABLET ORAL at 12:25

## 2022-11-01 ASSESSMENT — PAIN SCALES - GENERAL: PAINLEVEL_OUTOF10: 0

## 2022-11-01 ASSESSMENT — ENCOUNTER SYMPTOMS
COUGH: 1
NAUSEA: 0
DIARRHEA: 0
SHORTNESS OF BREATH: 1
WHEEZING: 0
SORE THROAT: 0
VOMITING: 0
BACK PAIN: 0

## 2022-11-01 NOTE — PROGRESS NOTES
V2.0  [facility] [place] Hospitalist Progress Note      Name:  Ria Gracia /Age/Sex: 1960  (63 y.o. female)   MRN & CSN:  [id number] & [id number] Encounter Date/Time: 2022 9:45 AM EDT    Location:  -A PCP: Elmira Lr MD       Hospital Day: 4    Assessment and Plan:   Ria Gracia is a 63 y.o. female with COPD, seizure disorder, noninsulin-dependent type 2 diabetes, prior CVA, hypothyroidism, and developmental delay who presents with shortness of breath and cough. who presents with COVID-19      Plan:    Acute on chronic hypoxic respiratory failure. Secondary COVID-19 and COPD exacerbation  Patient increasing her home oxygen on presentation. Continue supplemental oxygen. Wean to keep sats greater than 89%  Treatment for COVID-19 and COPD exacerbation     COPD exacerbation, secondary to COVID-19 with bronchitis  Steroids: Decadron 6 mg daily to complete 10-day course. Breathing treatments  Antibiotic coverage: Azithromycin and Zosyn to cover Pseudomonas. Will discharge on Levaquin to complete 7-day course. COVID-19 infection  Decadron 6 mg daily  Trend inflammatory markers  Monitor oxygenation. Does not meet criteria for baricitinib at this time. Seizure disorder  Continue home Tegretol, Depakote, and Keppra  Monitor closely as the patient states that her threshold is lowered with acute infection     Non-insulin-dependent type 2 diabetes  Sliding scale  Point-of-care glucose checks  Hypoglycemia protocol    Hypertension  -Continue home amlodipine. Holding lisinopril for now as her blood pressures have been under good control we will restart when appropriate. Hypothyroidism  Synthroid     Left hand aphasia secondary to CVA  Continue Lipitor    Blood culture positive for staph epidermidis  Only in 1 out of 4 sets. Likely contaminant. No further work-up or long-term antibiotics anticipated.       Comment: Please note this report has been produced using speech recognition software and may contain errors related to that system including errors in grammar, punctuation, and spelling, as well as words and phrases that may be inappropriate. If there are any questions or concerns please feel free to contact the dictating provider for clarification. Diet ADULT DIET; Dysphagia - Minced and Moist; 5 carb choices (75 gm/meal)   DVT Prophylaxis [x] Lovenox, []  Heparin, [] SCDs, [] Ambulation,  [] Eliquis, [] Xarelto  [] Coumadin   Code Status Full Code   Disposition From: Home Home  Expected Disposition: Home  Estimated Date of Discharge: 24 hours  Patient requires continued admission due to COPD exacerbation and COVID-19. Improving. Surrogate Decision Maker/ POA Legal guardian APSI     Subjective:     Chief Complaint: Respiratory Distress (Home health care nurse reports decreased oxygen saturation ongoing for the past week. Patient coughing but denies shortness of breath.)     Feels okay. Inquiring about when she can go home. Reports further improvement in her shortness of breath. Still has persistent cough but decreased. Production now. Review of Systems:    Review of Systems   Constitutional:  Negative for chills and fever. HENT:  Negative for sore throat. Eyes:  Negative for visual disturbance. Respiratory:  Positive for cough and shortness of breath. Negative for wheezing. Cardiovascular:  Negative for chest pain, palpitations and leg swelling. Gastrointestinal:  Negative for diarrhea, nausea and vomiting. Endocrine: Negative for polyuria. Genitourinary:  Negative for dysuria. Musculoskeletal:  Negative for back pain. Neurological:  Negative for dizziness. Psychiatric/Behavioral:  Negative for confusion.         Objective:   No intake or output data in the 24 hours ending 11/01/22 0945     Vitals:   Vitals:    11/01/22 0810   BP: (!) 144/75   Pulse: 54   Resp: 13   Temp: 97.5 °F (36.4 °C)   SpO2: 97%       Physical Exam:   Physical Exam  Constitutional: General: She is not in acute distress. HENT:      Mouth/Throat:      Mouth: Mucous membranes are moist.      Pharynx: No posterior oropharyngeal erythema. Eyes:      General: No scleral icterus. Pupils: Pupils are equal, round, and reactive to light. Cardiovascular:      Rate and Rhythm: Normal rate and regular rhythm. Heart sounds: No murmur heard. Pulmonary:      Effort: Pulmonary effort is normal. No respiratory distress. Breath sounds: Wheezing (Faint bilateral wheezing.) present. No rales. Abdominal:      Palpations: Abdomen is soft. Tenderness: There is no abdominal tenderness. Musculoskeletal:         General: Normal range of motion. Right lower leg: No edema. Left lower leg: No edema. Skin:     General: Skin is warm. Neurological:      Mental Status: She is alert. Cranial Nerves: No cranial nerve deficit. Motor: Weakness (Left upper and lower extremity( chronic)) present. Comments: Is able to follow commands.    Psychiatric:         Mood and Affect: Mood normal.       Medications:   Medications:    dexamethasone  6 mg Oral Daily    piperacillin-tazobactam  3,375 mg IntraVENous Q8H    sodium chloride flush  5-40 mL IntraVENous 2 times per day    enoxaparin  40 mg SubCUTAneous Daily    polyethylene glycol  17 g Oral Daily    sennosides-docusate sodium  1 tablet Oral BID    insulin lispro  0-4 Units SubCUTAneous TID WC    insulin lispro  0-4 Units SubCUTAneous Nightly    melatonin  3 mg Oral Nightly    albuterol sulfate HFA  2 puff Inhalation 4x daily    [Held by provider] amLODIPine  10 mg Oral Daily    atorvastatin  40 mg Oral Nightly    busPIRone  10 mg Oral BID    carBAMazepine  300 mg Oral BID    cetirizine  10 mg Oral Daily    divalproex  1,000 mg Oral BID    ferrous sulfate  325 mg Oral Daily with breakfast    fluticasone  1 spray Each Nostril Daily    lactobacillus  1 capsule Oral Daily with breakfast    levETIRAcetam  1,500 mg Oral BID    [Held by provider] lisinopril  40 mg Oral Daily    mirtazapine  30 mg Oral Nightly    miconazole   Topical BID    pantoprazole  40 mg Oral Daily    risperiDONE  1 mg Oral BID    sertraline  150 mg Oral Nightly    tiotropium-olodaterol  2 puff Inhalation Daily      Infusions:    dextrose      sodium chloride 500 mL (11/01/22 0159)     PRN Meds: glucose, 4 tablet, PRN  dextrose bolus, 125 mL, PRN   Or  dextrose bolus, 250 mL, PRN  glucagon (rDNA), 1 mg, PRN  dextrose, 1,000 mL, Continuous PRN  sodium chloride flush, 5-40 mL, PRN  sodium chloride, 500 mL, PRN  polyethylene glycol, 17 g, Daily PRN  acetaminophen, 650 mg, Q6H PRN   Or  acetaminophen, 650 mg, Q6H PRN  promethazine, 12.5 mg, Q6H PRN  aluminum & magnesium hydroxide-simethicone, 30 mL, Q6H PRN  guaiFENesin-dextromethorphan, 5 mL, Q4H PRN  diphenhydrAMINE, 25 mg, Q6H PRN  albuterol sulfate HFA, 1 puff, Q6H PRN   And  ipratropium, 1 puff, Q6H PRN        Labs      Recent Results (from the past 24 hour(s))   POCT Glucose    Collection Time: 10/31/22 11:26 AM   Result Value Ref Range    POC Glucose 178 (H) 70 - 99 MG/DL   POCT Glucose    Collection Time: 10/31/22  4:03 PM   Result Value Ref Range    POC Glucose 120 (H) 70 - 99 MG/DL   POCT Glucose    Collection Time: 10/31/22  8:40 PM   Result Value Ref Range    POC Glucose 94 70 - 99 MG/DL   C-Reactive Protein    Collection Time: 11/01/22  5:41 AM   Result Value Ref Range    CRP, High Sensitivity 14.2 (H) 0.0 - 5.0 mg/L   Basic Metabolic Panel w/ Reflex to MG    Collection Time: 11/01/22  5:41 AM   Result Value Ref Range    Sodium 145 135 - 145 MMOL/L    Potassium 3.5 3.5 - 5.1 MMOL/L    Chloride 102 99 - 110 mMol/L    CO2 32 21 - 32 MMOL/L    Anion Gap 11 4 - 16    BUN 15 6 - 23 MG/DL    Creatinine 0.4 (L) 0.6 - 1.1 MG/DL    Est, Glom Filt Rate >60 >60 mL/min/1.73m2    Glucose 109 (H) 70 - 99 MG/DL    Calcium 8.8 8.3 - 10.6 MG/DL   CBC with Auto Differential    Collection Time: 11/01/22  5:41 AM   Result Value Ref Range    WBC 4.4 4.0 - 10.5 K/CU MM    RBC 3.13 (L) 4.2 - 5.4 M/CU MM    Hemoglobin 9.9 (L) 12.5 - 16.0 GM/DL    Hematocrit 31.6 (L) 37 - 47 %    .0 (H) 78 - 100 FL    MCH 31.6 (H) 27 - 31 PG    MCHC 31.3 (L) 32.0 - 36.0 %    RDW 12.2 11.7 - 14.9 %    Platelets 598 990 - 560 K/CU MM    MPV 8.9 7.5 - 11.1 FL    Metamyelocytes Relative 1 (H) 0.0 %    Bands Relative 3 (L) 5 - 11 %    Segs Relative 35.0 (L) 36 - 66 %    Lymphocytes % 52.0 (H) 24 - 44 %    Monocytes % 9.0 (H) 0 - 4 %    Metamyelocytes Absolute 0.04 K/CU MM    Bands Absolute 0.13 K/CU MM    Segs Absolute 1.5 K/CU MM    Lymphocytes Absolute 2.3 K/CU MM    Monocytes Absolute 0.4 K/CU MM    Differential Type MANUAL DIFFERENTIAL     RBC Morphology OCCASIONAL     Anisocytosis 1+     Atypical Lymphocytes Absolute 1+    Magnesium    Collection Time: 11/01/22  5:41 AM   Result Value Ref Range    Magnesium 1.9 1.8 - 2.4 mg/dl        Imaging/Diagnostics Last 24 Hours   No results found.     Electronically signed by Eder Rowe MD on 11/1/2022 at 9:45 AM

## 2022-11-02 VITALS
HEIGHT: 59 IN | OXYGEN SATURATION: 91 % | TEMPERATURE: 98 F | WEIGHT: 132 LBS | SYSTOLIC BLOOD PRESSURE: 170 MMHG | BODY MASS INDEX: 26.61 KG/M2 | DIASTOLIC BLOOD PRESSURE: 73 MMHG | RESPIRATION RATE: 16 BRPM | HEART RATE: 67 BPM

## 2022-11-02 PROBLEM — R06.02 SOB (SHORTNESS OF BREATH): Status: ACTIVE | Noted: 2022-11-02

## 2022-11-02 LAB
ALBUMIN SERPL-MCNC: 3.3 GM/DL (ref 3.4–5)
ANION GAP SERPL CALCULATED.3IONS-SCNC: 12 MMOL/L (ref 4–16)
ANION GAP SERPL CALCULATED.3IONS-SCNC: 14 MMOL/L (ref 4–16)
BASOPHILS ABSOLUTE: 0 K/CU MM
BASOPHILS RELATIVE PERCENT: 0.6 % (ref 0–1)
BUN BLDV-MCNC: 16 MG/DL (ref 6–23)
BUN BLDV-MCNC: 17 MG/DL (ref 6–23)
C-REACTIVE PROTEIN, HIGH SENSITIVITY: 9 MG/L
CALCIUM SERPL-MCNC: 8.8 MG/DL (ref 8.3–10.6)
CALCIUM SERPL-MCNC: 9.1 MG/DL (ref 8.3–10.6)
CHLORIDE BLD-SCNC: 101 MMOL/L (ref 99–110)
CHLORIDE BLD-SCNC: 103 MMOL/L (ref 99–110)
CO2: 27 MMOL/L (ref 21–32)
CO2: 30 MMOL/L (ref 21–32)
CREAT SERPL-MCNC: 0.5 MG/DL (ref 0.6–1.1)
CREAT SERPL-MCNC: 0.6 MG/DL (ref 0.6–1.1)
DIFFERENTIAL TYPE: ABNORMAL
EOSINOPHILS ABSOLUTE: 0 K/CU MM
EOSINOPHILS RELATIVE PERCENT: 0.2 % (ref 0–3)
GFR SERPL CREATININE-BSD FRML MDRD: >60 ML/MIN/1.73M2
GFR SERPL CREATININE-BSD FRML MDRD: >60 ML/MIN/1.73M2
GLUCOSE BLD-MCNC: 111 MG/DL (ref 70–99)
GLUCOSE BLD-MCNC: 112 MG/DL (ref 70–99)
GLUCOSE BLD-MCNC: 119 MG/DL (ref 70–99)
GLUCOSE BLD-MCNC: 123 MG/DL (ref 70–99)
GLUCOSE BLD-MCNC: 158 MG/DL (ref 70–99)
HCT VFR BLD CALC: 29.9 % (ref 37–47)
HEMOGLOBIN: 9.6 GM/DL (ref 12.5–16)
IMMATURE NEUTROPHIL %: 4 % (ref 0–0.43)
KEPPRA: 11 UG/ML (ref 10–40)
LYMPHOCYTES ABSOLUTE: 2.6 K/CU MM
LYMPHOCYTES RELATIVE PERCENT: 47.7 % (ref 24–44)
MAGNESIUM: 1.9 MG/DL (ref 1.8–2.4)
MCH RBC QN AUTO: 31.4 PG (ref 27–31)
MCHC RBC AUTO-ENTMCNC: 32.1 % (ref 32–36)
MCV RBC AUTO: 97.7 FL (ref 78–100)
MONOCYTES ABSOLUTE: 0.5 K/CU MM
MONOCYTES RELATIVE PERCENT: 9.5 % (ref 0–4)
NUCLEATED RBC %: 0.6 %
PDW BLD-RTO: 12.3 % (ref 11.7–14.9)
PHOSPHORUS: 3.6 MG/DL (ref 2.5–4.9)
PLATELET # BLD: 171 K/CU MM (ref 140–440)
PMV BLD AUTO: 9 FL (ref 7.5–11.1)
POTASSIUM SERPL-SCNC: 3.2 MMOL/L (ref 3.5–5.1)
POTASSIUM SERPL-SCNC: 3.4 MMOL/L (ref 3.5–5.1)
RBC # BLD: 3.06 M/CU MM (ref 4.2–5.4)
SEGMENTED NEUTROPHILS ABSOLUTE COUNT: 2.1 K/CU MM
SEGMENTED NEUTROPHILS RELATIVE PERCENT: 38 % (ref 36–66)
SODIUM BLD-SCNC: 140 MMOL/L (ref 135–145)
SODIUM BLD-SCNC: 147 MMOL/L (ref 135–145)
TOTAL IMMATURE NEUTOROPHIL: 0.22 K/CU MM
TOTAL NUCLEATED RBC: 0 K/CU MM
WBC # BLD: 5.5 K/CU MM (ref 4–10.5)

## 2022-11-02 PROCEDURE — G0378 HOSPITAL OBSERVATION PER HR: HCPCS

## 2022-11-02 PROCEDURE — 85025 COMPLETE CBC W/AUTO DIFF WBC: CPT

## 2022-11-02 PROCEDURE — 36415 COLL VENOUS BLD VENIPUNCTURE: CPT

## 2022-11-02 PROCEDURE — 2500000003 HC RX 250 WO HCPCS: Performed by: NURSE PRACTITIONER

## 2022-11-02 PROCEDURE — 1200000000 HC SEMI PRIVATE

## 2022-11-02 PROCEDURE — 6360000002 HC RX W HCPCS: Performed by: NURSE PRACTITIONER

## 2022-11-02 PROCEDURE — 86140 C-REACTIVE PROTEIN: CPT

## 2022-11-02 PROCEDURE — 2580000003 HC RX 258: Performed by: STUDENT IN AN ORGANIZED HEALTH CARE EDUCATION/TRAINING PROGRAM

## 2022-11-02 PROCEDURE — 96366 THER/PROPH/DIAG IV INF ADDON: CPT

## 2022-11-02 PROCEDURE — 80048 BASIC METABOLIC PNL TOTAL CA: CPT

## 2022-11-02 PROCEDURE — 80069 RENAL FUNCTION PANEL: CPT

## 2022-11-02 PROCEDURE — 96372 THER/PROPH/DIAG INJ SC/IM: CPT

## 2022-11-02 PROCEDURE — 82962 GLUCOSE BLOOD TEST: CPT

## 2022-11-02 PROCEDURE — 6370000000 HC RX 637 (ALT 250 FOR IP): Performed by: STUDENT IN AN ORGANIZED HEALTH CARE EDUCATION/TRAINING PROGRAM

## 2022-11-02 PROCEDURE — 6370000000 HC RX 637 (ALT 250 FOR IP): Performed by: NURSE PRACTITIONER

## 2022-11-02 PROCEDURE — 94640 AIRWAY INHALATION TREATMENT: CPT

## 2022-11-02 PROCEDURE — 83735 ASSAY OF MAGNESIUM: CPT

## 2022-11-02 PROCEDURE — 6360000002 HC RX W HCPCS: Performed by: STUDENT IN AN ORGANIZED HEALTH CARE EDUCATION/TRAINING PROGRAM

## 2022-11-02 PROCEDURE — 94761 N-INVAS EAR/PLS OXIMETRY MLT: CPT

## 2022-11-02 PROCEDURE — 2580000003 HC RX 258: Performed by: NURSE PRACTITIONER

## 2022-11-02 RX ORDER — DIVALPROEX SODIUM 500 MG/1
1000 TABLET, DELAYED RELEASE ORAL 2 TIMES DAILY
Qty: 60 TABLET | Refills: 1 | Status: SHIPPED | OUTPATIENT
Start: 2022-11-02

## 2022-11-02 RX ORDER — POTASSIUM CHLORIDE 20 MEQ/1
40 TABLET, EXTENDED RELEASE ORAL ONCE
Status: COMPLETED | OUTPATIENT
Start: 2022-11-02 | End: 2022-11-02

## 2022-11-02 RX ORDER — SERTRALINE HYDROCHLORIDE 100 MG/1
150 TABLET, FILM COATED ORAL NIGHTLY
Qty: 30 TABLET | Refills: 1 | Status: SHIPPED | OUTPATIENT
Start: 2022-11-02

## 2022-11-02 RX ORDER — LISINOPRIL 20 MG/1
40 TABLET ORAL DAILY
Status: DISCONTINUED | OUTPATIENT
Start: 2022-11-02 | End: 2022-11-02 | Stop reason: HOSPADM

## 2022-11-02 RX ORDER — LEVOFLOXACIN 750 MG/1
750 TABLET ORAL DAILY
Qty: 4 TABLET | Refills: 0 | Status: SHIPPED | OUTPATIENT
Start: 2022-11-02 | End: 2022-11-06

## 2022-11-02 RX ORDER — RISPERIDONE 1 MG/1
1 TABLET ORAL 2 TIMES DAILY
Qty: 60 TABLET | Refills: 1 | Status: SHIPPED | OUTPATIENT
Start: 2022-11-02

## 2022-11-02 RX ORDER — LEVOTHYROXINE SODIUM 0.05 MG/1
50 TABLET ORAL DAILY
Qty: 30 TABLET | Refills: 1 | Status: SHIPPED | OUTPATIENT
Start: 2022-11-02

## 2022-11-02 RX ORDER — POTASSIUM CHLORIDE 20 MEQ/1
20 TABLET, EXTENDED RELEASE ORAL DAILY
Qty: 7 TABLET | Refills: 0 | Status: SHIPPED | OUTPATIENT
Start: 2022-11-02 | End: 2022-11-09

## 2022-11-02 RX ORDER — DEXAMETHASONE 6 MG/1
6 TABLET ORAL DAILY
Qty: 5 TABLET | Refills: 0 | Status: SHIPPED | OUTPATIENT
Start: 2022-11-03 | End: 2022-11-08

## 2022-11-02 RX ADMIN — CETIRIZINE HYDROCHLORIDE 10 MG: 10 TABLET, FILM COATED ORAL at 09:15

## 2022-11-02 RX ADMIN — LISINOPRIL 40 MG: 20 TABLET ORAL at 13:08

## 2022-11-02 RX ADMIN — FLUTICASONE PROPIONATE 1 SPRAY: 50 SPRAY, METERED NASAL at 09:14

## 2022-11-02 RX ADMIN — SODIUM CHLORIDE, PRESERVATIVE FREE 10 ML: 5 INJECTION INTRAVENOUS at 09:14

## 2022-11-02 RX ADMIN — TIOTROPIUM BROMIDE AND OLODATEROL 2 PUFF: 3.124; 2.736 SPRAY, METERED RESPIRATORY (INHALATION) at 07:50

## 2022-11-02 RX ADMIN — LEVETIRACETAM 1500 MG: 500 TABLET, FILM COATED ORAL at 09:15

## 2022-11-02 RX ADMIN — RISPERIDONE 1 MG: 0.5 TABLET ORAL at 09:15

## 2022-11-02 RX ADMIN — BUSPIRONE HYDROCHLORIDE 10 MG: 5 TABLET ORAL at 09:15

## 2022-11-02 RX ADMIN — CARBAMAZEPINE 300 MG: 300 CAPSULE, EXTENDED RELEASE ORAL at 09:13

## 2022-11-02 RX ADMIN — SENNOSIDES AND DOCUSATE SODIUM 1 TABLET: 50; 8.6 TABLET ORAL at 09:16

## 2022-11-02 RX ADMIN — DIVALPROEX SODIUM 1000 MG: 500 TABLET, DELAYED RELEASE ORAL at 09:15

## 2022-11-02 RX ADMIN — ENOXAPARIN SODIUM 40 MG: 40 INJECTION SUBCUTANEOUS at 09:17

## 2022-11-02 RX ADMIN — POTASSIUM CHLORIDE 40 MEQ: 1500 TABLET, EXTENDED RELEASE ORAL at 09:16

## 2022-11-02 RX ADMIN — POLYETHYLENE GLYCOL (3350) 17 G: 17 POWDER, FOR SOLUTION ORAL at 09:16

## 2022-11-02 RX ADMIN — PIPERACILLIN AND TAZOBACTAM 3375 MG: 3; .375 INJECTION, POWDER, LYOPHILIZED, FOR SOLUTION INTRAVENOUS at 09:28

## 2022-11-02 RX ADMIN — FERROUS SULFATE TAB 325 MG (65 MG ELEMENTAL FE) 325 MG: 325 (65 FE) TAB at 09:16

## 2022-11-02 RX ADMIN — PANTOPRAZOLE SODIUM 40 MG: 40 TABLET, DELAYED RELEASE ORAL at 09:16

## 2022-11-02 RX ADMIN — SODIUM CHLORIDE 500 ML: 9 INJECTION, SOLUTION INTRAVENOUS at 09:27

## 2022-11-02 RX ADMIN — DEXAMETHASONE 6 MG: 4 TABLET ORAL at 09:16

## 2022-11-02 RX ADMIN — MICONAZOLE NITRATE: 2 POWDER TOPICAL at 15:25

## 2022-11-02 RX ADMIN — ALBUTEROL SULFATE 2 PUFF: 90 AEROSOL, METERED RESPIRATORY (INHALATION) at 07:49

## 2022-11-02 RX ADMIN — ALBUTEROL SULFATE 2 PUFF: 90 AEROSOL, METERED RESPIRATORY (INHALATION) at 11:20

## 2022-11-02 RX ADMIN — AMLODIPINE BESYLATE 10 MG: 10 TABLET ORAL at 09:16

## 2022-11-02 RX ADMIN — POTASSIUM CHLORIDE 40 MEQ: 1500 TABLET, EXTENDED RELEASE ORAL at 15:22

## 2022-11-02 RX ADMIN — PIPERACILLIN AND TAZOBACTAM 3375 MG: 3; .375 INJECTION, POWDER, LYOPHILIZED, FOR SOLUTION INTRAVENOUS at 01:45

## 2022-11-02 ASSESSMENT — PAIN SCALES - GENERAL
PAINLEVEL_OUTOF10: 0
PAINLEVEL_OUTOF10: 0

## 2022-11-02 NOTE — DISCHARGE SUMMARY
V2.0  Discharge Summary    Name:  Paty Younger /Age/Sex: 1960 (75 y.o. female)   Admit Date: 10/29/2022  Discharge Date: 22    MRN & CSN:  [id number] & [id number] Encounter Date and Time 22 2:49 PM EDT    Attending:  Santa Prater MD Discharging Provider: Santa Prater MD       Hospital Course:     Brief HPI: Paty Younger is a 63 y.o. female with pmh of MRDD, who presented emergency department via EMS, sent in by home health nursing secondary to hypoxic episode, as well as increased Respirations, cough congestion and increased oxygenation at home. Patient with generalized cough, congestion over the last 3, found to be off her oxygen in the low 60s because her oxygen had been maxed off overnight. Has been increasingly using her home O2 according to home health nursing. Brief Problem Based Course:     Acute on chronic hypoxic respiratory failure. Secondary COVID-19 and COPD exacerbation  Patient increasing her home oxygen on presentation. Treatment for COVID-19 and COPD exacerbation with steroids, and patient was able to be weaned off supplemental oxygen on room air prior to discharge. COPD exacerbation, secondary to COVID-19 with bronchitis  Steroids: Decadron 6 mg daily to complete 10-day course. Antibiotic coverage: Azithromycin and Zosyn to cover Pseudomonas. Will discharge on Levaquin to complete 7-day course. Sepsis secondary to COVID-19 infection versus community-acquired pneumonia  Antibiotics and steroids as above. Seizure disorder  Home medications Tegretol, Depakote, and Keppra were continued during this admission        Non-insulin-dependent type 2 diabetes  Managed with sliding scale insulin. Hypertension  -Continue home amlodipine, initially patient had low blood pressures requiring holding lisinopril however was restarted day prior to discharge.      Hypothyroidism  Managed with home Synthroid     Left hand aphasia secondary to CVA  Managed with home Lipitor     Blood culture positive for staph epidermidis  Only in 1 out of 4 sets. Likely contaminant. No further work-up or long-term antibiotics anticipated. The patient expressed appropriate understanding of, and agreement with the discharge recommendations, medications, and plan. Consults this admission:  IP CONSULT TO HOSPITALIST    Discharge Diagnosis:   Acute on chronic hypoxic respiratory failure. Secondary COVID-19 and COPD exacerbation  COPD exacerbation, secondary to COVID-19 with bronchitis  COVID-19 infection  Seizure disorder  Non-insulin-dependent type 2 diabetes  Hypertension  Hypothyroidism  Left hand aphasia secondary to CVA  Blood culture positive for staph epidermidis  Only in 1 out of 4 sets. Likely contaminant. No further work-up or long-term antibiotics anticipated. Discharge Instruction:   Follow up appointments: PCP in 1 week  Primary care physician: Bridger Dowd MD within 2 weeks  Diet: regular diet   Activity: activity as tolerated  Disposition: Discharged to:   [x] Group Home, [][facility], []SNF, []Acute Rehab, []Hospice   Condition on discharge: Stable  Labs and Tests to be Followed up as an outpatient by PCP or Specialist: Needs repeat potassium level drawn within 1 week. Discharge Medications:        Medication List        START taking these medications      dexamethasone 6 MG tablet  Commonly known as: DECADRON  Take 1 tablet by mouth daily for 5 doses  Start taking on: November 3, 2022     levoFLOXacin 750 MG tablet  Commonly known as: Levaquin  Take 1 tablet by mouth daily for 4 days     potassium chloride 20 MEQ extended release tablet  Commonly known as: KLOR-CON M  Take 1 tablet by mouth daily for 7 days            CHANGE how you take these medications      Januvia 100 MG tablet  Generic drug: SITagliptin  TAKE 1 TABLET BY MOUTH DAILY  What changed: See the new instructions.      levothyroxine 50 MCG tablet  Commonly known as: SYNTHROID  Take 1 tablet by mouth Daily  What changed:   medication strength  how much to take     pantoprazole 40 MG tablet  Commonly known as: PROTONIX  What changed: Another medication with the same name was removed. Continue taking this medication, and follow the directions you see here. CONTINUE taking these medications      acidophilus Caps capsule  TAKE 1 CAPSULE BY MOUTH DAILY     albuterol sulfate  (90 Base) MCG/ACT inhaler  Commonly known as: PROVENTIL;VENTOLIN;PROAIR     amLODIPine 10 MG tablet  Commonly known as: NORVASC  Take 1 tablet by mouth in the morning. atorvastatin 40 MG tablet  Commonly known as: LIPITOR  TAKE 1 TABLET BY MOUTH DAILY     blood glucose test strips  Test 3 times a day & as needed for symptoms of irregular blood glucose. busPIRone 10 MG tablet  Commonly known as: BUSPAR  Take 1 tablet by mouth 2 times daily     carBAMazepine 300 MG extended release capsule  Commonly known as: CARBATROL  TAKE 1 CAPSULE BY MOUTH TWICE A DAY     cetirizine 10 MG tablet  Commonly known as: ZYRTEC  TAKE 1 TABLET BY MOUTH DAILY     D3 High Potency 50 MCG (2000 UT) Caps  Generic drug: Cholecalciferol  TAKE 1 CAPSULE BY MOUTH DAILY     Depend Underwear Large/XL Misc  1 Units by Does not apply route 4 times daily     Diapers & Supplies Misc  1 each by Does not apply route daily as needed (4-5 times daily)     diphenhydrAMINE 25 MG capsule  Commonly known as: BENADRYL     Disposable Gloves Misc  1 Units by Does not apply route 4 times daily     divalproex 500 MG DR tablet  Commonly known as: DEPAKOTE  Take 2 tablets by mouth in the morning and 2 tablets in the evening.      FeroSul 325 (65 Fe) MG tablet  Generic drug: ferrous sulfate  TAKE 1 TABLET BY MOUTH DAILY WITH BREAKFAST     fluticasone 50 MCG/ACT nasal spray  Commonly known as: FLONASE  INSTILL 1 SPRAY INTO EACH NOSTRIL DAILY     * ipratropium-albuterol 0.5-2.5 (3) MG/3ML Soln nebulizer solution  Commonly known as: DUONEB  Inhale 3 mLs into the lungs 3 times daily     * Combivent Respimat  MCG/ACT Aers inhaler  Generic drug: albuterol-ipratropium  INHALE 1 PUFF BY ORAL INHALATION EVERY 6 HOURS     * ipratropium-albuterol 0.5-2.5 (3) MG/3ML Soln nebulizer solution  Commonly known as: DUONEB  Inhale 3 mLs into the lungs every 4 hours     levETIRAcetam 750 MG tablet  Commonly known as: KEPPRA  Take 2 tablets by mouth 2 times daily     lisinopril 40 MG tablet  Commonly known as: PRINIVIL;ZESTRIL  TAKE 1 TABLET BY MOUTH DAILY     metFORMIN 500 MG tablet  Commonly known as: Glucophage  Take 2 tablets by mouth daily (with breakfast)     mirtazapine 30 MG tablet  Commonly known as: REMERON  TAKE 1 TABLET BY MOUTH NIGHTLY     nystatin 829378 UNIT/GM powder  Commonly known as: MYCOSTATIN  Apply topically 4 times daily. under the abdominal folds for 2 wks     risperiDONE 1 MG tablet  Commonly known as: RisperDAL  Take 1 tablet by mouth 2 times daily Twice daily     sertraline 100 MG tablet  Commonly known as: ZOLOFT  Take 1.5 tablets by mouth nightly     tiotropium-olodaterol 2.5-2.5 MCG/ACT Aers  Commonly known as: STIOLTO  Inhale 2 puffs into the lungs daily     UNABLE TO FIND  hospital bed mattress XL twin           * This list has 3 medication(s) that are the same as other medications prescribed for you. Read the directions carefully, and ask your doctor or other care provider to review them with you.                    Where to Get Your Medications        These medications were sent to [address] [place] [address]      Phone: [phone]   dexamethasone 6 MG tablet  divalproex 500 MG DR tablet  levoFLOXacin 750 MG tablet  levothyroxine 50 MCG tablet  metFORMIN 500 MG tablet  potassium chloride 20 MEQ extended release tablet  risperiDONE 1 MG tablet  sertraline 100 MG tablet        Objective Findings at Discharge:   BP (!) 170/73   Pulse 67   Temp 98 °F (36.7 °C) (Oral)   Resp 16   Ht 4' 11\" (1.499 m)   Wt 132 lb (59.9 kg)   SpO2 91%   BMI 26.66 kg/m²       Physical Exam:   Constitutional:       General: She is not in acute distress. HENT:      Mouth/Throat:      Mouth: Mucous membranes are moist.      Pharynx: No posterior oropharyngeal erythema. Eyes:      General: No scleral icterus. Pupils: Pupils are equal, round, and reactive to light. Cardiovascular:      Rate and Rhythm: Normal rate and regular rhythm. Heart sounds: No murmur heard. Pulmonary:      Effort: Pulmonary effort is normal. No respiratory distress. Breath sounds: No wheezing or rales. Abdominal:      Palpations: Abdomen is soft. Tenderness: There is no abdominal tenderness. Musculoskeletal:         General: Normal range of motion. Right lower leg: No edema. Left lower leg: No edema. Skin:     General: Skin is warm. Neurological:      Mental Status: She is alert. Cranial Nerves: No cranial nerve deficit. Motor: Weakness (Left upper and lower extremity( chronic)) present. Comments: Is able to follow commands. Psychiatric:         Mood and Affect: Mood normal.         Labs and Imaging   XR CHEST PORTABLE    Result Date: 10/29/2022  EXAMINATION: ONE XRAY VIEW OF THE CHEST 10/29/2022 9:17 am COMPARISON: 10/13/2022 HISTORY: ORDERING SYSTEM PROVIDED HISTORY: SOB TECHNOLOGIST PROVIDED HISTORY: Reason for exam:->SOB Reason for Exam: SOB Additional signs and symptoms: SOB Relevant Medical/Surgical History: SOB FINDINGS: Subjective central bronchial pulmonary marking prominence partially reflecting low lung volumes however, central bronchitis cannot be excluded. Otherwise, lung fields are clear. No detectable pleural effusion, pneumothorax, pulmonary edema, cardiomegaly or mediastinal widening. Subjective skeletal osteopenia noted.      Subjective central bronchial pulmonary marking prominence partially reflecting low lung volumes however, central bronchitis and underlying chronic lung disease may contribute to this appearance. Otherwise, radiographically nonacute portable chest. Subjective skeletal osteopenia. CTA PULMONARY W CONTRAST    Result Date: 10/29/2022  EXAMINATION: CTA OF THE CHEST 10/29/2022 11:53 am TECHNIQUE: CTA of the chest was performed after the administration of intravenous contrast.  Multiplanar reformatted images are provided for review. MIP images are provided for review. Automated exposure control, iterative reconstruction, and/or weight based adjustment of the mA/kV was utilized to reduce the radiation dose to as low as reasonably achievable. COMPARISON: Lung cancer screening CT 09/02/2022. CT PA 05/26/2022. Chest radiograph 10/29/2022. HISTORY: ORDERING SYSTEM PROVIDED HISTORY: Shortness of breath, increased oxygenation, COVID-19, rule out acute intrathoracic pathology. TECHNOLOGIST PROVIDED HISTORY: Reason for exam:->Shortness of breath, increased oxygenation, COVID-19, rule out acute intrathoracic pathology. Decision Support Exception - unselect if not a suspected or confirmed emergency medical condition->Emergency Medical Condition (MA) Reason for Exam: Shortness of breath, increased oxygenation, COVID-19, rule out acute intrathoracic pathology. Additional signs and symptoms: 80ml Isovue 370 FINDINGS: Pulmonary Arteries: The pulmonary arteries are normal in size with adequate opacification to the subsegmental level. No acute intraluminal filling defect. Mediastinum: Normal heart size with no significant pericardial effusion. Extensive coronary artery calcifications. Mild thoracic aortic atherosclerotic calcifications with no aneurysmal dilatation or dissection. The esophagus appears unremarkable. Calcified mediastinal and hilar lymph nodes consistent with remote healed granulomatous disease. Bilateral symmetric enlarged hilar lymph nodes.   Stable borderline enlarged precarinal and lateral recess lymph nodes compared to the prior CT's. Lungs/pleura: The trachea is patent. The bilateral bronchi predominately involving the lower lobes demonstrate progressive circumferential wall thickening with mild patchy peribronchial opacities. Normal lung volumes. No effusion or pneumothorax. No lobar consolidation. No spiculated mass. Acute right lower lobe posterior subpleural patchy consolidation. Upper Abdomen: Abdominal aortic atherosclerosis. There is mild reflux of contrast into the hepatic veins. Colonic diverticulosis present. No acute abnormality. Soft Tissues/Bones: The body wall soft tissues demonstrate no edema. No significant axillary lymphadenopathy. Normal thoracic spine kyphotic curvature, alignment, and vertebral body heights. Evidence of thoracic spine DISH. Lower cervical degenerative disc and joint disease. No acute osseous abnormality. 1. No acute pulmonary emboli. 2. Acute bilateral findings consistent bronchitis/bronchopneumonia predominately involving the lower lobes. 3. Mild symmetric bilateral hilar lymphadenopathy likely representing reactive nodes. CBC:   Recent Labs     10/31/22  0546 11/01/22  0541 11/02/22  0418   WBC 3.8* 4.4 5.5   HGB 9.5* 9.9* 9.6*    162 171     BMP:    Recent Labs     11/01/22  0541 11/02/22  0418 11/02/22  1238    147* 140   K 3.5 3.4* 3.2*    103 101   CO2 32 30 27   BUN 15 17 16   CREATININE 0.4* 0.5* 0.6   GLUCOSE 109* 112* 158*     Hepatic: No results for input(s): AST, ALT, ALB, BILITOT, ALKPHOS in the last 72 hours.   Lipids:   Lab Results   Component Value Date/Time    CHOL 161 08/19/2022 07:02 AM    HDL 40 08/19/2022 07:02 AM    TRIG 269 08/19/2022 07:02 AM     Hemoglobin A1C:   Lab Results   Component Value Date/Time    LABA1C 5.2 10/29/2022 03:52 PM     TSH: No results found for: TSH  Troponin:   Lab Results   Component Value Date/Time    TROPONINT <0.010 10/29/2022 09:35 AM    TROPONINT <0.010 10/13/2022 11:15 AM    TROPONINT <0.010 05/26/2022 08:50 AM     UA:  Lab Results   Component Value Date/Time    NITRU NEGATIVE 10/29/2022 09:55 AM    COLORU YELLOW 10/29/2022 09:55 AM    PHUR 6 04/05/2019 03:53 PM    WBCUA <1 10/29/2022 09:55 AM    RBCUA 4 10/29/2022 09:55 AM    MUCUS FEW 10/29/2022 09:55 AM    TRICHOMONAS NONE SEEN 10/29/2022 09:55 AM    YEAST RARE 08/11/2018 04:20 PM    BACTERIA RARE 10/29/2022 09:55 AM    CLARITYU CLEAR 10/29/2022 09:55 AM    SPECGRAV 1.025 10/29/2022 09:55 AM    LEUKOCYTESUR NEGATIVE 10/29/2022 09:55 AM    UROBILINOGEN 1.0 10/29/2022 09:55 AM    BILIRUBINUR SMALL 10/29/2022 09:55 AM    BILIRUBINUR neg 04/05/2019 03:53 PM    BLOODU TRACE 10/29/2022 09:55 AM    GLUCOSEU neg 04/05/2019 03:53 PM    KETUA 15 10/29/2022 09:55 AM     Urine Cultures:   Lab Results   Component Value Date/Time    LABURIN  08/28/2018 01:17 PM     <10,000 CFU/ml mixed skin/urogenital jaycob.  No further workup    LABURIN 50,000 CFU/ml  Multiple Resistant Drug Organism   08/28/2018 01:17 PM       Organism:   Lab Results   Component Value Date/Time    [facility] ECOL 12/18/2018 07:49 AM       Time Spent Discharging patient 35 minutes    Electronically signed by Fabian Augustine MD on 11/2/2022 at 2:49 PM

## 2022-11-02 NOTE — PROGRESS NOTES
Physician Progress Note      PATIENT:               Cris Tovar  CSN #:                  [id number]  :                       1960  ADMIT DATE:       10/29/2022 9:07 AM  DISCH DATE:  RESPONDING  PROVIDER #:        Imani Londono MD          QUERY TEXT:    Pt admitted with Covid-19. Pt noted to have WBC 3.5, CRP 14.2. If possible,   please document in the progress notes and discharge summary if you are   evaluating and /or treating any of the following: The medical record reflects the following:  Risk Factors: Covid-19,  pneumonia  Clinical Indicators:  HR 94, resp 34, T max 100.4  Treatment: IVF, IV Zosyn, IV Decadron    RHONDA Guzman, RN  Clinical   [phone]  Options provided:  -- Sepsis, present on admission  -- Sepsis associated with Covid-19, present on admission  -- Sepsis was ruled out  -- Other - I will add my own diagnosis  -- Disagree - Not applicable / Not valid  -- Disagree - Clinically unable to determine / Unknown  -- Refer to Clinical Documentation Reviewer    PROVIDER RESPONSE TEXT:    This patient has sepsis associated with Covid-19, which was present on   admission. Query created by: Phu Farias on 2022 2:48 PM      Electronically signed by:   Imani Londono MD 2022 2:51 PM

## 2022-11-02 NOTE — CARE COORDINATION
Contacted patient's [employer] manger; spoke to [place]. Patient can return to her apartment at any time. She has a O2 concentrator from previous Covid episode. Patient will need transportation home - agreeable to [facility]. Nickie Bentley RN     1400 Contacted by Dr Mavis Murphy. Patient is ready for discharge. Contacted [facility] [employer]; spoke to [facility]. They ask for 5 pm discharge for staffing needs. All scripts go to [facility]. Fax AVS to [phone]. Nickie Bentley RN      [facility]. She is agreeable to discharge plan. Nickie Bentley RN     [address] [place]; spoke to [facility].  time is 5 PM. Nickie Bentley RN     0013 Ja Delvalle RN; updated her on discharge needs.  Nickie Bentley RN     DISCHARGE NEEDS-   COPY [address] AND FAX -[phone]

## 2022-11-02 NOTE — PROGRESS NOTES
Caregivers present at bedside when transport arrived. Discharge paperwork reviewed with caregivers. Paperwork with belongings.

## 2022-11-02 NOTE — PROGRESS NOTES
Spoke to caregivers for pt. They stated that they need a 24 hour notice for pt to be discharged so that they can get apartment ready for her. They also asked for all paperwork to be faxed to [facility] at [phone] and requesting that any new medications be sent to [facility]. Perfect serve sent to  and spoke to him face to face. Per . He will confirm with case management about safe discharge.  Dr aware of recent labs, including K+

## 2022-11-02 NOTE — DISCHARGE INSTR - COC
Continuity of Care Form    Patient Name: Jim Hernandez   :  1960  MRN:  [id number]    Admit date:  10/29/2022  Discharge date:  ***    Code Status Order: Full Code   Advance Directives:     Admitting Physician:  Lisa Kaur MD  PCP: Shraddha Benitez MD    Discharging Nurse: [facility] Unit/Room#: -A  Discharging Unit Phone Number: ***    Emergency Contact:   Extended Emergency Contact Information  Primary Emergency Contact: APSI,APSI  Address: [address], [address] [facility] [employer] [facility] Phone: [phone]  Work Phone: [phone]  Mobile Phone: [phone]  Relation: Legal Guardian  Secondary Emergency Contact: [employer]   [address] Phone: [phone]  Relation: Other    Past Surgical History:  Past Surgical History:   Procedure Laterality Date    GASTROSTOMY TUBE PLACEMENT         Immunization History:   Immunization History   Administered Date(s) Administered    COVID-19, PFIZER PURPLE top, DILUTE for use, (age 15 y+), 30mcg/0.3mL 2021, 2021    Influenza Vaccine, unspecified formulation 10/21/2016    Influenza Virus Vaccine 2012, 10/01/2013, 2015    Influenza, FLUARIX, FLULAVAL, Rukhsana Bing (age 10 mo+) AND AFLURIA, (age 1 y+), PF, 0.5mL 10/23/2018, 2019    Pneumococcal Polysaccharide (Heyvfbinr59) 2011, 2015       Active Problems:  Patient Active Problem List   Diagnosis Code    Pneumonia due to infectious organism J18.9    HTN (hypertension), benign I10    Seizure disorder ([address] G40.909    Altered mental status R41.82    Chest pain R07.9    MR (mental retardation) F79    COPD exacerbation ([address] J44.1    Left hemiplegia ([address] G81.94    H/O: stroke Z86.73    Mixed hyperlipidemia E78.2    Acquired hypothyroidism E03.9    Sepsis ([address] A41.9    Acute bronchitis J20.9    Acute respiratory failure with hypoxia ([address] J96.01    Acute respiratory failure ([facility] J96.00 Anxiety and depression F41.9, F32. A    Anemia D64.9    Controlled type 2 diabetes mellitus without complication, without long-term current use of insulin (HCC) E11.9    Diverticulosis K57.90    Lesion of right native kidney N28.9    Black stool K92.1    Hypoxia R09.02    COVID-19 virus infection U07.1    Other cerebral palsy ([facility]) G80.8    Chronic respiratory failure ([facility]) J96.10    COVID-19 U07.1    Abnormal uterine and vaginal bleeding, unspecified N93.9    Bradyarrhythmia I49.8    Dyskinesia G24.9    Hemiplegia affecting left nondominant side ([facility]) G81.94    Insomnia G47.00    Intermittent explosive disorder F63.81    Intraparenchymal hemorrhage of brain ([facility]) I61.9    Iron deficiency anemia, unspecified D50.9    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 5XAA    Urinary incontinence R32    SOB (shortness of breath) R06.02       Isolation/Infection:   Isolation            Droplet Plus          Patient Infection Status       Infection Onset Added Last Indicated Last Indicated By Review Planned Expiration Resolved Resolved By    COVID-19 10/29/22 10/29/22 10/29/22 Respiratory Panel, Molecular, with COVID-19 (Restricted: peds pts or suitable admitted adults) 11/08/22 11/12/22      Resolved    COVID-19 (Rule Out) 10/29/22 10/29/22 10/29/22 Respiratory Panel, Molecular, with COVID-19 (Restricted: peds pts or suitable admitted adults) (Ordered)   10/29/22 Rule-Out Test Resulted    COVID-19 (Rule Out) 10/13/22 10/13/22 10/13/22 Respiratory Panel, Molecular, with COVID-19 (Restricted: peds pts or suitable admitted adults) (Ordered)   10/13/22 Rule-Out Test Resulted    COVID-19 (Rule Out) 05/26/22 05/26/22 05/26/22 COVID-19, Rapid (Ordered)   05/26/22 Rule-Out Test Resulted    COVID-19 (Rule Out) 10/30/21 10/30/21 10/30/21 Respiratory Panel, Molecular, with COVID-19 (Restricted: peds pts or suitable admitted adults) (Ordered)   10/31/21 Rule-Out Test Resulted    COVID-19 10/12/21 10/12/21 10/12/21 COVID-19, Rapid   10/26/21     COVID-19 (Rule Out) 10/12/21 10/12/21 10/12/21 COVID-19, Rapid (Ordered)   10/12/21 Rule-Out Test Resulted    COVID-19 (Rule Out) 21 Respiratory Panel, Molecular, with COVID-19 (Restricted: peds pts or suitable admitted adults) (Ordered)   21 Rule-Out Test Resulted    COVID-19 (Rule Out) 21 COVID-19, Rapid (Ordered)   21 Rule-Out Test Resulted            Nurse Assessment:  Last Vital Signs: BP (!) 170/73   Pulse 67   Temp 98 °F (36.7 °C) (Oral)   Resp 16   Ht 4' 11\" (1.499 m)   Wt 132 lb (59.9 kg)   SpO2 91%   BMI 26.66 kg/m²     Last documented pain score (0-10 scale): Pain Level: 0  Last Weight:   Wt Readings from Last 1 Encounters:   22 132 lb (59.9 kg)     Mental Status:  {IP PT MENTAL STATUS:16654}    IV Access:  { EDUARDO IV ACCESS:929398156}    Nursing Mobility/ADLs:  Walking   {CHP DME FSGT:743576379}  Transfer  {CHP DME BODD:194887869}  Bathing  {CHP DME KWJR:801341810}  Dressing  {CHP DME ZKRA:245931790}  Toileting  {CHP DME MQTC:075257613}  Feeding  {P DME GZDP:023026788}  Med Admin  {P DME WVJS:828555212}  Med Delivery   { EDUARDO MED Delivery:449616347}    Wound Care Documentation and Therapy:        Elimination:  Continence: Bowel: {YES / UO:61967}  Bladder: {YES / XW:52045}  Urinary Catheter: {Urinary Catheter:109804319}   Colostomy/Ileostomy/Ileal Conduit: {YES / ST:99396}       Date of Last BM: ***    Intake/Output Summary (Last 24 hours) at 2022 1416  Last data filed at 2022 1138  Gross per 24 hour   Intake 500 ml   Output --   Net 500 ml     I/O last 3 completed shifts:   In: 1000 [P.O.:1000]  Out: -     Safety Concerns:     [address] Safety Concerns:098499055}    Impairments/Disabilities:      [address] Impairments/Disabilities:551935234}    Nutrition Therapy:  Current Nutrition Therapy:   [address] Diet List:608092751}    Routes of Feeding: {CHP DME Other Feedings:169845830}  Liquids: {Slp liquid thickness:29954}  Daily Fluid Restriction: {CHP DME Yes amt example:344506187}  Last Modified Barium Swallow with Video (Video Swallowing Test): {Done Not Done JXVR:962869679}    Treatments at the Time of Hospital Discharge:   Respiratory Treatments: ***  Oxygen Therapy:  {Therapy; copd oxygen:75174}  Ventilator:    {[facility] Vent XUIE:049249619}    Rehab Therapies: {THERAPEUTIC INTERVENTION:3731331162}  Weight Bearing Status/Restrictions: {[facility] Weight Bearin}  Other Medical Equipment (for information only, NOT a DME order):  {EQUIPMENT:357284359}  Other Treatments: ***    Patient's personal belongings (please select all that are sent with patient):  {[place] DME Belongings:926975859}    RN SIGNATURE:  {Esignature:989882680}    CASE MANAGEMENT/SOCIAL WORK SECTION    Inpatient Status Date: ***    Readmission Risk Assessment Score:  Readmission Risk              Risk of Unplanned Readmission:  0           Discharging to Facility/ Agency   Name:   Address:  Phone:  Fax:    Dialysis Facility (if applicable)   Name:  Address:  Dialysis Schedule:  Phone:  Fax:    / signature: {Esignature:060182575}    PHYSICIAN SECTION    Prognosis: {Prognosis:6746599167}    Condition at Discharge: [address] [facility] Patient Condition:627429662}    Rehab Potential (if transferring to Rehab): {Prognosis:0480420043}    Recommended Labs or Other Treatments After Discharge: ***    Physician Certification: I certify the above information and transfer of Alisia Malcolm  is necessary for the continuing treatment of the diagnosis listed and that she requires {Admit to Appropriate Level of Care:25312} for {GREATER/LESS:055133636} 30 days.      Update Admission H&P: {CHP DME Changes in VXONV:991282693}    PHYSICIAN SIGNATURE:  {Esignature:633371067}

## 2022-11-02 NOTE — DISCHARGE INSTRUCTIONS
Finish course of steroids for COVID-19. Finish course of antibiotics. Do not miss any doses. Visit PCP in 1 week to check potassium.      Patient able to return to work at [employer] after completing her COVID-19 isolation period on 11/3/2022

## 2022-11-03 ENCOUNTER — CARE COORDINATION (OUTPATIENT)
Dept: CASE MANAGEMENT | Age: 62
End: 2022-11-03

## 2022-11-03 DIAGNOSIS — U07.1 COVID-19: Primary | ICD-10-CM

## 2022-11-03 PROCEDURE — 1111F DSCHRG MED/CURRENT MED MERGE: CPT | Performed by: FAMILY MEDICINE

## 2022-11-03 NOTE — CARE COORDINATION
[facility] Care Transitions Initial Follow Up Call    Call within 2 business days of discharge: Yes    Care Transition Nurse contacted the caregiver by telephone to perform post hospital discharge assessment. Verified name and  with caregiver as identifiers. Provided introduction to self, and explanation of the Care Transition Nurse role. Patient: Rosemary Murphy Patient : 1960   MRN: <[id number]>  Reason for Admission: COVID positive  Discharge Date: 22 RARS: Readmission Risk Score: 18.6      Last Discharge  Street       Date Complaint Diagnosis Description Type Department Provider    10/29/22 Respiratory Distress Shortness of breath . .. ED to Hosp-Admission (Discharged) ([facility]) Lawson Whalen MD; Rayo Dykes. .. Was this an external facility discharge? No Discharge Facility: [facility]     Challenges to be reviewed by the provider   Additional needs identified to be addressed with provider: No  none               Method of communication with provider: none. Contacted [facility] [place] for initial COVID transition follow up. Spoke with Bia Hernandez who states she is one of the supervisors. States that Reggie Mcdaniels is doing fine. She has not heard from anyone at the house regarding Reggie Mcdaniels. She requested CTN contact Tracy Ribeiro who is the [employer] and would know more about Jm. CTN contacted Stevie Helton. Tracy Ribeiro was very brief and short with CTN. Background noise loud and children can be heard. Tracy Ribeiro states that Reggie Mcadniels is doing fine. Reports cough, congestion and shortness of breath have improved since initially going to the hospital.  Respirations are no longer elevated. States Reggie Mcdaniels was not discharged on oxygen. Unable to give SpO2 reading. Reports Reggie Mcdaniels is eating and drinking fluids as she was previously. Denies having any issues. She again told CTN that Reggie Mcdaniels is doing fine and back to her baseline. Reviewed medications.   She informed CTN that they have not received the new medications as of yet but should be getting them today. Also reviewed changed medications. No questions regarding her medications. She is aware Jose Angel Starr has a virtual visit with PCP on Monday, 11/7/22 and appointment with pulmonology on 11/28/22. She and staff are aware of when to contact providers with any new or worsening symptoms. Cecy Melvin does not have any questions or needs at this time. CTN sent staff message with update to URIEL Hsieh who is following this patient. CTN signing off at this time. Educated patient about risk for severe COVID-19 due to risk factors according to CDC guidelines. CTN reviewed discharge instructions, medical action plan and red flag symptoms with the caregiver who verbalized understanding. Discussed COVID vaccination status: Yes. Education provided on COVID-19 vaccination as appropriate. Discussed exposure protocols and quarantine with CDC Guidelines. Caregiver was given an opportunity to verbalize any questions and concerns and agrees to contact CTN or health care provider for questions related to their healthcare. Care Transition Nurse reviewed discharge instructions with caregiver who verbalized understanding. The caregiver was given an opportunity to ask questions and does not have any further questions or concerns at this time. Were discharge instructions available to patient? Yes. Reviewed appropriate site of care based on symptoms and resources available to patient including: PCP  Specialist. The caregiver agrees to contact the PCP office for questions related to their healthcare. Advance Care Planning:   Does patient have an Advance Directive: not on file. Medication reconciliation was performed with caregiver, who verbalizes understanding of administration of home medications. Medications reviewed, 1111F entered: yes    Was patient discharged with a pulse oximeter?  no    Scheduled appointment with PCP-has VV on 11/7/22  Scheduled appointment with [facility] pulmonology appt on 11/28/22  Obtained and reviewed discharge summary and/or continuity of care documents  Education of patient/family/caregiver/guardian to support self-management-aware of when to contact providers    Offered patient enrollment in the Remote Patient Monitoring (RPM) program for in-home monitoring: NA.    Care Transitions 24 Hour Call    Do you have a copy of your discharge instructions?: Yes  Do you have all of your prescriptions and are they filled?: No  Have you been contacted by a [address]?: No  Have you scheduled your follow up appointment?: Yes  How are you going to get to your appointment?: Other (Comment: Virtual Visit)  Post Acute Services: Home Health (Comment: Yamilka Hale as PTA)  Patient DME: Wheelchair  Patient Home Equipment: Oxygen  Do you have support at home?: Other Caregiver  Do you feel like you have everything you need to keep you well at home?: Yes  Are you an active caregiver in your home?: No  Care Transitions Interventions   Home Care Waiver: Completed        Transportation Support: Declined      DME Assistance: Completed   Disease Association: Completed               Follow Up  Future Appointments   Date Time Provider Nicole Chapin   11/7/2022 11:30 AM MD Giorgio Reese [facility]   11/28/2022  1:15 PM MD Vamsi Grant [facility]   12/28/2022 11:30 AM [facility] MED ONC NURSE 1200 [facility] MED ONC [place]   12/28/2022 11:45 AM Dioxn Roach [place] [facility] [place]   1/23/2023  1:00 PM MD Vamsi Grant [facility]   2/24/2023  8:45 AM MD Giorgio Reese [facility]   8/24/2023  8:30 AM MD Giorgio Reese [facility]       Care Transition Nurse provided contact information. No further follow-up call indicated. Plan for next call:  CTN handing off to ACM who had been following patient prior to admission.     Chrystal Castellano RN

## 2022-11-04 LAB
CULTURE: NORMAL
CULTURE: NORMAL
Lab: NORMAL
SPECIMEN: NORMAL

## 2022-11-04 NOTE — ADT AUTH CERT
Utilization Reviews       [facility], Acute - Care Day 5 (11/2/2022) by Leroy Gunn RN       Review Status Review Entered   Completed 11/3/2022 1509       Created By   Leroy Gunn RN      Criteria Review      Care Day: 5 Care Date: 11/2/2022 Level of Care: Inpatient Floor    Guideline Day 3    Level Of Care    (X) Floor to discharge    11/3/2022 2:39 PM EDT by Sukh Weldon      acute medical floor to discharge    Clinical Status    (X) * Hemodynamic stability    11/3/2022 2:39 PM EDT by Sukh Wedlon      97.8F 16 45 176/75 91% on RA    (X) * Afebrile or temperature acceptable for next level of care    11/3/2022 2:39 PM EDT by Sukh Weldon      97.9F    (X) * Tachypnea absent    11/3/2022 2:39 PM EDT by Sukh Weldon      RR 16-22    (X) * Hypoxemia absent    11/3/2022 2:39 PM EDT by Sukh Weldon      91%-96% on RA    (X) * Mental status at baseline    11/3/2022 2:39 PM EDT by Sukh Weldon      alert    (X) * Renal function at baseline, or stable and acceptable for next level of care    11/3/2022 2:39 PM EDT by Sukh Weldon      crea 0.5 - 0.6  BUN 16-17    ( ) * Discharge plans and education understood    Activity    ( ) * Ambulatory or acceptable for next level of care    11/3/2022 3:09 PM EDT by Sukh Weldon      correction up with assist    11/3/2022 2:39 PM EDT by Sukh Weldon      up with assist; bedrest with bedside commode    Routes    (X) * Oral hydration    11/3/2022 2:39 PM EDT by Sukh Weldon      minced and moist 5 carb choices 75gms/meal    (X) * Oral medications or regimen acceptable for next level of care    11/3/2022 2:39 PM EDT by Sukh Weldon      norvasc 10mg daily po  Zyrtec 10mg daily po x1  kepra 1500mg BID po x1  Lisinopril  40mg daily po  protonix 40mg daily po  klor-conm 40meq once po x2    (X) * Oral diet or acceptable for next level of care    11/3/2022 2:39 PM EDT by Sukh Weldon      minced and moist 5 carb choices 75gms/meal    Interventions    ( ) * Isolation not indicated, or is performable at next level of care    (X) Pulse oximetry    11/3/2022 2:39 PM EDT by Jim Davenport      91% - 96% on RA    Medications    ( ) * Antimicrobial medication absent or regimen established for next level of care    11/3/2022 2:39 PM EDT by Jim Davenport      Zosyn 3375mg ivpb q8h x2    (X) Possible corticosteroid    11/3/2022 2:39 PM EDT by Jim Davenport      Decadron 6mg daily po    (X) Possible DVT prophylaxis    11/3/2022 2:39 PM EDT by Jim Davenport      levonox 40mg daily SC       Definitions for Care Day 5    Hemodynamic stability    (X) Hemodynamic stability, as indicated by  1 or more  of the following :       (X) Hemodynamic abnormalities at baseline or acceptable for next level of care       * Milestone   Additional Notes   DATE: 11/2/2022 Care Day 5            PERTINENT UPDATES:   Pt still on antibiotics, Levaquin to complete 7 days   Blood culture positive for staph epidermidis   Motor Weakness (Left upper and lower extremity( chronic)) present      ABNL/PERTINENT LABS/RADIOLOGY/DIAGNOSTIC STUDIES:      Sodium: 147 (H)   Potassium: 3.4 (L) 3.2 (L)   POC Glucose: 166 (H) 111 (H) 119 (H): 123 (H)   Glucose, Random: 112 (H) 158 (H)   CRP, High Sensitivity: 9.0 (H)   Albumin 3.3      RBC: 3.06 (L)   Hemoglobin Quant: 9.6 (L)   Hematocrit: 29.9 (L)      PHYSICAL EXAM:      Constitutional:        General: She is not in acute distress. Neurological:       Mental Status: She is alert. Cranial Nerves: No cranial nerve deficit. Motor: Weakness (Left upper and lower extremity( chronic)) present. Comments: Is able to follow commands. MD CONSULTS/ASSESSMENT AND PLAN:   IM Note   Acute on chronic hypoxic respiratory failure. Secondary COVID-19 and COPD exacerbation   Patient increasing her home oxygen on presentation. Treatment for COVID-19 and COPD exacerbation with steroids, and patient was able to be weaned off supplemental oxygen on room air prior to discharge.        COPD exacerbation, secondary to COVID-19 with bronchitis   · Steroids: Decadron 6 mg daily to complete 10-day course. · Antibiotic coverage: Azithromycin and Zosyn to cover Pseudomonas. Will discharge on Levaquin to complete 7-day course. Sepsis secondary to COVID-19 infection versus community-acquired pneumonia   · Antibiotics and steroids as above. Seizure disorder   · Home medications Tegretol, Depakote, and Keppra were continued during this admission            Non-insulin-dependent type 2 diabetes   · Managed with sliding scale insulin. Hypertension   -Continue home amlodipine, initially patient had low blood pressures requiring holding lisinopril however was restarted day prior to discharge. Hypothyroidism   · Managed with home Synthroid       Left hand aphasia secondary to CVA   · Managed with home Lipitor       Blood culture positive for staph epidermidis   Only in 1 out of 4 sets. Likely contaminant. No further work-up or long-term antibiotics anticipated. MEDICATIONS:   Proventil 2puff QID x2   Buspar 10mg BID po x1   Carbatrol 300mg BID po x1   Depakote 1000mg BID po x1   Iron 325mg daily with breakfast    Fluticasone nasal spray 1 spray daily each nostril x1   Risperidal 1 mg BID po x1   Stiolto 2.5/2. 5mcg 2 puff daily x1   NS 500ml prn infusion         ORDERS:   Up with assist   POCT glucose   Dysphagia - Minced and Moist; 5 carb choices (75 gm/meal) diet        PA letter of status determination by Nico Neely RN       Review Status Review Entered   In Primary 2022 1256       Created By   Nico Neely RN      Criteria Review   Name: Rogelio Wilson   : 1960   CSN: [id number]   INSURANCE: [facility] Medicare    Date of admission: 10/29/22    Current status: Observation    We recommend that patient's status is upgraded to INPATIENT; if you agree, please place a new ADMIT order in [facility] as recommended.      Rationale: Inpatient is appropriate for this high risk pt due to high acuity and intensity requiring further monitoring, evaluation and treatment. Clinical summary 62 yo F admitted for acute on chronic hypoxic respiratory failure. Secondary COVID-19 and COPD exacerbation  Patient increasing her home oxygen on presentation.   -COPD exacerbation, secondary to COVID-19 with bronchitis  -Pt still with persistent cough   -Tachypnea as of 11/02  -Pt on 2 L O2 as of 11/02   -IV Decadron  Vitals Tachypnea   Labs and Imaging See above  Status decision based on clinical judgment and Commercial Utilization criteria, e.g., [employer], [employer]     This chart was reviewed at 12:12 PM on 11/2/2022    Fer Pereira MD, HALEIGH Ordonez    [phone]

## 2022-11-07 ENCOUNTER — TELEMEDICINE (OUTPATIENT)
Dept: FAMILY MEDICINE CLINIC | Age: 62
End: 2022-11-07

## 2022-11-07 ENCOUNTER — CARE COORDINATION (OUTPATIENT)
Dept: CARE COORDINATION | Age: 62
End: 2022-11-07

## 2022-11-07 ENCOUNTER — TELEPHONE (OUTPATIENT)
Dept: FAMILY MEDICINE CLINIC | Age: 62
End: 2022-11-07

## 2022-11-07 DIAGNOSIS — J44.1 COPD EXACERBATION (HCC): ICD-10-CM

## 2022-11-07 DIAGNOSIS — E87.6 HYPOKALEMIA: ICD-10-CM

## 2022-11-07 DIAGNOSIS — U09.9 POST-ACUTE COVID-19 SYNDROME: Primary | ICD-10-CM

## 2022-11-07 DIAGNOSIS — Z09 HOSPITAL DISCHARGE FOLLOW-UP: ICD-10-CM

## 2022-11-07 NOTE — PROGRESS NOTES
Post-Discharge Transitional Care  Follow Up      Samanta Diallo   YOB: 1960    Date of Office Visit:  11/7/2022  Date of Hospital Admission: 10/29/22  Date of Hospital Discharge: 11/2/22  Risk of hospital readmission (high >=14%. Medium >=10%) :Readmission Risk Score: 18.6      Care management risk score Rising risk (score 2-5) and Complex Care (Scores >=6): No Risk Score On File     Non face to face  following discharge, date last encounter closed (first attempt may have been earlier): 11/03/2022    Call initiated 2 business days of discharge: Yes    ASSESSMENT/PLAN:   Below is the assessment and plan developed based on review of pertinent history, physical exam, labs, studies, and medications. Post-acute COVID-19 syndrome  Hospital discharge follow-up  -     [place] DISCHARGE MEDS RECONCILED W/ CURRENT OUTPATIENT MED LIST  COPD exacerbation ([facility] [address]  Hypokalemia  -     Basic Metabolic Panel; Future    No need for the dexamethasone any more  Need to wear mask if go out side for protection from getting any infection    Medical Decision Making: moderate complexity  No follow-ups on file. On this date 11/7/2022 I have spent 30 minutes reviewing previous notes, test results and face to face with the patient discussing the diagnosis and importance of compliance with the treatment plan as well as documenting on the day of the visit. Subjective:   HPI:  Follow up of Hospital problems/diagnosis(es): patient seen today for post f/u from hospitalization for copd exacerbation and covid positive, now doing much better, her pulse ox good, send her home with dexamethasone tan, which make her so nervouse and didn't like it, still has one dose    Inpatient course: Discharge summary reviewed- see chart.     Interval history/Current status: better    Patient Active Problem List   Diagnosis    Pneumonia due to infectious organism    HTN (hypertension), benign    Seizure disorder ([address]    Altered mental status Chest pain    MR (mental retardation)    COPD exacerbation (HCC)    Left hemiplegia (HCC)    H/O: stroke    Mixed hyperlipidemia    Acquired hypothyroidism    Sepsis ([facility] [address]    Acute bronchitis    Acute respiratory failure with hypoxia (HCC)    Acute respiratory failure (HCC)    Anxiety and depression    Anemia    Controlled type 2 diabetes mellitus without complication, without long-term current use of insulin (HCC)    Diverticulosis    Lesion of right native kidney    Black stool    Hypoxia    COVID-19 virus infection    Other cerebral palsy (HCC)    Chronic respiratory failure (HCC)    COVID-19    Abnormal uterine and vaginal bleeding, unspecified    Bradyarrhythmia    Dyskinesia    Hemiplegia affecting left nondominant side (HCC)    Insomnia    Intermittent explosive disorder    Intraparenchymal hemorrhage of brain (HCC)    Iron deficiency anemia, unspecified    Organic personality syndrome    Subdural hematoma    Urinary incontinence    SOB (shortness of breath)       Medications listed as ordered at the time of discharge from hospital     Medication List            Accurate as of November 7, 2022  4:11 PM. If you have any questions, ask your nurse or doctor. CHANGE how you take these medications      Januvia 100 MG tablet  Generic drug: SITagliptin  TAKE 1 TABLET BY MOUTH DAILY  What changed: See the new instructions. CONTINUE taking these medications      acidophilus Caps capsule  TAKE 1 CAPSULE BY MOUTH DAILY     albuterol sulfate  (90 Base) MCG/ACT inhaler  Commonly known as: PROVENTIL;VENTOLIN;PROAIR     amLODIPine 10 MG tablet  Commonly known as: NORVASC  Take 1 tablet by mouth in the morning. atorvastatin 40 MG tablet  Commonly known as: LIPITOR  TAKE 1 TABLET BY MOUTH DAILY     blood glucose test strips  Test 3 times a day & as needed for symptoms of irregular blood glucose.      busPIRone 10 MG tablet  Commonly known as: BUSPAR  Take 1 tablet by mouth 2 times daily carBAMazepine 300 MG extended release capsule  Commonly known as: CARBATROL  TAKE 1 CAPSULE BY MOUTH TWICE A DAY     cetirizine 10 MG tablet  Commonly known as: ZYRTEC  TAKE 1 TABLET BY MOUTH DAILY     D3 High Potency 50 MCG (2000 UT) Caps  Generic drug: Cholecalciferol  TAKE 1 CAPSULE BY MOUTH DAILY     Depend Underwear Large/XL Misc  1 Units by Does not apply route 4 times daily     dexamethasone 6 MG tablet  Commonly known as: DECADRON  Take 1 tablet by mouth daily for 5 doses     Diapers & Supplies Misc  1 each by Does not apply route daily as needed (4-5 times daily)     diphenhydrAMINE 25 MG capsule  Commonly known as: BENADRYL     Disposable Gloves Misc  1 Units by Does not apply route 4 times daily     divalproex 500 MG DR tablet  Commonly known as: DEPAKOTE  Take 2 tablets by mouth in the morning and 2 tablets in the evening.      FeroSul 325 (65 Fe) MG tablet  Generic drug: ferrous sulfate  TAKE 1 TABLET BY MOUTH DAILY WITH BREAKFAST     fluticasone 50 MCG/ACT nasal spray  Commonly known as: FLONASE  INSTILL 1 SPRAY INTO EACH NOSTRIL DAILY     * ipratropium-albuterol 0.5-2.5 (3) MG/3ML Soln nebulizer solution  Commonly known as: DUONEB  Inhale 3 mLs into the lungs 3 times daily     * Combivent Respimat  MCG/ACT Aers inhaler  Generic drug: albuterol-ipratropium  INHALE 1 PUFF BY ORAL INHALATION EVERY 6 HOURS     * ipratropium-albuterol 0.5-2.5 (3) MG/3ML Soln nebulizer solution  Commonly known as: DUONEB  Inhale 3 mLs into the lungs every 4 hours     levETIRAcetam 750 MG tablet  Commonly known as: KEPPRA  Take 2 tablets by mouth 2 times daily     levothyroxine 50 MCG tablet  Commonly known as: SYNTHROID  Take 1 tablet by mouth Daily     lisinopril 40 MG tablet  Commonly known as: PRINIVIL;ZESTRIL  TAKE 1 TABLET BY MOUTH DAILY     metFORMIN 500 MG tablet  Commonly known as: Glucophage  Take 2 tablets by mouth daily (with breakfast)     mirtazapine 30 MG tablet  Commonly known as: REMERON  TAKE 1 TABLET BY MOUTH NIGHTLY     nystatin 175485 UNIT/GM powder  Commonly known as: MYCOSTATIN  Apply topically 4 times daily. under the abdominal folds for 2 wks     pantoprazole 40 MG tablet  Commonly known as: PROTONIX     potassium chloride 20 MEQ extended release tablet  Commonly known as: KLOR-CON M  Take 1 tablet by mouth daily for 7 days     risperiDONE 1 MG tablet  Commonly known as: RisperDAL  Take 1 tablet by mouth 2 times daily Twice daily     sertraline 100 MG tablet  Commonly known as: ZOLOFT  Take 1.5 tablets by mouth nightly     tiotropium-olodaterol 2.5-2.5 MCG/ACT Aers  Commonly known as: STIOLTO  Inhale 2 puffs into the lungs daily     UNABLE TO FIND  hospital bed mattress XL twin           * This list has 3 medication(s) that are the same as other medications prescribed for you. Read the directions carefully, and ask your doctor or other care provider to review them with you. Medications marked \"taking\" at this time  Outpatient Medications Marked as Taking for the 11/7/22 encounter (Telemedicine) with Bridger Dowd MD   Medication Sig Dispense Refill    divalproex (DEPAKOTE) 500 MG DR tablet Take 2 tablets by mouth in the morning and 2 tablets in the evening.  60 tablet 1    sertraline (ZOLOFT) 100 MG tablet Take 1.5 tablets by mouth nightly 30 tablet 1    metFORMIN (GLUCOPHAGE) 500 MG tablet Take 2 tablets by mouth daily (with breakfast) 60 tablet 1    risperiDONE (RISPERDAL) 1 MG tablet Take 1 tablet by mouth 2 times daily Twice daily 60 tablet 1    dexamethasone (DECADRON) 6 MG tablet Take 1 tablet by mouth daily for 5 doses 5 tablet 0    levothyroxine (SYNTHROID) 50 MCG tablet Take 1 tablet by mouth Daily 30 tablet 1    potassium chloride (KLOR-CON M) 20 MEQ extended release tablet Take 1 tablet by mouth daily for 7 days 7 tablet 0    pantoprazole (PROTONIX) 40 MG tablet Take 40 mg by mouth daily      albuterol sulfate HFA (PROVENTIL;VENTOLIN;PROAIR) 108 (90 Base) MCG/ACT inhaler Inhale 2 puffs into the lungs in the morning, at noon, in the evening, and at bedtime      ipratropium-albuterol (DUONEB) 0.5-2.5 (3) MG/3ML SOLN nebulizer solution Inhale 3 mLs into the lungs every 4 hours 120 each 5    COMBIVENT RESPIMAT  MCG/ACT AERS inhaler INHALE 1 PUFF BY ORAL INHALATION EVERY 6 HOURS 4 g 5    cetirizine (ZYRTEC) 10 MG tablet TAKE 1 TABLET BY MOUTH DAILY 31 tablet 10    FEROSUL 325 (65 Fe) MG tablet TAKE 1 TABLET BY MOUTH DAILY WITH BREAKFAST 31 tablet 10    nystatin (MYCOSTATIN) 892691 UNIT/GM powder Apply topically 4 times daily. under the abdominal folds for 2 wks 1 each 0    fluticasone (FLONASE) 50 MCG/ACT nasal spray INSTILL 1 SPRAY INTO EACH NOSTRIL DAILY 16 g 5    D3 HIGH POTENCY 50 MCG (2000 UT) CAPS TAKE 1 CAPSULE BY MOUTH DAILY 31 capsule 10    ipratropium-albuterol (DUONEB) 0.5-2.5 (3) MG/3ML SOLN nebulizer solution Inhale 3 mLs into the lungs 3 times daily 270 mL 5    UNABLE TO FIND hospital bed mattress XL twin 1 Device 0    carBAMazepine (CARBATROL) 300 MG extended release capsule TAKE 1 CAPSULE BY MOUTH TWICE A DAY 56 capsule 5    Lactobacillus (ACIDOPHILUS) CAPS capsule TAKE 1 CAPSULE BY MOUTH DAILY 28 capsule 5    atorvastatin (LIPITOR) 40 MG tablet TAKE 1 TABLET BY MOUTH DAILY 31 tablet 5    levETIRAcetam (KEPPRA) 750 MG tablet Take 2 tablets by mouth 2 times daily 120 tablet 0    tiotropium-olodaterol (STIOLTO) 2.5-2.5 MCG/ACT AERS Inhale 2 puffs into the lungs daily 1 each 0    JANUVIA 100 MG tablet TAKE 1 TABLET BY MOUTH DAILY (Patient taking differently: 100 mg 2 times daily) 30 tablet 5    lisinopril (PRINIVIL;ZESTRIL) 40 MG tablet TAKE 1 TABLET BY MOUTH DAILY 31 tablet 5    Diapers & Supplies MISC 1 each by Does not apply route daily as needed (4-5 times daily) 100 each 5    mirtazapine (REMERON) 30 MG tablet TAKE 1 TABLET BY MOUTH NIGHTLY 31 tablet 5    Incontinence Supply Disposable (DEPEND UNDERWEAR LARGE/XL) MISC 1 Units by Does not apply route 4 times daily 100 Package 5    Disposable Gloves MISC 1 Units by Does not apply route 4 times daily 100 each 5    diphenhydrAMINE (BENADRYL) 25 MG capsule Take 25 mg by mouth every 6 hours as needed for Itching      blood glucose monitor strips Test 3 times a day & as needed for symptoms of irregular blood glucose. 90 strip 0    busPIRone (BUSPAR) 10 MG tablet Take 1 tablet by mouth 2 times daily 60 tablet 5        Medications patient taking as of now reconciled against medications ordered at time of hospital discharge: Yes    A comprehensive review of systems was negative except for: Constitutional: positive for doing fine  Respiratory: positive for no cough or SOB  Cardiovascular: positive for no chest pain  Gastrointestinal: positive for no vomiting  Behavioral/Psych: positive for anxiety    Objective:    Patient-Reported Vitals  Patient-Reported Systolic (Top): 919 mmHg  Patient-Reported Diastolic (Bottom): 85 mmHg  BP Observations: No, remote/electronic monitoring device was not used or able to be verified  Patient-Reported Weight: 132  Patient-Reported Pulse Oximetry: 95      General Appearance: alert and oriented to person, place and time, well-developed and well-nourished, in no acute distress  Pulmonary/Chest: doing fine, no distress  Neurologic: speech normal    Jim Hernandez, was evaluated through a synchronous (real-time) audio-video encounter. The patient (or guardian if applicable) is aware that this is a billable service, which includes applicable co-pays. This Virtual Visit was conducted with patient's (and/or legal guardian's) consent. The visit was conducted pursuant to the emergency declaration under the [address] [place] [address] authority and the [employer] Act. Patient identification was verified, and a caregiver was present when appropriate.    The patient was located at Home: [address] [address]. Provider was located at [facility] ([place] [address]): [address] [facility]  [address],  [address] [facility]. An electronic signature was used to authenticate this note.   --Meera Courtney MD

## 2022-11-07 NOTE — TELEPHONE ENCOUNTER
Robert Wynn with [employer] [facility] called in to   request the patient's provider fax over the patient's appointment summary   from today's visit, the mask recommendation for the patient, and also   needs her to fax over documentation that states that she told her not to   give her the last day of her medication dexamethasone.  Please fax over   documation to [phone]

## 2022-11-07 NOTE — TELEPHONE ENCOUNTER
----- Message from [facility] sent at 11/7/2022  1:15 PM EST -----  Subject: Message to Provider    QUESTIONS  Information for Provider? Shweta Bradley with [employer] [facility] called in to   request the patient's provider fax over the patient's appointment summary   from today's visit, the mask recommendation for the patient, and also   needs her to fax over documentation that states that she told her not to   give her the last day of her medication dexamethasone. Please fax over   documation to [phone].   ---------------------------------------------------------------------------  --------------  Cuate JEAN  [phone]; OK to leave message on voicemail  ---------------------------------------------------------------------------  --------------  SCRIPT ANSWERS  Relationship to Patient? Other  Representative Name? Shweta Bradley - [employer] [facility] [place]  Is the Representative on the appropriate HIPAA document in Epic?  Yes

## 2022-11-11 ENCOUNTER — NURSE TRIAGE (OUTPATIENT)
Dept: OTHER | Facility: CLINIC | Age: 62
End: 2022-11-11

## 2022-11-11 NOTE — TELEPHONE ENCOUNTER
Location of patient: [address] call from Daryl Goode at [facility] with [employer]. Subjective: Caller states \"Jm is extremely fatigued, repeating herself often, and periods of twitching. She was in the hospital and she had COVID and pneumonia. She went back to the hospital and was on some antibiotics. Dr. Bella Kim has let me discontinue a steroid. She then started having a lot of mood swings. \"     Current Symptoms: fatigue, forgetful, twitching in face    Onset: past few days     Associated Symptoms: none    Pain Severity: 0/10; N/A;     Temperature: None    What has been tried: None    LMP: NA Pregnant: NA    Recommended disposition: See in Office Today     Care advice provided, patient verbalizes understanding; denies any other questions or concerns; instructed to call back for any new or worsening symptoms. Patient/Caller agrees with recommended disposition; writer provided warm transfer to [employer] at [facility] for appointment scheduling    Attention Provider: Thank you for allowing me to participate in the care of your patient. The patient was connected to triage in response to information provided to the ECC/PSC. Please do not respond through this encounter as the response is not directed to a shared pool.       Reason for Disposition   MODERATE weakness (i.e., interferes with work, school, normal activities) and persists > 3 days    Protocols used: Weakness (Generalized) and Fatigue-ADULT-OH

## 2022-11-14 ENCOUNTER — OFFICE VISIT (OUTPATIENT)
Dept: FAMILY MEDICINE CLINIC | Age: 62
End: 2022-11-14
Payer: MEDICARE

## 2022-11-14 ENCOUNTER — CARE COORDINATION (OUTPATIENT)
Dept: CARE COORDINATION | Age: 62
End: 2022-11-14

## 2022-11-14 VITALS
HEIGHT: 59 IN | BODY MASS INDEX: 26.66 KG/M2 | DIASTOLIC BLOOD PRESSURE: 74 MMHG | HEART RATE: 75 BPM | SYSTOLIC BLOOD PRESSURE: 110 MMHG | OXYGEN SATURATION: 92 %

## 2022-11-14 DIAGNOSIS — U09.9 POST-COVID CHRONIC FATIGUE: ICD-10-CM

## 2022-11-14 DIAGNOSIS — G93.32 POST-COVID CHRONIC FATIGUE: ICD-10-CM

## 2022-11-14 DIAGNOSIS — D64.9 ANEMIA, UNSPECIFIED TYPE: ICD-10-CM

## 2022-11-14 DIAGNOSIS — F69 BEHAVIOR PROBLEM, ADULT: Primary | ICD-10-CM

## 2022-11-14 DIAGNOSIS — R41.3 MEMORY PROBLEM: ICD-10-CM

## 2022-11-14 PROCEDURE — 1036F TOBACCO NON-USER: CPT | Performed by: FAMILY MEDICINE

## 2022-11-14 PROCEDURE — 99214 OFFICE O/P EST MOD 30 MIN: CPT | Performed by: FAMILY MEDICINE

## 2022-11-14 PROCEDURE — G8484 FLU IMMUNIZE NO ADMIN: HCPCS | Performed by: FAMILY MEDICINE

## 2022-11-14 PROCEDURE — G8417 CALC BMI ABV UP PARAM F/U: HCPCS | Performed by: FAMILY MEDICINE

## 2022-11-14 PROCEDURE — G8427 DOCREV CUR MEDS BY ELIG CLIN: HCPCS | Performed by: FAMILY MEDICINE

## 2022-11-14 PROCEDURE — 3078F DIAST BP <80 MM HG: CPT | Performed by: FAMILY MEDICINE

## 2022-11-14 PROCEDURE — 3074F SYST BP LT 130 MM HG: CPT | Performed by: FAMILY MEDICINE

## 2022-11-14 PROCEDURE — 1111F DSCHRG MED/CURRENT MED MERGE: CPT | Performed by: FAMILY MEDICINE

## 2022-11-14 PROCEDURE — 3017F COLORECTAL CA SCREEN DOC REV: CPT | Performed by: FAMILY MEDICINE

## 2022-11-14 ASSESSMENT — ENCOUNTER SYMPTOMS
DIARRHEA: 0
ABDOMINAL PAIN: 0
COUGH: 1
SHORTNESS OF BREATH: 0
CONSTIPATION: 0
BACK PAIN: 0

## 2022-11-14 NOTE — PROGRESS NOTES
[facility]  1960    Chief Complaint   Patient presents with    Follow-up     Not acting herself , mood swings, no energy, not helping with transfers. Since she came home from the hospital with Juliette            Patient here with the care giver, because patient behavior not like before since came [facility] [place], no fever, eating good, sleeping good, but she dose forget, and tired, no change in her BM, still has some cough.       Past Medical History:   Diagnosis Date    Anxiety disorder     Back pain, chronic     Cerebral palsy (HCC)     COPD (chronic obstructive pulmonary disease) ([facility]     COVID-19 10/12/2021    CVA (cerebral infarction) 2014 x2    Diabetes mellitus ([facility]     Diverticulosis     Epilepsy ([facility]     GERD (gastroesophageal reflux disease)     Hyperlipidemia     Hypertension     Insomnia     Iron (Fe) deficiency anemia     Mental disability     Seizures (HCC)     Unspecified cerebral artery occlusion with cerebral infarction      Past Surgical History:   Procedure Laterality Date    GASTROSTOMY TUBE PLACEMENT       Family History   Problem Relation Age of Onset    Breast Cancer Neg Hx      Social History     Socioeconomic History    Marital status: Single     Spouse name: Not on file    Number of children: Not on file    Years of education: Not on file    Highest education level: Not on file   Occupational History    Not on file   Tobacco Use    Smoking status: Former     Packs/day: 1.50     Years: 20.00     Pack years: 30.00     Types: Cigarettes     Quit date: 2011     Years since quittin.2    Smokeless tobacco: Never   Vaping Use    Vaping Use: Never used   Substance and Sexual Activity    Alcohol use: No     Alcohol/week: 0.0 standard drinks    Drug use: No    Sexual activity: Not on file   Other Topics Concern    Not on file   Social History Narrative    ** Merged History Encounter **          Social Determinants of Health     Financial Resource Strain: Low Risk     Difficulty of Paying Living Expenses: Not very hard   Food Insecurity: No Food Insecurity    Worried About Running Out of Food in the Last Year: Never true    Ran Out of Food in the Last Year: Never true   Transportation Needs: No Transportation Needs    Lack of Transportation (Medical): No    Lack of Transportation (Non-Medical): No   Physical Activity: Inactive    Days of Exercise per Week: 0 days    Minutes of Exercise per Session: 0 min   Stress: No Stress Concern Present    Feeling of Stress : Only a little   Social Connections: Socially Isolated    Frequency of Communication with Friends and Family: Three times a week    Frequency of Social Gatherings with Friends and Family: Three times a week    Attends Faith Services: Never    Active Member of Clubs or Organizations: No    Attends Club or Organization Meetings: Never    Marital Status: Never    Intimate Partner Violence: Not on file   Housing Stability: Low Risk     Unable to Pay for Housing in the Last Year: No    Number of [place] in the Last Year: 1    Unstable Housing in the Last Year: No       Allergies   Allergen Reactions    Macrobid [Nitrofurantoin] Hives and Swelling     Hives     Current Outpatient Medications   Medication Sig Dispense Refill    divalproex (DEPAKOTE) 500 MG DR tablet Take 2 tablets by mouth in the morning and 2 tablets in the evening.  60 tablet 1    sertraline (ZOLOFT) 100 MG tablet Take 1.5 tablets by mouth nightly 30 tablet 1    metFORMIN (GLUCOPHAGE) 500 MG tablet Take 2 tablets by mouth daily (with breakfast) 60 tablet 1    risperiDONE (RISPERDAL) 1 MG tablet Take 1 tablet by mouth 2 times daily Twice daily 60 tablet 1    levothyroxine (SYNTHROID) 50 MCG tablet Take 1 tablet by mouth Daily 30 tablet 1    pantoprazole (PROTONIX) 40 MG tablet Take 40 mg by mouth daily      albuterol sulfate HFA (PROVENTIL;VENTOLIN;PROAIR) 108 (90 Base) MCG/ACT inhaler Inhale 2 puffs into the lungs in the morning, at noon, in the evening, and at bedtime      ipratropium-albuterol (DUONEB) 0.5-2.5 (3) MG/3ML SOLN nebulizer solution Inhale 3 mLs into the lungs every 4 hours 120 each 5    COMBIVENT RESPIMAT  MCG/ACT AERS inhaler INHALE 1 PUFF BY ORAL INHALATION EVERY 6 HOURS 4 g 5    cetirizine (ZYRTEC) 10 MG tablet TAKE 1 TABLET BY MOUTH DAILY 31 tablet 10    FEROSUL 325 (65 Fe) MG tablet TAKE 1 TABLET BY MOUTH DAILY WITH BREAKFAST 31 tablet 10    amLODIPine (NORVASC) 10 MG tablet Take 1 tablet by mouth in the morning. 31 tablet 5    nystatin (MYCOSTATIN) 499139 UNIT/GM powder Apply topically 4 times daily. under the abdominal folds for 2 wks 1 each 0    fluticasone (FLONASE) 50 MCG/ACT nasal spray INSTILL 1 SPRAY INTO EACH NOSTRIL DAILY 16 g 5    D3 HIGH POTENCY 50 MCG (2000 UT) CAPS TAKE 1 CAPSULE BY MOUTH DAILY 31 capsule 10    ipratropium-albuterol (DUONEB) 0.5-2.5 (3) MG/3ML SOLN nebulizer solution Inhale 3 mLs into the lungs 3 times daily 270 mL 5    UNABLE TO FIND hospital bed mattress XL twin 1 Device 0    carBAMazepine (CARBATROL) 300 MG extended release capsule TAKE 1 CAPSULE BY MOUTH TWICE A DAY 56 capsule 5    Lactobacillus (ACIDOPHILUS) CAPS capsule TAKE 1 CAPSULE BY MOUTH DAILY 28 capsule 5    atorvastatin (LIPITOR) 40 MG tablet TAKE 1 TABLET BY MOUTH DAILY 31 tablet 5    levETIRAcetam (KEPPRA) 750 MG tablet Take 2 tablets by mouth 2 times daily 120 tablet 0    tiotropium-olodaterol (STIOLTO) 2.5-2.5 MCG/ACT AERS Inhale 2 puffs into the lungs daily 1 each 0    JANUVIA 100 MG tablet TAKE 1 TABLET BY MOUTH DAILY (Patient taking differently: 100 mg 2 times daily) 30 tablet 5    lisinopril (PRINIVIL;ZESTRIL) 40 MG tablet TAKE 1 TABLET BY MOUTH DAILY 31 tablet 5    Diapers & Supplies MISC 1 each by Does not apply route daily as needed (4-5 times daily) 100 each 5    mirtazapine (REMERON) 30 MG tablet TAKE 1 TABLET BY MOUTH NIGHTLY 31 tablet 5    Incontinence Supply Disposable (DEPEND UNDERWEAR LARGE/XL) MISC 1 Units by Does not apply route 4 times daily 100 Package 5    Disposable Gloves MISC 1 Units by Does not apply route 4 times daily 100 each 5    diphenhydrAMINE (BENADRYL) 25 MG capsule Take 25 mg by mouth every 6 hours as needed for Itching      blood glucose monitor strips Test 3 times a day & as needed for symptoms of irregular blood glucose. 90 strip 0    busPIRone (BUSPAR) 10 MG tablet Take 1 tablet by mouth 2 times daily 60 tablet 5    potassium chloride (KLOR-CON M) 20 MEQ extended release tablet Take 1 tablet by mouth daily for 7 days 7 tablet 0     No current facility-administered medications for this visit. Review of Systems   Constitutional:  Positive for activity change. Negative for appetite change, chills, fatigue and fever. HENT:  Negative for congestion. Respiratory:  Positive for cough. Negative for shortness of breath. Cardiovascular:  Negative for chest pain and leg swelling. Gastrointestinal:  Negative for abdominal pain, constipation and diarrhea. Genitourinary:  Negative for dysuria and frequency. Musculoskeletal:  Negative for back pain. Skin:  Negative for rash. Neurological:  Negative for dizziness, weakness and headaches. Psychiatric/Behavioral:  Positive for behavioral problems and confusion. Negative for agitation, dysphoric mood, hallucinations and sleep disturbance. The patient is nervous/anxious. The patient is not hyperactive.       Lab Results   Component Value Date    WBC 5.5 11/02/2022    HGB 9.6 (L) 11/02/2022    HCT 29.9 (L) 11/02/2022    MCV 97.7 11/02/2022     11/02/2022     Lab Results   Component Value Date     11/02/2022    K 3.2 (L) 11/02/2022     11/02/2022    CO2 27 11/02/2022    BUN 16 11/02/2022    CREATININE 0.6 11/02/2022    GLUCOSE 158 (H) 11/02/2022    CALCIUM 9.1 11/02/2022    PROT 5.1 (L) 10/30/2022    LABALBU 3.3 (L) 11/02/2022    BILITOT 0.1 10/30/2022    ALKPHOS 50 10/30/2022    AST 13 (L) 10/30/2022    ALT 10 10/30/2022 LABGLOM >60 11/02/2022    GFRAA >60 10/13/2022     Lab Results   Component Value Date    CHOL 161 08/19/2022    CHOL 155 06/14/2022    CHOL 187 03/16/2022     Lab Results   Component Value Date    TRIG 269 (H) 08/19/2022    TRIG 318 (H) 06/14/2022    TRIG 386 (H) 03/16/2022     Lab Results   Component Value Date    HDL 40 (L) 08/19/2022    HDL 45 06/14/2022    HDL 41 03/16/2022     Lab Results   Component Value Date    LDLCALC 67 08/19/2022    LDLCALC 46 06/14/2022    LDLCALC 69 03/16/2022     Lab Results   Component Value Date    LABA1C 5.2 10/29/2022     Lab Results   Component Value Date    TSHHS 1.600 08/19/2022         /74 (Site: Right Upper Arm, Position: Sitting, Cuff Size: Medium Adult)   Pulse 75   Ht 4' 11\" (1.499 m)   SpO2 92%   BMI 26.66 kg/m²     BP Readings from Last 3 Encounters:   11/14/22 110/74   11/02/22 (!) 170/73   10/13/22 121/68       Wt Readings from Last 3 Encounters:   11/02/22 132 lb (59.9 kg)   09/26/22 132 lb (59.9 kg)   05/16/22 132 lb (59.9 kg)         Physical Exam  Constitutional:       Appearance: Normal appearance. She is well-developed. Comments: Using the wheel chair   HENT:      Head: Normocephalic and atraumatic. Cardiovascular:      Rate and Rhythm: Normal rate and regular rhythm. Heart sounds: Normal heart sounds. No murmur heard. Pulmonary:      Effort: Pulmonary effort is normal.      Breath sounds: Wheezing present. Musculoskeletal:         General: Normal range of motion. Right lower leg: No edema. Left lower leg: No edema. Neurological:      Mental Status: She is alert and oriented to person, place, and time. Psychiatric:         Mood and Affect: Mood normal.         Behavior: Behavior normal.       ASSESSMENT/ PLAN:    1. Behavior problem, adult  - all the blood work looks fine except little low potassium, and low Hg. 2. Memory problem  - looks okay examination as before    3.  Post-COVID chronic fatigue  - just give it few wks, if pesist will refer her to the neurology    4. Anemia, unspecified type  - she already on ferrous sulfate            - All old blood work reviewed with the patient  - Appropriate prescription are addressed. - After visit summery provided. - Questions answered and patient verbalizes understanding.  - Call for any problem, questions, or concerns. Return if symptoms worsen or fail to improve.

## 2022-11-22 ENCOUNTER — CARE COORDINATION (OUTPATIENT)
Dept: CARE COORDINATION | Age: 62
End: 2022-11-22

## 2022-11-24 ENCOUNTER — HOSPITAL ENCOUNTER (EMERGENCY)
Age: 62
Discharge: HOME | End: 2022-11-24
Payer: MEDICARE

## 2022-11-24 ENCOUNTER — APPOINTMENT (OUTPATIENT)
Dept: GENERAL RADIOLOGY | Age: 62
End: 2022-11-24
Payer: MEDICARE

## 2022-11-24 ENCOUNTER — APPOINTMENT (OUTPATIENT)
Dept: CT IMAGING | Age: 62
End: 2022-11-24
Payer: MEDICARE

## 2022-11-24 VITALS
SYSTOLIC BLOOD PRESSURE: 117 MMHG | WEIGHT: 135 LBS | DIASTOLIC BLOOD PRESSURE: 41 MMHG | OXYGEN SATURATION: 97 % | TEMPERATURE: 98.2 F | RESPIRATION RATE: 24 BRPM | HEART RATE: 77 BPM

## 2022-11-24 DIAGNOSIS — N30.01 ACUTE CYSTITIS WITH HEMATURIA: ICD-10-CM

## 2022-11-24 DIAGNOSIS — R30.0 DYSURIA: Primary | ICD-10-CM

## 2022-11-24 DIAGNOSIS — J02.9 SORE THROAT: ICD-10-CM

## 2022-11-24 LAB
ADENOVIRUS DETECTION BY PCR: NOT DETECTED
ALBUMIN SERPL-MCNC: 3.6 GM/DL (ref 3.4–5)
ALP BLD-CCNC: 83 IU/L (ref 40–129)
ALT SERPL-CCNC: 10 U/L (ref 10–40)
ANION GAP SERPL CALCULATED.3IONS-SCNC: 10 MMOL/L (ref 4–16)
AST SERPL-CCNC: 13 IU/L (ref 15–37)
BACTERIA: ABNORMAL /HPF
BASOPHILS ABSOLUTE: 0 K/CU MM
BASOPHILS RELATIVE PERCENT: 0.3 % (ref 0–1)
BILIRUB SERPL-MCNC: 0.1 MG/DL (ref 0–1)
BILIRUBIN URINE: NEGATIVE MG/DL
BLOOD, URINE: ABNORMAL
BORDETELLA PARAPERTUSSIS BY PCR: NOT DETECTED
BORDETELLA PERTUSSIS PCR: NOT DETECTED
BUN BLDV-MCNC: 21 MG/DL (ref 6–23)
CALCIUM SERPL-MCNC: 9.3 MG/DL (ref 8.3–10.6)
CHLAMYDOPHILA PNEUMONIA PCR: NOT DETECTED
CHLORIDE BLD-SCNC: 104 MMOL/L (ref 99–110)
CLARITY: CLEAR
CO2: 27 MMOL/L (ref 21–32)
COLOR: YELLOW
CORONAVIRUS 229E PCR: NOT DETECTED
CORONAVIRUS HKU1 PCR: NOT DETECTED
CORONAVIRUS NL63 PCR: NOT DETECTED
CORONAVIRUS OC43 PCR: NOT DETECTED
CREAT SERPL-MCNC: 0.5 MG/DL (ref 0.6–1.1)
DIFFERENTIAL TYPE: ABNORMAL
EKG ATRIAL RATE: 84 BPM
EKG DIAGNOSIS: NORMAL
EKG P AXIS: 50 DEGREES
EKG P-R INTERVAL: 130 MS
EKG Q-T INTERVAL: 378 MS
EKG QRS DURATION: 74 MS
EKG QTC CALCULATION (BAZETT): 446 MS
EKG R AXIS: 48 DEGREES
EKG T AXIS: 68 DEGREES
EKG VENTRICULAR RATE: 84 BPM
EOSINOPHILS ABSOLUTE: 0.1 K/CU MM
EOSINOPHILS RELATIVE PERCENT: 1.5 % (ref 0–3)
GFR SERPL CREATININE-BSD FRML MDRD: >60 ML/MIN/1.73M2
GLUCOSE BLD-MCNC: 138 MG/DL (ref 70–99)
GLUCOSE, URINE: NEGATIVE MG/DL
HCT VFR BLD CALC: 39.6 % (ref 37–47)
HEMOGLOBIN: 12.1 GM/DL (ref 12.5–16)
HUMAN METAPNEUMOVIRUS PCR: NOT DETECTED
IMMATURE NEUTROPHIL %: 1 % (ref 0–0.43)
INFLUENZA A BY PCR: NOT DETECTED
INFLUENZA A H1 (2009) PCR: NOT DETECTED
INFLUENZA A H1 PANDEMIC PCR: NOT DETECTED
INFLUENZA A H3 PCR: NOT DETECTED
INFLUENZA B BY PCR: NOT DETECTED
KETONES, URINE: NEGATIVE MG/DL
LEUKOCYTE ESTERASE, URINE: NEGATIVE
LYMPHOCYTES ABSOLUTE: 1.9 K/CU MM
LYMPHOCYTES RELATIVE PERCENT: 47.6 % (ref 24–44)
MCH RBC QN AUTO: 31.8 PG (ref 27–31)
MCHC RBC AUTO-ENTMCNC: 30.6 % (ref 32–36)
MCV RBC AUTO: 103.9 FL (ref 78–100)
MONOCYTES ABSOLUTE: 0.4 K/CU MM
MONOCYTES RELATIVE PERCENT: 8.9 % (ref 0–4)
MYCOPLASMA PNEUMONIAE PCR: NOT DETECTED
NITRITE URINE, QUANTITATIVE: NEGATIVE
NUCLEATED RBC %: 0 %
PARAINFLUENZA 1 PCR: NOT DETECTED
PARAINFLUENZA 2 PCR: NOT DETECTED
PARAINFLUENZA 3 PCR: NOT DETECTED
PARAINFLUENZA 4 PCR: NOT DETECTED
PDW BLD-RTO: 13.2 % (ref 11.7–14.9)
PH, URINE: 7 (ref 5–8)
PLATELET # BLD: 150 K/CU MM (ref 140–440)
PMV BLD AUTO: 8.7 FL (ref 7.5–11.1)
POTASSIUM SERPL-SCNC: 4.1 MMOL/L (ref 3.5–5.1)
PROTEIN UA: NEGATIVE MG/DL
RBC # BLD: 3.81 M/CU MM (ref 4.2–5.4)
RBC URINE: 27 /HPF (ref 0–6)
RHINOVIRUS ENTEROVIRUS PCR: NOT DETECTED
RSV PCR: NOT DETECTED
SARS-COV-2: NOT DETECTED
SEGMENTED NEUTROPHILS ABSOLUTE COUNT: 1.6 K/CU MM
SEGMENTED NEUTROPHILS RELATIVE PERCENT: 40.7 % (ref 36–66)
SODIUM BLD-SCNC: 141 MMOL/L (ref 135–145)
SPECIFIC GRAVITY UA: 1.02 (ref 1–1.03)
SQUAMOUS EPITHELIAL: <1 /HPF
TOTAL IMMATURE NEUTOROPHIL: 0.04 K/CU MM
TOTAL NUCLEATED RBC: 0 K/CU MM
TOTAL PROTEIN: 6.4 GM/DL (ref 6.4–8.2)
TRICHOMONAS: ABNORMAL /HPF
UROBILINOGEN, URINE: 0.2 MG/DL (ref 0.2–1)
WBC # BLD: 3.9 K/CU MM (ref 4–10.5)
WBC UA: <1 /HPF (ref 0–5)

## 2022-11-24 PROCEDURE — 81001 URINALYSIS AUTO W/SCOPE: CPT

## 2022-11-24 PROCEDURE — 71045 X-RAY EXAM CHEST 1 VIEW: CPT

## 2022-11-24 PROCEDURE — 2580000003 HC RX 258: Performed by: PHYSICIAN ASSISTANT

## 2022-11-24 PROCEDURE — 99285 EMERGENCY DEPT VISIT HI MDM: CPT

## 2022-11-24 PROCEDURE — 96365 THER/PROPH/DIAG IV INF INIT: CPT

## 2022-11-24 PROCEDURE — 87077 CULTURE AEROBIC IDENTIFY: CPT

## 2022-11-24 PROCEDURE — 81003 URINALYSIS AUTO W/O SCOPE: CPT

## 2022-11-24 PROCEDURE — 87086 URINE CULTURE/COLONY COUNT: CPT

## 2022-11-24 PROCEDURE — 0202U NFCT DS 22 TRGT SARS-COV-2: CPT

## 2022-11-24 PROCEDURE — 93005 ELECTROCARDIOGRAM TRACING: CPT | Performed by: PHYSICIAN ASSISTANT

## 2022-11-24 PROCEDURE — 93010 ELECTROCARDIOGRAM REPORT: CPT | Performed by: INTERNAL MEDICINE

## 2022-11-24 PROCEDURE — 85025 COMPLETE CBC W/AUTO DIFF WBC: CPT

## 2022-11-24 PROCEDURE — 6360000004 HC RX CONTRAST MEDICATION: Performed by: PHYSICIAN ASSISTANT

## 2022-11-24 PROCEDURE — 87430 STREP A AG IA: CPT

## 2022-11-24 PROCEDURE — 74177 CT ABD & PELVIS W/CONTRAST: CPT

## 2022-11-24 PROCEDURE — 6360000002 HC RX W HCPCS: Performed by: PHYSICIAN ASSISTANT

## 2022-11-24 PROCEDURE — 87081 CULTURE SCREEN ONLY: CPT

## 2022-11-24 PROCEDURE — 80053 COMPREHEN METABOLIC PANEL: CPT

## 2022-11-24 RX ORDER — 0.9 % SODIUM CHLORIDE 0.9 %
10 VIAL (ML) INJECTION
Status: COMPLETED | OUTPATIENT
Start: 2022-11-24 | End: 2022-11-24

## 2022-11-24 RX ORDER — CEPHALEXIN 500 MG/1
500 CAPSULE ORAL 3 TIMES DAILY
Qty: 21 CAPSULE | Refills: 0 | Status: SHIPPED | OUTPATIENT
Start: 2022-11-24 | End: 2022-11-24 | Stop reason: SDUPTHER

## 2022-11-24 RX ORDER — CEPHALEXIN 500 MG/1
500 CAPSULE ORAL 3 TIMES DAILY
Qty: 21 CAPSULE | Refills: 0 | Status: SHIPPED | OUTPATIENT
Start: 2022-11-24 | End: 2022-12-01

## 2022-11-24 RX ADMIN — Medication 10 ML: at 12:01

## 2022-11-24 RX ADMIN — IOPAMIDOL 75 ML: 755 INJECTION, SOLUTION INTRAVENOUS at 12:01

## 2022-11-24 RX ADMIN — CEFTRIAXONE SODIUM 1000 MG: 1 INJECTION, POWDER, FOR SOLUTION INTRAMUSCULAR; INTRAVENOUS at 14:34

## 2022-11-24 ASSESSMENT — ENCOUNTER SYMPTOMS
TROUBLE SWALLOWING: 0
DIARRHEA: 0
RHINORRHEA: 0
VOICE CHANGE: 0
ABDOMINAL PAIN: 0
VOMITING: 1
SORE THROAT: 1
EYE PAIN: 0
COUGH: 1
BACK PAIN: 0
NAUSEA: 0
SHORTNESS OF BREATH: 0

## 2022-11-24 NOTE — ED NOTES
PT depend wet at arrival-changed and checked for pressure wounds. None were found at this time.      Júnior Handy RN  11/24/22 0021

## 2022-11-24 NOTE — DISCHARGE INSTRUCTIONS
Please start the antibiotics.     Contact Dr. Bella Kim office this week to schedule appointment for reevaluation this week or next and to discuss your CAT scan findings and urine culture    [facility] emergency department any difficulty breathing, difficulty swallowing, voice changes, shortness of breath, chest pain, abdominal pain, fever, confusion, worsening symptoms or any new concerns

## 2022-11-24 NOTE — ED PROVIDER NOTES
[address] ENCOUNTER        Pt Name: Emily Alonso  MRN: [id number]  [place] 1960  Date of evaluation: 11/24/2022  Provider: Mesha Leonardo PA-C  PCP: Rashawn Ramírez MD  Note Started: 8:47 AM EST      KAREN. I have evaluated this patient. My supervising physician was available for consultation. Triage CHIEF COMPLAINT       Chief Complaint   Patient presents with    Urinary Pain    Shortness of Breath         HISTORY OF PRESENT ILLNESS   (Location/Symptom, Timing/Onset, Context/Setting, Quality, Duration, Modifying Factors, Severity)  Note limiting factors. Chief Complaint: soret throat, dysuria    Emily Alonso is a 63 y.o. female who presents Arrives via EMS from home with reported cough, difficulty swallowing, and painful urination. Per EMS, the initially been called for with a mention was sepsis, and respiratory distress patient currently has caregivers at home, and they had mentioned current chief complaint of only difficulty swallowing, painful irritation, per EMS caregivers at that time denied any difficulty breathing or concern for sepsis. EMS had reported patient had a cough, did mention vitals within normal limits. They mentioned no reported altered mental status, injury or trauma      Patient lives alone by herself, but has 24-hour care. Patient's third shift caregiver, Giovana" is at bedside and also provide history. She mentions patient had vomited 1 time yesterday morning. But otherwise feels her normal self going to bed and throughout the night. No reported issues throughout the night. However upon waking this morning, patient apparently complained of a sore throat. Patient's morning nurse who apparently spoke with patient, had reported lymph nodes in neck, wheezing, and patient apparently had been complaining of burning with her urination.   But other than yesterday morning, no persistent vomiting. No reported abdominal pain, fever, altered mental status, chest pain, shortness of breath, sick contacts    Per Mary, patient diagnosed with urinary tract infection early last month and since resolved, and had COVID and pneumonia late last month and a admission. Prior to being admitted at that time patient apparently was on nasal cannula oxygen however this been discontinued since the [facility] hospitalization. Nursing Notes were all reviewed and agreed with or any disagreements were addressed in the HPI. REVIEW OF SYSTEMS    (2-9 systems for level 4, 10 or more for level 5)     Review of Systems   Constitutional:  Negative for chills and fever. HENT:  Positive for sore throat. Negative for congestion, drooling, rhinorrhea, trouble swallowing and voice change. Eyes:  Negative for pain and visual disturbance. Respiratory:  Positive for cough. Negative for shortness of breath. Cardiovascular:  Negative for chest pain and leg swelling. Gastrointestinal:  Positive for vomiting. Negative for abdominal pain, diarrhea and nausea. Genitourinary:  Positive for dysuria. Negative for hematuria. Musculoskeletal:  Negative for back pain and myalgias. Skin:  Negative for rash and wound. Neurological:  Negative for dizziness, weakness and light-headedness. Psychiatric/Behavioral:  Negative for confusion. PAST MEDICAL HISTORY   No past medical history on file. SURGICAL HISTORY   No past surgical history on file. [address]       Discharge Medication List as of 11/24/2022  3:53 PM          ALLERGIES     Patient has no allergy information on record. FAMILYHISTORY     No family history on file.      SOCIAL HISTORY       Social History     Socioeconomic History    Marital status: Single       SCREENINGS    [place] Coma Scale  Eye Opening: Spontaneous  Best Verbal Response: Oriented  Best Motor Response: Obeys commands  [place] Coma Scale Score: 15        PHYSICAL EXAM    (up to 7 for level 4, 8 or more for level 5)     ED Triage Vitals   BP Temp Temp src Pulse Resp SpO2 Height Weight   -- -- -- -- -- -- -- --       Physical Exam  Vitals and nursing note reviewed. Exam conducted with a chaperone present. Constitutional:       General: She is not in acute distress. Appearance: She is not ill-appearing or toxic-appearing. HENT:      Head: Normocephalic. Right Ear: External ear normal.      Left Ear: External ear normal.      Nose: Congestion and rhinorrhea present. Mouth/Throat:      Mouth: Mucous membranes are moist.      Pharynx: No oropharyngeal exudate or posterior oropharyngeal erythema. Eyes:      General: No scleral icterus. Extraocular Movements: Extraocular movements intact. Cardiovascular:      Rate and Rhythm: Normal rate and regular rhythm. Heart sounds: Normal heart sounds. Pulmonary:      Effort: No respiratory distress. Breath sounds: No stridor. No wheezing or rhonchi. Abdominal:      General: There is no distension. Tenderness: There is no abdominal tenderness. There is no guarding. Genitourinary:     General: Normal vulva. Labia:         Right: No rash. Comments: Buttocks, perirectal, sacrum without evidence of soft tissue infection or skin breakdown  Musculoskeletal:         General: Swelling present. No tenderness. Cervical back: No rigidity or tenderness. Right lower leg: No edema. Left lower leg: No edema. Lymphadenopathy:      Cervical: No cervical adenopathy. Lower Body: No right inguinal adenopathy. Skin:     General: Skin is warm. Coloration: Skin is not jaundiced. Neurological:      General: No focal deficit present. Mental Status: She is alert. Mental status is at baseline. GCS: GCS eye subscore is 4. GCS verbal subscore is 5. GCS motor subscore is 6. Motor: No weakness.       Gait: Gait normal.   Psychiatric:         Attention and Perception: Attention normal. Mood and Affect: Mood normal.         Behavior: Behavior normal.       EMERGENCY DEPARTMENT COURSE:             Pt is a 63 y.o. female who presents with above HPI. I saw patient upon her arrival to EMS, she is alert, appears to be at her baseline mentation. Vitals are within normal limits. EMS had reported possible sacral ulcers lesion, however my exam with nursing, mild erythema to her sacrum and coccyx without any skin breakdown or cellulitic changes. No concerning signs for Heather's gangrene or soft tissue infection. Lung sounds are decreased, auscultation limited due to body habitus. No abdominal pain guarding rebound distention. Posterior oropharynx shows no exudates uvula deviation or concerning signs for PTA epiglottitis. Patient is managing her own secretions. She is answering my questions appropriately, and states that she is hungry and she likes [employer]'s breakfast.  No stridor, hoarseness, or muffled voice sounds. Given patient's concern for dysuria and history of UTIs, will attempt to obtain a urine specimen for urinalysis. Also plan for strep pharyngitis, respiratory infectious panel, routine lab work. Patient was given thefollowing medications:  Medications   iopamidol (ISOVUE-370) 76 % injection 75 mL (75 mLs IntraVENous Given 11/24/22 1201)   sodium chloride (PF) 0.9 % injection 10 mL (10 mLs IntraVENous Given 11/24/22 1201)   cefTRIAXone (ROCEPHIN) 1,000 mg in dextrose 5 % 50 mL IVPB mini-bag (0 mg IntraVENous Stopped 11/24/22 1508)           DIFFERENTIAL DIAGNOSIS/MDM:       Patient presents with a complaint of sore throat, but respiratory infectious panel and strep swabs negative. On examination no concerning signs for PTA is managing own secretions no acute distress. On repeat examination, she is eating potato chips and drinking soda and requesting be discharged her and her caregivers have been discussing potential [employer]'s.   She also has some dysuria, just occasional normal limits   STREP SCREEN GROUP A THROAT    Narrative:     SETUP DATE/TIME:     RESPIRATORY PANEL, MOLECULAR, WITH COVID-19   CULTURE, URINE       When ordered, only abnormal lab results are displayed. All other labs were within normal range or not returned as of this dictation. EKG: When ordered, EKG's are interpreted by the Emergency Department Physician in the absence of a cardiologist.  Please see their note for interpretation of EKG. RADIOLOGY:   Non-plain film images such as CT, Ultrasound and MRI are read by the radiologist. Plain radiographic images are visualized andpreliminarily interpreted by the  ED Provider with the below findings:      Interpretation perthe Radiologist below, if available at the time of this note:    CT ABDOMEN PELVIS W IV CONTRAST Additional Contrast? None   Preliminary Result   1. Moderate amount stool in the rectum with subtle wall thickening and   stranding which may reflect stercoral colitis in the appropriate clinical   setting. 2. Mild perivesicular stranding and bladder wall thickening. Findings likely   reflect cystitis in the appropriate clinical setting. Otherwise no acute   abdominal or pelvic abnormality. 3. Stable but otherwise indeterminate 1.1 cm hyperdense right renal lesion. XR CHEST PORTABLE   Final Result   Low lung volumes. No acute cardiopulmonary findings. No results found. PROCEDURES   Unless otherwise noted below, none     Procedures      FINAL IMPRESSION      1. Dysuria    2. Sore throat    3. Acute cystitis with hematuria          DISPOSITION/PLAN   DISPOSITION Decision To Discharge 11/24/2022 04:33:51 PM      PATIENT REFERREDTO:  No follow-up provider specified.     DISCHARGE MEDICATIONS:  Discharge Medication List as of 11/24/2022  3:53 PM          DISCONTINUED MEDICATIONS:  Discharge Medication List as of 11/24/2022  3:53 PM                 (Please note that portions ofthis note were completed with a voice recognition program.  Efforts were made to edit the dictations but occasionally words are mis-transcribed.)    Eunice Mosquera PA-C (electronically signed)            Eunice Mosquera PA-C  11/25/22 6864

## 2022-11-24 NOTE — ED PROVIDER NOTES
12 lead EKG per my interpretation:  Normal Sinus Rhythm 84  Axis is   Normal  QTc is   446  There is no specific T wave changes appreciated. There is no specific ST wave changes appreciated.     Prior EKG to compare with was not available        Peng Trimble DO  11/24/22 1141

## 2022-11-24 NOTE — ED NOTES
Pt discharged to home via [facility], discharge instructions given to caregiver.      Argenis Downing RN  11/24/22 2099

## 2022-11-24 NOTE — Clinical Note
Kassi Neville was seen and treated in our emergency department on 11/24/2022. She may return to work on 11/28/2022. If you have any questions or concerns, please don't hesitate to call.       Tessa Brantley PA-C

## 2022-11-24 NOTE — ED TRIAGE NOTES
shortness of breath. painful urination. swollen lymph nodes. trouble swallowing. Per home nurse as relayed by EMS.

## 2022-11-25 LAB
CULTURE: ABNORMAL
CULTURE: ABNORMAL
Lab: ABNORMAL
SPECIMEN: ABNORMAL

## 2022-11-26 LAB
CULTURE: NORMAL
Lab: NORMAL
SPECIMEN: NORMAL
STREP A DIRECT SCREEN: NEGATIVE

## 2022-11-28 ENCOUNTER — CARE COORDINATION (OUTPATIENT)
Dept: CARE COORDINATION | Age: 62
End: 2022-11-28

## 2022-11-28 NOTE — CARE COORDINATION
Attempted to reach patient for ACM follow up. No answer to phone. Message left with ACM contact information and request for call back. UTR letter sent via My Chart. No further outreach planned.

## 2022-12-05 NOTE — PROGRESS NOTES
Pharmacy Note  ED Culture Follow-up    Basilio Aden is a 63 y.o. female. Allergies: Patient has no allergy information on record. Current antimicrobials:   Reviewed patient's urine culture - culture positive for streptococcus gallolyticus. Patient was discharged on cephalexin 500 mg by mouth three times daily for 7 days, and culture is sensitive to prescribed medication. Antibiotic prescribed at discharge is appropriate - no changes made to antibiotic regimen. Please call with any questions.  Madeleine [facility], PharmD 5:27 PM 12/5/2022

## 2022-12-06 ENCOUNTER — HOSPITAL ENCOUNTER (OUTPATIENT)
Age: 62
Setting detail: SPECIMEN
Discharge: HOME | End: 2022-12-06
Payer: MEDICARE

## 2022-12-06 LAB
ALBUMIN SERPL-MCNC: 3.6 GM/DL (ref 3.4–5)
ALP BLD-CCNC: 77 IU/L (ref 40–128)
ALT SERPL-CCNC: 8 U/L (ref 10–40)
ANION GAP SERPL CALCULATED.3IONS-SCNC: 12 MMOL/L (ref 4–16)
AST SERPL-CCNC: 17 IU/L (ref 15–37)
BILIRUB SERPL-MCNC: 0.2 MG/DL (ref 0–1)
BUN BLDV-MCNC: 26 MG/DL (ref 6–23)
CALCIUM SERPL-MCNC: 9.1 MG/DL (ref 8.3–10.6)
CHLORIDE BLD-SCNC: 105 MMOL/L (ref 99–110)
CHOLESTEROL: 223 MG/DL
CO2: 26 MMOL/L (ref 21–32)
CREAT SERPL-MCNC: 0.5 MG/DL (ref 0.6–1.1)
ESTIMATED AVERAGE GLUCOSE: 114 MG/DL
GFR SERPL CREATININE-BSD FRML MDRD: >60 ML/MIN/1.73M2
GLUCOSE BLD-MCNC: 128 MG/DL (ref 70–99)
HBA1C MFR BLD: 5.6 % (ref 4.2–6.3)
HDLC SERPL-MCNC: 46 MG/DL
LDL CHOLESTEROL CALCULATED: ABNORMAL MG/DL
LDL CHOLESTEROL DIRECT: 114 MG/DL
POTASSIUM SERPL-SCNC: 4.2 MMOL/L (ref 3.5–5.1)
SODIUM BLD-SCNC: 143 MMOL/L (ref 135–145)
T4 FREE: 0.81 NG/DL (ref 0.9–1.8)
TOTAL PROTEIN: 6.2 GM/DL (ref 6.4–8.2)
TRIGL SERPL-MCNC: 476 MG/DL
TSH HIGH SENSITIVITY: 3.8 UIU/ML (ref 0.27–4.2)

## 2022-12-06 PROCEDURE — 83036 HEMOGLOBIN GLYCOSYLATED A1C: CPT

## 2022-12-06 PROCEDURE — 84443 ASSAY THYROID STIM HORMONE: CPT

## 2022-12-06 PROCEDURE — 36415 COLL VENOUS BLD VENIPUNCTURE: CPT

## 2022-12-06 PROCEDURE — 80053 COMPREHEN METABOLIC PANEL: CPT

## 2022-12-06 PROCEDURE — 80061 LIPID PANEL: CPT

## 2022-12-06 PROCEDURE — 83721 ASSAY OF BLOOD LIPOPROTEIN: CPT

## 2022-12-06 PROCEDURE — 84439 ASSAY OF FREE THYROXINE: CPT

## 2022-12-12 DIAGNOSIS — I10 HTN (HYPERTENSION), BENIGN: ICD-10-CM

## 2022-12-13 RX ORDER — AMLODIPINE BESYLATE 10 MG/1
TABLET ORAL
Qty: 31 TABLET | Refills: 5 | Status: SHIPPED | OUTPATIENT
Start: 2022-12-13

## 2022-12-19 ENCOUNTER — APPOINTMENT (OUTPATIENT)
Dept: CT IMAGING | Age: 62
DRG: 189 | End: 2022-12-19
Payer: MEDICARE

## 2022-12-19 ENCOUNTER — TELEPHONE (OUTPATIENT)
Dept: FAMILY MEDICINE CLINIC | Age: 62
End: 2022-12-19

## 2022-12-19 ENCOUNTER — APPOINTMENT (OUTPATIENT)
Dept: GENERAL RADIOLOGY | Age: 62
DRG: 189 | End: 2022-12-19
Payer: MEDICARE

## 2022-12-19 ENCOUNTER — HOSPITAL ENCOUNTER (INPATIENT)
Age: 62
LOS: 1 days | Discharge: HOME | DRG: 189 | End: 2022-12-21
Attending: STUDENT IN AN ORGANIZED HEALTH CARE EDUCATION/TRAINING PROGRAM | Admitting: INTERNAL MEDICINE
Payer: MEDICARE

## 2022-12-19 DIAGNOSIS — R09.02 HYPOXIA: ICD-10-CM

## 2022-12-19 DIAGNOSIS — N39.0 URINARY TRACT INFECTION WITH HEMATURIA, SITE UNSPECIFIED: ICD-10-CM

## 2022-12-19 DIAGNOSIS — R31.9 URINARY TRACT INFECTION WITH HEMATURIA, SITE UNSPECIFIED: ICD-10-CM

## 2022-12-19 DIAGNOSIS — R53.1 GENERAL WEAKNESS: Primary | ICD-10-CM

## 2022-12-19 LAB
ADENOVIRUS DETECTION BY PCR: NOT DETECTED
ALBUMIN SERPL-MCNC: 3.3 GM/DL (ref 3.4–5)
ALP BLD-CCNC: 58 IU/L (ref 40–129)
ALT SERPL-CCNC: 5 U/L (ref 10–40)
ANION GAP SERPL CALCULATED.3IONS-SCNC: 11 MMOL/L (ref 4–16)
AST SERPL-CCNC: 7 IU/L (ref 15–37)
BACTERIA: ABNORMAL /HPF
BASE EXCESS MIXED: 2 (ref 0–2.3)
BASOPHILS ABSOLUTE: 0 K/CU MM
BASOPHILS RELATIVE PERCENT: 0.5 % (ref 0–1)
BILIRUB SERPL-MCNC: 0.2 MG/DL (ref 0–1)
BILIRUBIN URINE: NEGATIVE MG/DL
BLOOD, URINE: ABNORMAL
BORDETELLA PARAPERTUSSIS BY PCR: NOT DETECTED
BORDETELLA PERTUSSIS PCR: NOT DETECTED
BUN BLDV-MCNC: 22 MG/DL (ref 6–23)
CALCIUM SERPL-MCNC: 9 MG/DL (ref 8.3–10.6)
CHLAMYDOPHILA PNEUMONIA PCR: NOT DETECTED
CHLORIDE BLD-SCNC: 105 MMOL/L (ref 99–110)
CLARITY: ABNORMAL
CO2: 30 MMOL/L (ref 21–32)
COLOR: YELLOW
COMMENT: ABNORMAL
CORONAVIRUS 229E PCR: NOT DETECTED
CORONAVIRUS HKU1 PCR: NOT DETECTED
CORONAVIRUS NL63 PCR: NOT DETECTED
CORONAVIRUS OC43 PCR: NOT DETECTED
CREAT SERPL-MCNC: 0.4 MG/DL (ref 0.6–1.1)
DIFFERENTIAL TYPE: ABNORMAL
DOSE AMOUNT: NORMAL
DOSE TIME: NORMAL
EKG ATRIAL RATE: 62 BPM
EKG DIAGNOSIS: NORMAL
EKG P AXIS: 47 DEGREES
EKG P-R INTERVAL: 160 MS
EKG Q-T INTERVAL: 430 MS
EKG QRS DURATION: 80 MS
EKG QTC CALCULATION (BAZETT): 436 MS
EKG R AXIS: 26 DEGREES
EKG T AXIS: 30 DEGREES
EKG VENTRICULAR RATE: 62 BPM
EOSINOPHILS ABSOLUTE: 0 K/CU MM
EOSINOPHILS RELATIVE PERCENT: 0.5 % (ref 0–3)
GFR SERPL CREATININE-BSD FRML MDRD: >60 ML/MIN/1.73M2
GLUCOSE BLD-MCNC: 123 MG/DL (ref 70–99)
GLUCOSE BLD-MCNC: 92 MG/DL (ref 70–99)
GLUCOSE, URINE: NEGATIVE MG/DL
HCO3 VENOUS: 27.8 MMOL/L (ref 19–25)
HCT VFR BLD CALC: 36.7 % (ref 37–47)
HEMOGLOBIN: 11.6 GM/DL (ref 12.5–16)
HUMAN METAPNEUMOVIRUS PCR: NOT DETECTED
IMMATURE NEUTROPHIL %: 1.1 % (ref 0–0.43)
INFLUENZA A BY PCR: NOT DETECTED
INFLUENZA A H1 (2009) PCR: NOT DETECTED
INFLUENZA A H1 PANDEMIC PCR: NOT DETECTED
INFLUENZA A H3 PCR: NOT DETECTED
INFLUENZA B BY PCR: NOT DETECTED
KETONES, URINE: NEGATIVE MG/DL
LACTATE: 1.1 MMOL/L (ref 0.4–2)
LEUKOCYTE ESTERASE, URINE: ABNORMAL
LIPASE: 23 IU/L (ref 13–60)
LYMPHOCYTES ABSOLUTE: 1.5 K/CU MM
LYMPHOCYTES RELATIVE PERCENT: 35.4 % (ref 24–44)
MAGNESIUM: 2.2 MG/DL (ref 1.8–2.4)
MCH RBC QN AUTO: 32 PG (ref 27–31)
MCHC RBC AUTO-ENTMCNC: 31.6 % (ref 32–36)
MCV RBC AUTO: 101.4 FL (ref 78–100)
MONOCYTES ABSOLUTE: 0.6 K/CU MM
MONOCYTES RELATIVE PERCENT: 12.9 % (ref 0–4)
MUCUS: ABNORMAL HPF
MYCOPLASMA PNEUMONIAE PCR: NOT DETECTED
NITRITE URINE, QUANTITATIVE: POSITIVE
NUCLEATED RBC %: 0 %
O2 SAT, VEN: 95.3 % (ref 50–70)
PARAINFLUENZA 1 PCR: NOT DETECTED
PARAINFLUENZA 2 PCR: NOT DETECTED
PARAINFLUENZA 3 PCR: NOT DETECTED
PARAINFLUENZA 4 PCR: NOT DETECTED
PCO2, VEN: 47 MMHG (ref 38–52)
PDW BLD-RTO: 12.1 % (ref 11.7–14.9)
PH VENOUS: 7.38 (ref 7.32–7.42)
PH, URINE: 6 (ref 5–8)
PLATELET # BLD: 186 K/CU MM (ref 140–440)
PMV BLD AUTO: 9 FL (ref 7.5–11.1)
PO2, VEN: 181 MMHG (ref 28–48)
POTASSIUM SERPL-SCNC: 3.8 MMOL/L (ref 3.5–5.1)
PRO-BNP: 117.8 PG/ML
PROTEIN UA: 100 MG/DL
RAPID INFLUENZA  B AGN: NEGATIVE
RAPID INFLUENZA A AGN: NEGATIVE
RBC # BLD: 3.62 M/CU MM (ref 4.2–5.4)
RBC URINE: 144 /HPF (ref 0–6)
RHINOVIRUS ENTEROVIRUS PCR: NOT DETECTED
RSV PCR: NOT DETECTED
SARS-COV-2, NAAT: NOT DETECTED
SARS-COV-2: NOT DETECTED
SEGMENTED NEUTROPHILS ABSOLUTE COUNT: 2.2 K/CU MM
SEGMENTED NEUTROPHILS RELATIVE PERCENT: 49.6 % (ref 36–66)
SODIUM BLD-SCNC: 146 MMOL/L (ref 135–145)
SOURCE: NORMAL
SPECIFIC GRAVITY UA: >1.03 (ref 1–1.03)
TOTAL IMMATURE NEUTOROPHIL: 0.05 K/CU MM
TOTAL NUCLEATED RBC: 0 K/CU MM
TOTAL PROTEIN: 6.8 GM/DL (ref 6.4–8.2)
TRICHOMONAS: ABNORMAL /HPF
TROPONIN T: <0.01 NG/ML
UROBILINOGEN, URINE: 0.2 MG/DL (ref 0.2–1)
VALPROIC ACID LEVEL: 76.5 UG/ML (ref 50–100)
WBC # BLD: 4.4 K/CU MM (ref 4–10.5)
WBC CLUMP: ABNORMAL /HPF
WBC UA: 1463 /HPF (ref 0–5)

## 2022-12-19 PROCEDURE — 87804 INFLUENZA ASSAY W/OPTIC: CPT

## 2022-12-19 PROCEDURE — G0378 HOSPITAL OBSERVATION PER HR: HCPCS

## 2022-12-19 PROCEDURE — 80164 ASSAY DIPROPYLACETIC ACD TOT: CPT

## 2022-12-19 PROCEDURE — 83605 ASSAY OF LACTIC ACID: CPT

## 2022-12-19 PROCEDURE — 6360000002 HC RX W HCPCS: Performed by: PHYSICIAN ASSISTANT

## 2022-12-19 PROCEDURE — 83690 ASSAY OF LIPASE: CPT

## 2022-12-19 PROCEDURE — 99285 EMERGENCY DEPT VISIT HI MDM: CPT

## 2022-12-19 PROCEDURE — 87086 URINE CULTURE/COLONY COUNT: CPT

## 2022-12-19 PROCEDURE — 87088 URINE BACTERIA CULTURE: CPT

## 2022-12-19 PROCEDURE — 0202U NFCT DS 22 TRGT SARS-COV-2: CPT

## 2022-12-19 PROCEDURE — 87186 SC STD MICRODIL/AGAR DIL: CPT

## 2022-12-19 PROCEDURE — 70450 CT HEAD/BRAIN W/O DYE: CPT

## 2022-12-19 PROCEDURE — 96361 HYDRATE IV INFUSION ADD-ON: CPT

## 2022-12-19 PROCEDURE — 84484 ASSAY OF TROPONIN QUANT: CPT

## 2022-12-19 PROCEDURE — 6370000000 HC RX 637 (ALT 250 FOR IP): Performed by: STUDENT IN AN ORGANIZED HEALTH CARE EDUCATION/TRAINING PROGRAM

## 2022-12-19 PROCEDURE — 93005 ELECTROCARDIOGRAM TRACING: CPT | Performed by: PHYSICIAN ASSISTANT

## 2022-12-19 PROCEDURE — 80053 COMPREHEN METABOLIC PANEL: CPT

## 2022-12-19 PROCEDURE — 71045 X-RAY EXAM CHEST 1 VIEW: CPT

## 2022-12-19 PROCEDURE — 96365 THER/PROPH/DIAG IV INF INIT: CPT

## 2022-12-19 PROCEDURE — 82962 GLUCOSE BLOOD TEST: CPT

## 2022-12-19 PROCEDURE — 51701 INSERT BLADDER CATHETER: CPT

## 2022-12-19 PROCEDURE — 82805 BLOOD GASES W/O2 SATURATION: CPT

## 2022-12-19 PROCEDURE — 2580000003 HC RX 258: Performed by: PHYSICIAN ASSISTANT

## 2022-12-19 PROCEDURE — 81001 URINALYSIS AUTO W/SCOPE: CPT

## 2022-12-19 PROCEDURE — 93010 ELECTROCARDIOGRAM REPORT: CPT | Performed by: INTERNAL MEDICINE

## 2022-12-19 PROCEDURE — 83880 ASSAY OF NATRIURETIC PEPTIDE: CPT

## 2022-12-19 PROCEDURE — 83735 ASSAY OF MAGNESIUM: CPT

## 2022-12-19 PROCEDURE — 85025 COMPLETE CBC W/AUTO DIFF WBC: CPT

## 2022-12-19 PROCEDURE — 87635 SARS-COV-2 COVID-19 AMP PRB: CPT

## 2022-12-19 RX ORDER — ONDANSETRON 2 MG/ML
4 INJECTION INTRAMUSCULAR; INTRAVENOUS EVERY 30 MIN PRN
Status: DISCONTINUED | OUTPATIENT
Start: 2022-12-19 | End: 2022-12-19

## 2022-12-19 RX ORDER — IPRATROPIUM BROMIDE AND ALBUTEROL SULFATE 2.5; .5 MG/3ML; MG/3ML
1 SOLUTION RESPIRATORY (INHALATION) 3 TIMES DAILY
Status: DISCONTINUED | OUTPATIENT
Start: 2022-12-19 | End: 2022-12-21 | Stop reason: HOSPADM

## 2022-12-19 RX ORDER — MIRTAZAPINE 15 MG/1
30 TABLET, FILM COATED ORAL NIGHTLY
Status: DISCONTINUED | OUTPATIENT
Start: 2022-12-19 | End: 2022-12-21 | Stop reason: HOSPADM

## 2022-12-19 RX ORDER — CARBAMAZEPINE 300 MG/1
300 CAPSULE, EXTENDED RELEASE ORAL 2 TIMES DAILY
Status: DISCONTINUED | OUTPATIENT
Start: 2022-12-19 | End: 2022-12-21 | Stop reason: HOSPADM

## 2022-12-19 RX ORDER — INSULIN LISPRO 100 [IU]/ML
0-4 INJECTION, SOLUTION INTRAVENOUS; SUBCUTANEOUS NIGHTLY
Status: DISCONTINUED | OUTPATIENT
Start: 2022-12-19 | End: 2022-12-21 | Stop reason: HOSPADM

## 2022-12-19 RX ORDER — ALBUTEROL SULFATE 90 UG/1
2 AEROSOL, METERED RESPIRATORY (INHALATION) 4 TIMES DAILY PRN
Status: DISCONTINUED | OUTPATIENT
Start: 2022-12-19 | End: 2022-12-21 | Stop reason: HOSPADM

## 2022-12-19 RX ORDER — FLUTICASONE PROPIONATE 50 MCG
1 SPRAY, SUSPENSION (ML) NASAL DAILY
Status: DISCONTINUED | OUTPATIENT
Start: 2022-12-20 | End: 2022-12-21 | Stop reason: HOSPADM

## 2022-12-19 RX ORDER — ONDANSETRON 4 MG/1
4 TABLET, ORALLY DISINTEGRATING ORAL EVERY 8 HOURS PRN
Status: DISCONTINUED | OUTPATIENT
Start: 2022-12-19 | End: 2022-12-21 | Stop reason: HOSPADM

## 2022-12-19 RX ORDER — SODIUM CHLORIDE 0.9 % (FLUSH) 0.9 %
5-40 SYRINGE (ML) INJECTION EVERY 12 HOURS SCHEDULED
Status: DISCONTINUED | OUTPATIENT
Start: 2022-12-19 | End: 2022-12-21 | Stop reason: HOSPADM

## 2022-12-19 RX ORDER — 0.9 % SODIUM CHLORIDE 0.9 %
1000 INTRAVENOUS SOLUTION INTRAVENOUS ONCE
Status: COMPLETED | OUTPATIENT
Start: 2022-12-19 | End: 2022-12-19

## 2022-12-19 RX ORDER — ONDANSETRON 2 MG/ML
4 INJECTION INTRAMUSCULAR; INTRAVENOUS EVERY 6 HOURS PRN
Status: DISCONTINUED | OUTPATIENT
Start: 2022-12-19 | End: 2022-12-21 | Stop reason: HOSPADM

## 2022-12-19 RX ORDER — SODIUM CHLORIDE 9 MG/ML
INJECTION, SOLUTION INTRAVENOUS CONTINUOUS
Status: DISCONTINUED | OUTPATIENT
Start: 2022-12-19 | End: 2022-12-21

## 2022-12-19 RX ORDER — POLYETHYLENE GLYCOL 3350 17 G/17G
17 POWDER, FOR SOLUTION ORAL DAILY PRN
Status: DISCONTINUED | OUTPATIENT
Start: 2022-12-19 | End: 2022-12-21 | Stop reason: HOSPADM

## 2022-12-19 RX ORDER — LEVETIRACETAM 500 MG/1
1500 TABLET ORAL 2 TIMES DAILY
Status: DISCONTINUED | OUTPATIENT
Start: 2022-12-19 | End: 2022-12-21 | Stop reason: HOSPADM

## 2022-12-19 RX ORDER — LISINOPRIL 20 MG/1
40 TABLET ORAL DAILY
Status: DISCONTINUED | OUTPATIENT
Start: 2022-12-20 | End: 2022-12-21 | Stop reason: HOSPADM

## 2022-12-19 RX ORDER — RISPERIDONE 0.5 MG/1
1 TABLET ORAL 2 TIMES DAILY
Status: DISCONTINUED | OUTPATIENT
Start: 2022-12-19 | End: 2022-12-21 | Stop reason: HOSPADM

## 2022-12-19 RX ORDER — ATORVASTATIN CALCIUM 40 MG/1
40 TABLET, FILM COATED ORAL DAILY
Status: DISCONTINUED | OUTPATIENT
Start: 2022-12-20 | End: 2022-12-21 | Stop reason: HOSPADM

## 2022-12-19 RX ORDER — SODIUM CHLORIDE 0.9 % (FLUSH) 0.9 %
5-40 SYRINGE (ML) INJECTION PRN
Status: DISCONTINUED | OUTPATIENT
Start: 2022-12-19 | End: 2022-12-21 | Stop reason: HOSPADM

## 2022-12-19 RX ORDER — LEVOTHYROXINE SODIUM 0.05 MG/1
50 TABLET ORAL DAILY
Status: DISCONTINUED | OUTPATIENT
Start: 2022-12-20 | End: 2022-12-21 | Stop reason: HOSPADM

## 2022-12-19 RX ORDER — PANTOPRAZOLE SODIUM 40 MG/1
40 TABLET, DELAYED RELEASE ORAL DAILY
Status: DISCONTINUED | OUTPATIENT
Start: 2022-12-20 | End: 2022-12-21 | Stop reason: HOSPADM

## 2022-12-19 RX ORDER — ACETAMINOPHEN 650 MG/1
650 SUPPOSITORY RECTAL EVERY 6 HOURS PRN
Status: DISCONTINUED | OUTPATIENT
Start: 2022-12-19 | End: 2022-12-21 | Stop reason: HOSPADM

## 2022-12-19 RX ORDER — DEXTROSE MONOHYDRATE 100 MG/ML
INJECTION, SOLUTION INTRAVENOUS CONTINUOUS PRN
Status: DISCONTINUED | OUTPATIENT
Start: 2022-12-19 | End: 2022-12-21 | Stop reason: HOSPADM

## 2022-12-19 RX ORDER — DIVALPROEX SODIUM 500 MG/1
1000 TABLET, DELAYED RELEASE ORAL 2 TIMES DAILY
Status: DISCONTINUED | OUTPATIENT
Start: 2022-12-19 | End: 2022-12-21 | Stop reason: HOSPADM

## 2022-12-19 RX ORDER — AMLODIPINE BESYLATE 10 MG/1
10 TABLET ORAL DAILY
Status: DISCONTINUED | OUTPATIENT
Start: 2022-12-20 | End: 2022-12-21 | Stop reason: HOSPADM

## 2022-12-19 RX ORDER — ACETAMINOPHEN 325 MG/1
650 TABLET ORAL EVERY 6 HOURS PRN
Status: DISCONTINUED | OUTPATIENT
Start: 2022-12-19 | End: 2022-12-21 | Stop reason: HOSPADM

## 2022-12-19 RX ORDER — INSULIN LISPRO 100 [IU]/ML
0-16 INJECTION, SOLUTION INTRAVENOUS; SUBCUTANEOUS
Status: DISCONTINUED | OUTPATIENT
Start: 2022-12-20 | End: 2022-12-21 | Stop reason: HOSPADM

## 2022-12-19 RX ORDER — SODIUM CHLORIDE 9 MG/ML
INJECTION, SOLUTION INTRAVENOUS PRN
Status: DISCONTINUED | OUTPATIENT
Start: 2022-12-19 | End: 2022-12-21 | Stop reason: HOSPADM

## 2022-12-19 RX ORDER — BUSPIRONE HYDROCHLORIDE 5 MG/1
10 TABLET ORAL 2 TIMES DAILY
Status: DISCONTINUED | OUTPATIENT
Start: 2022-12-19 | End: 2022-12-21 | Stop reason: HOSPADM

## 2022-12-19 RX ORDER — ENOXAPARIN SODIUM 100 MG/ML
40 INJECTION SUBCUTANEOUS DAILY
Status: DISCONTINUED | OUTPATIENT
Start: 2022-12-20 | End: 2022-12-21 | Stop reason: HOSPADM

## 2022-12-19 RX ADMIN — RISPERIDONE 1 MG: 0.5 TABLET ORAL at 23:26

## 2022-12-19 RX ADMIN — DIVALPROEX SODIUM 1000 MG: 500 TABLET, DELAYED RELEASE ORAL at 23:27

## 2022-12-19 RX ADMIN — SODIUM CHLORIDE 1000 ML: 9 INJECTION, SOLUTION INTRAVENOUS at 16:32

## 2022-12-19 RX ADMIN — BUSPIRONE HYDROCHLORIDE 10 MG: 5 TABLET ORAL at 23:27

## 2022-12-19 RX ADMIN — SODIUM CHLORIDE: 9 INJECTION, SOLUTION INTRAVENOUS at 20:04

## 2022-12-19 RX ADMIN — LEVETIRACETAM 1500 MG: 500 TABLET, FILM COATED ORAL at 23:26

## 2022-12-19 RX ADMIN — CEFTRIAXONE SODIUM 1000 MG: 1 INJECTION, POWDER, FOR SOLUTION INTRAMUSCULAR; INTRAVENOUS at 19:02

## 2022-12-19 RX ADMIN — SERTRALINE HYDROCHLORIDE 150 MG: 50 TABLET ORAL at 23:26

## 2022-12-19 RX ADMIN — MIRTAZAPINE 30 MG: 15 TABLET, FILM COATED ORAL at 23:27

## 2022-12-19 NOTE — ED PROVIDER NOTES
ED attending EKG interpretation (I otherwise did not participate in the care of this patient)    EKG Interpretation  Interpreted by me  Compared to 11/24/2022  Rhythm: normal sinus   Rate: normal 62  Axis: normal  Ectopy: none  Conduction: normal  ST Segments: no acute change  T Waves: no acute change  Clinical Impression: no acute changes and normal EKG     Agapito Moctezuma MD  12/19/22 1640

## 2022-12-20 LAB
ANION GAP SERPL CALCULATED.3IONS-SCNC: 8 MMOL/L (ref 4–16)
BASOPHILS ABSOLUTE: 0 K/CU MM
BASOPHILS RELATIVE PERCENT: 0.6 % (ref 0–1)
BUN BLDV-MCNC: 12 MG/DL (ref 6–23)
CALCIUM SERPL-MCNC: 8.2 MG/DL (ref 8.3–10.6)
CHLORIDE BLD-SCNC: 112 MMOL/L (ref 99–110)
CO2: 28 MMOL/L (ref 21–32)
CREAT SERPL-MCNC: 0.4 MG/DL (ref 0.6–1.1)
DIFFERENTIAL TYPE: ABNORMAL
EOSINOPHILS ABSOLUTE: 0 K/CU MM
EOSINOPHILS RELATIVE PERCENT: 0.6 % (ref 0–3)
GFR SERPL CREATININE-BSD FRML MDRD: >60 ML/MIN/1.73M2
GLUCOSE BLD-MCNC: 104 MG/DL (ref 70–99)
GLUCOSE BLD-MCNC: 115 MG/DL (ref 70–99)
GLUCOSE BLD-MCNC: 136 MG/DL (ref 70–99)
GLUCOSE BLD-MCNC: 137 MG/DL (ref 70–99)
GLUCOSE BLD-MCNC: 94 MG/DL (ref 70–99)
HCT VFR BLD CALC: 34.1 % (ref 37–47)
HEMOGLOBIN: 10.4 GM/DL (ref 12.5–16)
IMMATURE NEUTROPHIL %: 1.6 % (ref 0–0.43)
LYMPHOCYTES ABSOLUTE: 1.9 K/CU MM
LYMPHOCYTES RELATIVE PERCENT: 38.9 % (ref 24–44)
MCH RBC QN AUTO: 31.5 PG (ref 27–31)
MCHC RBC AUTO-ENTMCNC: 30.5 % (ref 32–36)
MCV RBC AUTO: 103.3 FL (ref 78–100)
MONOCYTES ABSOLUTE: 0.7 K/CU MM
MONOCYTES RELATIVE PERCENT: 14.9 % (ref 0–4)
NUCLEATED RBC %: 0 %
PDW BLD-RTO: 11.9 % (ref 11.7–14.9)
PLATELET # BLD: 173 K/CU MM (ref 140–440)
PMV BLD AUTO: 8.7 FL (ref 7.5–11.1)
POTASSIUM SERPL-SCNC: 4 MMOL/L (ref 3.5–5.1)
RBC # BLD: 3.3 M/CU MM (ref 4.2–5.4)
SEGMENTED NEUTROPHILS ABSOLUTE COUNT: 2.1 K/CU MM
SEGMENTED NEUTROPHILS RELATIVE PERCENT: 43.4 % (ref 36–66)
SODIUM BLD-SCNC: 148 MMOL/L (ref 135–145)
TOTAL IMMATURE NEUTOROPHIL: 0.08 K/CU MM
TOTAL NUCLEATED RBC: 0 K/CU MM
WBC # BLD: 4.9 K/CU MM (ref 4–10.5)

## 2022-12-20 PROCEDURE — 6360000002 HC RX W HCPCS: Performed by: STUDENT IN AN ORGANIZED HEALTH CARE EDUCATION/TRAINING PROGRAM

## 2022-12-20 PROCEDURE — G0378 HOSPITAL OBSERVATION PER HR: HCPCS

## 2022-12-20 PROCEDURE — 80048 BASIC METABOLIC PNL TOTAL CA: CPT

## 2022-12-20 PROCEDURE — 85025 COMPLETE CBC W/AUTO DIFF WBC: CPT

## 2022-12-20 PROCEDURE — 2580000003 HC RX 258: Performed by: STUDENT IN AN ORGANIZED HEALTH CARE EDUCATION/TRAINING PROGRAM

## 2022-12-20 PROCEDURE — 6370000000 HC RX 637 (ALT 250 FOR IP): Performed by: STUDENT IN AN ORGANIZED HEALTH CARE EDUCATION/TRAINING PROGRAM

## 2022-12-20 PROCEDURE — 94640 AIRWAY INHALATION TREATMENT: CPT

## 2022-12-20 PROCEDURE — 96372 THER/PROPH/DIAG INJ SC/IM: CPT

## 2022-12-20 PROCEDURE — 82962 GLUCOSE BLOOD TEST: CPT

## 2022-12-20 PROCEDURE — 96361 HYDRATE IV INFUSION ADD-ON: CPT

## 2022-12-20 PROCEDURE — 2700000000 HC OXYGEN THERAPY PER DAY

## 2022-12-20 PROCEDURE — 36415 COLL VENOUS BLD VENIPUNCTURE: CPT

## 2022-12-20 PROCEDURE — 96366 THER/PROPH/DIAG IV INF ADDON: CPT

## 2022-12-20 PROCEDURE — 94761 N-INVAS EAR/PLS OXIMETRY MLT: CPT

## 2022-12-20 RX ADMIN — TIOTROPIUM BROMIDE AND OLODATEROL 2 PUFF: 3.124; 2.736 SPRAY, METERED RESPIRATORY (INHALATION) at 07:33

## 2022-12-20 RX ADMIN — DIVALPROEX SODIUM 1000 MG: 500 TABLET, DELAYED RELEASE ORAL at 08:35

## 2022-12-20 RX ADMIN — LEVETIRACETAM 1500 MG: 500 TABLET, FILM COATED ORAL at 20:55

## 2022-12-20 RX ADMIN — LISINOPRIL 40 MG: 20 TABLET ORAL at 08:35

## 2022-12-20 RX ADMIN — FLUTICASONE PROPIONATE 1 SPRAY: 50 SPRAY, METERED NASAL at 08:36

## 2022-12-20 RX ADMIN — DIVALPROEX SODIUM 1000 MG: 500 TABLET, DELAYED RELEASE ORAL at 20:55

## 2022-12-20 RX ADMIN — PANTOPRAZOLE SODIUM 40 MG: 40 TABLET, DELAYED RELEASE ORAL at 06:46

## 2022-12-20 RX ADMIN — LEVETIRACETAM 1500 MG: 500 TABLET, FILM COATED ORAL at 08:36

## 2022-12-20 RX ADMIN — BUSPIRONE HYDROCHLORIDE 10 MG: 5 TABLET ORAL at 08:36

## 2022-12-20 RX ADMIN — ENOXAPARIN SODIUM 40 MG: 100 INJECTION SUBCUTANEOUS at 08:41

## 2022-12-20 RX ADMIN — SERTRALINE HYDROCHLORIDE 150 MG: 50 TABLET ORAL at 20:55

## 2022-12-20 RX ADMIN — CEFTRIAXONE SODIUM 1000 MG: 1 INJECTION, POWDER, FOR SOLUTION INTRAMUSCULAR; INTRAVENOUS at 18:16

## 2022-12-20 RX ADMIN — RISPERIDONE 1 MG: 0.5 TABLET ORAL at 08:36

## 2022-12-20 RX ADMIN — CARBAMAZEPINE 300 MG: 300 CAPSULE, EXTENDED RELEASE ORAL at 08:36

## 2022-12-20 RX ADMIN — CARBAMAZEPINE 300 MG: 300 CAPSULE, EXTENDED RELEASE ORAL at 20:58

## 2022-12-20 RX ADMIN — AMLODIPINE BESYLATE 10 MG: 10 TABLET ORAL at 08:36

## 2022-12-20 RX ADMIN — SODIUM CHLORIDE, PRESERVATIVE FREE 10 ML: 5 INJECTION INTRAVENOUS at 08:41

## 2022-12-20 RX ADMIN — SODIUM CHLORIDE, PRESERVATIVE FREE 10 ML: 5 INJECTION INTRAVENOUS at 20:57

## 2022-12-20 RX ADMIN — LEVOTHYROXINE SODIUM 50 MCG: 0.05 TABLET ORAL at 06:46

## 2022-12-20 RX ADMIN — IPRATROPIUM BROMIDE AND ALBUTEROL SULFATE 3 ML: .5; 2.5 SOLUTION RESPIRATORY (INHALATION) at 07:33

## 2022-12-20 RX ADMIN — RISPERIDONE 1 MG: 0.5 TABLET ORAL at 20:55

## 2022-12-20 RX ADMIN — ATORVASTATIN CALCIUM 40 MG: 40 TABLET, FILM COATED ORAL at 08:36

## 2022-12-20 RX ADMIN — BUSPIRONE HYDROCHLORIDE 10 MG: 5 TABLET ORAL at 20:56

## 2022-12-20 RX ADMIN — MIRTAZAPINE 30 MG: 15 TABLET, FILM COATED ORAL at 20:55

## 2022-12-20 NOTE — ED NOTES
Turned 2L nasal cannula off to see how patient tolerates room air.      Adam Howard RN  12/19/22 1912

## 2022-12-20 NOTE — ED NOTES
ED TO INPATIENT SBAR HANDOFF    Patient Name: Rosemary Murphy   :  1960  63 y.o. MRN:  [id number]  Preferred Name    ED Room #:  ED15/ED-15  Family/Caregiver Present yes Leaving soon   Restraints no   Sitter no   Sepsis Risk Score Sepsis Risk Score: 1.72    Situation  Code Status: Full Code No additional code details. Allergies: Macrobid [nitrofurantoin]  Weight: No data found. Arrived from: home  Chief Complaint:   Chief Complaint   Patient presents with    Other     Multiple complaints - pt told home health nurse she was having seizures (none witnessed), also told home health nurse she was 89% o2 so home health gave breathing tx, EMS reports o2 saturation of 94%. Pt has hx of UTI as well. Hospital Problem/Diagnosis:  Principal Problem:    UTI (urinary tract infection)  Resolved Problems:    * No resolved hospital problems. *    Imaging:   CT HEAD WO CONTRAST   Preliminary Result   No acute intracranial abnormality. Encephalomalacia of the right cerebral hemisphere with compensatory   hypertrophy of the right lateral ventricle, unchanged from prior. XR CHEST PORTABLE   Preliminary Result   No acute cardiopulmonary process.            Abnormal labs:   Abnormal Labs Reviewed   CBC WITH AUTO DIFFERENTIAL - Abnormal; Notable for the following components:       Result Value    RBC 3.62 (*)     Hemoglobin 11.6 (*)     Hematocrit 36.7 (*)     .4 (*)     MCH 32.0 (*)     MCHC 31.6 (*)     Monocytes % 12.9 (*)     Immature Neutrophil % 1.1 (*)     All other components within normal limits   COMPREHENSIVE METABOLIC PANEL - Abnormal; Notable for the following components:    Sodium 146 (*)     Creatinine 0.4 (*)     Glucose 123 (*)     Albumin 3.3 (*)     ALT 5 (*)     AST 7 (*)     All other components within normal limits   URINALYSIS - Abnormal; Notable for the following components:    Clarity, UA SLIGHTLY CLOUDY (*)     Blood, Urine LARGE NUMBER OR AMOUNT OBSERVED (*)     Protein,  (*)     Nitrite Urine, Quantitative POSITIVE (*)     Leukocyte Esterase, Urine MODERATE NUMBER OR AMOUNT OBSERVED (*)     All other components within normal limits   BLOOD GAS, VENOUS - Abnormal; Notable for the following components:    pO2, Lan 181 (*)     HCO3, Venous 27.8 (*)     O2 Sat, Lan 95.3 (*)     All other components within normal limits   MICROSCOPIC URINALYSIS - Abnormal; Notable for the following components:    RBC,  (*)     WBC, UA 1463 (*)     Bacteria, UA OCCASIONAL (*)     Mucus, UA OCCASIONAL (*)     All other components within normal limits     Critical values: NA     Abnormal Assessment Findings: MR, cough, SPO2 90% on RA and requires O2    Background  History:   Past Medical History:   Diagnosis Date    Anxiety disorder     Back pain, chronic     Cerebral palsy (HCC)     [facility]     COVID-19 10/12/2021    CVA (cerebral infarction) 2014 x2    Diabetes mellitus ([facility]     Diverticulosis     Epilepsy (HCC)     GERD (gastroesophageal reflux disease)     Hyperlipidemia     Hypertension     Insomnia     Iron (Fe) deficiency anemia     Mental disability     Seizures (HCC)     Unspecified cerebral artery occlusion with cerebral infarction        Assessment    Vitals/MEWS: MEWS Score: 2  Level of Consciousness: Alert (0)   Vitals:    12/19/22 1930 12/19/22 2031 12/19/22 2103 12/19/22 2130   BP: (!) 145/70 (!) 142/67 (!) 154/74 (!) 158/77   Pulse: 67 68 71 69   Resp: 17 20 25 23   Temp: 98.6 °F (37 °C)   98.6 °F (37 °C)   TempSrc: Oral      SpO2: 99% 100% 93% 100%     FiO2 (%): nasal cannula for respiratory distress  O2 Flow Rate: O2 Device: Nasal cannula O2 Flow Rate (L/min): 2 L/min  Cardiac Rhythm:    Pain Assessment: NA [] Verbal [] Nick Bonus Scale  Pain Scale:    Last documented pain score (0-10 scale)    Last documented pain medication administered: NA  Mental Status: MR, Alert  NIH Score:    C-SSRS: Risk of Suicide: No Risk  Bedside swallow:    Jerome Coma Scale (GCS): Jerome Coma Scale  Eye Opening: Spontaneous  Best Verbal Response: Oriented  Best Motor Response: Obeys commands  Jerome Coma Scale Score: 15  Active LDA's:   Peripheral IV 12/19/22 Right Forearm (Active)   Site Assessment Clean, dry & intact 12/19/22 1530   Line Status Blood return noted;Normal saline locked;Specimen collected 12/19/22 1530   Phlebitis Assessment No symptoms 12/19/22 1530   Infiltration Assessment 0 12/19/22 1530   Alcohol Cap Used No 12/19/22 1530   Dressing Status New dressing applied 12/19/22 1530   Dressing Type Transparent 12/19/22 1530   Dressing Intervention New 12/19/22 1530     PO Status: Regular  Pertinent or High Risk Medications/Drips: no   o If Yes, please provide details: NA  Pending Blood Product Administration: no     You may also review the ED PT Care Timeline found under the Summary Nursing Index tab. Recommendation    Pending orders    Plan for Discharge (if known):    Additional Comments:    If any further questions, please call Sending RN at [address] [facility] [place].    Electronically signed by: Electronically signed by Jolie Ravi RN on 12/19/2022 at 9:50 PM      Anam De La Rosa RN  12/19/22 [address] RN  12/19/22 Yosi Espinosa RN  12/19/22 [id number]

## 2022-12-20 NOTE — ED PROVIDER NOTES
[address] ENCOUNTER        Pt Name: Bety Danielson  MRN: [id number]  [place] 1960  Date of evaluation: 12/19/2022  Provider: SRI Suarez  PCP: Yvan Hassan MD    KAREN. I have evaluated this patient. My supervising physician was available for consultation. Triage CHIEF COMPLAINT       Chief Complaint   Patient presents with    Other     Multiple complaints - pt told home health nurse she was having seizures (none witnessed), also told home health nurse she was 89% o2 so home health gave breathing tx, EMS reports o2 saturation of 94%. Pt has hx of UTI as well. HISTORY OF PRESENT ILLNESS      Chief Complaint: Malaise, weakness, hypoxia, foul-smelling urine    Bety Danielson is a 63 y.o. female who presents to the emergency department today via EMS with caretakers at bedside. Patient is coming from home and has 24/7 care because of developmental delay. They had noticed that the patient has had decreased activity, malaise, drowsiness. Had worsening cough, hypoxia. Had been placed on oxygen in the prehospital setting. They also noticed that patient has had foul-smelling urine, history of recurrent urinary tract infection. They also noticed that patient had \"staring episodes\". Has history of epilepsy, is on Depakote, has been taking medications routinely. Patient is alert, is awake and says name but conversation and/or situation unable to be discerned    Nursing Notes were all reviewed and agreed with or any disagreements were addressed in the HPI.     REVIEW OF SYSTEMS     Unable to obtain secondary to patient's mental status    PAST MEDICAL HISTORY     Past Medical History:   Diagnosis Date    Anxiety disorder     Back pain, chronic     Cerebral palsy (HCC)     COPD (chronic obstructive pulmonary disease) [facility]     COVID-19 10/12/2021    CVA (cerebral infarction) 2014 x2    Diabetes mellitus ([facility] Diverticulosis     Epilepsy ([facility]     GERD (gastroesophageal reflux disease)     Hyperlipidemia     Hypertension     Insomnia     Iron (Fe) deficiency anemia     Mental disability     Seizures (HCC)     Unspecified cerebral artery occlusion with cerebral infarction        SURGICAL HISTORY     Past Surgical History:   Procedure Laterality Date    GASTROSTOMY TUBE PLACEMENT         CURRENTMEDICATIONS       Previous Medications    ALBUTEROL SULFATE HFA (PROVENTIL;VENTOLIN;PROAIR) 108 (90 BASE) MCG/ACT INHALER    Inhale 2 puffs into the lungs in the morning, at noon, in the evening, and at bedtime    AMLODIPINE (NORVASC) 10 MG TABLET    TAKE 1 TABLET BY MOUTH DAILY    ATORVASTATIN (LIPITOR) 40 MG TABLET    TAKE 1 TABLET BY MOUTH DAILY    BLOOD GLUCOSE MONITOR STRIPS    Test 3 times a day & as needed for symptoms of irregular blood glucose. BUSPIRONE (BUSPAR) 10 MG TABLET    Take 1 tablet by mouth 2 times daily    CARBAMAZEPINE (CARBATROL) 300 MG EXTENDED RELEASE CAPSULE    TAKE 1 CAPSULE BY MOUTH TWICE A DAY    CETIRIZINE (ZYRTEC) 10 MG TABLET    TAKE 1 TABLET BY MOUTH DAILY    COMBIVENT RESPIMAT  MCG/ACT AERS INHALER    INHALE 1 PUFF BY ORAL INHALATION EVERY 6 HOURS    D3 HIGH POTENCY 50 MCG (2000 UT) CAPS    TAKE 1 CAPSULE BY MOUTH DAILY    DIAPERS & SUPPLIES MISC    1 each by Does not apply route daily as needed (4-5 times daily)    DIPHENHYDRAMINE (BENADRYL) 25 MG CAPSULE    Take 25 mg by mouth every 6 hours as needed for Itching    DISPOSABLE GLOVES MISC    1 Units by Does not apply route 4 times daily    DIVALPROEX (DEPAKOTE) 500 MG DR TABLET    Take 2 tablets by mouth in the morning and 2 tablets in the evening.     FEROSUL 325 (65 FE) MG TABLET    TAKE 1 TABLET BY MOUTH DAILY WITH BREAKFAST    FLUTICASONE (FLONASE) 50 MCG/ACT NASAL SPRAY    INSTILL 1 SPRAY INTO EACH NOSTRIL DAILY    INCONTINENCE SUPPLY DISPOSABLE (DEPEND UNDERWEAR LARGE/XL) MISC    1 Units by Does not apply route 4 times daily IPRATROPIUM-ALBUTEROL (DUONEB) 0.5-2.5 (3) MG/3ML SOLN NEBULIZER SOLUTION    Inhale 3 mLs into the lungs 3 times daily    IPRATROPIUM-ALBUTEROL (DUONEB) 0.5-2.5 (3) MG/3ML SOLN NEBULIZER SOLUTION    Inhale 3 mLs into the lungs every 4 hours    JANUVIA 100 MG TABLET    TAKE 1 TABLET BY MOUTH DAILY    LACTOBACILLUS (ACIDOPHILUS) CAPS CAPSULE    TAKE 1 CAPSULE BY MOUTH DAILY    LEVETIRACETAM (KEPPRA) 750 MG TABLET    Take 2 tablets by mouth 2 times daily    LEVOTHYROXINE (SYNTHROID) 50 MCG TABLET    Take 1 tablet by mouth Daily    LISINOPRIL (PRINIVIL;ZESTRIL) 40 MG TABLET    TAKE 1 TABLET BY MOUTH DAILY    METFORMIN (GLUCOPHAGE) 500 MG TABLET    Take 2 tablets by mouth daily (with breakfast)    MIRTAZAPINE (REMERON) 30 MG TABLET    TAKE 1 TABLET BY MOUTH NIGHTLY    NYSTATIN (MYCOSTATIN) 984022 UNIT/GM POWDER    Apply topically 4 times daily. under the abdominal folds for 2 wks    PANTOPRAZOLE (PROTONIX) 40 MG TABLET    Take 40 mg by mouth daily    POTASSIUM CHLORIDE (KLOR-CON M) 20 MEQ EXTENDED RELEASE TABLET    Take 1 tablet by mouth daily for 7 days    RESPIRATORY THERAPY SUPPLIES (NEBULIZER/TUBING/MOUTHPIECE) KIT    1 kit by Does not apply route daily    RISPERIDONE (RISPERDAL) 1 MG TABLET    Take 1 tablet by mouth 2 times daily Twice daily    SERTRALINE (ZOLOFT) 100 MG TABLET    Take 1.5 tablets by mouth nightly    TIOTROPIUM-OLODATEROL (STIOLTO) 2.5-2.5 MCG/ACT AERS    Inhale 2 puffs into the lungs daily    UNABLE TO FIND    hospital bed mattress XL twin       ALLERGIES     Macrobid [nitrofurantoin]    FAMILYHISTORY       Family History   Problem Relation Age of Onset    Breast Cancer Neg Hx         SOCIAL HISTORY       Social History     Socioeconomic History    Marital status: Single     Spouse name: None    Number of children: None    Years of education: None    Highest education level: None   Tobacco Use    Smoking status: Former     Packs/day: 1.50     Years: 20.00     Pack years: 30.00     Types: Cigarettes Quit date: 2011     Years since quittin.3    Smokeless tobacco: Never   Vaping Use    Vaping Use: Never used   Substance and Sexual Activity    Alcohol use: No     Alcohol/week: 0.0 standard drinks    Drug use: No   Social History Narrative    ** Merged History Encounter **          Social Determinants of Health     Financial Resource Strain: Low Risk     Difficulty of Paying Living Expenses: Not very hard   Food Insecurity: No Food Insecurity    Worried About Running Out of Food in the Last Year: Never true    Ran Out of Food in the Last Year: Never true   Transportation Needs: No Transportation Needs    Lack of Transportation (Medical): No    Lack of Transportation (Non-Medical): No   Physical Activity: Inactive    Days of Exercise per Week: 0 days    Minutes of Exercise per Session: 0 min   Stress: No Stress Concern Present    Feeling of Stress : Only a little   Social Connections: Socially Isolated    Frequency of Communication with Friends and Family: Three times a week    Frequency of Social Gatherings with Friends and Family: Three times a week    Attends [employer] Services: Never    Active Member of Clubs or Organizations: No    Attends Club or Organization Meetings: Never    Marital Status: Never    Housing Stability: Low Risk     Unable to Pay for Housing in the Last Year: No    Number of [place] in the Last Year: 1    Unstable Housing in the Last Year: No       SCREENINGS    Daisy Coma Scale  Eye Opening: Spontaneous  Best Verbal Response: Oriented  Best Motor Response: Obeys commands  Daisy Coma Scale Score: 15      PHYSICAL EXAM       ED Triage Vitals [22 1431]   BP Temp Temp Source Heart Rate Resp SpO2 Height Weight   138/68 98.3 °F (36.8 °C) Oral 69 13 91 % -- --      Constitutional:  Well developed, Well nourished. No distress  HENT:  Normocephalic, Atraumatic, PERRL. EOMI. Sclera clear. Conjunctiva normal, No discharge.   Oral pharynx moist, no tonsillar perjury, white exudate. No significant erythema. Neck/Lymphatics: supple, no JVD, no swollen nodes  Cardiovascular:   RRR,  no murmurs/rubs/gallops. Respiratory:   Nonlabored breathing. Normal breath sounds, No wheezing  Abdomen: Bowel sounds normal, Soft, No tenderness, no masses. Musculoskeletal:    There is no edema, asymmetry, or calf / thigh tenderness bilaterally. No cyanosis. No cool or pale-appearing limb. Distal cap refill and pulses intact bilateral upper and lower extremities  Bilateral upper and lower extremity ROM intact without pain or obvious deficit  Integument:   Warm, Dry  Neurologic:  Alert & oriented , No focal deficits noted. Cranial nerves II through XII grossly intact. Normal gross motor coordination & motor strength bilateral upper and lower extremities  Sensation intact.   Psychiatric:  Affect normal, Mood normal.     DIAGNOSTIC RESULTS   LABS:    Labs Reviewed   CBC WITH AUTO DIFFERENTIAL - Abnormal; Notable for the following components:       Result Value    RBC 3.62 (*)     Hemoglobin 11.6 (*)     Hematocrit 36.7 (*)     .4 (*)     MCH 32.0 (*)     MCHC 31.6 (*)     Monocytes % 12.9 (*)     Immature Neutrophil % 1.1 (*)     All other components within normal limits   COMPREHENSIVE METABOLIC PANEL - Abnormal; Notable for the following components:    Sodium 146 (*)     Creatinine 0.4 (*)     Glucose 123 (*)     Albumin 3.3 (*)     ALT 5 (*)     AST 7 (*)     All other components within normal limits   URINALYSIS - Abnormal; Notable for the following components:    Clarity, UA SLIGHTLY CLOUDY (*)     Blood, Urine LARGE NUMBER OR AMOUNT OBSERVED (*)     Protein,  (*)     Nitrite Urine, Quantitative POSITIVE (*)     Leukocyte Esterase, Urine MODERATE NUMBER OR AMOUNT OBSERVED (*)     All other components within normal limits   BLOOD GAS, VENOUS - Abnormal; Notable for the following components:    pO2, Lan 181 (*)     HCO3, Venous 27.8 (*)     O2 Sat, Lan 95.3 (*)     All other components within normal limits   MICROSCOPIC URINALYSIS - Abnormal; Notable for the following components:    RBC,  (*)     WBC, UA 1463 (*)     Bacteria, UA OCCASIONAL (*)     Mucus, UA OCCASIONAL (*)     All other components within normal limits   COVID-19, RAPID   RAPID INFLUENZA A/B ANTIGENS   RESPIRATORY PANEL, MOLECULAR, WITH COVID-19   CULTURE, URINE   TROPONIN   BRAIN NATRIURETIC PEPTIDE   LIPASE   MAGNESIUM   VALPROIC ACID LEVEL, TOTAL   LACTIC ACID   BASIC METABOLIC PANEL W/ REFLEX TO MG FOR LOW K   CBC WITH AUTO DIFFERENTIAL       When ordered, only abnormal lab results are displayed. All other labs were within normal range or not returned as of this dictation. EKG: When ordered, EKG's are interpreted by the Emergency Department Physician in the absence of a cardiologist.  Please see their note for interpretation of EKG. RADIOLOGY:   Non-plain film images such as CT, Ultrasound and MRI are read by the radiologist. Plain radiographic images are visualized and preliminarily interpreted by the  ED Provider with the below findings:    Interpretation perthe Radiologist below, if available at the time of this note:    CT HEAD WO CONTRAST   Preliminary Result   No acute intracranial abnormality. Encephalomalacia of the right cerebral hemisphere with compensatory   hypertrophy of the right lateral ventricle, unchanged from prior. XR CHEST PORTABLE   Preliminary Result   No acute cardiopulmonary process. CT HEAD WO CONTRAST    Result Date: 12/19/2022  EXAMINATION: CT OF THE HEAD WITHOUT CONTRAST  12/19/2022 4:17 pm TECHNIQUE: CT of the head was performed without the administration of intravenous contrast. Automated exposure control, iterative reconstruction, and/or weight based adjustment of the mA/kV was utilized to reduce the radiation dose to as low as reasonably achievable.  COMPARISON: 9/23/2021 HISTORY: ORDERING SYSTEM PROVIDED HISTORY: Episodes of \" staring off\" with history of epilepsy TECHNOLOGIST PROVIDED HISTORY: Reason for exam:->Episodes of \" staring off\" with history of epilepsy Has a \"code stroke\" or \"stroke alert\" been called? ->No Decision Support Exception - unselect if not a suspected or confirmed emergency medical condition->Emergency Medical Condition (MA) Reason for Exam: Episodes of \" staring off\" with history of epilepsy Initial evaluation FINDINGS: BRAIN/VENTRICLES: There has been prior surgery on both the left and right skull with the appearance of the milka holes. 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 This is similar in appearance to the previous study. There is atherosclerotic calcification of the cavernous carotids. No acute intracranial abnormality is detected. ORBITS: The visualized portion of the orbits demonstrate no acute abnormality. SINUSES: There is a mucous retention cyst or polyp in the right maxillary sinus. The remainder of the sinuses are clear. The mastoid air cells are clear. SOFT TISSUES/SKULL:  No acute abnormality of the visualized skull or soft tissues. No acute intracranial abnormality. Encephalomalacia of the right cerebral hemisphere with compensatory hypertrophy of the right lateral ventricle, unchanged from prior. XR CHEST PORTABLE    Result Date: 12/19/2022  EXAMINATION: ONE XRAY VIEW OF THE CHEST 12/19/2022 3:22 pm COMPARISON: 10/29/2022 HISTORY: ORDERING SYSTEM PROVIDED HISTORY: weakness, new o2 requirement TECHNOLOGIST PROVIDED HISTORY: Reason for exam:->weakness, new o2 requirement Reason for Exam: weakness Initial evaluation. FINDINGS: Monitor wires overlie the chest.  The trachea is midline. The cardiac silhouette is unremarkable. The lungs are clear without evidence of infiltrate, mass, or pneumothorax. There is no pleural fluid. No acute cardiopulmonary process.          PROCEDURES   Unless otherwise noted below, none     CRITICAL CARE   CRITICAL CARE NOTE:   N/A    CONSULTS:  None      EMERGENCY DEPARTMENT COURSE and MDM:   Vitals:    Vitals:    12/19/22 1902 12/19/22 1930 12/19/22 2031 12/19/22 2103   BP: 135/86 (!) 145/70 (!) 142/67 (!) 154/74   Pulse: 69 67 68 71   Resp: 22 17 20 25   Temp:  98.6 °F (37 °C)     TempSrc:  Oral     SpO2: 98% 99% 100% 93%       Patient was given thefollowing medications:  Medications   ondansetron (ZOFRAN) injection 4 mg (has no administration in time range)   0.9 % sodium chloride infusion ( IntraVENous New Bag 12/19/22 2004)   sodium chloride flush 0.9 % injection 5-40 mL (has no administration in time range)   sodium chloride flush 0.9 % injection 5-40 mL (has no administration in time range)   0.9 % sodium chloride infusion (has no administration in time range)   enoxaparin (LOVENOX) injection 40 mg (has no administration in time range)   ondansetron (ZOFRAN-ODT) disintegrating tablet 4 mg (has no administration in time range)     Or   ondansetron (ZOFRAN) injection 4 mg (has no administration in time range)   acetaminophen (TYLENOL) tablet 650 mg (has no administration in time range)     Or   acetaminophen (TYLENOL) suppository 650 mg (has no administration in time range)   polyethylene glycol (GLYCOLAX) packet 17 g (has no administration in time range)   cefTRIAXone (ROCEPHIN) 1,000 mg in dextrose 5 % 50 mL IVPB mini-bag (has no administration in time range)   0.9 % sodium chloride bolus (0 mLs IntraVENous Stopped 12/19/22 1732)   cefTRIAXone (ROCEPHIN) 1,000 mg in dextrose 5 % 50 mL IVPB mini-bag (0 mg IntraVENous Stopped 12/19/22 1944)         Is this patient to be included in the SEP-1 Core Measure due to severe sepsis or septic shock? No   Exclusion criteria - the patient is NOT to be included for SEP-1 Core Measure due to:  2+ SIRS criteria are not met    MDM:  Patient presents as above. Emergent etiologies considered. Patient seen and examined.   Work-up initiated secondary to presentation, physical exam findings, vital signs and medical chart review. In brief, 82-year-old patient with history of cerebral palsy, developmental delay is being sent in for malaise, drowsiness, \"staring episodes\", foul-smelling urine and new onset hypoxia, on 2 L nasal cannula. Has been noticed by caretakers that she has been having foul-smelling urine, has been drowsy sleepy all the time. General worsening cough. Has history of requiring oxygen but is not currently on any oxygenation, with placed on 2 L with oxygen levels in the high 80s, low 90s. Had a worsening cough. Overall his vital signs look well. He is 98% on oxygen on 2 L, no signs of respiratory distress. Abdomen is soft. No significant rash or erythema into the perineum. Broad work-up initiated secondary to patient's presentation    Patient does have regular EKG, chest x-ray, lab work otherwise reassuring. No significant white count, no metabolic or electrolyte abnormalities. Patient's cardiac testing within normal limits. Chest x-ray negative. CT scan of the head demonstrated no acute abnormality. Patient was taken off oxygen and went back down into the high 80s low 90s. Was also found to be have a urinary tract infection. Will be initiated on Rocephin. We will send a respiratory disease panel. Patient respiratory disease panel negative. We did have several trials taking patient off oxygen and patient repeatedly became hypoxic. Secondary to this as well as urinary tract infection and her general medical condition we will look to admit for further observation and management. Patient admitted to medicine. Patient otherwise remained stable. CLINICAL IMPRESSION      1. General weakness    2. Urinary tract infection with hematuria, site unspecified    3.  Hypoxia          DISPOSITION/PLAN   DISPOSITION Admitted 12/19/2022 09:32:25 PM      (Please note that portions ofthis note were completed with a voice recognition program.  Efforts were made to edit the dictations but occasionally words are mis-transcribed. )    SRI Chapin (electronically signed)            SRI Woo  12/19/22 [id number]

## 2022-12-20 NOTE — ED NOTES
Pt o2 saturation 90% on room air consistently - 2L nasal cannula replaced.      Shahana Becerra RN  12/19/22 9724

## 2022-12-20 NOTE — PLAN OF CARE
Problem: Discharge Planning  Goal: Discharge to home or other facility with appropriate resources  Outcome: Progressing  Flowsheets (Taken 12/20/2022 0818)  Discharge to home or other facility with appropriate resources:   Identify barriers to discharge with patient and caregiver   Arrange for needed discharge resources and transportation as appropriate   Identify discharge learning needs (meds, wound care, etc)   Refer to discharge planning if patient needs post-hospital services based on physician order or complex needs related to functional status, cognitive ability or social support system     Problem: Chronic Conditions and Co-morbidities  Goal: Patient's chronic conditions and co-morbidity symptoms are monitored and maintained or improved  Outcome: Progressing  Flowsheets (Taken 12/20/2022 0818)  Care Plan - Patient's Chronic Conditions and Co-Morbidity Symptoms are Monitored and Maintained or Improved:   Update acute care plan with appropriate goals if chronic or comorbid symptoms are exacerbated and prevent overall improvement and discharge   Collaborate with multidisciplinary team to address chronic and comorbid conditions and prevent exacerbation or deterioration   Monitor and assess patient's chronic conditions and comorbid symptoms for stability, deterioration, or improvement     Problem: Skin/Tissue Integrity  Goal: Absence of new skin breakdown  Description: 1. Monitor for areas of redness and/or skin breakdown  2. Assess vascular access sites hourly  3. Every 4-6 hours minimum:  Change oxygen saturation probe site  4. Every 4-6 hours:  If on nasal continuous positive airway pressure, respiratory therapy assess nares and determine need for appliance change or resting period.   Outcome: Progressing     Problem: Safety - Adult  Goal: Free from fall injury  Outcome: Progressing     Problem: ABCDS Injury Assessment  Goal: Absence of physical injury  Outcome: Progressing

## 2022-12-20 NOTE — H&P
V2.0  History and Physical      Name:  French Crigler /Age/Sex: 1960  (63 y.o. female)   MRN & CSN:  [id number] & [id number] Encounter Date/Time: 2022 9:40 PM EST   Location:  [facility] PCP: Uri Faria MD       Hospital Day: 1    Assessment and Plan:   French Crigler is a 63 y.o. female with a pmh of MRDD, lives at home with home health,  epilepsy who presents with UTI (urinary tract infection)    Hospital Problems             Last Modified POA    * (Principal) UTI (urinary tract infection) 2022 Yes       Questionable Acute hypoxic respiratory failure  -Hypoxic on room air on arrival as per ED provider  -unclear if she uses home O2  -Sats 100% on 2 LNC  -Patient appears to desat off oxygen as per ED  -resp panel neg  -does not meet SIRS criteria  -CXR equivocal on my read  -patient noted to maintain sats off O2 on my exam  -admit for obs    UTI  -Recent history of UTI with Streptococcus Gallolyticus, prescribed cephalexin for 7-day course  -UA positive in ED, C/o dysuria as per patient  -Started on IVF and broad-spectrum ABx by ED  -We will cover with ceftriaxone, awaiting cultures    H/o seizures  MRDD  -resume all home AEDs    Disposition:   Current Living situation: Home  Expected Disposition: Home  Estimated D/C: 1-2 day    Diet ADULT DIET; Regular; 5 carb choices (75 gm/meal); Low Fat/Low Chol/High Fiber/2 gm Na   DVT Prophylaxis [x] Lovenox, []  Heparin, [] SCDs, [] Ambulation,  [] Eliquis, [] Xarelto   Code Status Full Code   Surrogate Decision Maker/ POA unknown     History from:     electronic medical record    History of Present Illness:     Chief Complaint: UTI (urinary tract infection)  French Crigler is a 63 y.o. female  who presents with lethargy and failure to thrive. Patient lives at home with round-the-clock home health care. Patient complained to the nursing staff that she had a seizure. Patient was brought to ED.   In ED patient was noted to be hemodynamically stable except for mild hypoxia on room air which improved with 2 L O2 via nasal cannula. Lab work notable for positive UA. Patient was started on IVF and given ceftriaxone. Hospitalist service called for admission. As per the ED provider they tried weaning the patient off oxygen but noted the patient to become hypoxic (sats dropped to 90s). Patient seen and evaluated at bedside. Limited verbally. On my evaluation patient seemed to nod yes no to questions appropriately. Denies any active complaints however I feel it is unreliable due to patient's underlying condition. Unable to contact home health aid for corroborative information. Vitals, labs and imaging reviewed. Lab work grossly unremarkable. Respiratory panel negative. UA positive and patient appropriately started on antibiotics. Imaging with CT and CXR also negative for any acute pathology. I return of patient's nasal cannula and monitored her for 20-25 minutes and patient only dropped saturation to 93% and was able to maintain it. ED provider hesitant on discharging the patient home. Also patient's home health aid had been sent home and told that the patient will be admitted as per the nurse so she does not have anyone at home to take care of her. We will admit for observation. Review of Systems: Need 10 Elements   Review of Systems   Unable to perform ROS: Other     Objective:   No intake or output data in the 24 hours ending 12/19/22 2140   Vitals:   Vitals:    12/19/22 1902 12/19/22 1930 12/19/22 2031 12/19/22 2103   BP: 135/86 (!) 145/70 (!) 142/67 (!) 154/74   Pulse: 69 67 68 71   Resp: 22 17 20 25   Temp:  98.6 °F (37 °C)     TempSrc:  Oral     SpO2: 98% 99% 100% 93%       Medications Prior to Admission     Prior to Admission medications    Medication Sig Start Date End Date Taking?  Authorizing Provider   amLODIPine (NORVASC) 10 MG tablet TAKE 1 TABLET BY MOUTH DAILY 12/13/22   Cony Jacinto MD   Respiratory Therapy Supplies (NEBULIZER/TUBING/MOUTHPIECE) KIT 1 kit by Does not apply route daily 11/21/22   Arleth Diehl MD   divalproex (DEPAKOTE) 500 MG DR tablet Take 2 tablets by mouth in the morning and 2 tablets in the evening. 11/2/22   Khadijah Byrne MD   sertraline (ZOLOFT) 100 MG tablet Take 1.5 tablets by mouth nightly 11/2/22   Khadijah Byrne MD   metFORMIN (GLUCOPHAGE) 500 MG tablet Take 2 tablets by mouth daily (with breakfast) 11/2/22   Khadijah Byrne MD   risperiDONE (RISPERDAL) 1 MG tablet Take 1 tablet by mouth 2 times daily Twice daily 11/2/22   Khadijah Byrne MD   levothyroxine (SYNTHROID) 50 MCG tablet Take 1 tablet by mouth Daily 11/2/22   Khadijah Byrne MD   potassium chloride (KLOR-CON M) 20 MEQ extended release tablet Take 1 tablet by mouth daily for 7 days 11/2/22 11/9/22  Khadijah Byrne MD   pantoprazole (PROTONIX) 40 MG tablet Take 40 mg by mouth daily    Historical Provider, MD   albuterol sulfate HFA (PROVENTIL;VENTOLIN;PROAIR) 108 (90 Base) MCG/ACT inhaler Inhale 2 puffs into the lungs in the morning, at noon, in the evening, and at bedtime 10/10/22   Historical Provider, MD   ipratropium-albuterol (DUONEB) 0.5-2.5 (3) MG/3ML SOLN nebulizer solution Inhale 3 mLs into the lungs every 4 hours 9/26/22   Arleth Diehl MD   COMBIVENT RESPIMAT  MCG/ACT AERS inhaler INHALE 1 PUFF BY ORAL INHALATION EVERY 6 HOURS 8/29/22   Rashawn Ramírez MD   cetirizine (ZYRTEC) 10 MG tablet TAKE 1 TABLET BY MOUTH DAILY 8/2/22   Rashawn Ramírez MD   FEROSUL 325 (65 Fe) MG tablet TAKE 1 TABLET BY MOUTH DAILY WITH BREAKFAST 8/2/22   Rashawn Ramírez MD   nystatin (MYCOSTATIN) 941039 UNIT/GM powder Apply topically 4 times daily. under the abdominal folds for 2 wks 6/15/22   Mukul Grant MD   fluticasone John Jemima) 50 MCG/ACT nasal spray INSTILL 1 SPRAY INTO EACH NOSTRIL DAILY 5/24/22   Mukul Grant MD   D3 HIGH POTENCY 50 MCG (2000 UT) CAPS TAKE 1 CAPSULE BY MOUTH DAILY 5/5/22   Rashawn Ramírez MD   ipratropium-albuterol (DUONEB) 0.5-2.5 (3) MG/3ML SOLN nebulizer solution Inhale 3 mLs into the lungs 3 times daily 4/21/22   Jeryl Councilman, MD   UNABLE TO FIND hospital bed mattress XL twin 3/31/22   Lupe Gaytan MD   carBAMazepine (CARBATROL) 300 MG extended release capsule TAKE 1 CAPSULE BY MOUTH TWICE A DAY 1/20/22   Lupe Gaytan MD   Lactobacillus (ACIDOPHILUS) CAPS capsule TAKE 1 CAPSULE BY MOUTH DAILY 1/20/22   Lupe Gaytan MD   atorvastatin (LIPITOR) 40 MG tablet TAKE 1 TABLET BY MOUTH DAILY 12/17/21   Lupe Gaytan MD   levETIRAcetam (KEPPRA) 750 MG tablet Take 2 tablets by mouth 2 times daily 10/17/21   Alina Salas MD   tiotropium-olodaterol (STIOLTO) 2.5-2.5 MCG/ACT AERS Inhale 2 puffs into the lungs daily 9/26/21   Rizwan Forde MD   JANUVIA 100 MG tablet TAKE 1 TABLET BY MOUTH DAILY  Patient taking differently: 100 mg 2 times daily 3/17/21   Lupe Gaytan MD   lisinopril (PRINIVIL;ZESTRIL) 40 MG tablet TAKE 1 TABLET BY MOUTH DAILY 3/1/21   Lupe Gaytan MD   Diapers & Supplies MISC 1 each by Does not apply route daily as needed (4-5 times daily) 7/24/20   Lupe Gaytan MD   mirtazapine (REMERON) 30 MG tablet TAKE 1 TABLET BY MOUTH NIGHTLY 6/5/20   Lupe Gaytan MD   Incontinence Supply Disposable (DEPEND UNDERWEAR LARGE/XL) MISC 1 Units by Does not apply route 4 times daily 4/6/20   Lupe Gaytan MD   Disposable Gloves MISC 1 Units by Does not apply route 4 times daily 4/6/20   Lupe Gaytan MD   diphenhydrAMINE (BENADRYL) 25 MG capsule Take 25 mg by mouth every 6 hours as needed for Itching    Historical MD Oswald   blood glucose monitor strips Test 3 times a day & as needed for symptoms of irregular blood glucose. 12/26/18   Alan Farley MD   busPIRone (BUSPAR) 10 MG tablet Take 1 tablet by mouth 2 times daily 5/31/18   Lupe Gaytan MD       Physical Exam: Need 8 Elements   Physical Exam  Vitals and nursing note reviewed. Constitutional:       General: She is not in acute distress. Appearance: She is not ill-appearing. Comments: Middle-aged lady, no acute distress   HENT:      Head: Normocephalic and atraumatic. Nose: Nose normal.      Mouth/Throat:      Mouth: Mucous membranes are moist.   Eyes:      Extraocular Movements: Extraocular movements intact. Pupils: Pupils are equal, round, and reactive to light. Cardiovascular:      Rate and Rhythm: Normal rate and regular rhythm. Pulses: Normal pulses. Heart sounds: Normal heart sounds. Pulmonary:      Effort: Pulmonary effort is normal.      Breath sounds: Normal breath sounds. Abdominal:      Palpations: Abdomen is soft. Musculoskeletal:         General: Normal range of motion. Cervical back: Normal range of motion. Skin:     General: Skin is warm. Capillary Refill: Capillary refill takes less than 2 seconds. Neurological:      General: No focal deficit present. Mental Status: She is alert and oriented to person, place, and time. Comments: Upper and lower extremity contractures   Psychiatric:         Mood and Affect: Mood normal.         Behavior: Behavior normal.          Past History:   PMHx   Past Medical History:   Diagnosis Date    Anxiety disorder     Back pain, chronic     Cerebral palsy (HCC)     COPD (chronic obstructive pulmonary disease) ([facility] [address]     COVID-19 10/12/2021    CVA (cerebral infarction) 2014 x2    Diabetes mellitus ([facility] [address]     Diverticulosis     Epilepsy ([facility] [address]     GERD (gastroesophageal reflux disease)     Hyperlipidemia     Hypertension     Insomnia     Iron (Fe) deficiency anemia     Mental disability     Seizures (HCC)     Unspecified cerebral artery occlusion with cerebral infarction         PSHX:  has a past surgical history that includes Gastrostomy tube placement. Fam HX: family history is not on file.     Soc HX:   Social History     Socioeconomic History    Marital status: Single     Spouse name: None    Number of children: None    Years of education: None Highest education level: None   Tobacco Use    Smoking status: Former     Packs/day: 1.50     Years: 20.00     Pack years: 30.00     Types: Cigarettes     Quit date: 2011     Years since quittin.3    Smokeless tobacco: Never   Vaping Use    Vaping Use: Never used   Substance and Sexual Activity    Alcohol use: No     Alcohol/week: 0.0 standard drinks    Drug use: No   Social History Narrative    ** Merged History Encounter **          Social Determinants of Health     Financial Resource Strain: Low Risk     Difficulty of Paying Living Expenses: Not very hard   Food Insecurity: No Food Insecurity    Worried About Running Out of Food in the Last Year: Never true    Ran Out of Food in the Last Year: Never true   Transportation Needs: No Transportation Needs    Lack of Transportation (Medical): No    Lack of Transportation (Non-Medical): No   Physical Activity: Inactive    Days of Exercise per Week: 0 days    Minutes of Exercise per Session: 0 min   Stress: No Stress Concern Present    Feeling of Stress : Only a little   Social Connections: Socially Isolated    Frequency of Communication with Friends and Family: Three times a week    Frequency of Social Gatherings with Friends and Family: Three times a week    Attends Church Services: Never    Active Member of Clubs or Organizations: No    Attends Club or Organization Meetings: Never    Marital Status: Never    Housing Stability: Low Risk     Unable to Pay for Housing in the Last Year: No    Number of [place] in the Last Year: 1    Unstable Housing in the Last Year: No       Allergies: Allergies:    Allergies   Allergen Reactions    Macrobid [Nitrofurantoin] Hives and Swelling     Hives       Medications:   Medications:    sodium chloride flush  5-40 mL IntraVENous 2 times per day    [START ON 2022] enoxaparin  40 mg SubCUTAneous Daily    [START ON 2022] cefTRIAXone (ROCEPHIN) IV  1,000 mg IntraVENous Q24H      Infusions:    sodium chloride 100 mL/hr at 12/19/22 2004    sodium chloride       PRN Meds: ondansetron, 4 mg, Q30 Min PRN  sodium chloride flush, 5-40 mL, PRN  sodium chloride, , PRN  ondansetron, 4 mg, Q8H PRN   Or  ondansetron, 4 mg, Q6H PRN  acetaminophen, 650 mg, Q6H PRN   Or  acetaminophen, 650 mg, Q6H PRN  polyethylene glycol, 17 g, Daily PRN        Labs      CBC:   Recent Labs     12/19/22  1530   WBC 4.4   HGB 11.6*        BMP:    Recent Labs     12/19/22  1530   *   K 3.8      CO2 30   BUN 22   CREATININE 0.4*   GLUCOSE 123*     Hepatic:   Recent Labs     12/19/22  1530   AST 7*   ALT 5*   BILITOT 0.2   ALKPHOS 58     Lipids:   Lab Results   Component Value Date/Time    CHOL 223 12/06/2022 07:50 AM    HDL 46 12/06/2022 07:50 AM    TRIG 476 12/06/2022 07:50 AM     Hemoglobin A1C:   Lab Results   Component Value Date/Time    LABA1C 5.6 12/06/2022 07:50 AM     TSH: No results found for: TSH  Troponin:   Lab Results   Component Value Date/Time    TROPONINT <0.010 12/19/2022 03:30 PM    TROPONINT <0.010 10/29/2022 09:35 AM    TROPONINT <0.010 10/13/2022 11:15 AM     Lactic Acid: No results for input(s): LACTA in the last 72 hours.   BNP:   Recent Labs     12/19/22  1530   PROBNP 117.8     UA:  Lab Results   Component Value Date/Time    NITRU POSITIVE 12/19/2022 05:06 PM    COLORU YELLOW 12/19/2022 05:06 PM    PHUR 6 04/05/2019 03:53 PM    WBCUA 1463 12/19/2022 05:06 PM    RBCUA 144 12/19/2022 05:06 PM    MUCUS OCCASIONAL 12/19/2022 05:06 PM    TRICHOMONAS NONE SEEN 12/19/2022 05:06 PM    YEAST RARE 08/11/2018 04:20 PM    BACTERIA OCCASIONAL 12/19/2022 05:06 PM    CLARITYU SLIGHTLY CLOUDY 12/19/2022 05:06 PM    SPECGRAV >1.030 12/19/2022 05:06 PM    LEUKOCYTESUR MODERATE NUMBER OR AMOUNT OBSERVED 12/19/2022 05:06 PM    UROBILINOGEN 0.2 12/19/2022 05:06 PM    BILIRUBINUR NEGATIVE 12/19/2022 05:06 PM    BILIRUBINUR neg 04/05/2019 03:53 PM    BLOODU LARGE NUMBER OR AMOUNT OBSERVED 12/19/2022 05:06 PM    GLUCOSEU neg 04/05/2019 03:53 PM    KETUA NEGATIVE 12/19/2022 05:06 PM     Urine Cultures:   Lab Results   Component Value Date/Time    LABURIN  08/28/2018 01:17 PM     <10,000 CFU/ml mixed skin/urogenital jaycob. No further workup    LABURIN 50,000 CFU/ml  Multiple Resistant Drug Organism   08/28/2018 01:17 PM     Blood Cultures: No results found for: BC  No results found for: BLOODCULT2  Organism:   Lab Results   Component Value Date/Time    ORG ECOL 12/18/2018 07:49 AM       Imaging/Diagnostics Last 24 Hours   CT HEAD WO CONTRAST    Result Date: 12/19/2022  EXAMINATION: CT OF THE HEAD WITHOUT CONTRAST  12/19/2022 4:17 pm TECHNIQUE: CT of the head was performed without the administration of intravenous contrast. Automated exposure control, iterative reconstruction, and/or weight based adjustment of the mA/kV was utilized to reduce the radiation dose to as low as reasonably achievable. COMPARISON: 9/23/2021 HISTORY: ORDERING SYSTEM PROVIDED HISTORY: Episodes of \" staring off\" with history of epilepsy TECHNOLOGIST PROVIDED HISTORY: Reason for exam:->Episodes of \" staring off\" with history of epilepsy Has a \"code stroke\" or \"stroke alert\" been called? ->No Decision Support Exception - unselect if not a suspected or confirmed emergency medical condition->Emergency Medical Condition (MA) Reason for Exam: Episodes of \" staring off\" with history of epilepsy Initial evaluation FINDINGS: BRAIN/VENTRICLES: There has been prior surgery on both the left and right skull with the appearance of the milka holes. 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 This is similar in appearance to the previous study. There is atherosclerotic calcification of the cavernous carotids. No acute intracranial abnormality is detected. ORBITS: The visualized portion of the orbits demonstrate no acute abnormality.  SINUSES: There is a mucous retention cyst or polyp in the right maxillary sinus. The remainder of the sinuses are clear. The mastoid air cells are clear. SOFT TISSUES/SKULL:  No acute abnormality of the visualized skull or soft tissues. No acute intracranial abnormality. Encephalomalacia of the right cerebral hemisphere with compensatory hypertrophy of the right lateral ventricle, unchanged from prior. XR CHEST PORTABLE    Result Date: 12/19/2022  EXAMINATION: ONE XRAY VIEW OF THE CHEST 12/19/2022 3:22 pm COMPARISON: 10/29/2022 HISTORY: ORDERING SYSTEM PROVIDED HISTORY: weakness, new o2 requirement TECHNOLOGIST PROVIDED HISTORY: Reason for exam:->weakness, new o2 requirement Reason for Exam: weakness Initial evaluation. FINDINGS: Monitor wires overlie the chest.  The trachea is midline. The cardiac silhouette is unremarkable. The lungs are clear without evidence of infiltrate, mass, or pneumothorax. There is no pleural fluid. No acute cardiopulmonary process.        Personally reviewed Lab Studies, Imaging, and discussed case with patient    Electronically signed by Marla Cassidy MD on 12/19/2022 at 9:40 PM

## 2022-12-20 NOTE — PROGRESS NOTES
V2.0  [facility] [place] Hospitalist Progress Note      Name:  Rosemary Murphy /Age/Sex: 1960  (63 y.o. female)   MRN & CSN:  [id number] & [id number] Encounter Date/Time: 2022 8:09 AM EST    Location:  [address] [place] [address] PCP: Radha Goldman MD       Hospital Day: 2    Assessment and Plan:   Rosemary Murphy is a 63 y.o. female with pmh of Anxiety Disorder, Cerebral Palsy, COPD, Covid-19 10/29/22, CVA, DM, HTN, Hyperlipidemia, Iron Def Anemia, Epilepsy, GERD, UTI, MRDD who presents with UTI (urinary tract infection)      This patient was discussed with Dr. Ena Mcghee. He was agreeable with the plan and management as dictated above. Plan:    Acute on Chronic Hypoxic respiratory Failure:  Followed by Dr. Loida Alvarado outpatient for COPD, not currently on Home O2  -Hypoxic on room air on arrival as per ED provider, was 89% on RA per Home health  -not currently on home O2, but did use it during and after her Covid-19 infection Oct 2022  -Sats 96-98% on 3 LNC  -Patient appears to desat off oxygen as per ED  -Viral Resp panel neg  -does not meet SIRS criteria  -CXR negative, EKG normal, WBC 4.9, Hbg 10.4, CT-Head Negative, ABG, pH 7.38, pO2 181, HCO3 27.8, O2 Sat- 95.3 low   -will trend her CBC, Lactic Acid and Pro calcitonin again tomorrow am     E Coli UTI: sensitivity pending  -Recent history of UTI with Streptococcus Gallolyticus, prescribed cephalexin for 7-day course 22 for this   -UA positive in ED, C/o dysuria as per patient  -Started on IVF and broad-spectrum ABx by ED  -We will cover with ceftriaxone, Urine Cx positive for E Coli, sensitivity report to follow    COPD  -managed outpatient by Dr. Loida Alvarado  -cont her home Stiolto, DuoNebs TID and albuterol prn      H/o seizures  -MRDD  -resume Depakote DR 1,000 mg PO QD, carbamazepine  mg PO BID, Keppra 1,500 mg PO QD    DM  -holding oral meds  -insulin sliding scale    COPD  -cont Stioloto 2.5/2.5 inhaler 2 puffs QD  -DuoNebs TID     HTN  -cont lisinopril 40 mg PO QD    Hyperlipidemia  -cont atorvastatin 40 mg PO QD     Hypothyroid  -cont levothyroxine 50 mcg PO Qam    Anxiety/Depression  -cont sertraline 150 mg PO QHS, mirtazapine 30 mg PO QHS, risperidone 1 mg PO BID      Diet ADULT DIET; Regular; 5 carb choices (75 gm/meal); Low Fat/Low Chol/High Fiber/2 gm Na   DVT Prophylaxis [x] Lovenox, []  Heparin, [] SCDs, [] Ambulation,  [] Eliquis, [] Xarelto  [] Coumadin   Code Status Full Code   Disposition From: Home w/ Home Health Care  Expected Disposition: Home  Estimated Date of Discharge: 1-2 days   Patient requires continued admission due to Seizures, low O2 sats   Surrogate Decision Maker/ POA Legal Guardian      Subjective:     Chief Complaint: Other (Multiple complaints - pt told home health nurse she was having seizures (none witnessed), also told home health nurse she was 89% o2 so home health gave breathing tx, EMS reports o2 saturation of 94%. Pt has hx of UTI as well.)       Gracie Rainey is a 63 y.o. female who presents with Seizure Activity per patient (as reported by [address] [place] [address] Roland Reddy) and Hypoxia with O2 sats at home 89% and by EMS at 94%. Her CXR was negative, CT Head negative EKG negative, BNP and Troponins negative. Her UA did have positive nitrites, WBCs and RBCs, so she was started on Ceftriaxone 1,000 mg IV QD. Her Urine CX shows E Coli- sensitivity report to follow. She did have Covid-19 10/29/22 and was hospitalized for 4 days. Sees Dr. Pegn Sanchez outpatient for her COPD. She denies fevers, abdominal pain, chest pain or SOB, however wasn't able to get a detailed ROS from her due to her MRDD. Review of Systems:    Ten point ROS is negative, unless otherwise noted above. Objective:      Intake/Output Summary (Last 24 hours) at 12/20/2022 1533  Last data filed at 12/20/2022 0841  Gross per 24 hour   Intake 5 ml   Output --   Net 5 ml        Vitals:   Vitals:    12/20/22 0734 12/20/22 0735 12/20/22 0815 12/20/22 1453   BP:   116/69 (!) 112/51 Pulse: 68 71 71 66   Resp:   18 17   Temp:   98.4 °F (36.9 °C) 98.8 °F (37.1 °C)   TempSrc:   Oral Axillary   SpO2:  98% 96% 95%   Weight:       Height:          Physical Exam:     General: NAD  Eyes: EOMI  ENT: neck supple  Cardiovascular: Regular rate and rhythm, Normal S1/S2, no murmurs or gallops   Respiratory: Clear to auscultation bilaterally, no rales, rhonchi or wheezes, normal oxygenation on RA   Gastrointestinal: Soft, non tender, normal BS x 4  Genitourinary: no suprapubic tenderness  Musculoskeletal: No edema  Skin: warm, dry  Neuro: Unable to Assess due to MRDD barriers  Psych: Mood appropriate.      Medications:   Medications:    sodium chloride flush  5-40 mL IntraVENous 2 times per day    enoxaparin  40 mg SubCUTAneous Daily    cefTRIAXone (ROCEPHIN) IV  1,000 mg IntraVENous Q24H    amLODIPine  10 mg Oral Daily    atorvastatin  40 mg Oral Daily    busPIRone  10 mg Oral BID    carBAMazepine  300 mg Oral BID    divalproex  1,000 mg Oral BID    fluticasone  1 spray Each Nostril Daily    ipratropium-albuterol  1 vial Inhalation TID    levETIRAcetam  1,500 mg Oral BID    levothyroxine  50 mcg Oral Daily    lisinopril  40 mg Oral Daily    mirtazapine  30 mg Oral Nightly    pantoprazole  40 mg Oral Daily    risperiDONE  1 mg Oral BID    sertraline  150 mg Oral Nightly    tiotropium-olodaterol  2 puff Inhalation Daily    insulin lispro  0-16 Units SubCUTAneous TID WC    insulin lispro  0-4 Units SubCUTAneous Nightly      Infusions:    sodium chloride 100 mL/hr at 12/19/22 2004    sodium chloride      dextrose       PRN Meds: sodium chloride flush, 5-40 mL, PRN  sodium chloride, , PRN  ondansetron, 4 mg, Q8H PRN   Or  ondansetron, 4 mg, Q6H PRN  acetaminophen, 650 mg, Q6H PRN   Or  acetaminophen, 650 mg, Q6H PRN  polyethylene glycol, 17 g, Daily PRN  albuterol sulfate HFA, 2 puff, 4x Daily PRN  glucose, 4 tablet, PRN  dextrose bolus, 125 mL, PRN   Or  dextrose bolus, 250 mL, PRN  glucagon (rDNA), 1 mg, PRN  dextrose, , Continuous PRN      Labs      Recent Results (from the past 24 hour(s))   EKG 12 Lead    Collection Time: 12/19/22  4:31 PM   Result Value Ref Range    Ventricular Rate 62 BPM    Atrial Rate 62 BPM    P-R Interval 160 ms    QRS Duration 80 ms    Q-T Interval 430 ms    QTc Calculation (Bazett) 436 ms    P Axis 47 degrees    R Axis 26 degrees    T Axis 30 degrees    Diagnosis       Normal sinus rhythm  Normal ECG  When compared with ECG of 30-OCT-2022 06:37,  No significant change was found  Confirmed by TAYLOR Fernandes ([id number]) on 12/19/2022 8:10:47 PM     Urinalysis    Collection Time: 12/19/22  5:06 PM   Result Value Ref Range    Color, UA YELLOW YELLOW    Clarity, UA SLIGHTLY CLOUDY (A) CLEAR    Glucose, Urine NEGATIVE NEGATIVE MG/DL    Bilirubin Urine NEGATIVE NEGATIVE MG/DL    Ketones, Urine NEGATIVE NEGATIVE MG/DL    Specific Gravity, UA >1.030 1.001 - 1.035    Blood, Urine LARGE NUMBER OR AMOUNT OBSERVED (A) NEGATIVE    pH, Urine 6.0 5.0 - 8.0    Protein,  (A) NEGATIVE MG/DL    Urobilinogen, Urine 0.2 0.2 - 1.0 MG/DL    Nitrite Urine, Quantitative POSITIVE (A) NEGATIVE    Leukocyte Esterase, Urine MODERATE NUMBER OR AMOUNT OBSERVED (A) NEGATIVE   Culture, Urine    Collection Time: 12/19/22  5:06 PM    Specimen: Urine, clean catch   Result Value Ref Range    Specimen URINE CLEAN CATCH     Special Requests NONE     Culture Prelim Report     Culture (A)      ESCHERICHIA COLI >100,000 CFU/ml Sensitivity to follow   Microscopic Urinalysis    Collection Time: 12/19/22  5:06 PM   Result Value Ref Range    RBC,  (H) 0 - 6 /HPF    WBC, UA 1463 (H) 0 - 5 /HPF    Bacteria, UA OCCASIONAL (A) NEGATIVE /HPF    WBC Clumps, UA MANY /HPF    Mucus, UA OCCASIONAL (A) NEGATIVE HPF    Trichomonas, UA NONE SEEN NONE SEEN /HPF   COVID-19, Rapid    Collection Time: 12/19/22  6:07 PM    Specimen: Nasopharyngeal   Result Value Ref Range    Source UNKNOWN     SARS-CoV-2, NAAT NOT DETECTED NOT DETECTED Rapid Flu Swab    Collection Time: 12/19/22  6:07 PM    Specimen: Nasopharyngeal   Result Value Ref Range    Rapid Influenza A Ag NEGATIVE NEGATIVE    Rapid Influenza B Ag NEGATIVE NEGATIVE   Respiratory Panel, Molecular, with COVID-19 (Restricted: peds pts or suitable admitted adults)    Collection Time: 12/19/22  7:11 PM    Specimen: Nasopharyngeal   Result Value Ref Range    Adenovirus Detection by PCR NOT DETECTED NOT DETECTED    Coronavirus 229E PCR NOT DETECTED NOT DETECTED    Coronavirus HKU1 PCR NOT DETECTED NOT DETECTED    Coronavirus NL63 PCR NOT DETECTED NOT DETECTED    Coronavirus OC43 PCR NOT DETECTED NOT DETECTED    SARS-CoV-2 NOT DETECTED NOT DETECTED    Human Metapneumovirus PCR NOT DETECTED NOT DETECTED    Rhinovirus Enterovirus PCR NOT DETECTED NOT DETECTED    Influenza A by PCR NOT DETECTED NOT DETECTED    Influenza A H1 Pandemic PCR NOT DETECTED NOT DETECTED    Influenza A H1 (2009) PCR NOT DETECTED NOT DETECTED    Influenza A H3 PCR NOT DETECTED NOT DETECTED    Influenza B by PCR NOT DETECTED NOT DETECTED    Parainfluenza 1 PCR NOT DETECTED NOT DETECTED    Parainfluenza 2 PCR NOT DETECTED NOT DETECTED    Parainfluenza 3 PCR NOT DETECTED NOT DETECTED    Parainfluenza 4 PCR NOT DETECTED NOT DETECTED    RSV PCR NOT DETECTED NOT DETECTED    Bordetella parapertussis by PCR NOT DETECTED NOT DETECTED    B Pertussis by PCR NOT DETECTED NOT DETECTED    Chlamydophila Pneumonia PCR NOT DETECTED NOT DETECTED    Mycoplasma pneumo by PCR NOT DETECTED NOT DETECTED   POCT Glucose    Collection Time: 12/19/22 10:42 PM   Result Value Ref Range    POC Glucose 92 70 - 99 MG/DL   Basic Metabolic Panel w/ Reflex to MG    Collection Time: 12/20/22  4:55 AM   Result Value Ref Range    Sodium 148 (H) 135 - 145 MMOL/L    Potassium 4.0 3.5 - 5.1 MMOL/L    Chloride 112 (H) 99 - 110 mMol/L    CO2 28 21 - 32 MMOL/L    Anion Gap 8 4 - 16    BUN 12 6 - 23 MG/DL    Creatinine 0.4 (L) 0.6 - 1.1 MG/DL    Est, Glom Filt Rate >60 >60 mL/min/1.73m2    Glucose 104 (H) 70 - 99 MG/DL    Calcium 8.2 (L) 8.3 - 10.6 MG/DL   CBC with Auto Differential    Collection Time: 12/20/22  4:55 AM   Result Value Ref Range    WBC 4.9 4.0 - 10.5 K/CU MM    RBC 3.30 (L) 4.2 - 5.4 M/CU MM    Hemoglobin 10.4 (L) 12.5 - 16.0 GM/DL    Hematocrit 34.1 (L) 37 - 47 %    .3 (H) 78 - 100 FL    MCH 31.5 (H) 27 - 31 PG    MCHC 30.5 (L) 32.0 - 36.0 %    RDW 11.9 11.7 - 14.9 %    Platelets 452 560 - 257 K/CU MM    MPV 8.7 7.5 - 11.1 FL    Differential Type AUTOMATED DIFFERENTIAL     Segs Relative 43.4 36 - 66 %    Lymphocytes % 38.9 24 - 44 %    Monocytes % 14.9 (H) 0 - 4 %    Eosinophils % 0.6 0 - 3 %    Basophils % 0.6 0 - 1 %    Segs Absolute 2.1 K/CU MM    Lymphocytes Absolute 1.9 K/CU MM    Monocytes Absolute 0.7 K/CU MM    Eosinophils Absolute 0.0 K/CU MM    Basophils Absolute 0.0 K/CU MM    Nucleated RBC % 0.0 %    Total Nucleated RBC 0.0 K/CU MM    Total Immature Neutrophil 0.08 K/CU MM    Immature Neutrophil % 1.6 (H) 0 - 0.43 %   POCT Glucose    Collection Time: 12/20/22  6:51 AM   Result Value Ref Range    POC Glucose 115 (H) 70 - 99 MG/DL   POCT Glucose    Collection Time: 12/20/22 11:33 AM   Result Value Ref Range    POC Glucose 137 (H) 70 - 99 MG/DL        Imaging/Diagnostics Last 24 Hours   CT HEAD WO CONTRAST    Result Date: 12/19/2022  EXAMINATION: CT OF THE HEAD WITHOUT CONTRAST  12/19/2022 4:17 pm TECHNIQUE: CT of the head was performed without the administration of intravenous contrast. Automated exposure control, iterative reconstruction, and/or weight based adjustment of the mA/kV was utilized to reduce the radiation dose to as low as reasonably achievable. COMPARISON: 9/23/2021 HISTORY: ORDERING SYSTEM PROVIDED HISTORY: Episodes of \" staring off\" with history of epilepsy TECHNOLOGIST PROVIDED HISTORY: Reason for exam:->Episodes of \" staring off\" with history of epilepsy Has a \"code stroke\" or \"stroke alert\" been called? ->No Decision Support Exception - unselect if not a suspected or confirmed emergency medical condition->Emergency Medical Condition (MA) Reason for Exam: Episodes of \" staring off\" with history of epilepsy Initial evaluation FINDINGS: BRAIN/VENTRICLES: There has been prior surgery on both the left and right skull with the appearance of the milka holes. 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 This is similar in appearance to the previous study. There is atherosclerotic calcification of the cavernous carotids. No acute intracranial abnormality is detected. ORBITS: The visualized portion of the orbits demonstrate no acute abnormality. SINUSES: There is a mucous retention cyst or polyp in the right maxillary sinus. The remainder of the sinuses are clear. The mastoid air cells are clear. SOFT TISSUES/SKULL:  No acute abnormality of the visualized skull or soft tissues. No acute intracranial abnormality. Encephalomalacia of the right cerebral hemisphere with compensatory hypertrophy of the right lateral ventricle, unchanged from prior. XR CHEST PORTABLE    Result Date: 12/19/2022  EXAMINATION: ONE XRAY VIEW OF THE CHEST 12/19/2022 3:22 pm COMPARISON: 10/29/2022 HISTORY: ORDERING SYSTEM PROVIDED HISTORY: weakness, new o2 requirement TECHNOLOGIST PROVIDED HISTORY: Reason for exam:->weakness, new o2 requirement Reason for Exam: weakness Initial evaluation. FINDINGS: Monitor wires overlie the chest.  The trachea is midline. The cardiac silhouette is unremarkable. The lungs are clear without evidence of infiltrate, mass, or pneumothorax. There is no pleural fluid. No acute cardiopulmonary process.        Electronically signed by INOCENCIO Bird CNP on 12/20/2022 at 3:33 PM

## 2022-12-21 VITALS
SYSTOLIC BLOOD PRESSURE: 143 MMHG | BODY MASS INDEX: 27.71 KG/M2 | DIASTOLIC BLOOD PRESSURE: 58 MMHG | HEIGHT: 58 IN | HEART RATE: 63 BPM | RESPIRATION RATE: 16 BRPM | OXYGEN SATURATION: 93 % | WEIGHT: 132 LBS | TEMPERATURE: 98.4 F

## 2022-12-21 LAB
ANION GAP SERPL CALCULATED.3IONS-SCNC: 11 MMOL/L (ref 4–16)
BASOPHILS ABSOLUTE: 0 K/CU MM
BASOPHILS RELATIVE PERCENT: 0.5 % (ref 0–1)
BUN BLDV-MCNC: 10 MG/DL (ref 6–23)
CALCIUM SERPL-MCNC: 8.3 MG/DL (ref 8.3–10.6)
CHLORIDE BLD-SCNC: 109 MMOL/L (ref 99–110)
CO2: 27 MMOL/L (ref 21–32)
CREAT SERPL-MCNC: 0.4 MG/DL (ref 0.6–1.1)
CULTURE: ABNORMAL
CULTURE: ABNORMAL
DIFFERENTIAL TYPE: ABNORMAL
EOSINOPHILS ABSOLUTE: 0.1 K/CU MM
EOSINOPHILS RELATIVE PERCENT: 1.3 % (ref 0–3)
GFR SERPL CREATININE-BSD FRML MDRD: >60 ML/MIN/1.73M2
GLUCOSE BLD-MCNC: 102 MG/DL (ref 70–99)
GLUCOSE BLD-MCNC: 153 MG/DL (ref 70–99)
GLUCOSE BLD-MCNC: 89 MG/DL (ref 70–99)
GLUCOSE BLD-MCNC: 98 MG/DL (ref 70–99)
HCT VFR BLD CALC: 34.3 % (ref 37–47)
HEMOGLOBIN: 10.3 GM/DL (ref 12.5–16)
IMMATURE NEUTROPHIL %: 2.1 % (ref 0–0.43)
LACTATE: 0.6 MMOL/L (ref 0.5–1.9)
LYMPHOCYTES ABSOLUTE: 1.6 K/CU MM
LYMPHOCYTES RELATIVE PERCENT: 42.1 % (ref 24–44)
Lab: ABNORMAL
MCH RBC QN AUTO: 31.2 PG (ref 27–31)
MCHC RBC AUTO-ENTMCNC: 30 % (ref 32–36)
MCV RBC AUTO: 103.9 FL (ref 78–100)
MONOCYTES ABSOLUTE: 0.4 K/CU MM
MONOCYTES RELATIVE PERCENT: 11.4 % (ref 0–4)
NUCLEATED RBC %: 0 %
PDW BLD-RTO: 11.9 % (ref 11.7–14.9)
PLATELET # BLD: 183 K/CU MM (ref 140–440)
PMV BLD AUTO: 9 FL (ref 7.5–11.1)
POTASSIUM SERPL-SCNC: 4.2 MMOL/L (ref 3.5–5.1)
PROCALCITONIN: 0.03
RBC # BLD: 3.3 M/CU MM (ref 4.2–5.4)
SEGMENTED NEUTROPHILS ABSOLUTE COUNT: 1.7 K/CU MM
SEGMENTED NEUTROPHILS RELATIVE PERCENT: 42.6 % (ref 36–66)
SODIUM BLD-SCNC: 147 MMOL/L (ref 135–145)
SPECIMEN: ABNORMAL
TOTAL IMMATURE NEUTOROPHIL: 0.08 K/CU MM
TOTAL NUCLEATED RBC: 0 K/CU MM
WBC # BLD: 3.9 K/CU MM (ref 4–10.5)

## 2022-12-21 PROCEDURE — 2700000000 HC OXYGEN THERAPY PER DAY

## 2022-12-21 PROCEDURE — 6370000000 HC RX 637 (ALT 250 FOR IP): Performed by: STUDENT IN AN ORGANIZED HEALTH CARE EDUCATION/TRAINING PROGRAM

## 2022-12-21 PROCEDURE — 94640 AIRWAY INHALATION TREATMENT: CPT

## 2022-12-21 PROCEDURE — 96361 HYDRATE IV INFUSION ADD-ON: CPT

## 2022-12-21 PROCEDURE — G0378 HOSPITAL OBSERVATION PER HR: HCPCS

## 2022-12-21 PROCEDURE — 92610 EVALUATE SWALLOWING FUNCTION: CPT

## 2022-12-21 PROCEDURE — 36415 COLL VENOUS BLD VENIPUNCTURE: CPT

## 2022-12-21 PROCEDURE — 85025 COMPLETE CBC W/AUTO DIFF WBC: CPT

## 2022-12-21 PROCEDURE — 84145 PROCALCITONIN (PCT): CPT

## 2022-12-21 PROCEDURE — 96372 THER/PROPH/DIAG INJ SC/IM: CPT

## 2022-12-21 PROCEDURE — 1200000000 HC SEMI PRIVATE

## 2022-12-21 PROCEDURE — 82962 GLUCOSE BLOOD TEST: CPT

## 2022-12-21 PROCEDURE — 94761 N-INVAS EAR/PLS OXIMETRY MLT: CPT

## 2022-12-21 PROCEDURE — 6360000002 HC RX W HCPCS: Performed by: STUDENT IN AN ORGANIZED HEALTH CARE EDUCATION/TRAINING PROGRAM

## 2022-12-21 PROCEDURE — 83605 ASSAY OF LACTIC ACID: CPT

## 2022-12-21 PROCEDURE — 80048 BASIC METABOLIC PNL TOTAL CA: CPT

## 2022-12-21 RX ORDER — ALBUTEROL SULFATE 90 UG/1
2 AEROSOL, METERED RESPIRATORY (INHALATION) EVERY 4 HOURS PRN
Qty: 18 G | Refills: 1 | Status: SHIPPED | OUTPATIENT
Start: 2022-12-21

## 2022-12-21 RX ORDER — CEPHALEXIN 500 MG/1
500 CAPSULE ORAL 3 TIMES DAILY
Qty: 21 CAPSULE | Refills: 0 | Status: SHIPPED | OUTPATIENT
Start: 2022-12-21 | End: 2022-12-28

## 2022-12-21 RX ADMIN — BUSPIRONE HYDROCHLORIDE 10 MG: 5 TABLET ORAL at 09:19

## 2022-12-21 RX ADMIN — ENOXAPARIN SODIUM 40 MG: 100 INJECTION SUBCUTANEOUS at 09:20

## 2022-12-21 RX ADMIN — LEVOTHYROXINE SODIUM 50 MCG: 0.05 TABLET ORAL at 05:26

## 2022-12-21 RX ADMIN — IPRATROPIUM BROMIDE AND ALBUTEROL SULFATE 3 ML: .5; 2.5 SOLUTION RESPIRATORY (INHALATION) at 15:02

## 2022-12-21 RX ADMIN — FLUTICASONE PROPIONATE 1 SPRAY: 50 SPRAY, METERED NASAL at 09:24

## 2022-12-21 RX ADMIN — LISINOPRIL 40 MG: 20 TABLET ORAL at 09:19

## 2022-12-21 RX ADMIN — PANTOPRAZOLE SODIUM 40 MG: 40 TABLET, DELAYED RELEASE ORAL at 05:26

## 2022-12-21 RX ADMIN — TIOTROPIUM BROMIDE AND OLODATEROL 2 PUFF: 3.124; 2.736 SPRAY, METERED RESPIRATORY (INHALATION) at 07:52

## 2022-12-21 RX ADMIN — RISPERIDONE 1 MG: 0.5 TABLET ORAL at 09:18

## 2022-12-21 RX ADMIN — ATORVASTATIN CALCIUM 40 MG: 40 TABLET, FILM COATED ORAL at 09:19

## 2022-12-21 RX ADMIN — AMLODIPINE BESYLATE 10 MG: 10 TABLET ORAL at 09:19

## 2022-12-21 RX ADMIN — DIVALPROEX SODIUM 1000 MG: 500 TABLET, DELAYED RELEASE ORAL at 09:18

## 2022-12-21 RX ADMIN — IPRATROPIUM BROMIDE AND ALBUTEROL SULFATE 3 ML: .5; 2.5 SOLUTION RESPIRATORY (INHALATION) at 07:47

## 2022-12-21 RX ADMIN — LEVETIRACETAM 1500 MG: 500 TABLET, FILM COATED ORAL at 09:18

## 2022-12-21 RX ADMIN — CARBAMAZEPINE 300 MG: 300 CAPSULE, EXTENDED RELEASE ORAL at 09:24

## 2022-12-21 NOTE — PROGRESS NOTES
Physician Progress Note      PATIENT:               Samantha Anna  [facility] #:                  [id number]  :                       1960  ADMIT DATE:       2022 2:28 PM  [address] [place] DATE:  RESPONDING  PROVIDER #:        Fransisco Fernandes MD          QUERY TEXT:    Patient admitted with \"Questionable Acute hypoxic respiratory failure\" on H&P. In order to support the diagnosis of acute respiratory failure, please   include additional clinical indicators in your documentation. Or please   document if the diagnosis of acute respiratory failure has been ruled out   after further study. The medical record reflects the following:  Risk Factors: COPD  Clinical Indicators: Sat 89% prior to arrival, 91% in ED. No work of   breathing, labored breathing, or dyspnea documented. 91% on RA  Treatment: continuous pulse oximetry, O@ 2-3 L nc, inhalers    Acute Respiratory Failure Clinical Indicators per  MS-DRG Training Guide and   Quick Reference Guide:  pO2 < 60 mmHg or SpO2 (pulse oximetry) < 91% breathing room air  pCO2 > 50 and pH < 7.35  P/F ratio (pO2 / FIO2) < 300  pO2 decrease or pCO2 increase by 10 mmHg from baseline (if known)  Supplemental oxygen of 40% or more  Presence of respiratory distress, tachypnea, dyspnea, shortness of breath,   wheezing  Unable to speak in complete sentences  Use of accessory muscles to breathe  Extreme anxiety and feeling of impending doom  Tripod position  Confusion/altered mental status/obtunded    MELVIN ForteN, RN, [facility]  Clinical   [phone]  Options provided:  -- Acute Respiratory Failure as evidenced by, Please document evidence.   -- Acute Respiratory Failure ruled out after study  -- Acute Respiratory Failure ruled out after study and Chronic Respiratory   Failure confirmed  -- Other - I will add my own diagnosis  -- Disagree - Not applicable / Not valid  -- Disagree - Clinically unable to determine / Unknown  -- Refer to Clinical Documentation Reviewer    PROVIDER RESPONSE TEXT:    This patient is in acute respiratory failure as evidenced by 3L oxygen   required while baseline is 2L    Query created by: Kaela Driver on 12/21/2022 1:32 PM      Electronically signed by:  Derick Fleming MD 12/21/2022 2:49 PM

## 2022-12-21 NOTE — CARE COORDINATION
TC to [facility]'s  through [facility]. She states pt has full home 02 set up via [facility] at home but had not been using it recently because upon discharge from hospital on last visit they were not told to keep her on it. Advised that at this time pt will need home 02 upon discharge and home 02 eval was cancelled as pt already has home 02. Pt will require stretcher home, scripts for Keflex needs to be provided to pt prior to transport to take home to begin tonight and discharge orders need to be faxed to [phone]. [facility] needs notified when transport (anytime after 5pm) can be arranged so she can ensure staff is at the home. She can be reached at [phone]. [phone]'s [facility] and left  for her rep Cloretta Chambers. Faxed AVS to [phone].   Faxed AVS to [address] [place] [address] [phone]    Set up transport via [facility] for 5pm.

## 2022-12-21 NOTE — PROGRESS NOTES
Speech Language Pathology  Facility/Department: [facility] 4E   CLINICAL BEDSIDE SWALLOW EVALUATION    NAME: Colleen Goodrich  : 1960  MRN: [id number]    ADMISSION DATE: 2022  ADMITTING DIAGNOSIS: has Pneumonia due to infectious organism; HTN (hypertension), benign; Seizure disorder ([address] Altered mental status; Chest pain; MR (mental retardation); COPD exacerbation ([address] Left hemiplegia ([address] H/O: stroke; Mixed hyperlipidemia; Acquired hypothyroidism; Sepsis ([address] Acute bronchitis; Acute respiratory failure with hypoxia ([address] Acute respiratory failure ([address] Anxiety and depression; Anemia; Controlled type 2 diabetes mellitus without complication, without long-term current use of insulin ([address] Diverticulosis; Lesion of right native kidney; Black stool; Hypoxia; COVID-19 virus infection; Other cerebral palsy ([address] Chronic respiratory failure ([address] COVID-19; Abnormal uterine and vaginal bleeding, unspecified; Bradyarrhythmia; Dyskinesia; Hemiplegia affecting left nondominant side ([address] Insomnia; Intermittent explosive disorder; Intraparenchymal hemorrhage of brain ([address] Iron deficiency anemia, unspecified; Organic personality syndrome; Subdural hematoma; Urinary incontinence; SOB (shortness of breath); and UTI (urinary tract infection) on their problem list.    Impressions: Colleen Goodrich was seen for a bedside swallowing evaluation after being admitted to [facility] with acute respiratory failure and possible UTI. Pt was alert and cooperative throughout assessment. Relevant medical hx includes seizure disorder, MRDD, GERD, pneumonia and COVID-19. Pt denies hx of dysphagia PTA. Pt was positioned upright in bed and presented with a pressed vocal quality and strong volitional cough. Oral mechanism examination indicated reduced labial/lingual strength and coordination. PO trials of puree, soft/regular solids and thin liquids by cup/straw sips were given.   Mild-moderate oral dysphagia was observed characterized by intermittent anterior loss of liquids, prolonged mastication and lingual residue with trials of regular solids. Suspect mild-moderate pharyngeal dysphagia characterized by delayed swallow initiation and reduced laryngeal elevation. Pt demonstrated an immediate cough with thin liquids by straw sips x1. Clear vocal quality and 0 overt s/s of aspiration were observed with trials of thin liquids by cup sips, puree and soft solids. Recommend minced and moist solids/thin liquids by cup sips with strict aspiration precautions. SLP will continue to follow. ONSET DATE: this admission     Date of Eval: 12/21/2022  Evaluating Therapist: ZAIN Jean-Baptiste    Current Diet level:  Current Diet : Regular      Primary Complaint       Pain:  Pain Assessment  Pain Assessment: None - Denies Pain    Reason for Referral  Oneida Henry was referred for a bedside swallow evaluation to assess the efficiency of her swallow function, identify signs and symptoms of aspiration and make recommendations regarding safe dietary consistencies, effective compensatory strategies, and safe eating environment. Impression  Dysphagia Diagnosis: Mild to moderate oral stage dysphagia;Mild to moderate pharyngeal stage dysphagia  Dysphagia Outcome Severity Scale: Level 4: Mild moderate dysphagia- Intermittent supervision/cueing. One - two diet consistencies restricted     Treatment Plan  Requires SLP Intervention: Yes  Duration of Treatment: 1-2xs weekly  D/C Recommendations: To be determined       Recommended Diet and Intervention        Recommended Form of Meds: Meds in puree     Therapeutic Interventions: Diet tolerance monitoring; Therapeutic PO trials with SLP    Compensatory Swallowing Strategies  Compensatory Swallowing Strategies : Upright as possible for all oral intake;Eat/Feed slowly; Small bites/sips    Treatment/Goals  Short-term Goals  Timeframe for Short-term Goals: LOS or until goals are met  Goal 1: Pt will tolerate minced and moist solids/thin liquids by cup sips without clinical evidence of aspiration 100%  Goal 2: Pt/caregivers will demonstrate comprehension of recommendations/POC    General  Chart Reviewed: Yes  Behavior/Cognition: Alert; Cooperative  O2 Device: None (Room air)  Communication Observation: Functional  Follows Directions: Simple  Dentition: Some missing teeth  Patient Positioning: Upright in bed  Baseline Vocal Quality: Normal  Volitional Cough: Strong  Volitional Swallow: Delayed  Prior Dysphagia History: hx of minced/moist thins  Consistencies Administered: Regular;Minced and Moist;Pureed; Thin - cup; Thin - straw; Thin - teaspoon           Vision/Hearing  Vision  Vision: Within Functional Limits  Hearing  Hearing: Within functional limits    Oral Motor Deficits       Oral Phase Dysfunction  Oral Phase  Oral Phase: Exceptions     Indicators of Pharyngeal Phase Dysfunction        Prognosis  Individuals consulted  Consulted and agree with results and recommendations: Patient;RN    Education  Patient Education: recommendations/POC  Patient Education Response: Verbalizes understanding  Safety Devices in place: Yes  Type of devices:  All fall risk precautions in place       Therapy Time  SLP Individual Minutes  Time In: 1130  Time Out: 1210  Minutes: [address] [facility], [facility] [address], [address] [facility], [place], operated [facility], 12/21/2022

## 2022-12-21 NOTE — PROGRESS NOTES
Patient is discharging from unit, this nurse removed patients IV, report was called to [facility] WILLIAM at the [facility], report given to [facility], patient transported by [facility]

## 2022-12-21 NOTE — PLAN OF CARE
Problem: Discharge Planning  Goal: Discharge to home or other facility with appropriate resources  12/21/2022 0930 by Rayo Rebolledo RN  Outcome: Progressing  12/21/2022 0440 by Woody Mcqueen RN  Outcome: Progressing

## 2022-12-21 NOTE — DISCHARGE INSTRUCTIONS
Nolvia Mccormick is to resume using home oxygen via nasal canula continuously. She can resume all normal activities on Monday 12/26/22.

## 2022-12-21 NOTE — DISCHARGE SUMMARY
V2.0  Discharge Summary    Name:  Tracy Law /Age/Sex: 1960 (63 y.o. female)   Admit Date: 2022  Discharge Date: 22    MRN & CSN:  [id number] & [id number] Encounter Date and Time 22 1:49 PM EST    Attending:  Kristen Georges MD Discharging Provider: INOCENCIO Castañeda - CNP       Hospital Course:     Brief HPI: Tracy Law is a 63 y.o. female who presented with Acute Respiratory Failure and Acute Cystitis    Brief Problem Based Course:     Acute on Chronic Hypoxic respiratory Failure: symptoms resolved.  Followed by Dr. Yovani Erwin outpatient for COPD, on Home O2 2 L/NC  -Hypoxic on room air on arrival as per ED provider, was 89% on RA per Home health her NC had fallen off her face all night and she had a sore throat and swollen nodes in her neck with some mild wheezing  -is on Home O2 2 L/nc since her Covid-19 infection Oct 2022  -Sats 96-98% on 2 LNC  -Viral Resp Panel neg, may have had some other viral infection  -does not meet SIRS criteria  -CXR negative, EKG normal, WBC 4.9, Hbg 10.4, CT-Head Negative, ABG, pH 7.38, pO2 181, HCO3 27.8, O2 Sat- 95.3 low   -will trend her CBC, Lactic Acid and Pro calcitonin again tomorrow am  -to cont Home Oxygen via NC continuously once at home     E Coli UTI: sensitivity showing MDRO  -Recent history of UTI with Streptococcus Gallolyticus, prescribed cephalexin for 7-day course 22 for this   -UA positive in ED, C/o dysuria as per patient now resolved  -We will cover with ceftriaxone, Urine Cx positive for E Coli, resistant to Cipro/Levaquin/Bactrim, upon DC will take Rx of Keflex 500 mg PO TID for 7 days, to follow up with PCP in 2 weeks for recheck     COPD  -managed outpatient by Dr. Yovani Erwin  -cont her home Stiolto, DuoNebs TID and albuterol prn      H/o seizures  -MRDD  -resume Depakote DR 1,000 mg PO QD, carbamazepine  mg PO BID, Keppra 1,500 mg PO QD     DM  -cont home oral meds- Januvia and Meformin 1000 mg PO QAM  -stopping insulin sliding scale     COPD  -cont Stioloto 2.5/2.5 inhaler 2 puffs QD  -DuoNebs Q4 hours prn and albuterol 2 puffs Q4 hours prn      HTN  -cont lisinopril 40 mg PO QD and amlodipine 10 mg PO QD     Hyperlipidemia  -cont atorvastatin 40 mg PO QD      Hypothyroid  -cont levothyroxine 50 mcg PO Qam     Anxiety/Depression  -cont sertraline 150 mg PO QHS, mirtazapine 30 mg PO QHS, risperidone 1 mg PO BID    This patient was seen and examined and/or discussed in conjunction with Dr. Chelsea Lara. He was agreeable with the patient's plan at discharge as dictated above. The patient expressed appropriate understanding of, and agreement with the discharge recommendations, medications, and plan.      Consults this admission:  None    Discharge Diagnosis:   UTI (urinary tract infection) - sending home on oral Keflex 500 mg PO TID x 7 days     Acute on Chronic Respiratory Failure - now resolved     Discharge Instruction:   Follow up appointments: PCP in 2 weeks, Pulmonology as scheduled- Dr. Shaista Canales  Primary care physician: Bridger Dowd MD within 2 weeks  Diet: diabetic diet   Activity: activity as tolerated  Disposition: Discharged to:   [x]Home, []C, []SNF, []Acute Rehab, []Hospice n/a  Condition on discharge: Stable  Labs and Tests to be Followed up as an outpatient by PCP or Specialist: Dr. Shaista Canales as scheduled outpatient     Discharge Medications:        Medication List        START taking these medications      cephALEXin 500 MG capsule  Commonly known as: Keflex  Take 1 capsule by mouth 3 times daily for 7 days Urinary Tract Infection            CHANGE how you take these medications      albuterol sulfate  (90 Base) MCG/ACT inhaler  Commonly known as: PROVENTIL;VENTOLIN;PROAIR  Inhale 2 puffs into the lungs every 4 hours as needed for Shortness of Breath  What changed:   when to take this  reasons to take this     ipratropium-albuterol 0.5-2.5 (3) MG/3ML Soln nebulizer solution  Commonly known as: DUONEB  Inhale 3 mLs into the lungs every 4 hours  What changed: Another medication with the same name was removed. Continue taking this medication, and follow the directions you see here. Januvia 100 MG tablet  Generic drug: SITagliptin  TAKE 1 TABLET BY MOUTH DAILY  What changed: See the new instructions. CONTINUE taking these medications      acidophilus Caps capsule  TAKE 1 CAPSULE BY MOUTH DAILY     amLODIPine 10 MG tablet  Commonly known as: NORVASC  TAKE 1 TABLET BY MOUTH DAILY     atorvastatin 40 MG tablet  Commonly known as: LIPITOR  TAKE 1 TABLET BY MOUTH DAILY     blood glucose test strips  Test 3 times a day & as needed for symptoms of irregular blood glucose. busPIRone 10 MG tablet  Commonly known as: BUSPAR  Take 1 tablet by mouth 2 times daily     carBAMazepine 300 MG extended release capsule  Commonly known as: CARBATROL  TAKE 1 CAPSULE BY MOUTH TWICE A DAY     cetirizine 10 MG tablet  Commonly known as: ZYRTEC  TAKE 1 TABLET BY MOUTH DAILY     D3 High Potency 50 MCG (2000 UT) Caps  Generic drug: Cholecalciferol  TAKE 1 CAPSULE BY MOUTH DAILY     Depend Underwear Large/XL Misc  1 Units by Does not apply route 4 times daily     Diapers & Supplies Misc  1 each by Does not apply route daily as needed (4-5 times daily)     diphenhydrAMINE 25 MG capsule  Commonly known as: BENADRYL     Disposable Gloves Misc  1 Units by Does not apply route 4 times daily     divalproex 500 MG DR tablet  Commonly known as: DEPAKOTE  Take 2 tablets by mouth in the morning and 2 tablets in the evening.      FeroSul 325 (65 Fe) MG tablet  Generic drug: ferrous sulfate  TAKE 1 TABLET BY MOUTH DAILY WITH BREAKFAST     fluticasone 50 MCG/ACT nasal spray  Commonly known as: FLONASE  INSTILL 1 SPRAY INTO EACH NOSTRIL DAILY     levETIRAcetam 750 MG tablet  Commonly known as: KEPPRA  Take 2 tablets by mouth 2 times daily     levothyroxine 50 MCG tablet  Commonly known as: SYNTHROID  Take 1 tablet by mouth Daily lisinopril 40 MG tablet  Commonly known as: PRINIVIL;ZESTRIL  TAKE 1 TABLET BY MOUTH DAILY     metFORMIN 500 MG tablet  Commonly known as: Glucophage  Take 2 tablets by mouth daily (with breakfast)     mirtazapine 30 MG tablet  Commonly known as: REMERON  TAKE 1 TABLET BY MOUTH NIGHTLY     Nebulizer/Tubing/Mouthpiece Kit  1 kit by Does not apply route daily     nystatin 611118 UNIT/GM powder  Commonly known as: MYCOSTATIN  Apply topically 4 times daily. under the abdominal folds for 2 wks     pantoprazole 40 MG tablet  Commonly known as: PROTONIX     potassium chloride 20 MEQ extended release tablet  Commonly known as: KLOR-CON M  Take 1 tablet by mouth daily for 7 days     risperiDONE 1 MG tablet  Commonly known as: RisperDAL  Take 1 tablet by mouth 2 times daily Twice daily     sertraline 100 MG tablet  Commonly known as: ZOLOFT  Take 1.5 tablets by mouth nightly     tiotropium-olodaterol 2.5-2.5 MCG/ACT Aers  Commonly known as: STIOLTO  Inhale 2 puffs into the lungs daily     UNABLE TO FIND  hospital bed mattress XL twin               Where to Get Your Medications        These medications were sent to [address] [place] [address] [facility] [phone]  [address] [place] [address]      Phone: [phone]   albuterol sulfate  (90 Base) MCG/ACT inhaler       These medications were sent to [address] [place] [address] [address] [id number]      Phone: [phone]   cephALEXin 500 MG capsule        Objective Findings at Discharge:   BP (!) 131/54   Pulse 67   Temp 98.6 °F (37 °C) (Axillary)   Resp 18   Ht 4' 10\" (1.473 m)   Wt 132 lb (59.9 kg)   SpO2 93%   BMI 27.59 kg/m²       Physical Exam:   General: NAD  Eyes: EOMI  ENT: neck supple  Cardiovascular: Regular rate and rhythm, normal S1/S2, no gallops or murmurs   Respiratory: Clear to auscultation bilaterally, no rhonchi, rales or wheezes   Gastrointestinal: Soft, non tender, BS X 4  Genitourinary: no suprapubic tenderness  Musculoskeletal: No edema  Skin: warm, dry  Neuro: Alert and oriented x 3   Psych: Mood appropriate. Labs and Imaging   CT HEAD WO CONTRAST    Result Date: 12/19/2022  EXAMINATION: CT OF THE HEAD WITHOUT CONTRAST  12/19/2022 4:17 pm TECHNIQUE: CT of the head was performed without the administration of intravenous contrast. Automated exposure control, iterative reconstruction, and/or weight based adjustment of the mA/kV was utilized to reduce the radiation dose to as low as reasonably achievable. COMPARISON: 9/23/2021 HISTORY: ORDERING SYSTEM PROVIDED HISTORY: Episodes of \" staring off\" with history of epilepsy TECHNOLOGIST PROVIDED HISTORY: Reason for exam:->Episodes of \" staring off\" with history of epilepsy Has a \"code stroke\" or \"stroke alert\" been called? ->No Decision Support Exception - unselect if not a suspected or confirmed emergency medical condition->Emergency Medical Condition (MA) Reason for Exam: Episodes of \" staring off\" with history of epilepsy Initial evaluation FINDINGS: BRAIN/VENTRICLES: There has been prior surgery on both the left and right skull with the appearance of the milka holes. 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 This is similar in appearance to the previous study. There is atherosclerotic calcification of the cavernous carotids. No acute intracranial abnormality is detected. ORBITS: The visualized portion of the orbits demonstrate no acute abnormality. SINUSES: There is a mucous retention cyst or polyp in the right maxillary sinus. The remainder of the sinuses are clear. The mastoid air cells are clear. SOFT TISSUES/SKULL:  No acute abnormality of the visualized skull or soft tissues. No acute intracranial abnormality. Encephalomalacia of the right cerebral hemisphere with compensatory hypertrophy of the right lateral ventricle, unchanged from prior. XR CHEST PORTABLE    Result Date: 12/19/2022  EXAMINATION: ONE XRAY VIEW OF THE CHEST 12/19/2022 3:22 pm COMPARISON: 10/29/2022 HISTORY: ORDERING SYSTEM PROVIDED HISTORY: weakness, new o2 requirement TECHNOLOGIST PROVIDED HISTORY: Reason for exam:->weakness, new o2 requirement Reason for Exam: weakness Initial evaluation. FINDINGS: Monitor wires overlie the chest.  The trachea is midline. The cardiac silhouette is unremarkable. The lungs are clear without evidence of infiltrate, mass, or pneumothorax. There is no pleural fluid. No acute cardiopulmonary process. CBC:   Recent Labs     12/19/22  1530 12/20/22  0455 12/21/22  0441   WBC 4.4 4.9 3.9*   HGB 11.6* 10.4* 10.3*    173 183     BMP:    Recent Labs     12/19/22  1530 12/20/22  0455 12/21/22  0441   * 148* 147*   K 3.8 4.0 4.2    112* 109   CO2 30 28 27   BUN 22 12 10   CREATININE 0.4* 0.4* 0.4*   GLUCOSE 123* 104* 98     Hepatic:   Recent Labs     12/19/22  1530   AST 7*   ALT 5*   BILITOT 0.2   ALKPHOS 58     Lipids:   Lab Results   Component Value Date/Time    CHOL 223 12/06/2022 07:50 AM    HDL 46 12/06/2022 07:50 AM    TRIG 476 12/06/2022 07:50 AM     Hemoglobin A1C:   Lab Results   Component Value Date/Time    LABA1C 5.6 12/06/2022 07:50 AM     TSH: No results found for: TSH  Troponin:   Lab Results   Component Value Date/Time    TROPONINT <0.010 12/19/2022 03:30 PM    TROPONINT <0.010 10/29/2022 09:35 AM    TROPONINT <0.010 10/13/2022 11:15 AM     Lactic Acid: No results for input(s): LACTA in the last 72 hours.   BNP:   Recent Labs     12/19/22  1530   PROBNP 117.8     UA:  Lab Results   Component Value Date/Time    NITRU POSITIVE 12/19/2022 05:06 PM    COLORU YELLOW 12/19/2022 05:06 PM    PHUR 6 04/05/2019 03:53 PM    WBCUA 1463 12/19/2022 05:06 PM    RBCUA 144 12/19/2022 05:06 PM MUCUS OCCASIONAL 12/19/2022 05:06 PM    TRICHOMONAS NONE SEEN 12/19/2022 05:06 PM    YEAST RARE 08/11/2018 04:20 PM    BACTERIA OCCASIONAL 12/19/2022 05:06 PM    CLARITYU SLIGHTLY CLOUDY 12/19/2022 05:06 PM    SPECGRAV >1.030 12/19/2022 05:06 PM    LEUKOCYTESUR MODERATE NUMBER OR AMOUNT OBSERVED 12/19/2022 05:06 PM    UROBILINOGEN 0.2 12/19/2022 05:06 PM    BILIRUBINUR NEGATIVE 12/19/2022 05:06 PM    BILIRUBINUR neg 04/05/2019 03:53 PM    BLOODU LARGE NUMBER OR AMOUNT OBSERVED 12/19/2022 05:06 PM    GLUCOSEU neg 04/05/2019 03:53 PM    KETUA NEGATIVE 12/19/2022 05:06 PM     Urine Cultures:   Lab Results   Component Value Date/Time    LABURIN  08/28/2018 01:17 PM     <10,000 CFU/ml mixed skin/urogenital jaycob.  No further workup    LABURIN 50,000 CFU/ml  Multiple Resistant Drug Organism   08/28/2018 01:17 PM     Blood Cultures: No results found for: BC  No results found for: BLOODCULT2  Organism:   Lab Results   Component Value Date/Time    [facility] ECOL 12/18/2018 07:49 AM       Time Spent Discharging patient 40 minutes    Electronically signed by INOCENCIO Gonzalez CNP on 12/21/2022 at 2:11 PM

## 2022-12-21 NOTE — PLAN OF CARE
Problem: Discharge Planning  Goal: Discharge to home or other facility with appropriate resources  12/21/2022 1354 by Khai Salazar RN  Outcome: Adequate for Discharge  12/21/2022 0930 by Khai Salazar RN  Outcome: Progressing  12/21/2022 0440 by Abundio Johnston RN  Outcome: Progressing

## 2022-12-21 NOTE — PROGRESS NOTES
Outpatient Pharmacy Progress Note for Meds-to-Beds    The outpatient pharmacy received prescription(s) for the patient, however, the patient is going to an assisted living facility or SNF. The facility will provide the patient's home medications. The outpatient pharmacy will profile the prescriptions and have them on file. A medication list and facesheet should be provided to facility so their staff can provide the appropriate medications. Thank you for letting us serve your patients.   [address] [facility]\""    [address], [address] [facility]    Phone: [phone]    Fax: [phone]

## 2022-12-21 NOTE — PLAN OF CARE
Problem: Discharge Planning  Goal: Discharge to home or other facility with appropriate resources  Outcome: Progressing     Problem: Chronic Conditions and Co-morbidities  Goal: Patient's chronic conditions and co-morbidity symptoms are monitored and maintained or improved  Outcome: Progressing     Problem: Skin/Tissue Integrity  Goal: Absence of new skin breakdown  Description: 1. Monitor for areas of redness and/or skin breakdown  2. Assess vascular access sites hourly  3. Every 4-6 hours minimum:  Change oxygen saturation probe site  4. Every 4-6 hours:  If on nasal continuous positive airway pressure, respiratory therapy assess nares and determine need for appliance change or resting period.   Outcome: Progressing     Problem: Safety - Adult  Goal: Free from fall injury  Outcome: Progressing     Problem: ABCDS Injury Assessment  Goal: Absence of physical injury  Outcome: Progressing

## 2022-12-22 ENCOUNTER — TELEMEDICINE (OUTPATIENT)
Dept: FAMILY MEDICINE CLINIC | Age: 62
End: 2022-12-22

## 2022-12-22 ENCOUNTER — CARE COORDINATION (OUTPATIENT)
Dept: CASE MANAGEMENT | Age: 62
End: 2022-12-22

## 2022-12-22 DIAGNOSIS — N39.0 URINARY TRACT INFECTION WITH HEMATURIA, SITE UNSPECIFIED: Primary | ICD-10-CM

## 2022-12-22 DIAGNOSIS — Z09 HOSPITAL DISCHARGE FOLLOW-UP: ICD-10-CM

## 2022-12-22 DIAGNOSIS — R31.9 URINARY TRACT INFECTION WITH HEMATURIA, SITE UNSPECIFIED: Primary | ICD-10-CM

## 2022-12-22 DIAGNOSIS — N30.00 ACUTE CYSTITIS WITHOUT HEMATURIA: Primary | ICD-10-CM

## 2022-12-22 PROCEDURE — 1111F DSCHRG MED/CURRENT MED MERGE: CPT | Performed by: FAMILY MEDICINE

## 2022-12-22 NOTE — PROGRESS NOTES
Post-Discharge Transitional Care  Follow Up      Izzy Wright   YOB: 1960    Date of Office Visit:  12/22/2022  Date of Hospital Admission: 12/19/22  Date of Hospital Discharge: 12/21/22  Risk of hospital readmission (high >=14%. Medium >=10%) :Readmission Risk Score: 26.1      Care management risk score Rising risk (score 2-5) and Complex Care (Scores >=6): No Risk Score On File     Non face to face  following discharge, date last encounter closed (first attempt may have been earlier): 12/22/2022    Call initiated 2 business days of discharge: Yes    ASSESSMENT/PLAN:   Below is the assessment and plan developed based on review of pertinent history, physical exam, labs, studies, and medications. Acute cystitis without hematuria  Hospital discharge follow-up  -     NE DISCHARGE MEDS RECONCILED W/ CURRENT OUTPATIENT MED LIST    Medical Decision Making: moderate complexity  No follow-ups on file. On this date 12/22/2022 I have spent 30  minutes reviewing previous notes, test results and face to face with the patient discussing the diagnosis and importance of compliance with the treatment plan as well as documenting on the day of the visit. Subjective:   HPI:  Follow up of Hospital problems/diagnosis(es): patient seen today to f/u from hospitalization for UTI, patient discharged on keflex, patient doing better, no fever, no N/V no abdominal pain, she is eating well, no concern per her care giver, and per patient too. Inpatient course: Discharge summary reviewed- see chart.     Interval history/Current status: stable      Patient Active Problem List   Diagnosis    Pneumonia due to infectious organism    HTN (hypertension), benign    Seizure disorder ([address]    Altered mental status    Chest pain    MR (mental retardation)    COPD exacerbation ([address]    Left hemiplegia ([address]    H/O: stroke    Mixed hyperlipidemia    Acquired hypothyroidism    Sepsis ([address]    Acute bronchitis    Acute respiratory failure with hypoxia (HCC)    Acute respiratory failure (HCC)    Anxiety and depression    Anemia    Controlled type 2 diabetes mellitus without complication, without long-term current use of insulin ([facility]    Diverticulosis    Lesion of right native kidney    Black stool    Hypoxia    COVID-19 virus infection    Other cerebral palsy (HCC)    Chronic respiratory failure (HCC)    COVID-19    Abnormal uterine and vaginal bleeding, unspecified    Bradyarrhythmia    Dyskinesia    Hemiplegia affecting left nondominant side (HCC)    Insomnia    Intermittent explosive disorder    Intraparenchymal hemorrhage of brain (HCC)    Iron deficiency anemia, unspecified    Organic personality syndrome    Subdural hematoma    Urinary incontinence    SOB (shortness of breath)    UTI (urinary tract infection)    Acute cystitis without hematuria       Medications listed as ordered at the time of discharge from hospital     Medication List            Accurate as of December 22, 2022 11:59 PM. If you have any questions, ask your nurse or doctor. CHANGE how you take these medications      Januvia 100 MG tablet  Generic drug: SITagliptin  TAKE 1 TABLET BY MOUTH DAILY  What changed: See the new instructions. CONTINUE taking these medications      acidophilus Caps capsule  TAKE 1 CAPSULE BY MOUTH DAILY     albuterol sulfate  (90 Base) MCG/ACT inhaler  Commonly known as: PROVENTIL;VENTOLIN;PROAIR  Inhale 2 puffs into the lungs every 4 hours as needed for Shortness of Breath     amLODIPine 10 MG tablet  Commonly known as: NORVASC  TAKE 1 TABLET BY MOUTH DAILY     atorvastatin 40 MG tablet  Commonly known as: LIPITOR  TAKE 1 TABLET BY MOUTH DAILY     blood glucose test strips  Test 3 times a day & as needed for symptoms of irregular blood glucose.      busPIRone 10 MG tablet  Commonly known as: BUSPAR  Take 1 tablet by mouth 2 times daily     carBAMazepine 300 MG extended release capsule  Commonly known as: CARBATROL  TAKE 1 CAPSULE BY MOUTH TWICE A DAY     * cephALEXin 500 MG capsule  Commonly known as: Keflex  Take 1 capsule by mouth 3 times daily for 7 days Urinary Tract Infection     * cephALEXin 500 MG capsule  Commonly known as: Keflex  Take 1 capsule by mouth 3 times daily for 7 days     cetirizine 10 MG tablet  Commonly known as: ZYRTEC  TAKE 1 TABLET BY MOUTH DAILY     D3 High Potency 50 MCG (2000 UT) Caps  Generic drug: Cholecalciferol  TAKE 1 CAPSULE BY MOUTH DAILY     Depend Underwear Large/XL Misc  1 Units by Does not apply route 4 times daily     Diapers & Supplies Misc  1 each by Does not apply route daily as needed (4-5 times daily)     diphenhydrAMINE 25 MG capsule  Commonly known as: BENADRYL     Disposable Gloves Misc  1 Units by Does not apply route 4 times daily     divalproex 500 MG DR tablet  Commonly known as: DEPAKOTE  Take 2 tablets by mouth in the morning and 2 tablets in the evening. FeroSul 325 (65 Fe) MG tablet  Generic drug: ferrous sulfate  TAKE 1 TABLET BY MOUTH DAILY WITH BREAKFAST     fluticasone 50 MCG/ACT nasal spray  Commonly known as: FLONASE  INSTILL 1 SPRAY INTO EACH NOSTRIL DAILY     ipratropium-albuterol 0.5-2.5 (3) MG/3ML Soln nebulizer solution  Commonly known as: DUONEB  Inhale 3 mLs into the lungs every 4 hours     levETIRAcetam 750 MG tablet  Commonly known as: KEPPRA  Take 2 tablets by mouth 2 times daily     levothyroxine 50 MCG tablet  Commonly known as: SYNTHROID  Take 1 tablet by mouth Daily     lisinopril 40 MG tablet  Commonly known as: PRINIVIL;ZESTRIL  TAKE 1 TABLET BY MOUTH DAILY     mirtazapine 30 MG tablet  Commonly known as: REMERON  TAKE 1 TABLET BY MOUTH NIGHTLY     Nebulizer/Tubing/Mouthpiece Kit  1 kit by Does not apply route daily     nystatin 768058 UNIT/GM powder  Commonly known as: MYCOSTATIN  Apply topically 4 times daily. under the abdominal folds for 2 wks     pantoprazole 40 MG tablet  Commonly known as: PROTONIX     potassium chloride 20 MEQ extended release tablet  Commonly known as: KLOR-CON M  Take 1 tablet by mouth daily for 7 days     risperiDONE 1 MG tablet  Commonly known as: RisperDAL  Take 1 tablet by mouth 2 times daily Twice daily     sertraline 100 MG tablet  Commonly known as: ZOLOFT  Take 1.5 tablets by mouth nightly     tiotropium-olodaterol 2.5-2.5 MCG/ACT Aers  Commonly known as: STIOLTO  Inhale 2 puffs into the lungs daily     UNABLE TO FIND  hospital bed mattress XL twin           * This list has 2 medication(s) that are the same as other medications prescribed for you. Read the directions carefully, and ask your doctor or other care provider to review them with you. Medications marked \"taking\" at this time  Outpatient Medications Marked as Taking for the 12/22/22 encounter (Telemedicine) with Kim Ventura MD   Medication Sig Dispense Refill    albuterol sulfate HFA (PROVENTIL;VENTOLIN;PROAIR) 108 (90 Base) MCG/ACT inhaler Inhale 2 puffs into the lungs every 4 hours as needed for Shortness of Breath 18 g 1    cephALEXin (KEFLEX) 500 MG capsule Take 1 capsule by mouth 3 times daily for 7 days Urinary Tract Infection 21 capsule 0    cephALEXin (KEFLEX) 500 MG capsule Take 1 capsule by mouth 3 times daily for 7 days 21 capsule 0    amLODIPine (NORVASC) 10 MG tablet TAKE 1 TABLET BY MOUTH DAILY 31 tablet 5    Respiratory Therapy Supplies (NEBULIZER/TUBING/MOUTHPIECE) KIT 1 kit by Does not apply route daily 1 kit 3    divalproex (DEPAKOTE) 500 MG DR tablet Take 2 tablets by mouth in the morning and 2 tablets in the evening.  60 tablet 1    sertraline (ZOLOFT) 100 MG tablet Take 1.5 tablets by mouth nightly 30 tablet 1    risperiDONE (RISPERDAL) 1 MG tablet Take 1 tablet by mouth 2 times daily Twice daily 60 tablet 1    levothyroxine (SYNTHROID) 50 MCG tablet Take 1 tablet by mouth Daily 30 tablet 1    pantoprazole (PROTONIX) 40 MG tablet Take 40 mg by mouth daily      ipratropium-albuterol (DUONEB) 0.5-2.5 (3) MG/3ML SOLN nebulizer solution Inhale 3 mLs into the lungs every 4 hours 120 each 5    cetirizine (ZYRTEC) 10 MG tablet TAKE 1 TABLET BY MOUTH DAILY 31 tablet 10    FEROSUL 325 (65 Fe) MG tablet TAKE 1 TABLET BY MOUTH DAILY WITH BREAKFAST 31 tablet 10    nystatin (MYCOSTATIN) 513495 UNIT/GM powder Apply topically 4 times daily. under the abdominal folds for 2 wks 1 each 0    fluticasone (FLONASE) 50 MCG/ACT nasal spray INSTILL 1 SPRAY INTO EACH NOSTRIL DAILY 16 g 5    D3 HIGH POTENCY 50 MCG (2000 UT) CAPS TAKE 1 CAPSULE BY MOUTH DAILY 31 capsule 10    UNABLE TO FIND hospital bed mattress XL twin 1 Device 0    carBAMazepine (CARBATROL) 300 MG extended release capsule TAKE 1 CAPSULE BY MOUTH TWICE A DAY 56 capsule 5    Lactobacillus (ACIDOPHILUS) CAPS capsule TAKE 1 CAPSULE BY MOUTH DAILY 28 capsule 5    atorvastatin (LIPITOR) 40 MG tablet TAKE 1 TABLET BY MOUTH DAILY 31 tablet 5    levETIRAcetam (KEPPRA) 750 MG tablet Take 2 tablets by mouth 2 times daily 120 tablet 0    tiotropium-olodaterol (STIOLTO) 2.5-2.5 MCG/ACT AERS Inhale 2 puffs into the lungs daily 1 each 0    JANUVIA 100 MG tablet TAKE 1 TABLET BY MOUTH DAILY (Patient taking differently: 100 mg 2 times daily) 30 tablet 5    lisinopril (PRINIVIL;ZESTRIL) 40 MG tablet TAKE 1 TABLET BY MOUTH DAILY 31 tablet 5    mirtazapine (REMERON) 30 MG tablet TAKE 1 TABLET BY MOUTH NIGHTLY 31 tablet 5    Incontinence Supply Disposable (DEPEND UNDERWEAR LARGE/XL) MISC 1 Units by Does not apply route 4 times daily 100 Package 5    Disposable Gloves MISC 1 Units by Does not apply route 4 times daily 100 each 5    diphenhydrAMINE (BENADRYL) 25 MG capsule Take 25 mg by mouth every 6 hours as needed for Itching      blood glucose monitor strips Test 3 times a day & as needed for symptoms of irregular blood glucose.  90 strip 0    busPIRone (BUSPAR) 10 MG tablet Take 1 tablet by mouth 2 times daily 60 tablet 5        Medications patient taking as of now reconciled against medications ordered at time of hospital discharge: Yes    A comprehensive review of systems was negative except for what was noted in the HPI. Objective:    Patient-Reported Vitals  BP Observations: No, remote/electronic monitoring device was not used or able to be verified  Patient-Reported Weight: 132      General Appearance: alert and oriented to person, place and time and well-developed and well nourished  Head: normocephalic and atraumatic  Pulmonary/Chest: no respiratory distress  Normal affect    Leslie Nagel, was evaluated through a synchronous (real-time) audio-video encounter. The patient (or guardian if applicable) is aware that this is a billable service, which includes applicable co-pays. This Virtual Visit was conducted with patient's (and/or legal guardian's) consent. The visit was conducted pursuant to the emergency declaration under the [facility] authority and the [employer] General Act. Patient identification was verified, and a caregiver was present when appropriate. The patient was located at Home: [address]. Provider was located at [place] [address]): [address] [place] [address] Doctor Jerson Lujan,  [address] [facility]. An electronic signature was used to authenticate this note.   --Laith Manzo MD

## 2022-12-22 NOTE — CARE COORDINATION
[facility] Initial Follow Up Call    Call within 2 business days of discharge: Yes    Care Transition Nurse contacted the  caregiver from [facility]  by telephone to perform post hospital discharge assessment. Verified name and  with caregiver as identifiers. Provided introduction to self, and explanation of the Care Transition Nurse role. Patient: Eleuterio Rinne Patient : 1960   MRN: [id number]  Reason for Admission: UTI  Discharge Date: 22 RARS: Readmission Risk Score: 26.1      Last Discharge  Street       Date Complaint Diagnosis Description Type Department Provider    22 Other General weakness . .. +UTI ED to Hosp-Admission (Discharged) (ADMITTED) Rohini Moody MD; Carmen Arevalo MD... PMH:COPD, DM2, Covid PNA, CVA, Seizure d/o, HTN, MRDD, Cerebral Palsy. Was this an external facility discharge? No Discharge Facility: [facility]    Challenges to be reviewed by the provider   Additional needs identified to be addressed with provider: No  none               Method of communication with provider: none. Spoke with Yamila Jacobsen caregiver with [employer] She says Magalys Bey seems to be doing ok, except she is very tired, sleeping a lot since home from the hospital. No fever/chills/hematuria. No behavioral issues since home. Magalys Bey wears Depends/Pull Ups, and cg staff changes them. Magalys Bey lives in her apt in [place] [address] provides [address] provides in home caregiving, so Magalys Bey has 24 hour care 7 days/week care in her apt. [facility] nurses/cg's check Jm's BG levels & gives her meds. Paulette Addison is not sure of BG's today, but says lately is is stable- . Paulette Addison says she tries to push fluids & changes her Depends frequently. Magalys Bey  has COPD, is home O2 dependent, on 3L/NC ATC. O2 sats mid 90's on 3L today. At times Jm pulls O2 off, but cg staff tries to ensure it is on @ all times. Reviewed AVS & DC meds. Magalys Bey has Keflex & is taking. All other meds are same as PTA. Magalys Bey is taking all meds as prescribed. Emphasized importance of HFU appt needed within 7 days. Magalys Bey has appt with PCP today. [facility] staff provides transportation/ Has appt with Hem-Onc Dr Courtney Pompa 12/28/22. Declines need for addtl cg/transportation resources @ this time. Care Transition Nurse reviewed discharge instructions, medical action plan, and red flags with patient and caregiver who verbalized understanding. The caregiver was given an opportunity to ask questions and does not have any further questions or concerns at this time. Were discharge instructions available to patient? Yes. Reviewed appropriate site of care based on symptoms and resources available to patient including: PCP  Specialist  Urgent care clinics  [facility]  When to call [address] The caregiver agrees to contact the PCP office for questions related to their healthcare. Advance Care Planning:   Does patient have an Advance Directive: not on file. Medication reconciliation was performed with caregiver, who verbalizes understanding of administration of home medications. Medications reviewed, 1111F entered: yes    Was patient discharged with a pulse oximeter? yes, discussed and confirmed pulse oximeter discharge instructions and when to notify provider or seek emergency care.     Non-face-to-face services provided:  Scheduled appointment with PCP-12/22/22  Education of patient/family/caregiver/guardian to support self-management-pt has MRDD-24 hr ATC caregiving staff  Assessment and support for treatment adherence and medication management-med review    Offered patient enrollment in the Remote Patient Monitoring (RPM) program for in-home monitoring: Patient is not eligible for RPM program.    Care Transitions 24 Hour Call    Schedule Follow Up Appointment with PCP: Completed  Do you have a copy of your discharge instructions?: Yes  Do you have all of your prescriptions and are they filled?: Yes  Have you been contacted by a [employer] [address]?: No  Have you scheduled your follow up appointment?: Yes (Comment: PCP 12/22/22. Onc 12/28/22)  How are you going to get to your appointment?: Other (Comment: [facility] staff transports)  Post Acute Services: Home Health (Comment: David Ga as PTA)  Patient DME: Wheelchair  Patient Home Equipment: Oxygen  Do you have support at home?: Other Caregiver  Do you feel like you have everything you need to keep you well at home?: Yes  Are you an active caregiver in your home?: No  Care Transitions Interventions   Home Care Waiver: Completed        Transportation Support: Declined      DME Assistance: Completed   Diabetes Education: Completed        Social Work: Declined    Disease Association: Completed               Follow Up  Future Appointments   Date Time Provider Nicole Escobari   12/22/2022  4:00 PM MD Giorgio Cowan [facility]   12/28/2022 11:30 AM NICANOR, MED ONC NURSE [facility] MED ONC [place]   12/28/2022 11:45 AM Henrry Kim MD [facility]ane [facility]   1/18/2023 10:15 MD Vamsi Jaime [facility]   2/24/2023  8:45 AM MD Giorgio Cowan [facility]   3/27/2023  1:15 PM MD Vamsi Davis [facility]   8/24/2023  8:30 AM MD Giorgio Cowan [facility]       Care Transition Nurse provided contact information. Plan for follow-up call in 5-7 days based on severity of symptoms and risk factors. Plan for next call: symptom management-Hematuria? Fever? Chills? Sob, O2 sat?  Still on 3L/NC  follow-up appointment-12/22/22    Dez Artis RN

## 2022-12-23 PROBLEM — N30.00 ACUTE CYSTITIS WITHOUT HEMATURIA: Status: ACTIVE | Noted: 2022-12-23

## 2022-12-23 NOTE — ADT AUTH CERT
Utilization Reviews       Urinary Tract Infection (UTI) - Care Day 3 (12/21/2022) by Michelle Diallo       Review Status Review Entered   Completed 12/23/2022 1007       Created By   Michelle Diallo      Criteria Review      Care Day: 3 Care Date: 12/21/2022 Level of Care: Inpatient Floor    Guideline Day 2    Level Of Care    (X) Floor    12/23/2022 10:07 AM EST by Lulú Mendoza      m//s    Clinical Status    (X) * Hypotension absent    12/23/2022 10:07 AM EST by Aubrey Barajas      BP: 131/54 143/58    (X) * Mental status at baseline    (X) * Afebrile or fever improved    12/23/2022 10:07 AM EST by Brayan Cadena: 98.6    (X) * Vomiting absent or improved    12/23/2022 10:07 AM EST by Lulú Getting      none noted    Activity    (X) * Ambulatory    12/23/2022 10:07 AM EST by Lulú Getting      Up in chair with assistance    Routes    (X) Advance diet as tolerated    12/23/2022 10:07 AM EST by Lulú Mendoza      ADULT DIET; Dysphagia - Minced and Moist; 5 carb choices (75 gm/meal); Low Fat/Low Chol/High Fiber/2 gm Na; No Drinking Straws    Interventions    (X) * Reversible urinary system abnormality (eg, obstruction, abscess) absent, addressed, or to be treated at next level of care    12/23/2022 10:07 AM EST by Aubrey Barajas      N/A    (X) WBC    12/23/2022 10:07 AM EST by Aubrey Morales      WBC: 3.9 (L)    (X) Renal function tests    12/23/2022 10:07 AM EST by Aroldo Johnson: 10  Creatinine: 0.4 (L)       Definitions for Care Day 3    Hypotension absent    (X) Hypotension absent, as indicated by  1 or more  of the following  (1) (2) (3) (4):       (X) SBP greater than or equal to 90 mm Hg and without recent decrease greater than 40 mm Hg from       baseline in adult or child 10 years or older       * Milestone   Additional Notes   DATE: 12/21 DC         Pertinent Updates: On room air. Patient sending home on oral Keflex 500 mg PO TID x 7 days.  Advised that at this time pt will need home 02 upon discharge per CM. Vitals:   RR: 16-18    WA: 63-70   SpO2: [email or web address]             Abnl/Pertinent Labs/Radiology/Diagnostic Studies:   Sodium: 147 (H)   Potassium: 4.2   RBC: 3.30 (L)   Hemoglobin Quant: 10.3 (L)   Hematocrit: 34.3 (L)         Physical Exam:   Gastrointestinal: Soft, non tender, BS X 4   Genitourinary: no suprapubic tenderness   Musculoskeletal: No edema             MD Consults/Assessments & Plans:   IM DISCHARGE SUMMARY NOTE:   Discharge Diagnosis:   UTI (urinary tract infection) - sending home on oral Keflex 500 mg PO TID x 7 days          Medications:   LOVENOX 40 mg DAILY SC      KEPPRA 1,500 mg BID PO         Orders:   Continue medications          PT/OT/SLP/CM Assessments or Notes:   SLP NOTE:   Recommended Diet and Intervention   Recommended Form of Meds: Meds in puree   Therapeutic Interventions: Diet tolerance monitoring; Therapeutic PO trials with SLP       CM NOTE    TC to [facility] [place] pt's  through [facility]. She states pt has full home 02 set up via [address] at home but had not been using it recently because upon discharge from hospital on last visit they were not told to keep her on it. Advised that at this time pt will need home 02 upon discharge and home 02 eval was cancelled as pt already has home 02. Pt will require stretcher home, scripts for Keflex needs to be provided to pt prior to transport to take home to begin tonight and discharge orders need to be faxed to [phone].             PA Recommends IP by Rizwana Villalpando LPN       Review Status Review Entered   In Primary 2022 1221       Created By   Rizwana Villalpando LPN      Criteria Review   obs list upgrade  Name: Miki Mosquera   : 1960   CSN: [id number]   INSURANCE: [facility] Medicare    Date of admission: 22    Current status: Observation    We recommend that patient's status is upgraded to INPATIENT; if you agree, please place a new ADMIT order in [facility] as recommended. Rationale: pt with hypoxia, sao2 89% on ra  Treated with 3 l O2, copd,also has uti with e coli bacteria  Clinical summary Patient presented with hypoxia and uti.   Vitals Hypoxia   Labs and Imaging Cxr neg  Status decision based on clinical judgment and Commercial Utilization criteria, e.g., MCG, [employer]     This chart was reviewed at 11:09 AM on 12/21/2022    Karey Vasquez MD  Physician [facility] [address] [place] [address]  Cell: [phone]

## 2022-12-27 ENCOUNTER — HOSPITAL ENCOUNTER (EMERGENCY)
Age: 62
Discharge: ANOTHER HOSPITAL | End: 2022-12-27
Payer: MEDICARE

## 2022-12-27 ENCOUNTER — APPOINTMENT (OUTPATIENT)
Dept: CT IMAGING | Age: 62
End: 2022-12-27
Payer: MEDICARE

## 2022-12-27 ENCOUNTER — APPOINTMENT (OUTPATIENT)
Dept: GENERAL RADIOLOGY | Age: 62
End: 2022-12-27
Payer: MEDICARE

## 2022-12-27 VITALS
DIASTOLIC BLOOD PRESSURE: 42 MMHG | TEMPERATURE: 97.9 F | SYSTOLIC BLOOD PRESSURE: 112 MMHG | BODY MASS INDEX: 27.59 KG/M2 | OXYGEN SATURATION: 98 % | HEART RATE: 60 BPM | WEIGHT: 132 LBS | RESPIRATION RATE: 23 BRPM

## 2022-12-27 DIAGNOSIS — R56.9 OBSERVED SEIZURE-LIKE ACTIVITY (HCC): Primary | ICD-10-CM

## 2022-12-27 LAB
ALBUMIN SERPL-MCNC: 3.6 GM/DL (ref 3.4–5)
ALP BLD-CCNC: 76 IU/L (ref 40–129)
ALT SERPL-CCNC: 9 U/L (ref 10–40)
ANION GAP SERPL CALCULATED.3IONS-SCNC: 7 MMOL/L (ref 4–16)
AST SERPL-CCNC: 12 IU/L (ref 15–37)
BASOPHILS ABSOLUTE: 0 K/CU MM
BASOPHILS RELATIVE PERCENT: 0.4 % (ref 0–1)
BILIRUB SERPL-MCNC: 0.1 MG/DL (ref 0–1)
BUN BLDV-MCNC: 30 MG/DL (ref 6–23)
CALCIUM SERPL-MCNC: 9.3 MG/DL (ref 8.3–10.6)
CHLORIDE BLD-SCNC: 104 MMOL/L (ref 99–110)
CO2: 30 MMOL/L (ref 21–32)
CREAT SERPL-MCNC: 0.4 MG/DL (ref 0.6–1.1)
DIFFERENTIAL TYPE: ABNORMAL
DOSE AMOUNT: NORMAL
DOSE TIME: NORMAL
EOSINOPHILS ABSOLUTE: 0.1 K/CU MM
EOSINOPHILS RELATIVE PERCENT: 1.5 % (ref 0–3)
GFR SERPL CREATININE-BSD FRML MDRD: >60 ML/MIN/1.73M2
GLUCOSE BLD-MCNC: 113 MG/DL
GLUCOSE BLD-MCNC: 113 MG/DL (ref 70–99)
GLUCOSE BLD-MCNC: 131 MG/DL (ref 70–99)
HCT VFR BLD CALC: 39.1 % (ref 37–47)
HEMOGLOBIN: 12.2 GM/DL (ref 12.5–16)
IMMATURE NEUTROPHIL %: 2.6 % (ref 0–0.43)
LACTATE: 1.4 MMOL/L (ref 0.5–1.9)
LYMPHOCYTES ABSOLUTE: 2 K/CU MM
LYMPHOCYTES RELATIVE PERCENT: 44.5 % (ref 24–44)
MCH RBC QN AUTO: 31.7 PG (ref 27–31)
MCHC RBC AUTO-ENTMCNC: 31.2 % (ref 32–36)
MCV RBC AUTO: 101.6 FL (ref 78–100)
MONOCYTES ABSOLUTE: 0.5 K/CU MM
MONOCYTES RELATIVE PERCENT: 10.6 % (ref 0–4)
NUCLEATED RBC %: 0 %
PDW BLD-RTO: 12.1 % (ref 11.7–14.9)
PLATELET # BLD: 265 K/CU MM (ref 140–440)
PMV BLD AUTO: 8.8 FL (ref 7.5–11.1)
POTASSIUM SERPL-SCNC: 4.5 MMOL/L (ref 3.5–5.1)
RBC # BLD: 3.85 M/CU MM (ref 4.2–5.4)
SEGMENTED NEUTROPHILS ABSOLUTE COUNT: 1.8 K/CU MM
SEGMENTED NEUTROPHILS RELATIVE PERCENT: 40.4 % (ref 36–66)
SODIUM BLD-SCNC: 141 MMOL/L (ref 135–145)
TOTAL IMMATURE NEUTOROPHIL: 0.12 K/CU MM
TOTAL NUCLEATED RBC: 0 K/CU MM
TOTAL PROTEIN: 6.8 GM/DL (ref 6.4–8.2)
TROPONIN T: <0.01 NG/ML
VALPROIC ACID LEVEL: 72.4 UG/ML (ref 50–100)
WBC # BLD: 4.5 K/CU MM (ref 4–10.5)

## 2022-12-27 PROCEDURE — 71045 X-RAY EXAM CHEST 1 VIEW: CPT

## 2022-12-27 PROCEDURE — 93005 ELECTROCARDIOGRAM TRACING: CPT | Performed by: PHYSICIAN ASSISTANT

## 2022-12-27 PROCEDURE — 95717 EEG PHYS/QHP 2-12 HR W/O VID: CPT | Performed by: STUDENT IN AN ORGANIZED HEALTH CARE EDUCATION/TRAINING PROGRAM

## 2022-12-27 PROCEDURE — 6360000002 HC RX W HCPCS: Performed by: PHYSICIAN ASSISTANT

## 2022-12-27 PROCEDURE — 82962 GLUCOSE BLOOD TEST: CPT

## 2022-12-27 PROCEDURE — 70450 CT HEAD/BRAIN W/O DYE: CPT

## 2022-12-27 PROCEDURE — 95813 EEG EXTND MNTR 61-119 MIN: CPT

## 2022-12-27 PROCEDURE — 83605 ASSAY OF LACTIC ACID: CPT

## 2022-12-27 PROCEDURE — 85025 COMPLETE CBC W/AUTO DIFF WBC: CPT

## 2022-12-27 PROCEDURE — 2580000003 HC RX 258: Performed by: PHYSICIAN ASSISTANT

## 2022-12-27 PROCEDURE — 84484 ASSAY OF TROPONIN QUANT: CPT

## 2022-12-27 PROCEDURE — 96374 THER/PROPH/DIAG INJ IV PUSH: CPT

## 2022-12-27 PROCEDURE — 80053 COMPREHEN METABOLIC PANEL: CPT

## 2022-12-27 PROCEDURE — 99285 EMERGENCY DEPT VISIT HI MDM: CPT

## 2022-12-27 PROCEDURE — 80164 ASSAY DIPROPYLACETIC ACD TOT: CPT

## 2022-12-27 PROCEDURE — 80177 DRUG SCRN QUAN LEVETIRACETAM: CPT

## 2022-12-27 RX ORDER — 0.9 % SODIUM CHLORIDE 0.9 %
500 INTRAVENOUS SOLUTION INTRAVENOUS ONCE
Status: COMPLETED | OUTPATIENT
Start: 2022-12-27 | End: 2022-12-27

## 2022-12-27 RX ORDER — LORAZEPAM 2 MG/ML
1 INJECTION INTRAMUSCULAR ONCE
Status: COMPLETED | OUTPATIENT
Start: 2022-12-27 | End: 2022-12-27

## 2022-12-27 RX ADMIN — SODIUM CHLORIDE 500 ML: 9 INJECTION, SOLUTION INTRAVENOUS at 10:43

## 2022-12-27 RX ADMIN — LORAZEPAM 1 MG: 2 INJECTION INTRAMUSCULAR at 10:44

## 2022-12-27 NOTE — ED NOTES
Report given to Shalonda Mondragon at [facility].       Joann Avendano RN  12/27/22 [id number]

## 2022-12-27 NOTE — ED NOTES
1815 called [address] for BLS unit to transport pt to [facility] [address] Rm [address]. Spoke with Janneth Duque at intake. ETA time for  is 2000.      Zaheer Vargas  12/27/22 1814

## 2022-12-27 NOTE — ED NOTES
[id number] Johanna Colin SEARL Albert  12/27/22 1223    1224 Dr Lina Saha returned call      Shauna Nurse  12/27/22 1222

## 2022-12-27 NOTE — ED PROVIDER NOTES
12 lead EKG per my interpretation:  Normal Sinus Rhythm 60  Axis is   Normal  QTc is   418  There is no specific T wave changes appreciated. There is no specific ST wave changes appreciated.     Prior EKG to compare with was not available        Mendel Freed, DO  12/27/22 1424

## 2022-12-27 NOTE — ED PROVIDER NOTES
[address] ENCOUNTER        Pt Name: Liv Oswald  MRN: [id number]  [place] 1960  Date of evaluation: 12/27/2022  Provider: SRI Greco  PCP: Titus Taylor MD    KAREN. I have evaluated this patient. My supervising physician was available for consultation. Triage CHIEF COMPLAINT       Chief Complaint   Patient presents with    Fatigue     All over, patient is be bound and confused- more tired than usual      HISTORY OF PRESENT ILLNESS      Chief Complaint: \"Staring episodes\", observed seizure-like activity    Liv Oswald is a 63 y.o. female who presents to the emergency department today via EMS with caretaker at bedside, patient has cerebral palsy, developmental delay, epilepsy. Patient seen and evaluated last week and was admitted for hypoxia. Returning back with caretaker stating that there have been observing these \"staring like episodes\" where they feel like she may be seizing. Patient does have history of epilepsy, follows follows with neurology from [place].  Patient is on Depakote, carbamazepine, Keppra, has been compliant with these medications per caretaker at bedside. There is been no obvious tonic-clonic activity. They do feel patient has been more subdued/drowsy than her normal.    Nursing Notes were all reviewed and agreed with or any disagreements were addressed in the HPI.     REVIEW OF SYSTEMS     Difficulty to assess secondary to patient's mental status  PAST MEDICAL HISTORY     Past Medical History:   Diagnosis Date    Anxiety disorder     Back pain, chronic     Cerebral palsy (HCC)     COPD (chronic obstructive pulmonary disease) ([facility] [address]     COVID-19 10/12/2021    CVA (cerebral infarction) 2014 x2    Diabetes mellitus ([facility] [address]     Diverticulosis     Epilepsy ([facility] [address]     GERD (gastroesophageal reflux disease)     Hyperlipidemia     Hypertension     Insomnia     Iron (Fe) deficiency anemia     Mental disability     Seizures (HCC)     Unspecified cerebral artery occlusion with cerebral infarction        SURGICAL HISTORY     Past Surgical History:   Procedure Laterality Date    GASTROSTOMY TUBE PLACEMENT         CURRENTMEDICATIONS       Previous Medications    ALBUTEROL SULFATE HFA (PROVENTIL;VENTOLIN;PROAIR) 108 (90 BASE) MCG/ACT INHALER    Inhale 2 puffs into the lungs every 4 hours as needed for Shortness of Breath    AMLODIPINE (NORVASC) 10 MG TABLET    TAKE 1 TABLET BY MOUTH DAILY    ATORVASTATIN (LIPITOR) 40 MG TABLET    TAKE 1 TABLET BY MOUTH DAILY    BLOOD GLUCOSE MONITOR STRIPS    Test 3 times a day & as needed for symptoms of irregular blood glucose. BUSPIRONE (BUSPAR) 10 MG TABLET    Take 1 tablet by mouth 2 times daily    CARBAMAZEPINE (CARBATROL) 300 MG EXTENDED RELEASE CAPSULE    TAKE 1 CAPSULE BY MOUTH TWICE A DAY    CEPHALEXIN (KEFLEX) 500 MG CAPSULE    Take 1 capsule by mouth 3 times daily for 7 days Urinary Tract Infection    CEPHALEXIN (KEFLEX) 500 MG CAPSULE    Take 1 capsule by mouth 3 times daily for 7 days    CETIRIZINE (ZYRTEC) 10 MG TABLET    TAKE 1 TABLET BY MOUTH DAILY    D3 HIGH POTENCY 50 MCG (2000 UT) CAPS    TAKE 1 CAPSULE BY MOUTH DAILY    DIAPERS & SUPPLIES MISC    1 each by Does not apply route daily as needed (4-5 times daily)    DIPHENHYDRAMINE (BENADRYL) 25 MG CAPSULE    Take 25 mg by mouth every 6 hours as needed for Itching    DISPOSABLE GLOVES MISC    1 Units by Does not apply route 4 times daily    DIVALPROEX (DEPAKOTE) 500 MG DR TABLET    Take 2 tablets by mouth in the morning and 2 tablets in the evening.     FEROSUL 325 (65 FE) MG TABLET    TAKE 1 TABLET BY MOUTH DAILY WITH BREAKFAST    FLUTICASONE (FLONASE) 50 MCG/ACT NASAL SPRAY    INSTILL 1 SPRAY INTO EACH NOSTRIL DAILY    INCONTINENCE SUPPLY DISPOSABLE (DEPEND UNDERWEAR LARGE/XL) MISC    1 Units by Does not apply route 4 times daily    IPRATROPIUM-ALBUTEROL (DUONEB) 0.5-2.5 (3) MG/3ML SOLN NEBULIZER SOLUTION    Inhale 3 mLs into the lungs every 4 hours    JANUVIA 100 MG TABLET    TAKE 1 TABLET BY MOUTH DAILY    LACTOBACILLUS (ACIDOPHILUS) CAPS CAPSULE    TAKE 1 CAPSULE BY MOUTH DAILY    LEVETIRACETAM (KEPPRA) 750 MG TABLET    Take 2 tablets by mouth 2 times daily    LEVOTHYROXINE (SYNTHROID) 50 MCG TABLET    Take 1 tablet by mouth Daily    LISINOPRIL (PRINIVIL;ZESTRIL) 40 MG TABLET    TAKE 1 TABLET BY MOUTH DAILY    MIRTAZAPINE (REMERON) 30 MG TABLET    TAKE 1 TABLET BY MOUTH NIGHTLY    NYSTATIN (MYCOSTATIN) 386168 UNIT/GM POWDER    Apply topically 4 times daily. under the abdominal folds for 2 wks    PANTOPRAZOLE (PROTONIX) 40 MG TABLET    Take 40 mg by mouth daily    POTASSIUM CHLORIDE (KLOR-CON M) 20 MEQ EXTENDED RELEASE TABLET    Take 1 tablet by mouth daily for 7 days    RESPIRATORY THERAPY SUPPLIES (NEBULIZER/TUBING/MOUTHPIECE) KIT    1 kit by Does not apply route daily    RISPERIDONE (RISPERDAL) 1 MG TABLET    Take 1 tablet by mouth 2 times daily Twice daily    SERTRALINE (ZOLOFT) 100 MG TABLET    Take 1.5 tablets by mouth nightly    TIOTROPIUM-OLODATEROL (STIOLTO) 2.5-2.5 MCG/ACT AERS    Inhale 2 puffs into the lungs daily    UNABLE TO FIND    hospital bed mattress XL twin       ALLERGIES     Macrobid [nitrofurantoin]    FAMILYHISTORY       Family History   Problem Relation Age of Onset    Breast Cancer Neg Hx         SOCIAL HISTORY       Social History     Socioeconomic History    Marital status: Single     Spouse name: None    Number of children: None    Years of education: None    Highest education level: None   Tobacco Use    Smoking status: Former     Packs/day: 1.50     Years: 20.00     Pack years: 30.00     Types: Cigarettes     Quit date: 2011     Years since quittin.3    Smokeless tobacco: Never   Vaping Use    Vaping Use: Never used   Substance and Sexual Activity    Alcohol use: No     Alcohol/week: 0.0 standard drinks    Drug use: No   Social History Narrative    ** Merged History Encounter **          Social Determinants of Health     Financial Resource Strain: Low Risk     Difficulty of Paying Living Expenses: Not very hard   Food Insecurity: No Food Insecurity    Worried About Running Out of Food in the Last Year: Never true    Ran Out of Food in the Last Year: Never true   Transportation Needs: No Transportation Needs    Lack of Transportation (Medical): No    Lack of Transportation (Non-Medical): No   Physical Activity: Inactive    Days of Exercise per Week: 0 days    Minutes of Exercise per Session: 0 min   Stress: No Stress Concern Present    Feeling of Stress : Only a little   Social Connections: Socially Isolated    Frequency of Communication with Friends and Family: Three times a week    Frequency of Social Gatherings with Friends and Family: Three times a week    Attends [employer] Services: Never    Active Member of Clubs or Organizations: No    Attends Club or Organization Meetings: Never    Marital Status: Never    Housing Stability: Low Risk     Unable to Pay for Housing in the Last Year: No    Number of [place] in the Last Year: 1    Unstable Housing in the Last Year: No       SCREENINGS    [place] Coma Scale  Eye Opening: Spontaneous  Best Verbal Response: Oriented  Best Motor Response: Obeys commands  [place] Coma Scale Score: 15      PHYSICAL EXAM       ED Triage Vitals [12/27/22 0829]   BP Temp Temp Source Heart Rate Resp SpO2 Height Weight   (!) 128/51 97.9 °F (36.6 °C) Oral 59 18 98 % -- --      Constitutional:  Well developed, Well nourished. No distress  HENT:  Normocephalic, Atraumatic, PERRL. EOMI. Sclera clear. Conjunctiva normal, No discharge. Neck/Lymphatics: supple, no JVD, no swollen nodes  Cardiovascular:   RRR,  no murmurs/rubs/gallops. Respiratory:   Nonlabored breathing. Normal breath sounds, No wheezing  Abdomen: Bowel sounds normal, Soft, No tenderness, no masses.   Musculoskeletal:    There is no edema, asymmetry, or calf / thigh tenderness bilaterally. No cyanosis. No cool or pale-appearing limb. Distal cap refill and pulses intact bilateral upper and lower extremities  Bilateral upper and lower extremity ROM intact without pain or obvious deficit  Integument:   Warm, Dry  Neurologic:  Alert & oriented , No focal deficits noted. Cranial nerves II through XII grossly intact. Normal gross motor coordination & motor strength bilateral upper and lower extremities  Sensation intact. Psychiatric:  Affect normal, Mood normal.     DIAGNOSTIC RESULTS   LABS:    Labs Reviewed   CBC WITH AUTO DIFFERENTIAL - Abnormal; Notable for the following components:       Result Value    RBC 3.85 (*)     Hemoglobin 12.2 (*)     .6 (*)     MCH 31.7 (*)     MCHC 31.2 (*)     Lymphocytes % 44.5 (*)     Monocytes % 10.6 (*)     Immature Neutrophil % 2.6 (*)     All other components within normal limits   COMPREHENSIVE METABOLIC PANEL - Abnormal; Notable for the following components:    BUN 30 (*)     Creatinine 0.4 (*)     Glucose 131 (*)     ALT 9 (*)     AST 12 (*)     All other components within normal limits   POCT GLUCOSE - Abnormal; Notable for the following components:    POC Glucose 113 (*)     All other components within normal limits   POCT GLUCOSE - Normal   TROPONIN   VALPROIC ACID LEVEL, TOTAL   LACTIC ACID   URINALYSIS   LEVETIRACETAM LEVEL       When ordered, only abnormal lab results are displayed. All other labs were within normal range or not returned as of this dictation. EKG: When ordered, EKG's are interpreted by the Emergency Department Physician in the absence of a cardiologist.  Please see their note for interpretation of EKG.     RADIOLOGY:   Non-plain film images such as CT, Ultrasound and MRI are read by the radiologist. Plain radiographic images are visualized and preliminarily interpreted by the  ED Provider with the below findings:    Interpretation perthe Radiologist below, if available at the time of this note:    XR CHEST PORTABLE   Final Result   No acute cardiopulmonary abnormality is identified. CT HEAD WO CONTRAST   Final Result   No acute intracranial abnormality within constraints of CT acquisition. CT HEAD WO CONTRAST    Result Date: 12/27/2022  EXAMINATION: CT OF THE HEAD WITHOUT CONTRAST  12/27/2022 8:49 am TECHNIQUE: CT of the head was performed without the administration of intravenous contrast. Automated exposure control, iterative reconstruction, and/or weight based adjustment of the mA/kV was utilized to reduce the radiation dose to as low as reasonably achievable. COMPARISON: Head CT 12/19/2022 HISTORY: ORDERING SYSTEM PROVIDED HISTORY: seizure, history of epilepsy, cerebral palsy. 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 No hydrocephalus. No acute hemorrhage or acute cortical infarct. There is no significant mass effect or midline shift. Unchanged, dysmorphic appearance of the brainstem. There is communication of the 4th ventricle with the retro cerebellar extra-axial CSF space suggesting mild Dandy-Walker continuum. ORBITS: The visualized portion of the orbits demonstrate no acute abnormality. SINUSES: The visualized paranasal sinuses and mastoid air cells demonstrate no acute abnormality. SOFT TISSUES/SKULL:  No acute abnormality of the visualized skull or soft tissues. Biparietal calvarial defects again noted. No acute intracranial abnormality within constraints of CT acquisition. XR CHEST PORTABLE    Result Date: 12/27/2022  EXAMINATION: ONE XRAY VIEW OF THE CHEST 12/27/2022 8:47 am COMPARISON: 12/19/2022 HISTORY: ORDERING SYSTEM PROVIDED HISTORY: seizure TECHNOLOGIST PROVIDED HISTORY: Reason for exam:->seizure Reason for Exam: seizure FINDINGS: The heart is similar in size to the prior study. No pleural effusion or pneumothorax is seen.   No focal lung consolidation is identified. No acute cardiopulmonary abnormality is identified. PROCEDURES   Unless otherwise noted below, none      CRITICAL CARE   CRITICAL CARE NOTE:   N/A    CONSULTS:  IP CONSULT TO NEUROLOGY  IP CONSULT TO INTERNAL MEDICINE      EMERGENCY DEPARTMENT COURSE and MDM:   Vitals:    Vitals:    12/27/22 1232 12/27/22 1332 12/27/22 1400 12/27/22 1511   BP: (!) 101/48 (!) 108/48 (!) 104/55    Pulse: 62 66 64 64   Resp: 18 18 21    Temp:       TempSrc:       SpO2: 97% 97%         Patient was given thefollowing medications:  Medications   0.9 % sodium chloride bolus (0 mLs IntraVENous Stopped 12/27/22 1259)   LORazepam (ATIVAN) injection 1 mg (1 mg IntraVENous Given 12/27/22 1044)         Is this patient to be included in the SEP-1 Core Measure due to severe sepsis or septic shock? No   Exclusion criteria - the patient is NOT to be included for SEP-1 Core Measure due to: Infection is not suspected    MDM:  Patient presents as above. Emergent etiologies considered. Patient seen and examined. Work-up initiated secondary to presentation, physical exam findings, vital signs and medical chart review. In brief, 82-year-old female presenting from group home with caretaker, has history of cerebral palsy, developmental delay, epilepsy. Has been having \"staring episodes\" over the last week, staff is coming concerned that they may be related to her seizures. Patient's been on anticonvulsant medications. Has seen neurology from [facility], no recent medication adjustments. Patient just recently admitted to the hospital here and had ultimately a negative work-up/day. A work-up was initiated CT scan of the head looks well. EKG, cardiac testing within normal limits. Has no other significant metabolic and/or electrolyte abnormalities, patient is on Keflex for urinary tract infection.   Patient was also placed on the [address] to further delineate if there is any signs of status epilepticus. Was below seizure threshold, initially talk to neurology, Dr. Chapo Palafox, we both agreed the patient would likely benefit from continuous EEG monitoring and to be sent to [place] to be evaluated by her own neurologist.    Spoke with Dr. Maida Kirkland who is on-call for neurology, [facility]. Will accept the admission for continuous EEG monitoring. Did speak with the medicine/hospitalist team for [facility] Dr. Meghan Daly who accepted the admission. Patient will otherwise be transferred, we will keep a close eye on patient. Will discharge home in stable condition. CLINICAL IMPRESSION      1. Observed seizure-like activity [facility])          DISPOSITION/PLAN   DISPOSITION Decision To Transfer 12/27/2022 03:39:56 PM    (Please note that portions ofthis note were completed with a voice recognition program.  Efforts were made to edit the dictations but occasionally words are mis-transcribed. )    SRI Chapin (electronically signed)            SRI Woo  12/27/22 4180

## 2022-12-27 NOTE — ED NOTES
Myra from [facility] command center called with bed assignment. Pt going to [address] [id number].  Nurse to nurse report can be called to [address]  12/27/22 1785

## 2022-12-27 NOTE — ED NOTES
1606 called [facility] to check on bed placement. Will call when bed gets assigned.       Juan Quijano  12/27/22 1612

## 2022-12-28 ENCOUNTER — CARE COORDINATION (OUTPATIENT)
Dept: CASE MANAGEMENT | Age: 62
End: 2022-12-28

## 2022-12-28 LAB
EKG ATRIAL RATE: 60 BPM
EKG DIAGNOSIS: NORMAL
EKG P AXIS: -2 DEGREES
EKG P-R INTERVAL: 148 MS
EKG Q-T INTERVAL: 418 MS
EKG QRS DURATION: 80 MS
EKG QTC CALCULATION (BAZETT): 418 MS
EKG R AXIS: 38 DEGREES
EKG T AXIS: 59 DEGREES
EKG VENTRICULAR RATE: 60 BPM
KEPPRA: 40 UG/ML (ref 10–40)

## 2022-12-28 PROCEDURE — 93010 ELECTROCARDIOGRAM REPORT: CPT | Performed by: INTERNAL MEDICINE

## 2022-12-28 NOTE — CARE COORDINATION
[facility] presented to ED @ [facility] on 12/27/22 for seizures then transferred to [place] for continuity of care/HLOC. Current CTN episode resolved/closed out per protocol.        Melly Suggs RN [phone]

## 2022-12-28 NOTE — PROCEDURES
CONTINUOUS ELECTROENCEPHALOGRAM (cvEEG) REPORT    Identifying Information:  Name: Alisia Malcolm  MRN: [id number]  : 1960  Interpreting Physician: Humberto Judd DO  Referring Provider: Mark Pina, PA      Clinical History:  Alisia Malcolm is an 63 y.o. female with concerns for seizure like activity. Past Medical History:  Past Medical History:   Diagnosis Date    Anxiety disorder     Back pain, chronic     Cerebral palsy (HCC)     COPD (chronic obstructive pulmonary disease) ([facility] [address]     COVID-19 10/12/2021    CVA (cerebral infarction) 2014 x2    Diabetes mellitus ([facility] [address]     Diverticulosis     Epilepsy ([facility]     GERD (gastroesophageal reflux disease)     Hyperlipidemia     Hypertension     Insomnia     Iron (Fe) deficiency anemia     Mental disability     Seizures (HCC)     Unspecified cerebral artery occlusion with cerebral infarction         Current Medications:   Current Outpatient Medications   Medication Instructions    albuterol sulfate HFA (PROVENTIL;VENTOLIN;PROAIR) 108 (90 Base) MCG/ACT inhaler 2 puffs, Inhalation, EVERY 4 HOURS PRN    amLODIPine (NORVASC) 10 MG tablet TAKE 1 TABLET BY MOUTH DAILY    atorvastatin (LIPITOR) 40 mg, Oral, DAILY    blood glucose monitor strips Test 3 times a day & as needed for symptoms of irregular blood glucose.     busPIRone (BUSPAR) 10 mg, Oral, 2 TIMES DAILY    carBAMazepine (CARBATROL) 300 MG extended release capsule TAKE 1 CAPSULE BY MOUTH TWICE A DAY    cephALEXin (KEFLEX) 500 mg, Oral, 3 TIMES DAILY, Urinary Tract Infection    cephALEXin (KEFLEX) 500 mg, Oral, 3 TIMES DAILY    cetirizine (ZYRTEC) 10 MG tablet TAKE 1 TABLET BY MOUTH DAILY    D3 HIGH POTENCY 50 MCG ( UT) CAPS TAKE 1 CAPSULE BY MOUTH DAILY    Diapers & Supplies MISC 1 each, Does not apply, DAILY PRN    diphenhydrAMINE (BENADRYL) 25 mg, Oral, EVERY 6 HOURS PRN    Disposable Gloves MISC 1 Units, Does not apply, 4 TIMES DAILY    divalproex (DEPAKOTE) 1,000 mg, Oral, 2 TIMES DAILY FEROSUL 325 (65 Fe) MG tablet TAKE 1 TABLET BY MOUTH DAILY WITH BREAKFAST    fluticasone (FLONASE) 50 MCG/ACT nasal spray INSTILL 1 SPRAY INTO EACH NOSTRIL DAILY    Incontinence Supply Disposable (DEPEND UNDERWEAR LARGE/XL) MISC 1 Units, Does not apply, 4 TIMES DAILY    ipratropium-albuterol (DUONEB) 0.5-2.5 (3) MG/3ML SOLN nebulizer solution 3 mLs, Inhalation, EVERY 4 HOURS    JANUVIA 100 MG tablet TAKE 1 TABLET BY MOUTH DAILY    Lactobacillus (ACIDOPHILUS) CAPS capsule 1 capsule, Oral, DAILY    levETIRAcetam (KEPPRA) 1,500 mg, Oral, 2 TIMES DAILY    levothyroxine (SYNTHROID) 50 mcg, Oral, DAILY    lisinopril (PRINIVIL;ZESTRIL) 40 MG tablet TAKE 1 TABLET BY MOUTH DAILY    mirtazapine (REMERON) 30 mg, Oral, NIGHTLY    nystatin (MYCOSTATIN) 494482 UNIT/GM powder Apply topically 4 times daily. under the abdominal folds for 2 wks    pantoprazole (PROTONIX) 40 mg, Oral, DAILY    potassium chloride (KLOR-CON M) 20 MEQ extended release tablet 20 mEq, Oral, DAILY    Respiratory Therapy Supplies (NEBULIZER/TUBING/MOUTHPIECE) KIT 1 kit, Does not apply, DAILY    risperiDONE (RISPERDAL) 1 mg, Oral, 2 TIMES DAILY, Twice daily    sertraline (ZOLOFT) 150 mg, Oral, NIGHTLY    tiotropium-olodaterol (STIOLTO) 2.5-2.5 MCG/ACT AERS 2 puffs, Inhalation, DAILY    UNABLE TO FIND hospital bed mattress XL twin     cEEG start date & time: 12/27/22 at 0951  cEEG end date & time: 12/27/22 at 1256     Indication:  cEEG was initiated for monitoring and localization of seizures as the etiology of the encephalopathy/altered mental status. It was available for review at the bedside as well as via remote access for the entire period of monitoring. Technical Summary:  8 channels of continuous EEG over the bilateral frontopolar, temporal and occipital head regions were recorded in a digital format on a patient who is reported to be awake and drowsy during the recording. The background consists of 4-6 Hz activity in the theta frequency range. It is symmetric, normal voltage and continuous. Posterior dominant rhythm (PDR) is present/absent and it  reactive to stimulation. The recording is remarkable for the presence of:   Abundant epileptiform discharges in the form of spike waves seen over the right temporal head region, max at T4>F8/T6 EEG leads. No clinical events reported. EEG Interpretation: This EEG is abnormal due to the presence of:   Epileptiform activity over the right temporal head region. This finding is consistent with an area of focal cortical irritability and a process of epileptogenic potential.   Moderate generalized slowing. This is a non-specific finding but is consistent with a generalized disturbance of cerebral function. It may be seen in a variety of conditions, such as toxic, metabolic, post-anoxic, multi-focal or diffuse structural abnormalities. No electrographic seizures or non-convulsive status epilepticus was seen over the entire monitoring period. Of note, this EEG is limited due to the number of electrodes and lack of parasaggital brain region coverage. If clinical suspicions persist for seizure as an etiology for the patient's symptoms, or if suspicion remains high for an underlying seizure disorder, additional EEG testing utilizing the standard 10-20 system of electrode placement for full cerebral coverage should be considered. Clinical correlation recommended.      Sharon Trejo DO   Epileptologist  12/27/2022 11:46 PM

## 2022-12-28 NOTE — ED NOTES
Report received from [address] [place] [address],[address] and care assumed at this time.       Malcom Castillo, RN  12/27/22 1924

## 2022-12-30 ENCOUNTER — TELEPHONE (OUTPATIENT)
Dept: FAMILY MEDICINE CLINIC | Age: 62
End: 2022-12-30

## 2022-12-30 NOTE — TELEPHONE ENCOUNTER
Care Transitions Initial Follow Up Call    Outreach made within 2 business days of discharge: Yes    Patient: Colleen Goodrich Patient : 1960   MRN: [id number]  Reason for Admission: There are no discharge diagnoses documented for the most recent discharge. Discharge Date: 22       Spoke with: care taker    Discharge department/facility: [facility] Patient Contact:  Was patient able to fill all prescriptions: Yes  Was patient instructed to bring all medications to the follow-up visit: Yes  Is patient taking all medications as directed in the discharge summary?  Yes and No  Does patient understand their discharge instructions: Yes  Does patient have questions or concerns that need addressed prior to 7-14 day follow up office visit: no    Scheduled appointment with PCP within 7-14 days    Follow Up  Future Appointments   Date Time Provider Nicole Chapin   1/10/2023 11:30 MD Giorgio Schrader   2023 10:15 AM MD Vamsi Diallo [facility] 2023  8:45 AM MD Giorgio Jaramillo   3/27/2023  1:15 PM MD Vamsi Diallo [facility]   2023  8:30 AM MD Giorgio Jaramillo MA

## 2023-01-04 ENCOUNTER — TELEPHONE (OUTPATIENT)
Dept: FAMILY MEDICINE CLINIC | Age: 63
End: 2023-01-04

## 2023-01-04 NOTE — TELEPHONE ENCOUNTER
----- Message from Meli Parekh sent at 1/4/2023  8:51 AM EST -----  Subject: Message to Provider    QUESTIONS  Information for Provider? Lafayette Babinski from [employer] calling to see if we can   place a physical therapy order for pt in home order. Please advise.   ---------------------------------------------------------------------------  --------------  Daryle Haver INFO  [phone]; OK to leave message on voicemail  ---------------------------------------------------------------------------  --------------  SCRIPT ANSWERS  Relationship to Patient? Third Party  Third Party Type? Physician Office?    Representative Name? Marnie Larios

## 2023-01-10 ENCOUNTER — TELEMEDICINE (OUTPATIENT)
Dept: FAMILY MEDICINE CLINIC | Age: 63
End: 2023-01-10

## 2023-01-10 DIAGNOSIS — Z09 HOSPITAL DISCHARGE FOLLOW-UP: ICD-10-CM

## 2023-01-10 DIAGNOSIS — R53.1 WEAKNESS: ICD-10-CM

## 2023-01-10 DIAGNOSIS — R56.9 SEIZURE (HCC): Primary | ICD-10-CM

## 2023-01-10 ASSESSMENT — PATIENT HEALTH QUESTIONNAIRE - PHQ9
9. THOUGHTS THAT YOU WOULD BE BETTER OFF DEAD, OR OF HURTING YOURSELF: 0
SUM OF ALL RESPONSES TO PHQ9 QUESTIONS 1 & 2: 5
8. MOVING OR SPEAKING SO SLOWLY THAT OTHER PEOPLE COULD HAVE NOTICED. OR THE OPPOSITE, BEING SO FIGETY OR RESTLESS THAT YOU HAVE BEEN MOVING AROUND A LOT MORE THAN USUAL: 0
7. TROUBLE CONCENTRATING ON THINGS, SUCH AS READING THE NEWSPAPER OR WATCHING TELEVISION: 1
10. IF YOU CHECKED OFF ANY PROBLEMS, HOW DIFFICULT HAVE THESE PROBLEMS MADE IT FOR YOU TO DO YOUR WORK, TAKE CARE OF THINGS AT HOME, OR GET ALONG WITH OTHER PEOPLE: 1
4. FEELING TIRED OR HAVING LITTLE ENERGY: 2
SUM OF ALL RESPONSES TO PHQ QUESTIONS 1-9: 13
SUM OF ALL RESPONSES TO PHQ QUESTIONS 1-9: 13
5. POOR APPETITE OR OVEREATING: 2
2. FEELING DOWN, DEPRESSED OR HOPELESS: 3
6. FEELING BAD ABOUT YOURSELF - OR THAT YOU ARE A FAILURE OR HAVE LET YOURSELF OR YOUR FAMILY DOWN: 1
3. TROUBLE FALLING OR STAYING ASLEEP: 2
1. LITTLE INTEREST OR PLEASURE IN DOING THINGS: 2
SUM OF ALL RESPONSES TO PHQ QUESTIONS 1-9: 13
SUM OF ALL RESPONSES TO PHQ QUESTIONS 1-9: 13

## 2023-01-10 NOTE — PROGRESS NOTES
Post-Discharge Transitional Care  Follow Up      Jim Hernandez   YOB: 1960    Date of Office Visit:  1/10/2023  Date of Hospital Admission: 12/27/22  Date of Hospital Discharge: 12/27/22  Risk of hospital readmission (high >=14%. Medium >=10%) :Readmission Risk Score: 26.1      Care management risk score Rising risk (score 2-5) and Complex Care (Scores >=6): No Risk Score On File     Non face to face  following discharge, date last encounter closed (first attempt may have been earlier): 12/30/2022    Call initiated 2 business days of discharge: Yes    ASSESSMENT/PLAN:   Below is the assessment and plan developed based on review of pertinent history, physical exam, labs, studies, and medications. Seizure [facility])  Hospital discharge follow-up  [facility]  D/C the [facility] by the ED  Avoid out door activity if the Temerature 35  or less    Medical Decision Making: moderate complexity  No follow-ups on file. On this date 1/10/2023 I have spent 30 minutes reviewing previous notes, test results and face to face with the patient discussing the diagnosis and importance of compliance with the treatment plan as well as documenting on the day of the visit. Subjective:   HPI:  Follow up of Hospital problems/diagnosis(es): patient was admitted 24 hrs because of staring episodes\", observed seizure-like activity, presents to the emergency department via EMS with caretaker. Patient had blood work, CXR and CT head were fine, no more seizure, doing fine but dose feels tired and weak  Inpatient course: Discharge summary reviewed- see chart.     Interval history/Current status: stable    Patient Active Problem List   Diagnosis    Pneumonia due to infectious organism    HTN (hypertension), benign    Seizure disorder ([address]    Altered mental status    Chest pain    MR (mental retardation)    COPD exacerbation ([address]    Left hemiplegia ([address]    H/O: stroke    Mixed hyperlipidemia Acquired hypothyroidism    Sepsis ([facility]    Acute bronchitis    Acute respiratory failure with hypoxia (HCC)    Acute respiratory failure (HCC)    Anxiety and depression    Anemia    Controlled type 2 diabetes mellitus without complication, without long-term current use of insulin (HCC)    Diverticulosis    Lesion of right native kidney    Black stool    Hypoxia    COVID-19 virus infection    Other cerebral palsy (HCC)    Chronic respiratory failure (HCC)    COVID-19    Abnormal uterine and vaginal bleeding, unspecified    Bradyarrhythmia    Dyskinesia    Hemiplegia affecting left nondominant side (HCC)    Insomnia    Intermittent explosive disorder    Intraparenchymal hemorrhage of brain (HCC)    Iron deficiency anemia, unspecified    Organic personality syndrome    Subdural hematoma    Urinary incontinence    SOB (shortness of breath)    UTI (urinary tract infection)    Acute cystitis without hematuria       Medications listed as ordered at the time of discharge from hospital     Medication List            Accurate as of January 10, 2023 11:59 PM. If you have any questions, ask your nurse or doctor. CHANGE how you take these medications      Januvia 100 MG tablet  Generic drug: SITagliptin  TAKE 1 TABLET BY MOUTH DAILY  What changed: See the new instructions. CONTINUE taking these medications      acidophilus Caps capsule  TAKE 1 CAPSULE BY MOUTH DAILY     albuterol sulfate  (90 Base) MCG/ACT inhaler  Commonly known as: PROVENTIL;VENTOLIN;PROAIR  Inhale 2 puffs into the lungs every 4 hours as needed for Shortness of Breath     atorvastatin 40 MG tablet  Commonly known as: LIPITOR  TAKE 1 TABLET BY MOUTH DAILY     blood glucose test strips  Test 3 times a day & as needed for symptoms of irregular blood glucose.      busPIRone 10 MG tablet  Commonly known as: BUSPAR  Take 1 tablet by mouth 2 times daily     carBAMazepine 300 MG extended release capsule  Commonly known as: CARBATROL  TAKE 1 CAPSULE BY MOUTH TWICE A DAY     cetirizine 10 MG tablet  Commonly known as: ZYRTEC  TAKE 1 TABLET BY MOUTH DAILY     D3 High Potency 50 MCG (2000 UT) Caps  Generic drug: Cholecalciferol  TAKE 1 CAPSULE BY MOUTH DAILY     Depend Underwear Large/XL Misc  1 Units by Does not apply route 4 times daily     Diapers & Supplies Misc  1 each by Does not apply route daily as needed (4-5 times daily)     diphenhydrAMINE 25 MG capsule  Commonly known as: BENADRYL     Disposable Gloves Misc  1 Units by Does not apply route 4 times daily     divalproex 500 MG DR tablet  Commonly known as: DEPAKOTE  Take 2 tablets by mouth in the morning and 2 tablets in the evening. FeroSul 325 (65 Fe) MG tablet  Generic drug: ferrous sulfate  TAKE 1 TABLET BY MOUTH DAILY WITH BREAKFAST     fluticasone 50 MCG/ACT nasal spray  Commonly known as: FLONASE  INSTILL 1 SPRAY INTO EACH NOSTRIL DAILY     ipratropium-albuterol 0.5-2.5 (3) MG/3ML Soln nebulizer solution  Commonly known as: DUONEB  Inhale 3 mLs into the lungs every 4 hours     levETIRAcetam 750 MG tablet  Commonly known as: KEPPRA  Take 2 tablets by mouth 2 times daily     levothyroxine 50 MCG tablet  Commonly known as: SYNTHROID  Take 1 tablet by mouth Daily     lisinopril 40 MG tablet  Commonly known as: PRINIVIL;ZESTRIL  TAKE 1 TABLET BY MOUTH DAILY     mirtazapine 30 MG tablet  Commonly known as: REMERON  TAKE 1 TABLET BY MOUTH NIGHTLY     Nebulizer/Tubing/Mouthpiece Kit  1 kit by Does not apply route daily     nystatin 026659 UNIT/GM powder  Commonly known as: MYCOSTATIN  Apply topically 4 times daily. under the abdominal folds for 2 wks     pantoprazole 40 MG tablet  Commonly known as: PROTONIX     potassium chloride 20 MEQ extended release tablet  Commonly known as: KLOR-CON M  Take 1 tablet by mouth daily for 7 days     risperiDONE 1 MG tablet  Commonly known as: RisperDAL  Take 1 tablet by mouth 2 times daily Twice daily     sertraline 100 MG tablet  Commonly known as: ZOLOFT  Take 1.5 tablets by mouth nightly     tiotropium-olodaterol 2.5-2.5 MCG/ACT Aers  Commonly known as: STIOLTO  Inhale 2 puffs into the lungs daily     UNABLE TO FIND  hospital bed mattress XL twin                Medications marked \"taking\" at this time  Outpatient Medications Marked as Taking for the 1/10/23 encounter (Telemedicine) with Taty Pierce MD   Medication Sig Dispense Refill    albuterol sulfate HFA (PROVENTIL;VENTOLIN;PROAIR) 108 (90 Base) MCG/ACT inhaler Inhale 2 puffs into the lungs every 4 hours as needed for Shortness of Breath 18 g 1    Respiratory Therapy Supplies (NEBULIZER/TUBING/MOUTHPIECE) KIT 1 kit by Does not apply route daily 1 kit 3    divalproex (DEPAKOTE) 500 MG DR tablet Take 2 tablets by mouth in the morning and 2 tablets in the evening. 60 tablet 1    sertraline (ZOLOFT) 100 MG tablet Take 1.5 tablets by mouth nightly 30 tablet 1    risperiDONE (RISPERDAL) 1 MG tablet Take 1 tablet by mouth 2 times daily Twice daily 60 tablet 1    levothyroxine (SYNTHROID) 50 MCG tablet Take 1 tablet by mouth Daily 30 tablet 1    pantoprazole (PROTONIX) 40 MG tablet Take 40 mg by mouth daily      ipratropium-albuterol (DUONEB) 0.5-2.5 (3) MG/3ML SOLN nebulizer solution Inhale 3 mLs into the lungs every 4 hours 120 each 5    cetirizine (ZYRTEC) 10 MG tablet TAKE 1 TABLET BY MOUTH DAILY 31 tablet 10    FEROSUL 325 (65 Fe) MG tablet TAKE 1 TABLET BY MOUTH DAILY WITH BREAKFAST 31 tablet 10    nystatin (MYCOSTATIN) 950439 UNIT/GM powder Apply topically 4 times daily. under the abdominal folds for 2 wks 1 each 0    fluticasone (FLONASE) 50 MCG/ACT nasal spray INSTILL 1 SPRAY INTO EACH NOSTRIL DAILY 16 g 5    D3 HIGH POTENCY 50 MCG (2000 UT) CAPS TAKE 1 CAPSULE BY MOUTH DAILY 31 capsule 10    UNABLE TO FIND hospital bed mattress XL twin 1 Device 0    carBAMazepine (CARBATROL) 300 MG extended release capsule TAKE 1 CAPSULE BY MOUTH TWICE A DAY 56 capsule 5    Lactobacillus (ACIDOPHILUS) CAPS capsule TAKE 1 CAPSULE BY MOUTH DAILY 28 capsule 5    atorvastatin (LIPITOR) 40 MG tablet TAKE 1 TABLET BY MOUTH DAILY 31 tablet 5    levETIRAcetam (KEPPRA) 750 MG tablet Take 2 tablets by mouth 2 times daily 120 tablet 0    tiotropium-olodaterol (STIOLTO) 2.5-2.5 MCG/ACT AERS Inhale 2 puffs into the lungs daily 1 each 0    JANUVIA 100 MG tablet TAKE 1 TABLET BY MOUTH DAILY (Patient taking differently: 100 mg 2 times daily) 30 tablet 5    lisinopril (PRINIVIL;ZESTRIL) 40 MG tablet TAKE 1 TABLET BY MOUTH DAILY 31 tablet 5    Diapers & Supplies MISC 1 each by Does not apply route daily as needed (4-5 times daily) 100 each 5    mirtazapine (REMERON) 30 MG tablet TAKE 1 TABLET BY MOUTH NIGHTLY 31 tablet 5    Incontinence Supply Disposable (DEPEND UNDERWEAR LARGE/XL) MISC 1 Units by Does not apply route 4 times daily 100 Package 5    Disposable Gloves MISC 1 Units by Does not apply route 4 times daily 100 each 5    diphenhydrAMINE (BENADRYL) 25 MG capsule Take 25 mg by mouth every 6 hours as needed for Itching      blood glucose monitor strips Test 3 times a day & as needed for symptoms of irregular blood glucose. 90 strip 0    busPIRone (BUSPAR) 10 MG tablet Take 1 tablet by mouth 2 times daily 60 tablet 5        Medications patient taking as of now reconciled against medications ordered at time of hospital discharge: No    A comprehensive review of systems was negative except for what was noted in the HPI. Objective:    Patient-Reported Vitals  Patient-Reported Systolic (Top): 945 mmHg  Patient-Reported Diastolic (Bottom): 67 mmHg  BP Observations: Yes, BP was taken on electronic monitoring device with digital readout  Patient-Reported Weight: 132      General Appearance: in no acute distress and alert  Pulmonary/Chest: no respiratory distress  Extremities: looks normal  Neurologic: no tremor and no changes    Oneida Henry, was evaluated through a synchronous (real-time) audio-video encounter.  The patient (or guardian if applicable) is aware that this is a billable service, which includes applicable co-pays. This Virtual Visit was conducted with patient's (and/or legal guardian's) consent. The visit was conducted pursuant to the emergency declaration under the [facility], [facility] authority and the [employer] General Act. Patient identification was verified, and a caregiver was present when appropriate. The patient was located at Home: [address] Provider was located at [facility] ([place] [address]): [address] [facility]  [address],  [address] [facility]. An electronic signature was used to authenticate this note.   --Oralia Lopez MD

## 2023-01-16 RX ORDER — L. ACIDOPHILUS/PECTIN, CITRUS 25MM-100MG
TABLET ORAL
Qty: 28 TABLET | Refills: 5 | Status: SHIPPED | OUTPATIENT
Start: 2023-01-16

## 2023-01-18 PROBLEM — N39.0 UTI (URINARY TRACT INFECTION): Status: RESOLVED | Noted: 2022-12-19 | Resolved: 2023-01-18

## 2023-01-23 ENCOUNTER — TELEPHONE (OUTPATIENT)
Dept: FAMILY MEDICINE CLINIC | Age: 63
End: 2023-01-23

## 2023-01-23 ENCOUNTER — HOSPITAL ENCOUNTER (EMERGENCY)
Age: 63
Discharge: HOME | End: 2023-01-23
Attending: EMERGENCY MEDICINE
Payer: MEDICARE

## 2023-01-23 VITALS
WEIGHT: 190 LBS | DIASTOLIC BLOOD PRESSURE: 67 MMHG | BODY MASS INDEX: 39.71 KG/M2 | TEMPERATURE: 98.7 F | RESPIRATION RATE: 18 BRPM | HEART RATE: 51 BPM | OXYGEN SATURATION: 96 % | SYSTOLIC BLOOD PRESSURE: 142 MMHG

## 2023-01-23 DIAGNOSIS — N30.01 ACUTE CYSTITIS WITH HEMATURIA: Primary | ICD-10-CM

## 2023-01-23 LAB
ALBUMIN SERPL-MCNC: 3.6 GM/DL (ref 3.4–5)
ALP BLD-CCNC: 62 IU/L (ref 40–129)
ALT SERPL-CCNC: 9 U/L (ref 10–40)
ANION GAP SERPL CALCULATED.3IONS-SCNC: 7 MMOL/L (ref 4–16)
AST SERPL-CCNC: 14 IU/L (ref 15–37)
BACTERIA: NEGATIVE /HPF
BASOPHILS ABSOLUTE: 0 K/CU MM
BASOPHILS RELATIVE PERCENT: 0.4 % (ref 0–1)
BILIRUB SERPL-MCNC: 0.1 MG/DL (ref 0–1)
BILIRUBIN URINE: NEGATIVE
BLOOD, URINE: NORMAL
BUN BLDV-MCNC: 22 MG/DL (ref 6–23)
CALCIUM SERPL-MCNC: 8.9 MG/DL (ref 8.3–10.6)
CHLORIDE BLD-SCNC: 106 MMOL/L (ref 99–110)
CLARITY: CLEAR
CO2: 30 MMOL/L (ref 21–32)
COLOR: YELLOW
CREAT SERPL-MCNC: 0.5 MG/DL (ref 0.6–1.1)
DIFFERENTIAL TYPE: ABNORMAL
EOSINOPHILS ABSOLUTE: 0.1 K/CU MM
EOSINOPHILS RELATIVE PERCENT: 2.6 % (ref 0–3)
GFR SERPL CREATININE-BSD FRML MDRD: >60 ML/MIN/1.73M2
GLUCOSE BLD-MCNC: 98 MG/DL (ref 70–99)
GLUCOSE, URINE: NEGATIVE MG/DL
HCT VFR BLD CALC: 37.2 % (ref 37–47)
HEMOGLOBIN: 11.9 GM/DL (ref 12.5–16)
IMMATURE NEUTROPHIL %: 2.2 % (ref 0–0.43)
KETONES, URINE: NORMAL MG/DL
LEUKOCYTE ESTERASE, URINE: NORMAL
LYMPHOCYTES ABSOLUTE: 2.2 K/CU MM
LYMPHOCYTES RELATIVE PERCENT: 44.1 % (ref 24–44)
MCH RBC QN AUTO: 31.3 PG (ref 27–31)
MCHC RBC AUTO-ENTMCNC: 32 % (ref 32–36)
MCV RBC AUTO: 97.9 FL (ref 78–100)
MONOCYTES ABSOLUTE: 0.4 K/CU MM
MONOCYTES RELATIVE PERCENT: 7.7 % (ref 0–4)
MUCUS: NORMAL HPF
NITRITE URINE, QUANTITATIVE: POSITIVE
NUCLEATED RBC %: 0 %
PDW BLD-RTO: 12.3 % (ref 11.7–14.9)
PH, URINE: 6
PLATELET # BLD: 173 K/CU MM (ref 140–440)
PMV BLD AUTO: 8.6 FL (ref 7.5–11.1)
POTASSIUM SERPL-SCNC: 3.9 MMOL/L (ref 3.5–5.1)
PROTEIN UA: 100 MG/DL
RBC # BLD: 3.8 M/CU MM (ref 4.2–5.4)
RBC URINE: 28 /HPF
SEGMENTED NEUTROPHILS ABSOLUTE COUNT: 2.2 K/CU MM
SEGMENTED NEUTROPHILS RELATIVE PERCENT: 43 % (ref 36–66)
SODIUM BLD-SCNC: 143 MMOL/L (ref 135–145)
SPECIFIC GRAVITY UA: 1.02
TOTAL IMMATURE NEUTOROPHIL: 0.11 K/CU MM
TOTAL NUCLEATED RBC: 0 K/CU MM
TOTAL PROTEIN: 6.4 GM/DL (ref 6.4–8.2)
TRICHOMONAS: NORMAL /HPF
UROBILINOGEN, URINE: 1 MG/DL
WBC # BLD: 5.1 K/CU MM (ref 4–10.5)
WBC UA: 21 /HPF

## 2023-01-23 PROCEDURE — 85025 COMPLETE CBC W/AUTO DIFF WBC: CPT

## 2023-01-23 PROCEDURE — 87186 SC STD MICRODIL/AGAR DIL: CPT

## 2023-01-23 PROCEDURE — 87088 URINE BACTERIA CULTURE: CPT

## 2023-01-23 PROCEDURE — 87086 URINE CULTURE/COLONY COUNT: CPT

## 2023-01-23 PROCEDURE — 99283 EMERGENCY DEPT VISIT LOW MDM: CPT | Performed by: EMERGENCY MEDICINE

## 2023-01-23 PROCEDURE — 6370000000 HC RX 637 (ALT 250 FOR IP): Performed by: EMERGENCY MEDICINE

## 2023-01-23 PROCEDURE — 80053 COMPREHEN METABOLIC PANEL: CPT

## 2023-01-23 PROCEDURE — 81001 URINALYSIS AUTO W/SCOPE: CPT

## 2023-01-23 RX ORDER — CEPHALEXIN 500 MG/1
500 CAPSULE ORAL 2 TIMES DAILY
Qty: 14 CAPSULE | Refills: 0 | Status: SHIPPED | OUTPATIENT
Start: 2023-01-23 | End: 2023-01-30

## 2023-01-23 RX ORDER — SULFAMETHOXAZOLE AND TRIMETHOPRIM 800; 160 MG/1; MG/1
1 TABLET ORAL 2 TIMES DAILY
Qty: 14 TABLET | Refills: 0 | Status: SHIPPED | OUTPATIENT
Start: 2023-01-23 | End: 2023-01-30

## 2023-01-23 RX ORDER — CEPHALEXIN 250 MG/1
500 CAPSULE ORAL ONCE
Status: COMPLETED | OUTPATIENT
Start: 2023-01-23 | End: 2023-01-23

## 2023-01-23 RX ORDER — CEPHALEXIN 500 MG/1
500 CAPSULE ORAL 2 TIMES DAILY
Qty: 14 CAPSULE | Refills: 0 | Status: SHIPPED | OUTPATIENT
Start: 2023-01-23 | End: 2023-01-23 | Stop reason: SDUPTHER

## 2023-01-23 RX ADMIN — CEPHALEXIN 500 MG: 250 CAPSULE ORAL at 15:37

## 2023-01-23 NOTE — ED PROVIDER NOTES
EMERGENCY DEPARTMENT ENCOUNTER      CHIEF COMPLAINT:   Hematuria    HPI: Janette Abbott is a 63 y.o. female who presents to the emergency department, via EMS, for evaluation after caregiver found blood in her urine in her depends. The patient has a history of cerebral palsy. She is minimally verbal.  She will not answer any questions. She is generally well-appearing. No further information is available. REVIEW OF SYSTEMS:   \"Remaining review of systems unable to obtain due to patient being minimally verbal.  I have reviewed the nursing triage documentation and agree unless otherwise noted below. \"      PAST MEDICAL HISTORY:   Past Medical History:   Diagnosis Date    Anxiety disorder     Back pain, chronic     Cerebral palsy (HCC)     COPD (chronic obstructive pulmonary disease) ([facility]     COVID-19 10/12/2021    CVA (cerebral infarction) 2014 x2    Diabetes mellitus ([facility]     Diverticulosis     Epilepsy ([facility]     GERD (gastroesophageal reflux disease)     Hyperlipidemia     Hypertension     Insomnia     Iron (Fe) deficiency anemia     Mental disability     Seizures (HCC)     Unspecified cerebral artery occlusion with cerebral infarction        CURRENT MEDICATIONS:   Home medications reviewed.     SURGICAL HISTORY:   Past Surgical History:   Procedure Laterality Date    GASTROSTOMY TUBE PLACEMENT         FAMILY HISTORY:   Family History   Problem Relation Age of Onset    Breast Cancer Neg Hx        SOCIAL HISTORY:   Social History     Socioeconomic History    Marital status: Single     Spouse name: Not on file    Number of children: Not on file    Years of education: Not on file    Highest education level: Not on file   Occupational History    Not on file   Tobacco Use    Smoking status: Former     Packs/day: 1.50     Years: 20.00     Pack years: 30.00     Types: Cigarettes     Quit date: 2011     Years since quittin.4    Smokeless tobacco: Never   Vaping Use    Vaping Use: Never used   Substance and Sexual Activity    Alcohol use: No     Alcohol/week: 0.0 standard drinks    Drug use: No    Sexual activity: Not on file   Other Topics Concern    Not on file   Social History Narrative    ** Merged History Encounter **          Social Determinants of Health     Financial Resource Strain: Low Risk     Difficulty of Paying Living Expenses: Not very hard   Food Insecurity: No Food Insecurity    Worried About Running Out of Food in the Last Year: Never true    Ran Out of Food in the Last Year: Never true   Transportation Needs: No Transportation Needs    Lack of Transportation (Medical): No    Lack of Transportation (Non-Medical): No   Physical Activity: Inactive    Days of Exercise per Week: 0 days    Minutes of Exercise per Session: 0 min   Stress: No Stress Concern Present    Feeling of Stress : Only a little   Social Connections: Socially Isolated    Frequency of Communication with Friends and Family: Three times a week    Frequency of Social Gatherings with Friends and Family: Three times a week    Attends Yarsanism Services: Never    Active Member of Clubs or Organizations: No    Attends Club or Organization Meetings: Never    Marital Status: Never    Intimate Partner Violence: Not on file   Housing Stability: Low Risk     Unable to Pay for Housing in the Last Year: No    Number of Places Lived in the Last Year: 1    Unstable Housing in the Last Year: No       ALLERGIES: Macrobid [nitrofurantoin]    PHYSICAL EXAM:  VITAL SIGNS:   ED Triage Vitals [01/23/23 1110]   Enc Vitals Group      BP (!) 162/62      Heart Rate 77      Resp 17      Temp 98.7 °F (37.1 °C)      Temp Source Oral      SpO2 96 %      Weight 190 lb (86.2 kg)      Height       Head Circumference       Peak Flow       Pain Score       Pain Loc       Pain Edu? Excl. in [address]?       Constitutional:  Non-toxic appearance  HENT: Normocephalic, Atraumatic, Bilateral external ears normal, Oropharynx moist, No oral exudates, Nose normal.  Eyes: PERRL, EOMI, Conjunctiva normal, No discharge. Neck: Normal range of motion, No tenderness, Supple, No stridor, No lymphadenopathy. Cardiovascular:  Normal heart rate, Normal rhythm  Pulmonary/Chest:  Normal breath sounds, No respiratory distress, No wheezing  Abdomen: Bowel sounds normal, Soft, No tenderness, No masses, No pulsatile masses  Back:  No tenderness, No CVA tenderness  Extremities:  Normal range of motion, Intact distal pulses, No edema, No tenderness  Skin:  Warm, Dry, No erythema, No rash      EKG Interpretation  None    Radiology / Procedures:  Labs Reviewed   CULTURE, URINE - Abnormal; Notable for the following components:       Result Value    Culture ESCHERICHIA COLI >100,000 CFU/ml (*)     All other components within normal limits    Narrative:     SETUP DATE/TIME:  01/23/2023 1914   CBC WITH AUTO DIFFERENTIAL - Abnormal; Notable for the following components:    RBC 3.80 (*)     Hemoglobin 11.9 (*)     MCH 31.3 (*)     Lymphocytes % 44.1 (*)     Monocytes % 7.7 (*)     Immature Neutrophil % 2.2 (*)     All other components within normal limits   COMPREHENSIVE METABOLIC PANEL - Abnormal; Notable for the following components:    Creatinine 0.5 (*)     ALT 9 (*)     AST 14 (*)     All other components within normal limits   URINALYSIS   MICROSCOPIC URINALYSIS       ED COURSE & MEDICAL DECISION MAKING:  Janette Daily is a 63 y.o. female who presents to the Emergency Department, via EMS from a group home where she resides, for evaluation after they found blood in her urine in her depends today. The patient is minimally verbal at baseline. Please see HPI for details. History from : EMS and Caregiver    Limitations to history : Minimally verbal at baseline    Chronic conditions affecting care: Cerebral palsy    Social Determinants : None    Records Reviewed : None    On exam, the patient is afebrile and nontoxic appearing.   She is hypertensive with a known history of hypertension and is asymptomatic. This improves without treatment. She is otherwise is hemodynamically stable and at her neurological baseline. Labs are obtained and are significant for mild anemia which appears to be chronic and stable and a urinalysis that is nitrite positive with 28 red cells, 21 white blood cells and small leukocytes concerning for a UTI. Urine culture is pending. Patient was given the following medications:  Medications   cephALEXin (KEFLEX) capsule 500 mg (500 mg Oral Given 1/23/23 3297)       Diagnosis and Differential Diagnosis:  I suspect that the patient has acute cystitis with hematuria. I have a low suspicion for hemorrhagic cystitis, pyelonephritis, acute surgical abdomen or sepsis. Disposition Considerations:  I feel that the patient is stable for outpatient management with follow up in 2-3 days. Prescriptions for Keflex will be prescribed as below. The caregiver was given return precautions by telephone per the nurse. I am the Primary Clinician of Record. Clinical Impression:  1. Acute cystitis with hematuria        Disposition referral (if applicable):  Margaret Cruz MD  [address] [facility] [place]  [phone]    Schedule an appointment as soon as possible for a visit       [address] [place] [address] Emergency Department  [place] [id number] [phone]  Go to   If symptoms worsen    Disposition medications (if applicable):  Discharge Medication List as of 1/23/2023  3:11 PM        START taking these medications    Details   cephALEXin (KEFLEX) 500 MG capsule Take 1 capsule by mouth 2 times daily for 7 days, Disp-14 capsule, R-0Print               Comment: Please note this report has been produced using speech recognition software and may contain errors related to that system including errors in grammar, punctuation, and spelling, as well as words and phrases that may be inappropriate.  If there are any questions or concerns please feel free to contact the dictating provider for clarification.         John Sarabia MD  01/28/23 5904

## 2023-01-23 NOTE — ED NOTES
Dr. Codi Yee to change script to e-send to [facility] pharmacy per caregiver.      Stephanie Arriola, RN  01/23/23 0142

## 2023-01-23 NOTE — ED NOTES
1500 called [facility] for BLS unit to transport patitent back to private residence. Spoke with Matias Car at intake. ETA for pickup is schduled for 1600.       Swapna Noel  01/23/23 3014

## 2023-01-23 NOTE — ED NOTES
Caregiver at bedside. States pt has not been taking PO fluids much all weekend and has not been urinating much. She states that when she went to change pt depends today she noticed blood in urine.      Ryan Bianchi RN  01/23/23 7815

## 2023-01-23 NOTE — TELEPHONE ENCOUNTER
Pt staff called and stated that they think she has a UTI and wants to know if you can call something in they state she has some blood in her urine. Please advise.

## 2023-01-25 ENCOUNTER — CARE COORDINATION (OUTPATIENT)
Dept: CARE COORDINATION | Age: 63
End: 2023-01-25

## 2023-01-25 LAB
CULTURE: ABNORMAL
CULTURE: ABNORMAL
Lab: ABNORMAL
SPECIMEN: ABNORMAL

## 2023-01-25 NOTE — CARE COORDINATION
Spoke with patient caregiver for [facility] ER follow up; potential enrollment. Oriented to the role of ACM. Patient caregiver reports that patient is doing better. Confirms that patient has antibiotic and is taking as directed. Encouraged patient caregiver to have patient complete entire course of antibiotic. Instructed on recommendation for Provider follow up. Denies need for assistance to schedule. Confirmed plan for Oncology appt. Tomorrow. Patient caregiver reports that patient has 24/7 caregiver support. Reports that staff provide transportation and assist with medication management. Patient caregiver reports that patient is well supported at home and denies the need for ongoing ACM follow up. ACM contact information provided should needs arise.

## 2023-02-02 ENCOUNTER — APPOINTMENT (OUTPATIENT)
Dept: CT IMAGING | Age: 63
End: 2023-02-02
Payer: MEDICAID

## 2023-02-02 ENCOUNTER — APPOINTMENT (OUTPATIENT)
Dept: GENERAL RADIOLOGY | Age: 63
End: 2023-02-02
Payer: MEDICAID

## 2023-02-02 ENCOUNTER — HOSPITAL ENCOUNTER (EMERGENCY)
Age: 63
Discharge: HOME | End: 2023-02-02
Attending: EMERGENCY MEDICINE
Payer: MEDICAID

## 2023-02-02 ENCOUNTER — OFFICE VISIT (OUTPATIENT)
Dept: FAMILY MEDICINE CLINIC | Age: 63
End: 2023-02-02
Payer: MEDICAID

## 2023-02-02 VITALS
HEART RATE: 71 BPM | RESPIRATION RATE: 16 BRPM | WEIGHT: 150 LBS | OXYGEN SATURATION: 98 % | SYSTOLIC BLOOD PRESSURE: 128 MMHG | DIASTOLIC BLOOD PRESSURE: 60 MMHG | BODY MASS INDEX: 33.74 KG/M2 | TEMPERATURE: 98.8 F | HEIGHT: 56 IN

## 2023-02-02 VITALS — OXYGEN SATURATION: 97 % | SYSTOLIC BLOOD PRESSURE: 122 MMHG | DIASTOLIC BLOOD PRESSURE: 82 MMHG | HEART RATE: 75 BPM

## 2023-02-02 DIAGNOSIS — J44.9 CHRONIC OBSTRUCTIVE PULMONARY DISEASE, UNSPECIFIED COPD TYPE (HCC): ICD-10-CM

## 2023-02-02 DIAGNOSIS — R39.89 URINARY PROBLEM: ICD-10-CM

## 2023-02-02 DIAGNOSIS — J96.11 CHRONIC RESPIRATORY FAILURE WITH HYPOXIA (HCC): ICD-10-CM

## 2023-02-02 DIAGNOSIS — R41.82 ALTERED MENTAL STATUS, UNSPECIFIED ALTERED MENTAL STATUS TYPE: Primary | ICD-10-CM

## 2023-02-02 DIAGNOSIS — R44.3 HALLUCINATIONS: Primary | ICD-10-CM

## 2023-02-02 DIAGNOSIS — R53.1 WEAKNESS: ICD-10-CM

## 2023-02-02 DIAGNOSIS — G80.9 CEREBRAL PALSY, UNSPECIFIED TYPE (HCC): ICD-10-CM

## 2023-02-02 LAB
ALBUMIN SERPL-MCNC: 3.9 GM/DL (ref 3.4–5)
ALP BLD-CCNC: 75 IU/L (ref 40–129)
ALT SERPL-CCNC: 14 U/L (ref 10–40)
ANION GAP SERPL CALCULATED.3IONS-SCNC: 10 MMOL/L (ref 4–16)
AST SERPL-CCNC: 26 IU/L (ref 15–37)
BASOPHILS ABSOLUTE: 0 K/CU MM
BASOPHILS RELATIVE PERCENT: 0.5 % (ref 0–1)
BILIRUB SERPL-MCNC: 0.2 MG/DL (ref 0–1)
BILIRUBIN URINE: NEGATIVE MG/DL
BLOOD, URINE: NEGATIVE
BUN SERPL-MCNC: 24 MG/DL (ref 6–23)
CALCIUM SERPL-MCNC: 9 MG/DL (ref 8.3–10.6)
CHLORIDE BLD-SCNC: 104 MMOL/L (ref 99–110)
CLARITY: CLEAR
CO2: 28 MMOL/L (ref 21–32)
COLOR: YELLOW
COMMENT UA: ABNORMAL
CREAT SERPL-MCNC: 0.5 MG/DL (ref 0.6–1.1)
DIFFERENTIAL TYPE: ABNORMAL
EOSINOPHILS ABSOLUTE: 0.1 K/CU MM
EOSINOPHILS RELATIVE PERCENT: 1.1 % (ref 0–3)
GFR SERPL CREATININE-BSD FRML MDRD: >60 ML/MIN/1.73M2
GLUCOSE SERPL-MCNC: 97 MG/DL (ref 70–99)
GLUCOSE, URINE: NEGATIVE MG/DL
HCT VFR BLD CALC: 38.6 % (ref 37–47)
HEMOGLOBIN: 11.9 GM/DL (ref 12.5–16)
IMMATURE NEUTROPHIL %: 1.1 % (ref 0–0.43)
KETONES, URINE: ABNORMAL MG/DL
LEUKOCYTE ESTERASE, URINE: NEGATIVE
LYMPHOCYTES ABSOLUTE: 2 K/CU MM
LYMPHOCYTES RELATIVE PERCENT: 45.9 % (ref 24–44)
MCH RBC QN AUTO: 31 PG (ref 27–31)
MCHC RBC AUTO-ENTMCNC: 30.8 % (ref 32–36)
MCV RBC AUTO: 100.5 FL (ref 78–100)
MONOCYTES ABSOLUTE: 0.3 K/CU MM
MONOCYTES RELATIVE PERCENT: 7.3 % (ref 0–4)
NITRITE URINE, QUANTITATIVE: NEGATIVE
NUCLEATED RBC %: 0 %
PDW BLD-RTO: 12.4 % (ref 11.7–14.9)
PH, URINE: 6 (ref 5–8)
PLATELET # BLD: 175 K/CU MM (ref 140–440)
PMV BLD AUTO: 8.9 FL (ref 7.5–11.1)
POTASSIUM SERPL-SCNC: 4.3 MMOL/L (ref 3.5–5.1)
PROTEIN UA: NEGATIVE MG/DL
RBC # BLD: 3.84 M/CU MM (ref 4.2–5.4)
SEGMENTED NEUTROPHILS ABSOLUTE COUNT: 1.9 K/CU MM
SEGMENTED NEUTROPHILS RELATIVE PERCENT: 44.1 % (ref 36–66)
SODIUM BLD-SCNC: 142 MMOL/L (ref 135–145)
SPECIFIC GRAVITY UA: >1.03 (ref 1–1.03)
TOTAL IMMATURE NEUTOROPHIL: 0.05 K/CU MM
TOTAL NUCLEATED RBC: 0 K/CU MM
TOTAL PROTEIN: 6.9 GM/DL (ref 6.4–8.2)
TROPONIN T: <0.01 NG/ML
UROBILINOGEN, URINE: 0.2 MG/DL (ref 0.2–1)
WBC # BLD: 4.4 K/CU MM (ref 4–10.5)

## 2023-02-02 PROCEDURE — 85025 COMPLETE CBC W/AUTO DIFF WBC: CPT

## 2023-02-02 PROCEDURE — 81003 URINALYSIS AUTO W/O SCOPE: CPT

## 2023-02-02 PROCEDURE — 70450 CT HEAD/BRAIN W/O DYE: CPT

## 2023-02-02 PROCEDURE — 99214 OFFICE O/P EST MOD 30 MIN: CPT | Performed by: FAMILY MEDICINE

## 2023-02-02 PROCEDURE — 71045 X-RAY EXAM CHEST 1 VIEW: CPT

## 2023-02-02 PROCEDURE — 80053 COMPREHEN METABOLIC PANEL: CPT

## 2023-02-02 PROCEDURE — 93005 ELECTROCARDIOGRAM TRACING: CPT | Performed by: EMERGENCY MEDICINE

## 2023-02-02 PROCEDURE — 3074F SYST BP LT 130 MM HG: CPT | Performed by: FAMILY MEDICINE

## 2023-02-02 PROCEDURE — 84484 ASSAY OF TROPONIN QUANT: CPT

## 2023-02-02 PROCEDURE — 3078F DIAST BP <80 MM HG: CPT | Performed by: FAMILY MEDICINE

## 2023-02-02 PROCEDURE — 99285 EMERGENCY DEPT VISIT HI MDM: CPT | Performed by: EMERGENCY MEDICINE

## 2023-02-02 NOTE — ED NOTES
Pt resting in position of comfort on cot presenting in no acute/ apparent distress (NAD). Respirations are noted even and unlabored with good rise and fall of the chest observed. Pt updated with current plan of care (POC) and all questions/ concerns addressed. Patient voices no needs at this time. Cot noted in lowest position, locked, with side rails X 2 up for patient safety. Will continue to monitor patient for acute changes.       [x] Side rails up    [x] Cart in lowest position    [x] Family at bedside    [x] Call light within reach           Ioana Green RN  02/02/23 3774

## 2023-02-02 NOTE — ED NOTES
The following labs were labeled with appropriate pt sticker and tubed to lab:     [] Blue     [] Lavender   [] on ice  [] Green/yellow  [] Green/black [] on ice  [] Willma Economy  [] on ice  [] Yellow  [] Red  [] Type/ Screen  [] ABG  [] VBG    [] COVID-19 swab    [] Rapid  [] PCR  [] Flu swab  [] Peds Viral Panel     [x] Urine Sample  [] Fecal Sample  [] Pelvic Cultures  [] Blood Cultures  [] X 2  [] STREP Cultures         Louis Francisco RN  02/02/23 5199

## 2023-02-02 NOTE — PROGRESS NOTES
Bety Danielson  1960    Chief Complaint   Patient presents with    Other     Patient c/o mental status changes           Patient brought with 2 care giver, c/o mental status changes, not eating, not active as usual going on for few wks, went to the ED on 12/3 and was evaluated and antibiotic was given patient still the same, no acting rosario, no eating well, not smiling. No fever, no vomiting.       Past Medical History:   Diagnosis Date    Anxiety disorder     Back pain, chronic     Cerebral palsy (HCC)     COPD (chronic obstructive pulmonary disease) ([facility] [address]     COVID-19 10/12/2021    CVA (cerebral infarction) 2014 x2    Diabetes mellitus ([facility] [address]     Diverticulosis     Epilepsy ([facility] [address]     GERD (gastroesophageal reflux disease)     Hyperlipidemia     Hypertension     Insomnia     Iron (Fe) deficiency anemia     Mental disability     Seizures (HCC)     Unspecified cerebral artery occlusion with cerebral infarction      Past Surgical History:   Procedure Laterality Date    GASTROSTOMY TUBE PLACEMENT       Family History   Problem Relation Age of Onset    Breast Cancer Neg Hx      Social History     Socioeconomic History    Marital status: Single     Spouse name: Not on file    Number of children: Not on file    Years of education: Not on file    Highest education level: Not on file   Occupational History    Not on file   Tobacco Use    Smoking status: Former     Packs/day: 1.50     Years: 20.00     Pack years: 30.00     Types: Cigarettes     Quit date: 2011     Years since quittin.4    Smokeless tobacco: Never   Vaping Use    Vaping Use: Never used   Substance and Sexual Activity    Alcohol use: No     Alcohol/week: 0.0 standard drinks    Drug use: No    Sexual activity: Not on file   Other Topics Concern    Not on file   Social History Narrative    ** Merged History Encounter **          Social Determinants of Health     Financial Resource Strain: Low Risk     Difficulty of Paying Living Expenses: Not very hard   Food Insecurity: No Food Insecurity    Worried About [facility] in the Last Year: Never true    Ran Out of Food in the Last Year: Never true   Transportation Needs: No Transportation Needs    Lack of Transportation (Medical): No    Lack of Transportation (Non-Medical): No   Physical Activity: Inactive    Days of Exercise per Week: 0 days    Minutes of Exercise per Session: 0 min   Stress: No Stress Concern Present    Feeling of Stress : Only a little   Social Connections: Socially Isolated    Frequency of Communication with Friends and Family: Three times a week    Frequency of Social Gatherings with Friends and Family: Three times a week    Attends [employer] Services: Never    Active Member of Clubs or Organizations: No    Attends Club or Organization Meetings: Never    Marital Status: Never    Intimate Partner Violence: Not on file   Housing Stability: Low Risk     Unable to Pay for Housing in the Last Year: No    Number of [place] in the Last Year: 1    Unstable Housing in the Last Year: No       Allergies   Allergen Reactions    Macrobid [Nitrofurantoin] Hives and Swelling     Hives     Current Outpatient Medications   Medication Sig Dispense Refill    albuterol-ipratropium (COMBIVENT RESPIMAT)  MCG/ACT AERS inhaler Inhale 1 puff into the lungs in the morning and 1 puff at noon and 1 puff in the evening and 1 puff before bedtime. OXYGEN Inhale 2 L into the lungs continuous      Lactobacillus Acid-Pectin (ACIDOPHILUS/CITRUS PECTIN) TABS TAKE 1 CAPSULE BY MOUTH DAILY 28 tablet 5    albuterol sulfate HFA (PROVENTIL;VENTOLIN;PROAIR) 108 (90 Base) MCG/ACT inhaler Inhale 2 puffs into the lungs every 4 hours as needed for Shortness of Breath 18 g 1    Respiratory Therapy Supplies (NEBULIZER/TUBING/MOUTHPIECE) KIT 1 kit by Does not apply route daily 1 kit 3    divalproex (DEPAKOTE) 500 MG DR tablet Take 2 tablets by mouth in the morning and 2 tablets in the evening.  61 tablet 1    sertraline (ZOLOFT) 100 MG tablet Take 1.5 tablets by mouth nightly 30 tablet 1    risperiDONE (RISPERDAL) 1 MG tablet Take 1 tablet by mouth 2 times daily Twice daily 60 tablet 1    levothyroxine (SYNTHROID) 50 MCG tablet Take 1 tablet by mouth Daily 30 tablet 1    pantoprazole (PROTONIX) 40 MG tablet Take 40 mg by mouth daily      ipratropium-albuterol (DUONEB) 0.5-2.5 (3) MG/3ML SOLN nebulizer solution Inhale 3 mLs into the lungs every 4 hours 120 each 5    cetirizine (ZYRTEC) 10 MG tablet TAKE 1 TABLET BY MOUTH DAILY 31 tablet 10    nystatin (MYCOSTATIN) 938298 UNIT/GM powder Apply topically 4 times daily. under the abdominal folds for 2 wks 1 each 0    fluticasone (FLONASE) 50 MCG/ACT nasal spray INSTILL 1 SPRAY INTO EACH NOSTRIL DAILY 16 g 5    D3 HIGH POTENCY 50 MCG (2000 UT) CAPS TAKE 1 CAPSULE BY MOUTH DAILY 31 capsule 10    UNABLE TO FIND hospital bed mattress XL twin 1 Device 0    carBAMazepine (CARBATROL) 300 MG extended release capsule TAKE 1 CAPSULE BY MOUTH TWICE A DAY 56 capsule 5    atorvastatin (LIPITOR) 40 MG tablet TAKE 1 TABLET BY MOUTH DAILY 31 tablet 5    levETIRAcetam (KEPPRA) 750 MG tablet Take 2 tablets by mouth 2 times daily 120 tablet 0    JANUVIA 100 MG tablet TAKE 1 TABLET BY MOUTH DAILY (Patient taking differently: 100 mg 2 times daily) 30 tablet 5    lisinopril (PRINIVIL;ZESTRIL) 40 MG tablet TAKE 1 TABLET BY MOUTH DAILY 31 tablet 5    Diapers & Supplies MISC 1 each by Does not apply route daily as needed (4-5 times daily) 100 each 5    mirtazapine (REMERON) 30 MG tablet TAKE 1 TABLET BY MOUTH NIGHTLY 31 tablet 5    Incontinence Supply Disposable (DEPEND UNDERWEAR LARGE/XL) MISC 1 Units by Does not apply route 4 times daily 100 Package 5    Disposable Gloves MISC 1 Units by Does not apply route 4 times daily 100 each 5    blood glucose monitor strips Test 3 times a day & as needed for symptoms of irregular blood glucose.  90 strip 0    busPIRone (BUSPAR) 10 MG tablet Take 1 tablet by mouth 2 times daily 60 tablet 5    potassium chloride (KLOR-CON M) 20 MEQ extended release tablet Take 1 tablet by mouth daily for 7 days (Patient not taking: Reported on 2/2/2023) 7 tablet 0    FEROSUL 325 (65 Fe) MG tablet TAKE 1 TABLET BY MOUTH DAILY WITH BREAKFAST (Patient not taking: Reported on 2/2/2023) 31 tablet 10    tiotropium-olodaterol (STIOLTO) 2.5-2.5 MCG/ACT AERS Inhale 2 puffs into the lungs daily (Patient not taking: No sig reported) 1 each 0    diphenhydrAMINE (BENADRYL) 25 MG capsule Take 25 mg by mouth every 6 hours as needed for Itching (Patient not taking: Reported on 2/2/2023)       No current facility-administered medications for this visit. Review of Systems   Constitutional:  Positive for activity change and fatigue. Negative for appetite change, chills and fever. HENT:  Negative for congestion. Respiratory:  Negative for cough and shortness of breath. Cardiovascular:  Negative for chest pain and leg swelling. Gastrointestinal:  Negative for abdominal pain, constipation and diarrhea. Genitourinary:  Positive for dysuria. Negative for frequency. Musculoskeletal:  Negative for back pain. Skin:  Negative for rash. Neurological:  Positive for weakness. Negative for dizziness and headaches. Psychiatric/Behavioral:  Positive for behavioral problems and confusion. Negative for agitation, dysphoric mood, hallucinations and sleep disturbance. The patient is nervous/anxious. The patient is not hyperactive.       Lab Results   Component Value Date    WBC 4.4 02/02/2023    HGB 11.9 (L) 02/02/2023    HCT 38.6 02/02/2023    .5 (H) 02/02/2023     02/02/2023     Lab Results   Component Value Date     02/02/2023    K 4.3 02/02/2023     02/02/2023    CO2 28 02/02/2023    BUN 24 (H) 02/02/2023    CREATININE 0.5 (L) 02/02/2023    GLUCOSE 97 02/02/2023    CALCIUM 9.0 02/02/2023    PROT 6.9 02/02/2023    LABALBU 3.9 02/02/2023    BILITOT 0.2 02/02/2023 ALKPHOS 75 02/02/2023    AST 26 02/02/2023    ALT 14 02/02/2023    LABGLOM >60 02/02/2023    GFRAA >60 10/13/2022     Lab Results   Component Value Date    CHOL 223 (H) 12/06/2022    CHOL 161 08/19/2022    CHOL 155 06/14/2022     Lab Results   Component Value Date    TRIG 476 (H) 12/06/2022    TRIG 269 (H) 08/19/2022    TRIG 318 (H) 06/14/2022     Lab Results   Component Value Date    HDL 46 12/06/2022    HDL 40 (L) 08/19/2022    HDL 45 06/14/2022     Lab Results   Component Value Date    LDLCALC        12/06/2022    LDLCALC 67 08/19/2022    LDLCALC 46 06/14/2022     Lab Results   Component Value Date    LABA1C 5.6 12/06/2022     Lab Results   Component Value Date    TSHHS 3.800 12/06/2022         /82 (Site: Right Upper Arm, Position: Sitting, Cuff Size: Medium Adult)   Pulse 75   SpO2 97%     BP Readings from Last 3 Encounters:   02/02/23 128/60   02/02/23 122/82   01/23/23 (!) 142/67       Wt Readings from Last 3 Encounters:   02/02/23 150 lb (68 kg)   01/23/23 190 lb (86.2 kg)   01/18/23 190 lb (86.2 kg)         Physical Exam  Constitutional:       General: She is not in acute distress. Appearance: She is well-developed. She is ill-appearing. She is not diaphoretic. HENT:      Head: Normocephalic and atraumatic. Eyes:      General: No scleral icterus. Pupils: Pupils are equal, round, and reactive to light. Cardiovascular:      Rate and Rhythm: Normal rate and regular rhythm. Heart sounds: Normal heart sounds. No murmur heard. Pulmonary:      Effort: Pulmonary effort is normal.      Breath sounds: Wheezing and rhonchi present. Abdominal:      General: There is no distension. Palpations: There is no mass. Tenderness: There is no abdominal tenderness. Musculoskeletal:         General: Normal range of motion. Cervical back: Normal range of motion and neck supple. No rigidity. Right lower leg: No edema. Left lower leg: No edema.    Neurological:      Mental Status: She is alert. She is disoriented. Motor: Weakness present. Psychiatric:         Behavior: Behavior normal.       ASSESSMENT/ PLAN:    1. Altered mental status, unspecified altered mental status type  - patient referred to the ED for further evaluation    2. Weakness  - needs to go to the ED    3. Urinary problem  - needs to go to the ED            - All old blood work reviewed with the patient  - Appropriate prescription are addressed. - After visit summery provided. - Questions answered and patient verbalizes understanding.  - Call for any problem, questions, or concerns. Return if symptoms worsen or fail to improve.

## 2023-02-02 NOTE — ED NOTES
Patient noted stable and presenting in no acute distress. Discharge instructions and medication list reviewed (as required) with the patient. All questions and concerns were addressed at this time, and the patient expresses understanding. Patient released with vitals noted as recorded. Patient caregivers assisting with dressing and placement in wheeled chair.             Lou Bone RN  02/02/23 [id number]

## 2023-02-02 NOTE — ED NOTES
The following labs were labeled with appropriate pt sticker and tubed to lab:     [x] Blue     [x] Lavender   [] on ice  [x] Green/yellow  [x] Green/black [] on ice  [x] Grey  [x] on ice  [] Yellow  [x] Red  [] Type/ Screen  [] ABG  [] VBG    [] COVID-19 swab    [] Rapid  [] PCR  [] Flu swab  [] Peds Viral Panel     [] Urine Sample  [] Fecal Sample  [] Pelvic Cultures  [] Blood Cultures  [] X 2  [] STREP Cultures         Ashly Faulkner RN  02/02/23 1137

## 2023-02-02 NOTE — ED PROVIDER NOTES
Emergency Department Encounter    Patient: Tejas Villegas  MRN: [id number]  : 1960  Date of Evaluation: 2023  ED Provider:  Lauren Falcon MD    Triage Chief Complaint:   Other (Uti 2 weeks ago with altered mental,finished atb 2 days ago symptoms return)    [place]:  Tejas Villegas is a 63 y.o. female that presents with complaint of concern for altered mental status. Report was that she had been diagnosed with a urinary tract infection a couple of weeks ago, and completed her antibiotics. She will still occasionally pick at things in the air or say things that are off the wall. 2 caregivers are here, as is her mother. She has a history of cerebral palsy, her only complaint was initially vaginal pain, she went to see PCP today and they referred her back here for reevaluation. She is on oxygen at baseline for her COPD. She does have a history of diabetes. She has had no fevers. No vomiting. No diarrhea. No cough. No shortness of breath.   Otherwise has been acting normally    ROS - see HPI, below listed is current ROS at time of my eval:  Limited  patient's mental disability    Past Medical History:   Diagnosis Date    Anxiety disorder     Back pain, chronic     Cerebral palsy (HCC)     COPD (chronic obstructive pulmonary disease) ([facility]     COVID-19 10/12/2021    CVA (cerebral infarction) 2014 x2    Diabetes mellitus ([address]     Diverticulosis     Epilepsy ([facility] [address]     GERD (gastroesophageal reflux disease)     Hyperlipidemia     Hypertension     Insomnia     Iron (Fe) deficiency anemia     Mental disability     Seizures (HCC)     Unspecified cerebral artery occlusion with cerebral infarction      Past Surgical History:   Procedure Laterality Date    GASTROSTOMY TUBE PLACEMENT       Family History   Problem Relation Age of Onset    Breast Cancer Neg Hx      Social History     Socioeconomic History    Marital status: Single     Spouse name: Not on file    Number of children: Not on file    Years of education: Not on file    Highest education level: Not on file   Occupational History    Not on file   Tobacco Use    Smoking status: Former     Packs/day: 1.50     Years: 20.00     Pack years: 30.00     Types: Cigarettes     Quit date: 2011     Years since quittin.4    Smokeless tobacco: Never   Vaping Use    Vaping Use: Never used   Substance and Sexual Activity    Alcohol use: No     Alcohol/week: 0.0 standard drinks    Drug use: No    Sexual activity: Not on file   Other Topics Concern    Not on file   Social History Narrative    ** Merged History Encounter **          Social Determinants of Health     Financial Resource Strain: Low Risk     Difficulty of Paying Living Expenses: Not very hard   Food Insecurity: No Food Insecurity    Worried About Running Out of Food in the Last Year: Never true    Ran Out of Food in the Last Year: Never true   Transportation Needs: No Transportation Needs    Lack of Transportation (Medical): No    Lack of Transportation (Non-Medical): No   Physical Activity: Inactive    Days of Exercise per Week: 0 days    Minutes of Exercise per Session: 0 min   Stress: No Stress Concern Present    Feeling of Stress : Only a little   Social Connections: Socially Isolated    Frequency of Communication with Friends and Family: Three times a week    Frequency of Social Gatherings with Friends and Family: Three times a week    Attends Jehovah's witness Services: Never    Active Member of Clubs or Organizations: No    Attends Club or Organization Meetings: Never    Marital Status: Never    Intimate Partner Violence: Not on file   Housing Stability: Low Risk     Unable to Pay for Housing in the Last Year: No    Number of [place] in the Last Year: 1    Unstable Housing in the Last Year: No     No current facility-administered medications for this encounter.      Current Outpatient Medications   Medication Sig Dispense Refill    albuterol-ipratropium (COMBIVENT RESPIMAT)  MCG/ACT AERS inhaler Inhale 1 puff into the lungs in the morning and 1 puff at noon and 1 puff in the evening and 1 puff before bedtime. OXYGEN Inhale 2 L into the lungs continuous      Lactobacillus Acid-Pectin (ACIDOPHILUS/CITRUS PECTIN) TABS TAKE 1 CAPSULE BY MOUTH DAILY 28 tablet 5    albuterol sulfate HFA (PROVENTIL;VENTOLIN;PROAIR) 108 (90 Base) MCG/ACT inhaler Inhale 2 puffs into the lungs every 4 hours as needed for Shortness of Breath 18 g 1    Respiratory Therapy Supplies (NEBULIZER/TUBING/MOUTHPIECE) KIT 1 kit by Does not apply route daily 1 kit 3    divalproex (DEPAKOTE) 500 MG DR tablet Take 2 tablets by mouth in the morning and 2 tablets in the evening. 60 tablet 1    sertraline (ZOLOFT) 100 MG tablet Take 1.5 tablets by mouth nightly 30 tablet 1    risperiDONE (RISPERDAL) 1 MG tablet Take 1 tablet by mouth 2 times daily Twice daily 60 tablet 1    levothyroxine (SYNTHROID) 50 MCG tablet Take 1 tablet by mouth Daily 30 tablet 1    potassium chloride (KLOR-CON M) 20 MEQ extended release tablet Take 1 tablet by mouth daily for 7 days (Patient not taking: Reported on 2/2/2023) 7 tablet 0    pantoprazole (PROTONIX) 40 MG tablet Take 40 mg by mouth daily      ipratropium-albuterol (DUONEB) 0.5-2.5 (3) MG/3ML SOLN nebulizer solution Inhale 3 mLs into the lungs every 4 hours 120 each 5    cetirizine (ZYRTEC) 10 MG tablet TAKE 1 TABLET BY MOUTH DAILY 31 tablet 10    FEROSUL 325 (65 Fe) MG tablet TAKE 1 TABLET BY MOUTH DAILY WITH BREAKFAST (Patient not taking: Reported on 2/2/2023) 31 tablet 10    nystatin (MYCOSTATIN) 330914 UNIT/GM powder Apply topically 4 times daily. under the abdominal folds for 2 wks 1 each 0    fluticasone (FLONASE) 50 MCG/ACT nasal spray INSTILL 1 SPRAY INTO EACH NOSTRIL DAILY 16 g 5    D3 HIGH POTENCY 50 MCG (2000 UT) CAPS TAKE 1 CAPSULE BY MOUTH DAILY 31 capsule 10    UNABLE TO FIND hospital bed mattress XL twin 1 Device 0    carBAMazepine (CARBATROL) 300 MG extended release capsule TAKE 1 CAPSULE BY MOUTH TWICE A DAY 56 capsule 5    atorvastatin (LIPITOR) 40 MG tablet TAKE 1 TABLET BY MOUTH DAILY 31 tablet 5    levETIRAcetam (KEPPRA) 750 MG tablet Take 2 tablets by mouth 2 times daily 120 tablet 0    tiotropium-olodaterol (STIOLTO) 2.5-2.5 MCG/ACT AERS Inhale 2 puffs into the lungs daily (Patient not taking: No sig reported) 1 each 0    JANUVIA 100 MG tablet TAKE 1 TABLET BY MOUTH DAILY (Patient taking differently: 100 mg 2 times daily) 30 tablet 5    lisinopril (PRINIVIL;ZESTRIL) 40 MG tablet TAKE 1 TABLET BY MOUTH DAILY 31 tablet 5    Diapers & Supplies MISC 1 each by Does not apply route daily as needed (4-5 times daily) 100 each 5    mirtazapine (REMERON) 30 MG tablet TAKE 1 TABLET BY MOUTH NIGHTLY 31 tablet 5    Incontinence Supply Disposable (DEPEND UNDERWEAR LARGE/XL) MISC 1 Units by Does not apply route 4 times daily 100 Package 5    Disposable Gloves MISC 1 Units by Does not apply route 4 times daily 100 each 5    diphenhydrAMINE (BENADRYL) 25 MG capsule Take 25 mg by mouth every 6 hours as needed for Itching (Patient not taking: Reported on 2/2/2023)      blood glucose monitor strips Test 3 times a day & as needed for symptoms of irregular blood glucose. 90 strip 0    busPIRone (BUSPAR) 10 MG tablet Take 1 tablet by mouth 2 times daily 60 tablet 5     Allergies   Allergen Reactions    Macrobid [Nitrofurantoin] Hives and Swelling     Hives       Nursing Notes Reviewed    Physical Exam:  Triage VS:    ED Triage Vitals [02/02/23 1202]   Enc Vitals Group      /60      Heart Rate 71      Resp 16      Temp 98.8 °F (37.1 °C)      Temp Source Oral      SpO2 98 %      Weight 150 lb (68 kg)      Height 4' 8\" (1.422 m)      Head Circumference       Peak Flow       Pain Score       Pain Loc       Pain Edu? Excl. in [address]? My pulse ox interpretation is - normal    General appearance:  No acute distress. Yelled out \"son of a bitch\" a couple of times, cooperative. Skin:  Warm. Dry.    Eye: Extraocular movements intact. Ears, nose, mouth and throat:  Oral mucosa moist   Neck:  Trachea midline. Extremity:  No swelling. Normal ROM     Heart:  Regular rate and rhythm, normal S1 & S2, no extra heart sounds. Perfusion:  intact  Respiratory:  Lungs clear to auscultation bilaterally. Respirations nonlabored. Abdominal:    Soft. Nontender. Non distended.   Neurological:  Alert    I have reviewed and interpreted all of the currently available lab results from this visit (if applicable):  Results for orders placed or performed during the hospital encounter of 02/02/23   CBC with Auto Differential   Result Value Ref Range    WBC 4.4 4.0 - 10.5 K/CU MM    RBC 3.84 (L) 4.2 - 5.4 M/CU MM    Hemoglobin 11.9 (L) 12.5 - 16.0 GM/DL    Hematocrit 38.6 37 - 47 %    .5 (H) 78 - 100 FL    MCH 31.0 27 - 31 PG    MCHC 30.8 (L) 32.0 - 36.0 %    RDW 12.4 11.7 - 14.9 %    Platelets 993 882 - 785 K/CU MM    MPV 8.9 7.5 - 11.1 FL    Differential Type AUTOMATED DIFFERENTIAL     Segs Relative 44.1 36 - 66 %    Lymphocytes % 45.9 (H) 24 - 44 %    Monocytes % 7.3 (H) 0 - 4 %    Eosinophils % 1.1 0 - 3 %    Basophils % 0.5 0 - 1 %    Segs Absolute 1.9 K/CU MM    Lymphocytes Absolute 2.0 K/CU MM    Monocytes Absolute 0.3 K/CU MM    Eosinophils Absolute 0.1 K/CU MM    Basophils Absolute 0.0 K/CU MM    Nucleated RBC % 0.0 %    Total Nucleated RBC 0.0 K/CU MM    Total Immature Neutrophil 0.05 K/CU MM    Immature Neutrophil % 1.1 (H) 0 - 0.43 %   Comprehensive Metabolic Panel   Result Value Ref Range    Sodium 142 135 - 145 MMOL/L    Potassium 4.3 3.5 - 5.1 MMOL/L    Chloride 104 99 - 110 mMol/L    CO2 28 21 - 32 MMOL/L    BUN 24 (H) 6 - 23 MG/DL    Creatinine 0.5 (L) 0.6 - 1.1 MG/DL    Est, Glom Filt Rate >60 >60 mL/min/1.73m2    Glucose 97 70 - 99 MG/DL    Calcium 9.0 8.3 - 10.6 MG/DL    Albumin 3.9 3.4 - 5.0 GM/DL    Total Protein 6.9 6.4 - 8.2 GM/DL    Total Bilirubin 0.2 0.0 - 1.0 MG/DL    ALT 14 10 - 40 U/L AST 26 15 - 37 IU/L    Alkaline Phosphatase 75 40 - 129 IU/L    Anion Gap 10 4 - 16   Troponin   Result Value Ref Range    Troponin T <0.010 <0.01 NG/ML   Urinalysis   Result Value Ref Range    Color, UA YELLOW YELLOW    Clarity, UA CLEAR CLEAR    Glucose, Urine NEGATIVE NEGATIVE MG/DL    Bilirubin Urine NEGATIVE NEGATIVE MG/DL    Ketones, Urine TRACE (A) NEGATIVE MG/DL    Specific Gravity, UA >1.030 1.001 - 1.035    Blood, Urine NEGATIVE NEGATIVE    pH, Urine 6.0 5.0 - 8.0    Protein, UA NEGATIVE NEGATIVE MG/DL    Urobilinogen, Urine 0.2 0.2 - 1.0 MG/DL    Nitrite Urine, Quantitative NEGATIVE NEGATIVE    Leukocyte Esterase, Urine NEGATIVE NEGATIVE    Urinalysis Comments       Microscopic exam not performed based on chemical results unless requested in original order. EKG 12 Lead   Result Value Ref Range    Ventricular Rate 76 BPM    Atrial Rate 76 BPM    P-R Interval 138 ms    QRS Duration 78 ms    Q-T Interval 382 ms    QTc Calculation (Bazett) 429 ms    P Axis 13 degrees    R Axis 34 degrees    T Axis 14 degrees    Diagnosis       Normal sinus rhythm  Normal ECG  When compared with ECG of 27-DEC-2022 09:06,  Non-specific change in ST segment in Inferior leads  Nonspecific T wave abnormality now evident in Inferior leads        Radiographs (if obtained):  Radiologist's Report Reviewed:  No results found. MDM:  CC/HPI Summary, DDx, ED Course, and Reassessment: 63-year-old female with history as above presents with concern that she is having some altered mental status. Sounds like she may be having some hallucinations and sometimes picks at the ear. She is not in distress with completely normal vitals on her home 2 L of oxygen at this time. Mother is at bedside, the 2 caregivers came with her as well. We will plan for labs, repeat urinalysis, we will get a CT head and chest x-ray given they are concerned that this has continued despite the antibiotic for the UTI. She has had no trauma.   She is otherwise acting normally for them. She is not in any distress at this time. Found to have no significant leukocytosis, her hemoglobin appears to be stable. Her kidney function is stable when I compared to previous. Does not appear that she has acute  DI. She is not acidotic. We also checked a troponin and that is normal, EKG is normal as below. Her CT head and chest x-ray showed no evidence of any acute abnormalities. Her baseline is to be on 2 L nasal cannula, she does not appear to be hypoxic or to have any other abnormalities to explain these hallucinations. However does not appear that she has any acute emergent conditions that would require hospitalization either at this time. I did discuss with mother, and she is very comfortable with patient going home at this time, prefers this to the rather than having her stay in the hospital, given the risk of hospitalization. As she is doing so well at this time they feel that they can follow-up as outpatient regarding these hallucinations. Plan will be for discharge, all questions were answered. Given strict return precautions. History from : Family mother and Caregiver    Limitations to history : None    Patient was given the following medications:  Medications - No data to display    EKG (if obtained): (All EKG's are interpreted by myself in the absence of a cardiologist) normal sinus rhythm with a rate of 76 bpm, normal intervals. No ST elevation. Normal EKG. No previous to compare    Chronic conditions affecting care: Cerebral palsy, COPD on 2 L nasal cannula at baseline with chronic respiratory failure    Discussion with Other Profesionals : None    Social Determinants : None    Records Reviewed : Outpatient Notes there is an outpatient note yet not yet signed from today, there is one from the 10th of this month from Dr. Galindo Jay him, regarding staring episodes and possible seizure-like activity.   They had discontinued the Norvasc that she had been started on, she had normal blood work, chest x-ray and CT head, been feeling generally weak but otherwise doing well. Then was seen in interim for UTI at our ED. Disposition Considerations (tests considered but not done, Shared Decision Making, Pt Expectation of Test or Tx.):   Appropriate for outpatient management      I am the Primary Clinician of Record. Clinical Impression:  1. Hallucinations    2. Chronic respiratory failure with hypoxia (HCC)    3. Chronic obstructive pulmonary disease, unspecified COPD type ([facility] [address]    4. Cerebral palsy, unspecified type ([facility] [address]      Disposition referral (if applicable):  Bridger Dowd MD  [address]   [phone]        Disposition medications (if applicable):  New Prescriptions    No medications on file     ED Provider Disposition Time  DISPOSITION Decision To Discharge 02/02/2023 02:11:32 PM      Comment: Please note this report has been produced using speech recognition software and may contain errors related to that system including errors in grammar, punctuation, and spelling, as well as words and phrases that may be inappropriate. Efforts were made to edit the dictations.        Sheyla Mckeon MD  02/02/23 2080

## 2023-02-03 PROBLEM — R53.1 WEAKNESS: Status: ACTIVE | Noted: 2023-02-03

## 2023-02-03 PROBLEM — R39.89 URINARY PROBLEM: Status: ACTIVE | Noted: 2022-12-19

## 2023-02-03 LAB
EKG ATRIAL RATE: 76 BPM
EKG DIAGNOSIS: NORMAL
EKG P AXIS: 13 DEGREES
EKG P-R INTERVAL: 138 MS
EKG Q-T INTERVAL: 382 MS
EKG QRS DURATION: 78 MS
EKG QTC CALCULATION (BAZETT): 429 MS
EKG R AXIS: 34 DEGREES
EKG T AXIS: 14 DEGREES
EKG VENTRICULAR RATE: 76 BPM

## 2023-02-03 PROCEDURE — 93010 ELECTROCARDIOGRAM REPORT: CPT | Performed by: INTERNAL MEDICINE

## 2023-02-03 ASSESSMENT — ENCOUNTER SYMPTOMS
DIARRHEA: 0
CONSTIPATION: 0
SHORTNESS OF BREATH: 0
BACK PAIN: 0
COUGH: 0
ABDOMINAL PAIN: 0

## 2023-02-17 ENCOUNTER — TELEPHONE (OUTPATIENT)
Dept: FAMILY MEDICINE CLINIC | Age: 63
End: 2023-02-17

## 2023-02-17 NOTE — TELEPHONE ENCOUNTER
Catrina Sun from [facility] [phone] requesting verbal for continuing HC . Will not take verbal from MA. Provider must call for verbal

## 2023-02-20 NOTE — PROGRESS NOTES
Patient Name:  Estee Garcia  Patient :  1960  Patient MRN:  [id number]     Primary Oncologist: Christa Pino MD  Referring Provider: Derick Martinez MD     Date of Service: 2023     Chief Complaint:    Chief Complaint   Patient presents with    Follow-up     Patient Active Problem List:     HTN (hypertension), benign     Seizure disorder ([address]     MR (mental retardation)     COPD exacerbation ([address]     Left hemiplegia ([address]     H/O: stroke     Mixed hyperlipidemia     Acquired hypothyroidism     Anxiety and depression     Anemia     Type 2 diabetes mellitus      Lesion of right native kidney     Other cerebral palsy ([address]    HPI:   Jm Manley is a 73-year-old very pleasant female with medical history significant for hypertension, hyperlipidemia, diabetes mellitus, cerebral palsy, mental retardation, hypothyroidism, COPD, seizure disorder, history of stroke, anxiety/depression and iron deficiency anemia, initially referred to me on 2022 for evaluation of right kidney lesion. She has right kidney small mass and she has been followed closely. CT abdomen/pelvis done on 2022 showed 13 mm right renal small enhancing neoplasm with 2 mm of growth annually since  [the previously 11 mm and  and 9 mm and ]. She was subsequently referred to me for evaluation of her right kidney lesion. CT findings are compatible with a small enhancing renal neoplasm. Since it is still small in size, I believe observation is appropriate. However, I recommend urology evaluation. She was seen by Dr. Terrance Oconnell and he discussed with patient and care givers all the options. They chose for observation and he plans to have repeat CT scan in 12 months. On 2023, she presented to me for follow-up. I reviewed the recommendations from Dr. Terrance Oconnell. I agree with his recommendations and I recommend her and her caregiver to follow-up with him on a regular basis.     Since she does not have any other oncology care issue, I will start seeing her as needed basis only. She does not have any significant symptoms at today visit. Past Medical History:     Significant for  1. Hypertension  2. Hyperlipidemia  3. Diabetes mellitus  4. Hypothyroidism  5. COPD  6. Mental retardation  7. Seizure disorder  8. History of stroke  9. Anxiety/depression  10. History of iron deficiency anemia   11. Other cerebral palsy    Past Surgery History:    Significant for  1. Gastrostomy tube placement    Social History:   She is a former smoker and she quit smoking in 8/28/2011. She used to smoke 1.5 pack a day for approximately 20 years. She denies alcohol drinking or illicit drug abuse. Family History:    No pertinent family history. Allergies   Allergen Reactions    Macrobid [Nitrofurantoin] Hives and Swelling     Hives     Review of Systems:  Constitutional:  No weight loss, No fever, No chills, No night sweats. Energy level good. Eyes:  No diplopia, No transient or permanent loss of vision, No scotomata. ENT / Mouth:  No epistaxis, No dysphagia, No hoarseness, No oral ulcers, No gingival bleeding. No sore throat, No postnasal drip, No nasal drip, No mouth pain, No sinus pain, No tinnitus, Normal hearing. Cardiovascular:  No chest pain, No palpitations, No syncope, No upper extremity edema, No lower extremity edema, No calf discomfort. Respiratory:  No cough. No hemoptysis, No pleurisy, No wheezing, No dyspnea. Breast:  No breast mass, No pain, No nipple discharge, No change in size, No change in shape. Gastrointestinal:  No abdominal pain, No abdominal cramping, No nausea, No vomiting, No constipation, No diarrhea, No hematochezia, No melena, No jaundice, No dyspepsia, No dysphagia. Urinary:  No dysuria, No hematuria, No urinary incontinence. Gynecological:  No vaginal discharge, No suprapubic pain, No abnormal vaginal bleeding.     Musculoskeletal:  No muscle pain, No swollen joints, No joint redness, No bone pain, No spine tenderness. Skin:  No rash, No nodules, No pruritus, No lesions. Neurologic:  No confusion, No seizures, No syncope, No tremor, No speech change, No headache, No hiccups, No abnormal gait, No sensory changes, No weakness. Psychiatric:  No depression, No anxiety, Concentration normal.  Endocrine:  No polyuria, No polydipsia, No hot flashes, No thyroid symptoms. Hematologic:  No epistaxis, No gingival bleeding, No petechiae, No ecchymosis. Lymphatic:  No lymphadenopathy, No lymphedema. Allergy / Immunologic:  No eczema, No frequent mucous infections, No frequent respiratory infections, No recurrent urticarial, No frequent skin infections. Vital Signs: BP (!) 116/55 (Site: Right Lower Arm, Position: Sitting, Cuff Size: Medium Adult)   Pulse 70   Temp 98.1 °F (36.7 °C) (Temporal)   Resp 18   Ht 4' 8\" (1.422 m)   SpO2 96%   BMI 33.63 kg/m²      Physical Exam:  CONSTITUTIONAL: awake, alert, cooperative, no apparent distress   EYES: pupils equal, round and reactive to light, sclera clear, normal conjunctiva  ENT: Normocephalic, without obvious abnormality, atraumatic  NECK: supple, symmetrical, no jugular venous distension, no carotid bruits   HEMATOLOGIC/LYMPHATIC: no cervical, supraclavicular or axillary lymphadenopathy   LUNGS: VBS, no wheezes, no increased work of breathing, no rhonchi, clear to auscultation, no crackles,    CARDIOVASCULAR: regular rate and rhythm, normal S1 and S2, no murmur noted  ABDOMEN: normal bowel sounds x 4, soft, non-distended, non-tender, no masses palpated, no hepatosplenomegaly   MUSCULOSKELETAL: full range of motion noted, tone is normal  NEUROLOGIC: awake, alert, cooperative. Motor skills grossly intact. SKIN: appears intact, normal skin color, normal texture, normal turgor, no jaundice.    EXTREMITIES: no LE edema, no leg swelling, no cyanosis, no clubbing,       Labs:  Hematology:  Lab Results   Component Value Date    WBC 4.4 02/02/2023    RBC 3.84 (L) 02/02/2023    HGB 11.9 (L) 02/02/2023    HCT 38.6 02/02/2023    .5 (H) 02/02/2023    MCH 31.0 02/02/2023    MCHC 30.8 (L) 02/02/2023    RDW 12.4 02/02/2023     02/02/2023    MPV 8.9 02/02/2023    BANDSPCT 3 (L) 11/01/2022    SEGSPCT 44.1 02/02/2023    EOSRELPCT 1.1 02/02/2023    BASOPCT 0.5 02/02/2023    LYMPHOPCT 45.9 (H) 02/02/2023    MONOPCT 7.3 (H) 02/02/2023    BANDABS 0.13 11/01/2022    SEGSABS 1.9 02/02/2023    EOSABS 0.1 02/02/2023    BASOSABS 0.0 02/02/2023    LYMPHSABS 2.0 02/02/2023    MONOSABS 0.3 02/02/2023    DIFFTYPE AUTOMATED DIFFERENTIAL 02/02/2023    ANISOCYTOSIS 1+ 11/01/2022    POLYCHROM 1+ 10/31/2022    WBCMORP OCCASIONAL 12/26/2018     No results found for: ESR  Chemistry:  Lab Results   Component Value Date     02/02/2023    K 4.3 02/02/2023     02/02/2023    CO2 28 02/02/2023    BUN 24 (H) 02/02/2023    CREATININE 0.5 (L) 02/02/2023    GLUCOSE 97 02/02/2023    CALCIUM 9.0 02/02/2023    PROT 6.9 02/02/2023    LABALBU 3.9 02/02/2023    BILITOT 0.2 02/02/2023    ALKPHOS 75 02/02/2023    AST 26 02/02/2023    ALT 14 02/02/2023    LABGLOM >60 02/02/2023    GFRAA >60 10/13/2022    PHOS 3.6 11/02/2022    MG 2.2 12/19/2022    POCGLU 113 (H) 12/27/2022     Lab Results   Component Value Date     10/29/2022     No components found for: LD  Lab Results   Component Value Date    TSHHS 3.800 12/06/2022    T4FREE 0.81 (L) 12/06/2022     Immunology:  Lab Results   Component Value Date    PROT 6.9 02/02/2023     No results found for: [place], KLFLCR  No results found for: B2M  Coagulation Panel:  Lab Results   Component Value Date    PROTIME 11.7 10/12/2021    INR 0.91 10/12/2021    APTT 28.1 03/14/2015    DDIMER 323 (H) 10/29/2022     Anemia Panel:  Lab Results   Component Value Date    ZYRFEBFB32 929 01/28/2022    FOLATE 6.92 01/28/2022     Tumor Markers:  No results found for: , CEA, , LABCA2, PSA     Observations:  No data recorded     Assessment   Right kidney lesion    Plan:  Jm Santana is a 72-year-old very pleasant female who has right kidney small mass and she has been followed closely. CT abdomen/pelvis done on 9/2/2022 showed 13 mm right renal small enhancing neoplasm with 2 mm of growth annually since 2020 [the previously 11 mm and 2021 and 9 mm and 2020]. CT findings are compatible with a small enhancing renal neoplasm. Since it is still small in size, I believe observation is appropriate. However, I recommend urology evaluation. She was seen by Dr. Alannah Soni and he discussed with patient and care givers all the options. They chose for observation and he plans to have repeat CT scan in 12 months. On February 23rd 2023, she presented to me for follow-up. I reviewed the recommendations from Dr. Alannah Soni. I agree with his recommendations and I recommend her and her caregiver to follow-up with him on a regular basis. Since she does not have any other oncology care issue, I will start seeing her as needed basis only. I answered all her questions and concerns for today. Recent imaging and labs were reviewed and discussed with the patient.

## 2023-02-23 ENCOUNTER — OFFICE VISIT (OUTPATIENT)
Dept: ONCOLOGY | Age: 63
End: 2023-02-23
Payer: MEDICAID

## 2023-02-23 ENCOUNTER — HOSPITAL ENCOUNTER (OUTPATIENT)
Dept: INFUSION THERAPY | Age: 63
Discharge: HOME | End: 2023-02-23
Payer: MEDICAID

## 2023-02-23 VITALS
RESPIRATION RATE: 18 BRPM | DIASTOLIC BLOOD PRESSURE: 55 MMHG | SYSTOLIC BLOOD PRESSURE: 116 MMHG | BODY MASS INDEX: 33.63 KG/M2 | OXYGEN SATURATION: 96 % | HEIGHT: 56 IN | TEMPERATURE: 98.1 F | HEART RATE: 70 BPM

## 2023-02-23 DIAGNOSIS — N28.9 LESION OF RIGHT NATIVE KIDNEY: Primary | ICD-10-CM

## 2023-02-23 PROCEDURE — 3078F DIAST BP <80 MM HG: CPT | Performed by: INTERNAL MEDICINE

## 2023-02-23 PROCEDURE — 99211 OFF/OP EST MAY X REQ PHY/QHP: CPT

## 2023-02-23 PROCEDURE — 99213 OFFICE O/P EST LOW 20 MIN: CPT | Performed by: INTERNAL MEDICINE

## 2023-02-23 PROCEDURE — 3074F SYST BP LT 130 MM HG: CPT | Performed by: INTERNAL MEDICINE

## 2023-02-23 RX ORDER — AMLODIPINE BESYLATE 10 MG/1
TABLET ORAL
COMMUNITY
Start: 2023-02-14

## 2023-02-23 NOTE — PROGRESS NOTES
MA Rooming Questions  Patient: Eleuterio Rinne  MRN: [id number]    Date: 2/23/2023        1. Do you have any new issues?   no         2. Do you need any refills on medications?    no    3. Have you had any imaging done since your last visit? yes -     4. Have you been hospitalized or seen in the emergency room since your last visit here?   yes -     5. Did the patient have a depression screening completed today?  No    No data recorded     PHQ-9 Given to (if applicable):               PHQ-9 Score (if applicable):                     [] Positive     []  Negative              Does question #9 need addressed (if applicable)                     [] Yes    []  No               Jose Luis Arvizu CMA

## 2023-03-02 ENCOUNTER — HOSPITAL ENCOUNTER (OUTPATIENT)
Age: 63
Setting detail: SPECIMEN
Discharge: HOME | End: 2023-03-02
Payer: MEDICAID

## 2023-03-02 LAB
ALBUMIN SERPL-MCNC: 3.7 GM/DL (ref 3.4–5)
ALP BLD-CCNC: 82 IU/L (ref 40–129)
ALT SERPL-CCNC: 9 U/L (ref 10–40)
ANION GAP SERPL CALCULATED.3IONS-SCNC: 11 MMOL/L (ref 4–16)
AST SERPL-CCNC: 13 IU/L (ref 15–37)
BILIRUB SERPL-MCNC: 0.1 MG/DL (ref 0–1)
BUN SERPL-MCNC: 27 MG/DL (ref 6–23)
CALCIUM SERPL-MCNC: 9.4 MG/DL (ref 8.3–10.6)
CHLORIDE BLD-SCNC: 105 MMOL/L (ref 99–110)
CHOLEST SERPL-MCNC: 207 MG/DL
CO2: 29 MMOL/L (ref 21–32)
CREAT SERPL-MCNC: 0.4 MG/DL (ref 0.6–1.1)
ESTIMATED AVERAGE GLUCOSE: 126 MG/DL
GFR SERPL CREATININE-BSD FRML MDRD: >60 ML/MIN/1.73M2
GLUCOSE SERPL-MCNC: 130 MG/DL (ref 70–99)
HBA1C MFR BLD: 6 % (ref 4.2–6.3)
HDLC SERPL-MCNC: 41 MG/DL
LDLC SERPL CALC-MCNC: ABNORMAL MG/DL
LDLC SERPL-MCNC: 88 MG/DL
POTASSIUM SERPL-SCNC: 3.9 MMOL/L (ref 3.5–5.1)
SODIUM BLD-SCNC: 145 MMOL/L (ref 135–145)
T4 FREE SERPL-MCNC: 0.9 NG/DL (ref 0.9–1.8)
TOTAL PROTEIN: 6.1 GM/DL (ref 6.4–8.2)
TRIGL SERPL-MCNC: 474 MG/DL
TSH SERPL DL<=0.005 MIU/L-ACNC: 2.03 UIU/ML (ref 0.27–4.2)

## 2023-03-02 PROCEDURE — 84439 ASSAY OF FREE THYROXINE: CPT

## 2023-03-02 PROCEDURE — 83721 ASSAY OF BLOOD LIPOPROTEIN: CPT

## 2023-03-02 PROCEDURE — 80061 LIPID PANEL: CPT

## 2023-03-02 PROCEDURE — 80053 COMPREHEN METABOLIC PANEL: CPT

## 2023-03-02 PROCEDURE — 83036 HEMOGLOBIN GLYCOSYLATED A1C: CPT

## 2023-03-02 PROCEDURE — 84443 ASSAY THYROID STIM HORMONE: CPT

## 2023-03-02 PROCEDURE — 36415 COLL VENOUS BLD VENIPUNCTURE: CPT

## 2023-03-16 RX ORDER — CHOLECALCIFEROL (VITAMIN D3) 125 MCG
CAPSULE ORAL
Qty: 30 CAPSULE | Refills: 10 | Status: SHIPPED | OUTPATIENT
Start: 2023-03-16

## 2023-03-22 ENCOUNTER — OFFICE VISIT (OUTPATIENT)
Dept: FAMILY MEDICINE CLINIC | Age: 63
End: 2023-03-22
Payer: MEDICAID

## 2023-03-22 VITALS — SYSTOLIC BLOOD PRESSURE: 126 MMHG | HEART RATE: 66 BPM | OXYGEN SATURATION: 95 % | DIASTOLIC BLOOD PRESSURE: 76 MMHG

## 2023-03-22 DIAGNOSIS — L30.9 DERMATITIS: Primary | ICD-10-CM

## 2023-03-22 PROCEDURE — 3074F SYST BP LT 130 MM HG: CPT | Performed by: FAMILY MEDICINE

## 2023-03-22 PROCEDURE — 3078F DIAST BP <80 MM HG: CPT | Performed by: FAMILY MEDICINE

## 2023-03-22 PROCEDURE — 99213 OFFICE O/P EST LOW 20 MIN: CPT | Performed by: FAMILY MEDICINE

## 2023-03-22 RX ORDER — NYSTATIN 100000 U/G
CREAM TOPICAL
Qty: 60 G | Refills: 0 | Status: SHIPPED | OUTPATIENT
Start: 2023-03-22

## 2023-03-22 NOTE — LETTER
March 22, 2023       Claudell Osler YOB: 1960   [address] [address] Date of Visit:  3/22/2023       To Whom It May Concern:    Shashank Montesinos should not have any boost meal replacement shakes until further notice  Any questions please contact our office.         Rj Freedman MD

## 2023-03-22 NOTE — PROGRESS NOTES
500 MG DR tablet Take 2 tablets by mouth in the morning and 2 tablets in the evening. 60 tablet 1    sertraline (ZOLOFT) 100 MG tablet Take 1.5 tablets by mouth nightly 30 tablet 1    risperiDONE (RISPERDAL) 1 MG tablet Take 1 tablet by mouth 2 times daily Twice daily 60 tablet 1    levothyroxine (SYNTHROID) 50 MCG tablet Take 1 tablet by mouth Daily 30 tablet 1    pantoprazole (PROTONIX) 40 MG tablet Take 40 mg by mouth daily      ipratropium-albuterol (DUONEB) 0.5-2.5 (3) MG/3ML SOLN nebulizer solution Inhale 3 mLs into the lungs every 4 hours 120 each 5    cetirizine (ZYRTEC) 10 MG tablet TAKE 1 TABLET BY MOUTH DAILY 31 tablet 10    FEROSUL 325 (65 Fe) MG tablet TAKE 1 TABLET BY MOUTH DAILY WITH BREAKFAST 31 tablet 10    nystatin (MYCOSTATIN) 194870 UNIT/GM powder Apply topically 4 times daily. under the abdominal folds for 2 wks 1 each 0    fluticasone (FLONASE) 50 MCG/ACT nasal spray INSTILL 1 SPRAY INTO EACH NOSTRIL DAILY 16 g 5    UNABLE TO FIND hospital bed mattress XL twin 1 Device 0    carBAMazepine (CARBATROL) 300 MG extended release capsule TAKE 1 CAPSULE BY MOUTH TWICE A DAY 56 capsule 5    atorvastatin (LIPITOR) 40 MG tablet TAKE 1 TABLET BY MOUTH DAILY 31 tablet 5    levETIRAcetam (KEPPRA) 750 MG tablet Take 2 tablets by mouth 2 times daily 120 tablet 0    JANUVIA 100 MG tablet TAKE 1 TABLET BY MOUTH DAILY (Patient taking differently: 100 mg 2 times daily) 30 tablet 5    lisinopril (PRINIVIL;ZESTRIL) 40 MG tablet TAKE 1 TABLET BY MOUTH DAILY 31 tablet 5    Diapers & Supplies MISC 1 each by Does not apply route daily as needed (4-5 times daily) 100 each 5    mirtazapine (REMERON) 30 MG tablet TAKE 1 TABLET BY MOUTH NIGHTLY 31 tablet 5    Incontinence Supply Disposable (DEPEND UNDERWEAR LARGE/XL) MISC 1 Units by Does not apply route 4 times daily 100 Package 5    Disposable Gloves MISC 1 Units by Does not apply route 4 times daily 100 each 5    blood glucose monitor strips Test 3 times a day & as needed

## 2023-03-29 ENCOUNTER — TELEPHONE (OUTPATIENT)
Dept: FAMILY MEDICINE CLINIC | Age: 63
End: 2023-03-29

## 2023-03-29 NOTE — TELEPHONE ENCOUNTER
----- Message from Rohini Navarrete sent at 3/29/2023 10:45 AM EDT -----  Subject: Message to Provider    QUESTIONS  Information for Provider? Jacklyn Oseguera from patient's home health care, [facility], calling in to let Dr. Linnette Teran know that patient's butt rash she   was seen for last week is completely clear. Jacklyn Oseguera is just requesting a   note from provider stating patient may return to normal activities   tomorrow. Please call Jacklyn Oseguera back, thank you.   ---------------------------------------------------------------------------  --------------  Cindy JEAN  [phone]; OK to leave message on voicemail  ---------------------------------------------------------------------------  --------------  SCRIPT ANSWERS  Relationship to Patient? Third Party  Third Party Type? Home Health Care? Representative Name?  Jacklyn Oseguera

## 2023-04-19 ENCOUNTER — TELEPHONE (OUTPATIENT)
Dept: FAMILY MEDICINE CLINIC | Age: 63
End: 2023-04-19

## 2023-05-19 ENCOUNTER — HOSPITAL ENCOUNTER (OUTPATIENT)
Age: 63
Setting detail: SPECIMEN
Discharge: HOME | End: 2023-05-19
Payer: MEDICARE

## 2023-05-19 LAB
ALT SERPL-CCNC: 9 U/L (ref 10–40)
ANION GAP SERPL CALCULATED.3IONS-SCNC: 8 MMOL/L (ref 4–16)
BASOPHILS ABSOLUTE: 0 K/CU MM
BASOPHILS RELATIVE PERCENT: 0.4 % (ref 0–1)
BUN SERPL-MCNC: 18 MG/DL (ref 6–23)
CALCIUM SERPL-MCNC: 8.9 MG/DL (ref 8.3–10.6)
CHLORIDE BLD-SCNC: 104 MMOL/L (ref 99–110)
CHOLEST SERPL-MCNC: 166 MG/DL
CO2: 31 MMOL/L (ref 21–32)
CREAT SERPL-MCNC: 0.5 MG/DL (ref 0.6–1.1)
DIFFERENTIAL TYPE: ABNORMAL
EOSINOPHILS ABSOLUTE: 0.1 K/CU MM
EOSINOPHILS RELATIVE PERCENT: 1.1 % (ref 0–3)
ESTIMATED AVERAGE GLUCOSE: 131 MG/DL
GFR SERPL CREATININE-BSD FRML MDRD: >60 ML/MIN/1.73M2
GLUCOSE SERPL-MCNC: 94 MG/DL (ref 70–99)
HBA1C MFR BLD: 6.2 % (ref 4.2–6.3)
HCT VFR BLD CALC: 35.4 % (ref 37–47)
HDLC SERPL-MCNC: 35 MG/DL
HEMOGLOBIN: 11.2 GM/DL (ref 12.5–16)
IMMATURE NEUTROPHIL %: 1.8 % (ref 0–0.43)
LDLC SERPL CALC-MCNC: 73 MG/DL
LYMPHOCYTES ABSOLUTE: 2.2 K/CU MM
LYMPHOCYTES RELATIVE PERCENT: 38.7 % (ref 24–44)
MCH RBC QN AUTO: 31.6 PG (ref 27–31)
MCHC RBC AUTO-ENTMCNC: 31.6 % (ref 32–36)
MCV RBC AUTO: 100 FL (ref 78–100)
MONOCYTES ABSOLUTE: 0.4 K/CU MM
MONOCYTES RELATIVE PERCENT: 7.2 % (ref 0–4)
NUCLEATED RBC %: 0 %
PDW BLD-RTO: 11.9 % (ref 11.7–14.9)
PLATELET # BLD: 191 K/CU MM (ref 140–440)
PMV BLD AUTO: 8.7 FL (ref 7.5–11.1)
POTASSIUM SERPL-SCNC: 4.2 MMOL/L (ref 3.5–5.1)
RBC # BLD: 3.54 M/CU MM (ref 4.2–5.4)
SEGMENTED NEUTROPHILS ABSOLUTE COUNT: 2.9 K/CU MM
SEGMENTED NEUTROPHILS RELATIVE PERCENT: 50.8 % (ref 36–66)
SODIUM BLD-SCNC: 143 MMOL/L (ref 135–145)
TOTAL IMMATURE NEUTOROPHIL: 0.1 K/CU MM
TOTAL NUCLEATED RBC: 0 K/CU MM
TRIGL SERPL-MCNC: 291 MG/DL
TSH SERPL DL<=0.005 MIU/L-ACNC: 1.6 UIU/ML (ref 0.27–4.2)
WBC # BLD: 5.7 K/CU MM (ref 4–10.5)

## 2023-05-19 PROCEDURE — 83036 HEMOGLOBIN GLYCOSYLATED A1C: CPT

## 2023-05-19 PROCEDURE — 84460 ALANINE AMINO (ALT) (SGPT): CPT

## 2023-05-19 PROCEDURE — 36415 COLL VENOUS BLD VENIPUNCTURE: CPT

## 2023-05-19 PROCEDURE — 84443 ASSAY THYROID STIM HORMONE: CPT

## 2023-05-19 PROCEDURE — 80048 BASIC METABOLIC PNL TOTAL CA: CPT

## 2023-05-19 PROCEDURE — 85025 COMPLETE CBC W/AUTO DIFF WBC: CPT

## 2023-05-19 PROCEDURE — 80061 LIPID PANEL: CPT

## 2023-06-16 DIAGNOSIS — D64.9 ANEMIA, UNSPECIFIED TYPE: ICD-10-CM

## 2023-06-16 RX ORDER — AMLODIPINE BESYLATE 10 MG/1
TABLET ORAL
Qty: 31 TABLET | Refills: 5 | Status: SHIPPED | OUTPATIENT
Start: 2023-06-16

## 2023-06-16 RX ORDER — FERROUS SULFATE 325(65) MG
TABLET ORAL
Qty: 30 TABLET | Refills: 5 | Status: SHIPPED | OUTPATIENT
Start: 2023-06-16

## 2023-06-16 RX ORDER — CETIRIZINE HYDROCHLORIDE 10 MG/1
TABLET ORAL
Qty: 31 TABLET | Refills: 5 | Status: SHIPPED | OUTPATIENT
Start: 2023-06-16

## 2023-06-16 NOTE — TELEPHONE ENCOUNTER
[facility] from [address] [place] [address] called requesting verbal for continuous of Home Care. Gabino Stephen gave a verbal and Joslyn Pineda was informed that Dr. Kirt Kelley will be leaving the practice.

## 2023-06-19 ENCOUNTER — OFFICE VISIT (OUTPATIENT)
Dept: ORTHOPEDIC SURGERY | Age: 63
End: 2023-06-19
Payer: MEDICARE

## 2023-06-19 ENCOUNTER — CARE COORDINATION (OUTPATIENT)
Dept: CARE COORDINATION | Age: 63
End: 2023-06-19

## 2023-06-19 VITALS — SYSTOLIC BLOOD PRESSURE: 128 MMHG | DIASTOLIC BLOOD PRESSURE: 88 MMHG

## 2023-06-19 DIAGNOSIS — S80.01XA CONTUSION OF RIGHT KNEE, INITIAL ENCOUNTER: Primary | ICD-10-CM

## 2023-06-19 PROCEDURE — 99203 OFFICE O/P NEW LOW 30 MIN: CPT | Performed by: STUDENT IN AN ORGANIZED HEALTH CARE EDUCATION/TRAINING PROGRAM

## 2023-06-19 PROCEDURE — G8417 CALC BMI ABV UP PARAM F/U: HCPCS | Performed by: STUDENT IN AN ORGANIZED HEALTH CARE EDUCATION/TRAINING PROGRAM

## 2023-06-19 PROCEDURE — G8427 DOCREV CUR MEDS BY ELIG CLIN: HCPCS | Performed by: STUDENT IN AN ORGANIZED HEALTH CARE EDUCATION/TRAINING PROGRAM

## 2023-06-19 PROCEDURE — 3074F SYST BP LT 130 MM HG: CPT | Performed by: STUDENT IN AN ORGANIZED HEALTH CARE EDUCATION/TRAINING PROGRAM

## 2023-06-19 PROCEDURE — 1036F TOBACCO NON-USER: CPT | Performed by: STUDENT IN AN ORGANIZED HEALTH CARE EDUCATION/TRAINING PROGRAM

## 2023-06-19 PROCEDURE — 3017F COLORECTAL CA SCREEN DOC REV: CPT | Performed by: STUDENT IN AN ORGANIZED HEALTH CARE EDUCATION/TRAINING PROGRAM

## 2023-06-19 PROCEDURE — 3079F DIAST BP 80-89 MM HG: CPT | Performed by: STUDENT IN AN ORGANIZED HEALTH CARE EDUCATION/TRAINING PROGRAM

## 2023-06-19 ASSESSMENT — ENCOUNTER SYMPTOMS
COLOR CHANGE: 0
BACK PAIN: 0
SHORTNESS OF BREATH: 0
SORE THROAT: 0
WHEEZING: 0
NAUSEA: 0
VOICE CHANGE: 0
VOMITING: 0
COUGH: 0

## 2023-06-19 NOTE — PROGRESS NOTES
Patient is here for follow up from ED visit 6/14/23. Patient is care-managed with In-home care through [facility]. Patient had a fall during transfer to her bed. He had impact on her knee on the ground. Patient reports \"a lot\" of pain today but did not take her pain medication. She was given 3 supply of Norco in ED. This was somewhat effective. Swelling significantly reduced since seen in ED. XRAYS taken in ED, impression:     IMPRESSION:  Suspected nondisplaced fracture of the proximal fibular metadiaphysis versus  prominent trabeculation. Recommend correlation with point tenderness.
Minimal bruising over lateral joint line at proximal fibula      TENDERNESS / CREPITUS (T / C): Patella - / -     Lateral joint line - / -  Medial joint line  - / -     Peripatellar lateral - / -  Peripatellar medial  - / -     Medial plica - / -  Lateral plica - / -    Patellar tendon - / -   Prepatellar Bursa - / -     Popliteal fossa - / -    Gastrocnemius - / -    Quadricep - / -   Quad tendon - / -      Tibial tubercle - / -     Thigh/hamstring - / -  Pes anserine/HS - / -     ITB - / -     Tibia - / -   Fibula + at neck/ -    Tib/fib joint - / -     MFC - / -    LFC - / -     MCL: Proximal - / -  Distal - / -    LCL - / -    MCL - / -      ROM: (* = pain)  PASSIVE  ACTIVE     Left :    0 / 145  0 / 145      Right :    0 / 145  0 / 145      PATELLOFEMORAL EXAMINATION:    See above noted areas of tenderness. Patella position     Subluxation / dislocation: Centered     Sup. / Inf; Normal     Crepitus (PF): Moderate    Patellar Mobility:     Medial-lateral: Normal     Superior-inferior: Normal     Inferior tilt Normal     Patellar tendon: Normal     Lateral tilt: Normal     J-sign: None     Patellofemoral grind: No pain         MENISCAL SIGNS:     Pain on terminal extension: -    Pain on terminal flexion: -      LIGAMENT EXAMINATION:    ACL / Lachman: normal (-1 to 2mm)      PCL-Post.  drawer: normal 0 to 2mm    MCL- Valgus: normal 0 to 2mm    LCL- Varus:  normal 0 to 2mm        STRENGTH: (* = with pain) PAINFUL SIDE    Quadricep 5/5    Hamstrin/5      EXTREMITY NEURO-VASCULAR EXAMINATION:     Sensation:  Grossly intact to light touch all dermatomal regions. Motor Function:  Fully intact motor function at hip, knee, foot and ankle      DTRs;  quadriceps and  achilles 2+. No clonus and downgoing Babinski. Vascular status:  DP and PT pulses 2+, brisk capillary refill, symmetric.         Diagnostic testing:  X-ray images were reviewed by myself and discussed with the patient:  4 views of

## 2023-06-28 ENCOUNTER — CARE COORDINATION (OUTPATIENT)
Dept: CARE COORDINATION | Age: 63
End: 2023-06-28

## 2023-06-29 ENCOUNTER — CARE COORDINATION (OUTPATIENT)
Dept: CARE COORDINATION | Age: 63
End: 2023-06-29

## 2023-07-10 RX ORDER — FLUTICASONE PROPIONATE 50 MCG
SPRAY, SUSPENSION (ML) NASAL
Qty: 16 G | Refills: 5 | OUTPATIENT
Start: 2023-07-10

## 2023-07-17 ENCOUNTER — OFFICE VISIT (OUTPATIENT)
Dept: ORTHOPEDIC SURGERY | Age: 63
End: 2023-07-17

## 2023-07-17 VITALS — OXYGEN SATURATION: 94 % | SYSTOLIC BLOOD PRESSURE: 134 MMHG | HEART RATE: 78 BPM | DIASTOLIC BLOOD PRESSURE: 80 MMHG

## 2023-07-17 DIAGNOSIS — S80.01XD CONTUSION OF RIGHT KNEE, SUBSEQUENT ENCOUNTER: Primary | ICD-10-CM

## 2023-07-17 ASSESSMENT — ENCOUNTER SYMPTOMS
SHORTNESS OF BREATH: 0
VOICE CHANGE: 0
NAUSEA: 0
VOMITING: 0
WHEEZING: 0
COUGH: 0
SORE THROAT: 0
BACK PAIN: 0
COLOR CHANGE: 0

## 2023-07-17 NOTE — PROGRESS NOTES
7/17/2023   Chief Complaint   Patient presents with    1 Month Follow-Up     Right knee contusion       Updated HPI: Patient returns today for reevaluation of her right knee. Patient's staff at her nursing home this with her today states that she is doing much better and the patient does agree that although she is somewhat sleepy and limited in communication which is unchanged from last visit. No new injuries or complaints. States that she is return to previous activity from before the injury. No new complaints at this time. No change in her health history and denies any constitutional symptoms. Previous HPI (6/19/2023):                             Patric Contreras is a 63 y.o. female mentally handicapped patient, accompanied by her family,  referred by emergency room for evaluation and treatment of right knee pain. This is evaluated as a personal injury. According to the patient's family, patient had an injury approximately 1 week ago. This appeared to happen when she was transferring and was having difficulty standing and was lowered to the ground. She had civic and swelling and pain in the knee diffusely around that time. They were seen in the ER several days later and x-rays were negative for any acute fracture although there was a suspected possible nondisplaced fracture of the proximal fibula. Over the last several days she has greatly improved and her pain. They have maintained her nonweightbearing out of precaution. Denies any prior right knee injury or pain but states that her function is overall been low for several years and she does minimal ambulation or weightbearing. No additional orthopedic complaints this time. No advanced imaging thus far. This is my first time seeing the patient. Patient mostly utilizes wheelchair. They have been limiting her weightbearing and transfer since injury.   Significant overall atrophy of her lower extremity musculature which is chronic per the

## 2023-07-17 NOTE — PROGRESS NOTES
Patient is here for 1 month follow up on right knee contusion. Patient denies new injury, numbness or tingling. Patient is minimally responsive, falling asleep while communicating.      Caregivers from [facility] state she has been returning to normal.

## 2023-07-21 ENCOUNTER — HOSPITAL ENCOUNTER (OUTPATIENT)
Dept: PULMONOLOGY | Age: 63
Discharge: HOME | End: 2023-07-21
Attending: INTERNAL MEDICINE
Payer: MEDICARE

## 2023-07-21 PROCEDURE — 94618 PULMONARY STRESS TESTING: CPT

## 2023-07-21 PROCEDURE — 94762 N-INVAS EAR/PLS OXIMTRY CONT: CPT

## 2023-07-21 NOTE — PROGRESS NOTES
Pt did not qualify for home oxygen while awake. Pt's SpO2 was 93% after being on room air for 30 minutes. Pt is unable to walk. Pt's caregiver was given an Overnight Oximeter and instructions. Caregiver will return Oximeter Monday.

## 2023-10-20 ENCOUNTER — HOSPITAL ENCOUNTER (OUTPATIENT)
Age: 63
Setting detail: SPECIMEN
Discharge: HOME | End: 2023-10-20
Payer: MEDICARE

## 2023-10-20 LAB
25(OH)D3 SERPL-MCNC: 47.11 NG/ML
ALBUMIN SERPL-MCNC: 3.2 GM/DL (ref 3.4–5)
ALP BLD-CCNC: 96 IU/L (ref 40–129)
ALT SERPL-CCNC: <5 U/L (ref 10–40)
ANION GAP SERPL CALCULATED.3IONS-SCNC: 10 MMOL/L (ref 4–16)
AST SERPL-CCNC: 12 IU/L (ref 15–37)
BASOPHILS ABSOLUTE: 0 K/CU MM
BASOPHILS RELATIVE PERCENT: 0.4 % (ref 0–1)
BILIRUB SERPL-MCNC: 0.1 MG/DL (ref 0–1)
BUN SERPL-MCNC: 32 MG/DL (ref 6–23)
CALCIUM SERPL-MCNC: 9 MG/DL (ref 8.3–10.6)
CARBAMAZEPINE LEVEL: 9.5 UG/ML (ref 5–9)
CHLORIDE BLD-SCNC: 105 MMOL/L (ref 99–110)
CO2: 27 MMOL/L (ref 21–32)
CREAT SERPL-MCNC: 0.7 MG/DL (ref 0.6–1.1)
DIFFERENTIAL TYPE: ABNORMAL
DOSE AMOUNT: ABNORMAL
DOSE TIME: ABNORMAL
EOSINOPHILS ABSOLUTE: 0.1 K/CU MM
EOSINOPHILS RELATIVE PERCENT: 1.5 % (ref 0–3)
GFR SERPL CREATININE-BSD FRML MDRD: >60 ML/MIN/1.73M2
GLUCOSE SERPL-MCNC: 90 MG/DL (ref 70–99)
HCT VFR BLD CALC: 34.9 % (ref 37–47)
HEMOGLOBIN: 10.7 GM/DL (ref 12.5–16)
IMMATURE NEUTROPHIL %: 0.5 % (ref 0–0.43)
LYMPHOCYTES ABSOLUTE: 2.4 K/CU MM
LYMPHOCYTES RELATIVE PERCENT: 43.7 % (ref 24–44)
MCH RBC QN AUTO: 31.7 PG (ref 27–31)
MCHC RBC AUTO-ENTMCNC: 30.7 % (ref 32–36)
MCV RBC AUTO: 103.3 FL (ref 78–100)
MONOCYTES ABSOLUTE: 0.4 K/CU MM
MONOCYTES RELATIVE PERCENT: 7.8 % (ref 0–4)
NUCLEATED RBC %: 0 %
PDW BLD-RTO: 12.7 % (ref 11.7–14.9)
PLATELET # BLD: 165 K/CU MM (ref 140–440)
PMV BLD AUTO: 9 FL (ref 7.5–11.1)
POTASSIUM SERPL-SCNC: 4.4 MMOL/L (ref 3.5–5.1)
RBC # BLD: 3.38 M/CU MM (ref 4.2–5.4)
SEGMENTED NEUTROPHILS ABSOLUTE COUNT: 2.5 K/CU MM
SEGMENTED NEUTROPHILS RELATIVE PERCENT: 46.1 % (ref 36–66)
SODIUM BLD-SCNC: 142 MMOL/L (ref 135–145)
TOTAL IMMATURE NEUTOROPHIL: 0.03 K/CU MM
TOTAL NUCLEATED RBC: 0 K/CU MM
TOTAL PROTEIN: 5.6 GM/DL (ref 6.4–8.2)
TSH SERPL DL<=0.005 MIU/L-ACNC: 2.76 UIU/ML (ref 0.27–4.2)
WBC # BLD: 5.5 K/CU MM (ref 4–10.5)

## 2023-10-20 PROCEDURE — 80156 ASSAY CARBAMAZEPINE TOTAL: CPT

## 2023-10-20 PROCEDURE — 36415 COLL VENOUS BLD VENIPUNCTURE: CPT

## 2023-10-20 PROCEDURE — 85025 COMPLETE CBC W/AUTO DIFF WBC: CPT

## 2023-10-20 PROCEDURE — 84443 ASSAY THYROID STIM HORMONE: CPT

## 2023-10-20 PROCEDURE — 82306 VITAMIN D 25 HYDROXY: CPT

## 2023-10-20 PROCEDURE — 80061 LIPID PANEL: CPT

## 2023-10-20 PROCEDURE — 80053 COMPREHEN METABOLIC PANEL: CPT

## 2023-10-23 LAB
CHOLEST SERPL-MCNC: 177 MG/DL
HDLC SERPL-MCNC: 42 MG/DL
LDLC SERPL CALC-MCNC: 76 MG/DL
TRIGL SERPL-MCNC: 296 MG/DL

## 2024-02-21 ENCOUNTER — HOSPITAL ENCOUNTER (OUTPATIENT)
Age: 64
Setting detail: SPECIMEN
Discharge: HOME | End: 2024-02-21
Payer: MEDICARE

## 2024-02-21 PROCEDURE — 84443 ASSAY THYROID STIM HORMONE: CPT

## 2024-02-21 PROCEDURE — 80164 ASSAY DIPROPYLACETIC ACD TOT: CPT

## 2024-02-23 ENCOUNTER — HOSPITAL ENCOUNTER (OUTPATIENT)
Age: 64
Setting detail: SPECIMEN
Discharge: HOME | End: 2024-02-23
Payer: MEDICARE

## 2024-02-23 LAB
25(OH)D3 SERPL-MCNC: 44.52 NG/ML
ALT SERPL-CCNC: 6 U/L (ref 10–40)
ANION GAP SERPL CALCULATED.3IONS-SCNC: 13 MMOL/L (ref 7–16)
BASOPHILS ABSOLUTE: 0 K/CU MM
BASOPHILS RELATIVE PERCENT: 0.3 % (ref 0–1)
BUN SERPL-MCNC: 20 MG/DL (ref 6–23)
CALCIUM SERPL-MCNC: 8.2 MG/DL (ref 8.3–10.6)
CARBAMAZEPINE LEVEL: 8.3 UG/ML (ref 5–9)
CHLORIDE BLD-SCNC: 105 MMOL/L (ref 99–110)
CHOLEST SERPL-MCNC: 145 MG/DL
CO2: 26 MMOL/L (ref 21–32)
CREAT SERPL-MCNC: 0.5 MG/DL (ref 0.6–1.1)
DIFFERENTIAL TYPE: ABNORMAL
DOSE AMOUNT: ABNORMAL
DOSE AMOUNT: NORMAL
DOSE TIME: ABNORMAL
DOSE TIME: NORMAL
EOSINOPHILS ABSOLUTE: 0.1 K/CU MM
EOSINOPHILS RELATIVE PERCENT: 1.9 % (ref 0–3)
ESTIMATED AVERAGE GLUCOSE: 91 MG/DL
GFR SERPL CREATININE-BSD FRML MDRD: >60 ML/MIN/1.73M2
GLUCOSE SERPL-MCNC: 87 MG/DL (ref 70–99)
HBA1C MFR BLD: 4.8 % (ref 4.2–6.3)
HCT VFR BLD CALC: 33.5 % (ref 37–47)
HDLC SERPL-MCNC: 39 MG/DL
HEMOGLOBIN: 10.1 GM/DL (ref 12.5–16)
IMMATURE NEUTROPHIL %: 0.3 % (ref 0–0.43)
LDLC SERPL CALC-MCNC: 66 MG/DL
LYMPHOCYTES ABSOLUTE: 1.7 K/CU MM
LYMPHOCYTES RELATIVE PERCENT: 28.3 % (ref 24–44)
MCH RBC QN AUTO: 31.9 PG (ref 27–31)
MCHC RBC AUTO-ENTMCNC: 30.1 % (ref 32–36)
MCV RBC AUTO: 105.7 FL (ref 78–100)
MONOCYTES ABSOLUTE: 0.5 K/CU MM
MONOCYTES RELATIVE PERCENT: 7.8 % (ref 0–4)
NUCLEATED RBC %: 0 %
PDW BLD-RTO: 12.8 % (ref 11.7–14.9)
PLATELET # BLD: 173 K/CU MM (ref 140–440)
PMV BLD AUTO: 9.1 FL (ref 7.5–11.1)
POTASSIUM SERPL-SCNC: 3.7 MMOL/L (ref 3.5–5.1)
RBC # BLD: 3.17 M/CU MM (ref 4.2–5.4)
SEGMENTED NEUTROPHILS ABSOLUTE COUNT: 3.6 K/CU MM
SEGMENTED NEUTROPHILS RELATIVE PERCENT: 61.4 % (ref 36–66)
SODIUM BLD-SCNC: 144 MMOL/L (ref 135–145)
TOTAL IMMATURE NEUTOROPHIL: 0.02 K/CU MM
TOTAL NUCLEATED RBC: 0 K/CU MM
TRIGL SERPL-MCNC: 199 MG/DL
TSH SERPL DL<=0.005 MIU/L-ACNC: 3.61 UIU/ML (ref 0.27–4.2)
VALPROIC ACID LEVEL: 48.2 UG/ML (ref 50–100)
WBC # BLD: 5.9 K/CU MM (ref 4–10.5)

## 2024-02-23 PROCEDURE — 85025 COMPLETE CBC W/AUTO DIFF WBC: CPT

## 2024-02-23 PROCEDURE — 84460 ALANINE AMINO (ALT) (SGPT): CPT

## 2024-02-23 PROCEDURE — 84443 ASSAY THYROID STIM HORMONE: CPT

## 2024-02-23 PROCEDURE — 80164 ASSAY DIPROPYLACETIC ACD TOT: CPT

## 2024-02-23 PROCEDURE — 80048 BASIC METABOLIC PNL TOTAL CA: CPT

## 2024-02-23 PROCEDURE — 80061 LIPID PANEL: CPT

## 2024-02-23 PROCEDURE — 83036 HEMOGLOBIN GLYCOSYLATED A1C: CPT

## 2024-02-23 PROCEDURE — 82306 VITAMIN D 25 HYDROXY: CPT

## 2024-02-23 PROCEDURE — 36415 COLL VENOUS BLD VENIPUNCTURE: CPT

## 2024-02-23 PROCEDURE — 80156 ASSAY CARBAMAZEPINE TOTAL: CPT

## 2024-02-29 ENCOUNTER — HOSPITAL ENCOUNTER (OUTPATIENT)
Age: 64
Setting detail: SPECIMEN
Discharge: HOME | End: 2024-02-29
Payer: MEDICARE

## 2024-02-29 LAB
ALBUMIN SERPL-MCNC: 3.2 GM/DL (ref 3.4–5)
ALP BLD-CCNC: 75 IU/L (ref 40–128)
ALT SERPL-CCNC: 8 U/L (ref 10–40)
ANION GAP SERPL CALCULATED.3IONS-SCNC: 10 MMOL/L (ref 7–16)
AST SERPL-CCNC: 18 IU/L (ref 15–37)
BILIRUB SERPL-MCNC: 0.2 MG/DL (ref 0–1)
BUN SERPL-MCNC: 21 MG/DL (ref 6–23)
CALCIUM SERPL-MCNC: 8.7 MG/DL (ref 8.3–10.6)
CHLORIDE BLD-SCNC: 103 MMOL/L (ref 99–110)
CHOLEST SERPL-MCNC: 127 MG/DL
CO2: 28 MMOL/L (ref 21–32)
CREAT SERPL-MCNC: 0.5 MG/DL (ref 0.6–1.1)
ESTIMATED AVERAGE GLUCOSE: 103 MG/DL
GFR SERPL CREATININE-BSD FRML MDRD: >60 ML/MIN/1.73M2
GLUCOSE SERPL-MCNC: 80 MG/DL (ref 70–99)
HBA1C MFR BLD: 5.2 % (ref 4.2–6.3)
HDLC SERPL-MCNC: 42 MG/DL
LDLC SERPL CALC-MCNC: 56 MG/DL
POTASSIUM SERPL-SCNC: 4.3 MMOL/L (ref 3.5–5.1)
SODIUM BLD-SCNC: 141 MMOL/L (ref 135–145)
T4 FREE SERPL-MCNC: 0.66 NG/DL (ref 0.9–1.8)
TOTAL PROTEIN: 5.6 GM/DL (ref 6.4–8.2)
TRIGL SERPL-MCNC: 146 MG/DL

## 2024-02-29 PROCEDURE — 36415 COLL VENOUS BLD VENIPUNCTURE: CPT

## 2024-02-29 PROCEDURE — 84439 ASSAY OF FREE THYROXINE: CPT

## 2024-02-29 PROCEDURE — 83036 HEMOGLOBIN GLYCOSYLATED A1C: CPT

## 2024-02-29 PROCEDURE — 80061 LIPID PANEL: CPT

## 2024-02-29 PROCEDURE — 80053 COMPREHEN METABOLIC PANEL: CPT

## 2024-03-25 ENCOUNTER — TRANSCRIBE ORDERS (OUTPATIENT)
Dept: ADMINISTRATIVE | Age: 64
End: 2024-03-25

## 2024-03-25 DIAGNOSIS — R13.14 DYSPHAGIA, PHARYNGOESOPHAGEAL PHASE: Primary | ICD-10-CM

## 2024-04-04 ENCOUNTER — HOSPITAL ENCOUNTER (OUTPATIENT)
Dept: GENERAL RADIOLOGY | Age: 64
Discharge: HOME | End: 2024-04-04
Payer: MEDICARE

## 2024-04-04 ENCOUNTER — HOSPITAL ENCOUNTER (OUTPATIENT)
Dept: SPEECH THERAPY | Age: 64
Setting detail: THERAPIES SERIES
Discharge: HOME | End: 2024-04-04
Payer: MEDICARE

## 2024-04-04 DIAGNOSIS — R13.14 DYSPHAGIA, PHARYNGOESOPHAGEAL PHASE: ICD-10-CM

## 2024-04-04 PROCEDURE — 74230 X-RAY XM SWLNG FUNCJ C+: CPT

## 2024-04-04 PROCEDURE — 92611 MOTION FLUOROSCOPY/SWALLOW: CPT

## 2024-04-04 NOTE — PROCEDURES
noted difficulty swallowing. Medical hx includes cerebral palsy/developmental disability, epilepsy, COPD, GERD, HTN, HLD, CVA, DM. Last known visit with SLP in December 2022; she was recommended minced and moist diet thin liquids via cup sip at that time.    Pt seen for today's study seated upright in chair, filmed in lateral view. She was presented with PO trials of thin liquids via tsp/cup, nectar thick liquids via cup/straw, honey thick liquids via cup/straw, puree, and regular solids. Oral stage is mild-moderately impaired, characterized by intact labial seal, prolonged mastication, premature pharyngeal entry with all liquids, adequate oral clearance given increased time for spontaneous cleansing swallow. Pharyngeal stage is severely impaired, characterized by delayed swallow initiation, reduced laryngeal elevation, incomplete laryngeal vestibule closure/epiglottic inversion, intact posterior pharyngeal stripping wave/palatal elevation/UES distention. Pharyngeal clearance grossly intact with all consistencies. Residue remained within the laryngeal vestibule.    Laryngeal penetration identified with thin, nectar thick, and honey thick liquids with subsequent aspiration. Aspiration of thin liquids was followed by a cough that was ineffective to clear any material from the airway. Aspiration of nectar thick liquids was inconsistently silent. All aspiration of honey thick liquids was silent. Aspiration of honey thick liquids appeared to be relatively decreased in volume (minimal-moderate) and frequency compared to gross aspiration identified with all thin liquid and majority of nectar thick liquid trials. Possible laryngeal penetration identified with pureed consistency, however, difficult to determine d/t residue that remained within the laryngeal vestibule from previous trials. No penetration/aspiration identified with solids.      Recommendations: NPO with consideration for alternate route for nutrition vs. soft

## 2024-04-30 ENCOUNTER — APPOINTMENT (OUTPATIENT)
Dept: GENERAL RADIOLOGY | Age: 64
End: 2024-04-30
Payer: MEDICARE

## 2024-04-30 ENCOUNTER — HOSPITAL ENCOUNTER (INPATIENT)
Age: 64
LOS: 10 days | Discharge: NURSING, REHAB OR LONG-TERM CARE | End: 2024-05-10
Attending: EMERGENCY MEDICINE
Payer: MEDICARE

## 2024-04-30 DIAGNOSIS — J96.01 ACUTE RESPIRATORY FAILURE WITH HYPOXIA (HCC): Primary | ICD-10-CM

## 2024-04-30 DIAGNOSIS — T17.908A ASPIRATION INTO AIRWAY, INITIAL ENCOUNTER: ICD-10-CM

## 2024-04-30 DIAGNOSIS — F79 INTELLECTUAL DISABILITY: Chronic | ICD-10-CM

## 2024-04-30 DIAGNOSIS — R41.82 ALTERED MENTAL STATUS, UNSPECIFIED ALTERED MENTAL STATUS TYPE: ICD-10-CM

## 2024-04-30 PROBLEM — R53.1 GENERALIZED WEAKNESS: Status: ACTIVE | Noted: 2024-04-30

## 2024-04-30 LAB
ABO/RH: NORMAL
ALBUMIN SERPL-MCNC: 3.2 GM/DL (ref 3.4–5)
ALP BLD-CCNC: 84 IU/L (ref 40–129)
ALT SERPL-CCNC: <5 U/L (ref 10–40)
AMMONIA: 56 UMOL/L (ref 11–51)
ANION GAP SERPL CALCULATED.3IONS-SCNC: 10 MMOL/L (ref 7–16)
ANTIBODY SCREEN: NEGATIVE
APTT: 28.8 SECONDS (ref 25.1–37.1)
AST SERPL-CCNC: 13 IU/L (ref 15–37)
BASOPHILS ABSOLUTE: 0 K/CU MM
BASOPHILS RELATIVE PERCENT: 0.3 % (ref 0–1)
BILIRUB SERPL-MCNC: 0.1 MG/DL (ref 0–1)
BUN SERPL-MCNC: 22 MG/DL (ref 6–23)
CALCIUM SERPL-MCNC: 9.3 MG/DL (ref 8.3–10.6)
CHLORIDE BLD-SCNC: 109 MMOL/L (ref 99–110)
CO2: 26 MMOL/L (ref 21–32)
CREAT SERPL-MCNC: 0.6 MG/DL (ref 0.6–1.1)
DIFFERENTIAL TYPE: ABNORMAL
EOSINOPHILS ABSOLUTE: 0.1 K/CU MM
EOSINOPHILS RELATIVE PERCENT: 0.9 % (ref 0–3)
GFR SERPL CREATININE-BSD FRML MDRD: >90 ML/MIN/1.73M2
GLUCOSE SERPL-MCNC: 139 MG/DL (ref 70–99)
HCT VFR BLD CALC: 38.3 % (ref 37–47)
HEMOGLOBIN: 11.9 GM/DL (ref 12.5–16)
IMMATURE NEUTROPHIL %: 0.9 % (ref 0–0.43)
INR BLD: 1 INDEX
LACTIC ACID, SEPSIS: 1 MMOL/L (ref 0.4–2)
LACTIC ACID, SEPSIS: 2.2 MMOL/L (ref 0.4–2)
LIPASE: 29 IU/L (ref 13–60)
LYMPHOCYTES ABSOLUTE: 1.9 K/CU MM
LYMPHOCYTES RELATIVE PERCENT: 28.4 % (ref 24–44)
MCH RBC QN AUTO: 33.5 PG (ref 27–31)
MCHC RBC AUTO-ENTMCNC: 31.1 % (ref 32–36)
MCV RBC AUTO: 107.9 FL (ref 78–100)
MONOCYTES ABSOLUTE: 0.7 K/CU MM
MONOCYTES RELATIVE PERCENT: 10.5 % (ref 0–4)
NEUTROPHILS RELATIVE PERCENT: 59 % (ref 36–66)
NUCLEATED RBC %: 0 %
PDW BLD-RTO: 12.1 % (ref 11.7–14.9)
PLATELET # BLD: 235 K/CU MM (ref 140–440)
PMV BLD AUTO: 8.7 FL (ref 7.5–11.1)
POTASSIUM SERPL-SCNC: 5.1 MMOL/L (ref 3.5–5.1)
PREALBUMIN: 15 MG/DL (ref 20–40)
PROTHROMBIN TIME: 13.1 SECONDS (ref 11.7–14.5)
RBC # BLD: 3.55 M/CU MM (ref 4.2–5.4)
SEGMENTED NEUTROPHILS ABSOLUTE COUNT: 4 K/CU MM
SODIUM BLD-SCNC: 145 MMOL/L (ref 135–145)
TOTAL IMMATURE NEUTOROPHIL: 0.06 K/CU MM
TOTAL NUCLEATED RBC: 0 K/CU MM
TOTAL PROTEIN: 6.4 GM/DL (ref 6.4–8.2)
TSH SERPL DL<=0.005 MIU/L-ACNC: 1.61 UIU/ML (ref 0.27–4.2)
WBC # BLD: 6.7 K/CU MM (ref 4–10.5)

## 2024-04-30 PROCEDURE — 85025 COMPLETE CBC W/AUTO DIFF WBC: CPT

## 2024-04-30 PROCEDURE — 80053 COMPREHEN METABOLIC PANEL: CPT

## 2024-04-30 PROCEDURE — 85730 THROMBOPLASTIN TIME PARTIAL: CPT

## 2024-04-30 PROCEDURE — 86901 BLOOD TYPING SEROLOGIC RH(D): CPT

## 2024-04-30 PROCEDURE — 6370000000 HC RX 637 (ALT 250 FOR IP): Performed by: INTERNAL MEDICINE

## 2024-04-30 PROCEDURE — 94761 N-INVAS EAR/PLS OXIMETRY MLT: CPT

## 2024-04-30 PROCEDURE — 99285 EMERGENCY DEPT VISIT HI MDM: CPT

## 2024-04-30 PROCEDURE — 86850 RBC ANTIBODY SCREEN: CPT

## 2024-04-30 PROCEDURE — 86900 BLOOD TYPING SEROLOGIC ABO: CPT

## 2024-04-30 PROCEDURE — 84443 ASSAY THYROID STIM HORMONE: CPT

## 2024-04-30 PROCEDURE — 2580000003 HC RX 258: Performed by: EMERGENCY MEDICINE

## 2024-04-30 PROCEDURE — 87040 BLOOD CULTURE FOR BACTERIA: CPT

## 2024-04-30 PROCEDURE — 82140 ASSAY OF AMMONIA: CPT

## 2024-04-30 PROCEDURE — 85610 PROTHROMBIN TIME: CPT

## 2024-04-30 PROCEDURE — 6360000002 HC RX W HCPCS: Performed by: FAMILY MEDICINE

## 2024-04-30 PROCEDURE — 6360000002 HC RX W HCPCS: Performed by: INTERNAL MEDICINE

## 2024-04-30 PROCEDURE — 2700000000 HC OXYGEN THERAPY PER DAY

## 2024-04-30 PROCEDURE — 71045 X-RAY EXAM CHEST 1 VIEW: CPT

## 2024-04-30 PROCEDURE — C9113 INJ PANTOPRAZOLE SODIUM, VIA: HCPCS | Performed by: EMERGENCY MEDICINE

## 2024-04-30 PROCEDURE — 84134 ASSAY OF PREALBUMIN: CPT

## 2024-04-30 PROCEDURE — 83690 ASSAY OF LIPASE: CPT

## 2024-04-30 PROCEDURE — 6360000002 HC RX W HCPCS: Performed by: EMERGENCY MEDICINE

## 2024-04-30 PROCEDURE — 94640 AIRWAY INHALATION TREATMENT: CPT

## 2024-04-30 PROCEDURE — 96374 THER/PROPH/DIAG INJ IV PUSH: CPT

## 2024-04-30 PROCEDURE — 36415 COLL VENOUS BLD VENIPUNCTURE: CPT

## 2024-04-30 PROCEDURE — 83605 ASSAY OF LACTIC ACID: CPT

## 2024-04-30 PROCEDURE — 2580000003 HC RX 258: Performed by: INTERNAL MEDICINE

## 2024-04-30 PROCEDURE — 1200000000 HC SEMI PRIVATE

## 2024-04-30 RX ORDER — FERROUS SULFATE 325(65) MG
325 TABLET ORAL DAILY
Status: DISCONTINUED | OUTPATIENT
Start: 2024-05-01 | End: 2024-04-30

## 2024-04-30 RX ORDER — SODIUM CHLORIDE 0.9 % (FLUSH) 0.9 %
5-40 SYRINGE (ML) INJECTION EVERY 12 HOURS SCHEDULED
Status: DISCONTINUED | OUTPATIENT
Start: 2024-04-30 | End: 2024-05-10 | Stop reason: HOSPADM

## 2024-04-30 RX ORDER — FERROUS SULFATE 325(65) MG
325 TABLET ORAL 2 TIMES DAILY WITH MEALS
Status: DISCONTINUED | OUTPATIENT
Start: 2024-04-30 | End: 2024-05-10 | Stop reason: HOSPADM

## 2024-04-30 RX ORDER — LEVOTHYROXINE SODIUM 0.05 MG/1
50 TABLET ORAL DAILY
Status: DISCONTINUED | OUTPATIENT
Start: 2024-05-01 | End: 2024-05-09

## 2024-04-30 RX ORDER — MIRTAZAPINE 15 MG/1
30 TABLET, FILM COATED ORAL NIGHTLY
Status: DISCONTINUED | OUTPATIENT
Start: 2024-04-30 | End: 2024-04-30

## 2024-04-30 RX ORDER — SODIUM CHLORIDE 9 MG/ML
INJECTION, SOLUTION INTRAVENOUS PRN
Status: DISCONTINUED | OUTPATIENT
Start: 2024-04-30 | End: 2024-05-10 | Stop reason: HOSPADM

## 2024-04-30 RX ORDER — RISPERIDONE 0.5 MG/1
1 TABLET ORAL 2 TIMES DAILY
Status: DISCONTINUED | OUTPATIENT
Start: 2024-04-30 | End: 2024-04-30

## 2024-04-30 RX ORDER — VITAMIN B COMPLEX
2000 TABLET ORAL DAILY
Status: DISCONTINUED | OUTPATIENT
Start: 2024-04-30 | End: 2024-05-09

## 2024-04-30 RX ORDER — MAGNESIUM SULFATE IN WATER 40 MG/ML
2000 INJECTION, SOLUTION INTRAVENOUS PRN
Status: DISCONTINUED | OUTPATIENT
Start: 2024-04-30 | End: 2024-05-10 | Stop reason: HOSPADM

## 2024-04-30 RX ORDER — ACETAMINOPHEN 325 MG/1
650 TABLET ORAL EVERY 6 HOURS PRN
Status: DISCONTINUED | OUTPATIENT
Start: 2024-04-30 | End: 2024-05-09

## 2024-04-30 RX ORDER — ENOXAPARIN SODIUM 100 MG/ML
40 INJECTION SUBCUTANEOUS DAILY
Status: DISCONTINUED | OUTPATIENT
Start: 2024-04-30 | End: 2024-05-10 | Stop reason: HOSPADM

## 2024-04-30 RX ORDER — FLUTICASONE PROPIONATE 50 MCG
1 SPRAY, SUSPENSION (ML) NASAL DAILY
Status: DISCONTINUED | OUTPATIENT
Start: 2024-05-01 | End: 2024-05-10 | Stop reason: HOSPADM

## 2024-04-30 RX ORDER — ONDANSETRON 2 MG/ML
4 INJECTION INTRAMUSCULAR; INTRAVENOUS EVERY 6 HOURS PRN
Status: DISCONTINUED | OUTPATIENT
Start: 2024-04-30 | End: 2024-05-09

## 2024-04-30 RX ORDER — MELOXICAM 15 MG/1
15 TABLET ORAL DAILY
COMMUNITY
Start: 2024-04-15

## 2024-04-30 RX ORDER — AMLODIPINE BESYLATE 10 MG/1
10 TABLET ORAL DAILY
Status: DISCONTINUED | OUTPATIENT
Start: 2024-05-01 | End: 2024-05-09

## 2024-04-30 RX ORDER — DIVALPROEX SODIUM 500 MG/1
1000 TABLET, DELAYED RELEASE ORAL
Status: DISCONTINUED | OUTPATIENT
Start: 2024-04-30 | End: 2024-05-10 | Stop reason: HOSPADM

## 2024-04-30 RX ORDER — ATORVASTATIN CALCIUM 40 MG/1
40 TABLET, FILM COATED ORAL DAILY
Status: DISCONTINUED | OUTPATIENT
Start: 2024-04-30 | End: 2024-05-09

## 2024-04-30 RX ORDER — POTASSIUM CHLORIDE 7.45 MG/ML
10 INJECTION INTRAVENOUS PRN
Status: DISCONTINUED | OUTPATIENT
Start: 2024-04-30 | End: 2024-05-10 | Stop reason: HOSPADM

## 2024-04-30 RX ORDER — BUSPIRONE HYDROCHLORIDE 5 MG/1
10 TABLET ORAL 2 TIMES DAILY
Status: DISCONTINUED | OUTPATIENT
Start: 2024-04-30 | End: 2024-05-09

## 2024-04-30 RX ORDER — PANTOPRAZOLE SODIUM 40 MG/1
40 TABLET, DELAYED RELEASE ORAL DAILY
Status: DISCONTINUED | OUTPATIENT
Start: 2024-04-30 | End: 2024-04-30

## 2024-04-30 RX ORDER — ALBUTEROL SULFATE 90 UG/1
2 AEROSOL, METERED RESPIRATORY (INHALATION) EVERY 4 HOURS PRN
Status: DISCONTINUED | OUTPATIENT
Start: 2024-04-30 | End: 2024-05-10 | Stop reason: HOSPADM

## 2024-04-30 RX ORDER — PANTOPRAZOLE SODIUM 40 MG/10ML
80 INJECTION, POWDER, LYOPHILIZED, FOR SOLUTION INTRAVENOUS ONCE
Status: COMPLETED | OUTPATIENT
Start: 2024-04-30 | End: 2024-04-30

## 2024-04-30 RX ORDER — MIRTAZAPINE 15 MG/1
15 TABLET, FILM COATED ORAL NIGHTLY
COMMUNITY
Start: 2024-01-24

## 2024-04-30 RX ORDER — METRONIDAZOLE 500 MG/100ML
500 INJECTION, SOLUTION INTRAVENOUS ONCE
Status: COMPLETED | OUTPATIENT
Start: 2024-04-30 | End: 2024-04-30

## 2024-04-30 RX ORDER — 0.9 % SODIUM CHLORIDE 0.9 %
1000 INTRAVENOUS SOLUTION INTRAVENOUS ONCE
Status: COMPLETED | OUTPATIENT
Start: 2024-04-30 | End: 2024-04-30

## 2024-04-30 RX ORDER — LEVETIRACETAM 500 MG/1
1500 TABLET ORAL 2 TIMES DAILY
Status: DISCONTINUED | OUTPATIENT
Start: 2024-04-30 | End: 2024-05-10 | Stop reason: HOSPADM

## 2024-04-30 RX ORDER — SITAGLIPTIN 50 MG/1
50 TABLET, FILM COATED ORAL DAILY
COMMUNITY
Start: 2024-04-15

## 2024-04-30 RX ORDER — LEVETIRACETAM 500 MG/5ML
1500 INJECTION, SOLUTION, CONCENTRATE INTRAVENOUS EVERY 12 HOURS
Status: DISCONTINUED | OUTPATIENT
Start: 2024-04-30 | End: 2024-05-10 | Stop reason: HOSPADM

## 2024-04-30 RX ORDER — IPRATROPIUM BROMIDE AND ALBUTEROL SULFATE 2.5; .5 MG/3ML; MG/3ML
1 SOLUTION RESPIRATORY (INHALATION)
Status: DISCONTINUED | OUTPATIENT
Start: 2024-04-30 | End: 2024-05-03

## 2024-04-30 RX ORDER — MIRTAZAPINE 15 MG/1
15 TABLET, FILM COATED ORAL NIGHTLY
Status: DISCONTINUED | OUTPATIENT
Start: 2024-04-30 | End: 2024-05-09

## 2024-04-30 RX ORDER — ONDANSETRON 4 MG/1
4 TABLET, ORALLY DISINTEGRATING ORAL EVERY 8 HOURS PRN
Status: DISCONTINUED | OUTPATIENT
Start: 2024-04-30 | End: 2024-05-09

## 2024-04-30 RX ORDER — CARBAMAZEPINE 300 MG/1
300 CAPSULE, EXTENDED RELEASE ORAL 2 TIMES DAILY
Status: DISCONTINUED | OUTPATIENT
Start: 2024-04-30 | End: 2024-05-10 | Stop reason: HOSPADM

## 2024-04-30 RX ORDER — POLYETHYLENE GLYCOL 3350 17 G/17G
17 POWDER, FOR SOLUTION ORAL DAILY PRN
Status: DISCONTINUED | OUTPATIENT
Start: 2024-04-30 | End: 2024-05-10 | Stop reason: HOSPADM

## 2024-04-30 RX ORDER — ACETAMINOPHEN 650 MG/1
650 SUPPOSITORY RECTAL EVERY 6 HOURS PRN
Status: DISCONTINUED | OUTPATIENT
Start: 2024-04-30 | End: 2024-05-09

## 2024-04-30 RX ORDER — POTASSIUM CHLORIDE 20 MEQ/1
40 TABLET, EXTENDED RELEASE ORAL PRN
Status: DISCONTINUED | OUTPATIENT
Start: 2024-04-30 | End: 2024-05-10 | Stop reason: HOSPADM

## 2024-04-30 RX ORDER — SODIUM CHLORIDE 0.9 % (FLUSH) 0.9 %
5-40 SYRINGE (ML) INJECTION PRN
Status: DISCONTINUED | OUTPATIENT
Start: 2024-04-30 | End: 2024-05-10 | Stop reason: HOSPADM

## 2024-04-30 RX ADMIN — AZITHROMYCIN MONOHYDRATE 500 MG: 500 INJECTION, POWDER, LYOPHILIZED, FOR SOLUTION INTRAVENOUS at 12:25

## 2024-04-30 RX ADMIN — LEVETIRACETAM 1500 MG: 100 INJECTION, SOLUTION INTRAVENOUS at 21:28

## 2024-04-30 RX ADMIN — ENOXAPARIN SODIUM 40 MG: 100 INJECTION SUBCUTANEOUS at 20:49

## 2024-04-30 RX ADMIN — SODIUM CHLORIDE 1000 ML: 9 INJECTION, SOLUTION INTRAVENOUS at 10:44

## 2024-04-30 RX ADMIN — SODIUM CHLORIDE, PRESERVATIVE FREE 10 ML: 5 INJECTION INTRAVENOUS at 20:50

## 2024-04-30 RX ADMIN — PANTOPRAZOLE SODIUM 80 MG: 40 INJECTION, POWDER, FOR SOLUTION INTRAVENOUS at 10:40

## 2024-04-30 RX ADMIN — METRONIDAZOLE 500 MG: 500 INJECTION, SOLUTION INTRAVENOUS at 12:29

## 2024-04-30 RX ADMIN — WATER 1000 MG: 1 INJECTION INTRAMUSCULAR; INTRAVENOUS; SUBCUTANEOUS at 12:16

## 2024-04-30 RX ADMIN — SODIUM CHLORIDE 1000 ML: 9 INJECTION, SOLUTION INTRAVENOUS at 12:14

## 2024-04-30 RX ADMIN — IPRATROPIUM BROMIDE AND ALBUTEROL SULFATE 1 DOSE: 2.5; .5 SOLUTION RESPIRATORY (INHALATION) at 19:56

## 2024-04-30 NOTE — ED NOTES
ED TO INPATIENT SBAR HANDOFF    Patient Name: Jm Garcia   :  1960  63 y.o.   Preferred Name  Jm  Family/Caregiver Present no   Restraints no   C-SSRS: Risk of Suicide: No Risk  Sitter no   Sepsis Risk Score Sepsis Risk Score: 2.62      Situation  No chief complaint on file.    Brief Description of Patient's Condition: patient brought in by EMS from [facility] for decreased appetite and dark stools   Mental Status: alert  Arrived from: group home    Imaging:   XR CHEST PORTABLE   Final Result   1.  No evidence of acute cardiopulmonary disease.        Abnormal labs:   Abnormal Labs Reviewed   COMPREHENSIVE METABOLIC PANEL - Abnormal; Notable for the following components:       Result Value    Glucose 139 (*)     Albumin 3.2 (*)     ALT <5 (*)     AST 13 (*)     All other components within normal limits   CBC WITH AUTO DIFFERENTIAL - Abnormal; Notable for the following components:    RBC 3.55 (*)     Hemoglobin 11.9 (*)     .9 (*)     MCH 33.5 (*)     MCHC 31.1 (*)     Monocytes % 10.5 (*)     Immature Neutrophil % 0.9 (*)     All other components within normal limits   LACTATE, SEPSIS - Abnormal; Notable for the following components:    Lactic Acid, Sepsis 2.2 (*)     All other components within normal limits       Background  History:   Past Medical History:   Diagnosis Date    Anxiety disorder     Back pain, chronic     Cerebral palsy (HCC)     COPD (chronic obstructive pulmonary disease) (HCC)     COVID-19 10/12/2021    CVA (cerebral infarction) 2014 x2    Diabetes mellitus (HCC)     Diverticulosis     Epilepsy (HCC)     GERD (gastroesophageal reflux disease)     Hyperlipidemia     Hypertension     Insomnia     Iron (Fe) deficiency anemia     Mental disability     Seizures (HCC)     Unspecified cerebral artery occlusion with cerebral infarction        Assessment    Vitals:        Vitals:    24 1230 24 1300 24 1330 24 1400   BP: 137/69 122/72 124/68 (!) 118/54   Pulse:

## 2024-04-30 NOTE — ED NOTES
Medication History  [facility]    Patient Name: Jm Garcia 1960     Medication history has been completed by: Nirmala Cao [facility]    Source(s) of information: Medication list provided by Caregiver and insurance claims     Primary Care Physician: Yuly Bro APRN - CNP     [facility]: [facility]    Allergies as of 04/30/2024 - Fully Reviewed 01/31/2024   Allergen Reaction Noted    Macrobid [nitrofurantoin] Hives and Swelling 12/07/2015        Prior to Admission medications    Medication Sig Start Date End Date Taking? Authorizing Provider   JANUVIA 50 MG tablet Take 1 tablet by mouth daily 4/15/24  Yes Provider, MD Lovely   mirtazapine (REMERON) 15 MG tablet Take 1 tablet by mouth nightly at bedtime 1/24/24  Yes Provider, MD Lovely   meloxicam (MOBIC) 15 MG tablet Take 1 tablet by mouth daily 4/15/24  Yes Provider, MD Lovely   COMBIVENT RESPIMAT  MCG/ACT AERS inhaler INHALE 1 PUFF BY ORAL INHALATION EVERY 6 HOURS 3/15/24   Ross Harman MD   ipratropium 0.5 mg-albuterol 2.5 mg (DUONEB) 0.5-2.5 (3) MG/3ML SOLN nebulizer solution Inhale 3 mLs into the lungs every 4 hours 12/28/23   Ross Harman MD   amLODIPine (NORVASC) 10 MG tablet TAKE 1 TABLET BY MOUTH DAILY 6/16/23   Jeanette Rao MD   cetirizine (ZYRTEC) 10 MG tablet TAKE 1 TABLET BY MOUTH DAILY 6/16/23   Jeanette Rao MD   FEROSUL 325 (65 Fe) MG tablet  Take 1 tablet by mouth 2 times daily 6/16/23   Jeanette Rao MD   D3 HIGH POTENCY 50 MCG (2000 UT) CAPS TAKE 1 CAPSULE BY MOUTH DAILY 3/16/23   Jeanette Rao MD   OXYGEN Inhale 2 L into the lungs continuous    Provider, MD Lovely   Lactobacillus Acid-Pectin (ACIDOPHILUS/CITRUS PECTIN) TABS TAKE 1 CAPSULE BY MOUTH DAILY 1/16/23   Jeanette Rao MD   albuterol sulfate HFA (PROVENTIL;VENTOLIN;PROAIR) 108 (90 Base) MCG/ACT inhaler Inhale 2 puffs into the lungs every 4 hours as needed for Shortness of Breath 12/21/22   Cece Brizuela,

## 2024-04-30 NOTE — H&P
with generalized weakness and lethargy.  Patient is alert but can provide only limited history.  Can answer yes or no to questions but cannot elaborate.  Unable to reach guardian for more history.  History obtained from EMR.  According to the caregiver who was present during initial evaluation, she is more lethargic than usual.  She is usually more talkative.  She has been having issues with her fluid and has been aspirating.  She was evaluated by SLP on 4/4/2024 and was continued to have severe dysphagia.  She is currently on puréed diet.  The patient denies any abdominal pain, nausea or vomiting, cough or sputum production.  She normally wears 2 L O2 and is on 1 L O2 in the emergency room.    Workup in the emergency room was fairly unremarkable.  BMP, CBC WNL.  Lactic acid 2.2.  Vital stable.  Blood cultures obtained.  Chest x-ray reviewed which is unremarkable.     Review of Systems: Need 10 Elements   Review of Systems         Objective:   No intake or output data in the 24 hours ending 04/30/24 1148   Vitals:   Vitals:    04/30/24 1030 04/30/24 1037 04/30/24 1100 04/30/24 1130   BP: 136/62  (!) 149/95 136/63   Pulse: 79 80 85 78   Resp: 23 21 20 25   Temp:       TempSrc:       SpO2: (!) 89% 94% 98% 95%       Medications Prior to Admission     Prior to Admission medications    Medication Sig Start Date End Date Taking? Authorizing Provider   COMBIVENT RESPIMAT  MCG/ACT AERS inhaler INHALE 1 PUFF BY ORAL INHALATION EVERY 6 HOURS 3/15/24   Ross Harman MD   ipratropium 0.5 mg-albuterol 2.5 mg (DUONEB) 0.5-2.5 (3) MG/3ML SOLN nebulizer solution Inhale 3 mLs into the lungs every 4 hours 12/28/23   Ross Harman MD   amLODIPine (NORVASC) 10 MG tablet TAKE 1 TABLET BY MOUTH DAILY 6/16/23   Jeanette Rao MD   cetirizine (ZYRTEC) 10 MG tablet TAKE 1 TABLET BY MOUTH DAILY 6/16/23   Jeanette Rao MD   FEROSUL 325 (65 Fe) MG tablet TAKE 1 TABLET BY MOUTH DAILY WITH BREAKFAST 6/16/23   Jeanette Rao MD  02/02/2023 01:04 PM    PHUR 6 04/05/2019 03:53 PM    WBCUA 21 01/23/2023 01:32 PM    RBCUA 28 01/23/2023 01:32 PM    MUCUS RARE 01/23/2023 01:32 PM    TRICHOMONAS NONE SEEN 01/23/2023 01:32 PM    YEAST RARE 08/11/2018 04:20 PM    BACTERIA NEGATIVE 01/23/2023 01:32 PM    CLARITYU CLEAR 02/02/2023 01:04 PM    SPECGRAV >1.030 02/02/2023 01:04 PM    LEUKOCYTESUR NEGATIVE 02/02/2023 01:04 PM    UROBILINOGEN 0.2 02/02/2023 01:04 PM    BILIRUBINUR NEGATIVE 02/02/2023 01:04 PM    BILIRUBINUR neg 04/05/2019 03:53 PM    BLOODU NEGATIVE 02/02/2023 01:04 PM    GLUCOSEU neg 04/05/2019 03:53 PM    KETUA TRACE 02/02/2023 01:04 PM     Urine Cultures:   Lab Results   Component Value Date/Time    LABURIN  08/28/2018 01:17 PM     <10,000 CFU/ml mixed skin/urogenital jaycob. No further workup    LABURIN 50,000 CFU/ml  Multiple Resistant Drug Organism   08/28/2018 01:17 PM     Blood Cultures: No results found for: \"BC\"  No results found for: \"BLOODCULT2\"  Organism:   Lab Results   Component Value Date/Time    ORG ECOL 12/18/2018 07:49 AM       Imaging/Diagnostics Last 24 Hours   No results found.    Personally reviewed Lab Studies, Imaging, and discussed case with     Electronically signed by Nabila Gregg MD on 4/30/2024 at 11:48 AM

## 2024-04-30 NOTE — PROGRESS NOTES
At 1939, Nurse reports pt coughing while eating pureed dinner.     Dysphagia  -2/2 CVA x2 history  -NPO for now  -Consult SLP  -Hold po medications including Keppra, Depakote and Carbamazepine and switch Keppra and Depakote to IV for now.   -Resume Carbamazepine as soon as possible if cleared by SLP to resume oral medications  -CXR on 4/30 at 1129 negative for acute process, but will repeat to rule out aspiration

## 2024-04-30 NOTE — ED PROVIDER NOTES
Emergency Department Encounter    Patient: Jm Garcia  MRN: [id number]  : 1960  Date of Evaluation: 2024  ED Provider:  Nguyen Rubio MD    Triage Chief Complaint:   No chief complaint on file.    [place]:  Jm Garcia is a 63 y.o. female that presents with reported black stools. Has h/o Cerebral palsy and has 24/7 caregiver. Caregiver with her right now says that she is more \"lethargic\" than usual. She is usually talkative, cursing at people, walks. She has h/o diverticulosis in 2020. She is on a pureed diet for the last month. Had been aspirating her food. Not able to chew.  She has had some coughing over the last few days as well.  No fevers.  No vomiting.    ROS - see HPI, below listed is current ROS at time of my eval:  10 systems reviewed and negative except as above.     Past Medical History:   Diagnosis Date    Anxiety disorder     Back pain, chronic     Cerebral palsy (HCC)     COPD (chronic obstructive pulmonary disease) (HCC)     COVID-19 10/12/2021    CVA (cerebral infarction) 2014 x2    Diabetes mellitus (HCC)     Diverticulosis     Epilepsy (HCC)     GERD (gastroesophageal reflux disease)     Hyperlipidemia     Hypertension     Insomnia     Iron (Fe) deficiency anemia     Mental disability     Seizures (HCC)     Unspecified cerebral artery occlusion with cerebral infarction      Past Surgical History:   Procedure Laterality Date    GASTROSTOMY TUBE PLACEMENT       Family History   Problem Relation Age of Onset    Breast Cancer Neg Hx      Social History     Socioeconomic History    Marital status: Single     Spouse name: Not on file    Number of children: Not on file    Years of education: Not on file    Highest education level: Not on file   Occupational History    Not on file   Tobacco Use    Smoking status: Former     Current packs/day: 0.00     Average packs/day: 1.5 packs/day for 20.0 years (30.0 ttl pk-yrs)     Types: Cigarettes     Start date: 1991     Quit date:     Lactic Acid, Sepsis 2.2 (HH) 0.4 - 2.0 mMOL/L   Lactate, Sepsis   Result Value Ref Range    Lactic Acid, Sepsis 1.0 0.4 - 2.0 mMOL/L   TYPE AND SCREEN   Result Value Ref Range    ABO/Rh O NEGATIVE     Antibody Screen NEGATIVE       Radiographs (if obtained):  Radiologist's Report Reviewed:  No results found.        MDM:  CC/HPI Summary, DDx, ED Course, and Reassessment: Patient mildly hypoxic initially, they do relate this history of aspiration, chills chest x-ray and labs are ordered.  Had to have faintly guaiac positive stool so I did also order a dose of IV Protonix and type and screen.  Blood pressure is normal, no tachycardia      History from : Patient    Limitations to history : None    Patient was given the following medications:  Medications   pantoprazole (PROTONIX) injection 80 mg (80 mg IntraVENous Given 4/30/24 1040)   sodium chloride 0.9 % bolus 1,000 mL (0 mLs IntraVENous Stopped 4/30/24 1144)   sodium chloride 0.9 % bolus 1,000 mL (0 mLs IntraVENous Stopped 4/30/24 1313)   cefTRIAXone (ROCEPHIN) 1,000 mg in sterile water 10 mL IV syringe (1,000 mg IntraVENous Given 4/30/24 1216)   azithromycin (ZITHROMAX) 500 mg in sodium chloride 0.9 % 250 mL IVPB ([id number]) (0 mg IntraVENous Stopped 4/30/24 1325)   metroNIDAZOLE (FLAGYL) 500 mg in 0.9% NaCl 100 mL IVPB premix (0 mg IntraVENous Stopped 4/30/24 1329)       Independent Imaging Interpretation by me: Chest x-ray with no evidence of pneumothorax or pleural effusions      Chronic conditions affecting care: MRDD    Social Determinants : Lives in group home, no nursing care    Records Reviewed : Outpatient Notes reviewed modified barium Swallow study from 4/4/2024 and speech pathology note.  Patient has pharyngoesophageal dysphagia.      Disposition Considerations (tests considered but not done, Shared Decision Making, Pt Expectation of Test or Tx.):     Patient's hemoglobin is stable.  She had mild elevation in lactic acid, so fluids were given, 2 L and

## 2024-04-30 NOTE — PROGRESS NOTES
4 Eyes Skin Assessment     NAME:  Jm Garcia  YOB: 1960  MEDICAL RECORD NUMBER:  [id number]    The patient is being assessed for  Admission    I agree that at least one RN has performed a thorough Head to Toe Skin Assessment on the patient. ALL assessment sites listed below have been assessed.      Areas assessed by both nurses:    Head, Face, Ears, Shoulders, Back, Chest, Arms, Elbows, Hands, Sacrum. Buttock, Coccyx, Ischium, and Legs. Feet and Heels        Does the Patient have a Wound? No noted wound(s)       Heladio Prevention initiated by RN: Yes  Wound Care Orders initiated by RN: No    Pressure Injury (Stage 3,4, Unstageable, DTI, NWPT, and Complex wounds) if present, place Wound referral order by RN under : No    New Ostomies, if present place, Ostomy referral order under : No     Nurse 1 eSignature: Electronically signed by Elle Velazco RN on 4/30/24 at 4:42 PM EDT    **SHARE this note so that the co-signing nurse can place an eSignature**    Nurse 2 eSignature: Electronically signed by Margy Pedraza LPN on 4/30/24 at 7:09 PM EDT

## 2024-05-01 LAB
ANION GAP SERPL CALCULATED.3IONS-SCNC: 8 MMOL/L (ref 7–16)
BASOPHILS ABSOLUTE: 0 K/CU MM
BASOPHILS RELATIVE PERCENT: 0.4 % (ref 0–1)
BUN SERPL-MCNC: 10 MG/DL (ref 6–23)
CALCIUM SERPL-MCNC: 8.3 MG/DL (ref 8.3–10.6)
CHLORIDE BLD-SCNC: 109 MMOL/L (ref 99–110)
CO2: 28 MMOL/L (ref 21–32)
CREAT SERPL-MCNC: 0.4 MG/DL (ref 0.6–1.1)
DIFFERENTIAL TYPE: ABNORMAL
EOSINOPHILS ABSOLUTE: 0.1 K/CU MM
EOSINOPHILS RELATIVE PERCENT: 0.9 % (ref 0–3)
GFR, ESTIMATED: >90 ML/MIN/1.73M2
GLUCOSE SERPL-MCNC: 90 MG/DL (ref 70–99)
HCT VFR BLD CALC: 33.4 % (ref 37–47)
HEMOGLOBIN: 10.5 GM/DL (ref 12.5–16)
IMMATURE NEUTROPHIL %: 0.7 % (ref 0–0.43)
LYMPHOCYTES ABSOLUTE: 1.5 K/CU MM
LYMPHOCYTES RELATIVE PERCENT: 27.1 % (ref 24–44)
MCH RBC QN AUTO: 33.3 PG (ref 27–31)
MCHC RBC AUTO-ENTMCNC: 31.4 % (ref 32–36)
MCV RBC AUTO: 106 FL (ref 78–100)
MONOCYTES ABSOLUTE: 0.5 K/CU MM
MONOCYTES RELATIVE PERCENT: 8.9 % (ref 0–4)
NEUTROPHILS ABSOLUTE: 3.3 K/CU MM
NEUTROPHILS RELATIVE PERCENT: 62 % (ref 36–66)
NUCLEATED RBC %: 0 %
PDW BLD-RTO: 12 % (ref 11.7–14.9)
PLATELET # BLD: 185 K/CU MM (ref 140–440)
PMV BLD AUTO: 8 FL (ref 7.5–11.1)
POTASSIUM SERPL-SCNC: 3.9 MMOL/L (ref 3.5–5.1)
RBC # BLD: 3.15 M/CU MM (ref 4.2–5.4)
SODIUM BLD-SCNC: 145 MMOL/L (ref 135–145)
TOTAL IMMATURE NEUTOROPHIL: 0.04 K/CU MM
TOTAL NUCLEATED RBC: 0 K/CU MM
WBC # BLD: 5.4 K/CU MM (ref 4–10.5)

## 2024-05-01 PROCEDURE — 2580000003 HC RX 258: Performed by: FAMILY MEDICINE

## 2024-05-01 PROCEDURE — 94640 AIRWAY INHALATION TREATMENT: CPT

## 2024-05-01 PROCEDURE — 82270 OCCULT BLOOD FECES: CPT

## 2024-05-01 PROCEDURE — 2500000003 HC RX 250 WO HCPCS: Performed by: FAMILY MEDICINE

## 2024-05-01 PROCEDURE — 2580000003 HC RX 258: Performed by: STUDENT IN AN ORGANIZED HEALTH CARE EDUCATION/TRAINING PROGRAM

## 2024-05-01 PROCEDURE — 85025 COMPLETE CBC W/AUTO DIFF WBC: CPT

## 2024-05-01 PROCEDURE — 2580000003 HC RX 258: Performed by: INTERNAL MEDICINE

## 2024-05-01 PROCEDURE — 80048 BASIC METABOLIC PNL TOTAL CA: CPT

## 2024-05-01 PROCEDURE — 1200000000 HC SEMI PRIVATE

## 2024-05-01 PROCEDURE — 6360000002 HC RX W HCPCS: Performed by: FAMILY MEDICINE

## 2024-05-01 PROCEDURE — 6370000000 HC RX 637 (ALT 250 FOR IP): Performed by: INTERNAL MEDICINE

## 2024-05-01 PROCEDURE — 92610 EVALUATE SWALLOWING FUNCTION: CPT | Performed by: SPEECH-LANGUAGE PATHOLOGIST

## 2024-05-01 PROCEDURE — 6360000002 HC RX W HCPCS: Performed by: INTERNAL MEDICINE

## 2024-05-01 PROCEDURE — 2700000000 HC OXYGEN THERAPY PER DAY

## 2024-05-01 PROCEDURE — 36415 COLL VENOUS BLD VENIPUNCTURE: CPT

## 2024-05-01 PROCEDURE — 94761 N-INVAS EAR/PLS OXIMETRY MLT: CPT

## 2024-05-01 RX ORDER — SODIUM CHLORIDE 9 MG/ML
INJECTION, SOLUTION INTRAVENOUS CONTINUOUS
Status: DISCONTINUED | OUTPATIENT
Start: 2024-05-01 | End: 2024-05-05

## 2024-05-01 RX ADMIN — IPRATROPIUM BROMIDE AND ALBUTEROL SULFATE 1 DOSE: 2.5; .5 SOLUTION RESPIRATORY (INHALATION) at 10:52

## 2024-05-01 RX ADMIN — VALPROATE SODIUM 500 MG: 100 INJECTION, SOLUTION INTRAVENOUS at 05:52

## 2024-05-01 RX ADMIN — LEVETIRACETAM 1500 MG: 100 INJECTION, SOLUTION INTRAVENOUS at 23:17

## 2024-05-01 RX ADMIN — SODIUM CHLORIDE, PRESERVATIVE FREE 10 ML: 5 INJECTION INTRAVENOUS at 10:32

## 2024-05-01 RX ADMIN — IPRATROPIUM BROMIDE AND ALBUTEROL SULFATE 1 DOSE: 2.5; .5 SOLUTION RESPIRATORY (INHALATION) at 15:01

## 2024-05-01 RX ADMIN — VALPROATE SODIUM 500 MG: 100 INJECTION, SOLUTION INTRAVENOUS at 23:17

## 2024-05-01 RX ADMIN — LEVETIRACETAM 1500 MG: 100 INJECTION, SOLUTION INTRAVENOUS at 10:29

## 2024-05-01 RX ADMIN — VALPROATE SODIUM 500 MG: 100 INJECTION, SOLUTION INTRAVENOUS at 10:38

## 2024-05-01 RX ADMIN — VALPROATE SODIUM 500 MG: 100 INJECTION, SOLUTION INTRAVENOUS at 16:25

## 2024-05-01 RX ADMIN — VALPROATE SODIUM 500 MG: 100 INJECTION, SOLUTION INTRAVENOUS at 00:05

## 2024-05-01 RX ADMIN — IPRATROPIUM BROMIDE AND ALBUTEROL SULFATE 1 DOSE: 2.5; .5 SOLUTION RESPIRATORY (INHALATION) at 06:30

## 2024-05-01 RX ADMIN — ENOXAPARIN SODIUM 40 MG: 100 INJECTION SUBCUTANEOUS at 23:13

## 2024-05-01 RX ADMIN — IPRATROPIUM BROMIDE AND ALBUTEROL SULFATE 1 DOSE: 2.5; .5 SOLUTION RESPIRATORY (INHALATION) at 20:00

## 2024-05-01 RX ADMIN — SODIUM CHLORIDE: 9 INJECTION, SOLUTION INTRAVENOUS at 19:37

## 2024-05-01 ASSESSMENT — PAIN SCALES - GENERAL: PAINLEVEL_OUTOF10: 0

## 2024-05-01 NOTE — PLAN OF CARE
Problem: Discharge Planning  Goal: Discharge to home or other facility with appropriate resources  Outcome: Progressing     Problem: Chronic Conditions and Co-morbidities  Goal: Patient's chronic conditions and co-morbidity symptoms are monitored and maintained or improved  Outcome: Progressing     Problem: Safety - Adult  Goal: Free from fall injury  Outcome: Progressing     Problem: Skin/Tissue Integrity  Goal: Absence of new skin breakdown  Outcome: Progressing     Problem: ABCDS Injury Assessment  Goal: Absence of physical injury  Outcome: Progressing

## 2024-05-01 NOTE — PROGRESS NOTES
V2.0  [facility] [place] Hospitalist Progress Note      Name:  Jm Garcia /Age/Sex: 1960  (63 y.o. female)   MRN & CSN:  [id number] & [id number] Encounter Date/Time: 2024 9:12 AM EDT    [facility]  [facility] PCP: Yuly Bro APRN - CNP       Hospital Day: 2    Assessment and Plan:   Jm Garcia is a 63 y.o. female with pmh of cerebral palsy, anxiety, dysphagia  who presents with Generalized weakness      Plan:  Generalized weakness and lethargy - improving  ?  Inadequate intake, polypharmacy  Patient has been having issues with aspiration  Normal WBC, CXR WNL  Blood cultures have been obtained from emergency room  Overall low risk of infections  Received ceftriaxone, metronidazole and azithromycin in the emergency room  Will hold off on fungal antibiotics at this time  TSH WNL  Ammonia elevated 56, will trend    Significant dysphagia  Persistent aspiration  -Patient seen by SLP as outpatient on  and recommended n.p.o. with alternate routes of nutrition versus soft/honey thick as least restrictive diet.  Caregiver expressed concern regarding aspiration on admission. Patient admitted with concern for aspiration pneumonia  - Consult SLP, recommend n.p.o. as patient noted to aspirate with any consistency liquid, able to tolerate solid food.  -Discussed with SLP who recommend n.p.o. with alternate route of nutrition  -I reached out to [employer] which is patient's legal guardian.  Case discussed and awaiting consent for procedure  -Will consult general surgery for PEG placement     Elevated lactic acid - resolved  Low suspicion for sepsis  ?Volume depletion  Received IVF in the emergency room  Subsequent level normal     History of cerebral palsy     Iron deficiency anemia  Continue iron supplements     Anxiety  On multiple psych medications  On BuSpar, Risperdal, and Zoloft, Depakote, continue     Hypertension on lisinopril, amlodipine     Chronic respiratory failure, on 2 L  COPD  Resume breathing treatments

## 2024-05-01 NOTE — CARE COORDINATION
05/01/24 1320   Service Assessment   Patient Orientation Alert and Oriented   Cognition Other (see comment)   History Provided By Medical Record;Physician   Primary Caregiver Other (Comment)   Support Systems Home Care Staff   Patient's Healthcare Decision Maker is: Patient Declined (Legal Next of Kin Remains as Decision Maker)   PCP Verified by CM Yes   Last Visit to PCP Within last year   Prior Functional Level Assistance with the following:;Cooking;Housework;Shopping;Mobility;Bathing;Toileting;Dressing   Current Functional Level Assistance with the following:;Bathing;Dressing;Toileting;Housework;Cooking;Shopping;Mobility   Can patient return to prior living arrangement Unknown at present   Ability to make needs known: Poor   Family able to assist with home care needs: Other (comment)   Would you like for me to discuss the discharge plan with any other family members/significant others, and if so, who? No   Financial Resources Medicare Community Resources Housing LVM for patient's caregiver to speak about living situation. Awaiting callback.    Patient Class: Inpatient [101]   REQUIRED: Diagnosis: Depression with suicidal ideation [189188]   Estimated Length of Stay: Estimated stay of more than 2 midnights   Admitting Provider: Colleen Robison [[id number]]

## 2024-05-01 NOTE — PROGRESS NOTES
SLP ALL NOTES  Facility/Department: [address]   CLINICAL BEDSIDE SWALLOW EVALUATION    NAME: Jm Garcia  : 1960  MRN: [id number]    Impression  Dysphagia Diagnosis: Moderate to severe pharyngeal stage dysphagia;Mild to moderate oral stage dysphagia  Dysphagia Outcome Severity Scale: Level 2: Moderate Severe dysphagia- Maximum assistance or maximum use of strategies with partial PO only     Jm Garcia was seen for a clinical bedside swallow evaluation following admission for Generalized weakness/lethargy, possible inadequate PO intake, suspected Aspiration with PO intake.  Relevant history includes CP, Epilepsy, GERD, Dysphagia with recent MBSS 24 with recommendation for NPO with alternate route of nutrition vs Soft/Honey thick as least restrictive diet.  Pt resides in a group home, no caregivers present at this time to provide information regarding pt's recent diet level (chart states pt has been taking pureed solids, no mention of liquid texture).  Pt seen at bedside this am.  She was alert, pleasant, and cooperative and followed all basic commands for the oral mechanism exam which demonstrated no focal weakness.  Reduced lingual coordination c/w her baseline.  Vocal quality was reduced in intensity, cough strength was WFL.  Pt endorsed feeling hungry/thirsty, however, accepted minimal PO trials.  Pureed solids by 1/2 spoon x 3 and honey thick liquids by spoon x 1 accepted.  Pt's oral phase was functional for textures presented by spoon with intact bolus containment/a-p transit/clearance.  Swallow initiation appears delayed with grossly intact hyolaryngeal excursion/elevation.  Immediate cough observed for honey thick liquid by spoon, 0 s/s aspiration for pureed solids x 3.     Recommend:  NPO with alternate route of nutrition and pleasure feeds as tolerated.  Spoke with Dr. Elena regarding MBSS results and recommendations.  He will reach out to decision maker to determine goals of care.  Spoke with RN

## 2024-05-02 LAB
ANION GAP SERPL CALCULATED.3IONS-SCNC: 10 MMOL/L (ref 7–16)
BASOPHILS ABSOLUTE: 0 K/CU MM
BASOPHILS RELATIVE PERCENT: 0.2 % (ref 0–1)
BUN SERPL-MCNC: 6 MG/DL (ref 6–23)
CALCIUM SERPL-MCNC: 8.5 MG/DL (ref 8.3–10.6)
CHLORIDE BLD-SCNC: 106 MMOL/L (ref 99–110)
CO2: 27 MMOL/L (ref 21–32)
CREAT SERPL-MCNC: 0.3 MG/DL (ref 0.6–1.1)
DIFFERENTIAL TYPE: ABNORMAL
EOSINOPHILS ABSOLUTE: 0 K/CU MM
EOSINOPHILS RELATIVE PERCENT: 0.2 % (ref 0–3)
GFR, ESTIMATED: >90 ML/MIN/1.73M2
GLUCOSE BLD-MCNC: 78 MG/DL (ref 70–99)
GLUCOSE BLD-MCNC: 83 MG/DL (ref 70–99)
GLUCOSE SERPL-MCNC: 89 MG/DL (ref 70–99)
HCT VFR BLD CALC: 31.4 % (ref 37–47)
HEMOGLOBIN: 10 GM/DL (ref 12.5–16)
IMMATURE NEUTROPHIL %: 0.8 % (ref 0–0.43)
LYMPHOCYTES ABSOLUTE: 1.8 K/CU MM
LYMPHOCYTES RELATIVE PERCENT: 34.7 % (ref 24–44)
MCH RBC QN AUTO: 33.6 PG (ref 27–31)
MCHC RBC AUTO-ENTMCNC: 31.8 % (ref 32–36)
MCV RBC AUTO: 105.4 FL (ref 78–100)
MONOCYTES ABSOLUTE: 0.3 K/CU MM
MONOCYTES RELATIVE PERCENT: 6.4 % (ref 0–4)
NEUTROPHILS ABSOLUTE: 3 K/CU MM
NEUTROPHILS RELATIVE PERCENT: 57.7 % (ref 36–66)
NUCLEATED RBC %: 0 %
PDW BLD-RTO: 11.9 % (ref 11.7–14.9)
PLATELET # BLD: 196 K/CU MM (ref 140–440)
PMV BLD AUTO: 8.3 FL (ref 7.5–11.1)
POTASSIUM SERPL-SCNC: 4.2 MMOL/L (ref 3.5–5.1)
RBC # BLD: 2.98 M/CU MM (ref 4.2–5.4)
SODIUM BLD-SCNC: 143 MMOL/L (ref 135–145)
TOTAL IMMATURE NEUTOROPHIL: 0.04 K/CU MM
TOTAL NUCLEATED RBC: 0 K/CU MM
WBC # BLD: 5.2 K/CU MM (ref 4–10.5)

## 2024-05-02 PROCEDURE — 2580000003 HC RX 258: Performed by: INTERNAL MEDICINE

## 2024-05-02 PROCEDURE — 85025 COMPLETE CBC W/AUTO DIFF WBC: CPT

## 2024-05-02 PROCEDURE — 2580000003 HC RX 258: Performed by: FAMILY MEDICINE

## 2024-05-02 PROCEDURE — 94640 AIRWAY INHALATION TREATMENT: CPT

## 2024-05-02 PROCEDURE — 2700000000 HC OXYGEN THERAPY PER DAY

## 2024-05-02 PROCEDURE — 6370000000 HC RX 637 (ALT 250 FOR IP): Performed by: INTERNAL MEDICINE

## 2024-05-02 PROCEDURE — 2580000003 HC RX 258: Performed by: STUDENT IN AN ORGANIZED HEALTH CARE EDUCATION/TRAINING PROGRAM

## 2024-05-02 PROCEDURE — 99222 1ST HOSP IP/OBS MODERATE 55: CPT | Performed by: SURGERY

## 2024-05-02 PROCEDURE — 1200000000 HC SEMI PRIVATE

## 2024-05-02 PROCEDURE — 94761 N-INVAS EAR/PLS OXIMETRY MLT: CPT

## 2024-05-02 PROCEDURE — 80048 BASIC METABOLIC PNL TOTAL CA: CPT

## 2024-05-02 PROCEDURE — 36415 COLL VENOUS BLD VENIPUNCTURE: CPT

## 2024-05-02 PROCEDURE — 2500000003 HC RX 250 WO HCPCS: Performed by: FAMILY MEDICINE

## 2024-05-02 PROCEDURE — 6360000002 HC RX W HCPCS: Performed by: FAMILY MEDICINE

## 2024-05-02 PROCEDURE — 6360000002 HC RX W HCPCS: Performed by: INTERNAL MEDICINE

## 2024-05-02 PROCEDURE — 82962 GLUCOSE BLOOD TEST: CPT

## 2024-05-02 RX ADMIN — VALPROATE SODIUM 500 MG: 100 INJECTION, SOLUTION INTRAVENOUS at 06:04

## 2024-05-02 RX ADMIN — IPRATROPIUM BROMIDE AND ALBUTEROL SULFATE 1 DOSE: 2.5; .5 SOLUTION RESPIRATORY (INHALATION) at 07:21

## 2024-05-02 RX ADMIN — VALPROATE SODIUM 500 MG: 100 INJECTION, SOLUTION INTRAVENOUS at 18:50

## 2024-05-02 RX ADMIN — IPRATROPIUM BROMIDE AND ALBUTEROL SULFATE 1 DOSE: 2.5; .5 SOLUTION RESPIRATORY (INHALATION) at 01:15

## 2024-05-02 RX ADMIN — IPRATROPIUM BROMIDE AND ALBUTEROL SULFATE 1 DOSE: 2.5; .5 SOLUTION RESPIRATORY (INHALATION) at 19:28

## 2024-05-02 RX ADMIN — LEVETIRACETAM 1500 MG: 100 INJECTION, SOLUTION INTRAVENOUS at 09:50

## 2024-05-02 RX ADMIN — ENOXAPARIN SODIUM 40 MG: 100 INJECTION SUBCUTANEOUS at 09:50

## 2024-05-02 RX ADMIN — IPRATROPIUM BROMIDE AND ALBUTEROL SULFATE 1 DOSE: 2.5; .5 SOLUTION RESPIRATORY (INHALATION) at 12:06

## 2024-05-02 RX ADMIN — FLUTICASONE PROPIONATE 1 SPRAY: 50 SPRAY, METERED NASAL at 09:48

## 2024-05-02 RX ADMIN — VALPROATE SODIUM 500 MG: 100 INJECTION, SOLUTION INTRAVENOUS at 12:53

## 2024-05-02 RX ADMIN — IPRATROPIUM BROMIDE AND ALBUTEROL SULFATE 1 DOSE: 2.5; .5 SOLUTION RESPIRATORY (INHALATION) at 16:35

## 2024-05-02 RX ADMIN — SODIUM CHLORIDE, PRESERVATIVE FREE 10 ML: 5 INJECTION INTRAVENOUS at 09:51

## 2024-05-02 RX ADMIN — SODIUM CHLORIDE, PRESERVATIVE FREE 10 ML: 5 INJECTION INTRAVENOUS at 22:26

## 2024-05-02 RX ADMIN — SODIUM CHLORIDE: 9 INJECTION, SOLUTION INTRAVENOUS at 06:03

## 2024-05-02 ASSESSMENT — PAIN SCALES - GENERAL: PAINLEVEL_OUTOF10: 0

## 2024-05-02 NOTE — CONSULTS
(10/17/2022)    Exercise Vital Sign     Days of Exercise per Week: 0 days     Minutes of Exercise per Session: 0 min   Stress: No Stress Concern Present (10/17/2022)    [employer] - Occupational Stress Questionnaire     Feeling of Stress : Only a little   Social Connections: Socially Isolated (10/17/2022)    Social Connection and Isolation Panel [NHANES]     Frequency of Communication with Friends and Family: Three times a week     Frequency of Social Gatherings with Friends and Family: Three times a week     Attends Shinto Services: Never     Active Member of Clubs or Organizations: No     Attends Club or Organization Meetings: Never     Marital Status: Never    Housing Stability: Low Risk  (5/2/2024)    Housing Stability Vital Sign     Unable to Pay for Housing in the Last Year: No     Number of Places Lived in the Last Year: 1     Unstable Housing in the Last Year: No       Family History:   Family History   Problem Relation Age of Onset    Breast Cancer Neg Hx        REVIEW OFSYSTEMS:    Review of Systems  Unable to obtain due to patient factors  PHYSICAL EXAM:  Vitals:    05/02/24 0600 05/02/24 0721 05/02/24 0915 05/02/24 0927   BP: (!) 148/70  (!) 139/38 (!) 148/75   Pulse: 62  62    Resp: 18  18    Temp: 98.4 °F (36.9 °C)  98.5 °F (36.9 °C)    TempSrc: Oral  Oral    SpO2: 99% 97% 97%    Weight:           Physical Exam  General: No acute distress  Respiratory: Chest rise equal bilaterally  CV: Appears well perfused   Abdomen: Soft, nontender, nondistended, no rebound or guarding    DATA:    CBC:   Lab Results   Component Value Date/Time    WBC 5.2 05/02/2024 02:34 AM    RBC 2.98 05/02/2024 02:34 AM    HGB 10.0 05/02/2024 02:34 AM    HCT 31.4 05/02/2024 02:34 AM    .4 05/02/2024 02:34 AM    MCH 33.6 05/02/2024 02:34 AM    MCHC 31.8 05/02/2024 02:34 AM    RDW 11.9 05/02/2024 02:34 AM     05/02/2024 02:34 AM    MPV 8.3 05/02/2024 02:34 AM       IMPRESSION:         Patient Active Problem List:     Pneumonia due to infectious organism     HTN (hypertension), benign     Seizure disorder (HCC)     Altered mental status     Chest pain     MR (mental retardation)     COPD exacerbation (HCC)     Left hemiplegia (HCC)     H/O: stroke     Mixed hyperlipidemia     Acquired hypothyroidism     Sepsis (HCC)     Acute bronchitis     Acute respiratory failure with hypoxia (HCC)     Acute respiratory failure (HCC)     Anxiety and depression     Anemia     Controlled type 2 diabetes mellitus without complication, without long-term current use of insulin (HCC)     Diverticulosis     Lesion of right native kidney     Black stool     Hypoxia     COVID-19 virus infection     Other cerebral palsy (HCC)     Chronic respiratory failure (HCC)     COVID-19     Abnormal uterine and vaginal bleeding, unspecified     Bradyarrhythmia     Dyskinesia     Hemiplegia affecting left nondominant side (HCC)     Insomnia     Intermittent explosive disorder     Intraparenchymal hemorrhage of brain (HCC)     Iron deficiency anemia, unspecified     Organic personality syndrome     Subdural hematoma (HCC)     Urinary incontinence     SOB (shortness of breath)     Urinary problem     Acute cystitis without hematuria     Weakness     Contusion of right knee     Generalized weakness      Dysphagia    PLAN:    General surgery consulted for PEG tube.  Will plan to place this Friday.  I discussed the case with representative at [facility] today and they are in agreement to proceed with the procedure.  They understand we will be calling back tomorrow with the surgery and anesthesia for consent.        Ashok Casas,

## 2024-05-02 NOTE — PROGRESS NOTES
V2.0  [facility] [place] Hospitalist Progress Note      Name:  Jm Garcia /Age/Sex: 1960  (63 y.o. female)   MRN & CSN:  [id number] & [id number] Encounter Date/Time: 2024 9:12 AM EDT    Location:  [facility] PCP: Yuly Bro APRN - CNP       Hospital Day: 3    Assessment and Plan:   Jm Garcia is a 63 y.o. female with pmh of cerebral palsy, anxiety, dysphagia  who presents with Generalized weakness      Plan:  Generalized weakness and lethargy - improving  ?  Inadequate intake, polypharmacy  Patient has been having issues with aspiration  Normal WBC, CXR WNL  Blood cultures have been obtained from emergency room, NGTD  Overall low risk of infections  Received ceftriaxone, metronidazole and azithromycin in the emergency room  Will hold off on fungal antibiotics at this time  TSH WNL  Ammonia elevated 56, will trend    Significant dysphagia  Persistent aspiration  Malnutrition  -Patient seen by SLP as outpatient on  and recommended n.p.o. with alternate routes of nutrition versus soft/honey thick as least restrictive diet.  Caregiver expressed concern regarding aspiration on admission. Patient admitted with concern for aspiration pneumonia  - Consult SLP, recommend n.p.o. as patient noted to aspirate with any consistency liquid, able to tolerate solid food.  -Discussed with SLP who recommend n.p.o. with alternate route of nutrition  -I reached out to [employer] which is patient's legal guardian.  Case discussed and and [employer] legal guardian agreeable for procedure.  -Will consult general surgery for PEG placement, general surgery will obtain official consult for the procedure     Elevated lactic acid - resolved  Low suspicion for sepsis  ?Volume depletion  Received IVF in the emergency room  Subsequent level normal     History of cerebral palsy     Iron deficiency anemia  Continue iron supplements     Anxiety  On multiple psych medications  On BuSpar, Risperdal, and Zoloft, Depakote, continue     Hypertension

## 2024-05-03 ENCOUNTER — ANESTHESIA EVENT (OUTPATIENT)
Dept: ENDOSCOPY | Age: 64
End: 2024-05-03
Payer: MEDICARE

## 2024-05-03 ENCOUNTER — APPOINTMENT (OUTPATIENT)
Dept: GENERAL RADIOLOGY | Age: 64
End: 2024-05-03
Payer: MEDICARE

## 2024-05-03 ENCOUNTER — ANESTHESIA (OUTPATIENT)
Dept: ENDOSCOPY | Age: 64
End: 2024-05-03
Payer: MEDICARE

## 2024-05-03 PROBLEM — R53.1 WEAKNESS: Chronic | Status: ACTIVE | Noted: 2023-02-03

## 2024-05-03 LAB
AMMONIA: 53 UMOL/L (ref 11–51)
ANION GAP SERPL CALCULATED.3IONS-SCNC: 11 MMOL/L (ref 7–16)
BASOPHILS ABSOLUTE: 0 K/CU MM
BASOPHILS RELATIVE PERCENT: 0.2 % (ref 0–1)
BUN SERPL-MCNC: 5 MG/DL (ref 6–23)
CALCIUM SERPL-MCNC: 8.4 MG/DL (ref 8.3–10.6)
CHLORIDE BLD-SCNC: 107 MMOL/L (ref 99–110)
CO2: 27 MMOL/L (ref 21–32)
CREAT SERPL-MCNC: 0.3 MG/DL (ref 0.6–1.1)
DIFFERENTIAL TYPE: ABNORMAL
EOSINOPHILS ABSOLUTE: 0 K/CU MM
EOSINOPHILS RELATIVE PERCENT: 0.6 % (ref 0–3)
GFR, ESTIMATED: >90 ML/MIN/1.73M2
GLUCOSE BLD-MCNC: 75 MG/DL (ref 70–99)
GLUCOSE BLD-MCNC: 75 MG/DL (ref 70–99)
GLUCOSE SERPL-MCNC: 80 MG/DL (ref 70–99)
HCT VFR BLD CALC: 30.8 % (ref 37–47)
HEMOGLOBIN: 9.7 GM/DL (ref 12.5–16)
IMMATURE NEUTROPHIL %: 1 % (ref 0–0.43)
LYMPHOCYTES ABSOLUTE: 1.4 K/CU MM
LYMPHOCYTES RELATIVE PERCENT: 28.9 % (ref 24–44)
MCH RBC QN AUTO: 32.6 PG (ref 27–31)
MCHC RBC AUTO-ENTMCNC: 31.5 % (ref 32–36)
MCV RBC AUTO: 103.4 FL (ref 78–100)
MONOCYTES ABSOLUTE: 0.4 K/CU MM
MONOCYTES RELATIVE PERCENT: 7.1 % (ref 0–4)
NEUTROPHILS ABSOLUTE: 3.1 K/CU MM
NEUTROPHILS RELATIVE PERCENT: 62.2 % (ref 36–66)
NUCLEATED RBC %: 0 %
PDW BLD-RTO: 11.8 % (ref 11.7–14.9)
PLATELET # BLD: 204 K/CU MM (ref 140–440)
PMV BLD AUTO: 8.2 FL (ref 7.5–11.1)
POTASSIUM SERPL-SCNC: 4.2 MMOL/L (ref 3.5–5.1)
RBC # BLD: 2.98 M/CU MM (ref 4.2–5.4)
SODIUM BLD-SCNC: 145 MMOL/L (ref 135–145)
TOTAL IMMATURE NEUTOROPHIL: 0.05 K/CU MM
TOTAL NUCLEATED RBC: 0 K/CU MM
WBC # BLD: 4.9 K/CU MM (ref 4–10.5)

## 2024-05-03 PROCEDURE — 0DH63UZ INSERTION OF FEEDING DEVICE INTO STOMACH, PERCUTANEOUS APPROACH: ICD-10-PCS | Performed by: SURGERY

## 2024-05-03 PROCEDURE — 94640 AIRWAY INHALATION TREATMENT: CPT

## 2024-05-03 PROCEDURE — 2500000003 HC RX 250 WO HCPCS: Performed by: NURSE ANESTHETIST, CERTIFIED REGISTERED

## 2024-05-03 PROCEDURE — 74018 RADEX ABDOMEN 1 VIEW: CPT

## 2024-05-03 PROCEDURE — 6360000002 HC RX W HCPCS: Performed by: STUDENT IN AN ORGANIZED HEALTH CARE EDUCATION/TRAINING PROGRAM

## 2024-05-03 PROCEDURE — 43246 EGD PLACE GASTROSTOMY TUBE: CPT | Performed by: SURGERY

## 2024-05-03 PROCEDURE — 6370000000 HC RX 637 (ALT 250 FOR IP): Performed by: INTERNAL MEDICINE

## 2024-05-03 PROCEDURE — 2580000003 HC RX 258: Performed by: STUDENT IN AN ORGANIZED HEALTH CARE EDUCATION/TRAINING PROGRAM

## 2024-05-03 PROCEDURE — 3700000001 HC ADD 15 MINUTES (ANESTHESIA): Performed by: SURGERY

## 2024-05-03 PROCEDURE — 6370000000 HC RX 637 (ALT 250 FOR IP): Performed by: STUDENT IN AN ORGANIZED HEALTH CARE EDUCATION/TRAINING PROGRAM

## 2024-05-03 PROCEDURE — 3609013300 HC EGD TUBE PLACEMENT: Performed by: SURGERY

## 2024-05-03 PROCEDURE — 82962 GLUCOSE BLOOD TEST: CPT

## 2024-05-03 PROCEDURE — 82140 ASSAY OF AMMONIA: CPT

## 2024-05-03 PROCEDURE — 3700000000 HC ANESTHESIA ATTENDED CARE: Performed by: SURGERY

## 2024-05-03 PROCEDURE — 85025 COMPLETE CBC W/AUTO DIFF WBC: CPT

## 2024-05-03 PROCEDURE — 6360000002 HC RX W HCPCS: Performed by: FAMILY MEDICINE

## 2024-05-03 PROCEDURE — 1200000000 HC SEMI PRIVATE

## 2024-05-03 PROCEDURE — 6360000002 HC RX W HCPCS: Performed by: NURSE ANESTHETIST, CERTIFIED REGISTERED

## 2024-05-03 PROCEDURE — 3E0G76Z INTRODUCTION OF NUTRITIONAL SUBSTANCE INTO UPPER GI, VIA NATURAL OR ARTIFICIAL OPENING: ICD-10-PCS | Performed by: SURGERY

## 2024-05-03 PROCEDURE — 2500000003 HC RX 250 WO HCPCS: Performed by: FAMILY MEDICINE

## 2024-05-03 PROCEDURE — 2580000003 HC RX 258: Performed by: FAMILY MEDICINE

## 2024-05-03 PROCEDURE — 80048 BASIC METABOLIC PNL TOTAL CA: CPT

## 2024-05-03 PROCEDURE — 36415 COLL VENOUS BLD VENIPUNCTURE: CPT

## 2024-05-03 PROCEDURE — 2580000003 HC RX 258: Performed by: INTERNAL MEDICINE

## 2024-05-03 PROCEDURE — 2709999900 HC NON-CHARGEABLE SUPPLY: Performed by: SURGERY

## 2024-05-03 RX ORDER — PROPOFOL 10 MG/ML
INJECTION, EMULSION INTRAVENOUS PRN
Status: DISCONTINUED | OUTPATIENT
Start: 2024-05-03 | End: 2024-05-03 | Stop reason: SDUPTHER

## 2024-05-03 RX ORDER — KETOROLAC TROMETHAMINE 30 MG/ML
15 INJECTION, SOLUTION INTRAMUSCULAR; INTRAVENOUS EVERY 6 HOURS PRN
Status: DISPENSED | OUTPATIENT
Start: 2024-05-03 | End: 2024-05-04

## 2024-05-03 RX ORDER — IPRATROPIUM BROMIDE AND ALBUTEROL SULFATE 2.5; .5 MG/3ML; MG/3ML
1 SOLUTION RESPIRATORY (INHALATION)
Status: DISCONTINUED | OUTPATIENT
Start: 2024-05-03 | End: 2024-05-05

## 2024-05-03 RX ORDER — LIDOCAINE HYDROCHLORIDE 20 MG/ML
INJECTION, SOLUTION EPIDURAL; INFILTRATION; INTRACAUDAL; PERINEURAL PRN
Status: DISCONTINUED | OUTPATIENT
Start: 2024-05-03 | End: 2024-05-03 | Stop reason: SDUPTHER

## 2024-05-03 RX ADMIN — VALPROATE SODIUM 500 MG: 100 INJECTION, SOLUTION INTRAVENOUS at 13:00

## 2024-05-03 RX ADMIN — LIDOCAINE HYDROCHLORIDE 40 MG: 20 INJECTION, SOLUTION EPIDURAL; INFILTRATION; INTRACAUDAL; PERINEURAL at 11:06

## 2024-05-03 RX ADMIN — LEVETIRACETAM 1500 MG: 100 INJECTION, SOLUTION INTRAVENOUS at 00:13

## 2024-05-03 RX ADMIN — IPRATROPIUM BROMIDE AND ALBUTEROL SULFATE 1 DOSE: 2.5; .5 SOLUTION RESPIRATORY (INHALATION) at 15:54

## 2024-05-03 RX ADMIN — IPRATROPIUM BROMIDE AND ALBUTEROL SULFATE 1 DOSE: 2.5; .5 SOLUTION RESPIRATORY (INHALATION) at 20:49

## 2024-05-03 RX ADMIN — PROPOFOL 50 MG: 10 INJECTION, EMULSION INTRAVENOUS at 11:06

## 2024-05-03 RX ADMIN — IPRATROPIUM BROMIDE AND ALBUTEROL SULFATE 1 DOSE: 2.5; .5 SOLUTION RESPIRATORY (INHALATION) at 03:30

## 2024-05-03 RX ADMIN — VALPROATE SODIUM 500 MG: 100 INJECTION, SOLUTION INTRAVENOUS at 06:29

## 2024-05-03 RX ADMIN — SERTRALINE HYDROCHLORIDE 200 MG: 50 TABLET ORAL at 20:36

## 2024-05-03 RX ADMIN — SODIUM CHLORIDE, PRESERVATIVE FREE 10 ML: 5 INJECTION INTRAVENOUS at 07:26

## 2024-05-03 RX ADMIN — LEVETIRACETAM 1500 MG: 100 INJECTION, SOLUTION INTRAVENOUS at 12:54

## 2024-05-03 RX ADMIN — SODIUM CHLORIDE: 9 INJECTION, SOLUTION INTRAVENOUS at 05:15

## 2024-05-03 RX ADMIN — VALPROATE SODIUM 500 MG: 100 INJECTION, SOLUTION INTRAVENOUS at 20:30

## 2024-05-03 RX ADMIN — VALPROATE SODIUM 500 MG: 100 INJECTION, SOLUTION INTRAVENOUS at 00:24

## 2024-05-03 RX ADMIN — KETOROLAC TROMETHAMINE 15 MG: 30 INJECTION, SOLUTION INTRAMUSCULAR; INTRAVENOUS at 18:48

## 2024-05-03 RX ADMIN — PROPOFOL 50 MG: 10 INJECTION, EMULSION INTRAVENOUS at 11:09

## 2024-05-03 ASSESSMENT — ENCOUNTER SYMPTOMS: SHORTNESS OF BREATH: 1

## 2024-05-03 ASSESSMENT — PAIN DESCRIPTION - ORIENTATION: ORIENTATION: LEFT

## 2024-05-03 ASSESSMENT — PAIN SCALES - GENERAL: PAINLEVEL_OUTOF10: 0

## 2024-05-03 ASSESSMENT — PAIN DESCRIPTION - DESCRIPTORS: DESCRIPTORS: OTHER (COMMENT)

## 2024-05-03 ASSESSMENT — PAIN DESCRIPTION - LOCATION: LOCATION: ABDOMEN

## 2024-05-03 NOTE — PROGRESS NOTES
Speech-Language Pathology Department   Jm Garcia  1960  [id number]      Attempted to see Jm Garcia for speech therapy follow up 05/03/24.  Deferred at this time- noted plans for PEG tube placement this date.  Pt had a recent MBSS 4/4/24 with recommendation for NPO with alternate route of nutrition vs Soft/Honey thick as least restrictive diet.  SLP will follow for possible implementation of pleasure feed following PEG-tube placement.    Greta Domínguez MS, CCC-SLP, 5/3/2024

## 2024-05-03 NOTE — ANESTHESIA POSTPROCEDURE EVALUATION
Department of Anesthesiology  Postprocedure Note    Patient: Jm Garcia  MRN: [id number]  YOB: 1960  Date of evaluation: 5/3/2024    Procedure Summary       Date: 05/03/24 Room / Location: [address]    Anesthesia Start: 1055 Anesthesia Stop: 1129    Procedure: ESOPHAGOGASTRODUODENOSCOPY PERCUTANEOUS ENDOSCOPIC GASTROSTOMY TUBE PLACEMENT Diagnosis:       Dysphagia, unspecified type      (Dysphagia, unspecified type [R13.10])    Surgeons: Ashok Casas DO Responsible Provider: Elgin Silver MD    Anesthesia Type: MAC ASA Status: 3            Anesthesia Type: MAC    Nikita Phase I:      Nikita Phase II:      Anesthesia Post Evaluation    Patient location during evaluation: bedside  Patient participation: complete - patient participated  Level of consciousness: awake  Pain score: 0  Airway patency: patent  Nausea & Vomiting: no nausea and no vomiting  Cardiovascular status: hemodynamically stable  Respiratory status: acceptable  Hydration status: euvolemic  Pain management: adequate    No notable events documented.

## 2024-05-03 NOTE — CARE COORDINATION
This RN CM apoke with Winifred with [employer]. Patient lives in her own apartment with a 24/7 caregiver. They are capable of managing the PEG tube. Once order for feedings are in, please call Winifred at [phone] to advise on how often and what feedings are so she can pass this on to her head nurse. Adding to weekend list for CM to follow up with [facility].

## 2024-05-03 NOTE — ANESTHESIA PRE PROCEDURE
DiabetesType II DM, well controlled, hypothyroidism::..                 Abdominal:       Abdomen: soft.      Vascular:          Other Findings: 05/03/24 02:59  Sodium: 145  Potassium: 4.2  Chloride: 107  CARBON DIOXIDE: 27  BUN,BUNPL: 5 (L)  Creatinine: 0.3 (L)  Anion Gap: 11  Est, Glom Filt Rate: >90  Glucose: 80  Calcium: 8.4  Ammonia: 53 (H)            Anesthesia Plan      MAC and general     ASA 3     (GA back up)  Induction: intravenous.      Anesthetic plan and risks discussed with patient and legal guardian.    Use of blood products discussed with legal guardian whom.    Plan discussed with CRNA and attending.                  INOCENCIO Larkin - CRNA   5/3/2024

## 2024-05-03 NOTE — PROGRESS NOTES
V2.0  [facility] [place] [place] Note      Name:  Jm Garcia /Age/Sex: 1960  (63 y.o. female)   MRN & CSN:  [id number] & [id number] Encounter Date/Time: 5/3/2024 9:12 AM EDT    Location:  [facility] PCP: Yuly Bro APRN - CNP       Hospital Day: 4    Assessment and Plan:   Jm Garcia is a 63 y.o. female with pmh of cerebral palsy, anxiety, dysphagia  who presents with Generalized weakness      Plan:  Generalized weakness and lethargy - improving  ?  Inadequate intake, polypharmacy  Patient has been having issues with aspiration  Normal WBC, CXR WNL  Blood cultures have been obtained from emergency room, NGTD  Overall low risk of infections  Received ceftriaxone, metronidazole and azithromycin in the emergency room  Will hold off on fungal antibiotics at this time  TSH WNL  Ammonia elevated 56, rechech 53    Significant dysphagia  Persistent aspiration  Malnutrition  -Patient seen by SLP as outpatient on  and recommended n.p.o. with alternate routes of nutrition versus soft/honey thick as least restrictive diet.  Caregiver expressed concern regarding aspiration on admission. Patient admitted with concern for aspiration pneumonia  - Consult SLP, recommend n.p.o. as patient noted to aspirate with any consistency liquid, able to tolerate solid food.  -Discussed with SLP who recommend n.p.o. with alternate route of nutrition  -I reached out to [employer] which is patient's legal guardian.  Case discussed and and [employer] legal guardian agreeable for procedure.  -Will consult general surgery for PEG placement, general surgery will obtain official consult for the procedure     Elevated lactic acid - resolved  Low suspicion for sepsis  ?Volume depletion  Received IVF in the emergency room  Subsequent level normal     History of cerebral palsy     Iron deficiency anemia  Continue iron supplements     Anxiety  On multiple psych medications  On BuSpar, Risperdal, and Zoloft, Depakote, continue     Hypertension    Genitourinary:  Negative for difficulty urinating and hematuria.   Musculoskeletal:  Negative for arthralgias, back pain and gait problem.   Neurological:  Negative for dizziness, syncope and speech difficulty.   Hematological:  Negative for adenopathy.   Psychiatric/Behavioral:  Negative for agitation and confusion.    All other systems reviewed and are negative.        Objective:     Intake/Output Summary (Last 24 hours) at 5/3/2024 0900  Last data filed at 5/3/2024 0301  Gross per 24 hour   Intake --   Output 2300 ml   Net -2300 ml          Vitals:   Vitals:    05/03/24 0800   BP: (!) 159/47   Pulse: 64   Resp: 22   Temp: 98 °F (36.7 °C)   SpO2: 97%       Physical Exam:   Physical Exam  Vitals and nursing note reviewed.   Constitutional:       General: She is not in acute distress.     Appearance: Normal appearance. She is not ill-appearing.   HENT:      Head: Normocephalic and atraumatic.      Nose: Nose normal.      Mouth/Throat:      Mouth: Mucous membranes are moist.   Eyes:      Extraocular Movements: Extraocular movements intact.      Pupils: Pupils are equal, round, and reactive to light.   Cardiovascular:      Rate and Rhythm: Normal rate and regular rhythm.      Pulses: Normal pulses.      Heart sounds: Normal heart sounds.   Pulmonary:      Effort: Pulmonary effort is normal.      Breath sounds: Normal breath sounds.   Abdominal:      Palpations: Abdomen is soft.   Musculoskeletal:         General: Deformity present. Normal range of motion.      Cervical back: Normal range of motion.   Skin:     General: Skin is warm.      Capillary Refill: Capillary refill takes less than 2 seconds.   Neurological:      Mental Status: She is alert and oriented to person, place, and time. Mental status is at baseline.   Psychiatric:         Mood and Affect: Mood normal.         Behavior: Behavior normal.            Medications:   Medications:    ipratropium 0.5 mg-albuterol 2.5 mg  1 Dose Inhalation Q4H WA RT

## 2024-05-04 LAB
GLUCOSE BLD-MCNC: 118 MG/DL (ref 70–99)
GLUCOSE BLD-MCNC: 131 MG/DL (ref 70–99)
GLUCOSE BLD-MCNC: 67 MG/DL (ref 70–99)
GLUCOSE BLD-MCNC: 78 MG/DL (ref 70–99)
GLUCOSE BLD-MCNC: 95 MG/DL (ref 70–99)

## 2024-05-04 PROCEDURE — 1200000000 HC SEMI PRIVATE

## 2024-05-04 PROCEDURE — 2580000003 HC RX 258: Performed by: STUDENT IN AN ORGANIZED HEALTH CARE EDUCATION/TRAINING PROGRAM

## 2024-05-04 PROCEDURE — 2580000003 HC RX 258: Performed by: INTERNAL MEDICINE

## 2024-05-04 PROCEDURE — 94761 N-INVAS EAR/PLS OXIMETRY MLT: CPT

## 2024-05-04 PROCEDURE — 6360000002 HC RX W HCPCS: Performed by: FAMILY MEDICINE

## 2024-05-04 PROCEDURE — 94664 DEMO&/EVAL PT USE INHALER: CPT

## 2024-05-04 PROCEDURE — 6370000000 HC RX 637 (ALT 250 FOR IP): Performed by: STUDENT IN AN ORGANIZED HEALTH CARE EDUCATION/TRAINING PROGRAM

## 2024-05-04 PROCEDURE — 6360000002 HC RX W HCPCS: Performed by: INTERNAL MEDICINE

## 2024-05-04 PROCEDURE — 99233 SBSQ HOSP IP/OBS HIGH 50: CPT | Performed by: SURGERY

## 2024-05-04 PROCEDURE — 2580000003 HC RX 258: Performed by: FAMILY MEDICINE

## 2024-05-04 PROCEDURE — 6370000000 HC RX 637 (ALT 250 FOR IP): Performed by: INTERNAL MEDICINE

## 2024-05-04 PROCEDURE — 82962 GLUCOSE BLOOD TEST: CPT

## 2024-05-04 PROCEDURE — 2500000003 HC RX 250 WO HCPCS: Performed by: FAMILY MEDICINE

## 2024-05-04 PROCEDURE — 94640 AIRWAY INHALATION TREATMENT: CPT

## 2024-05-04 RX ORDER — DEXTROSE MONOHYDRATE 100 MG/ML
INJECTION, SOLUTION INTRAVENOUS CONTINUOUS PRN
Status: DISCONTINUED | OUTPATIENT
Start: 2024-05-04 | End: 2024-05-10 | Stop reason: HOSPADM

## 2024-05-04 RX ORDER — GLUCAGON 1 MG/ML
1 KIT INJECTION PRN
Status: DISCONTINUED | OUTPATIENT
Start: 2024-05-04 | End: 2024-05-10 | Stop reason: HOSPADM

## 2024-05-04 RX ADMIN — LEVETIRACETAM 1500 MG: 100 INJECTION, SOLUTION INTRAVENOUS at 22:11

## 2024-05-04 RX ADMIN — BUSPIRONE HYDROCHLORIDE 10 MG: 5 TABLET ORAL at 22:05

## 2024-05-04 RX ADMIN — VALPROATE SODIUM 500 MG: 100 INJECTION, SOLUTION INTRAVENOUS at 05:42

## 2024-05-04 RX ADMIN — VALPROATE SODIUM 500 MG: 100 INJECTION, SOLUTION INTRAVENOUS at 12:31

## 2024-05-04 RX ADMIN — LEVETIRACETAM 1500 MG: 100 INJECTION, SOLUTION INTRAVENOUS at 12:38

## 2024-05-04 RX ADMIN — IPRATROPIUM BROMIDE AND ALBUTEROL SULFATE 1 DOSE: 2.5; .5 SOLUTION RESPIRATORY (INHALATION) at 11:34

## 2024-05-04 RX ADMIN — SODIUM CHLORIDE: 9 INJECTION, SOLUTION INTRAVENOUS at 00:33

## 2024-05-04 RX ADMIN — VALPROATE SODIUM 500 MG: 100 INJECTION, SOLUTION INTRAVENOUS at 00:35

## 2024-05-04 RX ADMIN — LEVETIRACETAM 1500 MG: 100 INJECTION, SOLUTION INTRAVENOUS at 00:07

## 2024-05-04 RX ADMIN — Medication 2000 UNITS: at 08:00

## 2024-05-04 RX ADMIN — VALPROATE SODIUM 500 MG: 100 INJECTION, SOLUTION INTRAVENOUS at 18:22

## 2024-05-04 RX ADMIN — SODIUM CHLORIDE, PRESERVATIVE FREE 10 ML: 5 INJECTION INTRAVENOUS at 22:05

## 2024-05-04 RX ADMIN — DEXTROSE MONOHYDRATE 125 ML: 100 INJECTION, SOLUTION INTRAVENOUS at 08:25

## 2024-05-04 RX ADMIN — IPRATROPIUM BROMIDE AND ALBUTEROL SULFATE 1 DOSE: 2.5; .5 SOLUTION RESPIRATORY (INHALATION) at 15:32

## 2024-05-04 RX ADMIN — IPRATROPIUM BROMIDE AND ALBUTEROL SULFATE 1 DOSE: 2.5; .5 SOLUTION RESPIRATORY (INHALATION) at 07:16

## 2024-05-04 RX ADMIN — FLUTICASONE PROPIONATE 1 SPRAY: 50 SPRAY, METERED NASAL at 08:00

## 2024-05-04 RX ADMIN — IPRATROPIUM BROMIDE AND ALBUTEROL SULFATE 1 DOSE: 2.5; .5 SOLUTION RESPIRATORY (INHALATION) at 19:39

## 2024-05-04 RX ADMIN — ENOXAPARIN SODIUM 40 MG: 100 INJECTION SUBCUTANEOUS at 08:00

## 2024-05-04 RX ADMIN — SERTRALINE HYDROCHLORIDE 200 MG: 50 TABLET ORAL at 22:05

## 2024-05-04 RX ADMIN — MIRTAZAPINE 15 MG: 15 TABLET, FILM COATED ORAL at 22:05

## 2024-05-04 ASSESSMENT — PAIN SCALES - GENERAL: PAINLEVEL_OUTOF10: 0

## 2024-05-04 NOTE — PROGRESS NOTES
Physician Progress Note      PATIENT:               FELIPA GUERRA  CSN #:                  [id number]  :                       1960  ADMIT DATE:       2024 9:46 AM  DISCH DATE:  RESPONDING  PROVIDER #:        Maryana Elena MD          QUERY TEXT:    Internal Medicine,    Pt admitted with  suspected aspiration PNA and has elevated lactate   documented. If possible, please document in the progress notes and discharge   summary if the lactate elevation can be further specified as the following:    The medical record reflects the following:  Risk Factors: aspiration  Clinical Indicators:  documentation of elevated lactate, lactate rise to 2.2  Treatment: labs, IVF, medical management    Thank you,  Shraddha Higginbotham RN CDS  Options provided:  -- Lactic Acidosis  -- Lactic Acidosis ruled out  -- Other - I will add my own diagnosis  -- Disagree - Not applicable / Not valid  -- Disagree - Clinically unable to determine / Unknown  -- Refer to Clinical Documentation Reviewer    PROVIDER RESPONSE TEXT:    Lactic Acidosis ruled out.    Query created by: Shraddha Higginbotham on 2024 11:25 AM      QUERY TEXT:    Internal Medicine,    Pt admitted with persistent aspiration and  has malnutrition documented.   Please further specify type of malnutrition with documentation in the medical   record.    The medical record reflects the following:  Risk Factors: dysphagia, NPO  Clinical Indicators: documentation of poor intake, aspiration  Treatment: labs, planned G-tube insertion    ASPEN Criteria:    https://aspenjournals.onlinelibrary.farmer.com/doi/full/10.1177/[email or web address]  5    Thank you,  Shraddha Higginbotham RN CDS  [phone]  Options provided:  -- Mild Malnutrition  -- Moderate Malnutrition  -- Severe Malnutrition  -- Mild Protein calorie malnutrition  -- Moderate Protein calorie malnutrition  -- Severe Protein calorie malnutrition  -- Other - I will add my own diagnosis  -- Disagree - Not applicable / Not valid  -- Disagree -

## 2024-05-04 NOTE — PROGRESS NOTES
POD #1 s/p PEG placement  Vss  Abd soft and tube intact  Ok to start TF per nutritionist rec and meds via PEG tube    Will s/o    SULMA ARRINGTON MD

## 2024-05-04 NOTE — PROGRESS NOTES
CM contacted the pt's RN, Oma.  Per RN, the pt's feeding orders have not been placed as of this writing on 5/4/2024.      CM remains available as needed.     Kirstin Hernandez, MSSW, LSW

## 2024-05-04 NOTE — PLAN OF CARE
Problem: Discharge Planning  Goal: Discharge to home or other facility with appropriate resources  5/4/2024 1250 by Oma Panchal LPN  Outcome: Progressing  5/4/2024 0208 by Brandi Grimaldo RN  Outcome: Progressing     Problem: Chronic Conditions and Co-morbidities  Goal: Patient's chronic conditions and co-morbidity symptoms are monitored and maintained or improved  5/4/2024 1250 by Oma Panchal LPN  Outcome: Progressing  5/4/2024 0208 by Brandi Grimaldo RN  Outcome: Progressing     Problem: Safety - Adult  Goal: Free from fall injury  5/4/2024 1250 by Oma Panchal LPN  Outcome: Progressing  5/4/2024 0208 by Brandi Grimaldo RN  Outcome: Progressing     Problem: Skin/Tissue Integrity  Goal: Absence of new skin breakdown  Description: 1.  Monitor for areas of redness and/or skin breakdown  2.  Assess vascular access sites hourly  3.  Every 4-6 hours minimum:  Change oxygen saturation probe site  4.  Every 4-6 hours:  If on nasal continuous positive airway pressure, respiratory therapy assess nares and determine need for appliance change or resting period.  5/4/2024 0208 by Brandi Grimaldo RN  Outcome: Progressing     Problem: ABCDS Injury Assessment  Goal: Absence of physical injury  5/4/2024 0208 by Brandi Grimaldo RN  Outcome: Progressing     Problem: Pain  Goal: Verbalizes/displays adequate comfort level or baseline comfort level  5/4/2024 0208 by Brandi Grimaldo RN  Outcome: Progressing

## 2024-05-04 NOTE — PLAN OF CARE

## 2024-05-04 NOTE — PROGRESS NOTES
Comprehensive Nutrition Assessment    Type and Reason for Visit:  Initial, Consult (TF order/manage)    Nutrition Recommendations/Plan:   Start tube feeding with standard formula with fiber (jevity 1.5), 10 ml/hr with slow progression to goal rate 40 ml/hr  Will continue to follow up during stay       Malnutrition Assessment:  Malnutrition Status:  At risk for malnutrition (Comment) (recent decline in intake, NPO with dysphagia) (05/04/24 1606)    Context:  Social/Environmental Circumstances       Nutrition Assessment:    Admit with weakness, lethargy, dysphagia, hx cerebral palsy. NPO as per speech therapy and PEG placed. Tube feeding to start today, plan for Standard formula with fiber (jevity 1.5). Low rate to start and advance to goal slowly with with concern for refeeding, NPO for past 4 days. At goal rate 40 ml/hr (23 hours if held for synthroid) will provide 1380 calories, 58 g protein, 699 ml free water in tube feeding. If able to tolerate tube feeding may consider transition to bolus feeding for home, 230 ml x 4 per day will continue to provide volume to meet needs.  Will follow at high nutrition risk at this time.    Nutrition Related Findings:    resting in bed, glucose POCT low normal,   HbA1c 5.2%     meds noted: synthroid, remeron, vitamin D Wound Type: None   IV fluid      Current Nutrition Intake & Therapies:    Average Meal Intake: NPO  Average Supplements Intake: NPO    Anthropometric Measures:  Height: 147.3 cm (4' 10\")  Ideal Body Weight (IBW): 90 lbs (41 kg)    Admission Body Weight: 50.2 kg (110 lb 10.7 oz)  Current Body Weight: 50.2 kg (110 lb 10.7 oz), 123 % IBW. Weight Source: Bed Scale  Current BMI (kg/m2): 23.1  Usual Body Weight: 68 kg (149 lb 14.6 oz) (hx january 2024)  % Weight Change (Calculated): -26.2  Weight Adjustment For: No Adjustment                 BMI Categories: Normal Weight (BMI 18.5-24.9)    Estimated Daily Nutrient Needs:  Energy Requirements Based On: Kcal/kg  Weight

## 2024-05-04 NOTE — PROGRESS NOTES
[place]2.[place]  [facility] [place]      Name:  Jm Garcia /Age/Sex: 1960  (63 y.o. female)   MRN & CSN:  [id number] & [id number] Encounter Date/Time: 2024 9:12 AM EDT    [facility] PCP: Yuly Bro APRN - CNP       Hospital Day: 5    Assessment and Plan:   Jm Garcia is a 63 y.o. female with pmh of cerebral palsy, anxiety, dysphagia  who presents with Generalized weakness      Plan:  Generalized weakness and lethargy -resolved  ?  Inadequate intake, polypharmacy  Patient has been having issues with aspiration  Normal WBC, CXR WNL  Blood cultures have been obtained from emergency room, NGTD  Overall low risk of infections  Received ceftriaxone, metronidazole and azithromycin in the emergency room  Will hold off on fungal antibiotics at this time  TSH WNL  Ammonia elevated 56, recheck 53    Significant dysphagia  Persistent aspiration  Malnutrition  -Patient seen by SLP as outpatient on  and recommended n.p.o. with alternate routes of nutrition versus soft/honey thick as least restrictive diet.  Caregiver expressed concern regarding aspiration on admission. Patient admitted with concern for aspiration pneumonia  - Consult SLP, recommend n.p.o. as patient noted to aspirate with any consistency liquid, able to tolerate solid food.  -Discussed with SLP who recommend n.p.o. with alternate route of nutrition  -I reached out to [employer] which is patient's legal guardian.  Case discussed and and [employer] legal guardian agreeable for procedure.  -general surgery consulted for PEG placement, s/p PEG placement /3  -tube feeds start today via PEG as cleared by surgery, nutrition service consulted regarding initiating tube feeds     Elevated lactic acid - resolved  Low suspicion for sepsis  ?Volume depletion  Received IVF in the emergency room  Subsequent level normal     History of cerebral palsy     Iron deficiency anemia  Continue iron supplements     Anxiety  On multiple psych      Imaging/Diagnostics Last 24 Hours   XR CHEST PORTABLE    Result Date: 4/30/2024  EXAM: PORTABLE AP CHEST X-RAY, 4/30/2024 INDICATION: Acute respiratory failure, concern for aspiration COMPARISON: 4/30/2024 FINDINGS: HEART / MEDIASTINUM: Normal in size. LUNGS/PLEURA: Small amount of left basilar airspace opacity is present, which was present on the prior examination. The right lung is grossly clear. No pulmonary edema. BONES / SOFT TISSUES: No acute abnormality. OTHER: None.     1. Stable left basilar airspace opacity compatible with atelectasis versus pneumonia/aspiration Electronically signed by Lew Hall MD    XR CHEST PORTABLE    Result Date: 4/30/2024  CHEST 1 VIEW  HISTORY: hypoxia, h/o aspiration  COMPARISON: Chest x-ray dated 2/2/2023 FINDINGS: Portable AP radiograph of the chest was obtained. The heart and mediastinum are within normal limits for size and configuration. No infiltrate, effusion or pneumothorax is identified.     1.  No evidence of acute cardiopulmonary disease.      Electronically signed by Maryana Elena MD on 5/4/2024 at 7:40 AM

## 2024-05-05 LAB
CULTURE: NORMAL
CULTURE: NORMAL
GLUCOSE BLD-MCNC: 123 MG/DL (ref 70–99)
GLUCOSE BLD-MCNC: 151 MG/DL (ref 70–99)
GLUCOSE BLD-MCNC: 95 MG/DL (ref 70–99)
Lab: NORMAL
Lab: NORMAL
SPECIMEN: NORMAL
SPECIMEN: NORMAL

## 2024-05-05 PROCEDURE — 94761 N-INVAS EAR/PLS OXIMETRY MLT: CPT

## 2024-05-05 PROCEDURE — 2580000003 HC RX 258: Performed by: FAMILY MEDICINE

## 2024-05-05 PROCEDURE — 6360000002 HC RX W HCPCS: Performed by: INTERNAL MEDICINE

## 2024-05-05 PROCEDURE — 2580000003 HC RX 258: Performed by: INTERNAL MEDICINE

## 2024-05-05 PROCEDURE — 82962 GLUCOSE BLOOD TEST: CPT

## 2024-05-05 PROCEDURE — 1200000000 HC SEMI PRIVATE

## 2024-05-05 PROCEDURE — 6360000002 HC RX W HCPCS: Performed by: FAMILY MEDICINE

## 2024-05-05 PROCEDURE — 6370000000 HC RX 637 (ALT 250 FOR IP): Performed by: INTERNAL MEDICINE

## 2024-05-05 PROCEDURE — 2500000003 HC RX 250 WO HCPCS: Performed by: FAMILY MEDICINE

## 2024-05-05 RX ORDER — IPRATROPIUM BROMIDE AND ALBUTEROL SULFATE 2.5; .5 MG/3ML; MG/3ML
1 SOLUTION RESPIRATORY (INHALATION) EVERY 4 HOURS PRN
Status: DISCONTINUED | OUTPATIENT
Start: 2024-05-05 | End: 2024-05-10 | Stop reason: HOSPADM

## 2024-05-05 RX ADMIN — VALPROATE SODIUM 500 MG: 100 INJECTION, SOLUTION INTRAVENOUS at 17:21

## 2024-05-05 RX ADMIN — BUSPIRONE HYDROCHLORIDE 10 MG: 5 TABLET ORAL at 09:28

## 2024-05-05 RX ADMIN — FLUTICASONE PROPIONATE 1 SPRAY: 50 SPRAY, METERED NASAL at 09:28

## 2024-05-05 RX ADMIN — LEVOTHYROXINE SODIUM 50 MCG: 0.05 TABLET ORAL at 05:32

## 2024-05-05 RX ADMIN — ATORVASTATIN CALCIUM 40 MG: 40 TABLET, FILM COATED ORAL at 09:28

## 2024-05-05 RX ADMIN — VALPROATE SODIUM 500 MG: 100 INJECTION, SOLUTION INTRAVENOUS at 12:22

## 2024-05-05 RX ADMIN — BUSPIRONE HYDROCHLORIDE 10 MG: 5 TABLET ORAL at 20:59

## 2024-05-05 RX ADMIN — VALPROATE SODIUM 500 MG: 100 INJECTION, SOLUTION INTRAVENOUS at 01:09

## 2024-05-05 RX ADMIN — AMLODIPINE BESYLATE 10 MG: 10 TABLET ORAL at 09:28

## 2024-05-05 RX ADMIN — SODIUM CHLORIDE, PRESERVATIVE FREE 10 ML: 5 INJECTION INTRAVENOUS at 09:29

## 2024-05-05 RX ADMIN — ENOXAPARIN SODIUM 40 MG: 100 INJECTION SUBCUTANEOUS at 09:28

## 2024-05-05 RX ADMIN — MIRTAZAPINE 15 MG: 15 TABLET, FILM COATED ORAL at 20:59

## 2024-05-05 RX ADMIN — VALPROATE SODIUM 500 MG: 100 INJECTION, SOLUTION INTRAVENOUS at 05:45

## 2024-05-05 RX ADMIN — SERTRALINE HYDROCHLORIDE 200 MG: 50 TABLET ORAL at 20:59

## 2024-05-05 RX ADMIN — LEVETIRACETAM 1500 MG: 100 INJECTION, SOLUTION INTRAVENOUS at 11:50

## 2024-05-05 RX ADMIN — SODIUM CHLORIDE, PRESERVATIVE FREE 10 ML: 5 INJECTION INTRAVENOUS at 20:58

## 2024-05-05 RX ADMIN — Medication 2000 UNITS: at 09:28

## 2024-05-05 ASSESSMENT — PAIN SCALES - WONG BAKER: WONGBAKER_NUMERICALRESPONSE: NO HURT

## 2024-05-05 ASSESSMENT — PAIN SCALES - GENERAL
PAINLEVEL_OUTOF10: 0
PAINLEVEL_OUTOF10: 0

## 2024-05-05 NOTE — PROGRESS NOTES
V2.0  [facility] [place]      Name:  Jm Garcia /Age/Sex: 1960  (63 y.o. female)   MRN & CSN:  [id number] & [id number] Encounter Date/Time: 2024 9:12 AM EDT    [facility]:  [facility] PCP: Yuly Bro APRN - CNP       Hospital Day: 6    Assessment and Plan:   Jm Garcia is a 63 y.o. female with pmh of cerebral palsy, anxiety, dysphagia  who presents with Generalized weakness    Patient lives at home with / caregiver.  CM currently assisting with making arrangements for the patient to go home with her caregiver and also ordering tube feeds from pharmacy.  Patient is medically optimized for discharge but awaiting the above-mentioned logistical issues to be addressed.    Plan:  Generalized weakness and lethargy -resolved  ?  Inadequate intake, polypharmacy  Patient has been having issues with aspiration  Normal WBC, CXR WNL  Blood cultures have been obtained from emergency room, NGTD  Overall low risk of infections  Received ceftriaxone, metronidazole and azithromycin in the emergency room  Will hold off on fungal antibiotics at this time  TSH WNL  Ammonia elevated 56, recheck 53    Significant dysphagia  Persistent aspiration  Malnutrition  -Patient seen by SLP as outpatient on  and recommended n.p.o. with alternate routes of nutrition versus soft/honey thick as least restrictive diet.  Caregiver expressed concern regarding aspiration on admission. Patient admitted with concern for aspiration pneumonia  - Consult SLP, recommend n.p.o. as patient noted to aspirate with any consistency liquid, able to tolerate solid food.  -Discussed with SLP who recommend n.p.o. with alternate route of nutrition  -I reached out to [employer] which is patient's legal guardian.  Case discussed and and [employer] legal guardian agreeable for procedure.  -general surgery consulted for PEG placement, s/p PEG placement 5/3  -tube feeds start today via PEG as cleared by surgery, nutrition service consulted

## 2024-05-05 NOTE — CARE COORDINATION
LSW noted dietician eval with comprehensive nutrition assessment indicating recs/plan: start tube feeding with standard formula with fiber (jevity 1.5), 10m;/hr with slow progression to goal rate 40 ml/hr.    LSW LVM for Winifred at [facility] ([phone]) informing her TF orders were available and to please return call to verify pharmacy of choice for TF delivery.  Electronically signed by MARTA Marley on 5/5/2024 at 7:20 AM

## 2024-05-05 NOTE — RT PROTOCOL NOTE
RT Inhaler-Nebulizer Bronchodilator Protocol Note    There is a bronchodilator order in the chart from a provider indicating to follow the RT Bronchodilator Protocol and there is an “Initiate RT Inhaler-Nebulizer Bronchodilator Protocol” order as well (see protocol at bottom of note).    CXR Findings:  No results found.    The findings from the last RT Protocol Assessment were as follows:   History Pulmonary Disease: Chronic pulmonary disease  Respiratory Pattern: Regular pattern and RR 12-20 bpm  Breath Sounds: Clear breath sounds  Cough: Strong, productive  Indication for Bronchodilator Therapy: On home bronchodilators  Bronchodilator Assessment Score: 3    Aerosolized bronchodilator medication orders have been revised according to the RT Inhaler-Nebulizer Bronchodilator Protocol below.    Respiratory Therapist to perform RT Therapy Protocol Assessment initially then follow the protocol.  Repeat RT Therapy Protocol Assessment PRN for score 0-3 or on second treatment, BID, and PRN for scores above 3.    No Indications - adjust the frequency to every 6 hours PRN wheezing or bronchospasm, if no treatments needed after 48 hours then discontinue using Per Protocol order mode.     If indication present, adjust the RT bronchodilator orders based on the Bronchodilator Assessment Score as indicated below.  Use Inhaler orders unless patient has one or more of the following: on home nebulizer, not able to hold breath for 10 seconds, is not alert and oriented, cannot activate and use MDI correctly, or respiratory rate 25 breaths per minute or more, then use the equivalent nebulizer order(s) with same Frequency and PRN reasons based on the score.  If a patient is on this medication at home then do not decrease Frequency below that used at home.    0-3 - enter or revise RT bronchodilator order(s) to equivalent RT Bronchodilator order with Frequency of every 4 hours PRN for wheezing or increased work of breathing using Per

## 2024-05-05 NOTE — CARE COORDINATION
MARTA received return phone call from Winifred at [facility].  Winifred reported they utilize [facility] for TF, which is closed today.  TAYLORW faxed TF orders and dietician eval to Winifred at [phone].  Winifred reported she would need to check with pt's home nurse to confirm cont TF can be provided, since pt is not on bolus TF.  Dc plan remains TBD.  Electronically signed by MARTA Marley on 5/5/2024 at 2:09 PM

## 2024-05-06 LAB
GLUCOSE BLD-MCNC: 124 MG/DL (ref 70–99)
GLUCOSE BLD-MCNC: 132 MG/DL (ref 70–99)

## 2024-05-06 PROCEDURE — 2500000003 HC RX 250 WO HCPCS: Performed by: FAMILY MEDICINE

## 2024-05-06 PROCEDURE — 6360000002 HC RX W HCPCS: Performed by: FAMILY MEDICINE

## 2024-05-06 PROCEDURE — 1200000000 HC SEMI PRIVATE

## 2024-05-06 PROCEDURE — 6370000000 HC RX 637 (ALT 250 FOR IP): Performed by: INTERNAL MEDICINE

## 2024-05-06 PROCEDURE — 2580000003 HC RX 258: Performed by: FAMILY MEDICINE

## 2024-05-06 PROCEDURE — 82962 GLUCOSE BLOOD TEST: CPT

## 2024-05-06 PROCEDURE — 2700000000 HC OXYGEN THERAPY PER DAY

## 2024-05-06 PROCEDURE — 94761 N-INVAS EAR/PLS OXIMETRY MLT: CPT

## 2024-05-06 PROCEDURE — 6360000002 HC RX W HCPCS: Performed by: INTERNAL MEDICINE

## 2024-05-06 PROCEDURE — 2580000003 HC RX 258: Performed by: INTERNAL MEDICINE

## 2024-05-06 RX ADMIN — SERTRALINE HYDROCHLORIDE 200 MG: 50 TABLET ORAL at 21:27

## 2024-05-06 RX ADMIN — LEVETIRACETAM 1500 MG: 100 INJECTION, SOLUTION INTRAVENOUS at 10:48

## 2024-05-06 RX ADMIN — VALPROATE SODIUM 500 MG: 100 INJECTION, SOLUTION INTRAVENOUS at 23:13

## 2024-05-06 RX ADMIN — LEVOTHYROXINE SODIUM 50 MCG: 0.05 TABLET ORAL at 07:17

## 2024-05-06 RX ADMIN — ENOXAPARIN SODIUM 40 MG: 100 INJECTION SUBCUTANEOUS at 10:16

## 2024-05-06 RX ADMIN — BUSPIRONE HYDROCHLORIDE 10 MG: 5 TABLET ORAL at 10:16

## 2024-05-06 RX ADMIN — BUSPIRONE HYDROCHLORIDE 10 MG: 5 TABLET ORAL at 21:27

## 2024-05-06 RX ADMIN — VALPROATE SODIUM 500 MG: 100 INJECTION, SOLUTION INTRAVENOUS at 06:15

## 2024-05-06 RX ADMIN — VALPROATE SODIUM 500 MG: 100 INJECTION, SOLUTION INTRAVENOUS at 11:33

## 2024-05-06 RX ADMIN — SODIUM CHLORIDE 25 ML: 9 INJECTION, SOLUTION INTRAVENOUS at 23:12

## 2024-05-06 RX ADMIN — MIRTAZAPINE 15 MG: 15 TABLET, FILM COATED ORAL at 21:27

## 2024-05-06 RX ADMIN — SODIUM CHLORIDE, PRESERVATIVE FREE 10 ML: 5 INJECTION INTRAVENOUS at 21:27

## 2024-05-06 RX ADMIN — VALPROATE SODIUM 500 MG: 100 INJECTION, SOLUTION INTRAVENOUS at 17:35

## 2024-05-06 RX ADMIN — LEVETIRACETAM 1500 MG: 100 INJECTION, SOLUTION INTRAVENOUS at 00:03

## 2024-05-06 RX ADMIN — ATORVASTATIN CALCIUM 40 MG: 40 TABLET, FILM COATED ORAL at 10:16

## 2024-05-06 RX ADMIN — VALPROATE SODIUM 500 MG: 100 INJECTION, SOLUTION INTRAVENOUS at 00:29

## 2024-05-06 RX ADMIN — SODIUM CHLORIDE: 9 INJECTION, SOLUTION INTRAVENOUS at 00:26

## 2024-05-06 RX ADMIN — FLUTICASONE PROPIONATE 1 SPRAY: 50 SPRAY, METERED NASAL at 10:16

## 2024-05-06 RX ADMIN — Medication 2000 UNITS: at 10:16

## 2024-05-06 RX ADMIN — AMLODIPINE BESYLATE 10 MG: 10 TABLET ORAL at 10:16

## 2024-05-06 RX ADMIN — LEVETIRACETAM 1500 MG: 100 INJECTION, SOLUTION INTRAVENOUS at 23:10

## 2024-05-06 ASSESSMENT — ENCOUNTER SYMPTOMS
EYE DISCHARGE: 0
SORE THROAT: 0
WHEEZING: 0
CONSTIPATION: 0
ABDOMINAL DISTENTION: 0
SHORTNESS OF BREATH: 0
COUGH: 0
BACK PAIN: 0
DIARRHEA: 0

## 2024-05-06 ASSESSMENT — PAIN SCALES - WONG BAKER
WONGBAKER_NUMERICALRESPONSE: NO HURT

## 2024-05-06 ASSESSMENT — PAIN SCALES - GENERAL
PAINLEVEL_OUTOF10: 0
PAINLEVEL_OUTOF10: 0

## 2024-05-06 NOTE — CARE COORDINATION
Continuous feeds have been changed to bolus feeds to accommodate caregivers better. New Dietary notes faxed to Winifred with [facility]. Need paper rx for feeds to fax to [facility].

## 2024-05-06 NOTE — PROGRESS NOTES
05/06/24 1607   Encounter Summary   Encounter Overview/Reason Initial Encounter   Service Provided For Patient   [facility]   Support System Family members   Last Encounter  05/06/24  (Calm and coping, did not want to be disturbed, continue to provide support as needed)   Complexity of Encounter Low   Begin Time 1602   End Time  1607   Total Time Calculated 5 min   Assessment/Intervention/Outcome   Assessment Calm   Intervention Sustaining Presence/Ministry of presence   Outcome Coping   Plan and Referrals   Plan/Referrals Continue Support (comment)

## 2024-05-06 NOTE — OP NOTE
Operative Note      Patient: Jm Garcia  YOB: 1960  MRN: [id number]    Date of Procedure: 5/3/2024    Pre-Op Diagnosis Codes:     * Dysphagia, unspecified type [R13.10]    Post-Op Diagnosis: Same       Procedure(s):  ESOPHAGOGASTRODUODENOSCOPY PERCUTANEOUS ENDOSCOPIC GASTROSTOMY TUBE PLACEMENT    Surgeon(s):  Ashok Casas DO    Assistant:   * No surgical staff found *    Anesthesia: Monitor Anesthesia Care    Estimated Blood Loss (mL): Minimal    Complications: None    Specimens:   * No specimens in log *    Implants:  * No implants in log *      Drains:   Gastrostomy/Enterostomy/Jejunostomy Tube Percutaneous Endoscopic Gastrostomy (PEG) LUQ 1 20 fr (Active)   Drainage Appearance None 05/05/24 2045   External Catheter Length (cm) 4 cm 05/03/24 1126   Site Description Reddened 05/05/24 2045   J Port Status Clamped 05/05/24 2045   G Port Status Infusing 05/05/24 2045   Surrounding Skin Clean, dry & intact 05/05/24 2045   Dressing Status Clean, dry & intact 05/05/24 2045   Dressing Type Split gauze 05/05/24 2045   Tube feeding/verify rate (mL/hr) 40 mL/hr 05/06/24 0200   Tube Feeding Supplement (Product) Fiber 05/06/24 0200   Tube Feeding Intake (mL) 107 ml 05/05/24 0535   Output (mL) 20 ml 05/04/24 0546   Residual Volume (ml) 5 ml 05/05/24 2045       External Urinary Catheter (Active)   Site Assessment Clean,dry & intact 05/05/24 2000   Placement Repositioned 05/05/24 0535   Securement Method Securing device (Describe) 05/05/24 0535   Catheter Care Suction Canister/Tubing changed 05/05/24 0535   Perineal Care Yes 05/05/24 1801   Suction 40 mmgHg continuous 05/05/24 0535   Urine Color Yellow 05/05/24 0535   Urine Appearance Clear 05/05/24 0535   Urine Odor Malodorous 05/05/24 0535   Output (mL) 400 mL 05/05/24 0535       Findings:  Infection Present At Time Of Surgery (PATOS) (choose all levels that have infection present):  No infection present  Other Findings: PEG tube placed.     Detailed  Description of Procedure:          Procedure Details:    The patient was taken to the operating room. Topical analgesia of the oropharynx was induced using lidocaine spray. After adequate sedation, a mouthpiece was placed in the patient's mouth and the endoscope was passed down into the stomach. No abnormalities were noted.  The stomach was insufflated with air and the endoscope positioned  in the midportion and directed towards the anterior abdominal wall. With the room darkened and intensity turned up on the endoscope, a good light reflex was noted on the skin of the abdominal wall  in the left upper quadrant. Finger pressure was applied at the light reflex with adequate  indentation on the stomach wall on endoscopy. A polypectomy snare was passed into the stomach, opened fully, and positioned so that the loop encircled the point of demonstrated finger indentation. The overlying skin was anesthetized with lidocaine and a 1 cm incision was made at the chosen site. The introducer needle with overlying  catheter was passed through this incision and into the stomach under visualization with the gastroscope. The needle and catheter were gently captured by the endoscopic snare. The guidewire was passed and snared and the endoscope, snare, and guidewire were then withdrawn and pulled back out of the mouth.  The gastrostomy tube was attached to the loop of the guidewire and the whole thing was pulled back into the stomach until the 3.5 cm radha of the gastrostomy tube was noted at the skin level.  The gastroscope was reintroduced and adequate placement of the gastrostomy tube was identified and a picture was taken. The gastrostomy tube was cut and a collecting bag was applied. The patient tolerated the procedure well and was taken to the recovery room in good condition.      Ashok Casas DO      Electronically signed by Ashok Casas DO on 5/6/2024 at 8:57 AM

## 2024-05-06 NOTE — PROGRESS NOTES
regarding patient care.     Imaging that was interpreted personally includes CXR     Drugs that require monitoring for toxicity include lovenox  and monitor with CBC, BMP    Discussed management of the case in detail w/ the patient    Comment: Please note this report has been produced using speech recognition software and may contain errors related to that system including errors in grammar, punctuation, and spelling, as well as words and phrases that may be inappropriate. If there are any questions or concerns please feel free to contact the dictating provider for clarification.       Review of Systems:    Review of Systems   Constitutional:  Positive for activity change. Negative for appetite change, diaphoresis and fatigue.   HENT:  Negative for congestion and sore throat.    Eyes:  Negative for discharge and visual disturbance.   Respiratory:  Negative for cough, shortness of breath and wheezing.    Cardiovascular:  Negative for chest pain, palpitations and leg swelling.   Gastrointestinal:  Negative for abdominal distention, constipation and diarrhea.   Genitourinary:  Negative for difficulty urinating and hematuria.   Musculoskeletal:  Negative for arthralgias, back pain and gait problem.   Neurological:  Negative for dizziness, syncope and speech difficulty.   Hematological:  Negative for adenopathy.   Psychiatric/Behavioral:  Negative for agitation and confusion.    All other systems reviewed and are negative.        Objective:     Intake/Output Summary (Last 24 hours) at 5/6/2024 0822  Last data filed at 5/5/2024 2058  Gross per 24 hour   Intake 10 ml   Output --   Net 10 ml          Vitals:   Vitals:    05/06/24 0200   BP: (!) 141/70   Pulse: 59   Resp: 20   Temp: 97.5 °F (36.4 °C)   SpO2: 99%       Physical Exam:   Physical Exam  Vitals and nursing note reviewed.   Constitutional:       General: She is not in acute distress.     Appearance: Normal appearance. She is not ill-appearing.   HENT:      Head:  mL, PRN  glucagon (rDNA), 1 mg, PRN  dextrose, , Continuous PRN  albuterol sulfate HFA, 2 puff, Q4H PRN  sodium chloride flush, 5-40 mL, PRN  sodium chloride, , PRN  potassium chloride, 40 mEq, PRN   Or  potassium alternative oral replacement, 40 mEq, PRN   Or  potassium chloride, 10 mEq, PRN  magnesium sulfate, 2,000 mg, PRN  ondansetron, 4 mg, Q8H PRN   Or  ondansetron, 4 mg, Q6H PRN  polyethylene glycol, 17 g, Daily PRN  acetaminophen, 650 mg, Q6H PRN   Or  acetaminophen, 650 mg, Q6H PRN        Labs      Recent Results (from the past 24 hour(s))   POCT Glucose    Collection Time: 05/05/24  7:55 PM   Result Value Ref Range    POC Glucose 151 (H) 70 - 99 MG/DL   POCT Glucose    Collection Time: 05/06/24  7:44 AM   Result Value Ref Range    POC Glucose 124 (H) 70 - 99 MG/DL        Imaging/Diagnostics Last 24 Hours   XR CHEST PORTABLE    Result Date: 4/30/2024  EXAM: PORTABLE AP CHEST X-RAY, 4/30/2024 INDICATION: Acute respiratory failure, concern for aspiration COMPARISON: 4/30/2024 FINDINGS: HEART / MEDIASTINUM: Normal in size. LUNGS/PLEURA: Small amount of left basilar airspace opacity is present, which was present on the prior examination. The right lung is grossly clear. No pulmonary edema. BONES / SOFT TISSUES: No acute abnormality. OTHER: None.     1. Stable left basilar airspace opacity compatible with atelectasis versus pneumonia/aspiration Electronically signed by Lew Hall MD    XR CHEST PORTABLE    Result Date: 4/30/2024  CHEST 1 VIEW  HISTORY: hypoxia, h/o aspiration  COMPARISON: Chest x-ray dated 2/2/2023 FINDINGS: Portable AP radiograph of the chest was obtained. The heart and mediastinum are within normal limits for size and configuration. No infiltrate, effusion or pneumothorax is identified.     1.  No evidence of acute cardiopulmonary disease.      Electronically signed by Maryana Elena MD on 5/6/2024 at 8:22 AM

## 2024-05-06 NOTE — PROGRESS NOTES
Comprehensive Nutrition Assessment    Type and Reason for Visit:  Reassess    Nutrition Recommendations/Plan:   Continue tube feeding with jevity 1.5 (standard with fiber)   Change to bolus feeding today, 237 ml bolus x 4 per day. First bolus after morning synthroid and then every 4 hours.   To transition to bolus, first bolus 60 ml volume and then increase each bolus by 60 ml (120 ml second bolus, 180 ml third bolus, 237 ml ) to monitor tolerance to volume  To meet fluid needs without IV fluid, need additional 680 ml free water flushes, 85 ml before and after bolus as tolerates     Malnutrition Assessment:  Malnutrition Status:  At risk for malnutrition (Comment) (recent decline in intake, NPO with dysphagia) (05/04/24 1606)    Context:  Social/Environmental Circumstances       Nutrition Assessment:    Able to tolerate tube feeing at goal rate 40 ml/hr with jevity 1.5 (standard with fiber). Plan to transition to bolus feeding today. Plan for 237 ml bolus volume (1 carton) as able to tolerate with 4 cartons per day to continue to meet calorie and protein needs with tube feeding. NPO as per speech therapy. Will continue to follow at high nutrition risk at this time.    Nutrition Related Findings:    in bed with nurse in room for care, patient not interactive, TF at goal rate 40 ml/hr    synthroid dose at 7:00 Wound Type: None       Current Nutrition Intake & Therapies:    Average Meal Intake: NPO  Average Supplements Intake: NPO  Current Tube Feeding (TF) Orders:  Feeding Route: PEG  Formula: Standard with Fiber (jevity 1.5)  Schedule: Continuous  Feeding Regimen: 40 ml/hr  Additives/Modulars: None  Water Flushes: 30 ml every 4 hours  Current TF & Flush Orders Provides: ~1440 calories, 61 g protein, 729 ml free water in TF  Goal TF & Flush Orders Provides: change to bolus regimen -237 ml x 4 per day, first bolus after morning synthroid dose and then every 4 hours    Anthropometric Measures:  Height: 147.3 cm (4'

## 2024-05-07 LAB
GLUCOSE BLD-MCNC: 152 MG/DL (ref 70–99)
GLUCOSE BLD-MCNC: 183 MG/DL (ref 70–99)

## 2024-05-07 PROCEDURE — 97162 PT EVAL MOD COMPLEX 30 MIN: CPT

## 2024-05-07 PROCEDURE — 6370000000 HC RX 637 (ALT 250 FOR IP): Performed by: INTERNAL MEDICINE

## 2024-05-07 PROCEDURE — 2700000000 HC OXYGEN THERAPY PER DAY

## 2024-05-07 PROCEDURE — 97530 THERAPEUTIC ACTIVITIES: CPT

## 2024-05-07 PROCEDURE — 94761 N-INVAS EAR/PLS OXIMETRY MLT: CPT

## 2024-05-07 PROCEDURE — 2580000003 HC RX 258: Performed by: INTERNAL MEDICINE

## 2024-05-07 PROCEDURE — 97166 OT EVAL MOD COMPLEX 45 MIN: CPT

## 2024-05-07 PROCEDURE — 1200000000 HC SEMI PRIVATE

## 2024-05-07 PROCEDURE — 6360000002 HC RX W HCPCS: Performed by: FAMILY MEDICINE

## 2024-05-07 PROCEDURE — 2500000003 HC RX 250 WO HCPCS: Performed by: FAMILY MEDICINE

## 2024-05-07 PROCEDURE — 82962 GLUCOSE BLOOD TEST: CPT

## 2024-05-07 PROCEDURE — 2580000003 HC RX 258: Performed by: FAMILY MEDICINE

## 2024-05-07 PROCEDURE — 97112 NEUROMUSCULAR REEDUCATION: CPT

## 2024-05-07 PROCEDURE — 92526 ORAL FUNCTION THERAPY: CPT | Performed by: SPEECH-LANGUAGE PATHOLOGIST

## 2024-05-07 PROCEDURE — 6360000002 HC RX W HCPCS: Performed by: INTERNAL MEDICINE

## 2024-05-07 RX ADMIN — LEVETIRACETAM 1500 MG: 100 INJECTION, SOLUTION INTRAVENOUS at 11:01

## 2024-05-07 RX ADMIN — AMLODIPINE BESYLATE 10 MG: 10 TABLET ORAL at 08:30

## 2024-05-07 RX ADMIN — FLUTICASONE PROPIONATE 1 SPRAY: 50 SPRAY, METERED NASAL at 08:30

## 2024-05-07 RX ADMIN — SODIUM CHLORIDE 25 ML: 9 INJECTION, SOLUTION INTRAVENOUS at 06:10

## 2024-05-07 RX ADMIN — Medication 2000 UNITS: at 08:31

## 2024-05-07 RX ADMIN — SODIUM CHLORIDE, PRESERVATIVE FREE 10 ML: 5 INJECTION INTRAVENOUS at 08:31

## 2024-05-07 RX ADMIN — ATORVASTATIN CALCIUM 40 MG: 40 TABLET, FILM COATED ORAL at 08:30

## 2024-05-07 RX ADMIN — SODIUM CHLORIDE, PRESERVATIVE FREE 10 ML: 5 INJECTION INTRAVENOUS at 22:07

## 2024-05-07 RX ADMIN — BUSPIRONE HYDROCHLORIDE 10 MG: 5 TABLET ORAL at 22:07

## 2024-05-07 RX ADMIN — SERTRALINE HYDROCHLORIDE 200 MG: 50 TABLET ORAL at 22:07

## 2024-05-07 RX ADMIN — MIRTAZAPINE 15 MG: 15 TABLET, FILM COATED ORAL at 22:07

## 2024-05-07 RX ADMIN — VALPROATE SODIUM 500 MG: 100 INJECTION, SOLUTION INTRAVENOUS at 17:07

## 2024-05-07 RX ADMIN — LEVETIRACETAM 1500 MG: 100 INJECTION, SOLUTION INTRAVENOUS at 23:48

## 2024-05-07 RX ADMIN — LEVOTHYROXINE SODIUM 50 MCG: 0.05 TABLET ORAL at 06:11

## 2024-05-07 RX ADMIN — VALPROATE SODIUM 500 MG: 100 INJECTION, SOLUTION INTRAVENOUS at 11:06

## 2024-05-07 RX ADMIN — BUSPIRONE HYDROCHLORIDE 10 MG: 5 TABLET ORAL at 08:31

## 2024-05-07 RX ADMIN — ENOXAPARIN SODIUM 40 MG: 100 INJECTION SUBCUTANEOUS at 08:31

## 2024-05-07 RX ADMIN — VALPROATE SODIUM 500 MG: 100 INJECTION, SOLUTION INTRAVENOUS at 06:11

## 2024-05-07 ASSESSMENT — ENCOUNTER SYMPTOMS
COUGH: 0
DIARRHEA: 0
ABDOMINAL DISTENTION: 0
BACK PAIN: 0
SHORTNESS OF BREATH: 0
SORE THROAT: 0
CONSTIPATION: 0
EYE DISCHARGE: 0
WHEEZING: 0

## 2024-05-07 ASSESSMENT — PAIN SCALES - WONG BAKER: WONGBAKER_NUMERICALRESPONSE: NO HURT

## 2024-05-07 NOTE — CONSULTS
[facility] ACUTE CARE PHYSICAL THERAPY EVALUATION  Jm Garcia, 1960, [id number]/[id number], 5/7/2024    History  [place]:  The primary encounter diagnosis was Acute respiratory failure with hypoxia ([facility]). Diagnoses of Aspiration into airway, initial encounter, Altered mental status, unspecified altered mental status type, and MR (mental retardation) were also pertinent to this visit.  Patient  has a past medical history of Anxiety disorder, Back pain, chronic, Cerebral palsy ([facility]), COPD (chronic obstructive pulmonary disease) ([facility]), COVID-19, CVA (cerebral infarction), Diabetes mellitus ([facility]), Diverticulosis, Epilepsy ([facility]), GERD (gastroesophageal reflux disease), Hyperlipidemia, Hypertension, Insomnia, Iron (Fe) deficiency anemia, Mental disability, Seizures ([facility]), and Unspecified cerebral artery occlusion with cerebral infarction.  Patient  has a past surgical history that includes Gastrostomy tube placement and Upper gastrointestinal endoscopy (N/A, 5/3/2024).    Discharge Recommendation: Encourage facility for moderate post-acute rehabilitation, anticipate 1-2 hours per day and 5 days per week.    Equipment: TBD at next level of care    Subjective:    Patient states:  pt mostly nods yes/no      Pain:  denies pain.      Communication with other providers:  Handoff to RN, OT    Restrictions: general precautions, fall risk    Home Setup/Prior level of function  Social/Functional History  Lives With:  (24/7 caregiver)  Type of Home: Apartment  Home Layout: One level  Home Equipment: Walker, rolling  Has the patient had two or more falls in the past year or any fall with injury in the past year?: Unknown  Receives Help From:  (caregiver 24/7)  ADL Assistance: Needs assistance  Toileting: Needs assistance  Homemaking Assistance:  (caregiver cooks, clean, and help with laundry)  Homemaking Responsibilities: No  Ambulation Assistance: Independent  Transfer Assistance: Independent  Active :

## 2024-05-07 NOTE — PROGRESS NOTES
noted to aspirate with any consistency liquid, able to tolerate solid food.  -Discussed with SLP who recommend n.p.o. with alternate route of nutrition  -I reached out to [employer] which is patient's legal guardian.  Case discussed and and [employer] legal guardian agreeable for procedure.  -general surgery consulted for PEG placement, s/p PEG placement 5/3  -tube feeds start today via PEG as cleared by surgery, nutrition service consulted regarding initiating tube feeds, currently at goal with bolus feeds     Elevated lactic acid - resolved  Low suspicion for sepsis  Likely secondary to volume depletion  Received IVF in the emergency room  Subsequent level normal     History of cerebral palsy     Iron deficiency anemia  Continue iron supplements     Anxiety  On multiple psych medications  On BuSpar, Risperdal, and Zoloft, Depakote, continue     Hypertension on lisinopril, amlodipine     Chronic respiratory failure, on 2 L  COPD  Resume breathing treatments     Hypothyroidism, on levothyroxine, continue  Repeat TSH WNL     Hyperlipidemia, on Lipitor, continue    CODE STATUS  -Goals of care and advance care directives discussed with [employer] legal guardian.  Legal guardian confirms patient is full code.    Diet Diet NPO  ADULT TUBE FEEDING; PEG; Standard with Fiber; Bolus; 4 Times Daily; 237; Syringe Push; 85; Before and after each bolus   DVT Prophylaxis [x] Lovenox, []  Heparin, [] SCDs, [] Ambulation,  [] Eliquis, [] Xarelto  [] Coumadin   Code Status Full Code   Disposition From: Group home  Expected Disposition: Likely SNF  Estimated Date of Discharge: Pending placement  Patient requires continued admission due to pending placement   Surrogate Decision Maker/ POA [employer] guardian     Subjective:     Chief Complaint: No chief complaint on file.       The patient was seen at bedside. All questions answered at bedside. NAEON. Tolerating diet. No BM last night. Good UOP. On Tube feeds    Discussed with CM regarding patient care.    PRN  dextrose, , Continuous PRN  albuterol sulfate HFA, 2 puff, Q4H PRN  sodium chloride flush, 5-40 mL, PRN  sodium chloride, , PRN  potassium chloride, 40 mEq, PRN   Or  potassium alternative oral replacement, 40 mEq, PRN   Or  potassium chloride, 10 mEq, PRN  magnesium sulfate, 2,000 mg, PRN  ondansetron, 4 mg, Q8H PRN   Or  ondansetron, 4 mg, Q6H PRN  polyethylene glycol, 17 g, Daily PRN  acetaminophen, 650 mg, Q6H PRN   Or  acetaminophen, 650 mg, Q6H PRN        Labs      Recent Results (from the past 24 hour(s))   POCT Glucose    Collection Time: 05/06/24  9:10 PM   Result Value Ref Range    POC Glucose 132 (H) 70 - 99 MG/DL   POCT Glucose    Collection Time: 05/07/24  8:02 AM   Result Value Ref Range    POC Glucose 152 (H) 70 - 99 MG/DL        Imaging/Diagnostics Last 24 Hours   XR CHEST PORTABLE    Result Date: 4/30/2024  EXAM: PORTABLE AP CHEST X-RAY, 4/30/2024 INDICATION: Acute respiratory failure, concern for aspiration COMPARISON: 4/30/2024 FINDINGS: HEART / MEDIASTINUM: Normal in size. LUNGS/PLEURA: Small amount of left basilar airspace opacity is present, which was present on the prior examination. The right lung is grossly clear. No pulmonary edema. BONES / SOFT TISSUES: No acute abnormality. OTHER: None.     1. Stable left basilar airspace opacity compatible with atelectasis versus pneumonia/aspiration Electronically signed by Lew Hall MD    XR CHEST PORTABLE    Result Date: 4/30/2024  CHEST 1 VIEW  HISTORY: hypoxia, h/o aspiration  COMPARISON: Chest x-ray dated 2/2/2023 FINDINGS: Portable AP radiograph of the chest was obtained. The heart and mediastinum are within normal limits for size and configuration. No infiltrate, effusion or pneumothorax is identified.     1.  No evidence of acute cardiopulmonary disease.      Electronically signed by Yany Almaguer MD on 5/7/2024 at 11:38 AM

## 2024-05-07 NOTE — PROGRESS NOTES
[facility] ACUTE CARE OCCUPATIONAL THERAPY EVALUATION  Jm Garcia, 1960, 1118/1118-A, 5/7/2024    Discharge Recommendation: Encourage facility for post-acute rehabilitation, anticipate 1-2 hours per day and 5 days per week.    History  [place]:  The primary encounter diagnosis was Acute respiratory failure with hypoxia ([facility]). Diagnoses of Aspiration into airway, initial encounter, Altered mental status, unspecified altered mental status type, and MR (mental retardation) were also pertinent to this visit.  Patient  has a past medical history of Anxiety disorder, Back pain, chronic, Cerebral palsy ([facility]), COPD (chronic obstructive pulmonary disease) ([facility]), COVID-19, CVA (cerebral infarction), Diabetes mellitus ([facility]), Diverticulosis, Epilepsy ([facility]), GERD (gastroesophageal reflux disease), Hyperlipidemia, Hypertension, Insomnia, Iron (Fe) deficiency anemia, Mental disability, Seizures ([facility]), and Unspecified cerebral artery occlusion with cerebral infarction.  Patient  has a past surgical history that includes Gastrostomy tube placement and Upper gastrointestinal endoscopy (N/A, 5/3/2024).    Subjective:  Patient states:  \"Can I lay back down?\" Pt mostly nods yes/no throughout session   Pain:  No complaint of pain.    Communication: RN, CM, co-eval with PT, OT present throughout session   Restrictions: fall risk, hx CP, Hx CVAs, PEG tube, seizures precautions     Home Setup/Prior level of function  Social/Functional History  Lives With:  (24/7 caregiver)  Type of Home: Apartment  Home Layout: One level  Home Equipment: Walker, rolling  Has the patient had two or more falls in the past year or any fall with injury in the past year?: Unknown  Receives Help From:  (caregiver 24/7)  ADL Assistance: Needs assistance  Toileting: Needs assistance  Homemaking Assistance:  (caregiver cooks, clean, and help with laundry)  Homemaking Responsibilities: No  Ambulation Assistance: Independent  Transfer Assistance:

## 2024-05-07 NOTE — PLAN OF CARE

## 2024-05-07 NOTE — PROGRESS NOTES
[facility]  DEPARTMENT OF SPEECH/LANGUAGE PATHOLOGY  DAILY PROGRESS NOTE  Jm Garcia  5/7/2024  [id number]  Generalized weakness [R53.1]  Acute respiratory failure with hypoxia (HCC) [J96.01]  Aspiration into airway, initial encounter [T17.018A]  Altered mental status, unspecified altered mental status type [R41.82]  Allergies   Allergen Reactions    Macrobid [Nitrofurantoin] Hives and Swelling     Hives         Pt was seen this date for dysphagia treatment.       IMPRESSION AND RECOMMENDATIONS:    Pt seen this date to target trials for pleasure feeds.  Pt had recent MBSS which revealed aspiration for all liquid textures, no aspiration for pureed and soft solids.  Pt has had a PEG tube placed and is tolerating continuous feeds.  Per/AMBROSIO Holliday, pt not tolerating bolus feedings resulting in episodes of diarrhea.      Pt was alert with flat affect, non-verbal for the session.  Oral cavity was dry c/w NPO status.  Pt was amenable to PO trials of pureed solids which she completed x 4 oz with intact bolus acceptance, formation, a-p transit and clearance.  Pharyngeal swallow appeared to be WFL for pureed texture with no s/s aspiration.  Trials of honey thick liquids by spoon x 3 with no overt s/s aspiration, however, unable to r/o aspiration as all aspiration for honey thick liquids was silent per/MBSS report.    Recommend:  Initiate Pleasure feeds of Pureed solids in addition to PEG tube feeding for nutrition/hydration/meds.      GOALS (current status in bold):  Short-term Goals  Timeframe for Short-term Goals: 1 week  Goal 1: Pt will tolerate trials of pureed solids with 0 s/s aspiration 100% Meeting, Continue  Goal 2: Caregivers/POA will demonstrate understanding of SLP recommendations 100%     EDUCATION:  spoke with AMBROSIO Holliday regarding recs    PAIN RATING (0-10 Scale):  appeared comfortable  Time in/Time out:   In/out:  1400/1430    Visit number:  3      Venita Gross SLP  5/7/2024  3:08

## 2024-05-07 NOTE — CARE COORDINATION
[facility] states they need to complete training for staff for feedings and do not have the staff to accommodate needs at this time. I spoke with pt's [employer] legal guardian, Amanda Hall, and she is agreeable to SNF placement. Therapy orders requested. WB posted. [facility] would like [facility] as SNF choice. CM following for recs.    1511 - Referral made to Lin[facility] with [facility] via HIPAA safe VM. CM following for determination.

## 2024-05-08 LAB
GLUCOSE BLD-MCNC: 120 MG/DL (ref 70–99)
GLUCOSE BLD-MCNC: 180 MG/DL (ref 70–99)
GLUCOSE BLD-MCNC: 181 MG/DL (ref 70–99)
GLUCOSE BLD-MCNC: 181 MG/DL (ref 70–99)
GLUCOSE BLD-MCNC: 72 MG/DL (ref 70–99)

## 2024-05-08 PROCEDURE — 2580000003 HC RX 258: Performed by: FAMILY MEDICINE

## 2024-05-08 PROCEDURE — 92526 ORAL FUNCTION THERAPY: CPT | Performed by: SPEECH-LANGUAGE PATHOLOGIST

## 2024-05-08 PROCEDURE — 2500000003 HC RX 250 WO HCPCS: Performed by: FAMILY MEDICINE

## 2024-05-08 PROCEDURE — 1200000000 HC SEMI PRIVATE

## 2024-05-08 PROCEDURE — 94761 N-INVAS EAR/PLS OXIMETRY MLT: CPT

## 2024-05-08 PROCEDURE — 82962 GLUCOSE BLOOD TEST: CPT

## 2024-05-08 PROCEDURE — 2580000003 HC RX 258: Performed by: INTERNAL MEDICINE

## 2024-05-08 PROCEDURE — 6360000002 HC RX W HCPCS: Performed by: FAMILY MEDICINE

## 2024-05-08 PROCEDURE — 6360000002 HC RX W HCPCS: Performed by: INTERNAL MEDICINE

## 2024-05-08 PROCEDURE — 6370000000 HC RX 637 (ALT 250 FOR IP): Performed by: INTERNAL MEDICINE

## 2024-05-08 RX ADMIN — SODIUM CHLORIDE 25 ML/HR: 9 INJECTION, SOLUTION INTRAVENOUS at 12:05

## 2024-05-08 RX ADMIN — AMLODIPINE BESYLATE 10 MG: 10 TABLET ORAL at 09:07

## 2024-05-08 RX ADMIN — VALPROATE SODIUM 500 MG: 100 INJECTION, SOLUTION INTRAVENOUS at 23:25

## 2024-05-08 RX ADMIN — ATORVASTATIN CALCIUM 40 MG: 40 TABLET, FILM COATED ORAL at 09:08

## 2024-05-08 RX ADMIN — VALPROATE SODIUM 500 MG: 100 INJECTION, SOLUTION INTRAVENOUS at 17:41

## 2024-05-08 RX ADMIN — SODIUM CHLORIDE, PRESERVATIVE FREE 10 ML: 5 INJECTION INTRAVENOUS at 09:08

## 2024-05-08 RX ADMIN — ENOXAPARIN SODIUM 40 MG: 100 INJECTION SUBCUTANEOUS at 09:08

## 2024-05-08 RX ADMIN — VALPROATE SODIUM 500 MG: 100 INJECTION, SOLUTION INTRAVENOUS at 00:02

## 2024-05-08 RX ADMIN — SODIUM CHLORIDE, PRESERVATIVE FREE 10 ML: 5 INJECTION INTRAVENOUS at 12:02

## 2024-05-08 RX ADMIN — SODIUM CHLORIDE, PRESERVATIVE FREE 10 ML: 5 INJECTION INTRAVENOUS at 22:06

## 2024-05-08 RX ADMIN — LEVETIRACETAM 1500 MG: 100 INJECTION, SOLUTION INTRAVENOUS at 12:05

## 2024-05-08 RX ADMIN — VALPROATE SODIUM 500 MG: 100 INJECTION, SOLUTION INTRAVENOUS at 06:00

## 2024-05-08 RX ADMIN — MIRTAZAPINE 15 MG: 15 TABLET, FILM COATED ORAL at 22:06

## 2024-05-08 RX ADMIN — SODIUM CHLORIDE 25 ML: 9 INJECTION, SOLUTION INTRAVENOUS at 05:59

## 2024-05-08 RX ADMIN — VALPROATE SODIUM 500 MG: 100 INJECTION, SOLUTION INTRAVENOUS at 12:17

## 2024-05-08 RX ADMIN — LEVETIRACETAM 1500 MG: 100 INJECTION, SOLUTION INTRAVENOUS at 23:16

## 2024-05-08 RX ADMIN — BUSPIRONE HYDROCHLORIDE 10 MG: 5 TABLET ORAL at 22:06

## 2024-05-08 RX ADMIN — SODIUM CHLORIDE 25 ML: 9 INJECTION, SOLUTION INTRAVENOUS at 00:02

## 2024-05-08 RX ADMIN — Medication 2000 UNITS: at 09:08

## 2024-05-08 RX ADMIN — BUSPIRONE HYDROCHLORIDE 10 MG: 5 TABLET ORAL at 09:08

## 2024-05-08 RX ADMIN — LEVOTHYROXINE SODIUM 50 MCG: 0.05 TABLET ORAL at 06:01

## 2024-05-08 RX ADMIN — SERTRALINE HYDROCHLORIDE 200 MG: 50 TABLET ORAL at 22:06

## 2024-05-08 RX ADMIN — FLUTICASONE PROPIONATE 1 SPRAY: 50 SPRAY, METERED NASAL at 09:08

## 2024-05-08 ASSESSMENT — ENCOUNTER SYMPTOMS
COUGH: 0
BACK PAIN: 0
SHORTNESS OF BREATH: 0
DIARRHEA: 0
ABDOMINAL DISTENTION: 0
SORE THROAT: 0
EYE DISCHARGE: 0
WHEEZING: 0
CONSTIPATION: 0

## 2024-05-08 ASSESSMENT — PAIN SCALES - GENERAL
PAINLEVEL_OUTOF10: 0
PAINLEVEL_OUTOF10: 0

## 2024-05-08 NOTE — PROGRESS NOTES
[facility]  DEPARTMENT OF SPEECH/LANGUAGE PATHOLOGY  DAILY PROGRESS NOTE  Jm Garcia  5/8/2024  [id number]  Generalized weakness [R53.1]  Acute respiratory failure with hypoxia (HCC) [J96.01]  Aspiration into airway, initial encounter [T17.348A]  Altered mental status, unspecified altered mental status type [R41.82]  Allergies   Allergen Reactions    Macrobid [Nitrofurantoin] Hives and Swelling     Hives         Pt was seen this date for dysphagia treatment.       IMPRESSION AND RECOMMENDATIONS:    Pt seen this date to target trials for pleasure feeds of pureed solids and trials of soft solids.  Pt had recent MBSS which revealed aspiration for all liquid textures, no aspiration for pureed and soft solids.  Pt has had a PEG tube placed and is tolerating bolus feeds.      Pt was alert and more talkative today.  Pt was enthusiastic to participate in PO trials.  Functional oral phase and no s/s aspiration for all pureed trials x 8 oz.  Anterior loss on the left for soft solid trials mixed with saliva and cough following 2/4 trials possibly r/t aspiration due to thinner mixed consistency due to salivation.        Recommend:  Continue Pleasure feeds of Pureed solids as least restrictive texture in addition to PEG tube feeding for nutrition/hydration/meds.     GOALS (current status in bold):  Short-term Goals  Timeframe for Short-term Goals: 1 week  Goal 1: Pt will tolerate trials of pureed solids with 0 s/s aspiration 100% Meeting, Continue  Goal 2: Caregivers/POA will demonstrate understanding of SLP recommendations 100% Meeting, Continue    EDUCATION:  spoke with AMBROSIO Dos Santos regarding recs    PAIN RATING (0-10 Scale):  appeared comfortable  Time in/Time out:   In/out:  12805/1115    Visit number:  4      Venita Gross, SLP  5/8/2024  11:14 AM

## 2024-05-08 NOTE — CARE COORDINATION
[facility] is unable to accept patient at this time due to no female bed available. I have LVM with Amanda Hall with [employer] asking that she return my call with new choices. CM awaiting call back.

## 2024-05-08 NOTE — PROGRESS NOTES
Comprehensive Nutrition Assessment    Type and Reason for Visit:  Reassess    Nutrition Recommendations/Plan:   Continue current oral diet, per SLP  Continue bolus tube feedings, monitor GI status; Bolus regimen of Jevity 1.5 (standard w/ fiber formula) 237ml 4x/day provides ~1440kcal, 61g PRO, and 1400ml total fluids (provide first bolus after morning synthroid dose then Q4H)   Monitor weights, GI status, po intakes, glucose, lytes, POC     Malnutrition Assessment:  Malnutrition Status:  At risk for malnutrition (Comment) (recent decline in intake, NPO with dysphagia) (05/04/24 1606)    Context:  Social/Environmental Circumstances       Nutrition Assessment:    Pt working w/ SLP at visit, OK for pleasure feeds of puree foods. TF order d/c likely due to diarrhea? Per RN, pt received bolus feeding this morning, no further diarrhea at this time, would recommend continuing to trial bolus feeds and monitoring GI issues. Working on d/c placement. Continue to follow at high nutrition risk.    Nutrition Related Findings:    +synthroid, remeron, vit D; POC glucose  Wound Type: None       Current Nutrition Intake & Therapies:    Average Meal Intake: NPO  Average Supplements Intake: NPO  ADULT DIET; Dysphagia - Pureed  Current Tube Feeding (TF) Orders:  Feeding Route: PEG  Formula: Standard with Fiber (jevity 1.5)  Schedule: Bolus  Feeding Regimen: 237ml 4x/day  Additives/Modulars: None  Water Flushes: 85ml before and after each bolus (8x/day)  Current TF & Flush Orders Provides: ~1440 calories, 61 g protein, 729 ml free water in TF  Goal TF & Flush Orders Provides: Bolus regimen of Jevity 1.5 (standard w/ fiber formula) 237ml 4x/day provides ~1440kcal, 61g PRO, and 1400ml total fluids (provide first bolus after morning synthroid dose then Q4H)    Anthropometric Measures:  Height: 147.3 cm (4' 10\")  Ideal Body Weight (IBW): 90 lbs (41 kg)    Admission Body Weight: 50.2 kg (110 lb 10.7 oz)  Current Body Weight: 52.1 kg

## 2024-05-08 NOTE — PROGRESS NOTES
V2.0  [facility] Hospitalist Progress Note      Name:  Jm Garcia /Age/Sex: 1960  (63 y.o. female)   MRN & CSN:  [id number] & [id number] Encounter Date/Time: 2024 9:12 AM EDT    Location:  [facility]8/1118-A PCP: Yuly Bro APRN - CNP       Hospital Day: 9    Assessment and Plan:   Jm Garcia is a 63 y.o. female with pmh of cerebral palsy, anxiety, dysphagia  who presents with Generalized weakness    Patient lives at home with  caregiver.  CM currently assisting with making arrangements for the patient to go home with her caregiver and also ordering tube feeds from pharmacy.  Patient is medically optimized for discharge but awaiting the above-mentioned logistical issues to be addressed.  Will try to switch the incontinence to bolus code this will facilitate discharge with the caregiver.  [facility] called after the discharge order was placed that the patient would benefit from SNF.  Pre-CERT to be started today.  Awaiting placement.  Medically stable for discharge.      Plan:  Generalized weakness and lethargy -resolved  ?  Inadequate intake, polypharmacy  Patient has been having issues with aspiration  Normal WBC, CXR WNL  Blood cultures have been obtained from emergency room, NGTD  Overall low risk of infections  Received ceftriaxone, metronidazole and azithromycin in the emergency room  Will hold off on fungal antibiotics at this time  TSH WNL  Ammonia elevated 56, recheck 53  Patient currently awake and alert and able to communicate appropriately.  -Currently on bolus of tube feeds.  Medically stable pending placement    Significant dysphagia  Persistent aspiration  Malnutrition  -Patient seen by SLP as outpatient on  and recommended n.p.o. with alternate routes of nutrition versus soft/honey thick as least restrictive diet.  Caregiver expressed concern regarding aspiration on admission. Patient admitted with concern for aspiration pneumonia  - Consult SLP, recommend n.p.o. as patient

## 2024-05-09 LAB
GLUCOSE BLD-MCNC: 109 MG/DL (ref 70–99)
GLUCOSE BLD-MCNC: 157 MG/DL (ref 70–99)
GLUCOSE BLD-MCNC: 165 MG/DL (ref 70–99)
GLUCOSE BLD-MCNC: 175 MG/DL (ref 70–99)
GLUCOSE BLD-MCNC: 199 MG/DL (ref 70–99)
GLUCOSE BLD-MCNC: 208 MG/DL (ref 70–99)

## 2024-05-09 PROCEDURE — 2580000003 HC RX 258: Performed by: FAMILY MEDICINE

## 2024-05-09 PROCEDURE — 6360000002 HC RX W HCPCS: Performed by: INTERNAL MEDICINE

## 2024-05-09 PROCEDURE — 6370000000 HC RX 637 (ALT 250 FOR IP): Performed by: INTERNAL MEDICINE

## 2024-05-09 PROCEDURE — 6360000002 HC RX W HCPCS: Performed by: FAMILY MEDICINE

## 2024-05-09 PROCEDURE — 6370000000 HC RX 637 (ALT 250 FOR IP): Performed by: STUDENT IN AN ORGANIZED HEALTH CARE EDUCATION/TRAINING PROGRAM

## 2024-05-09 PROCEDURE — 97112 NEUROMUSCULAR REEDUCATION: CPT

## 2024-05-09 PROCEDURE — 2500000003 HC RX 250 WO HCPCS: Performed by: FAMILY MEDICINE

## 2024-05-09 PROCEDURE — 2580000003 HC RX 258: Performed by: INTERNAL MEDICINE

## 2024-05-09 PROCEDURE — 1200000000 HC SEMI PRIVATE

## 2024-05-09 PROCEDURE — 97530 THERAPEUTIC ACTIVITIES: CPT

## 2024-05-09 PROCEDURE — 94761 N-INVAS EAR/PLS OXIMETRY MLT: CPT

## 2024-05-09 PROCEDURE — 82962 GLUCOSE BLOOD TEST: CPT

## 2024-05-09 RX ORDER — VITAMIN B COMPLEX
2000 TABLET ORAL DAILY
Status: DISCONTINUED | OUTPATIENT
Start: 2024-05-09 | End: 2024-05-10 | Stop reason: HOSPADM

## 2024-05-09 RX ORDER — ONDANSETRON 4 MG/1
4 TABLET, ORALLY DISINTEGRATING ORAL EVERY 8 HOURS PRN
Status: DISCONTINUED | OUTPATIENT
Start: 2024-05-09 | End: 2024-05-10 | Stop reason: HOSPADM

## 2024-05-09 RX ORDER — ATORVASTATIN CALCIUM 40 MG/1
40 TABLET, FILM COATED ORAL DAILY
Status: DISCONTINUED | OUTPATIENT
Start: 2024-05-09 | End: 2024-05-10 | Stop reason: HOSPADM

## 2024-05-09 RX ORDER — AMLODIPINE BESYLATE 10 MG/1
10 TABLET ORAL DAILY
Status: DISCONTINUED | OUTPATIENT
Start: 2024-05-10 | End: 2024-05-09

## 2024-05-09 RX ORDER — AMLODIPINE BESYLATE 10 MG/1
10 TABLET ORAL DAILY
Status: DISCONTINUED | OUTPATIENT
Start: 2024-05-09 | End: 2024-05-10 | Stop reason: HOSPADM

## 2024-05-09 RX ORDER — LEVOTHYROXINE SODIUM 0.05 MG/1
50 TABLET ORAL DAILY
Status: DISCONTINUED | OUTPATIENT
Start: 2024-05-10 | End: 2024-05-10 | Stop reason: HOSPADM

## 2024-05-09 RX ORDER — ACETAMINOPHEN 650 MG/1
650 SUPPOSITORY RECTAL EVERY 6 HOURS PRN
Status: DISCONTINUED | OUTPATIENT
Start: 2024-05-09 | End: 2024-05-10 | Stop reason: HOSPADM

## 2024-05-09 RX ORDER — BUSPIRONE HYDROCHLORIDE 5 MG/1
10 TABLET ORAL 2 TIMES DAILY
Status: DISCONTINUED | OUTPATIENT
Start: 2024-05-09 | End: 2024-05-10 | Stop reason: HOSPADM

## 2024-05-09 RX ORDER — ACETAMINOPHEN 325 MG/1
650 TABLET ORAL EVERY 6 HOURS PRN
Status: DISCONTINUED | OUTPATIENT
Start: 2024-05-09 | End: 2024-05-10 | Stop reason: HOSPADM

## 2024-05-09 RX ORDER — ONDANSETRON 2 MG/ML
4 INJECTION INTRAMUSCULAR; INTRAVENOUS EVERY 6 HOURS PRN
Status: DISCONTINUED | OUTPATIENT
Start: 2024-05-09 | End: 2024-05-10 | Stop reason: HOSPADM

## 2024-05-09 RX ORDER — MIRTAZAPINE 15 MG/1
15 TABLET, FILM COATED ORAL NIGHTLY
Status: DISCONTINUED | OUTPATIENT
Start: 2024-05-09 | End: 2024-05-10 | Stop reason: HOSPADM

## 2024-05-09 RX ADMIN — SODIUM CHLORIDE, PRESERVATIVE FREE 10 ML: 5 INJECTION INTRAVENOUS at 21:08

## 2024-05-09 RX ADMIN — Medication 2000 UNITS: at 11:54

## 2024-05-09 RX ADMIN — SODIUM CHLORIDE: 9 INJECTION, SOLUTION INTRAVENOUS at 18:27

## 2024-05-09 RX ADMIN — ATORVASTATIN CALCIUM 40 MG: 40 TABLET, FILM COATED ORAL at 11:54

## 2024-05-09 RX ADMIN — BUSPIRONE HYDROCHLORIDE 10 MG: 5 TABLET ORAL at 11:53

## 2024-05-09 RX ADMIN — LEVETIRACETAM 1500 MG: 100 INJECTION, SOLUTION INTRAVENOUS at 12:03

## 2024-05-09 RX ADMIN — VALPROATE SODIUM 500 MG: 100 INJECTION, SOLUTION INTRAVENOUS at 23:15

## 2024-05-09 RX ADMIN — MIRTAZAPINE 15 MG: 15 TABLET, FILM COATED ORAL at 21:08

## 2024-05-09 RX ADMIN — BUSPIRONE HYDROCHLORIDE 10 MG: 5 TABLET ORAL at 21:08

## 2024-05-09 RX ADMIN — LEVOTHYROXINE SODIUM 50 MCG: 0.05 TABLET ORAL at 05:27

## 2024-05-09 RX ADMIN — VALPROATE SODIUM 500 MG: 100 INJECTION, SOLUTION INTRAVENOUS at 05:29

## 2024-05-09 RX ADMIN — AMLODIPINE BESYLATE 10 MG: 10 TABLET ORAL at 11:54

## 2024-05-09 RX ADMIN — VALPROATE SODIUM 500 MG: 100 INJECTION, SOLUTION INTRAVENOUS at 18:33

## 2024-05-09 RX ADMIN — SERTRALINE HYDROCHLORIDE 200 MG: 50 TABLET ORAL at 21:08

## 2024-05-09 RX ADMIN — LEVETIRACETAM 1500 MG: 100 INJECTION, SOLUTION INTRAVENOUS at 23:09

## 2024-05-09 RX ADMIN — ENOXAPARIN SODIUM 40 MG: 100 INJECTION SUBCUTANEOUS at 11:59

## 2024-05-09 RX ADMIN — SODIUM CHLORIDE, PRESERVATIVE FREE 10 ML: 5 INJECTION INTRAVENOUS at 11:55

## 2024-05-09 RX ADMIN — FLUTICASONE PROPIONATE 1 SPRAY: 50 SPRAY, METERED NASAL at 11:57

## 2024-05-09 ASSESSMENT — PAIN SCALES - GENERAL
PAINLEVEL_OUTOF10: 0

## 2024-05-09 ASSESSMENT — PAIN SCALES - WONG BAKER
WONGBAKER_NUMERICALRESPONSE: NO HURT

## 2024-05-09 ASSESSMENT — ENCOUNTER SYMPTOMS
CONSTIPATION: 0
DIARRHEA: 0
BACK PAIN: 0
WHEEZING: 0
COUGH: 0
EYE DISCHARGE: 0
SHORTNESS OF BREATH: 0
ABDOMINAL DISTENTION: 0
SORE THROAT: 0

## 2024-05-09 NOTE — PROGRESS NOTES
V2.0  [facility]ist Progress Note      Name:  Jm Garcia /Age/Sex: 1960  (63 y.o. female)   MRN & CSN:  [id number] & [id number] Encounter Date/Time: 2024 9:12 AM EDT    Location:  [facility]8/1118-A PCP: Yuly Bro APRN - CNP       Hospital Day: 10    Assessment and Plan:   Jm Garcia is a 63 y.o. female with pmh of cerebral palsy, anxiety, dysphagia  who presents with Generalized weakness    Patient lives at home with  caregiver.  CM currently assisting with making arrangements for the patient to go home with her caregiver and also ordering tube feeds from pharmacy.  Patient is medically optimized for discharge but awaiting the above-mentioned logistical issues to be addressed.  Will try to switch the incontinence to bolus code this will facilitate discharge with the caregiver.  Home care [facility] called after the discharge order was placed that the patient would benefit from SNF.  Pre-CERT to be started.  Awaiting placement.  Medically stable for discharge.      Plan:  Generalized weakness and lethargy -resolved  ?  Inadequate intake, polypharmacy  Patient has been having issues with aspiration  Normal WBC, CXR WNL  Blood cultures have been obtained from emergency room, NGTD  Overall low risk of infections  Received ceftriaxone, metronidazole and azithromycin in the emergency room  Will hold off on fungal antibiotics at this time  TSH WNL  Ammonia elevated 56, recheck 53  Patient currently awake and alert and able to communicate appropriately.  -Currently on bolus of tube feeds.  Medically stable pending placement    Significant dysphagia  Persistent aspiration  Malnutrition  -Patient seen by SLP as outpatient on  and recommended n.p.o. with alternate routes of nutrition versus soft/honey thick as least restrictive diet.  Caregiver expressed concern regarding aspiration on admission. Patient admitted with concern for aspiration pneumonia  - Consult SLP, recommend n.p.o. as patient noted

## 2024-05-09 NOTE — PROGRESS NOTES
Occupational Therapy Treatment Note    Name: Jm Garcia MRN: [id number] :   1960   Date:  2024   Admission Date: 2024 Room:  [address] [place] [address]       Restrictions/Precautions:   fall risk, hx CP, Hx CVAs, PEG tube, seizures precautions      Communication with other providers: RN approval of therapy, PT cotx, OT present throughout session     Subjective:  Patient states:  \"I hate this bed\"   Pain:   Location, Type, Intensity (0/10 to 10/10):  no complaint of pain noted    Objective:    Observation: pt supine in bed up on arrival, agreeable to therapy   Objective Measures: on room air, tele, vitals remained stable throughout session     Treatment, including education:  Therapeutic Activity Training:   Therapeutic activity training was instructed today.  Cues were given for safety, sequence, UE/LE placement, awareness, and balance.    Activities performed today included bed mobility training, sup-sit, sit-stand, functional mobility, Squat pivot transfer    Self Care Training:   Cues were given for safety, sequence, UE/LE placement, visual cues, and balance.    Activities performed today included LB dressing task    Lying semi-brown's in bed, adjusted socks with max A to reach and complete task. Supine to EOB with mod A for trunk elevation and hip pivot, HOB elevated, min v/c's for sequencing of task and hand placement on bed features. Sit EOB with mod A progressing to CGA for trunk stability, mod v/c's for body mechanics and anterior weight shift. STS from EOB with mod A x2 for force production and balance safety, min v/c's for sequencing and hand placement. Squat pivot with Mod x2 for force production, hip pivot, min v/c's for sequencing. Adjust pt in chair with Max A x2 for force production.     Pt educated on role of OT, benefits of OT, and rationale for therapeutic intervention. Educated on need to be patient advocate, assist to fullest ability with ADL completion, and benefits of OOB activity. Pt

## 2024-05-09 NOTE — PLAN OF CARE
Problem: Discharge Planning  Goal: Discharge to home or other facility with appropriate resources  5/8/2024 2240 by Shruti Narvaez RN  Outcome: Progressing  5/8/2024 0953 by Levon Goins LPN  Outcome: Progressing     Problem: Chronic Conditions and Co-morbidities  Goal: Patient's chronic conditions and co-morbidity symptoms are monitored and maintained or improved  5/8/2024 2240 by Shruti Narvaez RN  Outcome: Progressing  5/8/2024 0953 by Levon Goins LPN  Outcome: Progressing     Problem: Safety - Adult  Goal: Free from fall injury  5/8/2024 2240 by Shruti Narvaez RN  Outcome: Progressing  5/8/2024 0953 by Levon Goins LPN  Outcome: Progressing     Problem: Skin/Tissue Integrity  Goal: Absence of new skin breakdown  Description: 1.  Monitor for areas of redness and/or skin breakdown  2.  Assess vascular access sites hourly  3.  Every 4-6 hours minimum:  Change oxygen saturation probe site  4.  Every 4-6 hours:  If on nasal continuous positive airway pressure, respiratory therapy assess nares and determine need for appliance change or resting period.  5/8/2024 2240 by Shruti Narvaez RN  Outcome: Progressing  5/8/2024 0953 by Levon Goins LPN  Outcome: Progressing     Problem: ABCDS Injury Assessment  Goal: Absence of physical injury  5/8/2024 2240 by Shruti Narvaez RN  Outcome: Progressing  5/8/2024 0953 by Levon Goins LPN  Outcome: Progressing     Problem: Pain  Goal: Verbalizes/displays adequate comfort level or baseline comfort level  5/8/2024 2240 by Shruti Narvaez RN  Outcome: Progressing  5/8/2024 0953 by Levon Goins LPN  Outcome: Progressing     Problem: Nutrition Deficit:  Goal: Optimize nutritional status  5/8/2024 2240 by Shruti Narvaez RN  Outcome: Progressing  5/8/2024 1145 by Susan Pereyra, WILLIAM  Outcome: Progressing  5/8/2024 0953 by Levon Goins LPN  Outcome: Progressing

## 2024-05-09 NOTE — CARE COORDINATION
This RN case manager received a voicemail from Amanda Hall. She states that [employer] has no preference in where patient goes for SNF other than they do not want [facility]. Referral made to [facility]. Awaiting determination.

## 2024-05-09 NOTE — PROGRESS NOTES
Physical Therapy Treatment Note  Name: Jm Garcia MRN: [id number] :   1960   Date:  2024   Admission Date: 2024 Room:  [address] [place] [address]     Restrictions/Precautions:          general precautions, fall risk    Communication with other providers:  OT    Subjective:  Patient states:  \"I hate it. I hate this bed\"  Pain:   Location, Type, Intensity (0/10 to 10/10):  denies pain    Objective:    Observation:  pt supine in bed and agreeable to session    Treatment, including education/measures:    Bed mobility: pt completed supine to sitting EOB mod A with assist at trunk and hips. Cues provided for sequencing    Transfers: pt completed STS to/from EOB and stand pivot from EOB to chair mod A x2 with cues for hand placement and sequencing    Balance: pt sat EOB min A progressing to close SBA with initial retropulsion. Cues provided for anterior weightshifting. Pt stood with bilateral HHA max A with left lateral lean. Cues provided for sequencing and upright posture    Assessment / Impression:    Pt up in chair at end of session with needs in reach and alarm on    Patient's tolerance of treatment:  fair   Adverse Reaction: n/a  Significant change in status and impact:  n/a  Barriers to improvement:  decreased endurance, impaired transfers    Plan for Next Session:    Continue to address transfer training and gait training as appropriate    Time in:  1013  Time out:  1033  Timed treatment minutes:  10  Total treatment time:  20    Previously filed items:  Social/Functional History  Lives With:  ( caregiver)  Type of Home: Apartment  Home Layout: One level  Home Equipment: Walker, rolling  Has the patient had two or more falls in the past year or any fall with injury in the past year?: Unknown  Receives Help From:  (caregiver )  ADL Assistance: Needs assistance  Toileting: Needs assistance  Homemaking Assistance:  (caregiver cooks, clean, and help with laundry)  Homemaking Responsibilities:

## 2024-05-09 NOTE — PROGRESS NOTES
complaints of weakness and lethargy,   possible aspiration PNA, volume depletion and poor oral intake with mild   malnutrition, polypharmacy mentioned but unclear if any home meds were   discontinued due to risk  Treatment: labs, imaging, medical management and supportive care    Thank you,  Shraddha Higginbotham RN CDS  [phone]  Options provided:  -- Weakness and lethargy due to aspiration PNA  -- Weakness and lethargy due to dehydration  -- Weakness and lethargy due to polypharmacy without OD  -- Weakness and lethargy due to ##please document cause, please document   cause.  -- Other - I will add my own diagnosis  -- Disagree - Not applicable / Not valid  -- Disagree - Clinically unable to determine / Unknown  -- Refer to Clinical Documentation Reviewer    PROVIDER RESPONSE TEXT:    This patient has weakness and lethargy due to aspiration PNA.    Query created by: Shraddha Higginbotham on 5/6/2024 12:18 PM      Electronically signed by:  Yany Almaguer MD 5/9/2024 11:52 AM

## 2024-05-10 VITALS
WEIGHT: 114.86 LBS | SYSTOLIC BLOOD PRESSURE: 127 MMHG | OXYGEN SATURATION: 98 % | RESPIRATION RATE: 16 BRPM | HEART RATE: 66 BPM | BODY MASS INDEX: 24.11 KG/M2 | HEIGHT: 58 IN | DIASTOLIC BLOOD PRESSURE: 64 MMHG | TEMPERATURE: 98.4 F

## 2024-05-10 LAB
ANION GAP SERPL CALCULATED.3IONS-SCNC: 7 MMOL/L (ref 7–16)
BUN SERPL-MCNC: 13 MG/DL (ref 6–23)
CALCIUM SERPL-MCNC: 9.2 MG/DL (ref 8.3–10.6)
CHLORIDE BLD-SCNC: 108 MMOL/L (ref 99–110)
CO2: 28 MMOL/L (ref 21–32)
CREAT SERPL-MCNC: 0.4 MG/DL (ref 0.6–1.1)
GFR, ESTIMATED: >90 ML/MIN/1.73M2
GLUCOSE BLD-MCNC: 144 MG/DL (ref 70–99)
GLUCOSE BLD-MCNC: 153 MG/DL (ref 70–99)
GLUCOSE BLD-MCNC: 163 MG/DL (ref 70–99)
GLUCOSE SERPL-MCNC: 106 MG/DL (ref 70–99)
HCT VFR BLD CALC: 32.3 % (ref 37–47)
HEMOGLOBIN: 10.3 GM/DL (ref 12.5–16)
MAGNESIUM: 2.1 MG/DL (ref 1.8–2.4)
MCH RBC QN AUTO: 33.4 PG (ref 27–31)
MCHC RBC AUTO-ENTMCNC: 31.9 % (ref 32–36)
MCV RBC AUTO: 104.9 FL (ref 78–100)
PDW BLD-RTO: 12.3 % (ref 11.7–14.9)
PHOSPHORUS: 1.3 MG/DL (ref 2.5–4.9)
PLATELET # BLD: 212 K/CU MM (ref 140–440)
PMV BLD AUTO: 8.8 FL (ref 7.5–11.1)
POTASSIUM SERPL-SCNC: 4.3 MMOL/L (ref 3.5–5.1)
RBC # BLD: 3.08 M/CU MM (ref 4.2–5.4)
SODIUM BLD-SCNC: 143 MMOL/L (ref 135–145)
WBC # BLD: 6.3 K/CU MM (ref 4–10.5)

## 2024-05-10 PROCEDURE — 83735 ASSAY OF MAGNESIUM: CPT

## 2024-05-10 PROCEDURE — 6360000002 HC RX W HCPCS: Performed by: FAMILY MEDICINE

## 2024-05-10 PROCEDURE — 2580000003 HC RX 258: Performed by: INTERNAL MEDICINE

## 2024-05-10 PROCEDURE — 85027 COMPLETE CBC AUTOMATED: CPT

## 2024-05-10 PROCEDURE — 97535 SELF CARE MNGMENT TRAINING: CPT

## 2024-05-10 PROCEDURE — 82962 GLUCOSE BLOOD TEST: CPT

## 2024-05-10 PROCEDURE — 36415 COLL VENOUS BLD VENIPUNCTURE: CPT

## 2024-05-10 PROCEDURE — 80048 BASIC METABOLIC PNL TOTAL CA: CPT

## 2024-05-10 PROCEDURE — 2580000003 HC RX 258: Performed by: STUDENT IN AN ORGANIZED HEALTH CARE EDUCATION/TRAINING PROGRAM

## 2024-05-10 PROCEDURE — 97530 THERAPEUTIC ACTIVITIES: CPT

## 2024-05-10 PROCEDURE — 2580000003 HC RX 258: Performed by: FAMILY MEDICINE

## 2024-05-10 PROCEDURE — 2500000003 HC RX 250 WO HCPCS: Performed by: STUDENT IN AN ORGANIZED HEALTH CARE EDUCATION/TRAINING PROGRAM

## 2024-05-10 PROCEDURE — 94761 N-INVAS EAR/PLS OXIMETRY MLT: CPT

## 2024-05-10 PROCEDURE — 2500000003 HC RX 250 WO HCPCS: Performed by: FAMILY MEDICINE

## 2024-05-10 PROCEDURE — 6370000000 HC RX 637 (ALT 250 FOR IP): Performed by: STUDENT IN AN ORGANIZED HEALTH CARE EDUCATION/TRAINING PROGRAM

## 2024-05-10 PROCEDURE — 84100 ASSAY OF PHOSPHORUS: CPT

## 2024-05-10 PROCEDURE — 6360000002 HC RX W HCPCS: Performed by: INTERNAL MEDICINE

## 2024-05-10 RX ADMIN — Medication 2000 UNITS: at 09:49

## 2024-05-10 RX ADMIN — AMLODIPINE BESYLATE 10 MG: 10 TABLET ORAL at 09:47

## 2024-05-10 RX ADMIN — SODIUM CHLORIDE, PRESERVATIVE FREE 5 ML: 5 INJECTION INTRAVENOUS at 09:49

## 2024-05-10 RX ADMIN — VALPROATE SODIUM 500 MG: 100 INJECTION, SOLUTION INTRAVENOUS at 11:30

## 2024-05-10 RX ADMIN — BUSPIRONE HYDROCHLORIDE 10 MG: 5 TABLET ORAL at 09:48

## 2024-05-10 RX ADMIN — SODIUM CHLORIDE: 9 INJECTION, SOLUTION INTRAVENOUS at 10:52

## 2024-05-10 RX ADMIN — POTASSIUM PHOSPHATE, MONOBASIC POTASSIUM PHOSPHATE, DIBASIC 20 MMOL: 224; 236 INJECTION, SOLUTION, CONCENTRATE INTRAVENOUS at 10:53

## 2024-05-10 RX ADMIN — LEVETIRACETAM 1500 MG: 100 INJECTION, SOLUTION INTRAVENOUS at 10:51

## 2024-05-10 RX ADMIN — VALPROATE SODIUM 500 MG: 100 INJECTION, SOLUTION INTRAVENOUS at 05:29

## 2024-05-10 RX ADMIN — ATORVASTATIN CALCIUM 40 MG: 40 TABLET, FILM COATED ORAL at 09:48

## 2024-05-10 RX ADMIN — ENOXAPARIN SODIUM 40 MG: 100 INJECTION SUBCUTANEOUS at 09:48

## 2024-05-10 RX ADMIN — LEVOTHYROXINE SODIUM 50 MCG: 0.05 TABLET ORAL at 05:29

## 2024-05-10 RX ADMIN — FLUTICASONE PROPIONATE 1 SPRAY: 50 SPRAY, METERED NASAL at 09:49

## 2024-05-10 ASSESSMENT — ENCOUNTER SYMPTOMS
SORE THROAT: 0
CONSTIPATION: 0
BACK PAIN: 0
WHEEZING: 0
SHORTNESS OF BREATH: 0
ABDOMINAL DISTENTION: 0
COUGH: 0
EYE DISCHARGE: 0
DIARRHEA: 0

## 2024-05-10 ASSESSMENT — PAIN SCALES - WONG BAKER: WONGBAKER_NUMERICALRESPONSE: NO HURT

## 2024-05-10 ASSESSMENT — PAIN SCALES - GENERAL: PAINLEVEL_OUTOF10: 0

## 2024-05-10 NOTE — PROGRESS NOTES
Physical Therapy    Physical Therapy Treatment Note  Name: Jm Garcia MRN: [id number] :   1960   Date:  5/10/2024   Admission Date: 2024 Room:  [address] [place] [address]   Restrictions/Precautions:          general precautions, fall risk   Communication with other providers:  co-tx with Dahlia MOSS for safety  Subjective:  Patient states:  pt states several times during tx \" I don't like this\"  Pain:   Location, Type, Intensity (0/10 to 10/10):  no c/o pain during tx session  Objective:    Observation:  alert and oriented to self and able to follow cues.    Treatment, including education/measures:  Sup to sit with HOB up max assist of 2  Sitting EOB max assist of 2 but able to progress to max assist of 1. Pt working on wt shifts and reaching. Pt with increased extensor tone when att to reach with left UE.  SPT max assist of 2  Turned chair to face bed rail and pt was able to do very minimal partial stand with max assist of 2  Safety  Patient left safely in the chair, with call light/phone in reach with alarm applied. Gait belt was used for transfers and gait.   Assessment / Impression:      Patient's tolerance of treatment:  good   Adverse Reaction: na  Significant change in status and impact:  na  Barriers to improvement:  strength and safety  Plan for Next Session:    Cont. POC  Time in:  848  Time out:  914  Timed treatment minutes:  26  Total treatment time:  26    Previously filed items:  Social/Functional History  Lives With:  ( caregiver)  Type of Home: Apartment  Home Layout: One level  Home Equipment: Walker, rolling  Has the patient had two or more falls in the past year or any fall with injury in the past year?: Unknown  Receives Help From:  (caregiver )  ADL Assistance: Needs assistance  Toileting: Needs assistance  Homemaking Assistance:  (caregiver cooks, clean, and help with laundry)  Homemaking Responsibilities: No  Ambulation Assistance: Independent  Transfer Assistance:

## 2024-05-10 NOTE — DISCHARGE INSTR - COC
Continuity of Care Form    Patient Name: mJ Garcia   :  1960  MRN:  [id number]    Admit date:  2024  Discharge date:  ***    Code Status Order: Full Code   Advance Directives:     Admitting Physician:  No admitting provider for patient encounter.  PCP: Yuly Bro, APRN - CNP    Discharging Nurse: ***  Discharging Hospital Unit/Room#: 1118/1118-A  Discharging Unit Phone Number: ***    Emergency Contact:   Extended Emergency Contact Information  Primary Emergency Contact: SELENA WALTERS  Address: [address] [place] [address]           [address] [place] [address] of [facility]  Home Phone: [phone]  Work Phone: [phone]  Mobile Phone: [phone]  Relation: Legal Guardian  Secondary Emergency Contact: DARLINE PARDO  Home Phone: [phone]  Mobile Phone: [phone]  Relation: Lay Caregiver  Preferred language: English   needed? No    Past Surgical History:  Past Surgical History:   Procedure Laterality Date    GASTROSTOMY TUBE PLACEMENT      UPPER GASTROINTESTINAL ENDOSCOPY N/A 5/3/2024    ESOPHAGOGASTRODUODENOSCOPY PERCUTANEOUS ENDOSCOPIC GASTROSTOMY TUBE PLACEMENT performed by Ashok Casas DO at [facility] ENDOSCOPY       Immunization History:   Immunization History   Administered Date(s) Administered    COVID-19, PFIZER PURPLE top, DILUTE for use, (age 12 y+), 30mcg/0.3mL 2021, 2021    Influenza Vaccine, unspecified formulation 10/21/2016    Influenza Virus Vaccine 2012, 10/01/2013, 2015    Influenza, FLUARIX, FLULAVAL, FLUZONE (age 6 mo+) AND AFLURIA, (age 3 y+), PF, 0.5mL 10/23/2018, 2019, 10/19/2022    Pneumococcal Conjugate Vaccine 2011, 2015    Pneumococcal, PCV20, PREVNAR 20, (age 6w+), IM, 0.5mL 10/19/2022    Pneumococcal, PPSV23, PNEUMOVAX 23, (age 2y+), SC/IM, 0.5mL 2011, 2015       Active Problems:  Patient Active Problem List   Diagnosis Code    Pneumonia due to infectious organism J18.9    HTN

## 2024-05-10 NOTE — CARE COORDINATION
Discharging Nurse: Upon discharge pt will be going to [facility]- report to be called directly to [phone], they will provide number to fax AVS.  Transport can be set up with [facility] by calling Oma at [phone] if before 5pm and after 5pm dispatch at [phone].     Sent PS to Dr. Almaguer to update.     HENS Completed     Precert initiated via Rapid Vocabulary: APPROVED  Rapid Vocabulary Auth ID [id number]  05/10/2024-05/12/2024  Approved

## 2024-05-10 NOTE — PLAN OF CARE
Problem: Discharge Planning  Goal: Discharge to home or other facility with appropriate resources  5/10/2024 0951 by Oma Panchal LPN  Outcome: Progressing  5/9/2024 2232 by Shruti Narvaez RN  Outcome: Progressing     Problem: Chronic Conditions and Co-morbidities  Goal: Patient's chronic conditions and co-morbidity symptoms are monitored and maintained or improved  5/9/2024 2232 by Shruti Narvaez RN  Outcome: Progressing     Problem: Safety - Adult  Goal: Free from fall injury  5/10/2024 0951 by Oma Panchal LPN  Outcome: Progressing  5/9/2024 2232 by Shruti Narvaez RN  Outcome: Progressing     Problem: Skin/Tissue Integrity  Goal: Absence of new skin breakdown  Description: 1.  Monitor for areas of redness and/or skin breakdown  2.  Assess vascular access sites hourly  3.  Every 4-6 hours minimum:  Change oxygen saturation probe site  4.  Every 4-6 hours:  If on nasal continuous positive airway pressure, respiratory therapy assess nares and determine need for appliance change or resting period.  5/10/2024 0951 by Oma Panchal LPN  Outcome: Progressing  5/9/2024 2232 by Shruti Narvaze RN  Outcome: Progressing     Problem: ABCDS Injury Assessment  Goal: Absence of physical injury  5/10/2024 0951 by Oma Panchal LPN  Outcome: Progressing  5/9/2024 2232 by Shruti Narvaez RN  Outcome: Progressing     Problem: Pain  Goal: Verbalizes/displays adequate comfort level or baseline comfort level  5/9/2024 2232 by Shruti Narvaez RN  Outcome: Progressing     Problem: Nutrition Deficit:  Goal: Optimize nutritional status  5/9/2024 2232 by Shruti Narvaez RN  Outcome: Progressing      Pt advised of medication called in. Pt expressed understanding.

## 2024-05-10 NOTE — PROGRESS NOTES
[place]  [facility] [place]      Name:  Jm Garcia /Age/Sex: 1960  (63 y.o. female)   MRN & CSN:  [id number] & [id number] Encounter Date/Time: 5/10/2024 9:12 AM EDT    Location:  [facility]8/1118-A PCP: Yuly Bro APRN - CNP       Hospital Day: 11    Assessment and Plan:   Jm Garcia is a 63 y.o. female with pmh of cerebral palsy, anxiety, dysphagia  who presents with Generalized weakness    Patient lives at home with  caregiver.  CM currently assisting with making arrangements for the patient to go home with her caregiver and also ordering tube feeds from pharmacy.  Patient is medically optimized for discharge but awaiting the above-mentioned logistical issues to be addressed.  Will try to switch the incontinence to bolus code this will facilitate discharge with the caregiver.  Home care [facility] called after the discharge order was placed that the patient would benefit from SNF.  Pre-CERT has been started.  Awaiting placement.  Medically stable for discharge.      Plan:  Generalized weakness and lethargy -resolved  ?  Inadequate intake, polypharmacy  Patient has been having issues with aspiration  Normal WBC, CXR WNL  Blood cultures have been obtained from emergency room, NGTD  Overall low risk of infections  Received ceftriaxone, metronidazole and azithromycin in the emergency room  Will hold off on fungal antibiotics at this time  TSH WNL  Ammonia elevated 56, recheck was 53  Patient currently awake and alert and able to communicate appropriately.  -Currently on bolus of tube feeds.  Medically stable pending placement    Significant dysphagia  Persistent aspiration  Malnutrition  -Patient seen by SLP as outpatient on  and recommended n.p.o. with alternate routes of nutrition versus soft/honey thick as least restrictive diet.  Caregiver expressed concern regarding aspiration on admission. Patient admitted with concern for aspiration pneumonia  - Consult SLP, recommend n.p.o. as  Exam  Vitals and nursing note reviewed.   Constitutional:       General: She is not in acute distress.     Appearance: Normal appearance. She is not ill-appearing.   HENT:      Head: Normocephalic and atraumatic.      Nose: Nose normal.      Mouth/Throat:      Mouth: Mucous membranes are moist.   Eyes:      Extraocular Movements: Extraocular movements intact.      Pupils: Pupils are equal, round, and reactive to light.   Cardiovascular:      Rate and Rhythm: Normal rate and regular rhythm.      Pulses: Normal pulses.      Heart sounds: Normal heart sounds.   Pulmonary:      Effort: Pulmonary effort is normal.      Breath sounds: Normal breath sounds.   Abdominal:      Palpations: Abdomen is soft.      Comments: PEG tube in place, dressing C/D/I   Musculoskeletal:         General: Deformity present. Normal range of motion.      Cervical back: Normal range of motion.   Skin:     General: Skin is warm.      Capillary Refill: Capillary refill takes less than 2 seconds.   Neurological:      Mental Status: She is alert and oriented to person, place, and time. Mental status is at baseline.   Psychiatric:         Mood and Affect: Mood normal.         Behavior: Behavior normal.            Medications:   Medications:    potassium phosphate IVPB (PERIPHERAL LINE)  20 mmol IntraVENous Once    atorvastatin  40 mg PEG Tube Daily    busPIRone  10 mg PEG Tube BID    levothyroxine  50 mcg PEG Tube Daily    mirtazapine  15 mg PEG Tube Nightly    sertraline  200 mg PEG Tube Nightly    Vitamin D  2,000 Units PEG Tube Daily    amLODIPine  10 mg PEG Tube Daily    [Held by provider] carBAMazepine  300 mg Oral BID    [Held by provider] divalproex  1,000 mg Oral BID RT    fluticasone  1 spray Each Nostril Daily    [Held by provider] levETIRAcetam  1,500 mg Oral BID    sodium chloride flush  5-40 mL IntraVENous 2 times per day    enoxaparin  40 mg SubCUTAneous Daily    [Held by provider] ferrous sulfate  325 mg Oral BID     levETIRAcetam

## 2024-05-10 NOTE — CARE COORDINATION
[facility] / [facility] do not take patient's primary insurance. Referral made to [facility]. Following for determination.

## 2024-05-10 NOTE — CARE COORDINATION
[facility] able to accept patient. I have asked Violet LOZANO to start precert. CM following for determination.

## 2024-05-10 NOTE — PROGRESS NOTES
Occupational Therapy    Occupational Therapy Treatment Note    Name: Jm Garcia MRN: [id number] :   1960   Date:  5/10/2024   Admission Date: 2024 Room:  [address] [place] [address]     Restrictions/Precautions: fall risk, hx CP, Hx CVAs, PEG tube, seizures precautions     Communication with other providers: RN approved session, co tx with PTA for safety and mobility.     Subjective:  Patient states:  Pt stated \"I hate this\"  Pain:   Location, Type, Intensity (0/10 to 10/10):  non verbal indicator of pain not present.    Objective:    Observation: Pt supine in bed upon arrival.    Objective Measures:  Pt alert to self and good command follow    Treatment, including education:  Self Care Training:   Cues were given for safety, sequence, UE/LE placement, visual cues, and balance.    Activities performed today included UB/LB dressing tasks, toileting, hand hygiene at sink    Therapeutic Activity Training:   Therapeutic activity training was instructed today.  Cues were given for safety, sequence, UE/LE placement, awareness, and balance.    Activities performed today included bed mobility training, sup-sit, sit-stand, SPT.    Pt supine in bed upon arrival and completed supine to sit with head of bed elevated and MOD to MAX A x 1-2 with noted posterior and left lateral lean.  Pt completed seated edge of bed with MAX A 1-2 to maintain midline positioning. Pt sat edge of bed approx 6-8 minutes and progressed with MOD and MAX A and completed reaching outside of base of support with MIN A to increased static sitting balance and patient progressed to MIN A EOB. Pt completed sit to stand and SPT from edge of bed with MOD to MAX A x2. Pt seated in chair and completed sit to stand from armed chair with MAX A X 2. Pt completed hair grooming with MAX A seated in chair. Pt seated in chair with karly in chair with call light in reach and chair alarm.     Assessment / Impression:    Patient's tolerance of treatment: fair-  Adverse

## 2024-05-12 NOTE — DISCHARGE SUMMARY
Pt appeared to be uncomfortable in triage due to vomiting, but Father states he has no pain.
Pt nauseous with headache 8/10 advised md . Md aware awaiting orders.
Purposeful rounding done with parents, but will reinforce with .
configuration. No infiltrate, effusion or pneumothorax is identified.     1.  No evidence of acute cardiopulmonary disease.       CBC:   Recent Labs     05/10/24  0330   WBC 6.3   HGB 10.3*        BMP:    Recent Labs     05/10/24  0330      K 4.3      CO2 28   BUN 13   CREATININE 0.4*   GLUCOSE 106*     Hepatic: No results for input(s): \"AST\", \"ALT\", \"BILITOT\", \"ALKPHOS\" in the last 72 hours.    Invalid input(s): \"ALB\"  Lipids:   Lab Results   Component Value Date/Time    CHOL 127 02/29/2024 06:16 AM    HDL 42 02/29/2024 06:16 AM    TRIG 146 02/29/2024 06:16 AM     Hemoglobin A1C:   Lab Results   Component Value Date/Time    LABA1C 5.2 02/29/2024 06:16 AM     TSH: No results found for: \"TSH\"  Troponin:   Lab Results   Component Value Date/Time    TROPONINT <0.010 02/02/2023 12:30 PM    TROPONINT <0.010 12/27/2022 09:33 AM    TROPONINT <0.010 12/19/2022 03:30 PM     Lactic Acid: No results for input(s): \"LACTA\" in the last 72 hours.  BNP: No results for input(s): \"PROBNP\" in the last 72 hours.  UA:  Lab Results   Component Value Date/Time    NITRU NEGATIVE 02/02/2023 01:04 PM    COLORU YELLOW 02/02/2023 01:04 PM    PHUR 6.0 02/02/2023 01:04 PM    PHUR 6 04/05/2019 03:53 PM    WBCUA 21 01/23/2023 01:32 PM    RBCUA 28 01/23/2023 01:32 PM    MUCUS RARE 01/23/2023 01:32 PM    TRICHOMONAS NONE SEEN 01/23/2023 01:32 PM    YEAST RARE 08/11/2018 04:20 PM    BACTERIA NEGATIVE 01/23/2023 01:32 PM    CLARITYU CLEAR 02/02/2023 01:04 PM    SPECGRAV 1.020 04/05/2019 03:53 PM    LEUKOCYTESUR NEGATIVE 02/02/2023 01:04 PM    UROBILINOGEN 0.2 02/02/2023 01:04 PM    BILIRUBINUR NEGATIVE 02/02/2023 01:04 PM    BILIRUBINUR neg 04/05/2019 03:53 PM    BLOODU NEGATIVE 02/02/2023 01:04 PM    GLUCOSEU NEGATIVE 02/02/2023 01:04 PM    GLUCOSEU neg 04/05/2019 03:53 PM    KETUA TRACE 02/02/2023 01:04 PM     Urine Cultures:   Lab Results   Component Value Date/Time    LABURIN  08/28/2018 01:17 PM     <10,000 CFU/ml mixed

## 2024-05-19 NOTE — DISCHARGE INSTR - DIET
Good nutrition is important when healing from an illness, injury, or surgery.  Follow any nutrition recommendations given to you during your hospital stay.   If you were given an oral nutrition supplement while in the hospital, continue to take this supplement at home.  You can take it with meals, in-between meals, and/or before bedtime. These supplements can be purchased at most local grocery stores, pharmacies, and chain [employer]-stores.   If you have any questions about your diet or nutrition, call the hospital and ask for the dietitian.      Nutrition Recommendations/Plan:   1. Continue tube feeding with jevity 1.5 (standard with fiber)   2. 237 ml bolus x 4 per day. Every 4 hours during day  3. If need to transition, first bolus 60 ml volume and then increase each bolus by 60 ml (120 ml second bolus, 180 ml third bolus, 237 ml ) to monitor tolerance to volume  4. To meet fluid needs without IV fluid, need additional 680 ml free water flushes, 85 ml before and after bolus as tolerates     Medical decision making

## 2024-07-11 ENCOUNTER — APPOINTMENT (OUTPATIENT)
Dept: GENERAL RADIOLOGY | Age: 64
End: 2024-07-11
Payer: MEDICARE

## 2024-07-11 ENCOUNTER — HOSPITAL ENCOUNTER (EMERGENCY)
Age: 64
Discharge: HOME | End: 2024-07-11
Attending: STUDENT IN AN ORGANIZED HEALTH CARE EDUCATION/TRAINING PROGRAM
Payer: MEDICARE

## 2024-07-11 VITALS
HEIGHT: 58 IN | HEART RATE: 99 BPM | BODY MASS INDEX: 25.19 KG/M2 | SYSTOLIC BLOOD PRESSURE: 139 MMHG | OXYGEN SATURATION: 99 % | RESPIRATION RATE: 16 BRPM | DIASTOLIC BLOOD PRESSURE: 66 MMHG | WEIGHT: 120 LBS | TEMPERATURE: 98.5 F

## 2024-07-11 DIAGNOSIS — K94.23 GASTROSTOMY TUBE DYSFUNCTION (HCC): Primary | ICD-10-CM

## 2024-07-11 PROCEDURE — 2709999900 HC NON-CHARGEABLE SUPPLY

## 2024-07-11 PROCEDURE — 74018 RADEX ABDOMEN 1 VIEW: CPT

## 2024-07-11 PROCEDURE — 6360000004 HC RX CONTRAST MEDICATION: Performed by: STUDENT IN AN ORGANIZED HEALTH CARE EDUCATION/TRAINING PROGRAM

## 2024-07-11 PROCEDURE — 99284 EMERGENCY DEPT VISIT MOD MDM: CPT

## 2024-07-11 PROCEDURE — 43762 RPLC GTUBE NO REVJ TRC: CPT

## 2024-07-11 RX ADMIN — DIATRIZOATE MEGLUMINE AND DIATRIZOATE SODIUM 30 ML: 600; 100 SOLUTION ORAL; RECTAL at 09:33

## 2024-07-11 NOTE — DISCHARGE INSTRUCTIONS
Call and follow-up with your family doctor in the next 1-3 days  Return to the ED if your symptoms worsen or you feel you need to be reevaluated

## 2024-07-11 NOTE — ED PROVIDER NOTES
Emergency Department Encounter        Pt Name: Jm Garcia  MRN: [id number]  Birthdate 1960  Date of evaluation: 7/11/2024  ED Physician: Colin Mckeon MD    CHIEF COMPLAINT     Triage Chief Complaint:   G Tube Complications (G tube pulled out at [facility]. Unsure what size. )      HISTORY OF PRESENT ILLNESS & REVIEW OF SYSTEMS     History obtained from the patient and staff.    Jm Garcia is a 63 y.o. female who presents to the emergency department for evaluation of G-tube complication.  History is limited due to patient's baseline mental status.  Reportedly had a G-tube that was pulled out today.  Nursing staff at facility placed a Alberts in place.  Otherwise denies any complaints and is at baseline.  Wears supplemental oxygen at baseline          The patient has no other acute complaints at this time.  Review of systems as above.          PAST MED/SURG/SOCIAL/FAM HISTORY & ALLERGY & MEDICATIONS     Past Medical History:   Diagnosis Date    Anxiety disorder     Back pain, chronic     Cerebral palsy (HCC)     COPD (chronic obstructive pulmonary disease) ([facility])     COVID-19 10/12/2021    CVA (cerebral infarction) 2014 x2    Diabetes mellitus ([facility])     Diverticulosis     Epilepsy ([facility])     GERD (gastroesophageal reflux disease)     Hyperlipidemia     Hypertension     Insomnia     Iron (Fe) deficiency anemia     Mental disability     Seizures ([facility])     Unspecified cerebral artery occlusion with cerebral infarction      Patient Active Problem List   Diagnosis Code    Pneumonia due to infectious organism J18.9    HTN (hypertension), benign I10    Seizure disorder ([facility]) G40.909    Altered mental status R41.82    Chest pain R07.9    MR (mental retardation) F79    COPD exacerbation ([facility]) J44.1    Left hemiplegia ([facility]) G81.94    H/O: stroke Z86.73    Mixed hyperlipidemia E78.2    Acquired hypothyroidism E03.9    Sepsis ([facility]) A41.9    Acute bronchitis J20.9    Acute respiratory failure with hypoxia ([facility]) J96.01

## 2024-07-14 ENCOUNTER — APPOINTMENT (OUTPATIENT)
Dept: GENERAL RADIOLOGY | Age: 64
DRG: 871 | End: 2024-07-14
Payer: MEDICARE

## 2024-07-14 ENCOUNTER — HOSPITAL ENCOUNTER (INPATIENT)
Age: 64
LOS: 13 days | Discharge: OTHER INSTITUTION | DRG: 871 | End: 2024-07-27
Attending: STUDENT IN AN ORGANIZED HEALTH CARE EDUCATION/TRAINING PROGRAM
Payer: MEDICARE

## 2024-07-14 ENCOUNTER — APPOINTMENT (OUTPATIENT)
Dept: CT IMAGING | Age: 64
DRG: 871 | End: 2024-07-14
Payer: MEDICARE

## 2024-07-14 DIAGNOSIS — J18.9 PNEUMONIA OF LEFT LUNG DUE TO INFECTIOUS ORGANISM, UNSPECIFIED PART OF LUNG: ICD-10-CM

## 2024-07-14 DIAGNOSIS — T17.500A MUCUS PLUGGING OF BRONCHI: Primary | ICD-10-CM

## 2024-07-14 DIAGNOSIS — J98.19 TRAPPED LUNG: ICD-10-CM

## 2024-07-14 DIAGNOSIS — J96.01 ACUTE HYPOXIC RESPIRATORY FAILURE (HCC): ICD-10-CM

## 2024-07-14 DIAGNOSIS — J86.9 EMPYEMA OF LEFT PLEURAL SPACE (HCC): ICD-10-CM

## 2024-07-14 LAB
ANION GAP SERPL CALCULATED.3IONS-SCNC: 11 MMOL/L (ref 7–16)
B PARAP IS1001 DNA NPH QL NAA+NON-PROBE: NOT DETECTED
B PERT.PT PRMT NPH QL NAA+NON-PROBE: NOT DETECTED
BASOPHILS ABSOLUTE: 0 K/CU MM
BASOPHILS ABSOLUTE: 0 K/CU MM
BASOPHILS RELATIVE PERCENT: 0.2 % (ref 0–1)
BASOPHILS RELATIVE PERCENT: 0.2 % (ref 0–1)
BUN SERPL-MCNC: 13 MG/DL (ref 6–23)
C PNEUM DNA NPH QL NAA+NON-PROBE: NOT DETECTED
CALCIUM SERPL-MCNC: 9.8 MG/DL (ref 8.3–10.6)
CHLORIDE BLD-SCNC: 92 MMOL/L (ref 99–110)
CO2: 29 MMOL/L (ref 21–32)
CREAT SERPL-MCNC: 0.2 MG/DL (ref 0.6–1.1)
DIFFERENTIAL TYPE: ABNORMAL
DIFFERENTIAL TYPE: ABNORMAL
EOSINOPHILS ABSOLUTE: 0 K/CU MM
EOSINOPHILS ABSOLUTE: 0 K/CU MM
EOSINOPHILS RELATIVE PERCENT: 0 % (ref 0–3)
EOSINOPHILS RELATIVE PERCENT: 0.1 % (ref 0–3)
ESTIMATED AVERAGE GLUCOSE: 123 MG/DL
FERRITIN: 495 NG/ML (ref 15–150)
FLUAV H1 2009 PAN RNA NPH NAA+NON-PROBE: NOT DETECTED
FLUAV H1 RNA NPH QL NAA+NON-PROBE: NOT DETECTED
FLUAV H3 RNA NPH QL NAA+NON-PROBE: NOT DETECTED
FLUAV RNA NPH QL NAA+NON-PROBE: NOT DETECTED
FLUBV RNA NPH QL NAA+NON-PROBE: NOT DETECTED
FOLATE SERPL-MCNC: 11.8 NG/ML (ref 3.1–17.5)
GFR, ESTIMATED: >90 ML/MIN/1.73M2
GLUCOSE BLD-MCNC: 194 MG/DL (ref 70–99)
GLUCOSE BLD-MCNC: 203 MG/DL (ref 70–99)
GLUCOSE SERPL-MCNC: 184 MG/DL (ref 70–99)
HADV DNA NPH QL NAA+NON-PROBE: NOT DETECTED
HBA1C MFR BLD: 5.9 % (ref 4.2–6.3)
HCOV 229E RNA NPH QL NAA+NON-PROBE: NOT DETECTED
HCOV HKU1 RNA NPH QL NAA+NON-PROBE: NOT DETECTED
HCOV NL63 RNA NPH QL NAA+NON-PROBE: NOT DETECTED
HCOV OC43 RNA NPH QL NAA+NON-PROBE: NOT DETECTED
HCT VFR BLD CALC: 24.2 % (ref 37–47)
HCT VFR BLD CALC: 25.5 % (ref 37–47)
HEMOGLOBIN: 6.6 GM/DL (ref 12.5–16)
HEMOGLOBIN: 7.2 GM/DL (ref 12.5–16)
HMPV RNA NPH QL NAA+NON-PROBE: NOT DETECTED
HPIV1 RNA NPH QL NAA+NON-PROBE: NOT DETECTED
HPIV2 RNA NPH QL NAA+NON-PROBE: NOT DETECTED
HPIV3 RNA NPH QL NAA+NON-PROBE: NOT DETECTED
HPIV4 RNA NPH QL NAA+NON-PROBE: NOT DETECTED
IMMATURE NEUTROPHIL %: 0.6 % (ref 0–0.43)
IMMATURE NEUTROPHIL %: 0.6 % (ref 0–0.43)
IRON: 12 UG/DL (ref 37–145)
LACTIC ACID, SEPSIS: 1.7 MMOL/L (ref 0.4–2)
LACTIC ACID, SEPSIS: 1.7 MMOL/L (ref 0.4–2)
LYMPHOCYTES ABSOLUTE: 1.1 K/CU MM
LYMPHOCYTES ABSOLUTE: 1.5 K/CU MM
LYMPHOCYTES RELATIVE PERCENT: 14 % (ref 24–44)
LYMPHOCYTES RELATIVE PERCENT: 9.2 % (ref 24–44)
M PNEUMO DNA NPH QL NAA+NON-PROBE: NOT DETECTED
MCH RBC QN AUTO: 26.2 PG (ref 27–31)
MCH RBC QN AUTO: 26.2 PG (ref 27–31)
MCHC RBC AUTO-ENTMCNC: 27.3 % (ref 32–36)
MCHC RBC AUTO-ENTMCNC: 28.2 % (ref 32–36)
MCV RBC AUTO: 92.7 FL (ref 78–100)
MCV RBC AUTO: 96 FL (ref 78–100)
MONOCYTES ABSOLUTE: 1.3 K/CU MM
MONOCYTES ABSOLUTE: 1.5 K/CU MM
MONOCYTES RELATIVE PERCENT: 12.1 % (ref 0–4)
MONOCYTES RELATIVE PERCENT: 12.6 % (ref 0–4)
NEUTROPHILS ABSOLUTE: 7.6 K/CU MM
NEUTROPHILS ABSOLUTE: 9 K/CU MM
NEUTROPHILS RELATIVE PERCENT: 73 % (ref 36–66)
NEUTROPHILS RELATIVE PERCENT: 77.4 % (ref 36–66)
NUCLEATED RBC %: 0 %
NUCLEATED RBC %: 0 %
PCT TRANSFERRIN: 9 % (ref 10–44)
PDW BLD-RTO: 16.7 % (ref 11.7–14.9)
PDW BLD-RTO: 16.9 % (ref 11.7–14.9)
PLATELET # BLD: 469 K/CU MM (ref 140–440)
PLATELET # BLD: 504 K/CU MM (ref 140–440)
PMV BLD AUTO: 9 FL (ref 7.5–11.1)
PMV BLD AUTO: 9 FL (ref 7.5–11.1)
POTASSIUM SERPL-SCNC: 4.7 MMOL/L (ref 3.5–5.1)
PROCALCITONIN SERPL-MCNC: 0.18 NG/ML
RBC # BLD: 2.52 M/CU MM (ref 4.2–5.4)
RBC # BLD: 2.75 M/CU MM (ref 4.2–5.4)
RETICULOCYTE COUNT PCT: 2.8 % (ref 0.2–2.2)
RSV RNA NPH QL NAA+NON-PROBE: NOT DETECTED
RV+EV RNA NPH QL NAA+NON-PROBE: NOT DETECTED
SARS-COV-2 RNA NPH QL NAA+NON-PROBE: NOT DETECTED
SODIUM BLD-SCNC: 132 MMOL/L (ref 135–145)
TOTAL IMMATURE NEUTOROPHIL: 0.06 K/CU MM
TOTAL IMMATURE NEUTOROPHIL: 0.07 K/CU MM
TOTAL IRON BINDING CAPACITY: 141 UG/DL (ref 250–450)
TOTAL NUCLEATED RBC: 0 K/CU MM
TOTAL NUCLEATED RBC: 0 K/CU MM
UNSATURATED IRON BINDING CAPACITY: 129 UG/DL (ref 110–370)
VITAMIN B-12: 1410 PG/ML (ref 211–911)
WBC # BLD: 10.4 K/CU MM (ref 4–10.5)
WBC # BLD: 11.6 K/CU MM (ref 4–10.5)

## 2024-07-14 PROCEDURE — 82607 VITAMIN B-12: CPT

## 2024-07-14 PROCEDURE — 83540 ASSAY OF IRON: CPT

## 2024-07-14 PROCEDURE — 2580000003 HC RX 258: Performed by: STUDENT IN AN ORGANIZED HEALTH CARE EDUCATION/TRAINING PROGRAM

## 2024-07-14 PROCEDURE — 86900 BLOOD TYPING SEROLOGIC ABO: CPT

## 2024-07-14 PROCEDURE — 82728 ASSAY OF FERRITIN: CPT

## 2024-07-14 PROCEDURE — 6370000000 HC RX 637 (ALT 250 FOR IP): Performed by: STUDENT IN AN ORGANIZED HEALTH CARE EDUCATION/TRAINING PROGRAM

## 2024-07-14 PROCEDURE — 36415 COLL VENOUS BLD VENIPUNCTURE: CPT

## 2024-07-14 PROCEDURE — 71250 CT THORAX DX C-: CPT

## 2024-07-14 PROCEDURE — 87641 MR-STAPH DNA AMP PROBE: CPT

## 2024-07-14 PROCEDURE — 85018 HEMOGLOBIN: CPT

## 2024-07-14 PROCEDURE — 85014 HEMATOCRIT: CPT

## 2024-07-14 PROCEDURE — 86922 COMPATIBILITY TEST ANTIGLOB: CPT

## 2024-07-14 PROCEDURE — 87040 BLOOD CULTURE FOR BACTERIA: CPT

## 2024-07-14 PROCEDURE — 94667 MNPJ CHEST WALL 1ST: CPT

## 2024-07-14 PROCEDURE — 82746 ASSAY OF FOLIC ACID SERUM: CPT

## 2024-07-14 PROCEDURE — 93005 ELECTROCARDIOGRAM TRACING: CPT | Performed by: STUDENT IN AN ORGANIZED HEALTH CARE EDUCATION/TRAINING PROGRAM

## 2024-07-14 PROCEDURE — 86850 RBC ANTIBODY SCREEN: CPT

## 2024-07-14 PROCEDURE — 83605 ASSAY OF LACTIC ACID: CPT

## 2024-07-14 PROCEDURE — 6360000002 HC RX W HCPCS: Performed by: STUDENT IN AN ORGANIZED HEALTH CARE EDUCATION/TRAINING PROGRAM

## 2024-07-14 PROCEDURE — 80048 BASIC METABOLIC PNL TOTAL CA: CPT

## 2024-07-14 PROCEDURE — 96365 THER/PROPH/DIAG IV INF INIT: CPT

## 2024-07-14 PROCEDURE — 94664 DEMO&/EVAL PT USE INHALER: CPT

## 2024-07-14 PROCEDURE — 83550 IRON BINDING TEST: CPT

## 2024-07-14 PROCEDURE — 85045 AUTOMATED RETICULOCYTE COUNT: CPT

## 2024-07-14 PROCEDURE — 94761 N-INVAS EAR/PLS OXIMETRY MLT: CPT

## 2024-07-14 PROCEDURE — 96368 THER/DIAG CONCURRENT INF: CPT

## 2024-07-14 PROCEDURE — 71045 X-RAY EXAM CHEST 1 VIEW: CPT

## 2024-07-14 PROCEDURE — 94668 MNPJ CHEST WALL SBSQ: CPT

## 2024-07-14 PROCEDURE — 94640 AIRWAY INHALATION TREATMENT: CPT

## 2024-07-14 PROCEDURE — 2500000003 HC RX 250 WO HCPCS: Performed by: STUDENT IN AN ORGANIZED HEALTH CARE EDUCATION/TRAINING PROGRAM

## 2024-07-14 PROCEDURE — 31720 CLEARANCE OF AIRWAYS: CPT

## 2024-07-14 PROCEDURE — 85025 COMPLETE CBC W/AUTO DIFF WBC: CPT

## 2024-07-14 PROCEDURE — 0202U NFCT DS 22 TRGT SARS-COV-2: CPT

## 2024-07-14 PROCEDURE — 83036 HEMOGLOBIN GLYCOSYLATED A1C: CPT

## 2024-07-14 PROCEDURE — 2140000000 HC CCU INTERMEDIATE R&B

## 2024-07-14 PROCEDURE — 86901 BLOOD TYPING SEROLOGIC RH(D): CPT

## 2024-07-14 PROCEDURE — 2700000000 HC OXYGEN THERAPY PER DAY

## 2024-07-14 PROCEDURE — 82962 GLUCOSE BLOOD TEST: CPT

## 2024-07-14 PROCEDURE — 84145 PROCALCITONIN (PCT): CPT

## 2024-07-14 PROCEDURE — 99291 CRITICAL CARE FIRST HOUR: CPT

## 2024-07-14 RX ORDER — DIVALPROEX SODIUM 125 MG/1
1000 CAPSULE, COATED PELLETS ORAL 2 TIMES DAILY
Status: DISCONTINUED | OUTPATIENT
Start: 2024-07-14 | End: 2024-07-27 | Stop reason: HOSPADM

## 2024-07-14 RX ORDER — 0.9 % SODIUM CHLORIDE 0.9 %
30 INTRAVENOUS SOLUTION INTRAVENOUS ONCE
Status: COMPLETED | OUTPATIENT
Start: 2024-07-14 | End: 2024-07-14

## 2024-07-14 RX ORDER — FERROUS SULFATE 325(65) MG
325 TABLET ORAL 2 TIMES DAILY
Status: DISCONTINUED | OUTPATIENT
Start: 2024-07-14 | End: 2024-07-27 | Stop reason: HOSPADM

## 2024-07-14 RX ORDER — LISINOPRIL 20 MG/1
40 TABLET ORAL DAILY
Status: DISCONTINUED | OUTPATIENT
Start: 2024-07-14 | End: 2024-07-27 | Stop reason: HOSPADM

## 2024-07-14 RX ORDER — ATORVASTATIN CALCIUM 40 MG/1
40 TABLET, FILM COATED ORAL DAILY
Status: DISCONTINUED | OUTPATIENT
Start: 2024-07-14 | End: 2024-07-27 | Stop reason: HOSPADM

## 2024-07-14 RX ORDER — SODIUM CHLORIDE 9 MG/ML
INJECTION, SOLUTION INTRAVENOUS PRN
Status: DISCONTINUED | OUTPATIENT
Start: 2024-07-14 | End: 2024-07-27 | Stop reason: HOSPADM

## 2024-07-14 RX ORDER — ACETAMINOPHEN 650 MG/1
650 SUPPOSITORY RECTAL EVERY 6 HOURS PRN
Status: DISCONTINUED | OUTPATIENT
Start: 2024-07-14 | End: 2024-07-27 | Stop reason: HOSPADM

## 2024-07-14 RX ORDER — MIRTAZAPINE 15 MG/1
15 TABLET, FILM COATED ORAL NIGHTLY
Status: DISCONTINUED | OUTPATIENT
Start: 2024-07-14 | End: 2024-07-27 | Stop reason: HOSPADM

## 2024-07-14 RX ORDER — GLUCAGON 1 MG/ML
1 KIT INJECTION PRN
Status: DISCONTINUED | OUTPATIENT
Start: 2024-07-14 | End: 2024-07-27 | Stop reason: HOSPADM

## 2024-07-14 RX ORDER — AMLODIPINE BESYLATE 10 MG/1
10 TABLET ORAL DAILY
Status: DISCONTINUED | OUTPATIENT
Start: 2024-07-14 | End: 2024-07-27 | Stop reason: HOSPADM

## 2024-07-14 RX ORDER — ALBUTEROL SULFATE 2.5 MG/.5ML
2.5 SOLUTION RESPIRATORY (INHALATION) EVERY 4 HOURS
Status: DISPENSED | OUTPATIENT
Start: 2024-07-14 | End: 2024-07-17

## 2024-07-14 RX ORDER — ENOXAPARIN SODIUM 100 MG/ML
40 INJECTION SUBCUTANEOUS DAILY
Status: DISCONTINUED | OUTPATIENT
Start: 2024-07-14 | End: 2024-07-27 | Stop reason: HOSPADM

## 2024-07-14 RX ORDER — POTASSIUM CHLORIDE 7.45 MG/ML
10 INJECTION INTRAVENOUS PRN
Status: DISCONTINUED | OUTPATIENT
Start: 2024-07-14 | End: 2024-07-27 | Stop reason: HOSPADM

## 2024-07-14 RX ORDER — SODIUM CHLORIDE 0.9 % (FLUSH) 0.9 %
5-40 SYRINGE (ML) INJECTION PRN
Status: DISCONTINUED | OUTPATIENT
Start: 2024-07-14 | End: 2024-07-27 | Stop reason: HOSPADM

## 2024-07-14 RX ORDER — ONDANSETRON 2 MG/ML
4 INJECTION INTRAMUSCULAR; INTRAVENOUS EVERY 6 HOURS PRN
Status: DISCONTINUED | OUTPATIENT
Start: 2024-07-14 | End: 2024-07-27 | Stop reason: HOSPADM

## 2024-07-14 RX ORDER — INSULIN LISPRO 100 [IU]/ML
0-4 INJECTION, SOLUTION INTRAVENOUS; SUBCUTANEOUS NIGHTLY
Status: DISCONTINUED | OUTPATIENT
Start: 2024-07-14 | End: 2024-07-26

## 2024-07-14 RX ORDER — SODIUM CHLORIDE 0.9 % (FLUSH) 0.9 %
5-40 SYRINGE (ML) INJECTION EVERY 12 HOURS SCHEDULED
Status: DISCONTINUED | OUTPATIENT
Start: 2024-07-14 | End: 2024-07-27 | Stop reason: HOSPADM

## 2024-07-14 RX ORDER — BUSPIRONE HYDROCHLORIDE 5 MG/1
10 TABLET ORAL 2 TIMES DAILY
Status: DISCONTINUED | OUTPATIENT
Start: 2024-07-14 | End: 2024-07-27 | Stop reason: HOSPADM

## 2024-07-14 RX ORDER — LACTOBACILLUS RHAMNOSUS GG 10B CELL
1 CAPSULE ORAL DAILY
Status: DISCONTINUED | OUTPATIENT
Start: 2024-07-14 | End: 2024-07-27 | Stop reason: HOSPADM

## 2024-07-14 RX ORDER — LEVETIRACETAM 100 MG/ML
1500 SOLUTION ORAL 2 TIMES DAILY
Status: DISCONTINUED | OUTPATIENT
Start: 2024-07-15 | End: 2024-07-27 | Stop reason: HOSPADM

## 2024-07-14 RX ORDER — DEXTROSE MONOHYDRATE 100 MG/ML
INJECTION, SOLUTION INTRAVENOUS CONTINUOUS PRN
Status: DISCONTINUED | OUTPATIENT
Start: 2024-07-14 | End: 2024-07-27 | Stop reason: HOSPADM

## 2024-07-14 RX ORDER — LEVETIRACETAM 500 MG/1
1500 TABLET ORAL 2 TIMES DAILY
Status: DISCONTINUED | OUTPATIENT
Start: 2024-07-14 | End: 2024-07-14

## 2024-07-14 RX ORDER — DIVALPROEX SODIUM 250 MG/1
1000 TABLET, DELAYED RELEASE ORAL
Status: DISCONTINUED | OUTPATIENT
Start: 2024-07-14 | End: 2024-07-14

## 2024-07-14 RX ORDER — MAGNESIUM SULFATE IN WATER 40 MG/ML
2000 INJECTION, SOLUTION INTRAVENOUS PRN
Status: DISCONTINUED | OUTPATIENT
Start: 2024-07-14 | End: 2024-07-27 | Stop reason: HOSPADM

## 2024-07-14 RX ORDER — CARBAMAZEPINE 100 MG/5ML
300 SUSPENSION ORAL 2 TIMES DAILY
Status: DISCONTINUED | OUTPATIENT
Start: 2024-07-14 | End: 2024-07-27 | Stop reason: HOSPADM

## 2024-07-14 RX ORDER — CARBAMAZEPINE 300 MG/1
300 CAPSULE, EXTENDED RELEASE ORAL 2 TIMES DAILY
Status: DISCONTINUED | OUTPATIENT
Start: 2024-07-14 | End: 2024-07-14

## 2024-07-14 RX ORDER — IPRATROPIUM BROMIDE AND ALBUTEROL SULFATE 2.5; .5 MG/3ML; MG/3ML
1 SOLUTION RESPIRATORY (INHALATION)
Status: DISCONTINUED | OUTPATIENT
Start: 2024-07-14 | End: 2024-07-16

## 2024-07-14 RX ORDER — ACETAMINOPHEN 325 MG/1
650 TABLET ORAL EVERY 6 HOURS PRN
Status: DISCONTINUED | OUTPATIENT
Start: 2024-07-14 | End: 2024-07-27 | Stop reason: HOSPADM

## 2024-07-14 RX ORDER — SODIUM CHLORIDE FOR INHALATION 3 %
4 VIAL, NEBULIZER (ML) INHALATION EVERY 4 HOURS
Status: DISPENSED | OUTPATIENT
Start: 2024-07-14 | End: 2024-07-17

## 2024-07-14 RX ORDER — ONDANSETRON 4 MG/1
4 TABLET, ORALLY DISINTEGRATING ORAL EVERY 8 HOURS PRN
Status: DISCONTINUED | OUTPATIENT
Start: 2024-07-14 | End: 2024-07-27 | Stop reason: HOSPADM

## 2024-07-14 RX ORDER — PANTOPRAZOLE SODIUM 40 MG/10ML
40 INJECTION, POWDER, LYOPHILIZED, FOR SOLUTION INTRAVENOUS DAILY
Status: DISCONTINUED | OUTPATIENT
Start: 2024-07-14 | End: 2024-07-27 | Stop reason: HOSPADM

## 2024-07-14 RX ORDER — INSULIN LISPRO 100 [IU]/ML
0-4 INJECTION, SOLUTION INTRAVENOUS; SUBCUTANEOUS
Status: DISCONTINUED | OUTPATIENT
Start: 2024-07-14 | End: 2024-07-26

## 2024-07-14 RX ORDER — FLUTICASONE PROPIONATE 50 MCG
2 SPRAY, SUSPENSION (ML) NASAL DAILY
Status: DISCONTINUED | OUTPATIENT
Start: 2024-07-14 | End: 2024-07-27 | Stop reason: HOSPADM

## 2024-07-14 RX ORDER — SODIUM CHLORIDE, SODIUM LACTATE, POTASSIUM CHLORIDE, CALCIUM CHLORIDE 600; 310; 30; 20 MG/100ML; MG/100ML; MG/100ML; MG/100ML
INJECTION, SOLUTION INTRAVENOUS CONTINUOUS
Status: DISPENSED | OUTPATIENT
Start: 2024-07-14 | End: 2024-07-15

## 2024-07-14 RX ORDER — POTASSIUM CHLORIDE 20 MEQ/1
40 TABLET, EXTENDED RELEASE ORAL PRN
Status: DISCONTINUED | OUTPATIENT
Start: 2024-07-14 | End: 2024-07-27 | Stop reason: HOSPADM

## 2024-07-14 RX ORDER — ALBUTEROL SULFATE 2.5 MG/3ML
2.5 SOLUTION RESPIRATORY (INHALATION) EVERY 4 HOURS
Status: DISCONTINUED | OUTPATIENT
Start: 2024-07-14 | End: 2024-07-14

## 2024-07-14 RX ORDER — POLYETHYLENE GLYCOL 3350 17 G/17G
17 POWDER, FOR SOLUTION ORAL DAILY PRN
Status: DISCONTINUED | OUTPATIENT
Start: 2024-07-14 | End: 2024-07-27 | Stop reason: HOSPADM

## 2024-07-14 RX ADMIN — CARBAMAZEPINE 300 MG: 100 SUSPENSION ORAL at 22:44

## 2024-07-14 RX ADMIN — Medication 4 ML: at 20:01

## 2024-07-14 RX ADMIN — SODIUM CHLORIDE 1413 ML: 9 INJECTION, SOLUTION INTRAVENOUS at 13:10

## 2024-07-14 RX ADMIN — FERROUS SULFATE TAB 325 MG (65 MG ELEMENTAL FE) 325 MG: 325 (65 FE) TAB at 22:43

## 2024-07-14 RX ADMIN — ALBUTEROL SULFATE 2.5 MG: 2.5 SOLUTION RESPIRATORY (INHALATION) at 16:05

## 2024-07-14 RX ADMIN — SODIUM CHLORIDE, POTASSIUM CHLORIDE, SODIUM LACTATE AND CALCIUM CHLORIDE: 600; 310; 30; 20 INJECTION, SOLUTION INTRAVENOUS at 16:50

## 2024-07-14 RX ADMIN — MIRTAZAPINE 15 MG: 15 TABLET, FILM COATED ORAL at 22:44

## 2024-07-14 RX ADMIN — PIPERACILLIN AND TAZOBACTAM 4500 MG: 4; .5 INJECTION, POWDER, FOR SOLUTION INTRAVENOUS at 13:16

## 2024-07-14 RX ADMIN — Medication 4 ML: at 16:04

## 2024-07-14 RX ADMIN — ALBUTEROL SULFATE 2.5 MG: 2.5 SOLUTION RESPIRATORY (INHALATION) at 23:06

## 2024-07-14 RX ADMIN — Medication 4 ML: at 23:07

## 2024-07-14 RX ADMIN — DIVALPROEX SODIUM 1000 MG: 125 CAPSULE, COATED PELLETS ORAL at 23:39

## 2024-07-14 RX ADMIN — DOXYCYCLINE 100 MG: 100 INJECTION, POWDER, LYOPHILIZED, FOR SOLUTION INTRAVENOUS at 13:22

## 2024-07-14 RX ADMIN — BUSPIRONE HYDROCHLORIDE 10 MG: 5 TABLET ORAL at 22:44

## 2024-07-14 RX ADMIN — LEVETIRACETAM 1500 MG: 500 TABLET, FILM COATED ORAL at 22:42

## 2024-07-14 RX ADMIN — SODIUM CHLORIDE 1250 MG: 9 INJECTION, SOLUTION INTRAVENOUS at 14:38

## 2024-07-14 RX ADMIN — ALBUTEROL SULFATE 2.5 MG: 2.5 SOLUTION RESPIRATORY (INHALATION) at 20:00

## 2024-07-14 NOTE — H&P
not able to restart nursing home for collateral information.  Vital signs in the ED with tachycardia in low 100s, MAP 64 mmHg, patient saturating 100% on 2 L oxygen, labs with lactate 1.7, Pro-Js 0.181, no leukocytosis, hemoglobin 7.2 from 9's,  Platelet count 504 K, respiratory viral panel unremarkable, chest x-ray with Probable large left sided pneumonia, underlying mass not excluded.  Chest with 'No significant elevated lung parenchyma appreciated on the left side. Absence of contrast limits evaluation however the findings are concerning for underlying  empyema formation.  Renal care consulted from the ED and ordered nebs/recommended aggressive pulmonary toilet and admission to medicine service with possible bronchoscopy while inpatient.  Patient received Vanco/Zosyn and doxycycline, 30 mill per KG IV NS bolus and is currently being admitted to stepdown unit for further care.     Review of Systems:        Pertinent positives and negatives discussed in HPI     Objective:   No intake or output data in the 24 hours ending 07/14/24 1414   Vitals:   Vitals:    07/14/24 1228 07/14/24 1317 07/14/24 1325 07/14/24 1332   BP:  120/65 112/65 (!) 111/47   Pulse: 94 96 99 (!) 102   Resp: 29 (!) 31 24 25   Temp:       TempSrc:       SpO2: 100% 100%  100%   Weight:       Height:           Medications Prior to Admission     Prior to Admission medications    Medication Sig Start Date End Date Taking? Authorizing Provider   JANUVIA 50 MG tablet Take 1 tablet by mouth daily 4/15/24   Lovely Akers MD   mirtazapine (REMERON) 15 MG tablet Take 1 tablet by mouth nightly at bedtime 1/24/24   Lovely Akers MD   meloxicam (MOBIC) 15 MG tablet Take 1 tablet by mouth daily 4/15/24   Lovely Akers MD   COMBIVENT RESPIMAT  MCG/ACT AERS inhaler INHALE 1 PUFF BY ORAL INHALATION EVERY 6 HOURS 3/15/24   Ross Harman MD   ipratropium 0.5 mg-albuterol 2.5 mg (DUONEB) 0.5-2.5 (3) MG/3ML SOLN nebulizer solution  Lived in the Last Year: 1     Unstable Housing in the Last Year: No       Medications:   Medications:    sodium chloride (Inhalant)  4 mL Nebulization Q4H    vancomycin  25 mg/kg IntraVENous Once    doxycycline (VIBRAMYCIN) IV  100 mg IntraVENous Once    albuterol  2.5 mg Nebulization Q4H    sodium chloride flush  5-40 mL IntraVENous 2 times per day    [Held by provider] enoxaparin  40 mg SubCUTAneous Daily    piperacillin-tazobactam  3,375 mg IntraVENous Q8H    pantoprazole  40 mg IntraVENous Daily      Infusions:    sodium chloride      lactated ringers IV soln       PRN Meds: sodium chloride flush, 5-40 mL, PRN  sodium chloride, , PRN  potassium chloride, 40 mEq, PRN   Or  potassium alternative oral replacement, 40 mEq, PRN   Or  potassium chloride, 10 mEq, PRN  magnesium sulfate, 2,000 mg, PRN  ondansetron, 4 mg, Q8H PRN   Or  ondansetron, 4 mg, Q6H PRN  polyethylene glycol, 17 g, Daily PRN  acetaminophen, 650 mg, Q6H PRN   Or  acetaminophen, 650 mg, Q6H PRN        Labs      CBC:   Recent Labs     07/14/24  1225   WBC 10.4   HGB 7.2*   *     BMP:    Recent Labs     07/14/24  1225   *   K 4.7   CL 92*   CO2 29   BUN 13   CREATININE 0.2*   GLUCOSE 184*     Hepatic: No results for input(s): \"AST\", \"ALT\", \"BILITOT\", \"ALKPHOS\" in the last 72 hours.    Invalid input(s): \"ALB\"  Lipids:   Lab Results   Component Value Date/Time    CHOL 127 02/29/2024 06:16 AM    HDL 42 02/29/2024 06:16 AM    TRIG 146 02/29/2024 06:16 AM     Hemoglobin A1C:   Lab Results   Component Value Date/Time    LABA1C 5.2 02/29/2024 06:16 AM     TSH: No results found for: \"TSH\"  Troponin:   Lab Results   Component Value Date/Time    TROPONINT <0.010 02/02/2023 12:30 PM    TROPONINT <0.010 12/27/2022 09:33 AM    TROPONINT <0.010 12/19/2022 03:30 PM     Lactic Acid: No results for input(s): \"LACTA\" in the last 72 hours.  BNP: No results for input(s): \"PROBNP\" in the last 72 hours.  UA:  Lab Results   Component Value Date/Time    NITRU

## 2024-07-14 NOTE — ED PROVIDER NOTES
Emergency Department Encounter        Pt Name: Jm Garcia  MRN: [id number]  Birthdate 1960  Date of evaluation: 7/14/2024  ED Physician: Colin Mckeon MD    CHIEF COMPLAINT     Triage Chief Complaint:   Shortness of Breath (Short of breath, possible collapsed lung, PRN O2 3L )      HISTORY OF PRESENT ILLNESS & REVIEW OF SYSTEMS     History obtained from the patient and staff.    Jm Garcia is a 63 y.o. female who presents to the emergency department for evaluation of shortness breath.  History is limited.  Patient generally has MRDD and is at baseline mental status.  Says that she got her tube feeds and there was concern for potential aspiration so a nursing home chest x-ray and HTN for pneumothorax.  Says she normally wears supplemental oxygen 2-3 L sometimes as needed.            The patient has no other acute complaints at this time.  Review of systems as above.          PAST MED/SURG/SOCIAL/FAM HISTORY & ALLERGY & MEDICATIONS     Past Medical History:   Diagnosis Date    Anxiety disorder     Back pain, chronic     Cerebral palsy (HCC)     COPD (chronic obstructive pulmonary disease) ([facility])     COVID-19 10/12/2021    CVA (cerebral infarction) 2014 x2    Diabetes mellitus (HCC)     Diverticulosis     Epilepsy ([facility])     GERD (gastroesophageal reflux disease)     Hyperlipidemia     Hypertension     Insomnia     Iron (Fe) deficiency anemia     Mental disability     Seizures ([facility])     Unspecified cerebral artery occlusion with cerebral infarction      Patient Active Problem List   Diagnosis Code    Pneumonia due to infectious organism J18.9    HTN (hypertension), benign I10    Seizure disorder ([facility]) G40.909    Altered mental status R41.82    Chest pain R07.9    MR (mental retardation) F79    COPD exacerbation ([facility]) J44.1    Left hemiplegia ([facility]) G81.94    H/O: stroke Z86.73    Mixed hyperlipidemia E78.2    Acquired hypothyroidism E03.9    Sepsis ([facility]) A41.9    Acute bronchitis J20.9    Acute respiratory    Probable large left sided pneumonia, underlying mass not excluded. CT chest with   contrast recommended         Electronically signed by Pj Steve          LABS: (none if blank)  Labs Reviewed   CBC WITH AUTO DIFFERENTIAL - Abnormal; Notable for the following components:       Result Value    RBC 2.75 (*)     Hemoglobin 7.2 (*)     Hematocrit 25.5 (*)     MCH 26.2 (*)     MCHC 28.2 (*)     RDW 16.9 (*)     Platelets 504 (*)     Neutrophils % 73.0 (*)     Lymphocytes % 14.0 (*)     Monocytes % 12.1 (*)     Immature Neutrophil % 0.6 (*)     All other components within normal limits   BASIC METABOLIC PANEL - Abnormal; Notable for the following components:    Sodium 132 (*)     Chloride 92 (*)     Glucose 184 (*)     Creatinine 0.2 (*)     All other components within normal limits   CULTURE, BLOOD 1   CULTURE, BLOOD 1   RESPIRATORY PANEL, MOLECULAR, WITH COVID-19   LACTATE, SEPSIS   LACTATE, SEPSIS   PROCALCITONIN   ANEMIA PANEL   OCCULT BLOOD X 3, STOOL   HEMOGLOBIN AND HEMATOCRIT   HEMOGLOBIN AND HEMATOCRIT       FINAL IMPRESSION      Final diagnoses:   Mucus plugging of bronchi   Pneumonia of left lung due to infectious organism, unspecified part of lung   Acute hypoxic respiratory failure (HCC)     Condition: stable  Dispo: Admission      This transcription was electronically signed. Parts of this transcriptions may have been dictated by use of voice recognition software and electronically transcribed, and parts may have been transcribed with the assistance of an ED scribe and may contain errors related to that system including errors in grammar, punctuation, and spelling, as well as words and phrases that may be inappropriate.  The transcription may contain errors not detected in proofreading.  Efforts were made to edit the dictations.    Electronically Signed: Colin Mckeon MD, 07/14/24, 2:14 PM    I am the Primary Clinician of Record.      Clinical Impression:  1. Mucus plugging of bronchi    2. Pneumonia

## 2024-07-14 NOTE — PROGRESS NOTES
PHARMACY VANCOMYCIN MONITORING SERVICE  Pharmacy consulted by Dr. Mo Catalan MD for monitoring and adjustment.    Indication for treatment: Vancomycin indication: Bacteremia  Goal trough: Trough Goal: 15-20 mcg/mL  AUC/MAGALIE: 400-600    Risk Factors for MRSA Identified:   Hospitalization within the past 90 days, Received IV antibiotics within the past 90 days    Pertinent Laboratory Values:   Temp Readings from Last 3 Encounters:   07/14/24 98.1 °F (36.7 °C) (Oral)   07/11/24 98.5 °F (36.9 °C) (Axillary)   05/10/24 98.4 °F (36.9 °C) (Oral)     Recent Labs     07/14/24  1225   WBC 10.4     Recent Labs     07/14/24  1225   BUN 13   CREATININE 0.2*     Estimated Creatinine Clearance: 220 mL/min (A) (based on SCr of 0.2 mg/dL (L)).  No intake or output data in the 24 hours ending 07/14/24 1420  Last Encounter Weight:  Wt Readings from Last 3 Encounters:   07/14/24 54.4 kg (120 lb)   07/11/24 54.4 kg (120 lb)   05/07/24 52.1 kg (114 lb 13.8 oz)       Pertinent Cultures:   Date    Source    Results  07/14   Blood    Sent  07/14   Sputum/Respiratory  Panel Negative  07/14   MRSA Nasal   Ordered    Vancomycin level:   TROUGH:  No results for input(s): \"VANCOTROUGH\" in the last 72 hours.  RANDOM:  No results for input(s): \"VANCORANDOM\" in the last 72 hours.    Assessment:  HPI: Jm Garcia is a 63 y.o. female with a pmh of MRDD, cerebral palsy, COPD on 2 L home oxygen, type 2 diabetes mellitus, hypothyroidism, iron deficiency anemia, HLD, anxiety/depression, seizure disorder, essential hypertension, GERD who presents with Acute hypoxemic respiratory failure (HCC)   SCr, BUN, and urine output:   Scr 0.2 mg/dL today, baseline  BUN WNL  Day(s) of therapy: 1  Vancomycin concentration: TBD    Plan:  Pt given a vancomycin 25 mg/kg loading dose in the ED. Will start vancomycin 1000 mg (18 mg/kg) q12hr.  Will check a level in 48 hours.  Pharmacy will continue to monitor patient and adjust therapy as indicated    VANCOMYCIN  CONCENTRATION SCHEDULED FOR 07/16 @ 1200    Thank you for the consult.  Blanka Kumar RPH  7/14/2024 2:20 PM

## 2024-07-14 NOTE — ED NOTES
ED TO INPATIENT SBAR HANDOFF    Patient Name: Jm Garcia   :  1960  63 y.o.   Preferred Name  Jm Garcia  Family/Caregiver Present no   Restraints no   C-SSRS:    Sitter no   Sepsis Risk Score        Situation  Chief Complaint   Patient presents with    Shortness of Breath     Short of breath, possible collapsed lung, PRN O2 3L      Brief Description of Patient's Condition:   Pt to ER via EMS with c/o of shortness of breath. Nursing home states patient has a possible collapsed lung. Patient has a hs of MRDD and mentation is at baseline. Nursing home states she got her tube feeds and there was concern for aspiration. Pt placed on 2 liters nasal cannula.   Mental Status: disoriented and patient is baseline.   Arrived from: nursing home    Imaging:   CT CHEST WO CONTRAST   Final Result   No significant elevated lung parenchyma appreciated on the left side. Absence of   contrast limits evaluation however the findings are concerning for underlying   empyema formation.            Electronically signed by [employer]      XR CHEST PORTABLE   Final Result   Probable large left sided pneumonia, underlying mass not excluded. CT chest with   contrast recommended         Electronically signed by [employer]        Abnormal labs:   Abnormal Labs Reviewed   CBC WITH AUTO DIFFERENTIAL - Abnormal; Notable for the following components:       Result Value    RBC 2.75 (*)     Hemoglobin 7.2 (*)     Hematocrit 25.5 (*)     MCH 26.2 (*)     MCHC 28.2 (*)     RDW 16.9 (*)     Platelets 504 (*)     Neutrophils % 73.0 (*)     Lymphocytes % 14.0 (*)     Monocytes % 12.1 (*)     Immature Neutrophil % 0.6 (*)     All other components within normal limits   BASIC METABOLIC PANEL - Abnormal; Notable for the following components:    Sodium 132 (*)     Chloride 92 (*)     Glucose 184 (*)     Creatinine 0.2 (*)     All other components within normal limits        Background  History:   Past Medical History:   Diagnosis Date    Anxiety

## 2024-07-14 NOTE — ED TRIAGE NOTES
Pt arrived to trauma 4, via EMS. Nursing home states patient has a possible collapsed lung. Pt has hx of MRDD and mentation is at baseline. Dr. Mckeon notified and at bedside.

## 2024-07-14 NOTE — ED NOTES
Depends changed and patient repositioned in bed for comfort. Remains on cardiac monitor. Resps even and unlabored.

## 2024-07-14 NOTE — PROGRESS NOTES
07/14/24 1605   Treatment   Treatment Type Vest   $Bronchial Hygiene $Initial P&PD   Oxygen Therapy/Pulse Ox   Pulse (!) 106   Respirations 30   SpO2 100 %   RT Vest   Vest Hertz 7   Vest Pressure 5   Cough/Sputum   Sputum How Obtained Suctioned   $Obtained Sample $Nasotracheal Suction   Cough Productive   Sputum Amount Large   Sputum Color Yellow   Tenacity Thick   Patient Observation   Observations 14 fr coude suction catheter used   RT Misc Charges   $RT Education $HHN/MDI/IPPB Demo or Eval     Vest therapy started with no complications.  Patient tolerated fairly

## 2024-07-14 NOTE — PROGRESS NOTES
4 Eyes Skin Assessment     NAME:  Jm Garcia  YOB: 1960  MEDICAL RECORD NUMBER:  [id number]    The patient is being assessed for  Admission    I agree that at least one RN has performed a thorough Head to Toe Skin Assessment on the patient. ALL assessment sites listed below have been assessed.      Areas assessed by both nurses:    Head, Face, Ears, Shoulders, Back, Chest, Arms, Elbows, Hands, Sacrum. Buttock, Coccyx, Ischium, Legs. Feet and Heels, and Under Medical Devices         Does the Patient have a Wound? Yes wound(s) were present on assessment. LDA wound assessment was Initiated and completed by RN       Heladio Prevention initiated by RN: Yes  Wound Care Orders initiated by RN: Yes    Pressure Injury (Stage 3,4, Unstageable, DTI, NWPT, and Complex wounds) if present, place Wound referral order by RN under : Yes    New Ostomies, if present place, Ostomy referral order under : No     Nurse 1 eSignature: Electronically signed by Nickie Elizalde RN on 7/14/24 at 3:49 PM EDT    **SHARE this note so that the co-signing nurse can place an eSignature**    Nurse 2 eSignature: Electronically signed by Tahmina Gonzalez RN on 7/14/24 at 4:59 PM EDT

## 2024-07-15 ENCOUNTER — APPOINTMENT (OUTPATIENT)
Dept: INTERVENTIONAL RADIOLOGY/VASCULAR | Age: 64
DRG: 871 | End: 2024-07-15
Attending: STUDENT IN AN ORGANIZED HEALTH CARE EDUCATION/TRAINING PROGRAM
Payer: MEDICARE

## 2024-07-15 ENCOUNTER — APPOINTMENT (OUTPATIENT)
Dept: CT IMAGING | Age: 64
DRG: 871 | End: 2024-07-15
Payer: MEDICARE

## 2024-07-15 LAB
ALBUMIN SERPL-MCNC: 2.1 GM/DL (ref 3.4–5)
ALP BLD-CCNC: 76 IU/L (ref 40–128)
ALT SERPL-CCNC: 9 U/L (ref 10–40)
ANION GAP SERPL CALCULATED.3IONS-SCNC: 10 MMOL/L (ref 7–16)
AST SERPL-CCNC: 9 IU/L (ref 15–37)
BASOPHILS ABSOLUTE: 0 K/CU MM
BASOPHILS RELATIVE PERCENT: 0.2 % (ref 0–1)
BILIRUB SERPL-MCNC: 0.2 MG/DL (ref 0–1)
BUN SERPL-MCNC: 10 MG/DL (ref 6–23)
CALCIUM SERPL-MCNC: 9.3 MG/DL (ref 8.3–10.6)
CHLORIDE BLD-SCNC: 99 MMOL/L (ref 99–110)
CO2: 27 MMOL/L (ref 21–32)
CREAT SERPL-MCNC: 0.3 MG/DL (ref 0.6–1.1)
DIFFERENTIAL TYPE: ABNORMAL
EOSINOPHILS ABSOLUTE: 0 K/CU MM
EOSINOPHILS RELATIVE PERCENT: 0.1 % (ref 0–3)
FLUID TYPE: ABNORMAL INDEX
GFR, ESTIMATED: >90 ML/MIN/1.73M2
GLUCOSE BLD-MCNC: 134 MG/DL (ref 70–99)
GLUCOSE BLD-MCNC: 157 MG/DL (ref 70–99)
GLUCOSE BLD-MCNC: 177 MG/DL (ref 70–99)
GLUCOSE BLD-MCNC: 211 MG/DL (ref 70–99)
GLUCOSE SERPL-MCNC: 201 MG/DL (ref 70–99)
GLUCOSE, FLUID: 237 MG/DL
HCT VFR BLD CALC: 26.3 % (ref 37–47)
HCT VFR BLD CALC: 31.2 % (ref 37–47)
HEMOGLOBIN: 7.9 GM/DL (ref 12.5–16)
HEMOGLOBIN: 9 GM/DL (ref 12.5–16)
IMMATURE NEUTROPHIL %: 1 % (ref 0–0.43)
LACTATE DEHYDROGENASE, FLUID: 684 IU/L
LYMPHOCYTES ABSOLUTE: 1.1 K/CU MM
LYMPHOCYTES RELATIVE PERCENT: 10.9 % (ref 24–44)
LYMPHOCYTES, BODY FLUID: ABNORMAL %
MAGNESIUM: 1.9 MG/DL (ref 1.8–2.4)
MCH RBC QN AUTO: 26 PG (ref 27–31)
MCHC RBC AUTO-ENTMCNC: 30 % (ref 32–36)
MCV RBC AUTO: 86.5 FL (ref 78–100)
MESOTHELIAL FLUID: ABNORMAL /100 WBC
MONOCYTE, FLUID: ABNORMAL %
MONOCYTES ABSOLUTE: 1.3 K/CU MM
MONOCYTES RELATIVE PERCENT: 13.2 % (ref 0–4)
MRSA, DNA, NASAL: NEGATIVE
NEUTROPHIL, FLUID: ABNORMAL %
NEUTROPHILS ABSOLUTE: 7.6 K/CU MM
NEUTROPHILS RELATIVE PERCENT: 74.6 % (ref 36–66)
NUCLEATED RBC %: 0 %
PDW BLD-RTO: 19.9 % (ref 11.7–14.9)
PHOSPHORUS: 3.7 MG/DL (ref 2.5–4.9)
PLATELET # BLD: 451 K/CU MM (ref 140–440)
PMV BLD AUTO: 8.9 FL (ref 7.5–11.1)
POTASSIUM SERPL-SCNC: 4.4 MMOL/L (ref 3.5–5.1)
PROTEIN FLUID: 1.6 G/DL
RBC # BLD: 3.04 M/CU MM (ref 4.2–5.4)
RBC FLUID: ABNORMAL /CU MM
SODIUM BLD-SCNC: 136 MMOL/L (ref 135–145)
SPECIMEN DESCRIPTION: NORMAL
TOTAL IMMATURE NEUTOROPHIL: 0.1 K/CU MM
TOTAL NUCLEATED RBC: 0 K/CU MM
TOTAL PROTEIN: 6 GM/DL (ref 6.4–8.2)
WBC # BLD: 10.2 K/CU MM (ref 4–10.5)
WBC FLUID: ABNORMAL /CU MM

## 2024-07-15 PROCEDURE — 32557 INSERT CATH PLEURA W/ IMAGE: CPT | Performed by: RADIOLOGY

## 2024-07-15 PROCEDURE — 80053 COMPREHEN METABOLIC PANEL: CPT

## 2024-07-15 PROCEDURE — 94669 MECHANICAL CHEST WALL OSCILL: CPT

## 2024-07-15 PROCEDURE — 99223 1ST HOSP IP/OBS HIGH 75: CPT | Performed by: INTERNAL MEDICINE

## 2024-07-15 PROCEDURE — 6360000002 HC RX W HCPCS: Performed by: STUDENT IN AN ORGANIZED HEALTH CARE EDUCATION/TRAINING PROGRAM

## 2024-07-15 PROCEDURE — 2580000003 HC RX 258: Performed by: STUDENT IN AN ORGANIZED HEALTH CARE EDUCATION/TRAINING PROGRAM

## 2024-07-15 PROCEDURE — P9016 RBC LEUKOCYTES REDUCED: HCPCS

## 2024-07-15 PROCEDURE — 83735 ASSAY OF MAGNESIUM: CPT

## 2024-07-15 PROCEDURE — 94761 N-INVAS EAR/PLS OXIMETRY MLT: CPT

## 2024-07-15 PROCEDURE — 94640 AIRWAY INHALATION TREATMENT: CPT

## 2024-07-15 PROCEDURE — 76937 US GUIDE VASCULAR ACCESS: CPT

## 2024-07-15 PROCEDURE — 36430 TRANSFUSION BLD/BLD COMPNT: CPT

## 2024-07-15 PROCEDURE — 89051 BODY FLUID CELL COUNT: CPT

## 2024-07-15 PROCEDURE — 36410 VNPNXR 3YR/> PHY/QHP DX/THER: CPT

## 2024-07-15 PROCEDURE — 84157 ASSAY OF PROTEIN OTHER: CPT

## 2024-07-15 PROCEDURE — 85025 COMPLETE CBC W/AUTO DIFF WBC: CPT

## 2024-07-15 PROCEDURE — 6360000002 HC RX W HCPCS: Performed by: INTERNAL MEDICINE

## 2024-07-15 PROCEDURE — 6360000002 HC RX W HCPCS: Performed by: RADIOLOGY

## 2024-07-15 PROCEDURE — 05HB33Z INSERTION OF INFUSION DEVICE INTO RIGHT BASILIC VEIN, PERCUTANEOUS APPROACH: ICD-10-PCS | Performed by: STUDENT IN AN ORGANIZED HEALTH CARE EDUCATION/TRAINING PROGRAM

## 2024-07-15 PROCEDURE — 84100 ASSAY OF PHOSPHORUS: CPT

## 2024-07-15 PROCEDURE — 0W9B30Z DRAINAGE OF LEFT PLEURAL CAVITY WITH DRAINAGE DEVICE, PERCUTANEOUS APPROACH: ICD-10-PCS | Performed by: STUDENT IN AN ORGANIZED HEALTH CARE EDUCATION/TRAINING PROGRAM

## 2024-07-15 PROCEDURE — 36415 COLL VENOUS BLD VENIPUNCTURE: CPT

## 2024-07-15 PROCEDURE — 85014 HEMATOCRIT: CPT

## 2024-07-15 PROCEDURE — 2709999900 CT GUIDED CHEST TUBE

## 2024-07-15 PROCEDURE — 82945 GLUCOSE OTHER FLUID: CPT

## 2024-07-15 PROCEDURE — 82962 GLUCOSE BLOOD TEST: CPT

## 2024-07-15 PROCEDURE — 2140000000 HC CCU INTERMEDIATE R&B

## 2024-07-15 PROCEDURE — 83615 LACTATE (LD) (LDH) ENZYME: CPT

## 2024-07-15 PROCEDURE — 6370000000 HC RX 637 (ALT 250 FOR IP): Performed by: STUDENT IN AN ORGANIZED HEALTH CARE EDUCATION/TRAINING PROGRAM

## 2024-07-15 PROCEDURE — 85018 HEMOGLOBIN: CPT

## 2024-07-15 RX ORDER — MORPHINE SULFATE 2 MG/ML
1 INJECTION, SOLUTION INTRAMUSCULAR; INTRAVENOUS EVERY 4 HOURS PRN
Status: DISCONTINUED | OUTPATIENT
Start: 2024-07-15 | End: 2024-07-27 | Stop reason: HOSPADM

## 2024-07-15 RX ORDER — MIDAZOLAM HYDROCHLORIDE 2 MG/2ML
INJECTION, SOLUTION INTRAMUSCULAR; INTRAVENOUS PRN
Status: COMPLETED | OUTPATIENT
Start: 2024-07-15 | End: 2024-07-15

## 2024-07-15 RX ORDER — FENTANYL CITRATE 50 UG/ML
INJECTION, SOLUTION INTRAMUSCULAR; INTRAVENOUS PRN
Status: COMPLETED | OUTPATIENT
Start: 2024-07-15 | End: 2024-07-15

## 2024-07-15 RX ADMIN — DIVALPROEX SODIUM 1000 MG: 125 CAPSULE, COATED PELLETS ORAL at 21:54

## 2024-07-15 RX ADMIN — ALBUTEROL SULFATE 2.5 MG: 2.5 SOLUTION RESPIRATORY (INHALATION) at 03:03

## 2024-07-15 RX ADMIN — ALBUTEROL SULFATE 2.5 MG: 2.5 SOLUTION RESPIRATORY (INHALATION) at 15:53

## 2024-07-15 RX ADMIN — Medication 4 ML: at 07:35

## 2024-07-15 RX ADMIN — VANCOMYCIN HYDROCHLORIDE 1000 MG: 1 INJECTION, POWDER, LYOPHILIZED, FOR SOLUTION INTRAVENOUS at 03:36

## 2024-07-15 RX ADMIN — MIDAZOLAM HYDROCHLORIDE 1 MG: 1 INJECTION, SOLUTION INTRAMUSCULAR; INTRAVENOUS at 11:56

## 2024-07-15 RX ADMIN — BUSPIRONE HYDROCHLORIDE 10 MG: 5 TABLET ORAL at 08:09

## 2024-07-15 RX ADMIN — ALBUTEROL SULFATE 2.5 MG: 2.5 SOLUTION RESPIRATORY (INHALATION) at 21:00

## 2024-07-15 RX ADMIN — FERROUS SULFATE TAB 325 MG (65 MG ELEMENTAL FE) 325 MG: 325 (65 FE) TAB at 08:09

## 2024-07-15 RX ADMIN — FERROUS SULFATE TAB 325 MG (65 MG ELEMENTAL FE) 325 MG: 325 (65 FE) TAB at 20:17

## 2024-07-15 RX ADMIN — Medication 1 CAPSULE: at 08:09

## 2024-07-15 RX ADMIN — SODIUM CHLORIDE, PRESERVATIVE FREE 10 ML: 5 INJECTION INTRAVENOUS at 08:09

## 2024-07-15 RX ADMIN — MORPHINE SULFATE 1 MG: 2 INJECTION, SOLUTION INTRAMUSCULAR; INTRAVENOUS at 17:50

## 2024-07-15 RX ADMIN — PIPERACILLIN AND TAZOBACTAM 3375 MG: 3; .375 INJECTION, POWDER, LYOPHILIZED, FOR SOLUTION INTRAVENOUS at 04:45

## 2024-07-15 RX ADMIN — PIPERACILLIN AND TAZOBACTAM 3375 MG: 3; .375 INJECTION, POWDER, LYOPHILIZED, FOR SOLUTION INTRAVENOUS at 12:53

## 2024-07-15 RX ADMIN — DIVALPROEX SODIUM 1000 MG: 125 CAPSULE, COATED PELLETS ORAL at 08:07

## 2024-07-15 RX ADMIN — MIRTAZAPINE 15 MG: 15 TABLET, FILM COATED ORAL at 20:18

## 2024-07-15 RX ADMIN — PANTOPRAZOLE SODIUM 40 MG: 40 INJECTION, POWDER, FOR SOLUTION INTRAVENOUS at 08:07

## 2024-07-15 RX ADMIN — SODIUM CHLORIDE: 9 INJECTION, SOLUTION INTRAVENOUS at 04:44

## 2024-07-15 RX ADMIN — LEVETIRACETAM 1500 MG: 100 SOLUTION ORAL at 20:17

## 2024-07-15 RX ADMIN — FLUTICASONE PROPIONATE 2 SPRAY: 50 SPRAY, METERED NASAL at 08:07

## 2024-07-15 RX ADMIN — MORPHINE SULFATE 1 MG: 2 INJECTION, SOLUTION INTRAMUSCULAR; INTRAVENOUS at 22:00

## 2024-07-15 RX ADMIN — BUSPIRONE HYDROCHLORIDE 10 MG: 5 TABLET ORAL at 20:17

## 2024-07-15 RX ADMIN — ATORVASTATIN CALCIUM 40 MG: 40 TABLET, FILM COATED ORAL at 08:09

## 2024-07-15 RX ADMIN — CARBAMAZEPINE 300 MG: 100 SUSPENSION ORAL at 20:17

## 2024-07-15 RX ADMIN — VANCOMYCIN HYDROCHLORIDE 1000 MG: 1 INJECTION, POWDER, LYOPHILIZED, FOR SOLUTION INTRAVENOUS at 15:29

## 2024-07-15 RX ADMIN — Medication 4 ML: at 03:03

## 2024-07-15 RX ADMIN — FENTANYL CITRATE 50 MCG: 50 INJECTION, SOLUTION INTRAMUSCULAR; INTRAVENOUS at 11:55

## 2024-07-15 RX ADMIN — Medication 4 ML: at 21:05

## 2024-07-15 RX ADMIN — Medication 4 ML: at 15:54

## 2024-07-15 RX ADMIN — LEVETIRACETAM 1500 MG: 100 SOLUTION ORAL at 08:06

## 2024-07-15 RX ADMIN — ALBUTEROL SULFATE 2.5 MG: 2.5 SOLUTION RESPIRATORY (INHALATION) at 07:34

## 2024-07-15 RX ADMIN — CARBAMAZEPINE 300 MG: 100 SUSPENSION ORAL at 08:11

## 2024-07-15 RX ADMIN — PIPERACILLIN AND TAZOBACTAM 3375 MG: 3; .375 INJECTION, POWDER, LYOPHILIZED, FOR SOLUTION INTRAVENOUS at 20:28

## 2024-07-15 ASSESSMENT — PAIN SCALES - WONG BAKER
WONGBAKER_NUMERICALRESPONSE: 0;2
WONGBAKER_NUMERICALRESPONSE: NO HURT

## 2024-07-15 ASSESSMENT — PAIN SCALES - GENERAL: PAINLEVEL_OUTOF10: 2

## 2024-07-15 ASSESSMENT — PAIN DESCRIPTION - DESCRIPTORS: DESCRIPTORS: ACHING

## 2024-07-15 NOTE — CARE COORDINATION
Pt with [employer] guardian, CM call to [employer], left  for Amanda Hall, Pt's guardian.     CM call to lay caregiver in chart who states Pt does not receive care from their services anymore.      Per EMS run sheet Pt is from [facility].     CM call to [facility], verified Pt is from long term [facility].      Pt can return to [facility] when medically ready, no precert needed.

## 2024-07-15 NOTE — PROCEDURES
PROCEDURE NOTE  Date: 7/15/2024   Name: Jm Garcia  YOB: 1960    Procedures Successful CT guided left chest tube placement for empyema

## 2024-07-15 NOTE — PROGRESS NOTES
TRANSFER - OUT REPORT:    Verbal report given to AMBROSIO Sanchez on Jm Garcia being transferred to [facility] for routine progression of patient care       Report consisted of patient's Situation, Background, Assessment and   Recommendations(SBAR).     Information from the following report(s) Nurse Handoff Report was reviewed with the receiving nurse.    Opportunity for questions and clarification was provided.      Patient transported with:   Registered Nurse

## 2024-07-15 NOTE — PROGRESS NOTES
Thanks to RR RN who tried to get IV acces but was not able. PT very difficult even with the US. PICC RN will be in as soon as possible to get line for Blood and ATB. Will also update NP on situation. Blood bank aware we are trying to get access.

## 2024-07-15 NOTE — PROGRESS NOTES
Unable to draw back sample from midline. Meagan from phlebotomy informed to draw labs. Post transfusion hemoglobin check pending.

## 2024-07-15 NOTE — CONSULTS
07/15/24 1106   Wound Width (cm) 6.5 cm 07/15/24 1106   Wound Depth (cm) 0.2 cm 07/15/24 1106   Wound Surface Area (cm^2) 42.25 cm^2 07/15/24 1106   Wound Volume (cm^3) 8.45 cm^3 07/15/24 1106   Distance Tunneling (cm) 0 cm 07/15/24 1106   Tunneling Position ___ O'Clock 0 07/15/24 1106   Undermining Starts ___ O'Clock 0 07/15/24 1106   Undermining Ends___ O'Clock 0 07/15/24 1106   Undermining Maxium Distance (cm) 0 07/15/24 1106   Wound Assessment Pink/red;Slough;Purple/maroon 07/15/24 1106   Drainage Amount Moderate (25-50%) 07/15/24 1106   Drainage Description Serosanguinous 07/15/24 1106   Odor None 07/15/24 1106   Nirmala-wound Assessment Fragile 07/15/24 1106   Margins Defined edges 07/15/24 1106   Wound Thickness Description not for Pressure Injury Full thickness 07/15/24 1106   Number of days: 0       Response to treatment:  Well tolerated by patient.     Pain Assessment:  Severity:  pt is non-verbal  Quality of pain:   Wound Pain Timing/Severity:   Premedicated: no    Plan:     Plan of Care: Wound 07/14/24 Sacrum-Dressing/Treatment: Collagen, Silicone border    Patient in bed is non-verbal with mental disability. Wound care eval for sacral wound. Has a pressure injury to sacrum unstageable with slough. Cleansed with NS measured and pictured. Applied collagen and foam border recommend santyl ointment. Heels intact and floated. Pt turned to lt side with pillow support. No connection on mattress for atmos pump. Ordered a skin guard float mattress. Pt is at high risk for skin breakdown AEB stephenie. Follow stephenie orders.          Specialty Bed Required : yes  [x] Low Air Loss   [] Pressure Redistribution  [] Fluid Immersion  [] Bariatric  [] Total Pressure Relief  [] Other:     Discharge Plan:  Placement for patient upon discharge: tbd  Hospice Care: no  Patient appropriate for Outpatient Wound Care Center: tbd    Patient/Caregiver Teaching:  Level of patient/caregiver understanding able to: pt not appropriate for  teaching.         Electronically signed by Libby Vale RN,  on 7/15/2024 at 1:46 PM

## 2024-07-15 NOTE — PROGRESS NOTES
Nutrition Note    RD team received call from RN about TF order. Will order bolus regimen and re-evaluate tomorrow for consult and full assessment.    Electronically signed by Rashaun Ansari RD, LD on 7/15/24 at 4:33 PM EDT    Contact: [phone]

## 2024-07-15 NOTE — PROGRESS NOTES
Verbal consent gained from Patient guardian (Edwin Gaffney with [employer]) Consent is for Bronchoscopy with a possible Biopsy. Mari Augustine

## 2024-07-15 NOTE — PROGRESS NOTES
Pt identified as a candidate for COPD/GOLD assessment.   PFTs are required for confirmation of diagnosis and GOLD grading used for pharmacological and nonpharmacological therapy recommendations.  Patient would benefit if a pft were to be ordered, after discharged and able to complete PFT testing as an out-patient

## 2024-07-15 NOTE — PROGRESS NOTES
PHARMACY VANCOMYCIN MONITORING SERVICE  Pharmacy consulted by Dr. Mo Catalan MD for monitoring and adjustment.    Indication for treatment: Vancomycin indication: Bacteremia  Goal trough: Trough Goal: 15-20 mcg/mL  AUC/MAGALIE: 400-600    Risk Factors for MRSA Identified:   Hospitalization within the past 90 days, Received IV antibiotics within the past 90 days    Pertinent Laboratory Values:   Temp Readings from Last 3 Encounters:   07/15/24 99.1 °F (37.3 °C)   07/11/24 98.5 °F (36.9 °C) (Axillary)   05/10/24 98.4 °F (36.9 °C) (Oral)     Recent Labs     07/14/24  1225 07/14/24  1804 07/15/24  0955   WBC 10.4 11.6* 10.2       Recent Labs     07/14/24  1225 07/15/24  0248   BUN 13 10   CREATININE 0.2* 0.3*       Estimated Creatinine Clearance: 146 mL/min (A) (based on SCr of 0.3 mg/dL (L)).    Intake/Output Summary (Last 24 hours) at 7/15/2024 1356  Last data filed at 7/15/2024 0645  Gross per 24 hour   Intake 331 ml   Output 900 ml   Net -569 ml     Last Encounter Weight:  Wt Readings from Last 3 Encounters:   07/15/24 52.8 kg (116 lb 4.8 oz)   07/11/24 54.4 kg (120 lb)   05/07/24 52.1 kg (114 lb 13.8 oz)       Pertinent Cultures:   Date    Source    Results  07/14   Blood    NG at 24 hours  07/14   Sputum/Respiratory  Panel Negative  07/14   MRSA Nasal   Negative    Vancomycin level:   TROUGH:  No results for input(s): \"VANCOTROUGH\" in the last 72 hours.  RANDOM:  No results for input(s): \"VANCORANDOM\" in the last 72 hours.    Assessment:  HPI: Jm Garcia is a 63 y.o. female with a pmh of MRDD, cerebral palsy, COPD on 2 L home oxygen, type 2 diabetes mellitus, hypothyroidism, iron deficiency anemia, HLD, anxiety/depression, seizure disorder, essential hypertension, GERD who presents with Acute hypoxemic respiratory failure (HCC)   07/15 - Purulent chest tube drainage  SCr, BUN, and urine output:   Scr 0.3 mg/dL today, baseline  BUN WNL  Day(s) of therapy: 2  Vancomycin concentration: TBD    Plan:  Will continue  current dose of vancomycin 1000 mg IV q12hr.  Level tomorrow.  Pharmacy will continue to monitor patient and adjust therapy as indicated    VANCOMYCIN CONCENTRATION SCHEDULED FOR 07/16 @ 1200    Thank you for the consult.  Blanka Kumar RPH  7/15/2024 1:56 PM

## 2024-07-15 NOTE — PROGRESS NOTES
Lab called approx 20 min ago to let me know unit of PRBC is ready, call made out to guardian for consent. Awaiting response. Will let NP know .

## 2024-07-15 NOTE — CONSENT
I have discussed with the patient the rationale for blood component transfusion; its benefits in treating or preventing fatigue, organ damage, or death; and its risk which includes mild transfusion reactions, rare risk of blood borne infection, or more serious but rare reactions. I have discussed the alternatives to transfusion, including the risk and consequences of not receiving transfusion. The patient had an opportunity to ask questions and had agreed to proceed with transfusion of blood components.    Jacqueline French MD  11:52 PM 7/14/24

## 2024-07-15 NOTE — PROGRESS NOTES
V2.0  [facility] Progress Note      Name:  Jm Garcia /Age/Sex: 1960  (63 y.o. female)   MRN & CSN:  [id number] & [id number] Encounter Date/Time: 7/15/2024 1:42 PM EDT    [facility] PCP: Yuly Bro APRN - CNP       Hospital Day: 2    Assessment and Plan:   Jm Garcia is a 63 y.o. female with pmh of  who presents with Acute hypoxemic respiratory failure (HCC)      Plan:    Acute hypoxemic respiratory failure:  Left-sided empyema:  Chronic hypoxemic respiratory failure on 2 L home oxygen:  History of COPD:  Sepsis:  -Patient s/p Vanco/Zosyn and doxycycline in the ED  -Continue with vancomycin/Zosyn  -Pro-Js 0.181, lactate 1.7  -Patient status post 30 mL/kilogram sepsis bolus in the ED  -Underwent CT-guided chest tube placement by IR on 7/15/2024.  Had 500 cc of purulent drainage during chest tube insertion.  Follow culture results  -Pulmonology on board.  Will likely manage chest tube.  -Consult infectious disease.     Normocytic anemia-history of iron deficiency anemia.  Hemoglobin dropped from 8-9 S2 sounds, monitor H&H every 6 hours/24 hours and follow anemia workup/stool occult blood.  Will start with pantoprazole IV and continue iron supplements.  Hypertension-hold amlodipine and lisinopril in the setting of sepsis and soft blood pressure readings  Seizure disorder-continue with carbamazepine, Keppra  History of hypotension-continue midodrine with holding parameters  history of anxiety/depression-continue with BuSpar, Depakote and mirtazapine  Type 2 diabetes mellitus-hold oral hypoglycemics, start with low-dose sliding scale insulin with hypoglycemia protocol  Hypothyroidism-continue levothyroxine  Hyperlipidemia-continue atorvastatin  Diet: Placed dietitian for tube feeds placement and managed      Diet Diet NPO   DVT Prophylaxis [] Lovenox, []  Heparin, [] SCDs, [] Ambulation,  [] Eliquis, [] Xarelto  [] Coumadin   Code Status Full Code   Disposition From:   Expected

## 2024-07-15 NOTE — PRE SEDATION
Sedation Pre-Procedure Note    Patient Name: Jm Garcia   YOB: 1960  Room/Bed: 3129/3129-A  Medical Record Number: [id number]  Date: 7/15/2024   Time: 11:50 AM       Indication:  CT guided chest tube    Consent: Consent was unable to be obtained due to patient's condition.    Vital Signs:   Vitals:    07/15/24 0735   BP:    Pulse: 96   Resp: 28   Temp:    SpO2: 98%       Past Medical History:   has a past medical history of Anxiety disorder, Back pain, chronic, Cerebral palsy ([facility]), COPD (chronic obstructive pulmonary disease) ([facility]), COVID-19, CVA (cerebral infarction), Diabetes mellitus ([facility]), Diverticulosis, Epilepsy ([facility]), GERD (gastroesophageal reflux disease), Hyperlipidemia, Hypertension, Insomnia, Iron (Fe) deficiency anemia, Mental disability, Seizures ([facility]), and Unspecified cerebral artery occlusion with cerebral infarction.    Past Surgical History:   has a past surgical history that includes Gastrostomy tube placement and Upper gastrointestinal endoscopy (N/A, 5/3/2024).    Medications:   Scheduled Meds:    sodium chloride (Inhalant)  4 mL Nebulization Q4H    albuterol  2.5 mg Nebulization Q4H    [Held by provider] amLODIPine  10 mg Oral Daily    atorvastatin  40 mg Oral Daily    busPIRone  10 mg Oral BID    ferrous sulfate  325 mg Oral BID    fluticasone  2 spray Each Nostril Daily    ipratropium 0.5 mg-albuterol 2.5 mg  1 Dose Inhalation 4x Daily RT    lactobacillus  1 capsule Oral Daily    [Held by provider] lisinopril  40 mg Oral Daily    mirtazapine  15 mg Oral Nightly    sodium chloride flush  5-40 mL IntraVENous 2 times per day    [Held by provider] enoxaparin  40 mg SubCUTAneous Daily    piperacillin-tazobactam  3,375 mg IntraVENous Q8H    pantoprazole  40 mg IntraVENous Daily    insulin lispro  0-4 Units SubCUTAneous TID     insulin lispro  0-4 Units SubCUTAneous Nightly    vancomycin  1,000 mg IntraVENous Q12H    carBAMazepine  300 mg Oral BID    divalproex  1,000 mg Oral BID

## 2024-07-15 NOTE — CONSULTS
Consult completed. [employer]can Safety® Deep Access 20g 2 ½\" IV Midline catheter initiated into RUE basilic vein x 1 attempt using ultrasound guided technique. Brisk blood return, flushes without resistance. Patient tolerated well. Lab draw. X2 PIVs removed. X1 failed attempt to place 24Ga [employer]. Patient needs two points of access. This PICC nurse contacted the hospital supervisor to see if ICU could place an IO. Primary nurse Shannan notified.      Consult the Vascular Access Team for questions, concerns, or change in patient's condition.

## 2024-07-15 NOTE — CONSULTS
Pulmonary Consult Note      Reason for Consult:     acute hypoxemic respiratory failure      Requesting Physician:     Mo Catalan MD       Subjective:   CHIEF COMPLAINT :SOB    Patient Active Problem List    Diagnosis Date Noted    Weakness 02/03/2023     Priority: Medium    Acute cystitis without hematuria 12/23/2022     Priority: Medium    Urinary problem 12/19/2022     Priority: Medium    SOB (shortness of breath) 11/02/2022     Priority: Medium    COVID-19 10/29/2022     Priority: Medium    Abnormal uterine and vaginal bleeding, unspecified 10/29/2022     Priority: Medium    Urinary incontinence 10/29/2022     Priority: Medium    Chronic respiratory failure (HCC) 05/16/2022     Priority: Medium    Bradyarrhythmia 02/04/2020     Priority: Medium    Dyskinesia 12/03/2015     Priority: Medium    Hemiplegia affecting left nondominant side (HCC) 12/03/2015     Priority: Medium    Insomnia 12/03/2015     Priority: Medium    Intermittent explosive disorder 12/03/2015     Priority: Medium    Iron deficiency anemia, unspecified 12/03/2015     Priority: Medium    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     Priority: Medium    Subdural hematoma (HCC) 12/03/2015     Priority: Medium    Intraparenchymal hemorrhage of brain ([facility]) 02/08/2014     Priority: Medium    Acute hypoxemic respiratory failure ([facility]) 07/14/2024    Generalized weakness 04/30/2024    Contusion of right knee 06/19/2023    Other cerebral palsy ([facility]) 01/28/2022    COVID-19 virus infection 10/12/2021    Hypoxia 09/23/2021    Diverticulosis 05/05/2020    Lesion of right native kidney 05/05/2020    Black stool 05/05/2020    Anxiety and depression 03/10/2020    Anemia 03/10/2020    Controlled type 2 diabetes mellitus without complication, without long-term current use of insulin ([facility]) 03/10/2020    Acute respiratory failure with hypoxia ([facility]) 06/28/2019    Acute respiratory failure ([facility]) 06/28/2019    Acute bronchitis 12/21/2018    Sepsis ([facility]) 12/18/2018     03/13/2015     Overview Note:     Replacing deprecated diagnoses      Seizure disorder (HCC) 03/10/2014    Altered mental status 03/10/2014    Pneumonia due to infectious organism 11/10/2012    HTN (hypertension), benign 11/10/2012     Acute on chronic Hypoxic resp failure  Suspected Left Empyema  ? Left lung mass  Anemia  COPD  H/o Seizure disorder  Hypothyroidism  HLD      PLAN    Abx  F/u C&S  Need left chest tube  Possible bronch if no reexpansion  Inhalers  Solumedrol 40 mg /day  Keep sats > 92%  DVT and GI Prophylaxis  C/w Levothyroxine  Insulin  Antiseizure meds  DVT and GI Prophylaxis  C.w present management              Total time spent for this encounter: 75 mins    Electronically signed by Eric Knox MD on 7/15/2024 at 9:58 AM

## 2024-07-16 ENCOUNTER — APPOINTMENT (OUTPATIENT)
Dept: GENERAL RADIOLOGY | Age: 64
DRG: 871 | End: 2024-07-16
Payer: MEDICARE

## 2024-07-16 PROBLEM — J86.9 EMPYEMA OF LEFT PLEURAL SPACE (HCC): Status: ACTIVE | Noted: 2024-07-16

## 2024-07-16 PROBLEM — T17.500A MUCUS PLUGGING OF BRONCHI: Status: ACTIVE | Noted: 2024-07-16

## 2024-07-16 PROBLEM — E44.0 MODERATE MALNUTRITION (HCC): Chronic | Status: ACTIVE | Noted: 2024-07-16

## 2024-07-16 LAB
ABO/RH: NORMAL
ANION GAP SERPL CALCULATED.3IONS-SCNC: 11 MMOL/L (ref 7–16)
ANTIBODY SCREEN: NEGATIVE
BASOPHILS ABSOLUTE: 0 K/CU MM
BASOPHILS RELATIVE PERCENT: 0.2 % (ref 0–1)
BUN SERPL-MCNC: 11 MG/DL (ref 6–23)
CALCIUM SERPL-MCNC: 8.6 MG/DL (ref 8.3–10.6)
CHLORIDE BLD-SCNC: 99 MMOL/L (ref 99–110)
CO2: 25 MMOL/L (ref 21–32)
COMPONENT: NORMAL
CREAT SERPL-MCNC: 0.2 MG/DL (ref 0.6–1.1)
CROSSMATCH RESULT: NORMAL
CRP SERPL HS-MCNC: 244.7 MG/L
DIFFERENTIAL TYPE: ABNORMAL
DOSE AMOUNT: NORMAL
DOSE TIME: NORMAL
EOSINOPHILS ABSOLUTE: 0 K/CU MM
EOSINOPHILS RELATIVE PERCENT: 0.3 % (ref 0–3)
GFR, ESTIMATED: >90 ML/MIN/1.73M2
GLUCOSE BLD-MCNC: 107 MG/DL (ref 70–99)
GLUCOSE BLD-MCNC: 140 MG/DL (ref 70–99)
GLUCOSE BLD-MCNC: 154 MG/DL (ref 70–99)
GLUCOSE BLD-MCNC: 262 MG/DL (ref 70–99)
GLUCOSE SERPL-MCNC: 243 MG/DL (ref 70–99)
HCT VFR BLD CALC: 26.1 % (ref 37–47)
HCT VFR BLD CALC: 26.2 % (ref 37–47)
HCT VFR BLD CALC: 29.3 % (ref 37–47)
HCT VFR BLD CALC: 30.1 % (ref 37–47)
HEMOGLOBIN: 7.5 GM/DL (ref 12.5–16)
HEMOGLOBIN: 7.7 GM/DL (ref 12.5–16)
HEMOGLOBIN: 8.4 GM/DL (ref 12.5–16)
HEMOGLOBIN: 8.7 GM/DL (ref 12.5–16)
IMMATURE NEUTROPHIL %: 1.1 % (ref 0–0.43)
LYMPHOCYTES ABSOLUTE: 1.3 K/CU MM
LYMPHOCYTES RELATIVE PERCENT: 12.2 % (ref 24–44)
MAGNESIUM: 1.8 MG/DL (ref 1.8–2.4)
MCH RBC QN AUTO: 26.1 PG (ref 27–31)
MCHC RBC AUTO-ENTMCNC: 29.5 % (ref 32–36)
MCV RBC AUTO: 88.5 FL (ref 78–100)
MONOCYTES ABSOLUTE: 0.9 K/CU MM
MONOCYTES RELATIVE PERCENT: 9.1 % (ref 0–4)
NEUTROPHILS ABSOLUTE: 8 K/CU MM
NEUTROPHILS RELATIVE PERCENT: 77.1 % (ref 36–66)
NUCLEATED RBC %: 0 %
PDW BLD-RTO: 19.9 % (ref 11.7–14.9)
PLATELET # BLD: 427 K/CU MM (ref 140–440)
PMV BLD AUTO: 8.8 FL (ref 7.5–11.1)
POTASSIUM SERPL-SCNC: 3.8 MMOL/L (ref 3.5–5.1)
PROCALCITONIN SERPL-MCNC: 0.24 NG/ML
RBC # BLD: 2.95 M/CU MM (ref 4.2–5.4)
SODIUM BLD-SCNC: 135 MMOL/L (ref 135–145)
STATUS: NORMAL
TOTAL IMMATURE NEUTOROPHIL: 0.11 K/CU MM
TOTAL NUCLEATED RBC: 0 K/CU MM
TRANSFUSION STATUS: NORMAL
UNIT DIVISION: 0
UNIT NUMBER: NORMAL
VANCOMYCIN RANDOM: 10.2 UG/ML
WBC # BLD: 10.3 K/CU MM (ref 4–10.5)

## 2024-07-16 PROCEDURE — 2140000000 HC CCU INTERMEDIATE R&B

## 2024-07-16 PROCEDURE — 6370000000 HC RX 637 (ALT 250 FOR IP): Performed by: STUDENT IN AN ORGANIZED HEALTH CARE EDUCATION/TRAINING PROGRAM

## 2024-07-16 PROCEDURE — 87205 SMEAR GRAM STAIN: CPT

## 2024-07-16 PROCEDURE — 94640 AIRWAY INHALATION TREATMENT: CPT

## 2024-07-16 PROCEDURE — 85014 HEMATOCRIT: CPT

## 2024-07-16 PROCEDURE — 83735 ASSAY OF MAGNESIUM: CPT

## 2024-07-16 PROCEDURE — 87070 CULTURE OTHR SPECIMN AEROBIC: CPT

## 2024-07-16 PROCEDURE — 6370000000 HC RX 637 (ALT 250 FOR IP): Performed by: INTERNAL MEDICINE

## 2024-07-16 PROCEDURE — 36415 COLL VENOUS BLD VENIPUNCTURE: CPT

## 2024-07-16 PROCEDURE — 84145 PROCALCITONIN (PCT): CPT

## 2024-07-16 PROCEDURE — 80048 BASIC METABOLIC PNL TOTAL CA: CPT

## 2024-07-16 PROCEDURE — 2700000000 HC OXYGEN THERAPY PER DAY

## 2024-07-16 PROCEDURE — 94761 N-INVAS EAR/PLS OXIMETRY MLT: CPT

## 2024-07-16 PROCEDURE — 71045 X-RAY EXAM CHEST 1 VIEW: CPT

## 2024-07-16 PROCEDURE — 99223 1ST HOSP IP/OBS HIGH 75: CPT | Performed by: INTERNAL MEDICINE

## 2024-07-16 PROCEDURE — 6360000002 HC RX W HCPCS: Performed by: STUDENT IN AN ORGANIZED HEALTH CARE EDUCATION/TRAINING PROGRAM

## 2024-07-16 PROCEDURE — 82962 GLUCOSE BLOOD TEST: CPT

## 2024-07-16 PROCEDURE — 80202 ASSAY OF VANCOMYCIN: CPT

## 2024-07-16 PROCEDURE — 86140 C-REACTIVE PROTEIN: CPT

## 2024-07-16 PROCEDURE — APPSS60 APP SPLIT SHARED TIME 46-60 MINUTES: Performed by: NURSE PRACTITIONER

## 2024-07-16 PROCEDURE — 6360000002 HC RX W HCPCS: Performed by: INTERNAL MEDICINE

## 2024-07-16 PROCEDURE — 85025 COMPLETE CBC W/AUTO DIFF WBC: CPT

## 2024-07-16 PROCEDURE — 85018 HEMOGLOBIN: CPT

## 2024-07-16 PROCEDURE — 99233 SBSQ HOSP IP/OBS HIGH 50: CPT | Performed by: INTERNAL MEDICINE

## 2024-07-16 PROCEDURE — 2580000003 HC RX 258: Performed by: STUDENT IN AN ORGANIZED HEALTH CARE EDUCATION/TRAINING PROGRAM

## 2024-07-16 PROCEDURE — 83986 ASSAY PH BODY FLUID NOS: CPT

## 2024-07-16 RX ORDER — ALBUTEROL SULFATE 2.5 MG/3ML
SOLUTION RESPIRATORY (INHALATION)
Status: DISPENSED
Start: 2024-07-16 | End: 2024-07-16

## 2024-07-16 RX ORDER — IPRATROPIUM BROMIDE AND ALBUTEROL SULFATE 2.5; .5 MG/3ML; MG/3ML
1 SOLUTION RESPIRATORY (INHALATION) EVERY 4 HOURS PRN
Status: DISCONTINUED | OUTPATIENT
Start: 2024-07-16 | End: 2024-07-27 | Stop reason: HOSPADM

## 2024-07-16 RX ADMIN — MORPHINE SULFATE 1 MG: 2 INJECTION, SOLUTION INTRAMUSCULAR; INTRAVENOUS at 13:28

## 2024-07-16 RX ADMIN — INSULIN LISPRO 2 UNITS: 100 INJECTION, SOLUTION INTRAVENOUS; SUBCUTANEOUS at 09:14

## 2024-07-16 RX ADMIN — LEVETIRACETAM 1500 MG: 100 SOLUTION ORAL at 23:32

## 2024-07-16 RX ADMIN — ALBUTEROL SULFATE 2.5 MG: 2.5 SOLUTION RESPIRATORY (INHALATION) at 14:33

## 2024-07-16 RX ADMIN — ALBUTEROL SULFATE 2.5 MG: 2.5 SOLUTION RESPIRATORY (INHALATION) at 07:24

## 2024-07-16 RX ADMIN — ALBUTEROL SULFATE 2.5 MG: 2.5 SOLUTION RESPIRATORY (INHALATION) at 20:21

## 2024-07-16 RX ADMIN — FERROUS SULFATE TAB 325 MG (65 MG ELEMENTAL FE) 325 MG: 325 (65 FE) TAB at 21:18

## 2024-07-16 RX ADMIN — CARBAMAZEPINE 300 MG: 100 SUSPENSION ORAL at 23:32

## 2024-07-16 RX ADMIN — ALBUTEROL SULFATE 2.5 MG: 2.5 SOLUTION RESPIRATORY (INHALATION) at 00:45

## 2024-07-16 RX ADMIN — COLLAGENASE SANTYL: 250 OINTMENT TOPICAL at 08:40

## 2024-07-16 RX ADMIN — ATORVASTATIN CALCIUM 40 MG: 40 TABLET, FILM COATED ORAL at 08:40

## 2024-07-16 RX ADMIN — Medication 4 ML: at 04:20

## 2024-07-16 RX ADMIN — Medication 4 ML: at 00:50

## 2024-07-16 RX ADMIN — DIVALPROEX SODIUM 1000 MG: 125 CAPSULE, COATED PELLETS ORAL at 08:41

## 2024-07-16 RX ADMIN — MIRTAZAPINE 15 MG: 15 TABLET, FILM COATED ORAL at 21:18

## 2024-07-16 RX ADMIN — CARBAMAZEPINE 300 MG: 100 SUSPENSION ORAL at 09:14

## 2024-07-16 RX ADMIN — VANCOMYCIN HYDROCHLORIDE 1000 MG: 1 INJECTION, POWDER, LYOPHILIZED, FOR SOLUTION INTRAVENOUS at 21:22

## 2024-07-16 RX ADMIN — MORPHINE SULFATE 1 MG: 2 INJECTION, SOLUTION INTRAMUSCULAR; INTRAVENOUS at 09:07

## 2024-07-16 RX ADMIN — DIVALPROEX SODIUM 1000 MG: 125 CAPSULE, COATED PELLETS ORAL at 23:32

## 2024-07-16 RX ADMIN — Medication 4 ML: at 14:34

## 2024-07-16 RX ADMIN — VANCOMYCIN HYDROCHLORIDE 1000 MG: 1 INJECTION, POWDER, LYOPHILIZED, FOR SOLUTION INTRAVENOUS at 03:54

## 2024-07-16 RX ADMIN — BUSPIRONE HYDROCHLORIDE 10 MG: 5 TABLET ORAL at 21:18

## 2024-07-16 RX ADMIN — Medication 4 ML: at 19:58

## 2024-07-16 RX ADMIN — ALBUTEROL SULFATE 2.5 MG: 2.5 SOLUTION RESPIRATORY (INHALATION) at 04:15

## 2024-07-16 RX ADMIN — FERROUS SULFATE TAB 325 MG (65 MG ELEMENTAL FE) 325 MG: 325 (65 FE) TAB at 08:40

## 2024-07-16 RX ADMIN — Medication 1 CAPSULE: at 08:40

## 2024-07-16 RX ADMIN — BUSPIRONE HYDROCHLORIDE 10 MG: 5 TABLET ORAL at 08:40

## 2024-07-16 RX ADMIN — PANTOPRAZOLE SODIUM 40 MG: 40 INJECTION, POWDER, FOR SOLUTION INTRAVENOUS at 08:39

## 2024-07-16 RX ADMIN — VANCOMYCIN HYDROCHLORIDE 1000 MG: 1 INJECTION, POWDER, LYOPHILIZED, FOR SOLUTION INTRAVENOUS at 13:25

## 2024-07-16 RX ADMIN — PIPERACILLIN AND TAZOBACTAM 3375 MG: 3; .375 INJECTION, POWDER, LYOPHILIZED, FOR SOLUTION INTRAVENOUS at 21:21

## 2024-07-16 RX ADMIN — SODIUM CHLORIDE, PRESERVATIVE FREE 10 ML: 5 INJECTION INTRAVENOUS at 08:42

## 2024-07-16 RX ADMIN — PIPERACILLIN AND TAZOBACTAM 3375 MG: 3; .375 INJECTION, POWDER, LYOPHILIZED, FOR SOLUTION INTRAVENOUS at 12:48

## 2024-07-16 RX ADMIN — FLUTICASONE PROPIONATE 2 SPRAY: 50 SPRAY, METERED NASAL at 08:40

## 2024-07-16 RX ADMIN — Medication 4 ML: at 07:24

## 2024-07-16 RX ADMIN — LEVETIRACETAM 1500 MG: 100 SOLUTION ORAL at 08:39

## 2024-07-16 RX ADMIN — PIPERACILLIN AND TAZOBACTAM 3375 MG: 3; .375 INJECTION, POWDER, LYOPHILIZED, FOR SOLUTION INTRAVENOUS at 05:27

## 2024-07-16 ASSESSMENT — PAIN SCALES - WONG BAKER
WONGBAKER_NUMERICALRESPONSE: HURTS A LITTLE BIT
WONGBAKER_NUMERICALRESPONSE: HURTS LITTLE MORE
WONGBAKER_NUMERICALRESPONSE: HURTS LITTLE MORE

## 2024-07-16 ASSESSMENT — PAIN SCALES - GENERAL
PAINLEVEL_OUTOF10: 4
PAINLEVEL_OUTOF10: 2
PAINLEVEL_OUTOF10: 2

## 2024-07-16 ASSESSMENT — PAIN DESCRIPTION - ORIENTATION: ORIENTATION: RIGHT

## 2024-07-16 ASSESSMENT — PAIN DESCRIPTION - DESCRIPTORS: DESCRIPTORS: ACHING

## 2024-07-16 NOTE — PROGRESS NOTES
Pulmonary and Critical Care  Progress Note      VITALS:  BP (!) 136/57   Pulse 80   Temp 99 °F (37.2 °C) (Axillary) Comment: Blankets removed  Resp 28   Ht 1.473 m (4' 10\")   Wt 52.8 kg (116 lb 4.8 oz)   SpO2 98%   BMI 24.31 kg/m²     Subjective:   CHIEF COMPLAINT :SOB     HPI:                The patient is a 63 y.o. female is lying in the bed. He is non verbal. He is not in acute resp distress. He has a left chest tube with 600 ml output wit incomplete reexpansion of the left lung    Objective:   PHYSICAL EXAM:    LUNGS:Decreased air entry left base  Abd-soft, BS+,NT  Ext- no pedal edema  CVS-s1s2, no murmurs      DATA:    CBC:  Recent Labs     07/14/24  1804 07/15/24  0955 07/15/24  1415 07/16/24  0603 07/16/24  0936   WBC 11.6* 10.2  --  10.3  --    RBC 2.52* 3.04*  --  2.95*  --    HGB 6.6* 7.9* 9.0* 7.7* 7.5*   HCT 24.2* 26.3* 31.2* 26.1* 26.2*   * 451*  --  427  --    MCV 96.0 86.5  --  88.5  --    MCH 26.2* 26.0*  --  26.1*  --    MCHC 27.3* 30.0*  --  29.5*  --    RDW 16.7* 19.9*  --  19.9*  --       BMP:  Recent Labs     07/14/24  1225 07/15/24  0248 07/16/24  0603   * 136 135   K 4.7 4.4 3.8   CL 92* 99 99   CO2 29 27 25   BUN 13 10 11   CREATININE 0.2* 0.3* 0.2*   CALCIUM 9.8 9.3 8.6   GLUCOSE 184* 201* 243*      ABG:  No results for input(s): \"PH\", \"PO2ART\", \"NFZ8LOA\", \"HCO3\", \"BEART\", \"O2SAT\" in the last 72 hours.  BNP  Lab Results   Component Value Date    BNP 49 11/12/2012      D-Dimer:  Lab Results   Component Value Date    DDIMER 323 (H) 10/29/2022      Radiology: Interval placement of left chest tube with improvement in aeration  in the left upper lung zone. There is persistent left mid and lower lung zone  opacification likely due to effusion.      Assessment/Plan     Patient Active Problem List    Diagnosis Date Noted    Weakness 02/03/2023     Priority: Medium    Acute cystitis without hematuria 12/23/2022     Priority: Medium    Urinary problem 12/19/2022     Priority:

## 2024-07-16 NOTE — DISCHARGE INSTR - DIET
Good nutrition is important when healing from an illness, injury, or surgery.  Follow any nutrition recommendations given to you during your hospital stay.   If you were given an oral nutrition supplement while in the hospital, continue to take this supplement at home.  You can take it with meals, in-between meals, and/or before bedtime. These supplements can be purchased at most local grocery stores, pharmacies, and chain [employer]-stores.   If you have any questions about your diet or nutrition, call the hospital and ask for the dietitian.    Home EN regimen: Diabetic formula (Glucerna 1.5 Js) , or equivalent formula, bolus feeds of 237 mL q 6 hours or 4 times daily with 88 ml free water fluids before and after to provide 948 mL TV, ~1422 kcals/day, 78 g protein/day, and ~1424 mL fluid volume/day from TF & FWF.

## 2024-07-16 NOTE — CONSULTS
Comprehensive Nutrition Assessment    Type and Reason for Visit:  Initial, Wound, Consult (Heladio Score, TF order and mgt)    Nutrition Recommendations/Plan:   Recommend EN regimen: Diabetic formula (Glucerna 1.5 Js) , or equivalent formula, bolus feeds of 237 mL q 6 hours or 4 times daily with 88 ml free water fluids before and after to provide 948 mL TV/day, ~1422 kcals/day, 78 g protein/day, and ~1424 mL fluid volume/day from TF & FWF.  Monitor GI tolerance, HOB, skin status, labs, weights, and POC     Malnutrition Assessment:  Malnutrition Status:  Moderate malnutrition (07/16/24 1157)    Context:  Social/Environmental Circumstances (dysphagia, requiring PEG for EN initiation)     Findings of the 6 clinical characteristics of malnutrition:  Energy Intake:  Less than 75% estimated energy requirements for 3 months or longer (Reduced po intakes on average x past 6 months with dysphagia, then PEG placed 2 months ago)  Weight Loss:  Greater than 10% over 6 months (22%)     Body Fat Loss:  No significant body fat loss Orbital, Triceps, Buccal region, Fat Overlying Ribs   Muscle Mass Loss:  Mild muscle mass loss (contractures) Thigh (quadraceps), Calf (gastrocnemius)  Fluid Accumulation:  No significant fluid accumulation Extremities   Strength:  Not Performed    Nutrition Assessment:    Admitted with Acute hypoxemic respiratory failure. Hx CVA c/b hemiplegia L side, MR, Seizure disoorder, Cerebral palsy, COPD, PEG tube placement 5/3/24. Pt started on Jevity 1.5 bolus of 237 ml q 6 h with FWF of 85 ml before and after based on previous regimen. EN order started and administered yesterday. Observed elevated blood sugars within stay greater than 200 mg/dL. Pt with significant wound, would benefit from switching to more carb steady formula. Spoke with RN at visit, will adjust EN order. Pt meets criteria for malnutrition. Follow as high nutrition risk.    Nutrition Related Findings:    Rx: remeron, vanco, culturelle,

## 2024-07-16 NOTE — PROGRESS NOTES
12:50 PM HISTORY: Sob; PTx vs mcus plug COMPARISON:  No comparisons available.  TECHNIQUE:  CT scan of the chest was performed without IV contrast.  One or more of the following dose reduction techniques were used: automated exposure control, adjustment of the mA and/or kV according to patient size, use of iterative reconstruction technique. FINDINGS: Coronary artery calcifications in the right and left main vessels.  (msn13) LUNGS: There is no significant aerated lung parenchyma noted on the left side with a large complex appearing fluid collection replacing most of the left lung probably within the pleural space with atelectasis and infiltrate noted within the aerated left upper lobe. No tracheomalacia. No bronchiectasis. Minimal emphysematous changes in the right lung.  HEART AND PERICARDIUM: Within normal limits. AORTA: Normal caliber aorta. ADENOPATHY/MEDIASTINUM: There are enlarged lymph nodes within the mediastinum the largest in the subcarinal space 1.5 x 1.5 cm. LIMITED VIEWS OF THE ABDOMEN: Within normal limits. OSSEOUS STRUCTURES: No sclerotic or lytic lesions. No acute fractures are identified. OVERLYING SOFT TISSUES: Unremarkable. THYROID: The thyroid is unremarkable.     No significant elevated lung parenchyma appreciated on the left side. Absence of contrast limits evaluation however the findings are concerning for underlying empyema formation. Electronically signed by eTelemetry    XR CHEST PORTABLE    Result Date: 7/14/2024  EXAMINATION: XR CHEST PORTABLE, 7/14/2024 12:22 PM HISTORY: r/o ptx COMPARISON:  No comparisons available.  Technique: Single view. Findings: Complete opacification of the left hemithorax. No pneumothorax. Heart is normal size. Mediastinal and hilar contours are within normal limits. Bony thorax no acute abnormality.     Probable large left sided pneumonia, underlying mass not excluded. CT chest with contrast recommended Electronically signed by eTelemetry      Electronically

## 2024-07-16 NOTE — PROGRESS NOTES
PHARMACY VANCOMYCIN MONITORING SERVICE  Pharmacy consulted by Dr. Mo Catalan MD for monitoring and adjustment.    Indication for treatment: Vancomycin indication: Bacteremia  Goal trough: Trough Goal: 15-20 mcg/mL  AUC/MAGALIE: 400-600    Risk Factors for MRSA Identified:   Hospitalization within the past 90 days, Received IV antibiotics within the past 90 days    Pertinent Laboratory Values:   Temp Readings from Last 3 Encounters:   07/16/24 99 °F (37.2 °C) (Axillary)   07/11/24 98.5 °F (36.9 °C) (Axillary)   05/10/24 98.4 °F (36.9 °C) (Oral)     Recent Labs     07/14/24  1804 07/15/24  0955 07/16/24  0603   WBC 11.6* 10.2 10.3     Recent Labs     07/14/24  1225 07/15/24  0248 07/16/24  0603   BUN 13 10 11   CREATININE 0.2* 0.3* 0.2*     Estimated Creatinine Clearance: 220 mL/min (A) (based on SCr of 0.2 mg/dL (L)).    Intake/Output Summary (Last 24 hours) at 7/16/2024 1254  Last data filed at 7/16/2024 1210  Gross per 24 hour   Intake 1142 ml   Output --   Net 1142 ml     Last Encounter Weight:  Wt Readings from Last 3 Encounters:   07/15/24 52.8 kg (116 lb 4.8 oz)   07/11/24 54.4 kg (120 lb)   05/07/24 52.1 kg (114 lb 13.8 oz)       Pertinent Cultures:   Date    Source    Results  07/14   Blood    NG at 24 hours  07/14   Sputum/Respiratory  Panel Negative  07/14   MRSA Nasal   Negative    Vancomycin level:   TROUGH:  No results for input(s): \"VANCOTROUGH\" in the last 72 hours.  RANDOM:    Recent Labs     07/16/24  1131   VANCORANDOM 10.2       Assessment:  HPI: Jm Garcia is a 63 y.o. female with a pmh of MRDD, cerebral palsy, COPD on 2 L home oxygen, type 2 diabetes mellitus, hypothyroidism, iron deficiency anemia, HLD, anxiety/depression, seizure disorder, essential hypertension, GERD who presents with Acute hypoxemic respiratory failure (HCC)   07/15 - Purulent chest tube drainage  SCr, BUN, and urine output:   Scr 0.2 mg/dL today, baseline  BUN WNL  UOP not documented  Day(s) of therapy: 3 - ID now

## 2024-07-16 NOTE — CONSULTS
Infectious Disease Consult Note  2024   Patient Name: Jm Garcia : 1960   Impression  Left Aspiration Pneumonia with Empyema:  Unstageable Sacral Pressure Wound:  Allergy to nitrofurantoin (hives, swelling):  She has had past aspiration pneumonia and is at great risk for recurrence due to her poor dentation, although now has a G-tube placed. Will empirically broadly cover as recent past aspiration.  CrCl 220. T-max 99.1, initial leukocytosis of 11.6 normalized on 7/15. Pct 0.181. Hypoalbuminemia. Resp panel and MRSA by PCR negative.   -BC 0/2 NGTD at 24h  -PCXR: probable large left sided pneumonia, underlying mass not excluded. CT chest with contrast recommended.   -CT Chest wo Contrast: No significant elevated lung parenchyma appreciated on the left side. Absence of   contrast limits evaluation however the findings are concerning for underlying   empyema formation.   7/15-S/p per Dr. Oliva, IR, CT guided left chest tube placement for empyema. 450 cc purulent drainage. Fluid analysis: RBC 1,135,000. Glucose 237. , Protein 1.6. ,445. Culture: Pending  -PCXR: Interval placement of left chest tube with improvement in aeration   in the left upper lung zone. There is persistent left mid and lower lung zone   opacification likely due to effusion.   Dr. Knox, Pulmonology, has been consulted.   Recent G-Tube Placement Secondary to Aspiration:  MRDD/ Cerebral Palsy/ Seizures:  COPD Oxygen Dependent 2 L/NC:  Multi-morbidity: per PMHx: MRDD, anxiety disorder/ depression, chronic back pain, COPD oxygen dependent, DMII, hypothyroidism, iron deficiency anemia, cerebral palsy, CVA ,epilepsy, DMII, diverticulosis, GERD, HLD, HTN, seizures, past aspiration pneumonia  Plan:  Continue IV Zosyn 3,375 mg q8h, continue as has had recent past aspiration pneumonia  Continue IV vancomycin per pharmacy dosing  Trend CRP and Pct, ordered  Await thoracentesis culture from 7/15  Following as

## 2024-07-17 ENCOUNTER — APPOINTMENT (OUTPATIENT)
Dept: GENERAL RADIOLOGY | Age: 64
DRG: 871 | End: 2024-07-17
Payer: MEDICARE

## 2024-07-17 LAB
ANION GAP SERPL CALCULATED.3IONS-SCNC: 11 MMOL/L (ref 7–16)
BASOPHILS ABSOLUTE: 0 K/CU MM
BASOPHILS RELATIVE PERCENT: 0.4 % (ref 0–1)
BUN SERPL-MCNC: 13 MG/DL (ref 6–23)
CALCIUM SERPL-MCNC: 8.6 MG/DL (ref 8.3–10.6)
CHLORIDE BLD-SCNC: 105 MMOL/L (ref 99–110)
CO2: 27 MMOL/L (ref 21–32)
CREAT SERPL-MCNC: 0.2 MG/DL (ref 0.6–1.1)
CRP SERPL HS-MCNC: 178.6 MG/L
DIFFERENTIAL TYPE: ABNORMAL
EKG ATRIAL RATE: 104 BPM
EKG DIAGNOSIS: NORMAL
EKG P AXIS: 64 DEGREES
EKG P-R INTERVAL: 128 MS
EKG Q-T INTERVAL: 316 MS
EKG QRS DURATION: 64 MS
EKG QTC CALCULATION (BAZETT): 415 MS
EKG R AXIS: 27 DEGREES
EKG T AXIS: 18 DEGREES
EKG VENTRICULAR RATE: 104 BPM
EOSINOPHILS ABSOLUTE: 0.1 K/CU MM
EOSINOPHILS RELATIVE PERCENT: 1.1 % (ref 0–3)
GFR, ESTIMATED: >90 ML/MIN/1.73M2
GLUCOSE BLD-MCNC: 130 MG/DL (ref 70–99)
GLUCOSE BLD-MCNC: 174 MG/DL (ref 70–99)
GLUCOSE BLD-MCNC: 175 MG/DL (ref 70–99)
GLUCOSE SERPL-MCNC: 138 MG/DL (ref 70–99)
HCT VFR BLD CALC: 29 % (ref 37–47)
HCT VFR BLD CALC: 33.1 % (ref 37–47)
HEMOGLOBIN: 8.2 GM/DL (ref 12.5–16)
HEMOGLOBIN: 9.1 GM/DL (ref 12.5–16)
IMMATURE NEUTROPHIL %: 0.9 % (ref 0–0.43)
LYMPHOCYTES ABSOLUTE: 1.2 K/CU MM
LYMPHOCYTES RELATIVE PERCENT: 16.6 % (ref 24–44)
MAGNESIUM: 2.2 MG/DL (ref 1.8–2.4)
MCH RBC QN AUTO: 25.7 PG (ref 27–31)
MCHC RBC AUTO-ENTMCNC: 28.3 % (ref 32–36)
MCV RBC AUTO: 90.9 FL (ref 78–100)
MONOCYTES ABSOLUTE: 0.7 K/CU MM
MONOCYTES RELATIVE PERCENT: 9.7 % (ref 0–4)
NEUTROPHILS ABSOLUTE: 5.3 K/CU MM
NEUTROPHILS RELATIVE PERCENT: 71.3 % (ref 36–66)
NUCLEATED RBC %: 0 %
PDW BLD-RTO: 19.5 % (ref 11.7–14.9)
PLATELET # BLD: 397 K/CU MM (ref 140–440)
PMV BLD AUTO: 8.7 FL (ref 7.5–11.1)
POTASSIUM SERPL-SCNC: 3.9 MMOL/L (ref 3.5–5.1)
RBC # BLD: 3.19 M/CU MM (ref 4.2–5.4)
SODIUM BLD-SCNC: 143 MMOL/L (ref 135–145)
TOTAL IMMATURE NEUTOROPHIL: 0.07 K/CU MM
TOTAL NUCLEATED RBC: 0 K/CU MM
WBC # BLD: 7.5 K/CU MM (ref 4–10.5)

## 2024-07-17 PROCEDURE — 6360000002 HC RX W HCPCS: Performed by: STUDENT IN AN ORGANIZED HEALTH CARE EDUCATION/TRAINING PROGRAM

## 2024-07-17 PROCEDURE — 6370000000 HC RX 637 (ALT 250 FOR IP): Performed by: STUDENT IN AN ORGANIZED HEALTH CARE EDUCATION/TRAINING PROGRAM

## 2024-07-17 PROCEDURE — 85025 COMPLETE CBC W/AUTO DIFF WBC: CPT

## 2024-07-17 PROCEDURE — 2580000003 HC RX 258: Performed by: INTERNAL MEDICINE

## 2024-07-17 PROCEDURE — 80048 BASIC METABOLIC PNL TOTAL CA: CPT

## 2024-07-17 PROCEDURE — 6360000002 HC RX W HCPCS: Performed by: INTERNAL MEDICINE

## 2024-07-17 PROCEDURE — 94640 AIRWAY INHALATION TREATMENT: CPT

## 2024-07-17 PROCEDURE — 0W9B30Z DRAINAGE OF LEFT PLEURAL CAVITY WITH DRAINAGE DEVICE, PERCUTANEOUS APPROACH: ICD-10-PCS | Performed by: INTERNAL MEDICINE

## 2024-07-17 PROCEDURE — 71045 X-RAY EXAM CHEST 1 VIEW: CPT

## 2024-07-17 PROCEDURE — 36415 COLL VENOUS BLD VENIPUNCTURE: CPT

## 2024-07-17 PROCEDURE — 99233 SBSQ HOSP IP/OBS HIGH 50: CPT | Performed by: NURSE PRACTITIONER

## 2024-07-17 PROCEDURE — 2700000000 HC OXYGEN THERAPY PER DAY

## 2024-07-17 PROCEDURE — 82962 GLUCOSE BLOOD TEST: CPT

## 2024-07-17 PROCEDURE — 86140 C-REACTIVE PROTEIN: CPT

## 2024-07-17 PROCEDURE — 85018 HEMOGLOBIN: CPT

## 2024-07-17 PROCEDURE — 2140000000 HC CCU INTERMEDIATE R&B

## 2024-07-17 PROCEDURE — 94669 MECHANICAL CHEST WALL OSCILL: CPT

## 2024-07-17 PROCEDURE — 83735 ASSAY OF MAGNESIUM: CPT

## 2024-07-17 PROCEDURE — 5A2204Z RESTORATION OF CARDIAC RHYTHM, SINGLE: ICD-10-PCS | Performed by: INTERNAL MEDICINE

## 2024-07-17 PROCEDURE — 85014 HEMATOCRIT: CPT

## 2024-07-17 PROCEDURE — 93010 ELECTROCARDIOGRAM REPORT: CPT | Performed by: INTERNAL MEDICINE

## 2024-07-17 PROCEDURE — 2580000003 HC RX 258: Performed by: STUDENT IN AN ORGANIZED HEALTH CARE EDUCATION/TRAINING PROGRAM

## 2024-07-17 PROCEDURE — 6370000000 HC RX 637 (ALT 250 FOR IP): Performed by: INTERNAL MEDICINE

## 2024-07-17 PROCEDURE — 32561 LYSE CHEST FIBRIN INIT DAY: CPT | Performed by: INTERNAL MEDICINE

## 2024-07-17 RX ADMIN — ALBUTEROL SULFATE 2.5 MG: 2.5 SOLUTION RESPIRATORY (INHALATION) at 07:40

## 2024-07-17 RX ADMIN — ALTEPLASE 10 MG: KIT at 16:24

## 2024-07-17 RX ADMIN — PIPERACILLIN AND TAZOBACTAM 3375 MG: 3; .375 INJECTION, POWDER, LYOPHILIZED, FOR SOLUTION INTRAVENOUS at 06:18

## 2024-07-17 RX ADMIN — FERROUS SULFATE TAB 325 MG (65 MG ELEMENTAL FE) 325 MG: 325 (65 FE) TAB at 09:34

## 2024-07-17 RX ADMIN — MORPHINE SULFATE 1 MG: 2 INJECTION, SOLUTION INTRAMUSCULAR; INTRAVENOUS at 14:52

## 2024-07-17 RX ADMIN — MIRTAZAPINE 15 MG: 15 TABLET, FILM COATED ORAL at 20:11

## 2024-07-17 RX ADMIN — VANCOMYCIN HYDROCHLORIDE 1000 MG: 1 INJECTION, POWDER, LYOPHILIZED, FOR SOLUTION INTRAVENOUS at 14:38

## 2024-07-17 RX ADMIN — FLUTICASONE PROPIONATE 2 SPRAY: 50 SPRAY, METERED NASAL at 09:39

## 2024-07-17 RX ADMIN — PANTOPRAZOLE SODIUM 40 MG: 40 INJECTION, POWDER, FOR SOLUTION INTRAVENOUS at 09:34

## 2024-07-17 RX ADMIN — ALBUTEROL SULFATE 2.5 MG: 2.5 SOLUTION RESPIRATORY (INHALATION) at 00:30

## 2024-07-17 RX ADMIN — Medication 4 ML: at 04:25

## 2024-07-17 RX ADMIN — BUSPIRONE HYDROCHLORIDE 10 MG: 5 TABLET ORAL at 20:11

## 2024-07-17 RX ADMIN — FERROUS SULFATE TAB 325 MG (65 MG ELEMENTAL FE) 325 MG: 325 (65 FE) TAB at 20:11

## 2024-07-17 RX ADMIN — Medication 4 ML: at 07:40

## 2024-07-17 RX ADMIN — DIVALPROEX SODIUM 1000 MG: 125 CAPSULE, COATED PELLETS ORAL at 20:11

## 2024-07-17 RX ADMIN — CARBAMAZEPINE 300 MG: 100 SUSPENSION ORAL at 20:11

## 2024-07-17 RX ADMIN — VANCOMYCIN HYDROCHLORIDE 1000 MG: 1 INJECTION, POWDER, LYOPHILIZED, FOR SOLUTION INTRAVENOUS at 06:19

## 2024-07-17 RX ADMIN — MORPHINE SULFATE 1 MG: 2 INJECTION, SOLUTION INTRAMUSCULAR; INTRAVENOUS at 00:39

## 2024-07-17 RX ADMIN — Medication 4 ML: at 00:35

## 2024-07-17 RX ADMIN — PIPERACILLIN AND TAZOBACTAM 3375 MG: 3; .375 INJECTION, POWDER, LYOPHILIZED, FOR SOLUTION INTRAVENOUS at 14:33

## 2024-07-17 RX ADMIN — LEVETIRACETAM 1500 MG: 100 SOLUTION ORAL at 20:11

## 2024-07-17 RX ADMIN — SODIUM CHLORIDE, PRESERVATIVE FREE 10 ML: 5 INJECTION INTRAVENOUS at 09:39

## 2024-07-17 RX ADMIN — PIPERACILLIN AND TAZOBACTAM 3375 MG: 3; .375 INJECTION, POWDER, LYOPHILIZED, FOR SOLUTION INTRAVENOUS at 20:14

## 2024-07-17 RX ADMIN — CARBAMAZEPINE 300 MG: 100 SUSPENSION ORAL at 09:37

## 2024-07-17 RX ADMIN — IPRATROPIUM BROMIDE AND ALBUTEROL SULFATE 1 DOSE: .5; 2.5 SOLUTION RESPIRATORY (INHALATION) at 12:15

## 2024-07-17 RX ADMIN — LEVETIRACETAM 1500 MG: 100 SOLUTION ORAL at 09:38

## 2024-07-17 RX ADMIN — Medication 1 CAPSULE: at 09:34

## 2024-07-17 RX ADMIN — WATER 5 MG: 1 INJECTION INTRAMUSCULAR; INTRAVENOUS; SUBCUTANEOUS at 16:24

## 2024-07-17 RX ADMIN — ATORVASTATIN CALCIUM 40 MG: 40 TABLET, FILM COATED ORAL at 09:34

## 2024-07-17 RX ADMIN — ALBUTEROL SULFATE 2.5 MG: 2.5 SOLUTION RESPIRATORY (INHALATION) at 04:20

## 2024-07-17 RX ADMIN — VANCOMYCIN HYDROCHLORIDE 1000 MG: 1 INJECTION, POWDER, LYOPHILIZED, FOR SOLUTION INTRAVENOUS at 20:13

## 2024-07-17 RX ADMIN — SODIUM CHLORIDE, PRESERVATIVE FREE 10 ML: 5 INJECTION INTRAVENOUS at 20:11

## 2024-07-17 RX ADMIN — DIVALPROEX SODIUM 1000 MG: 125 CAPSULE, COATED PELLETS ORAL at 09:34

## 2024-07-17 RX ADMIN — BUSPIRONE HYDROCHLORIDE 10 MG: 5 TABLET ORAL at 09:36

## 2024-07-17 ASSESSMENT — PAIN SCALES - GENERAL
PAINLEVEL_OUTOF10: 8
PAINLEVEL_OUTOF10: 0

## 2024-07-17 ASSESSMENT — PAIN DESCRIPTION - LOCATION: LOCATION: ABDOMEN;BACK

## 2024-07-17 ASSESSMENT — PAIN DESCRIPTION - DESCRIPTORS: DESCRIPTORS: ACHING;DISCOMFORT

## 2024-07-17 ASSESSMENT — PAIN SCALES - WONG BAKER: WONGBAKER_NUMERICALRESPONSE: NO HURT

## 2024-07-17 NOTE — PROGRESS NOTES
Infectious Disease Progress Note  2024   Patient Name: Jm Garcia : 1960   Impression  Left Aspiration Pneumonia Complicated by Left-Sided Empyema:  Unstageable Sacral Pressure Wound:  Allergy to nitrofurantoin (hives, swelling):  She has had past aspiration pneumonia and is at great risk for recurrence due to her poor dentation, although now has a G-tube placed. Will empirically broadly cover as recent past aspiration.  CrCl 220. T-max 99.1, initial leukocytosis of 11.6 normalized on 7/15. Hypoalbuminemia. Resp panel and MRSA by PCR negative. Pct 0.181, 0.237.  on dwt to 178.   -BC 0/2 NGTD at 24h  -PCXR: probable large left sided pneumonia, underlying mass not excluded. CT chest with contrast recommended.   -CT Chest wo Contrast: No significant elevated lung parenchyma appreciated on the left side. Absence of   contrast limits evaluation however the findings are concerning for underlying   empyema formation.   7/15-S/p per Dr. Oliva, IR, CT guided left chest tube placement for empyema. 450 cc purulent drainage. Fluid analysis: RBC 1,135,000. Glucose 237. , Protein 1.6. ,445. Culture: Pending  -PCXR: Interval placement of left chest tube with improvement in aeration   in the left upper lung zone. There is persistent left mid and lower lung zone   opacification likely due to effusion.   Dr. Knox, Pulmonology, started a 5 day course of IV Solumedrol.   Recent G-Tube Placement Secondary to Aspiration:  MRDD/ Cerebral Palsy/ Seizures:  COPD Oxygen Dependent 2 L/NC:  Multi-morbidity: per PMHx: MRDD, anxiety disorder/ depression, chronic back pain, COPD oxygen dependent, DMII, hypothyroidism, iron deficiency anemia, cerebral palsy, CVA ,epilepsy, DMII, diverticulosis, GERD, HLD, HTN, seizures, past aspiration pneumonia  Plan:  Continue IV Zosyn 3,375 mg q8h, continue as has had recent past aspiration pneumonia  Continue IV vancomycin per pharmacy dosing  Duration  bleeding, unspecified    Bradyarrhythmia    Dyskinesia    Hemiplegia affecting left nondominant side (HCC)    Insomnia    Intermittent explosive disorder    Intraparenchymal hemorrhage of brain (HCC)    Iron deficiency anemia, unspecified    Organic personality syndrome    Subdural hematoma (HCC)    Urinary incontinence    SOB (shortness of breath)    Urinary problem    Acute cystitis without hematuria    Weakness    Contusion of right knee    Generalized weakness    Acute hypoxemic respiratory failure (HCC)    Moderate malnutrition (HCC)    Mucus plugging of bronchi    Empyema of left pleural space (HCC)       Active Problems  Principal Problem:    Acute hypoxemic respiratory failure (HCC)  Active Problems:    Moderate malnutrition (HCC)    Mucus plugging of bronchi    Empyema of left pleural space (HCC)  Resolved Problems:    * No resolved hospital problems. *    Electronically signed by: Electronically signed by INOCENCIO Ibrahim CNP on 7/17/2024 at 10:33 AM

## 2024-07-17 NOTE — PROGRESS NOTES
Attempted to call [facility] with AMBROSIO Ziegler to obtain consent for TPA of chest tube. No answer once rep transferred this RN to pt's guardian. Message left.

## 2024-07-17 NOTE — PLAN OF CARE
Problem: Discharge Planning  Goal: Discharge to home or other facility with appropriate resources  Outcome: Progressing     Problem: Safety - Adult  Goal: Free from fall injury  Outcome: Progressing     Problem: Chronic Conditions and Co-morbidities  Goal: Patient's chronic conditions and co-morbidity symptoms are monitored and maintained or improved  Outcome: Progressing     Problem: Nutrition Deficit:  Goal: Optimize nutritional status  Outcome: Progressing     Problem: Pain  Goal: Verbalizes/displays adequate comfort level or baseline comfort level  Outcome: Progressing     Problem: Skin/Tissue Integrity  Goal: Absence of new skin breakdown  Description: 1.  Monitor for areas of redness and/or skin breakdown  2.  Assess vascular access sites hourly  3.  Every 4-6 hours minimum:  Change oxygen saturation probe site  4.  Every 4-6 hours:  If on nasal continuous positive airway pressure, respiratory therapy assess nares and determine need for appliance change or resting period.  Outcome: Progressing

## 2024-07-17 NOTE — PLAN OF CARE
Problem: Discharge Planning  Goal: Discharge to home or other facility with appropriate resources  7/17/2024 1114 by Annie Chaney RN  Outcome: Progressing  7/17/2024 1113 by Annie Chaney RN  Outcome: Progressing     Problem: Safety - Adult  Goal: Free from fall injury  7/17/2024 1114 by Annie Chaney RN  Outcome: Progressing  7/17/2024 1113 by Annie Chaney RN  Outcome: Progressing     Problem: Chronic Conditions and Co-morbidities  Goal: Patient's chronic conditions and co-morbidity symptoms are monitored and maintained or improved  7/17/2024 1114 by Annie Chaney RN  Outcome: Progressing  7/17/2024 1113 by Annie Chaney RN  Outcome: Progressing     Problem: Nutrition Deficit:  Goal: Optimize nutritional status  7/17/2024 1114 by Annie Chaney RN  Outcome: Progressing  7/17/2024 1113 by Annie Chaney RN  Outcome: Progressing     Problem: Pain  Goal: Verbalizes/displays adequate comfort level or baseline comfort level  7/17/2024 1114 by Annie Chaney RN  Outcome: Progressing  7/17/2024 1113 by Annie Chaney RN  Outcome: Progressing     Problem: Skin/Tissue Integrity  Goal: Absence of new skin breakdown  Description: 1.  Monitor for areas of redness and/or skin breakdown  2.  Assess vascular access sites hourly  3.  Every 4-6 hours minimum:  Change oxygen saturation probe site  4.  Every 4-6 hours:  If on nasal continuous positive airway pressure, respiratory therapy assess nares and determine need for appliance change or resting period.  7/17/2024 1114 by Annie Chaney RN  Outcome: Progressing  7/17/2024 1113 by Annie Chaney RN  Outcome: Progressing

## 2024-07-17 NOTE — PROGRESS NOTES
V2.0  [facility] Hospitalist Progress Note      Name:  Jm Garcia /Age/Sex: 1960  (63 y.o. female)   MRN & CSN:  [id number] & [id number] Encounter Date/Time: 2024 1:42 PM EDT    [facility] P[facility]: Yuly Bro APRN - CNP       Hospital Day: 4    Assessment and Plan:   Jm Garcia is a 63 y.o. female with pmh of  who presents with Acute hypoxemic respiratory failure (HCC)      Plan:    Acute hypoxemic respiratory failure:  Left-sided empyema:  Chronic hypoxemic respiratory failure on 2 L home oxygen:  History of COPD:  Sepsis:  -Patient s/p Vanco/Zosyn and doxycycline in the ED  -Continue with vancomycin/Zosyn  -Pro-Js 0.181, lactate 1.7  -Patient status post 30 mL/kilogram sepsis bolus in the ED  -Underwent CT-guided chest tube placement by IR on 7/15/2024.  Had 500 cc of purulent drainage during chest tube insertion.  Follow culture results  -Pulmonology on board.  Will likely manage chest tube.  -Infectious disease following     Normocytic anemia-history of iron deficiency anemia.  Hemoglobin dropped from 8-9 S2 sounds, monitor H&H every 6 hours/24 hours and follow anemia workup/stool occult blood.  Will start with pantoprazole IV and continue iron supplements.  Hypertension-hold amlodipine and lisinopril in the setting of sepsis and soft blood pressure readings  Seizure disorder-continue with carbamazepine, Keppra  History of hypotension-continue midodrine with holding parameters  history of anxiety/depression-continue with BuSpar, Depakote and mirtazapine  Type 2 diabetes mellitus-hold oral hypoglycemics, start with low-dose sliding scale insulin with hypoglycemia protocol  Hypothyroidism-continue levothyroxine  Hyperlipidemia-continue atorvastatin  Diet: Placed dietitian for tube feeds placement and managed      Diet Diet NPO  ADULT TUBE FEEDING; PEG; Diabetic; Bolus; 4 Times Daily, Other (specify); q 6 hours; 237; Syringe Push; 88; Before and after each bolus   DVT Prophylaxis []  iterative reconstruction technique. FINDINGS: Coronary artery calcifications in the right and left main vessels.  (msn13) LUNGS: There is no significant aerated lung parenchyma noted on the left side with a large complex appearing fluid collection replacing most of the left lung probably within the pleural space with atelectasis and infiltrate noted within the aerated left upper lobe. No tracheomalacia. No bronchiectasis. Minimal emphysematous changes in the right lung.  HEART AND PERICARDIUM: Within normal limits. AORTA: Normal caliber aorta. ADENOPATHY/MEDIASTINUM: There are enlarged lymph nodes within the mediastinum the largest in the subcarinal space 1.5 x 1.5 cm. LIMITED VIEWS OF THE ABDOMEN: Within normal limits. OSSEOUS STRUCTURES: No sclerotic or lytic lesions. No acute fractures are identified. OVERLYING SOFT TISSUES: Unremarkable. THYROID: The thyroid is unremarkable.     No significant elevated lung parenchyma appreciated on the left side. Absence of contrast limits evaluation however the findings are concerning for underlying empyema formation. Electronically signed by Pj Steve    XR CHEST PORTABLE    Result Date: 7/14/2024  EXAMINATION: XR CHEST PORTABLE, 7/14/2024 12:22 PM HISTORY: r/o ptx COMPARISON:  No comparisons available.  Technique: Single view. Findings: Complete opacification of the left hemithorax. No pneumothorax. Heart is normal size. Mediastinal and hilar contours are within normal limits. Bony thorax no acute abnormality.     Probable large left sided pneumonia, underlying mass not excluded. CT chest with contrast recommended Electronically signed by Pj Steve      Electronically signed by Pinky Brewer MD on 7/17/2024 at 4:34 PM

## 2024-07-17 NOTE — PROGRESS NOTES
Pulmonary and Critical Care  Progress Note      VITALS:  BP (!) 148/61   Pulse 83   Temp 98.5 °F (36.9 °C) (Oral)   Resp 20   Ht 1.473 m (4' 10\")   Wt 53 kg (116 lb 13.5 oz)   SpO2 96%   BMI 24.42 kg/m²     Subjective:   CHIEF COMPLAINT :SOB     HPI:                The patient is a 63 y.o. female is lying in the bed. She is non verbal. She has not much output from the chest tube. CXR showed not much left lung reexpansion    Objective:   PHYSICAL EXAM:    LUNGS:Decreased air entry left base  Abd-soft, BS+,NT  Ext- no pedal edema  CVS-s1s2, no murmurs      DATA:    CBC:  Recent Labs     07/15/24  0955 07/15/24  1415 07/16/24  0603 07/16/24  0936 07/16/24  1922 07/17/24  0219 07/17/24  0810   WBC 10.2  --  10.3  --   --  7.5  --    RBC 3.04*  --  2.95*  --   --  3.19*  --    HGB 7.9*   < > 7.7*   < > 8.4* 8.2* 9.1*   HCT 26.3*   < > 26.1*   < > 29.3* 29.0* 33.1*   *  --  427  --   --  397  --    MCV 86.5  --  88.5  --   --  90.9  --    MCH 26.0*  --  26.1*  --   --  25.7*  --    MCHC 30.0*  --  29.5*  --   --  28.3*  --    RDW 19.9*  --  19.9*  --   --  19.5*  --     < > = values in this interval not displayed.      BMP:  Recent Labs     07/15/24  0248 07/16/24  0603 07/17/24  0219    135 143   K 4.4 3.8 3.9   CL 99 99 105   CO2 27 25 27   BUN 10 11 13   CREATININE 0.3* 0.2* 0.2*   CALCIUM 9.3 8.6 8.6   GLUCOSE 201* 243* 138*      ABG:  No results for input(s): \"PH\", \"PO2ART\", \"WDN9LXT\", \"HCO3\", \"BEART\", \"O2SAT\" in the last 72 hours.  BNP  Lab Results   Component Value Date    BNP 49 11/12/2012      D-Dimer:  Lab Results   Component Value Date    DDIMER 323 (H) 10/29/2022      Radiology: Mild interval increase in left pleural effusion with chest tube in  place.       Assessment/Plan     Patient Active Problem List    Diagnosis Date Noted    Weakness 02/03/2023     Priority: Medium    Acute cystitis without hematuria 12/23/2022     Priority: Medium    Urinary problem 12/19/2022     Priority: Medium

## 2024-07-17 NOTE — CARE COORDINATION
CM spoke to Pt APS[employer] guardian Amanda.  Discharge plan remains to return to [facility].  Amanda denies any needs at this time.

## 2024-07-17 NOTE — PROGRESS NOTES
PHARMACY VANCOMYCIN MONITORING SERVICE  Pharmacy consulted by Dr. Mo Catalan MD for monitoring and adjustment.    Indication for treatment: Vancomycin indication: Bacteremia  Goal trough: Trough Goal: 15-20 mcg/mL  AUC/MAGALIE: 400-600    Risk Factors for MRSA Identified:   Hospitalization within the past 90 days, Received IV antibiotics within the past 90 days    Pertinent Laboratory Values:   Temp Readings from Last 3 Encounters:   07/17/24 98.3 °F (36.8 °C) (Oral)   07/11/24 98.5 °F (36.9 °C) (Axillary)   05/10/24 98.4 °F (36.9 °C) (Oral)     Recent Labs     07/15/24  0955 07/16/24  0603 07/17/24  0219   WBC 10.2 10.3 7.5       Recent Labs     07/15/24  0248 07/16/24  0603 07/17/24  0219   BUN 10 11 13   CREATININE 0.3* 0.2* 0.2*       Estimated Creatinine Clearance: 220 mL/min (A) (based on SCr of 0.2 mg/dL (L)).    Intake/Output Summary (Last 24 hours) at 7/17/2024 0912  Last data filed at 7/17/2024 0619  Gross per 24 hour   Intake 1652 ml   Output 925 ml   Net 727 ml       Last Encounter Weight:  Wt Readings from Last 3 Encounters:   07/17/24 53 kg (116 lb 13.5 oz)   07/11/24 54.4 kg (120 lb)   05/07/24 52.1 kg (114 lb 13.8 oz)       Pertinent Cultures:   Date    Source    Results  07/14   Blood    NG at 24 hours  07/14   Sputum/Respiratory  Panel Negative  07/14   MRSA Nasal   Negative    Vancomycin level:   TROUGH:  No results for input(s): \"VANCOTROUGH\" in the last 72 hours.  RANDOM:    Recent Labs     07/16/24  1131   VANCORANDOM 10.2         Assessment:  HPI: Jm Garcia is a 63 y.o. female with a pmh of MRDD, cerebral palsy, COPD on 2 L home oxygen, type 2 diabetes mellitus, hypothyroidism, iron deficiency anemia, HLD, anxiety/depression, seizure disorder, essential hypertension, GERD who presents with Acute hypoxemic respiratory failure (HCC)   07/15 - Purulent chest tube drainage  SCr, BUN, and urine output:   Scr 0.2 mg/dL today, baseline  BUN WNL  UOP not documented  Day(s) of therapy: 4 - ID now

## 2024-07-17 NOTE — PLAN OF CARE
Problem: Discharge Planning  Goal: Discharge to home or other facility with appropriate resources  7/17/2024 1115 by Annie Chaney RN  Outcome: Progressing  7/17/2024 1114 by Annie Chaney RN  Outcome: Progressing  7/17/2024 1113 by Annie Chaney RN  Outcome: Progressing     Problem: Safety - Adult  Goal: Free from fall injury  7/17/2024 1115 by Annie Chaney RN  Outcome: Progressing  7/17/2024 1114 by Annie Chaney RN  Outcome: Progressing  7/17/2024 1113 by Annie Chaney RN  Outcome: Progressing     Problem: Chronic Conditions and Co-morbidities  Goal: Patient's chronic conditions and co-morbidity symptoms are monitored and maintained or improved  7/17/2024 1115 by Annie Chaney RN  Outcome: Progressing  7/17/2024 1114 by Annie Chaney RN  Outcome: Progressing  7/17/2024 1113 by Annie Chaney RN  Outcome: Progressing     Problem: Nutrition Deficit:  Goal: Optimize nutritional status  7/17/2024 1115 by Annie Chaney RN  Outcome: Progressing  7/17/2024 1114 by Annie Chaney RN  Outcome: Progressing  7/17/2024 1113 by Annie Chaney RN  Outcome: Progressing     Problem: Pain  Goal: Verbalizes/displays adequate comfort level or baseline comfort level  7/17/2024 1115 by Annie Chaney RN  Outcome: Progressing  7/17/2024 1114 by Annie Chaney RN  Outcome: Progressing  7/17/2024 1113 by Annie Chaney RN  Outcome: Progressing     Problem: Skin/Tissue Integrity  Goal: Absence of new skin breakdown  Description: 1.  Monitor for areas of redness and/or skin breakdown  2.  Assess vascular access sites hourly  3.  Every 4-6 hours minimum:  Change oxygen saturation probe site  4.  Every 4-6 hours:  If on nasal continuous positive airway pressure, respiratory therapy assess nares and determine need for appliance change or resting period.  7/17/2024 1115 by Annie Chaney RN  Outcome: Progressing  7/17/2024 1114 by Annie Chaney RN  Outcome: Progressing  7/17/2024 1113 by Annie Chaney RN  Outcome: Progressing

## 2024-07-18 ENCOUNTER — APPOINTMENT (OUTPATIENT)
Dept: GENERAL RADIOLOGY | Age: 64
DRG: 871 | End: 2024-07-18
Payer: MEDICARE

## 2024-07-18 LAB
ANION GAP SERPL CALCULATED.3IONS-SCNC: 10 MMOL/L (ref 7–16)
BUN SERPL-MCNC: 8 MG/DL (ref 6–23)
CALCIUM SERPL-MCNC: 8.4 MG/DL (ref 8.3–10.6)
CHLORIDE BLD-SCNC: 105 MMOL/L (ref 99–110)
CO2: 30 MMOL/L (ref 21–32)
CREAT SERPL-MCNC: 0.2 MG/DL (ref 0.6–1.1)
CRP SERPL HS-MCNC: 116.7 MG/L
DOSE AMOUNT: NORMAL
DOSE TIME: NORMAL
GFR, ESTIMATED: >90 ML/MIN/1.73M2
GLUCOSE BLD-MCNC: 121 MG/DL (ref 70–99)
GLUCOSE BLD-MCNC: 133 MG/DL (ref 70–99)
GLUCOSE BLD-MCNC: 133 MG/DL (ref 70–99)
GLUCOSE BLD-MCNC: 183 MG/DL (ref 70–99)
GLUCOSE SERPL-MCNC: 131 MG/DL (ref 70–99)
POTASSIUM SERPL-SCNC: 3.6 MMOL/L (ref 3.5–5.1)
SODIUM BLD-SCNC: 145 MMOL/L (ref 135–145)
VANCOMYCIN RANDOM: 18.8 UG/ML

## 2024-07-18 PROCEDURE — 32562 LYSE CHEST FIBRIN SUBQ DAY: CPT | Performed by: INTERNAL MEDICINE

## 2024-07-18 PROCEDURE — 80048 BASIC METABOLIC PNL TOTAL CA: CPT

## 2024-07-18 PROCEDURE — 6360000002 HC RX W HCPCS: Performed by: INTERNAL MEDICINE

## 2024-07-18 PROCEDURE — 99232 SBSQ HOSP IP/OBS MODERATE 35: CPT | Performed by: INTERNAL MEDICINE

## 2024-07-18 PROCEDURE — 36415 COLL VENOUS BLD VENIPUNCTURE: CPT

## 2024-07-18 PROCEDURE — 80202 ASSAY OF VANCOMYCIN: CPT

## 2024-07-18 PROCEDURE — 99233 SBSQ HOSP IP/OBS HIGH 50: CPT | Performed by: NURSE PRACTITIONER

## 2024-07-18 PROCEDURE — 71045 X-RAY EXAM CHEST 1 VIEW: CPT

## 2024-07-18 PROCEDURE — 6360000002 HC RX W HCPCS: Performed by: STUDENT IN AN ORGANIZED HEALTH CARE EDUCATION/TRAINING PROGRAM

## 2024-07-18 PROCEDURE — 6370000000 HC RX 637 (ALT 250 FOR IP): Performed by: STUDENT IN AN ORGANIZED HEALTH CARE EDUCATION/TRAINING PROGRAM

## 2024-07-18 PROCEDURE — 2140000000 HC CCU INTERMEDIATE R&B

## 2024-07-18 PROCEDURE — 86140 C-REACTIVE PROTEIN: CPT

## 2024-07-18 PROCEDURE — 82962 GLUCOSE BLOOD TEST: CPT

## 2024-07-18 PROCEDURE — 2580000003 HC RX 258: Performed by: INTERNAL MEDICINE

## 2024-07-18 PROCEDURE — 2580000003 HC RX 258: Performed by: STUDENT IN AN ORGANIZED HEALTH CARE EDUCATION/TRAINING PROGRAM

## 2024-07-18 PROCEDURE — 94761 N-INVAS EAR/PLS OXIMETRY MLT: CPT

## 2024-07-18 RX ADMIN — PIPERACILLIN AND TAZOBACTAM 3375 MG: 3; .375 INJECTION, POWDER, LYOPHILIZED, FOR SOLUTION INTRAVENOUS at 21:15

## 2024-07-18 RX ADMIN — FERROUS SULFATE TAB 325 MG (65 MG ELEMENTAL FE) 325 MG: 325 (65 FE) TAB at 09:30

## 2024-07-18 RX ADMIN — DIVALPROEX SODIUM 1000 MG: 125 CAPSULE, COATED PELLETS ORAL at 09:29

## 2024-07-18 RX ADMIN — DIVALPROEX SODIUM 1000 MG: 125 CAPSULE, COATED PELLETS ORAL at 20:49

## 2024-07-18 RX ADMIN — ALTEPLASE 10 MG: KIT at 12:54

## 2024-07-18 RX ADMIN — CARBAMAZEPINE 300 MG: 100 SUSPENSION ORAL at 20:50

## 2024-07-18 RX ADMIN — LEVETIRACETAM 1500 MG: 100 SOLUTION ORAL at 20:51

## 2024-07-18 RX ADMIN — Medication 1 CAPSULE: at 09:30

## 2024-07-18 RX ADMIN — PIPERACILLIN AND TAZOBACTAM 3375 MG: 3; .375 INJECTION, POWDER, LYOPHILIZED, FOR SOLUTION INTRAVENOUS at 05:43

## 2024-07-18 RX ADMIN — VANCOMYCIN HYDROCHLORIDE 1000 MG: 1 INJECTION, POWDER, LYOPHILIZED, FOR SOLUTION INTRAVENOUS at 05:44

## 2024-07-18 RX ADMIN — PIPERACILLIN AND TAZOBACTAM 3375 MG: 3; .375 INJECTION, POWDER, LYOPHILIZED, FOR SOLUTION INTRAVENOUS at 13:40

## 2024-07-18 RX ADMIN — CARBAMAZEPINE 300 MG: 100 SUSPENSION ORAL at 09:29

## 2024-07-18 RX ADMIN — SODIUM CHLORIDE, PRESERVATIVE FREE 10 ML: 5 INJECTION INTRAVENOUS at 20:57

## 2024-07-18 RX ADMIN — BUSPIRONE HYDROCHLORIDE 10 MG: 5 TABLET ORAL at 20:49

## 2024-07-18 RX ADMIN — FERROUS SULFATE TAB 325 MG (65 MG ELEMENTAL FE) 325 MG: 325 (65 FE) TAB at 20:49

## 2024-07-18 RX ADMIN — COLLAGENASE SANTYL: 250 OINTMENT TOPICAL at 18:54

## 2024-07-18 RX ADMIN — WATER 5 MG: 1 INJECTION INTRAMUSCULAR; INTRAVENOUS; SUBCUTANEOUS at 12:55

## 2024-07-18 RX ADMIN — MIRTAZAPINE 15 MG: 15 TABLET, FILM COATED ORAL at 20:49

## 2024-07-18 RX ADMIN — PANTOPRAZOLE SODIUM 40 MG: 40 INJECTION, POWDER, FOR SOLUTION INTRAVENOUS at 09:30

## 2024-07-18 RX ADMIN — ATORVASTATIN CALCIUM 40 MG: 40 TABLET, FILM COATED ORAL at 09:30

## 2024-07-18 RX ADMIN — LEVETIRACETAM 1500 MG: 100 SOLUTION ORAL at 09:29

## 2024-07-18 RX ADMIN — SODIUM CHLORIDE, PRESERVATIVE FREE 10 ML: 5 INJECTION INTRAVENOUS at 09:31

## 2024-07-18 RX ADMIN — BUSPIRONE HYDROCHLORIDE 10 MG: 5 TABLET ORAL at 09:30

## 2024-07-18 ASSESSMENT — PAIN SCALES - WONG BAKER: WONGBAKER_NUMERICALRESPONSE: NO HURT

## 2024-07-18 ASSESSMENT — PAIN SCALES - GENERAL: PAINLEVEL_OUTOF10: 0

## 2024-07-18 NOTE — PROGRESS NOTES
Pulmonary and Critical Care  Progress Note      VITALS:  BP (!) 152/70   Pulse 93   Temp 97.9 °F (36.6 °C) (Oral)   Resp 25   Ht 1.473 m (4' 10\")   Wt 54 kg (119 lb 0.8 oz)   SpO2 97%   BMI 24.88 kg/m²     Subjective:   CHIEF COMPLAINT :SOB     HPI:                The patient is a 63 y.o. female dayton in the bed. She is non verbal. She has good output from the chest tube. CXR showed  much left lung reexpansion     Objective:   PHYSICAL EXAM:    LUNGS:Decreased air entry left base  Abd-soft, BS+,NT  Ext- no pedal edema  CVS-s1s2, no murmurs         DATA:    CBC:  Recent Labs     07/16/24  0603 07/16/24  0936 07/16/24  1922 07/17/24  0219 07/17/24  0810   WBC 10.3  --   --  7.5  --    RBC 2.95*  --   --  3.19*  --    HGB 7.7*   < > 8.4* 8.2* 9.1*   HCT 26.1*   < > 29.3* 29.0* 33.1*     --   --  397  --    MCV 88.5  --   --  90.9  --    MCH 26.1*  --   --  25.7*  --    MCHC 29.5*  --   --  28.3*  --    RDW 19.9*  --   --  19.5*  --     < > = values in this interval not displayed.      BMP:  Recent Labs     07/16/24  0603 07/17/24  0219 07/18/24  0401    143 145   K 3.8 3.9 3.6   CL 99 105 105   CO2 25 27 30   BUN 11 13 8   CREATININE 0.2* 0.2* 0.2*   CALCIUM 8.6 8.6 8.4   GLUCOSE 243* 138* 131*      ABG:  No results for input(s): \"PH\", \"PO2ART\", \"NPY1PUR\", \"HCO3\", \"BEART\", \"O2SAT\" in the last 72 hours.  BNP  Lab Results   Component Value Date    BNP 49 11/12/2012      D-Dimer:  Lab Results   Component Value Date    DDIMER 323 (H) 10/29/2022      Radiology: Left pigtail catheter in the left lower lung zone. Moderate left  effusion decreased in size from prior with diffuse left pulmonary infiltrates.       Assessment/Plan     Patient Active Problem List    Diagnosis Date Noted    Weakness 02/03/2023     Priority: Medium    Acute cystitis without hematuria 12/23/2022     Priority: Medium    Urinary problem 12/19/2022     Priority: Medium    SOB (shortness of breath) 11/02/2022     Priority: Medium     Altered mental status 03/10/2014    Pneumonia due to infectious organism 11/10/2012    HTN (hypertension), benign 11/10/2012     Acute on chronic Hypoxic resp failure  Left Empyema s/p left chest tube  ? Left entrapped lung- slowly improving  Anemia  COPD  H/o Seizure disorder  Hypothyroidism  HLD     C/w Intrpleural tpa and DNAse  C/w left chest tube to suction  Keep sats > 92%  Abx  F/u C&S  Inhalers  Keep sats > 92%  CXR in am  DVT and GI Prophylaxis  C/w present management    Electronically signed by Eric Knox MD on 7/18/2024 at 10:52 AM

## 2024-07-18 NOTE — PROGRESS NOTES
07/18/24 1516   Encounter Summary   Encounter Overview/Reason Initial Encounter   Service Provided For Patient   Referral/Consult From [facility]   Support System Other (Comment)  (Legual guardian)   Last Encounter  07/18/24  (Patient was non verbal. This  offered blessings)   Complexity of Encounter Low   Begin Time 1515   End Time  1530   Total Time Calculated 15 min   Spiritual/Emotional needs   Type Spiritual Support   Grief, Loss, and Adjustments   Type Adjustment to illness   Assessment/Intervention/Outcome   Assessment Unable to assess   Intervention Prayer (assurance of)/[place]   Plan and Referrals   Plan/Referrals Continue Support (comment)

## 2024-07-18 NOTE — PROGRESS NOTES
PHARMACY VANCOMYCIN MONITORING SERVICE  Pharmacy consulted by Dr. Mo Catalan MD for monitoring and adjustment.    Indication for treatment: Vancomycin indication: HAP  Goal trough: Trough Goal: 15-20 mcg/mL  AUC/MAGALIE: 400-600    Risk Factors for MRSA Identified:   Hospitalization within the past 90 days, Received IV antibiotics within the past 90 days    Pertinent Laboratory Values:   Temp Readings from Last 3 Encounters:   07/18/24 97.9 °F (36.6 °C) (Oral)   07/11/24 98.5 °F (36.9 °C) (Axillary)   05/10/24 98.4 °F (36.9 °C) (Oral)     Recent Labs     07/15/24  0955 07/16/24  0603 07/17/24  0219   WBC 10.2 10.3 7.5       Recent Labs     07/16/24  0603 07/17/24  0219 07/18/24  0401   BUN 11 13 8   CREATININE 0.2* 0.2* 0.2*       Estimated Creatinine Clearance: 220 mL/min (A) (based on SCr of 0.2 mg/dL (L)).    Intake/Output Summary (Last 24 hours) at 7/18/2024 0901  Last data filed at 7/18/2024 0543  Gross per 24 hour   Intake 559 ml   Output 1625 ml   Net -1066 ml     Last Encounter Weight:  Wt Readings from Last 3 Encounters:   07/18/24 54 kg (119 lb 0.8 oz)   07/11/24 54.4 kg (120 lb)   05/07/24 52.1 kg (114 lb 13.8 oz)     Pertinent Cultures:   Date    Source    Results  07/14   Blood    NG at 24 hours  07/14   Sputum/Respiratory  Panel Negative  07/14   MRSA Nasal   Negative    Vancomycin level:   TROUGH:  No results for input(s): \"VANCOTROUGH\" in the last 72 hours.  RANDOM:    Recent Labs     07/16/24  1131 07/18/24  0401   VANCORANDOM 10.2 18.8     Assessment:  HPI: Jm Garcia is a 63 y.o. female with a pmh of MRDD, cerebral palsy, COPD on 2 L home oxygen, type 2 diabetes mellitus, hypothyroidism, iron deficiency anemia, HLD, anxiety/depression, seizure disorder, essential hypertension, GERD who presents with Acute hypoxemic respiratory failure (HCC)   07/15 - Purulent chest tube drainage (Waiting on these results to see if de-escalation can occur)   SCr, BUN, and urine output: stable   Day(s) of therapy:

## 2024-07-18 NOTE — CARE COORDINATION
CM call to [facility], they remain able to accept Pt back when medically ready.  Pt is long term care, no precert needed.    CM following

## 2024-07-18 NOTE — PROGRESS NOTES
V2.0  [facility] [place] Hospitalist Progress Note      Name:  Jm Garcia /Age/Sex: 1960  (63 y.o. female)   MRN & CSN:  [id number] & [id number] Encounter Date/Time: 2024 1:42 PM EDT    [facility]  [facility] PCP: Yuly Bro APRN - CNP       Hospital Day: 5    Assessment and Plan:   Jm Garcia is a 63 y.o. female with pmh of  who presents with Acute hypoxemic respiratory failure (HCC)      Plan:    Acute hypoxemic respiratory failure:  Left-sided empyema:  Chronic hypoxemic respiratory failure on 2 L home oxygen:  History of COPD:  Sepsis:  -Patient s/p Vanco/Zosyn and doxycycline in the ED  -Continue with vancomycin/Zosyn  -Pro-Js 0.181, lactate 1.7  -Patient status post 30 mL/kilogram sepsis bolus in the ED  -Underwent CT-guided chest tube placement by IR on 7/15/2024.  Had 500 cc of purulent drainage during chest tube insertion.  Follow culture results  -Pulmonology on board, tPA being administered. Repeat CXR improved.  -Infectious disease following     Normocytic anemia-history of iron deficiency anemia.  Hemoglobin dropped from 8-9 S2 sounds, monitor H&H every 6 hours/24 hours and follow anemia workup/stool occult blood.  Will start with pantoprazole IV and continue iron supplements.  Hypertension-hold amlodipine and lisinopril in the setting of sepsis and soft blood pressure readings  Seizure disorder-continue with carbamazepine, Keppra  History of hypotension-continue midodrine with holding parameters  history of anxiety/depression-continue with BuSpar, Depakote and mirtazapine  Type 2 diabetes mellitus-hold oral hypoglycemics, start with low-dose sliding scale insulin with hypoglycemia protocol  Hypothyroidism-continue levothyroxine  Hyperlipidemia-continue atorvastatin  Diet: Placed dietitian for tube feeds placement and managed      Diet Diet NPO  ADULT TUBE FEEDING; PEG; Diabetic; Bolus; 4 Times Daily, Other (specify); q 6 hours; 237; Syringe Push; 88; Before and after each bolus   DVT

## 2024-07-18 NOTE — PROCEDURES
PROCEDURE NOTE  Date: 7/17/2024   Name: Jm Garcia  YOB: 1960    Intra pleural tpa    Date/Time: 7/17/2024 10:19 PM    Performed by: Eric Knox MD  Authorized by: Eric Knox MD  Consent: Written consent obtained.  Risks and benefits: risks, benefits and alternatives were discussed  Consent given by: guardian  Patient understanding: patient does not state understanding of the procedure being performed  Patient consent: the patient's understanding of the procedure does not match consent given  Procedure consent: procedure consent matches procedure scheduled  Relevant documents: relevant documents present and verified  Test results: test results available and properly labeled  Site marked: the operative site was marked  Imaging studies: imaging studies not available  Patient identity confirmed: arm band  Time out: Immediately prior to procedure a \"time out\" was called to verify the correct patient, procedure, equipment, support staff and site/side marked as required.  Preparation: Patient was prepped and draped in the usual sterile fashion.  Local anesthesia used: no    Anesthesia:  Local anesthesia used: no    Sedation:  Patient sedated: no    Patient tolerance: patient tolerated the procedure well with no immediate complications  Comments: I instilled 10 ml saline in the left chest tube following sterile procedure, followed by intrapleural tpa and DNAse followed by 10 ml of sterile saline  Chest tube to be clamped for 1 hour and then unclamp the chest tube to -20 cm h20 suction  CXR in am

## 2024-07-18 NOTE — PROGRESS NOTES
Infectious Disease Progress Note  2024   Patient Name: Jm Garcia : 1960   Impression  Left Aspiration Pneumonia Complicated by Left-Sided Empyema:  Unstageable Sacral Pressure Wound:  Allergy to nitrofurantoin (hives, swelling):  She has had past aspiration pneumonia and is at great risk for recurrence due to her poor dentation, although now has a G-tube placed. Will empirically broadly cover as recent past aspiration.  CrCl 220. T-max 99.1, initial leukocytosis of 11.6 normalized on 7/15. Hypoalbuminemia. Resp panel and MRSA by PCR negative. Pct 0.181, 0.237.  on dwt to 178.   -BC 0/2 NGTD at 24h  -PCXR: probable large left sided pneumonia, underlying mass not excluded. CT chest with contrast recommended.   -CT Chest wo Contrast: No significant elevated lung parenchyma appreciated on the left side. Absence of   contrast limits evaluation however the findings are concerning for underlying   empyema formation.   7/15-S/p per Dr. Oliva, IR, CT guided left chest tube placement for empyema. 450 cc purulent drainage. Fluid analysis: RBC 1,135,000. Glucose 237. , Protein 1.6. ,445. Culture: Pending  -PCXR: Interval placement of left chest tube with improvement in aeration   in the left upper lung zone. There is persistent left mid and lower lung zone   opacification likely due to effusion.   Dr. Knox, Pulmonology, started a 5 day course of IV Solumedrol.   Recent G-Tube Placement Secondary to Aspiration:  MRDD/ Cerebral Palsy/ Seizures:  COPD Oxygen Dependent 2 L/NC:  Multi-morbidity: per PMHx: MRDD, anxiety disorder/ depression, chronic back pain, COPD oxygen dependent, DMII, hypothyroidism, iron deficiency anemia, cerebral palsy, CVA ,epilepsy, DMII, diverticulosis, GERD, HLD, HTN, seizures, past aspiration pneumonia  Plan:  Continue IV Zosyn 3,375 mg q8h, continue as has had recent past aspiration pneumonia, will attempt to transition to po therapy for DC  DC

## 2024-07-19 ENCOUNTER — APPOINTMENT (OUTPATIENT)
Dept: GENERAL RADIOLOGY | Age: 64
DRG: 871 | End: 2024-07-19
Payer: MEDICARE

## 2024-07-19 LAB
ANION GAP SERPL CALCULATED.3IONS-SCNC: 11 MMOL/L (ref 7–16)
BUN SERPL-MCNC: 8 MG/DL (ref 6–23)
CALCIUM SERPL-MCNC: 8.8 MG/DL (ref 8.3–10.6)
CHLORIDE BLD-SCNC: 100 MMOL/L (ref 99–110)
CO2: 30 MMOL/L (ref 21–32)
CREAT SERPL-MCNC: 0.3 MG/DL (ref 0.6–1.1)
CRP SERPL HS-MCNC: 106.6 MG/L
CULTURE: NORMAL
CULTURE: NORMAL
GFR, ESTIMATED: >90 ML/MIN/1.73M2
GLUCOSE BLD-MCNC: 110 MG/DL (ref 70–99)
GLUCOSE BLD-MCNC: 154 MG/DL (ref 70–99)
GLUCOSE BLD-MCNC: 170 MG/DL (ref 70–99)
GLUCOSE SERPL-MCNC: 142 MG/DL (ref 70–99)
HCT VFR BLD CALC: 32.3 % (ref 37–47)
HEMOGLOBIN: 9 GM/DL (ref 12.5–16)
Lab: NORMAL
Lab: NORMAL
POTASSIUM SERPL-SCNC: 3.4 MMOL/L (ref 3.5–5.1)
SODIUM BLD-SCNC: 141 MMOL/L (ref 135–145)
SPECIMEN: NORMAL
SPECIMEN: NORMAL

## 2024-07-19 PROCEDURE — 2140000000 HC CCU INTERMEDIATE R&B

## 2024-07-19 PROCEDURE — 71045 X-RAY EXAM CHEST 1 VIEW: CPT

## 2024-07-19 PROCEDURE — 86140 C-REACTIVE PROTEIN: CPT

## 2024-07-19 PROCEDURE — 2580000003 HC RX 258: Performed by: STUDENT IN AN ORGANIZED HEALTH CARE EDUCATION/TRAINING PROGRAM

## 2024-07-19 PROCEDURE — 2580000003 HC RX 258: Performed by: INTERNAL MEDICINE

## 2024-07-19 PROCEDURE — 6370000000 HC RX 637 (ALT 250 FOR IP): Performed by: STUDENT IN AN ORGANIZED HEALTH CARE EDUCATION/TRAINING PROGRAM

## 2024-07-19 PROCEDURE — 5A2204Z RESTORATION OF CARDIAC RHYTHM, SINGLE: ICD-10-PCS | Performed by: INTERNAL MEDICINE

## 2024-07-19 PROCEDURE — 99232 SBSQ HOSP IP/OBS MODERATE 35: CPT | Performed by: INTERNAL MEDICINE

## 2024-07-19 PROCEDURE — 6360000002 HC RX W HCPCS: Performed by: INTERNAL MEDICINE

## 2024-07-19 PROCEDURE — 94761 N-INVAS EAR/PLS OXIMETRY MLT: CPT

## 2024-07-19 PROCEDURE — 3E0L317 INTRODUCTION OF OTHER THROMBOLYTIC INTO PLEURAL CAVITY, PERCUTANEOUS APPROACH: ICD-10-PCS | Performed by: INTERNAL MEDICINE

## 2024-07-19 PROCEDURE — 6360000002 HC RX W HCPCS: Performed by: STUDENT IN AN ORGANIZED HEALTH CARE EDUCATION/TRAINING PROGRAM

## 2024-07-19 PROCEDURE — 85014 HEMATOCRIT: CPT

## 2024-07-19 PROCEDURE — 85018 HEMOGLOBIN: CPT

## 2024-07-19 PROCEDURE — 36415 COLL VENOUS BLD VENIPUNCTURE: CPT

## 2024-07-19 PROCEDURE — 80048 BASIC METABOLIC PNL TOTAL CA: CPT

## 2024-07-19 PROCEDURE — 82962 GLUCOSE BLOOD TEST: CPT

## 2024-07-19 PROCEDURE — 99233 SBSQ HOSP IP/OBS HIGH 50: CPT | Performed by: NURSE PRACTITIONER

## 2024-07-19 PROCEDURE — 32562 LYSE CHEST FIBRIN SUBQ DAY: CPT | Performed by: INTERNAL MEDICINE

## 2024-07-19 RX ADMIN — BUSPIRONE HYDROCHLORIDE 10 MG: 5 TABLET ORAL at 20:52

## 2024-07-19 RX ADMIN — SODIUM CHLORIDE, PRESERVATIVE FREE 10 ML: 5 INJECTION INTRAVENOUS at 09:38

## 2024-07-19 RX ADMIN — DIVALPROEX SODIUM 1000 MG: 125 CAPSULE, COATED PELLETS ORAL at 20:51

## 2024-07-19 RX ADMIN — PANTOPRAZOLE SODIUM 40 MG: 40 INJECTION, POWDER, FOR SOLUTION INTRAVENOUS at 09:37

## 2024-07-19 RX ADMIN — Medication 1 CAPSULE: at 09:37

## 2024-07-19 RX ADMIN — CARBAMAZEPINE 300 MG: 100 SUSPENSION ORAL at 20:51

## 2024-07-19 RX ADMIN — MORPHINE SULFATE 1 MG: 2 INJECTION, SOLUTION INTRAMUSCULAR; INTRAVENOUS at 08:05

## 2024-07-19 RX ADMIN — MORPHINE SULFATE 1 MG: 2 INJECTION, SOLUTION INTRAMUSCULAR; INTRAVENOUS at 19:42

## 2024-07-19 RX ADMIN — SODIUM CHLORIDE, PRESERVATIVE FREE 10 ML: 5 INJECTION INTRAVENOUS at 20:53

## 2024-07-19 RX ADMIN — FERROUS SULFATE TAB 325 MG (65 MG ELEMENTAL FE) 325 MG: 325 (65 FE) TAB at 20:52

## 2024-07-19 RX ADMIN — POTASSIUM BICARBONATE 40 MEQ: 782 TABLET, EFFERVESCENT ORAL at 05:50

## 2024-07-19 RX ADMIN — WATER 5 MG: 1 INJECTION INTRAMUSCULAR; INTRAVENOUS; SUBCUTANEOUS at 12:21

## 2024-07-19 RX ADMIN — MORPHINE SULFATE 1 MG: 2 INJECTION, SOLUTION INTRAMUSCULAR; INTRAVENOUS at 12:29

## 2024-07-19 RX ADMIN — FERROUS SULFATE TAB 325 MG (65 MG ELEMENTAL FE) 325 MG: 325 (65 FE) TAB at 09:36

## 2024-07-19 RX ADMIN — PIPERACILLIN AND TAZOBACTAM 3375 MG: 3; .375 INJECTION, POWDER, LYOPHILIZED, FOR SOLUTION INTRAVENOUS at 21:25

## 2024-07-19 RX ADMIN — LEVETIRACETAM 1500 MG: 100 SOLUTION ORAL at 20:51

## 2024-07-19 RX ADMIN — ACETAMINOPHEN 650 MG: 325 TABLET ORAL at 20:52

## 2024-07-19 RX ADMIN — ALTEPLASE 10 MG: KIT at 12:20

## 2024-07-19 RX ADMIN — CARBAMAZEPINE 300 MG: 100 SUSPENSION ORAL at 09:37

## 2024-07-19 RX ADMIN — BUSPIRONE HYDROCHLORIDE 10 MG: 5 TABLET ORAL at 09:36

## 2024-07-19 RX ADMIN — MIRTAZAPINE 15 MG: 15 TABLET, FILM COATED ORAL at 20:52

## 2024-07-19 RX ADMIN — LEVETIRACETAM 1500 MG: 100 SOLUTION ORAL at 09:37

## 2024-07-19 RX ADMIN — PIPERACILLIN AND TAZOBACTAM 3375 MG: 3; .375 INJECTION, POWDER, LYOPHILIZED, FOR SOLUTION INTRAVENOUS at 04:36

## 2024-07-19 RX ADMIN — ATORVASTATIN CALCIUM 40 MG: 40 TABLET, FILM COATED ORAL at 09:36

## 2024-07-19 RX ADMIN — DIVALPROEX SODIUM 1000 MG: 125 CAPSULE, COATED PELLETS ORAL at 09:36

## 2024-07-19 RX ADMIN — COLLAGENASE SANTYL: 250 OINTMENT TOPICAL at 09:39

## 2024-07-19 RX ADMIN — PIPERACILLIN AND TAZOBACTAM 3375 MG: 3; .375 INJECTION, POWDER, LYOPHILIZED, FOR SOLUTION INTRAVENOUS at 14:41

## 2024-07-19 ASSESSMENT — PAIN - FUNCTIONAL ASSESSMENT
PAIN_FUNCTIONAL_ASSESSMENT: ACTIVITIES ARE NOT PREVENTED

## 2024-07-19 ASSESSMENT — PAIN DESCRIPTION - LOCATION
LOCATION: GENERALIZED
LOCATION: LEG
LOCATION: GENERALIZED

## 2024-07-19 ASSESSMENT — PAIN DESCRIPTION - ORIENTATION: ORIENTATION: RIGHT;LEFT

## 2024-07-19 ASSESSMENT — PAIN DESCRIPTION - DESCRIPTORS
DESCRIPTORS: ACHING
DESCRIPTORS: ACHING;DISCOMFORT
DESCRIPTORS: ACHING;DISCOMFORT

## 2024-07-19 ASSESSMENT — PAIN DESCRIPTION - PAIN TYPE
TYPE: ACUTE PAIN

## 2024-07-19 ASSESSMENT — PAIN SCALES - WONG BAKER
WONGBAKER_NUMERICALRESPONSE: HURTS EVEN MORE
WONGBAKER_NUMERICALRESPONSE: HURTS A LITTLE BIT
WONGBAKER_NUMERICALRESPONSE: HURTS LITTLE MORE
WONGBAKER_NUMERICALRESPONSE: HURTS A LITTLE BIT
WONGBAKER_NUMERICALRESPONSE: HURTS WHOLE LOT
WONGBAKER_NUMERICALRESPONSE: HURTS A LITTLE BIT
WONGBAKER_NUMERICALRESPONSE: NO HURT
WONGBAKER_NUMERICALRESPONSE: HURTS WHOLE LOT

## 2024-07-19 NOTE — PROGRESS NOTES
Comprehensive Nutrition Assessment    Type and Reason for Visit:  Reassess    Nutrition Recommendations/Plan:   Continue NPO status with bolus tube feeding per order-diabetic (glucerna 1.5) 237 ml x 4 per day with 88 ml free water flush before and after bolus  Will continue to follow up during stay      Malnutrition Assessment:  Malnutrition Status:  Moderate malnutrition (07/16/24 1157)    Context:  Social/Environmental Circumstances (dysphagia, requiring PEG for EN initiation)     Findings of the 6 clinical characteristics of malnutrition:  Energy Intake:  Less than 75% estimated energy requirements for 3 months or longer (Reduced po intakes on average x past 6 months with dysphagia, then PEG placed 2 months ago)  Weight Loss:  Greater than 10% over 6 months (22%)     Body Fat Loss:  No significant body fat loss Orbital, Triceps, Buccal region, Fat Overlying Ribs   Muscle Mass Loss:  Mild muscle mass loss (contractures) Thigh (quadraceps), Calf (gastrocnemius)  Fluid Accumulation:  No significant fluid accumulation Extremities   Strength:  Not Performed    Nutrition Assessment:    Remains NPO with dyshagia, tube feeding via PEG to meet needs. Currently on bolus tube feeding with diabetic formula, 237 ml x 4 per day meeting estimated calorie and protein needs. Will continue to follow at high nutrition risk at this time.    Nutrition Related Findings:    chest tube with aspiration pneumonia,   non-verbal   meds noted: iron, insulin, culturelle, remeron Wound Type: Pressure Injury, Unstageable       Current Nutrition Intake & Therapies:    Average Meal Intake: NPO  Average Supplements Intake: NPO  Current Tube Feeding (TF) Orders:  Feeding Route: PEG  Formula: Diabetic (glucerna 1.5)  Schedule: Bolus  Feeding Regimen: 237 ml 4 times per day  Additives/Modulars: None  Water Flushes: 88 ml before and after bolus  Current TF & Flush Orders Provides: 1422 calories, 78 g protein, 1423 ml free water from tube feeding

## 2024-07-19 NOTE — PROGRESS NOTES
V2.0  [facility] [place] Hospitalist Progress Note      Name:  Jm Garcia /Age/Sex: 1960  (63 y.o. female)   MRN & CSN:  [id number] & [id number] Encounter Date/Time: 2024 1:42 PM EDT    [facility] PCP: Yuly Bro APRN - CNP       Hospital Day: 6    Assessment and Plan:   Jm Garcia is a 63 y.o. female with pmh of  who presents with Acute hypoxemic respiratory failure (HCC)      Plan:    Acute hypoxemic respiratory failure:  Left-sided empyema:  Chronic hypoxemic respiratory failure on 2 L home oxygen:  History of COPD:  Sepsis:  -Patient s/p Vanco/Zosyn and doxycycline in the ED  -Continue with vancomycin/Zosyn  -Pro-Js 0.181, lactate 1.7  -Patient status post 30 mL/kilogram sepsis bolus in the ED  -Underwent CT-guided chest tube placement by IR on 7/15/2024.  Had 500 cc of purulent drainage during chest tube insertion.  Follow culture results  -Pulmonology on board, tPA being administered. Repeat CXR improved.  -Infectious disease following     Normocytic anemia-history of iron deficiency anemia.  Hemoglobin dropped from 8-9 S2 sounds, monitor H&H every 6 hours/24 hours and follow anemia workup/stool occult blood.  Will start with pantoprazole IV and continue iron supplements.  Hypertension-hold amlodipine and lisinopril in the setting of sepsis and soft blood pressure readings  Seizure disorder-continue with carbamazepine, Keppra  History of hypotension-continue midodrine with holding parameters  history of anxiety/depression-continue with BuSpar, Depakote and mirtazapine  Type 2 diabetes mellitus-hold oral hypoglycemics, start with low-dose sliding scale insulin with hypoglycemia protocol  Hypothyroidism-continue levothyroxine  Hyperlipidemia-continue atorvastatin  Diet: Placed dietitian for tube feeds placement and managed      Diet Diet NPO  ADULT TUBE FEEDING; PEG; Diabetic; Bolus; 4 Times Daily, Other (specify); q 6 hours; 237; Syringe Push; 88; Before and after each bolus   DVT  PORTABLE, 7/14/2024 12:22 PM HISTORY: r/o ptx COMPARISON:  No comparisons available.  Technique: Single view. Findings: Complete opacification of the left hemithorax. No pneumothorax. Heart is normal size. Mediastinal and hilar contours are within normal limits. Bony thorax no acute abnormality.     Probable large left sided pneumonia, underlying mass not excluded. CT chest with contrast recommended Electronically signed by Pj Steve      Electronically signed by Pinky Brewer MD on 7/19/2024 at 2:52 PM

## 2024-07-19 NOTE — PROCEDURES
PROCEDURE NOTE  Date: 7/19/2024   Name: Jm Garcia  YOB: 1960    Intra pleural tpa    Date/Time: 7/19/2024 11:09 AM    Performed by: Eric Knox MD  Authorized by: Eric Knox MD  Consent: Written consent obtained.  Risks and benefits: risks, benefits and alternatives were discussed  Consent given by: guardian  Patient understanding: patient does not state understanding of the procedure being performed  Patient consent: the patient's understanding of the procedure matches consent given  Procedure consent: procedure consent matches procedure scheduled  Relevant documents: relevant documents present and verified  Test results: test results available and properly labeled  Site marked: the operative site was marked  Imaging studies: imaging studies available  Patient identity confirmed: arm band  Time out: Immediately prior to procedure a \"time out\" was called to verify the correct patient, procedure, equipment, support staff and site/side marked as required.  Preparation: Patient was prepped and draped in the usual sterile fashion.  Local anesthesia used: no    Anesthesia:  Local anesthesia used: no    Sedation:  Patient sedated: no    Patient tolerance: patient tolerated the procedure well with no immediate complications  Comments: Instilled 10 mg of intrappleural tpa and then 5 mg of DNAse  The chest tube was flushed with 10 ml of sterile saline before and after the medications

## 2024-07-19 NOTE — PROGRESS NOTES
Infectious Disease Progress Note  2024   Patient Name: Jm Garcia : 1960   Impression  Left Aspiration Pneumonia Complicated by Left-Sided Empyema:  Unstageable Sacral Pressure Wound:  Allergy to nitrofurantoin (hives, swelling):  She has had past aspiration pneumonia and is at great risk for recurrence due to her poor dentation, although now has a G-tube placed. Will empirically broadly cover as recent past aspiration.  CrCl 146. T-max 99.1, initial leukocytosis of 11.6 normalized on 7/15. Hypoalbuminemia. Resp panel and MRSA by PCR negative. Pct 0.181, 0.237.  on dwt to 178.   -BC 0/2 NGTD at 24h  -PCXR: probable large left sided pneumonia, underlying mass not excluded. CT chest with contrast recommended.   -CT Chest wo Contrast: No significant elevated lung parenchyma appreciated on the left side. Absence of   contrast limits evaluation however the findings are concerning for underlying   empyema formation.   7/15-S/p per Dr. Oliva, IR, CT guided left chest tube placement for empyema. 450 cc purulent drainage. Fluid analysis: RBC 1,135,000. Glucose 237. , Protein 1.6. ,445. Culture: Pending  -PCXR: Interval placement of left chest tube with improvement in aeration   in the left upper lung zone. There is persistent left mid and lower lung zone   opacification likely due to effusion.   -PCXR: pigtail catheter in left pleural space. Moderate left pleural effusion. Diffuse moderate atelectasis of the left lung. The right lung is clear. No significant change.   Dr. Knox, Pulmonology, started a 5 day course of IV Solumedrol.   Recent G-Tube Placement Secondary to Aspiration:  MRDD/ Cerebral Palsy/ Seizures:  COPD Oxygen Dependent 2 L/NC:  Multi-morbidity: per PMHx: MRDD, anxiety disorder/ depression, chronic back pain, COPD oxygen dependent, DMII, hypothyroidism, iron deficiency anemia, cerebral palsy, CVA ,epilepsy, DMII, diverticulosis, GERD, HLD, HTN,

## 2024-07-19 NOTE — PROGRESS NOTES
Pulmonary and Critical Care  Progress Note      VITALS:  BP (!) 148/67   Pulse 85   Temp 97.6 °F (36.4 °C) (Oral)   Resp 27   Ht 1.473 m (4' 10\")   Wt 49.3 kg (108 lb 11 oz)   SpO2 98%   BMI 22.72 kg/m²     Subjective:   CHIEF COMPLAINT :SOB     HPI:                The patient is a 63 y.o. female is lying  in the bed. She is non verbal. She has good output from the chest tube. CXR showed  much left lung reexpansion with residual left pleural effusion    Objective:   PHYSICAL EXAM:    LUNGS:Decreased air entry left base  Abd-soft, BS+,NT  Ext- no pedal edema  CVS-s1s2, no murmurs      DATA:    CBC:  Recent Labs     07/17/24  0219 07/17/24  0810 07/19/24  0147   WBC 7.5  --   --    RBC 3.19*  --   --    HGB 8.2* 9.1* 9.0*   HCT 29.0* 33.1* 32.3*     --   --    MCV 90.9  --   --    MCH 25.7*  --   --    MCHC 28.3*  --   --    RDW 19.5*  --   --       BMP:  Recent Labs     07/17/24  0219 07/18/24  0401 07/19/24  0147    145 141   K 3.9 3.6 3.4*    105 100   CO2 27 30 30   BUN 13 8 8   CREATININE 0.2* 0.2* 0.3*   CALCIUM 8.6 8.4 8.8   GLUCOSE 138* 131* 142*      ABG:  No results for input(s): \"PH\", \"PO2ART\", \"EZO1CCR\", \"HCO3\", \"BEART\", \"O2SAT\" in the last 72 hours.  BNP  Lab Results   Component Value Date    BNP 49 11/12/2012      D-Dimer:  Lab Results   Component Value Date    DDIMER 323 (H) 10/29/2022      Radiology: There is a pigtail catheter in the left pleural space. Moderate left  pleural effusion. There is diffuse moderate atelectasis of the left lung. The  right lung is clear. The heart size is normal.  The bony thorax is intact      Assessment/Plan     Patient Active Problem List    Diagnosis Date Noted    Weakness 02/03/2023     Priority: Medium    Acute cystitis without hematuria 12/23/2022     Priority: Medium    Urinary problem 12/19/2022     Priority: Medium    SOB (shortness of breath) 11/02/2022     Priority: Medium    COVID-19 10/29/2022     Priority: Medium    Abnormal uterine

## 2024-07-19 NOTE — CARE COORDINATION
CM met with Pt guardimohamud Patel in Pt room.  Amanda expressed concerns with Pt's facility, stating concerns about care, wounds, diet, and that the Pt should not be long term care at this time.      Per Sayra/Lobito, Pt's insurance cut for skilled care May 31.  Pt was then transitioned to long term care.     Amanda requested a referral be made to [facility].  CM call to [facility] with referral.     PS to Dr. Brewer to update.     4:05 PM   CM contacted by [facility], they are able to accept Pt when medically ready.  Pt will not require precert.     Pt guardian updated  PS to Dr. Brewer to update

## 2024-07-20 ENCOUNTER — APPOINTMENT (OUTPATIENT)
Dept: GENERAL RADIOLOGY | Age: 64
DRG: 871 | End: 2024-07-20
Payer: MEDICARE

## 2024-07-20 LAB
ANION GAP SERPL CALCULATED.3IONS-SCNC: 10 MMOL/L (ref 7–16)
BASOPHILS ABSOLUTE: 0 K/CU MM
BASOPHILS RELATIVE PERCENT: 0.2 % (ref 0–1)
BUN SERPL-MCNC: 11 MG/DL (ref 6–23)
CALCIUM SERPL-MCNC: 8.8 MG/DL (ref 8.3–10.6)
CHLORIDE BLD-SCNC: 103 MMOL/L (ref 99–110)
CO2: 29 MMOL/L (ref 21–32)
CREAT SERPL-MCNC: 0.3 MG/DL (ref 0.6–1.1)
DIFFERENTIAL TYPE: ABNORMAL
EOSINOPHILS ABSOLUTE: 0.1 K/CU MM
EOSINOPHILS RELATIVE PERCENT: 0.7 % (ref 0–3)
GFR, ESTIMATED: >90 ML/MIN/1.73M2
GLUCOSE BLD-MCNC: 104 MG/DL (ref 70–99)
GLUCOSE BLD-MCNC: 154 MG/DL (ref 70–99)
GLUCOSE BLD-MCNC: 160 MG/DL (ref 70–99)
GLUCOSE BLD-MCNC: 177 MG/DL (ref 70–99)
GLUCOSE SERPL-MCNC: 146 MG/DL (ref 70–99)
HCT VFR BLD CALC: 30.6 % (ref 37–47)
HEMOGLOBIN: 8.7 GM/DL (ref 12.5–16)
IMMATURE NEUTROPHIL %: 1.4 % (ref 0–0.43)
LYMPHOCYTES ABSOLUTE: 1.5 K/CU MM
LYMPHOCYTES RELATIVE PERCENT: 17.1 % (ref 24–44)
MCH RBC QN AUTO: 26.1 PG (ref 27–31)
MCHC RBC AUTO-ENTMCNC: 28.4 % (ref 32–36)
MCV RBC AUTO: 91.9 FL (ref 78–100)
MONOCYTES ABSOLUTE: 0.8 K/CU MM
MONOCYTES RELATIVE PERCENT: 9.6 % (ref 0–4)
NEUTROPHILS ABSOLUTE: 6.2 K/CU MM
NEUTROPHILS RELATIVE PERCENT: 71 % (ref 36–66)
NUCLEATED RBC %: 0 %
PDW BLD-RTO: 18.2 % (ref 11.7–14.9)
PLATELET # BLD: 370 K/CU MM (ref 140–440)
PMV BLD AUTO: 8.6 FL (ref 7.5–11.1)
POTASSIUM SERPL-SCNC: 4.9 MMOL/L (ref 3.5–5.1)
RBC # BLD: 3.33 M/CU MM (ref 4.2–5.4)
SODIUM BLD-SCNC: 142 MMOL/L (ref 135–145)
TOTAL IMMATURE NEUTOROPHIL: 0.12 K/CU MM
TOTAL NUCLEATED RBC: 0 K/CU MM
WBC # BLD: 8.7 K/CU MM (ref 4–10.5)

## 2024-07-20 PROCEDURE — 3E0L317 INTRODUCTION OF OTHER THROMBOLYTIC INTO PLEURAL CAVITY, PERCUTANEOUS APPROACH: ICD-10-PCS | Performed by: INTERNAL MEDICINE

## 2024-07-20 PROCEDURE — 6360000002 HC RX W HCPCS: Performed by: INTERNAL MEDICINE

## 2024-07-20 PROCEDURE — 5A2204Z RESTORATION OF CARDIAC RHYTHM, SINGLE: ICD-10-PCS | Performed by: INTERNAL MEDICINE

## 2024-07-20 PROCEDURE — 71045 X-RAY EXAM CHEST 1 VIEW: CPT

## 2024-07-20 PROCEDURE — 99233 SBSQ HOSP IP/OBS HIGH 50: CPT | Performed by: INTERNAL MEDICINE

## 2024-07-20 PROCEDURE — 6360000002 HC RX W HCPCS: Performed by: STUDENT IN AN ORGANIZED HEALTH CARE EDUCATION/TRAINING PROGRAM

## 2024-07-20 PROCEDURE — 82962 GLUCOSE BLOOD TEST: CPT

## 2024-07-20 PROCEDURE — 87205 SMEAR GRAM STAIN: CPT

## 2024-07-20 PROCEDURE — 32562 LYSE CHEST FIBRIN SUBQ DAY: CPT | Performed by: INTERNAL MEDICINE

## 2024-07-20 PROCEDURE — 94761 N-INVAS EAR/PLS OXIMETRY MLT: CPT

## 2024-07-20 PROCEDURE — 2580000003 HC RX 258: Performed by: INTERNAL MEDICINE

## 2024-07-20 PROCEDURE — 87070 CULTURE OTHR SPECIMN AEROBIC: CPT

## 2024-07-20 PROCEDURE — 80048 BASIC METABOLIC PNL TOTAL CA: CPT

## 2024-07-20 PROCEDURE — 36415 COLL VENOUS BLD VENIPUNCTURE: CPT

## 2024-07-20 PROCEDURE — 2580000003 HC RX 258: Performed by: STUDENT IN AN ORGANIZED HEALTH CARE EDUCATION/TRAINING PROGRAM

## 2024-07-20 PROCEDURE — 2140000000 HC CCU INTERMEDIATE R&B

## 2024-07-20 PROCEDURE — 6370000000 HC RX 637 (ALT 250 FOR IP): Performed by: STUDENT IN AN ORGANIZED HEALTH CARE EDUCATION/TRAINING PROGRAM

## 2024-07-20 PROCEDURE — 85025 COMPLETE CBC W/AUTO DIFF WBC: CPT

## 2024-07-20 RX ADMIN — CARBAMAZEPINE 300 MG: 100 SUSPENSION ORAL at 22:04

## 2024-07-20 RX ADMIN — SODIUM CHLORIDE, PRESERVATIVE FREE 10 ML: 5 INJECTION INTRAVENOUS at 08:22

## 2024-07-20 RX ADMIN — SODIUM CHLORIDE: 9 INJECTION, SOLUTION INTRAVENOUS at 13:16

## 2024-07-20 RX ADMIN — LEVETIRACETAM 1500 MG: 100 SOLUTION ORAL at 22:06

## 2024-07-20 RX ADMIN — COLLAGENASE SANTYL: 250 OINTMENT TOPICAL at 08:22

## 2024-07-20 RX ADMIN — MIRTAZAPINE 15 MG: 15 TABLET, FILM COATED ORAL at 22:02

## 2024-07-20 RX ADMIN — ALTEPLASE 10 MG: KIT at 12:30

## 2024-07-20 RX ADMIN — LEVETIRACETAM 1500 MG: 100 SOLUTION ORAL at 08:24

## 2024-07-20 RX ADMIN — DIVALPROEX SODIUM 1000 MG: 125 CAPSULE, COATED PELLETS ORAL at 08:11

## 2024-07-20 RX ADMIN — MORPHINE SULFATE 1 MG: 2 INJECTION, SOLUTION INTRAMUSCULAR; INTRAVENOUS at 04:17

## 2024-07-20 RX ADMIN — BUSPIRONE HYDROCHLORIDE 10 MG: 5 TABLET ORAL at 08:11

## 2024-07-20 RX ADMIN — CARBAMAZEPINE 300 MG: 100 SUSPENSION ORAL at 08:12

## 2024-07-20 RX ADMIN — SODIUM CHLORIDE: 9 INJECTION, SOLUTION INTRAVENOUS at 22:12

## 2024-07-20 RX ADMIN — DIVALPROEX SODIUM 1000 MG: 125 CAPSULE, COATED PELLETS ORAL at 22:05

## 2024-07-20 RX ADMIN — MORPHINE SULFATE 1 MG: 2 INJECTION, SOLUTION INTRAMUSCULAR; INTRAVENOUS at 22:02

## 2024-07-20 RX ADMIN — WATER 5 MG: 1 INJECTION INTRAMUSCULAR; INTRAVENOUS; SUBCUTANEOUS at 12:30

## 2024-07-20 RX ADMIN — ACETAMINOPHEN 650 MG: 325 TABLET ORAL at 04:17

## 2024-07-20 RX ADMIN — FLUTICASONE PROPIONATE 2 SPRAY: 50 SPRAY, METERED NASAL at 08:11

## 2024-07-20 RX ADMIN — PIPERACILLIN AND TAZOBACTAM 3375 MG: 3; .375 INJECTION, POWDER, LYOPHILIZED, FOR SOLUTION INTRAVENOUS at 04:40

## 2024-07-20 RX ADMIN — SODIUM CHLORIDE, PRESERVATIVE FREE 10 ML: 5 INJECTION INTRAVENOUS at 22:03

## 2024-07-20 RX ADMIN — FERROUS SULFATE TAB 325 MG (65 MG ELEMENTAL FE) 325 MG: 325 (65 FE) TAB at 08:11

## 2024-07-20 RX ADMIN — Medication 1 CAPSULE: at 08:11

## 2024-07-20 RX ADMIN — BUSPIRONE HYDROCHLORIDE 10 MG: 5 TABLET ORAL at 22:02

## 2024-07-20 RX ADMIN — PIPERACILLIN AND TAZOBACTAM 3375 MG: 3; .375 INJECTION, POWDER, LYOPHILIZED, FOR SOLUTION INTRAVENOUS at 13:16

## 2024-07-20 RX ADMIN — PANTOPRAZOLE SODIUM 40 MG: 40 INJECTION, POWDER, FOR SOLUTION INTRAVENOUS at 08:12

## 2024-07-20 RX ADMIN — ATORVASTATIN CALCIUM 40 MG: 40 TABLET, FILM COATED ORAL at 08:11

## 2024-07-20 RX ADMIN — PIPERACILLIN AND TAZOBACTAM 3375 MG: 3; .375 INJECTION, POWDER, LYOPHILIZED, FOR SOLUTION INTRAVENOUS at 22:13

## 2024-07-20 RX ADMIN — FERROUS SULFATE TAB 325 MG (65 MG ELEMENTAL FE) 325 MG: 325 (65 FE) TAB at 22:02

## 2024-07-20 ASSESSMENT — PAIN SCALES - GENERAL
PAINLEVEL_OUTOF10: 0
PAINLEVEL_OUTOF10: 0
PAINLEVEL_OUTOF10: 5
PAINLEVEL_OUTOF10: 6

## 2024-07-20 ASSESSMENT — PAIN DESCRIPTION - LOCATION: LOCATION: ABDOMEN;CHEST

## 2024-07-20 ASSESSMENT — PAIN SCALES - WONG BAKER: WONGBAKER_NUMERICALRESPONSE: NO HURT

## 2024-07-20 ASSESSMENT — PAIN DESCRIPTION - DESCRIPTORS: DESCRIPTORS: ACHING

## 2024-07-20 NOTE — PROCEDURES
PROCEDURE NOTE  Date: 7/20/2024   Name: Jm Garcia  YOB: 1960    Intra pleural tpa    Date/Time: 7/20/2024 12:35 PM    Performed by: Eric Knox MD  Authorized by: Eric Knox MD  Consent: Written consent obtained.  Risks and benefits: risks, benefits and alternatives were discussed  Consent given by: guardian  Patient understanding: patient does not state understanding of the procedure being performed  Patient consent: the patient's understanding of the procedure matches consent given  Procedure consent: procedure consent matches procedure scheduled  Relevant documents: relevant documents present and verified  Test results: test results available and properly labeled  Site marked: the operative site was marked  Imaging studies: imaging studies not available  Patient identity confirmed: arm band  Time out: Immediately prior to procedure a \"time out\" was called to verify the correct patient, procedure, equipment, support staff and site/side marked as required.  Local anesthesia used: no    Anesthesia:  Local anesthesia used: no    Sedation:  Patient sedated: no    Patient tolerance: patient tolerated the procedure well with no immediate complications  Comments: Flushed the chest tube with 10 ml of sterile saline and then instilled 10 mg of intrapleural tpa abd then 5  mg of DNAse followed by 10 ml of sterile saline

## 2024-07-20 NOTE — PROGRESS NOTES
V2.0  [facility] Hospitalist Progress Note      Name:  Jm Garcia /Age/Sex: 1960  (63 y.o. female)   MRN & CSN:  [id number] & [id number] Encounter Date/Time: 2024 1:42 PM EDT    Location:  [facility]7/3122-A PCP: Yuly Bro APRN - CNP       Hospital Day: 7    Assessment and Plan:   Jm Garcia is a 63 y.o. female with pmh of  who presents with Acute hypoxemic respiratory failure (HCC)      Plan:    Acute on chronic hypoxemic respiratory failure 2/2 left sided empyema  Sepsis  Chronic COPD -not in exacerbation  Patient found to have left lung consolidation/empyema on x-ray done to confirm location of the G-tube placed in the ED 2 days ago.  On 2L oxygen at baseline. Hx of aspiration PNA and at risk of recurrence due to poor dentition (does have G tube).  Initiated on vancomycin and zosyn in ER, discontinued vancomycin and continue zosyn  S/p CT guided chest tube placement by IR on 7/15, had 500cc purulent drainage during chest tube insertion  S/p tPA/Dnase x4 rounds  CT chest in AM, if no improvement then consult CT surgery  Pulmonology following  ID following: continue zosyn, attempt to transition to augmentin for 30 day course past removal of chest tube  Fluid culture sent but not resulted, will send another sample    Normocytic anemia  Hx of iron deficiency anemia  Hgb 6.6 on admission from baseline 9-10. Corrected with 1U PRBC.  No overt sources of GIB, does have bloody drainage in chest tube.  Monitor H&H, stable at this time  Hold DVT prophylaxis    Hypertension  Hold home amlodipine as pt with acceptable BP without this, restart as required  Continue home lisinopril    Seizure disorder  Continue home keppra and carbamazepine    Non-insulin dependent type II diabetes mellitus  Hold home PO meds  POCT glucose, SSI, hypoglycemia protocol    History of depression -continue home meds  Hyperlipidemia -continue home statin        Diet Diet NPO  ADULT TUBE FEEDING; PEG; Diabetic; Bolus; 4 Times Daily,

## 2024-07-20 NOTE — PROGRESS NOTES
Pulmonary and Critical Care  Progress Note      VITALS:  BP (!) 149/63   Pulse 88   Temp 97.6 °F (36.4 °C) (Oral)   Resp 23   Ht 1.473 m (4' 10\")   Wt 50.6 kg (111 lb 8.8 oz)   SpO2 96%   BMI 23.31 kg/m²     Subjective:   CHIEF COMPLAINT :SOB     HPI:                The patient is a 63 y.o. female is lying  in the bed. She is non verbal. She has not much output from the chest tube. CXR showed not  much left lung reexpansion     Objective:   PHYSICAL EXAM:    LUNGS:Decreased air entry left base  Abd-soft, BS+,NT  Ext- no pedal edema  CVS-s1s2, no murmurs      DATA:    CBC:  Recent Labs     07/19/24  0147   HGB 9.0*   HCT 32.3*      BMP:  Recent Labs     07/18/24  0401 07/19/24  0147 07/20/24  0203    141 142   K 3.6 3.4* 4.9    100 103   CO2 30 30 29   BUN 8 8 11   CREATININE 0.2* 0.3* 0.3*   CALCIUM 8.4 8.8 8.8   GLUCOSE 131* 142* 146*      ABG:  No results for input(s): \"PH\", \"PO2ART\", \"WDN0TTR\", \"HCO3\", \"BEART\", \"O2SAT\" in the last 72 hours.  BNP  Lab Results   Component Value Date    BNP 49 11/12/2012      D-Dimer:  Lab Results   Component Value Date    DDIMER 323 (H) 10/29/2022      Radiology: There is a left-sided pigtail catheter. There is an apparent partially  loculated pleural fluid collection in the left hemithorax. There is diffuse  moderate atelectasis of the left lung. The right lung is clear. The heart size  is normal.  The bony thorax is intact.        Assessment/Plan     Patient Active Problem List    Diagnosis Date Noted    Weakness 02/03/2023     Priority: Medium    Acute cystitis without hematuria 12/23/2022     Priority: Medium    Urinary problem 12/19/2022     Priority: Medium    SOB (shortness of breath) 11/02/2022     Priority: Medium    COVID-19 10/29/2022     Priority: Medium    Abnormal uterine and vaginal bleeding, unspecified 10/29/2022     Priority: Medium    Urinary incontinence 10/29/2022     Priority: Medium    Chronic respiratory failure (HCC) 05/16/2022

## 2024-07-20 NOTE — PROCEDURES
PROCEDURE NOTE  Date: 7/20/2024   Name: Jm Garcia  YOB: 1960    Intra pleural tpa    Date/Time: 7/20/2024 9:04 AM    Performed by: Eric Knox MD  Authorized by: Eric Knox MD  Consent: Written consent obtained.  Risks and benefits: risks, benefits and alternatives were discussed  Consent given by: guardian  Patient understanding: patient does not state understanding of the procedure being performed  Patient consent: the patient's understanding of the procedure matches consent given  Procedure consent: procedure consent matches procedure scheduled  Relevant documents: relevant documents present and verified  Test results: test results available and properly labeled  Site marked: the operative site was marked  Imaging studies: imaging studies not available  Patient identity confirmed: arm band  Time out: Immediately prior to procedure a \"time out\" was called to verify the correct patient, procedure, equipment, support staff and site/side marked as required.  Preparation: Patient was prepped and draped in the usual sterile fashion.  Local anesthesia used: no    Anesthesia:  Local anesthesia used: no    Sedation:  Patient sedated: no    Patient tolerance: patient tolerated the procedure well with no immediate complications  Comments: Instilled 10 mg of tpa and 5 mg of DNAse  10 ml sterile sale was used before instilling medications and then after instilling medications

## 2024-07-21 ENCOUNTER — APPOINTMENT (OUTPATIENT)
Dept: GENERAL RADIOLOGY | Age: 64
DRG: 871 | End: 2024-07-21
Attending: INTERNAL MEDICINE
Payer: MEDICARE

## 2024-07-21 ENCOUNTER — APPOINTMENT (OUTPATIENT)
Dept: CT IMAGING | Age: 64
DRG: 871 | End: 2024-07-21
Payer: MEDICARE

## 2024-07-21 LAB
ANION GAP SERPL CALCULATED.3IONS-SCNC: 9 MMOL/L (ref 7–16)
BASOPHILS ABSOLUTE: 0 K/CU MM
BASOPHILS RELATIVE PERCENT: 0.4 % (ref 0–1)
BUN SERPL-MCNC: 12 MG/DL (ref 6–23)
CALCIUM SERPL-MCNC: 8.9 MG/DL (ref 8.3–10.6)
CHLORIDE BLD-SCNC: 102 MMOL/L (ref 99–110)
CO2: 28 MMOL/L (ref 21–32)
CREAT SERPL-MCNC: 0.3 MG/DL (ref 0.6–1.1)
DIFFERENTIAL TYPE: ABNORMAL
EOSINOPHILS ABSOLUTE: 0.1 K/CU MM
EOSINOPHILS RELATIVE PERCENT: 1.1 % (ref 0–3)
GFR, ESTIMATED: >90 ML/MIN/1.73M2
GLUCOSE BLD-MCNC: 139 MG/DL (ref 70–99)
GLUCOSE BLD-MCNC: 148 MG/DL (ref 70–99)
GLUCOSE BLD-MCNC: 156 MG/DL (ref 70–99)
GLUCOSE BLD-MCNC: 182 MG/DL (ref 70–99)
GLUCOSE SERPL-MCNC: 111 MG/DL (ref 70–99)
HCT VFR BLD CALC: 30.4 % (ref 37–47)
HEMOGLOBIN: 8.3 GM/DL (ref 12.5–16)
IMMATURE NEUTROPHIL %: 1.7 % (ref 0–0.43)
LYMPHOCYTES ABSOLUTE: 2 K/CU MM
LYMPHOCYTES RELATIVE PERCENT: 26.8 % (ref 24–44)
MCH RBC QN AUTO: 25.3 PG (ref 27–31)
MCHC RBC AUTO-ENTMCNC: 27.3 % (ref 32–36)
MCV RBC AUTO: 92.7 FL (ref 78–100)
MONOCYTES ABSOLUTE: 0.6 K/CU MM
MONOCYTES RELATIVE PERCENT: 7.6 % (ref 0–4)
NEUTROPHILS ABSOLUTE: 4.7 K/CU MM
NEUTROPHILS RELATIVE PERCENT: 62.4 % (ref 36–66)
NUCLEATED RBC %: 0 %
PDW BLD-RTO: 18.6 % (ref 11.7–14.9)
PLATELET # BLD: 406 K/CU MM (ref 140–440)
PMV BLD AUTO: 8.7 FL (ref 7.5–11.1)
POTASSIUM SERPL-SCNC: 4.3 MMOL/L (ref 3.5–5.1)
RBC # BLD: 3.28 M/CU MM (ref 4.2–5.4)
SODIUM BLD-SCNC: 139 MMOL/L (ref 135–145)
TOTAL IMMATURE NEUTOROPHIL: 0.13 K/CU MM
TOTAL NUCLEATED RBC: 0 K/CU MM
WBC # BLD: 7.5 K/CU MM (ref 4–10.5)

## 2024-07-21 PROCEDURE — 2140000000 HC CCU INTERMEDIATE R&B

## 2024-07-21 PROCEDURE — 80048 BASIC METABOLIC PNL TOTAL CA: CPT

## 2024-07-21 PROCEDURE — 6360000004 HC RX CONTRAST MEDICATION: Performed by: INTERNAL MEDICINE

## 2024-07-21 PROCEDURE — 36415 COLL VENOUS BLD VENIPUNCTURE: CPT

## 2024-07-21 PROCEDURE — 6360000002 HC RX W HCPCS: Performed by: STUDENT IN AN ORGANIZED HEALTH CARE EDUCATION/TRAINING PROGRAM

## 2024-07-21 PROCEDURE — 2580000003 HC RX 258: Performed by: STUDENT IN AN ORGANIZED HEALTH CARE EDUCATION/TRAINING PROGRAM

## 2024-07-21 PROCEDURE — 71045 X-RAY EXAM CHEST 1 VIEW: CPT

## 2024-07-21 PROCEDURE — 94761 N-INVAS EAR/PLS OXIMETRY MLT: CPT

## 2024-07-21 PROCEDURE — 6370000000 HC RX 637 (ALT 250 FOR IP): Performed by: STUDENT IN AN ORGANIZED HEALTH CARE EDUCATION/TRAINING PROGRAM

## 2024-07-21 PROCEDURE — 6360000002 HC RX W HCPCS: Performed by: INTERNAL MEDICINE

## 2024-07-21 PROCEDURE — 71260 CT THORAX DX C+: CPT

## 2024-07-21 PROCEDURE — 85025 COMPLETE CBC W/AUTO DIFF WBC: CPT

## 2024-07-21 PROCEDURE — 82962 GLUCOSE BLOOD TEST: CPT

## 2024-07-21 RX ADMIN — FERROUS SULFATE TAB 325 MG (65 MG ELEMENTAL FE) 325 MG: 325 (65 FE) TAB at 20:56

## 2024-07-21 RX ADMIN — MORPHINE SULFATE 1 MG: 2 INJECTION, SOLUTION INTRAMUSCULAR; INTRAVENOUS at 03:17

## 2024-07-21 RX ADMIN — PIPERACILLIN AND TAZOBACTAM 3375 MG: 3; .375 INJECTION, POWDER, LYOPHILIZED, FOR SOLUTION INTRAVENOUS at 13:42

## 2024-07-21 RX ADMIN — PANTOPRAZOLE SODIUM 40 MG: 40 INJECTION, POWDER, FOR SOLUTION INTRAVENOUS at 08:05

## 2024-07-21 RX ADMIN — MORPHINE SULFATE 1 MG: 2 INJECTION, SOLUTION INTRAMUSCULAR; INTRAVENOUS at 20:54

## 2024-07-21 RX ADMIN — MORPHINE SULFATE 1 MG: 2 INJECTION, SOLUTION INTRAMUSCULAR; INTRAVENOUS at 08:05

## 2024-07-21 RX ADMIN — LEVETIRACETAM 1500 MG: 100 SOLUTION ORAL at 20:55

## 2024-07-21 RX ADMIN — IOPAMIDOL 75 ML: 755 INJECTION, SOLUTION INTRAVENOUS at 09:29

## 2024-07-21 RX ADMIN — COLLAGENASE SANTYL: 250 OINTMENT TOPICAL at 13:44

## 2024-07-21 RX ADMIN — FLUTICASONE PROPIONATE 2 SPRAY: 50 SPRAY, METERED NASAL at 13:45

## 2024-07-21 RX ADMIN — Medication 1 CAPSULE: at 08:05

## 2024-07-21 RX ADMIN — DIVALPROEX SODIUM 1000 MG: 125 CAPSULE, COATED PELLETS ORAL at 20:56

## 2024-07-21 RX ADMIN — MORPHINE SULFATE 1 MG: 2 INJECTION, SOLUTION INTRAMUSCULAR; INTRAVENOUS at 13:42

## 2024-07-21 RX ADMIN — DIVALPROEX SODIUM 1000 MG: 125 CAPSULE, COATED PELLETS ORAL at 08:06

## 2024-07-21 RX ADMIN — LISINOPRIL 40 MG: 20 TABLET ORAL at 08:05

## 2024-07-21 RX ADMIN — ATORVASTATIN CALCIUM 40 MG: 40 TABLET, FILM COATED ORAL at 08:05

## 2024-07-21 RX ADMIN — MIRTAZAPINE 15 MG: 15 TABLET, FILM COATED ORAL at 20:56

## 2024-07-21 RX ADMIN — BUSPIRONE HYDROCHLORIDE 10 MG: 5 TABLET ORAL at 20:56

## 2024-07-21 RX ADMIN — CARBAMAZEPINE 300 MG: 100 SUSPENSION ORAL at 08:06

## 2024-07-21 RX ADMIN — SODIUM CHLORIDE, PRESERVATIVE FREE 10 ML: 5 INJECTION INTRAVENOUS at 08:06

## 2024-07-21 RX ADMIN — PIPERACILLIN AND TAZOBACTAM 3375 MG: 3; .375 INJECTION, POWDER, LYOPHILIZED, FOR SOLUTION INTRAVENOUS at 06:23

## 2024-07-21 RX ADMIN — FERROUS SULFATE TAB 325 MG (65 MG ELEMENTAL FE) 325 MG: 325 (65 FE) TAB at 08:05

## 2024-07-21 RX ADMIN — SODIUM CHLORIDE, PRESERVATIVE FREE 10 ML: 5 INJECTION INTRAVENOUS at 03:18

## 2024-07-21 RX ADMIN — LEVETIRACETAM 1500 MG: 100 SOLUTION ORAL at 08:06

## 2024-07-21 RX ADMIN — SODIUM CHLORIDE: 9 INJECTION, SOLUTION INTRAVENOUS at 20:59

## 2024-07-21 RX ADMIN — SODIUM CHLORIDE: 9 INJECTION, SOLUTION INTRAVENOUS at 06:22

## 2024-07-21 RX ADMIN — CARBAMAZEPINE 300 MG: 100 SUSPENSION ORAL at 20:55

## 2024-07-21 RX ADMIN — BUSPIRONE HYDROCHLORIDE 10 MG: 5 TABLET ORAL at 08:05

## 2024-07-21 RX ADMIN — SODIUM CHLORIDE, PRESERVATIVE FREE 10 ML: 5 INJECTION INTRAVENOUS at 20:55

## 2024-07-21 RX ADMIN — PIPERACILLIN AND TAZOBACTAM 3375 MG: 3; .375 INJECTION, POWDER, LYOPHILIZED, FOR SOLUTION INTRAVENOUS at 21:01

## 2024-07-21 ASSESSMENT — PAIN DESCRIPTION - LOCATION
LOCATION: GENERALIZED
LOCATION: ABDOMEN;CHEST

## 2024-07-21 ASSESSMENT — PAIN DESCRIPTION - DESCRIPTORS
DESCRIPTORS: PATIENT UNABLE TO DESCRIBE
DESCRIPTORS: PATIENT UNABLE TO DESCRIBE

## 2024-07-21 ASSESSMENT — PAIN DESCRIPTION - ORIENTATION: ORIENTATION: MID;LEFT

## 2024-07-21 ASSESSMENT — PAIN SCALES - GENERAL
PAINLEVEL_OUTOF10: 6
PAINLEVEL_OUTOF10: 4
PAINLEVEL_OUTOF10: 0

## 2024-07-21 ASSESSMENT — PAIN SCALES - WONG BAKER
WONGBAKER_NUMERICALRESPONSE: HURTS LITTLE MORE
WONGBAKER_NUMERICALRESPONSE: HURTS EVEN MORE
WONGBAKER_NUMERICALRESPONSE: NO HURT

## 2024-07-21 NOTE — PROGRESS NOTES
Consult sent to Dr Anderson from CT Surg- original went to Libby Padilla NP- she is not covering here per her request

## 2024-07-21 NOTE — PROGRESS NOTES
V2.0  [facility] [place]      Name:  Jm Garcia /Age/Sex: 1960  (63 y.o. female)   MRN & CSN:  [id number] & [id number] Encounter Date/Time: 2024 1:42 PM EDT    Location:  1209/3128-A PCP: Yuly Bro APRN - CNP       Hospital Day: 8    Assessment and Plan:   Jm Garcia is a 63 y.o. female with pmh of  who presents with Acute hypoxemic respiratory failure (HCC)      Plan:    Acute on chronic hypoxemic respiratory failure 2/2 left sided empyema  Sepsis  Chronic COPD -not in exacerbation  Patient found to have left lung consolidation/empyema on x-ray done to confirm location of the G-tube placed in the ED 2 days ago.  On 2L oxygen at baseline. Hx of aspiration PNA and at risk of recurrence due to poor dentition (does have G tube).  Initiated on vancomycin and zosyn in ER, discontinued vancomycin and continue zosyn  S/p CT guided chest tube placement by IR on 7/15, had 500cc purulent drainage during chest tube insertion  S/p tPA/Dnase x4 rounds  Pulmonology following  ID following: continue zosyn, attempt to transition to augmentin for 30 day course past removal of chest tube  Fluid culture sent on  but not resulted, will send another sample  CT chest today, if no improvement then consult CT surgery    Normocytic anemia  Hx of iron deficiency anemia  Hgb 6.6 on admission from baseline 9-10. Corrected with 1U PRBC.  No overt sources of GIB, does have bloody drainage in chest tube.  Monitor H&H, stable at this time  Hold DVT prophylaxis    Hypertension  Hold home amlodipine as pt with acceptable BP without this, restart as required  Continue home lisinopril    Seizure disorder  Continue home keppra and carbamazepine    Non-insulin dependent type II diabetes mellitus  Hold home PO meds  POCT glucose, SSI, hypoglycemia protocol    History of depression -continue home meds  Hyperlipidemia -continue home statin        Diet Diet NPO  ADULT TUBE FEEDING; PEG; Diabetic; Bolus; 4 Times  size. Mediastinal and hilar contours are within normal limits. Bony thorax no acute abnormality.     Probable large left sided pneumonia, underlying mass not excluded. CT chest with contrast recommended Electronically signed by Pj Steve      Electronically signed by Pinky Brewer MD on 7/21/2024 at 8:31 AM

## 2024-07-22 ENCOUNTER — APPOINTMENT (OUTPATIENT)
Dept: GENERAL RADIOLOGY | Age: 64
DRG: 871 | End: 2024-07-22
Attending: INTERNAL MEDICINE
Payer: MEDICARE

## 2024-07-22 LAB
ANION GAP SERPL CALCULATED.3IONS-SCNC: 14 MMOL/L (ref 7–16)
BASOPHILS ABSOLUTE: 0 K/CU MM
BASOPHILS RELATIVE PERCENT: 0.3 % (ref 0–1)
BUN SERPL-MCNC: 13 MG/DL (ref 6–23)
CALCIUM SERPL-MCNC: 9.1 MG/DL (ref 8.3–10.6)
CHLORIDE BLD-SCNC: 102 MMOL/L (ref 99–110)
CO2: 23 MMOL/L (ref 21–32)
CREAT SERPL-MCNC: 0.3 MG/DL (ref 0.6–1.1)
DIFFERENTIAL TYPE: ABNORMAL
EOSINOPHILS ABSOLUTE: 0.1 K/CU MM
EOSINOPHILS RELATIVE PERCENT: 1.7 % (ref 0–3)
GFR, ESTIMATED: >90 ML/MIN/1.73M2
GLUCOSE BLD-MCNC: 152 MG/DL (ref 70–99)
GLUCOSE BLD-MCNC: 169 MG/DL (ref 70–99)
GLUCOSE BLD-MCNC: 182 MG/DL (ref 70–99)
GLUCOSE SERPL-MCNC: 162 MG/DL (ref 70–99)
HCT VFR BLD CALC: 31.1 % (ref 37–47)
HEMOGLOBIN: 8.7 GM/DL (ref 12.5–16)
IMMATURE NEUTROPHIL %: 2 % (ref 0–0.43)
LYMPHOCYTES ABSOLUTE: 1.8 K/CU MM
LYMPHOCYTES RELATIVE PERCENT: 29.2 % (ref 24–44)
MCH RBC QN AUTO: 25.9 PG (ref 27–31)
MCHC RBC AUTO-ENTMCNC: 28 % (ref 32–36)
MCV RBC AUTO: 92.6 FL (ref 78–100)
MONOCYTES ABSOLUTE: 0.6 K/CU MM
MONOCYTES RELATIVE PERCENT: 10.5 % (ref 0–4)
NEUTROPHILS ABSOLUTE: 3.4 K/CU MM
NEUTROPHILS RELATIVE PERCENT: 56.3 % (ref 36–66)
NUCLEATED RBC %: 0 %
PDW BLD-RTO: 19.4 % (ref 11.7–14.9)
PLATELET # BLD: 399 K/CU MM (ref 140–440)
PMV BLD AUTO: 8.8 FL (ref 7.5–11.1)
POTASSIUM SERPL-SCNC: 4.8 MMOL/L (ref 3.5–5.1)
RBC # BLD: 3.36 M/CU MM (ref 4.2–5.4)
SODIUM BLD-SCNC: 139 MMOL/L (ref 135–145)
TOTAL IMMATURE NEUTOROPHIL: 0.12 K/CU MM
TOTAL NUCLEATED RBC: 0 K/CU MM
WBC # BLD: 6 K/CU MM (ref 4–10.5)

## 2024-07-22 PROCEDURE — 82962 GLUCOSE BLOOD TEST: CPT

## 2024-07-22 PROCEDURE — 2140000000 HC CCU INTERMEDIATE R&B

## 2024-07-22 PROCEDURE — 71045 X-RAY EXAM CHEST 1 VIEW: CPT

## 2024-07-22 PROCEDURE — 2580000003 HC RX 258: Performed by: STUDENT IN AN ORGANIZED HEALTH CARE EDUCATION/TRAINING PROGRAM

## 2024-07-22 PROCEDURE — 6360000002 HC RX W HCPCS: Performed by: STUDENT IN AN ORGANIZED HEALTH CARE EDUCATION/TRAINING PROGRAM

## 2024-07-22 PROCEDURE — 94761 N-INVAS EAR/PLS OXIMETRY MLT: CPT

## 2024-07-22 PROCEDURE — 80048 BASIC METABOLIC PNL TOTAL CA: CPT

## 2024-07-22 PROCEDURE — 99233 SBSQ HOSP IP/OBS HIGH 50: CPT | Performed by: NURSE PRACTITIONER

## 2024-07-22 PROCEDURE — 36415 COLL VENOUS BLD VENIPUNCTURE: CPT

## 2024-07-22 PROCEDURE — 6360000002 HC RX W HCPCS: Performed by: INTERNAL MEDICINE

## 2024-07-22 PROCEDURE — 85025 COMPLETE CBC W/AUTO DIFF WBC: CPT

## 2024-07-22 PROCEDURE — 6370000000 HC RX 637 (ALT 250 FOR IP): Performed by: STUDENT IN AN ORGANIZED HEALTH CARE EDUCATION/TRAINING PROGRAM

## 2024-07-22 RX ADMIN — CARBAMAZEPINE 300 MG: 100 SUSPENSION ORAL at 09:31

## 2024-07-22 RX ADMIN — BUSPIRONE HYDROCHLORIDE 10 MG: 5 TABLET ORAL at 08:59

## 2024-07-22 RX ADMIN — MORPHINE SULFATE 1 MG: 2 INJECTION, SOLUTION INTRAMUSCULAR; INTRAVENOUS at 22:14

## 2024-07-22 RX ADMIN — Medication 1 CAPSULE: at 08:59

## 2024-07-22 RX ADMIN — PIPERACILLIN AND TAZOBACTAM 3375 MG: 3; .375 INJECTION, POWDER, LYOPHILIZED, FOR SOLUTION INTRAVENOUS at 22:13

## 2024-07-22 RX ADMIN — DIVALPROEX SODIUM 1000 MG: 125 CAPSULE, COATED PELLETS ORAL at 22:17

## 2024-07-22 RX ADMIN — CARBAMAZEPINE 300 MG: 100 SUSPENSION ORAL at 22:16

## 2024-07-22 RX ADMIN — FLUTICASONE PROPIONATE 2 SPRAY: 50 SPRAY, METERED NASAL at 08:58

## 2024-07-22 RX ADMIN — BUSPIRONE HYDROCHLORIDE 10 MG: 5 TABLET ORAL at 22:17

## 2024-07-22 RX ADMIN — MORPHINE SULFATE 1 MG: 2 INJECTION, SOLUTION INTRAMUSCULAR; INTRAVENOUS at 04:55

## 2024-07-22 RX ADMIN — LEVETIRACETAM 1500 MG: 100 SOLUTION ORAL at 08:58

## 2024-07-22 RX ADMIN — MORPHINE SULFATE 1 MG: 2 INJECTION, SOLUTION INTRAMUSCULAR; INTRAVENOUS at 08:57

## 2024-07-22 RX ADMIN — MORPHINE SULFATE 1 MG: 2 INJECTION, SOLUTION INTRAMUSCULAR; INTRAVENOUS at 00:46

## 2024-07-22 RX ADMIN — ATORVASTATIN CALCIUM 40 MG: 40 TABLET, FILM COATED ORAL at 08:59

## 2024-07-22 RX ADMIN — COLLAGENASE SANTYL: 250 OINTMENT TOPICAL at 09:00

## 2024-07-22 RX ADMIN — SODIUM CHLORIDE, PRESERVATIVE FREE 10 ML: 5 INJECTION INTRAVENOUS at 00:47

## 2024-07-22 RX ADMIN — SODIUM CHLORIDE, PRESERVATIVE FREE 10 ML: 5 INJECTION INTRAVENOUS at 08:59

## 2024-07-22 RX ADMIN — DIVALPROEX SODIUM 1000 MG: 125 CAPSULE, COATED PELLETS ORAL at 08:58

## 2024-07-22 RX ADMIN — PIPERACILLIN AND TAZOBACTAM 3375 MG: 3; .375 INJECTION, POWDER, LYOPHILIZED, FOR SOLUTION INTRAVENOUS at 13:30

## 2024-07-22 RX ADMIN — SODIUM CHLORIDE: 9 INJECTION, SOLUTION INTRAVENOUS at 05:00

## 2024-07-22 RX ADMIN — PANTOPRAZOLE SODIUM 40 MG: 40 INJECTION, POWDER, FOR SOLUTION INTRAVENOUS at 08:58

## 2024-07-22 RX ADMIN — LEVETIRACETAM 1500 MG: 100 SOLUTION ORAL at 22:16

## 2024-07-22 RX ADMIN — LISINOPRIL 40 MG: 20 TABLET ORAL at 08:59

## 2024-07-22 RX ADMIN — PIPERACILLIN AND TAZOBACTAM 3375 MG: 3; .375 INJECTION, POWDER, LYOPHILIZED, FOR SOLUTION INTRAVENOUS at 05:00

## 2024-07-22 RX ADMIN — FERROUS SULFATE TAB 325 MG (65 MG ELEMENTAL FE) 325 MG: 325 (65 FE) TAB at 08:59

## 2024-07-22 RX ADMIN — SODIUM CHLORIDE, PRESERVATIVE FREE 10 ML: 5 INJECTION INTRAVENOUS at 22:12

## 2024-07-22 RX ADMIN — SODIUM CHLORIDE, PRESERVATIVE FREE 10 ML: 5 INJECTION INTRAVENOUS at 04:56

## 2024-07-22 RX ADMIN — MIRTAZAPINE 15 MG: 15 TABLET, FILM COATED ORAL at 22:17

## 2024-07-22 RX ADMIN — SODIUM CHLORIDE: 9 INJECTION, SOLUTION INTRAVENOUS at 22:11

## 2024-07-22 ASSESSMENT — PAIN SCALES - GENERAL
PAINLEVEL_OUTOF10: 3
PAINLEVEL_OUTOF10: 6
PAINLEVEL_OUTOF10: 5
PAINLEVEL_OUTOF10: 6
PAINLEVEL_OUTOF10: 0
PAINLEVEL_OUTOF10: 6
PAINLEVEL_OUTOF10: 6
PAINLEVEL_OUTOF10: 2
PAINLEVEL_OUTOF10: 6
PAINLEVEL_OUTOF10: 3

## 2024-07-22 ASSESSMENT — PAIN - FUNCTIONAL ASSESSMENT: PAIN_FUNCTIONAL_ASSESSMENT: ACTIVITIES ARE NOT PREVENTED

## 2024-07-22 ASSESSMENT — PAIN DESCRIPTION - PAIN TYPE: TYPE: OTHER (COMMENT)

## 2024-07-22 ASSESSMENT — PAIN SCALES - WONG BAKER
WONGBAKER_NUMERICALRESPONSE: NO HURT
WONGBAKER_NUMERICALRESPONSE: HURTS EVEN MORE
WONGBAKER_NUMERICALRESPONSE: HURTS EVEN MORE
WONGBAKER_NUMERICALRESPONSE: NO HURT
WONGBAKER_NUMERICALRESPONSE: HURTS A LITTLE BIT

## 2024-07-22 ASSESSMENT — PAIN DESCRIPTION - ORIENTATION
ORIENTATION: LEFT;MID
ORIENTATION: MID;LEFT
ORIENTATION: OTHER (COMMENT)

## 2024-07-22 ASSESSMENT — PAIN DESCRIPTION - DESCRIPTORS
DESCRIPTORS: PATIENT UNABLE TO DESCRIBE
DESCRIPTORS: OTHER (COMMENT)
DESCRIPTORS: PATIENT UNABLE TO DESCRIBE
DESCRIPTORS: PATIENT UNABLE TO DESCRIBE

## 2024-07-22 ASSESSMENT — PAIN DESCRIPTION - ONSET: ONSET: UNABLE TO COMMUNICATE

## 2024-07-22 ASSESSMENT — PAIN DESCRIPTION - LOCATION
LOCATION: ABDOMEN;CHEST
LOCATION: ABDOMEN;CHEST
LOCATION: OTHER (COMMENT)
LOCATION: ABDOMEN;CHEST

## 2024-07-22 NOTE — PROGRESS NOTES
pulmonary      SUBJECTIVE:  on room air and no sob     OBJECTIVE    VITALS:  BP (!) 152/61   Pulse 93   Temp 98.5 °F (36.9 °C) (Axillary)   Resp 22   Ht 1.473 m (4' 10\")   Wt 50.3 kg (110 lb 14.3 oz)   SpO2 93%   BMI 23.18 kg/m²   HEAD AND FACE EXAM:  No throat injection, no active exudate,no thrush  NECK EXAM;No JVD, no masses, symmetrical  CHEST EXAM; Expansion equal and symmetrical, no masses  LUNG EXAM; Good breath sounds bilaterally. There are expiratory wheezes both lungs, there are crackles at both lung bases  CARDIOVASCULAR EXAM: Positive S1 and S2, no S3 or S4, no clicks ,no murmurs            LABS   Lab Results   Component Value Date    WBC 6.0 07/22/2024    HGB 8.7 (L) 07/22/2024    HCT 31.1 (L) 07/22/2024    MCV 92.6 07/22/2024     07/22/2024     Lab Results   Component Value Date    CREATININE 0.3 (L) 07/22/2024    BUN 13 07/22/2024     07/22/2024    K 4.8 07/22/2024     07/22/2024    CO2 23 07/22/2024     Lab Results   Component Value Date    INR 1.0 04/30/2024    PROTIME 13.1 04/30/2024          Lab Results   Component Value Date/Time    PHOS 3.7 07/15/2024 02:48 AM    PHOS 1.3 05/10/2024 03:30 AM    PHOS 3.6 11/02/2022 12:38 PM      No results for input(s): \"PH\", \"PO2ART\", \"WNK2OKX\", \"HCO3\", \"BEART\", \"O2SAT\" in the last 72 hours.      Wt Readings from Last 3 Encounters:   07/22/24 50.3 kg (110 lb 14.3 oz)   07/11/24 54.4 kg (120 lb)   05/07/24 52.1 kg (114 lb 13.8 oz)            FINDINGS:  Left-sided pigtail catheter terminates at the left lung base adjacent to  inflammatory likely empyema containing gas and air-fluid level and heterogeneous  secretions, which has demonstrated notable decreased in size compared to prior  exams. Confluent airspace opacities adjacent to this empyema extending from the  left lung base to the apex. Right lung remains focally clear. Heart size normal.     No coronary artery calcifications. No pneumothorax.           ASSESMENT  Left

## 2024-07-22 NOTE — PLAN OF CARE
Problem: Discharge Planning  Goal: Discharge to home or other facility with appropriate resources  Outcome: Progressing     Problem: Safety - Adult  Goal: Free from fall injury  Outcome: Progressing     Problem: Chronic Conditions and Co-morbidities  Goal: Patient's chronic conditions and co-morbidity symptoms are monitored and maintained or improved  Outcome: Progressing     Problem: Nutrition Deficit:  Goal: Optimize nutritional status  Outcome: Progressing     Problem: Pain  Goal: Verbalizes/displays adequate comfort level or baseline comfort level  Outcome: Progressing     Problem: Skin/Tissue Integrity  Goal: Absence of new skin breakdown  Description: 1.  Monitor for areas of redness and/or skin breakdown  2.  Assess vascular access sites hourly  3.  Every 4-6 hours minimum:  Change oxygen saturation probe site  4.  Every 4-6 hours:  If on nasal continuous positive airway pressure, respiratory therapy assess nares and determine need for appliance change or resting period.  Outcome: Progressing     Problem: ABCDS Injury Assessment  Goal: Absence of physical injury  Outcome: Progressing

## 2024-07-22 NOTE — CARE COORDINATION
CM call to Pt [facility] guardian Amanda to follow up on discharge planning.  Plan remains [facility].      CM following for needs

## 2024-07-22 NOTE — CONSULTS
[facility] Nurse  Consult Note       Jm Garcia  AGE: 63 y.o.   GENDER: female  : 1960  TODAY'S DATE:  2024    Subjective:     Reason for Evaluation and Assessment: wound assessment       Jm Garcia is a 63 y.o. female referred by:   [] Physician  [] Nursing  [] Other:     Wound Identification:  Wound Type: pressure  Contributing Factors: chronic pressure, decreased mobility, obesity, incontinence of stool, and incontinence of urine        PAST MEDICAL HISTORY        Diagnosis Date    Anxiety disorder     Back pain, chronic     Cerebral palsy (HCC)     COPD (chronic obstructive pulmonary disease) (HCC)     COVID-19 10/12/2021    CVA (cerebral infarction) 2014 x2    Diabetes mellitus (HCC)     Diverticulosis     Epilepsy (HCC)     GERD (gastroesophageal reflux disease)     Hyperlipidemia     Hypertension     Insomnia     Iron (Fe) deficiency anemia     Mental disability     Seizures (HCC)     Unspecified cerebral artery occlusion with cerebral infarction        PAST SURGICAL HISTORY    Past Surgical History:   Procedure Laterality Date    GASTROSTOMY TUBE PLACEMENT      UPPER GASTROINTESTINAL ENDOSCOPY N/A 5/3/2024    ESOPHAGOGASTRODUODENOSCOPY PERCUTANEOUS ENDOSCOPIC GASTROSTOMY TUBE PLACEMENT performed by Ashok Casas DO at [facility] ENDOSCOPY       FAMILY HISTORY    Family History   Problem Relation Age of Onset    Breast Cancer Neg Hx        SOCIAL HISTORY    Social History     Tobacco Use    Smoking status: Former     Current packs/day: 0.00     Average packs/day: 1.5 packs/day for 20.0 years (30.0 ttl pk-yrs)     Types: Cigarettes     Start date: 1991     Quit date: 2011     Years since quittin.9    Smokeless tobacco: Never   Vaping Use    Vaping Use: Never used   Substance Use Topics    Alcohol use: No     Alcohol/week: 0.0 standard drinks of alcohol    Drug use: No       ALLERGIES    Allergies   Allergen Reactions    Macrobid [Nitrofurantoin] Hives and  AM    ALT 9 07/15/2024 02:48 AM     Albumin:  No results found for: \"LABALBU\"  PT/INR:    Lab Results   Component Value Date/Time    PROTIME 13.1 04/30/2024 09:55 AM    INR 1.0 04/30/2024 09:55 AM     HgBA1c:    Lab Results   Component Value Date/Time    LABA1C 5.9 07/14/2024 05:54 PM         Assessment:     Patient Active Problem List   Diagnosis    Pneumonia due to infectious organism    HTN (hypertension), benign    Seizure disorder (HCC)    Altered mental status    Chest pain    MR (mental retardation)    COPD exacerbation (HCC)    Left hemiplegia (HCC)    H/O: stroke    Mixed hyperlipidemia    Acquired hypothyroidism    Sepsis (HCC)    Acute bronchitis    Acute respiratory failure with hypoxia (HCC)    Acute respiratory failure (HCC)    Anxiety and depression    Anemia    Controlled type 2 diabetes mellitus without complication, without long-term current use of insulin (HCC)    Diverticulosis    Lesion of right native kidney    Black stool    Hypoxia    COVID-19 virus infection    Other cerebral palsy (HCC)    Chronic respiratory failure (HCC)    COVID-19    Abnormal uterine and vaginal bleeding, unspecified    Bradyarrhythmia    Dyskinesia    Hemiplegia affecting left nondominant side (HCC)    Insomnia    Intermittent explosive disorder    Intraparenchymal hemorrhage of brain (HCC)    Iron deficiency anemia, unspecified    Organic personality syndrome    Subdural hematoma (HCC)    Urinary incontinence    SOB (shortness of breath)    Urinary problem    Acute cystitis without hematuria    Weakness    Contusion of right knee    Generalized weakness    Acute hypoxemic respiratory failure (HCC)    Moderate malnutrition (HCC)    Mucus plugging of bronchi    Empyema of left pleural space (HCC)       Measurements:  Wound 07/14/24 Sacrum (Active)   Wound Image   07/22/24 1020   Wound Etiology Pressure Unstageable 07/22/24 1020   Dressing Status New dressing applied 07/22/24 1020   Wound Cleansed Cleansed with saline

## 2024-07-22 NOTE — PROGRESS NOTES
Infectious Disease Progress Note  2024   Patient Name: Jm Garcia : 1960   Impression  Left Aspiration Pneumonia Complicated by Left-Sided Empyema:  Unstageable Sacral Pressure Wound:  Allergy to nitrofurantoin (hives, swelling):  She has had past aspiration pneumonia and is at great risk for recurrence due to her poor dentation, although now has a G-tube placed. Will empirically broadly cover as recent past aspiration.  CrCl 146. T-max 99.1, initial leukocytosis of 11.6 normalized on 7/15. Hypoalbuminemia. Resp panel and MRSA by PCR negative. Pct 0.181, 0.237.  on dwt to 178.   -BC 0/2 NGTD at 24h  -PCXR: probable large left sided pneumonia, underlying mass not excluded. CT chest with contrast recommended.   -CT Chest wo Contrast: No significant elevated lung parenchyma appreciated on the left side. Absence of   contrast limits evaluation however the findings are concerning for underlying   empyema formation.   7/15-S/p per Dr. Oliva, IR, CT guided left chest tube placement for empyema. 450 cc purulent drainage. Fluid analysis: RBC 1,135,000. Glucose 237. , Protein 1.6. ,445. Culture:NGTD  -PCXR: Interval placement of left chest tube with improvement in aeration   in the left upper lung zone. There is persistent left mid and lower lung zone   opacification likely due to effusion.   -PCXR: pigtail catheter in left pleural space. Moderate left pleural effusion. Diffuse moderate atelectasis of the left lung. The right lung is clear. No significant change.   Dr. Knox, Pulmonology, started a 5 day course of IV Solumedrol.   Recent G-Tube Placement Secondary to Aspiration:  MRDD/ Cerebral Palsy/ Seizures:  COPD Oxygen Dependent 2 L/NC:  Multi-morbidity: per PMHx: MRDD, anxiety disorder/ depression, chronic back pain, COPD oxygen dependent, DMII, hypothyroidism, iron deficiency anemia, cerebral palsy, CVA ,epilepsy, DMII, diverticulosis, GERD, HLD, HTN,

## 2024-07-22 NOTE — PROGRESS NOTES
V2.0  [facility] [place] Hospitalist Progress Note      Name:  Jm Garcia /Age/Sex: 1960  (63 y.o. female)   MRN & CSN:  [id number] & [id number] Encounter Date/Time: 2024 1:42 PM EDT    Location:  3129/3127-A PCP: Yuly Bro APRN - CNP       Hospital Day: 9    Assessment and Plan:   Jm Garcia is a 63 y.o. female with pmh of  who presents with Acute hypoxemic respiratory failure (HCC)      Plan:    Acute on chronic hypoxemic respiratory failure 2/2 left sided empyema  Sepsis  Chronic COPD -not in exacerbation  Patient found to have left lung consolidation/empyema on x-ray done to confirm location of the G-tube placed in the ED 2 days ago.  On 2L oxygen at baseline. Hx of aspiration PNA and at risk of recurrence due to poor dentition (does have G tube).  Initiated on vancomycin and zosyn in ER, discontinued vancomycin and continue zosyn  S/p CT guided chest tube placement by IR on 7/15, had 500cc purulent drainage during chest tube insertion  S/p tPA/Dnase x4 rounds  Pulmonology following  ID following: continue zosyn, attempt to transition to augmentin for 30 day course past removal of chest tube  Fluid culture sent on  but not resulted, sent another sample on   Repeat CT chest on  with decreased size of empyema  CT surgery consulted by pulmonology for trapped lung, pending recommendations    Normocytic anemia  Hx of iron deficiency anemia  Hgb 6.6 on admission from baseline 9-10. Corrected with 1U PRBC.  No overt sources of GIB, does have bloody drainage in chest tube.  Monitor H&H, stable at this time  Hold DVT prophylaxis    Hypertension  Hold home amlodipine as pt with acceptable BP without this, restart as required  Continue home lisinopril    Seizure disorder  Continue home keppra and carbamazepine    Non-insulin dependent type II diabetes mellitus  Hold home PO meds  POCT glucose, SSI, hypoglycemia protocol    History of depression -continue home meds  Hyperlipidemia -continue home  No tracheomalacia. No bronchiectasis. Minimal emphysematous changes in the right lung.  HEART AND PERICARDIUM: Within normal limits. AORTA: Normal caliber aorta. ADENOPATHY/MEDIASTINUM: There are enlarged lymph nodes within the mediastinum the largest in the subcarinal space 1.5 x 1.5 cm. LIMITED VIEWS OF THE ABDOMEN: Within normal limits. OSSEOUS STRUCTURES: No sclerotic or lytic lesions. No acute fractures are identified. OVERLYING SOFT TISSUES: Unremarkable. THYROID: The thyroid is unremarkable.     No significant elevated lung parenchyma appreciated on the left side. Absence of contrast limits evaluation however the findings are concerning for underlying empyema formation. Electronically signed by Pj Steve    XR CHEST PORTABLE    Result Date: 7/14/2024  EXAMINATION: XR CHEST PORTABLE, 7/14/2024 12:22 PM HISTORY: r/o ptx COMPARISON:  No comparisons available.  Technique: Single view. Findings: Complete opacification of the left hemithorax. No pneumothorax. Heart is normal size. Mediastinal and hilar contours are within normal limits. Bony thorax no acute abnormality.     Probable large left sided pneumonia, underlying mass not excluded. CT chest with contrast recommended Electronically signed by Pj Steve      Electronically signed by Pinky Brewer MD on 7/22/2024 at 5:28 PM

## 2024-07-23 LAB
ANION GAP SERPL CALCULATED.3IONS-SCNC: 10 MMOL/L (ref 7–16)
ANION GAP SERPL CALCULATED.3IONS-SCNC: 12 MMOL/L (ref 7–16)
BASOPHILS ABSOLUTE: 0.1 K/CU MM
BASOPHILS ABSOLUTE: 0.1 K/CU MM
BASOPHILS RELATIVE PERCENT: 0.5 % (ref 0–1)
BASOPHILS RELATIVE PERCENT: 0.7 % (ref 0–1)
BUN SERPL-MCNC: 12 MG/DL (ref 6–23)
BUN SERPL-MCNC: 14 MG/DL (ref 6–23)
CALCIUM SERPL-MCNC: 9.2 MG/DL (ref 8.3–10.6)
CALCIUM SERPL-MCNC: 9.3 MG/DL (ref 8.3–10.6)
CHLORIDE BLD-SCNC: 98 MMOL/L (ref 99–110)
CHLORIDE BLD-SCNC: 99 MMOL/L (ref 99–110)
CO2: 24 MMOL/L (ref 21–32)
CO2: 26 MMOL/L (ref 21–32)
CREAT SERPL-MCNC: 0.3 MG/DL (ref 0.6–1.1)
CREAT SERPL-MCNC: 0.3 MG/DL (ref 0.6–1.1)
CULTURE: ABNORMAL
DIFFERENTIAL TYPE: ABNORMAL
DIFFERENTIAL TYPE: ABNORMAL
EOSINOPHILS ABSOLUTE: 0 K/CU MM
EOSINOPHILS ABSOLUTE: 0.1 K/CU MM
EOSINOPHILS RELATIVE PERCENT: 0.3 % (ref 0–3)
EOSINOPHILS RELATIVE PERCENT: 1.3 % (ref 0–3)
GFR, ESTIMATED: >90 ML/MIN/1.73M2
GFR, ESTIMATED: >90 ML/MIN/1.73M2
GLUCOSE BLD-MCNC: 128 MG/DL (ref 70–99)
GLUCOSE BLD-MCNC: 148 MG/DL (ref 70–99)
GLUCOSE BLD-MCNC: 156 MG/DL (ref 70–99)
GLUCOSE BLD-MCNC: 194 MG/DL (ref 70–99)
GLUCOSE BLD-MCNC: 219 MG/DL (ref 70–99)
GLUCOSE SERPL-MCNC: 133 MG/DL (ref 70–99)
GLUCOSE SERPL-MCNC: 152 MG/DL (ref 70–99)
GRAM SMEAR: ABNORMAL
HCT VFR BLD CALC: 32.1 % (ref 37–47)
HCT VFR BLD CALC: 34 % (ref 37–47)
HEMOGLOBIN: 9.2 GM/DL (ref 12.5–16)
HEMOGLOBIN: 9.3 GM/DL (ref 12.5–16)
IMMATURE NEUTROPHIL %: 1.7 % (ref 0–0.43)
IMMATURE NEUTROPHIL %: 2.4 % (ref 0–0.43)
LACTATE: 1.9 MMOL/L (ref 0.5–1.9)
LYMPHOCYTES ABSOLUTE: 2.6 K/CU MM
LYMPHOCYTES ABSOLUTE: 2.6 K/CU MM
LYMPHOCYTES RELATIVE PERCENT: 28 % (ref 24–44)
LYMPHOCYTES RELATIVE PERCENT: 36.7 % (ref 24–44)
Lab: ABNORMAL
MCH RBC QN AUTO: 26.1 PG (ref 27–31)
MCH RBC QN AUTO: 26.5 PG (ref 27–31)
MCHC RBC AUTO-ENTMCNC: 27.4 % (ref 32–36)
MCHC RBC AUTO-ENTMCNC: 28.7 % (ref 32–36)
MCV RBC AUTO: 90.9 FL (ref 78–100)
MCV RBC AUTO: 96.9 FL (ref 78–100)
MONOCYTES ABSOLUTE: 0.6 K/CU MM
MONOCYTES ABSOLUTE: 0.9 K/CU MM
MONOCYTES RELATIVE PERCENT: 8.4 % (ref 0–4)
MONOCYTES RELATIVE PERCENT: 9.3 % (ref 0–4)
NEUTROPHILS ABSOLUTE: 3.6 K/CU MM
NEUTROPHILS ABSOLUTE: 5.5 K/CU MM
NEUTROPHILS RELATIVE PERCENT: 50.5 % (ref 36–66)
NEUTROPHILS RELATIVE PERCENT: 60.2 % (ref 36–66)
NUCLEATED RBC %: 0 %
NUCLEATED RBC %: 0 %
PDW BLD-RTO: 19.6 % (ref 11.7–14.9)
PDW BLD-RTO: 20.1 % (ref 11.7–14.9)
PLATELET # BLD: 392 K/CU MM (ref 140–440)
PLATELET # BLD: 424 K/CU MM (ref 140–440)
PMV BLD AUTO: 8.6 FL (ref 7.5–11.1)
PMV BLD AUTO: 8.6 FL (ref 7.5–11.1)
POTASSIUM SERPL-SCNC: 5.2 MMOL/L (ref 3.5–5.1)
POTASSIUM SERPL-SCNC: 5.3 MMOL/L (ref 3.5–5.1)
RBC # BLD: 3.51 M/CU MM (ref 4.2–5.4)
RBC # BLD: 3.53 M/CU MM (ref 4.2–5.4)
SODIUM BLD-SCNC: 134 MMOL/L (ref 135–145)
SODIUM BLD-SCNC: 135 MMOL/L (ref 135–145)
SPECIMEN: ABNORMAL
TOTAL IMMATURE NEUTOROPHIL: 0.16 K/CU MM
TOTAL IMMATURE NEUTOROPHIL: 0.17 K/CU MM
TOTAL NUCLEATED RBC: 0 K/CU MM
TOTAL NUCLEATED RBC: 0 K/CU MM
WBC # BLD: 7 K/CU MM (ref 4–10.5)
WBC # BLD: 9.2 K/CU MM (ref 4–10.5)

## 2024-07-23 PROCEDURE — 2140000000 HC CCU INTERMEDIATE R&B

## 2024-07-23 PROCEDURE — 2580000003 HC RX 258: Performed by: STUDENT IN AN ORGANIZED HEALTH CARE EDUCATION/TRAINING PROGRAM

## 2024-07-23 PROCEDURE — 51798 US URINE CAPACITY MEASURE: CPT

## 2024-07-23 PROCEDURE — 83605 ASSAY OF LACTIC ACID: CPT

## 2024-07-23 PROCEDURE — 82962 GLUCOSE BLOOD TEST: CPT

## 2024-07-23 PROCEDURE — 94761 N-INVAS EAR/PLS OXIMETRY MLT: CPT

## 2024-07-23 PROCEDURE — 99223 1ST HOSP IP/OBS HIGH 75: CPT | Performed by: THORACIC SURGERY (CARDIOTHORACIC VASCULAR SURGERY)

## 2024-07-23 PROCEDURE — 6370000000 HC RX 637 (ALT 250 FOR IP): Performed by: STUDENT IN AN ORGANIZED HEALTH CARE EDUCATION/TRAINING PROGRAM

## 2024-07-23 PROCEDURE — 99233 SBSQ HOSP IP/OBS HIGH 50: CPT | Performed by: NURSE PRACTITIONER

## 2024-07-23 PROCEDURE — 80048 BASIC METABOLIC PNL TOTAL CA: CPT

## 2024-07-23 PROCEDURE — 6360000002 HC RX W HCPCS: Performed by: INTERNAL MEDICINE

## 2024-07-23 PROCEDURE — 85025 COMPLETE CBC W/AUTO DIFF WBC: CPT

## 2024-07-23 PROCEDURE — 6360000002 HC RX W HCPCS: Performed by: STUDENT IN AN ORGANIZED HEALTH CARE EDUCATION/TRAINING PROGRAM

## 2024-07-23 PROCEDURE — 36415 COLL VENOUS BLD VENIPUNCTURE: CPT

## 2024-07-23 RX ADMIN — SODIUM CHLORIDE: 9 INJECTION, SOLUTION INTRAVENOUS at 05:56

## 2024-07-23 RX ADMIN — SODIUM CHLORIDE, PRESERVATIVE FREE 10 ML: 5 INJECTION INTRAVENOUS at 08:52

## 2024-07-23 RX ADMIN — Medication 1 CAPSULE: at 08:52

## 2024-07-23 RX ADMIN — BUSPIRONE HYDROCHLORIDE 10 MG: 5 TABLET ORAL at 22:15

## 2024-07-23 RX ADMIN — COLLAGENASE SANTYL: 250 OINTMENT TOPICAL at 08:51

## 2024-07-23 RX ADMIN — SODIUM CHLORIDE, PRESERVATIVE FREE 10 ML: 5 INJECTION INTRAVENOUS at 05:57

## 2024-07-23 RX ADMIN — ATORVASTATIN CALCIUM 40 MG: 40 TABLET, FILM COATED ORAL at 08:52

## 2024-07-23 RX ADMIN — MIRTAZAPINE 15 MG: 15 TABLET, FILM COATED ORAL at 22:15

## 2024-07-23 RX ADMIN — SODIUM CHLORIDE, PRESERVATIVE FREE 10 ML: 5 INJECTION INTRAVENOUS at 03:30

## 2024-07-23 RX ADMIN — FERROUS SULFATE TAB 325 MG (65 MG ELEMENTAL FE) 325 MG: 325 (65 FE) TAB at 08:52

## 2024-07-23 RX ADMIN — MORPHINE SULFATE 1 MG: 2 INJECTION, SOLUTION INTRAMUSCULAR; INTRAVENOUS at 08:47

## 2024-07-23 RX ADMIN — CARBAMAZEPINE 300 MG: 100 SUSPENSION ORAL at 08:51

## 2024-07-23 RX ADMIN — DIVALPROEX SODIUM 1000 MG: 125 CAPSULE, COATED PELLETS ORAL at 08:52

## 2024-07-23 RX ADMIN — PANTOPRAZOLE SODIUM 40 MG: 40 INJECTION, POWDER, FOR SOLUTION INTRAVENOUS at 08:52

## 2024-07-23 RX ADMIN — CARBAMAZEPINE 300 MG: 100 SUSPENSION ORAL at 22:15

## 2024-07-23 RX ADMIN — BUSPIRONE HYDROCHLORIDE 10 MG: 5 TABLET ORAL at 08:52

## 2024-07-23 RX ADMIN — PIPERACILLIN AND TAZOBACTAM 3375 MG: 3; .375 INJECTION, POWDER, LYOPHILIZED, FOR SOLUTION INTRAVENOUS at 14:06

## 2024-07-23 RX ADMIN — PIPERACILLIN AND TAZOBACTAM 3375 MG: 3; .375 INJECTION, POWDER, LYOPHILIZED, FOR SOLUTION INTRAVENOUS at 05:56

## 2024-07-23 RX ADMIN — MORPHINE SULFATE 1 MG: 2 INJECTION, SOLUTION INTRAMUSCULAR; INTRAVENOUS at 03:30

## 2024-07-23 RX ADMIN — LEVETIRACETAM 1500 MG: 100 SOLUTION ORAL at 22:15

## 2024-07-23 RX ADMIN — DIVALPROEX SODIUM 1000 MG: 125 CAPSULE, COATED PELLETS ORAL at 22:14

## 2024-07-23 RX ADMIN — LEVETIRACETAM 1500 MG: 100 SOLUTION ORAL at 08:51

## 2024-07-23 RX ADMIN — FLUTICASONE PROPIONATE 2 SPRAY: 50 SPRAY, METERED NASAL at 08:51

## 2024-07-23 RX ADMIN — LISINOPRIL 40 MG: 20 TABLET ORAL at 08:51

## 2024-07-23 RX ADMIN — PIPERACILLIN AND TAZOBACTAM 3375 MG: 3; .375 INJECTION, POWDER, LYOPHILIZED, FOR SOLUTION INTRAVENOUS at 22:20

## 2024-07-23 RX ADMIN — SODIUM CHLORIDE, PRESERVATIVE FREE 10 ML: 5 INJECTION INTRAVENOUS at 22:15

## 2024-07-23 RX ADMIN — FERROUS SULFATE TAB 325 MG (65 MG ELEMENTAL FE) 325 MG: 325 (65 FE) TAB at 22:15

## 2024-07-23 ASSESSMENT — PAIN SCALES - GENERAL
PAINLEVEL_OUTOF10: 0
PAINLEVEL_OUTOF10: 0
PAINLEVEL_OUTOF10: 5
PAINLEVEL_OUTOF10: 4
PAINLEVEL_OUTOF10: 1
PAINLEVEL_OUTOF10: 7

## 2024-07-23 ASSESSMENT — PAIN DESCRIPTION - LOCATION
LOCATION: ABDOMEN;CHEST
LOCATION: CHEST

## 2024-07-23 ASSESSMENT — PAIN SCALES - WONG BAKER: WONGBAKER_NUMERICALRESPONSE: HURTS A LITTLE BIT

## 2024-07-23 ASSESSMENT — PAIN DESCRIPTION - DESCRIPTORS
DESCRIPTORS: PATIENT UNABLE TO DESCRIBE
DESCRIPTORS: PATIENT UNABLE TO DESCRIBE

## 2024-07-23 ASSESSMENT — PAIN - FUNCTIONAL ASSESSMENT: PAIN_FUNCTIONAL_ASSESSMENT: PREVENTS OR INTERFERES SOME ACTIVE ACTIVITIES AND ADLS

## 2024-07-23 ASSESSMENT — PAIN DESCRIPTION - PAIN TYPE: TYPE: SURGICAL PAIN

## 2024-07-23 ASSESSMENT — PAIN DESCRIPTION - ONSET: ONSET: UNABLE TO COMMUNICATE

## 2024-07-23 ASSESSMENT — PAIN DESCRIPTION - ORIENTATION: ORIENTATION: LEFT

## 2024-07-23 ASSESSMENT — PAIN DESCRIPTION - FREQUENCY: FREQUENCY: OTHER (COMMENT)

## 2024-07-23 NOTE — CONSULTS
Cardiothoracic Surgery  IN-PATIENT SERVICE   [facility]    CONSULTATION / HISTORY AND PHYSICAL EXAMINATION            Date:   7/23/2024  Patient name:  Jm Garcia  Date of admission:  7/14/2024 12:10 PM  MRN:   [id number]  Account:  [id number]  YOB: 1960  PCP:    Yuly Bro APRN - CNP  Room:   [address] [place], [place] [address]A  Code Status:    Full Code    Physician Requesting Consult: Phylicia Medrano MD    Reason for Consult:  trapped lung/empyema    Chief Complaint:     sob    History of Present Illness:     Jm Garcia is a 63 y.o. female with pmh of MRDD, cerebral palsy, COPD on 2 L home oxygen, type 2 diabetes mellitus, hypothyroidism, iron deficiency anemia, HLD, anxiety/depression, seizure disorder, essential hypertension, GERD who presents from the facility after x-ray done to confirm the location of the G-tube which was replaced in the ED 2 days back revealed left-sided lung washout/consolidation.  Patient was sent to the ED.      7/15- Dr. Oliva, IR, CT guided left chest tube placement    Pulm consulted and performed intra pleural TPA on 7/17/24 x 4 days    We were consulted for surgical evaluation of empyema                 Past Medical History:     Past Medical History:   Diagnosis Date    Anxiety disorder     Back pain, chronic     Cerebral palsy (HCC)     COPD (chronic obstructive pulmonary disease) (HCC)     COVID-19 10/12/2021    CVA (cerebral infarction) 2014 x2    Diabetes mellitus (HCC)     Diverticulosis     Epilepsy (HCC)     GERD (gastroesophageal reflux disease)     Hyperlipidemia     Hypertension     Insomnia     Iron (Fe) deficiency anemia     Mental disability     Seizures (HCC)     Unspecified cerebral artery occlusion with cerebral infarction         Past Surgical History:     Past Surgical History:   Procedure Laterality Date    GASTROSTOMY TUBE PLACEMENT      UPPER GASTROINTESTINAL ENDOSCOPY N/A 5/3/2024    ESOPHAGOGASTRODUODENOSCOPY PERCUTANEOUS  8:00AM-4:30PM    CVICU patient or urgent follow up: 4:30PM to 8:00AM Call or Page Surgeon on-call     Step-down patient follow up: 4:30PM to 8:00AM Page or secure chat PA on-call         Today, I personally spent 80 minutes with the patient, of which greater than 50% of the time was in direct face to face contact with the patient. The time was spent in patient education and counseling as described above and I personally reviewed his studies and coordinating his future care.     I explained the risk, benefits and other non-surgical treatment options as well as STS score for mortality and other complication associated with this surgery.  I have reviewed the HPI, past medical, surgical, family, social history sections, medications and allergies listed in the above medical record as well as performing a physical exam of the patient including the review of systems, medications and allergies listed in the above medical record. I have also reviewed and discussed the KAREN documentation.    I verified and agreed with the above documentation.    Toni Lewis MD 07/25/24 11:17 AM

## 2024-07-23 NOTE — PROGRESS NOTES
pulmonary      SUBJECTIVE:  no sob     OBJECTIVE    VITALS:  BP (!) 157/54   Pulse 89   Temp 98.1 °F (36.7 °C) (Oral)   Resp 23   Ht 1.473 m (4' 10\")   Wt 49.7 kg (109 lb 9.1 oz)   SpO2 97%   BMI 22.90 kg/m²   HEAD AND FACE EXAM:  No throat injection, no active exudate,no thrush  NECK EXAM;No JVD, no masses, symmetrical  CHEST EXAM; Expansion equal and symmetrical, no masses  LUNG EXAM; Good breath sounds bilaterally. There are expiratory wheezes both lungs, there are crackles at both lung bases  CARDIOVASCULAR EXAM: Positive S1 and S2, no S3 or S4, no clicks ,no murmurs  RIGHT AND LEFT LOWER EXTRIMITY EXAM: No edema, no swelling, no           LABS   Lab Results   Component Value Date    WBC 7.0 07/23/2024    HGB 9.2 (L) 07/23/2024    HCT 32.1 (L) 07/23/2024    MCV 90.9 07/23/2024     07/23/2024     Lab Results   Component Value Date    CREATININE 0.3 (L) 07/23/2024    BUN 12 07/23/2024     07/23/2024    K 5.2 (H) 07/23/2024    CL 99 07/23/2024    CO2 26 07/23/2024     Lab Results   Component Value Date    INR 1.0 04/30/2024    PROTIME 13.1 04/30/2024          Lab Results   Component Value Date/Time    PHOS 3.7 07/15/2024 02:48 AM    PHOS 1.3 05/10/2024 03:30 AM    PHOS 3.6 11/02/2022 12:38 PM      No results for input(s): \"PH\", \"PO2ART\", \"YFR1EMZ\", \"HCO3\", \"BEART\", \"O2SAT\" in the last 72 hours.      Wt Readings from Last 3 Encounters:   07/23/24 49.7 kg (109 lb 9.1 oz)   07/11/24 54.4 kg (120 lb)   05/07/24 52.1 kg (114 lb 13.8 oz)               ASSESMENT  Left empyema  copd        PLAN  Antibx  Ct drainage    7/23/2024  Ross Harman MD, M.D.

## 2024-07-23 NOTE — PROGRESS NOTES
Comprehensive Nutrition Assessment    Type and Reason for Visit:  Reassess    Nutrition Recommendations/Plan:   Continue current EN meeting 100% estimated needs  Renal formula available if needed     Malnutrition Assessment:  Malnutrition Status:  Moderate malnutrition (07/16/24 1157)    Context:  Social/Environmental Circumstances (dysphagia, requiring PEG for EN initiation)     Findings of the 6 clinical characteristics of malnutrition:  Energy Intake:  Less than 75% estimated energy requirements for 3 months or longer (Reduced po intakes on average x past 6 months with dysphagia, then PEG placed 2 months ago)  Weight Loss:  Greater than 10% over 6 months (22%)     Body Fat Loss:  No significant body fat loss Orbital, Triceps, Buccal region, Fat Overlying Ribs   Muscle Mass Loss:  Mild muscle mass loss (contractures) Thigh (quadraceps), Calf (gastrocnemius)  Fluid Accumulation:  No significant fluid accumulation Extremities   Strength:  Not Performed    Nutrition Assessment:    Remains NPO with dysphagia, tube feeding via PEG to meet needs. Currently on bolus tube feeding with diabetic formula, 237 ml x 4 per day meeting estimated calorie and protein needs. Will continue to follow at high nutrition risk at this time.    Nutrition Related Findings:    culturelle, remeron K+ 5.2, Cr 0.3, Glu 219   Wound Type: Pressure Injury, Unstageable       Current Nutrition Intake & Therapies:    Average Meal Intake: NPO  Average Supplements Intake: NPO  Diet NPO  ADULT TUBE FEEDING; PEG; Diabetic; Bolus; 4 Times Daily, Other (specify); q 6 hours; 237; Syringe Push; 88; Before and after each bolus  Current Tube Feeding (TF) Orders:  Feeding Route: PEG  Formula: Diabetic (glucerna 1.5)  Schedule: Bolus  Feeding Regimen: 237 ml 4 times per day  Additives/Modulars: None  Water Flushes: 88 ml before and after bolus  Current TF & Flush Orders Provides: 1422 calories, 78 g protein, 1423 ml free water from tube feeding and

## 2024-07-23 NOTE — PROGRESS NOTES
V2.0  [facility] [place] Hospitalist Progress Note      Name:  Jm Garcia /Age/Sex: 1960  (63 y.o. female)   MRN & CSN:  [id number] & [id number] Encounter Date/Time: 2024 1:42 PM EDT    Location:  3129/3128-A PCP: Yuly Bro APRN - CNP       Hospital Day: 10    Assessment and Plan:   Jm Garcia is a 63 y.o. female with pmh of  who presents with Acute hypoxemic respiratory failure (HCC)      Plan:    Acute on chronic hypoxemic respiratory failure 2/2 left sided empyema  Sepsis  Chronic COPD -not in exacerbation  Patient found to have left lung consolidation/empyema on x-ray done to confirm location of the G-tube placed in the ED 2 days ago.  On 2L oxygen at baseline. Hx of aspiration PNA and at risk of recurrence due to poor dentition (does have G tube).  Initiated on vancomycin and zosyn in ER, discontinued vancomycin and continue zosyn  S/p CT guided chest tube placement by IR on 7/15, had 500cc purulent drainage during chest tube insertion  S/p tPA/Dnase x4 rounds  Pulmonology following  ID following: continue zosyn, attempt to transition to augmentin for 30 day course past removal of chest tube  Fluid culture sent on  but not resulted, sent another sample on   Repeat CT chest on  with decreased size of empyema  CT surgery consulted by pulmonology for trapped lung, pending recommendations    Normocytic anemia  Hx of iron deficiency anemia  Hgb 6.6 on admission from baseline 9-10. Corrected with 1U PRBC.  No overt sources of GIB, does have bloody drainage in chest tube.  Monitor H&H, stable at this time  Hold DVT prophylaxis    Hypertension  Hold home amlodipine as pt with acceptable BP without this, restart as required  Continue home lisinopril    Seizure disorder  Continue home keppra and carbamazepine    Non-insulin dependent type II diabetes mellitus  Hold home PO meds  POCT glucose, SSI, hypoglycemia protocol    History of depression -continue home meds  Hyperlipidemia -continue home

## 2024-07-23 NOTE — PROGRESS NOTES
Infectious Disease Progress Note  2024   Patient Name: Jm Garcia : 1960   Impression  Left Aspiration Pneumonia Complicated by Left-Sided Empyema:  Unstageable Sacral Pressure Wound:  Allergy to nitrofurantoin (hives, swelling):  She has had past aspiration pneumonia and is at great risk for recurrence due to her poor dentation, although now has a G-tube placed. Will empirically broadly cover as recent past aspiration.  CrCl 146. T-max 99.1, initial leukocytosis of 11.6 normalized on 7/15. Hypoalbuminemia. Resp panel and MRSA by PCR negative. Pct 0.181, 0.237.  on dwt to 178.   -BC 0/2 NGTD at 24h  -PCXR: probable large left sided pneumonia, underlying mass not excluded. CT chest with contrast recommended.   -CT Chest wo Contrast: No significant elevated lung parenchyma appreciated on the left side. Absence of   contrast limits evaluation however the findings are concerning for underlying   empyema formation.   7/15-S/p per Dr. Oliva, IR, CT guided left chest tube placement for empyema. 450 cc purulent drainage. Fluid analysis: RBC 1,135,000. Glucose 237. , Protein 1.6. ,445. Culture:NGTD  -PCXR: Interval placement of left chest tube with improvement in aeration   in the left upper lung zone. There is persistent left mid and lower lung zone   opacification likely due to effusion.   -PCXR: pigtail catheter in left pleural space. Moderate left pleural effusion. Diffuse moderate atelectasis of the left lung. The right lung is clear. No significant change.   Dr. Knox, Pulmonology, started a 5 day course of IV Solumedrol.   Recent G-Tube Placement Secondary to Aspiration:  MRDD/ Cerebral Palsy/ Seizures:  COPD Oxygen Dependent 2 L/NC:  Multi-morbidity: per PMHx: MRDD, anxiety disorder/ depression, chronic back pain, COPD oxygen dependent, DMII, hypothyroidism, iron deficiency anemia, cerebral palsy, CVA ,epilepsy, DMII, diverticulosis, GERD, HLD, HTN,  07/23/24  3:32 AM   Result Value Ref Range    WBC 7.0 4.0 - 10.5 K/CU MM    RBC 3.53 (L) 4.2 - 5.4 M/CU MM    Hemoglobin 9.2 (L) 12.5 - 16.0 GM/DL    Hematocrit 32.1 (L) 37 - 47 %    MCV 90.9 78 - 100 FL    MCH 26.1 (L) 27 - 31 PG    MCHC 28.7 (L) 32.0 - 36.0 %    RDW 19.6 (H) 11.7 - 14.9 %    Platelets 392 140 - 440 K/CU MM    MPV 8.6 7.5 - 11.1 FL    Differential Type AUTOMATED DIFFERENTIAL     Neutrophils % 50.5 36 - 66 %    Lymphocytes % 36.7 24 - 44 %    Monocytes % 8.4 (H) 0 - 4 %    Eosinophils % 1.3 0 - 3 %    Basophils % 0.7 0 - 1 %    Neutrophils Absolute 3.6 K/CU MM    Lymphocytes Absolute 2.6 K/CU MM    Monocytes Absolute 0.6 K/CU MM    Eosinophils Absolute 0.1 K/CU MM    Basophils Absolute 0.1 K/CU MM    Nucleated RBC % 0.0 %    Total Nucleated RBC 0.0 K/CU MM    Total Immature Neutrophil 0.17 K/CU MM    Immature Neutrophil % 2.4 (H) 0 - 0.43 %   POCT Glucose    Collection Time: 07/23/24  8:32 AM   Result Value Ref Range    POC Glucose 219 (H) 70 - 99 MG/DL   POCT Glucose    Collection Time: 07/23/24 12:01 PM   Result Value Ref Range    POC Glucose 194 (H) 70 - 99 MG/DL     CULTURE results: Invalid input(s): \"BLOOD CULTURE\", \"URINE CULTURE\", \"SURGICAL CULTURE\"    Diagnosis:  Patient Active Problem List   Diagnosis    Pneumonia due to infectious organism    HTN (hypertension), benign    Seizure disorder (HCC)    Altered mental status    Chest pain    MR (mental retardation)    COPD exacerbation (HCC)    Left hemiplegia (HCC)    H/O: stroke    Mixed hyperlipidemia    Acquired hypothyroidism    Sepsis (HCC)    Acute bronchitis    Acute respiratory failure with hypoxia (HCC)    Acute respiratory failure (HCC)    Anxiety and depression    Anemia    Controlled type 2 diabetes mellitus without complication, without long-term current use of insulin (HCC)    Diverticulosis    Lesion of right native kidney    Black stool    Hypoxia    COVID-19 virus infection    Other cerebral palsy (HCC)    Chronic respiratory

## 2024-07-24 LAB
ANION GAP SERPL CALCULATED.3IONS-SCNC: 11 MMOL/L (ref 7–16)
BASOPHILS ABSOLUTE: 0 K/CU MM
BASOPHILS RELATIVE PERCENT: 0.4 % (ref 0–1)
BUN SERPL-MCNC: 13 MG/DL (ref 6–23)
CALCIUM SERPL-MCNC: 9.1 MG/DL (ref 8.3–10.6)
CHLORIDE BLD-SCNC: 101 MMOL/L (ref 99–110)
CO2: 24 MMOL/L (ref 21–32)
CREAT SERPL-MCNC: 0.3 MG/DL (ref 0.6–1.1)
DIFFERENTIAL TYPE: ABNORMAL
EOSINOPHILS ABSOLUTE: 0 K/CU MM
EOSINOPHILS RELATIVE PERCENT: 0.5 % (ref 0–3)
GFR, ESTIMATED: >90 ML/MIN/1.73M2
GLUCOSE BLD-MCNC: 123 MG/DL (ref 70–99)
GLUCOSE BLD-MCNC: 124 MG/DL (ref 70–99)
GLUCOSE BLD-MCNC: 128 MG/DL (ref 70–99)
GLUCOSE BLD-MCNC: 170 MG/DL (ref 70–99)
GLUCOSE SERPL-MCNC: 173 MG/DL (ref 70–99)
HCT VFR BLD CALC: 30.2 % (ref 37–47)
HEMOGLOBIN: 8.5 GM/DL (ref 12.5–16)
IMMATURE NEUTROPHIL %: 1.6 % (ref 0–0.43)
LYMPHOCYTES ABSOLUTE: 2.1 K/CU MM
LYMPHOCYTES RELATIVE PERCENT: 27.9 % (ref 24–44)
MCH RBC QN AUTO: 26 PG (ref 27–31)
MCHC RBC AUTO-ENTMCNC: 28.1 % (ref 32–36)
MCV RBC AUTO: 92.4 FL (ref 78–100)
MONOCYTES ABSOLUTE: 0.5 K/CU MM
MONOCYTES RELATIVE PERCENT: 7 % (ref 0–4)
NEUTROPHILS ABSOLUTE: 4.7 K/CU MM
NEUTROPHILS RELATIVE PERCENT: 62.6 % (ref 36–66)
NUCLEATED RBC %: 0 %
PDW BLD-RTO: 20.2 % (ref 11.7–14.9)
PLATELET # BLD: 448 K/CU MM (ref 140–440)
PMV BLD AUTO: 8.7 FL (ref 7.5–11.1)
POTASSIUM SERPL-SCNC: 4.3 MMOL/L (ref 3.5–5.1)
RBC # BLD: 3.27 M/CU MM (ref 4.2–5.4)
SODIUM BLD-SCNC: 136 MMOL/L (ref 135–145)
TOTAL IMMATURE NEUTOROPHIL: 0.12 K/CU MM
TOTAL NUCLEATED RBC: 0 K/CU MM
WBC # BLD: 7.5 K/CU MM (ref 4–10.5)

## 2024-07-24 PROCEDURE — 6370000000 HC RX 637 (ALT 250 FOR IP): Performed by: STUDENT IN AN ORGANIZED HEALTH CARE EDUCATION/TRAINING PROGRAM

## 2024-07-24 PROCEDURE — 2140000000 HC CCU INTERMEDIATE R&B

## 2024-07-24 PROCEDURE — 6360000002 HC RX W HCPCS: Performed by: STUDENT IN AN ORGANIZED HEALTH CARE EDUCATION/TRAINING PROGRAM

## 2024-07-24 PROCEDURE — 94761 N-INVAS EAR/PLS OXIMETRY MLT: CPT

## 2024-07-24 PROCEDURE — 2580000003 HC RX 258: Performed by: STUDENT IN AN ORGANIZED HEALTH CARE EDUCATION/TRAINING PROGRAM

## 2024-07-24 PROCEDURE — 99233 SBSQ HOSP IP/OBS HIGH 50: CPT | Performed by: NURSE PRACTITIONER

## 2024-07-24 PROCEDURE — 99233 SBSQ HOSP IP/OBS HIGH 50: CPT | Performed by: INTERNAL MEDICINE

## 2024-07-24 PROCEDURE — 82962 GLUCOSE BLOOD TEST: CPT

## 2024-07-24 PROCEDURE — 6360000002 HC RX W HCPCS: Performed by: INTERNAL MEDICINE

## 2024-07-24 PROCEDURE — 36415 COLL VENOUS BLD VENIPUNCTURE: CPT

## 2024-07-24 PROCEDURE — 80048 BASIC METABOLIC PNL TOTAL CA: CPT

## 2024-07-24 PROCEDURE — 85025 COMPLETE CBC W/AUTO DIFF WBC: CPT

## 2024-07-24 RX ADMIN — PIPERACILLIN AND TAZOBACTAM 3375 MG: 3; .375 INJECTION, POWDER, LYOPHILIZED, FOR SOLUTION INTRAVENOUS at 12:40

## 2024-07-24 RX ADMIN — CARBAMAZEPINE 300 MG: 100 SUSPENSION ORAL at 10:47

## 2024-07-24 RX ADMIN — ATORVASTATIN CALCIUM 40 MG: 40 TABLET, FILM COATED ORAL at 10:48

## 2024-07-24 RX ADMIN — CARBAMAZEPINE 300 MG: 100 SUSPENSION ORAL at 21:50

## 2024-07-24 RX ADMIN — SODIUM CHLORIDE 25 ML/HR: 9 INJECTION, SOLUTION INTRAVENOUS at 05:07

## 2024-07-24 RX ADMIN — PIPERACILLIN AND TAZOBACTAM 3375 MG: 3; .375 INJECTION, POWDER, LYOPHILIZED, FOR SOLUTION INTRAVENOUS at 21:11

## 2024-07-24 RX ADMIN — Medication 1 CAPSULE: at 10:47

## 2024-07-24 RX ADMIN — BUSPIRONE HYDROCHLORIDE 10 MG: 5 TABLET ORAL at 10:47

## 2024-07-24 RX ADMIN — FERROUS SULFATE TAB 325 MG (65 MG ELEMENTAL FE) 325 MG: 325 (65 FE) TAB at 21:50

## 2024-07-24 RX ADMIN — DIVALPROEX SODIUM 1000 MG: 125 CAPSULE, COATED PELLETS ORAL at 10:47

## 2024-07-24 RX ADMIN — SODIUM CHLORIDE, PRESERVATIVE FREE 10 ML: 5 INJECTION INTRAVENOUS at 12:13

## 2024-07-24 RX ADMIN — MORPHINE SULFATE 1 MG: 2 INJECTION, SOLUTION INTRAMUSCULAR; INTRAVENOUS at 10:51

## 2024-07-24 RX ADMIN — LISINOPRIL 40 MG: 20 TABLET ORAL at 10:47

## 2024-07-24 RX ADMIN — MIRTAZAPINE 15 MG: 15 TABLET, FILM COATED ORAL at 21:50

## 2024-07-24 RX ADMIN — LEVETIRACETAM 1500 MG: 100 SOLUTION ORAL at 21:50

## 2024-07-24 RX ADMIN — PANTOPRAZOLE SODIUM 40 MG: 40 INJECTION, POWDER, FOR SOLUTION INTRAVENOUS at 10:46

## 2024-07-24 RX ADMIN — BUSPIRONE HYDROCHLORIDE 10 MG: 5 TABLET ORAL at 21:51

## 2024-07-24 RX ADMIN — FERROUS SULFATE TAB 325 MG (65 MG ELEMENTAL FE) 325 MG: 325 (65 FE) TAB at 10:48

## 2024-07-24 RX ADMIN — LEVETIRACETAM 1500 MG: 100 SOLUTION ORAL at 10:50

## 2024-07-24 RX ADMIN — DIVALPROEX SODIUM 1000 MG: 125 CAPSULE, COATED PELLETS ORAL at 21:51

## 2024-07-24 RX ADMIN — PIPERACILLIN AND TAZOBACTAM 3375 MG: 3; .375 INJECTION, POWDER, LYOPHILIZED, FOR SOLUTION INTRAVENOUS at 05:08

## 2024-07-24 RX ADMIN — SODIUM CHLORIDE, PRESERVATIVE FREE 10 ML: 5 INJECTION INTRAVENOUS at 20:05

## 2024-07-24 ASSESSMENT — PAIN SCALES - GENERAL
PAINLEVEL_OUTOF10: 0
PAINLEVEL_OUTOF10: 0
PAINLEVEL_OUTOF10: 4
PAINLEVEL_OUTOF10: 7
PAINLEVEL_OUTOF10: 0
PAINLEVEL_OUTOF10: 0

## 2024-07-24 ASSESSMENT — PAIN DESCRIPTION - ONSET: ONSET: GRADUAL

## 2024-07-24 ASSESSMENT — PAIN DESCRIPTION - PAIN TYPE: TYPE: CHRONIC PAIN;SURGICAL PAIN

## 2024-07-24 ASSESSMENT — PAIN DESCRIPTION - DESCRIPTORS: DESCRIPTORS: ACHING

## 2024-07-24 ASSESSMENT — PAIN DESCRIPTION - FREQUENCY: FREQUENCY: INTERMITTENT

## 2024-07-24 ASSESSMENT — PAIN DESCRIPTION - LOCATION: LOCATION: GENERALIZED

## 2024-07-24 NOTE — PROGRESS NOTES
Noted new mid line iv leaking larger amounts of iv fluid. Vascular consult ordered to check line out.

## 2024-07-24 NOTE — CONSULTS
Consult completed. RUE Midline fully patent, returning blood briskly and flushing without resistance/abnormalities s/p sterile dressing change with CHG scrub, SkinPrep, StatLock securing device, CHG gel DSSG, and new LuerLok cap/SwabCap. Specimens collected, labeled, and sent to lab and Primary RN notified.

## 2024-07-24 NOTE — PROGRESS NOTES
Physician Progress Note      PATIENT:               FELIPA GUERRA  [facility] #:                  [id number]  :                       1960  ADMIT DATE:       2024 12:10 PM  DISCH DATE:  RESPONDING  PROVIDER #:        Phylicia Dougherty MD          QUERY TEXT:    Patient admitted with empyema. Noted documentation of sepsis in H&P and   consult notes and PN. In order to support the diagnosis of sepsis, please   include additional clinical indicators in your documentation.  Or please   document if the diagnosis of sepsis has been ruled out after further study    The medical record reflects the following:  Risk Factors: empyema, possible pneumonia  Clinical Indicators: Follow septic workup, de-escalate antibiotics as able,   Pro-Js 0.181, lactate 1.7, WBC 11.6, .7, Pulse 102, respirations   25-31, afebrile  Treatment: 30 cc/kg IV fluids ordered. Patient s/p Vanco/Zosyn and doxycycline   in the ED-Continue with vancomycin/Zosyn, -IR consulted for thoracentesis of   possible empyema-CT placement, Continue IV LR, Critical care consulted from   the ED, contemplating possible need for bronchoscopy on the medicine floor/SDU    Thank you, Cora Saez RN, CDS [phone]  Options provided:  -- Sepsis present as evidenced by, Please document evidence.  -- Sepsis was ruled out after study  -- Other - I will add my own diagnosis  -- Disagree - Not applicable / Not valid  -- Disagree - Clinically unable to determine / Unknown  -- Refer to Clinical Documentation Reviewer    PROVIDER RESPONSE TEXT:    Sepsis is present as evidenced by empyema    Query created by: Cora Saez on 2024 2:12 PM      QUERY TEXT:    Patient admitted with empyema. Noted to have documentation of weight loss,   dietician assessment with malnutrition diagnosis, etc. in date and type of   note. If possible, please document in progress notes and discharge summary if   you are evaluating and /or treating any of the following:    The medical record  reflects the following:  Risk Factors: Hx CVA c/b hemiplegia L side, MR, Seizure disoorder, Cerebral   palsy, COPD, PEG tube placement 5/3/24.  Clinical Indicators: Nutrition Diagnosis: Moderate malnutrition, In context of   social or environmental circumstances (dysphagia) related to swallowing   difficulty, inadequate protein-energy intake as evidenced by poor intake prior   to admission, weight loss greater than or equal to 10% in 6 months, mild   muscle loss, Current BMI (kg/m2): 24.3  Treatment: Nutrition consult, Current TF & Flush Orders Provides: Jevity   1.5/Standard w fiber per room, 237 mL x 2 bolus this AM per flowsheet provided   711 kcal, 30 gm protein    Thank you, Cora Saez RN, CDS [phone]    ASPEN Criteria:    https://aspenjournals.onlinelibrary.farmer.com/doi/full/10.1177/847694841237879  5  Options provided:  -- Protein calorie malnutrition moderate  -- Other - I will add my own diagnosis  -- Disagree - Not applicable / Not valid  -- Disagree - Clinically unable to determine / Unknown  -- Refer to Clinical Documentation Reviewer    PROVIDER RESPONSE TEXT:    This patient has moderate protein calorie malnutrition.    Query created by: Cora Saez on 7/16/2024 2:12 PM      Electronically signed by:  Phylicia Dougherty MD 7/23/2024 11:13 PM

## 2024-07-24 NOTE — CONSULTS
RUE MidLine evaluated r/t report of leaking - no drainage/leaking noted under dressing changed this am @0800 which remains completely clean, dry, & intact. Site flushed with 20ml NS and 10ml NS powerflush without any leaking/drainage noted. Additionally US evaluation shows no s/s of infiltration/extravasation even after powerflush. Line patent and safe for continued use.

## 2024-07-24 NOTE — PROGRESS NOTES
V2.0  [facility] [place] Hospitalist Progress Note      Name:  Jm Garcia /Age/Sex: 1960  (63 y.o. female)   MRN & CSN:  [id number] & [id number] Encounter Date/Time: 2024 1:42 PM EDT    Location:  5929/3120-A PCP: Yuly Bro APRN - CNP       Hospital Day: 11    Assessment and Plan:   Jm Garcia is a 63 y.o. female with pmh of  who presents with Acute hypoxemic respiratory failure (HCC)      Plan:    Acute on chronic hypoxemic respiratory failure 2/2 left sided empyema  Sepsis  Chronic COPD -not in exacerbation  Patient found to have left lung consolidation/empyema on x-ray done to confirm location of the G-tube placed in the ED 2 days ago.  On 2L oxygen at baseline. Hx of aspiration PNA and at risk of recurrence due to poor dentition (does have G tube).  Initiated on vancomycin and zosyn in ER, discontinued vancomycin and continue zosyn  S/p CT guided chest tube placement by IR on 7/15, had 500cc purulent drainage during chest tube insertion  S/p tPA/Dnase x4 rounds  Pulmonology following  ID following: continue zosyn, attempt to transition to augmentin for 30 day course past removal of chest tube  Fluid culture sent on  but not resulted, sent another sample on   Repeat CT chest on  with decreased size of empyema  CT surgery consulted by pulmonology for trapped lung, pending recommendations  Pulmonology managing the chest tube     Normocytic anemia  Hx of iron deficiency anemia  Hgb 6.6 on admission from baseline 9-10. Corrected with 1U PRBC.  No overt sources of GIB, does have bloody drainage in chest tube.  Monitor H&H, stable at this time  Hold DVT prophylaxis    Hypertension  Hold home amlodipine as pt with acceptable BP without this, restart as required  Continue home lisinopril    Seizure disorder  Continue home keppra and carbamazepine    Non-insulin dependent type II diabetes mellitus  Hold home PO meds  POCT glucose, SSI, hypoglycemia protocol    History of depression -continue home  Eosinophils % 0.3 0 - 3 %    Basophils % 0.5 0 - 1 %    Neutrophils Absolute 5.5 K/CU MM    Lymphocytes Absolute 2.6 K/CU MM    Monocytes Absolute 0.9 K/CU MM    Eosinophils Absolute 0.0 K/CU MM    Basophils Absolute 0.1 K/CU MM    Nucleated RBC % 0.0 %    Total Nucleated RBC 0.0 K/CU MM    Total Immature Neutrophil 0.16 K/CU MM    Immature Neutrophil % 1.7 (H) 0 - 0.43 %   Basic Metabolic Panel    Collection Time: 07/23/24  6:06 PM   Result Value Ref Range    Sodium 134 (L) 135 - 145 MMOL/L    Potassium 5.3 (H) 3.5 - 5.1 MMOL/L    Chloride 98 (L) 99 - 110 mMol/L    CO2 24 21 - 32 MMOL/L    Anion Gap 12 7 - 16    Glucose 152 (H) 70 - 99 MG/DL    BUN 14 6 - 23 MG/DL    Creatinine 0.3 (L) 0.6 - 1.1 MG/DL    Est, Glom Filt Rate >90 >60 mL/min/1.73m2    Calcium 9.2 8.3 - 10.6 MG/DL   Lactic Acid    Collection Time: 07/23/24  6:06 PM   Result Value Ref Range    Lactate 1.9 0.5 - 1.9 mMOL/L   POCT Glucose    Collection Time: 07/23/24  8:18 PM   Result Value Ref Range    POC Glucose 128 (H) 70 - 99 MG/DL   POCT Glucose    Collection Time: 07/23/24 10:23 PM   Result Value Ref Range    POC Glucose 148 (H) 70 - 99 MG/DL   Basic Metabolic Panel    Collection Time: 07/24/24  2:56 AM   Result Value Ref Range    Sodium 136 135 - 145 MMOL/L    Potassium 4.3 3.5 - 5.1 MMOL/L    Chloride 101 99 - 110 mMol/L    CO2 24 21 - 32 MMOL/L    Anion Gap 11 7 - 16    Glucose 173 (H) 70 - 99 MG/DL    BUN 13 6 - 23 MG/DL    Creatinine 0.3 (L) 0.6 - 1.1 MG/DL    Est, Glom Filt Rate >90 >60 mL/min/1.73m2    Calcium 9.1 8.3 - 10.6 MG/DL   CBC with Auto Differential    Collection Time: 07/24/24  2:56 AM   Result Value Ref Range    WBC 7.5 4.0 - 10.5 K/CU MM    RBC 3.27 (L) 4.2 - 5.4 M/CU MM    Hemoglobin 8.5 (L) 12.5 - 16.0 GM/DL    Hematocrit 30.2 (L) 37 - 47 %    MCV 92.4 78 - 100 FL    MCH 26.0 (L) 27 - 31 PG    MCHC 28.1 (L) 32.0 - 36.0 %    RDW 20.2 (H) 11.7 - 14.9 %    Platelets 448 (H) 140 - 440 K/CU MM    MPV 8.7 7.5 - 11.1 FL

## 2024-07-24 NOTE — PROGRESS NOTES
Infectious Disease Progress Note  2024   Patient Name: Jm Garcia : 1960   Impression  Left Aspiration Pneumonia Complicated by Left-Sided Empyema:  Unstageable Sacral Pressure Wound:  Allergy to nitrofurantoin (hives, swelling):  She has had past aspiration pneumonia and is at great risk for recurrence due to her poor dentation, although now has a G-tube placed. Will empirically broadly cover as recent past aspiration.  CrCl 146. T-max 99.1, initial leukocytosis of 11.6 normalized on 7/15. Hypoalbuminemia. Resp panel and MRSA by PCR negative. Pct 0.181, 0.237.  on dwt to 178.   -BC 0/2 NGTD at 24h  -PCXR: probable large left sided pneumonia, underlying mass not excluded. CT chest with contrast recommended.   -CT Chest wo Contrast: No significant elevated lung parenchyma appreciated on the left side. Absence of   contrast limits evaluation however the findings are concerning for underlying   empyema formation.   7/15-S/p per Dr. Oliva, IR, CT guided left chest tube placement for empyema. 450 cc purulent drainage. Fluid analysis: RBC 1,135,000. Glucose 237. , Protein 1.6. ,445. Culture:NGTD  -PCXR: Interval placement of left chest tube with improvement in aeration   in the left upper lung zone. There is persistent left mid and lower lung zone   opacification likely due to effusion.   -PCXR: pigtail catheter in left pleural space. Moderate left pleural effusion. Diffuse moderate atelectasis of the left lung. The right lung is clear. No significant change.   Dr. Knox, Pulmonology, started a 5 day course of IV Solumedrol.   Dr. Lewis, CTS, rec due to improvement from TPA treatment with CT, does not recommend surgery at this time.  Recent G-Tube Placement Secondary to Aspiration:  MRDD/ Cerebral Palsy/ Seizures:  COPD Oxygen Dependent 2 L/NC:  Multi-morbidity: per PMHx: MRDD, anxiety disorder/ depression, chronic back pain, COPD oxygen dependent, DMII,  hypothyroidism, iron deficiency anemia, cerebral palsy, CVA 2014,epilepsy, DMII, diverticulosis, GERD, HLD, HTN, seizures, past aspiration pneumonia  Plan:  Continue empiric IV Zosyn 3,375 mg q8h, continue as has had recent past aspiration pneumonia  Plan to transition to po therapy of Augmentin 875-125 mg bid x 30 days past removal of chest tube (placed 7/15)  Duration and final choice of ABX will be determined by clinical course  Trend CRP and Pct, ordered  Appreciate pulmonology and CTS recs, no surgical recommendation at this time    Ongoing Antimicrobial Therapy  Zosyn 7/15-?  Completed Antimicrobial Therapy  Doxycycline 7/14?  Vancomycin 7/14-18 ?  History:?Interval history noted  Denies n/v/d/f or untoward effects of antibiotics  Physical Exam:  Vital Signs: BP (!) 142/64   Pulse 89   Temp 98.1 °F (36.7 °C) (Oral)   Resp 29   Ht 1.473 m (4' 10\")   Wt 51 kg (112 lb 7 oz)   SpO2 99%   BMI 23.50 kg/m²     Gen: alert and non-verbal, no changes, resting in bed  Wounds: C/D/I-Sacral unstageable wound.   HEMT: AT/NC Oropharynx pink, moist, and without lesions or exudates; dentition in poor state of repair with several missing teeth   Eyes: PERRLA, EOMI, conjunctiva pink, sclera anicteric.   Neck: Supple. Trachea midline. No LAD.  Chest: no distress and CTA. Breath sounds diminished. Room air.   Chest tube: Intact left-sided draining serosanguinous fluid  Heart: NSR and no MRG.   G-Tube: Intact LUQ with tube feeding infusing  Abd: soft, non-distended, no tenderness, no hepatomegaly. Normoactive bowel sounds.  Ext: no clubbing, cyanosis, or edema  External Catheter Site: without erythema or tenderness  Neuro: CN 2-12 intact and no focal sensory or motor deficits     Radiologic / Imaging / TESTING  7/14/2024 XR Chest Portable:  IMPRESSION:Probable large left sided pneumonia, underlying mass not excluded. CT chest with contrast recommended.      7/14/2024 CT Chest WO Contrast:  IMPRESSION:  No significant

## 2024-07-24 NOTE — PROGRESS NOTES
Pulmonary and Critical Care  Progress Note      VITALS:  BP (!) 150/58   Pulse 86   Temp 97.6 °F (36.4 °C) (Oral)   Resp 18   Ht 1.473 m (4' 10\")   Wt 51 kg (112 lb 7 oz)   SpO2 95%   BMI 23.50 kg/m²     Subjective:   CHIEF COMPLAINT :SOB     HPI:                The patient is a 63 y.o. female is lying  in the bed. She is non verbal. She has not much output from the chest tube. She was evaluated by CT surgery and no plan for decortication    Objective:   PHYSICAL EXAM:    LUNGS:Decreased air entry left base  Abd-soft, BS+,NT  Ext- no pedal edema  CVS-s1s2, no murmurs      DATA:    CBC:  Recent Labs     07/23/24 0332 07/23/24 1806 07/24/24  0256   WBC 7.0 9.2 7.5   RBC 3.53* 3.51* 3.27*   HGB 9.2* 9.3* 8.5*   HCT 32.1* 34.0* 30.2*    424 448*   MCV 90.9 96.9 92.4   MCH 26.1* 26.5* 26.0*   MCHC 28.7* 27.4* 28.1*   RDW 19.6* 20.1* 20.2*      BMP:  Recent Labs     07/23/24 0332 07/23/24 1806 07/24/24  0256    134* 136   K 5.2* 5.3* 4.3   CL 99 98* 101   CO2 26 24 24   BUN 12 14 13   CREATININE 0.3* 0.3* 0.3*   CALCIUM 9.3 9.2 9.1   GLUCOSE 133* 152* 173*      ABG:  No results for input(s): \"PH\", \"PO2ART\", \"ZOI8YFB\", \"HCO3\", \"BEART\", \"O2SAT\" in the last 72 hours.  BNP  Lab Results   Component Value Date    BNP 49 11/12/2012      D-Dimer:  Lab Results   Component Value Date    DDIMER 323 (H) 10/29/2022      Radiology: Left-sided pigtail catheter terminates at the left lung base adjacent to  inflammatory likely empyema containing gas and air-fluid level and heterogeneous  secretions, which has demonstrated notable decreased in size compared to prior  exams. Confluent airspace opacities adjacent to this empyema extending from the  left lung base to the apex. Right lung remains focally clear. Heart size normal.     No coronary artery calcifications. No pneumothorax.      Assessment/Plan     Patient Active Problem List    Diagnosis Date Noted    Weakness 02/03/2023     Priority: Medium    Acute  cystitis without hematuria 12/23/2022     Priority: Medium    Urinary problem 12/19/2022     Priority: Medium    SOB (shortness of breath) 11/02/2022     Priority: Medium    COVID-19 10/29/2022     Priority: Medium    Abnormal uterine and vaginal bleeding, unspecified 10/29/2022     Priority: Medium    Urinary incontinence 10/29/2022     Priority: Medium    Chronic respiratory failure (HCC) 05/16/2022     Priority: Medium    Bradyarrhythmia 02/04/2020     Priority: Medium    Dyskinesia 12/03/2015     Priority: Medium    Hemiplegia affecting left nondominant side (HCC) 12/03/2015     Priority: Medium    Insomnia 12/03/2015     Priority: Medium    Intermittent explosive disorder 12/03/2015     Priority: Medium    Iron deficiency anemia, unspecified 12/03/2015     Priority: Medium    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     Priority: Medium    Subdural hematoma (HCC) 12/03/2015     Priority: Medium    Intraparenchymal hemorrhage of brain ([facility]) 02/08/2014     Priority: Medium    Moderate malnutrition ([facility]) 07/16/2024    Mucus plugging of bronchi 07/16/2024    Empyema of left pleural space ([facility]) 07/16/2024    Acute hypoxemic respiratory failure ([facility]) 07/14/2024    Generalized weakness 04/30/2024    Contusion of right knee 06/19/2023    Other cerebral palsy ([facility]) 01/28/2022    COVID-19 virus infection 10/12/2021    Hypoxia 09/23/2021    Diverticulosis 05/05/2020    Lesion of right native kidney 05/05/2020    Black stool 05/05/2020    Anxiety and depression 03/10/2020    Anemia 03/10/2020    Controlled type 2 diabetes mellitus without complication, without long-term current use of insulin ([facility]) 03/10/2020    Acute respiratory failure with hypoxia ([facility]) 06/28/2019    Acute respiratory failure ([facility]) 06/28/2019    Acute bronchitis 12/21/2018    Sepsis ([facility]) 12/18/2018    Mixed hyperlipidemia 04/24/2018    Acquired hypothyroidism 04/24/2018    Left hemiplegia ([facility]) 07/24/2017    H/O: stroke 07/24/2017    COPD exacerbation

## 2024-07-24 NOTE — CARE COORDINATION
CM call to [facility] admissions, they remain able to accept Pt when medically ready.  No precert needed to admit.      CM following

## 2024-07-25 LAB
ANION GAP SERPL CALCULATED.3IONS-SCNC: 9 MMOL/L (ref 7–16)
BASOPHILS ABSOLUTE: 0 K/CU MM
BASOPHILS RELATIVE PERCENT: 0.4 % (ref 0–1)
BUN SERPL-MCNC: 10 MG/DL (ref 6–23)
CALCIUM SERPL-MCNC: 9.5 MG/DL (ref 8.3–10.6)
CHLORIDE BLD-SCNC: 98 MMOL/L (ref 99–110)
CO2: 26 MMOL/L (ref 21–32)
CREAT SERPL-MCNC: 0.3 MG/DL (ref 0.6–1.1)
DIFFERENTIAL TYPE: ABNORMAL
EOSINOPHILS ABSOLUTE: 0.1 K/CU MM
EOSINOPHILS RELATIVE PERCENT: 1.3 % (ref 0–3)
GFR, ESTIMATED: >90 ML/MIN/1.73M2
GLUCOSE BLD-MCNC: 148 MG/DL (ref 70–99)
GLUCOSE BLD-MCNC: 149 MG/DL (ref 70–99)
GLUCOSE BLD-MCNC: 157 MG/DL (ref 70–99)
GLUCOSE BLD-MCNC: 185 MG/DL (ref 70–99)
GLUCOSE SERPL-MCNC: 130 MG/DL (ref 70–99)
HCT VFR BLD CALC: 29.8 % (ref 37–47)
HEMOGLOBIN: 8.6 GM/DL (ref 12.5–16)
IMMATURE NEUTROPHIL %: 1.9 % (ref 0–0.43)
LYMPHOCYTES ABSOLUTE: 2.7 K/CU MM
LYMPHOCYTES RELATIVE PERCENT: 40.4 % (ref 24–44)
MCH RBC QN AUTO: 26.5 PG (ref 27–31)
MCHC RBC AUTO-ENTMCNC: 28.9 % (ref 32–36)
MCV RBC AUTO: 92 FL (ref 78–100)
MONOCYTES ABSOLUTE: 0.7 K/CU MM
MONOCYTES RELATIVE PERCENT: 9.6 % (ref 0–4)
NEUTROPHILS ABSOLUTE: 3.1 K/CU MM
NEUTROPHILS RELATIVE PERCENT: 46.4 % (ref 36–66)
NUCLEATED RBC %: 0 %
PDW BLD-RTO: 20.6 % (ref 11.7–14.9)
PLATELET # BLD: 406 K/CU MM (ref 140–440)
PMV BLD AUTO: 8.6 FL (ref 7.5–11.1)
POTASSIUM SERPL-SCNC: 4.8 MMOL/L (ref 3.5–5.1)
RBC # BLD: 3.24 M/CU MM (ref 4.2–5.4)
SODIUM BLD-SCNC: 133 MMOL/L (ref 135–145)
TOTAL IMMATURE NEUTOROPHIL: 0.13 K/CU MM
TOTAL NUCLEATED RBC: 0 K/CU MM
WBC # BLD: 6.7 K/CU MM (ref 4–10.5)

## 2024-07-25 PROCEDURE — 82962 GLUCOSE BLOOD TEST: CPT

## 2024-07-25 PROCEDURE — 99233 SBSQ HOSP IP/OBS HIGH 50: CPT | Performed by: NURSE PRACTITIONER

## 2024-07-25 PROCEDURE — 94761 N-INVAS EAR/PLS OXIMETRY MLT: CPT

## 2024-07-25 PROCEDURE — 6360000002 HC RX W HCPCS: Performed by: STUDENT IN AN ORGANIZED HEALTH CARE EDUCATION/TRAINING PROGRAM

## 2024-07-25 PROCEDURE — 85025 COMPLETE CBC W/AUTO DIFF WBC: CPT

## 2024-07-25 PROCEDURE — 80048 BASIC METABOLIC PNL TOTAL CA: CPT

## 2024-07-25 PROCEDURE — 6360000002 HC RX W HCPCS: Performed by: INTERNAL MEDICINE

## 2024-07-25 PROCEDURE — 6370000000 HC RX 637 (ALT 250 FOR IP): Performed by: STUDENT IN AN ORGANIZED HEALTH CARE EDUCATION/TRAINING PROGRAM

## 2024-07-25 PROCEDURE — 36415 COLL VENOUS BLD VENIPUNCTURE: CPT

## 2024-07-25 PROCEDURE — 2580000003 HC RX 258: Performed by: STUDENT IN AN ORGANIZED HEALTH CARE EDUCATION/TRAINING PROGRAM

## 2024-07-25 PROCEDURE — 2140000000 HC CCU INTERMEDIATE R&B

## 2024-07-25 PROCEDURE — 99232 SBSQ HOSP IP/OBS MODERATE 35: CPT | Performed by: INTERNAL MEDICINE

## 2024-07-25 RX ADMIN — MORPHINE SULFATE 1 MG: 2 INJECTION, SOLUTION INTRAMUSCULAR; INTRAVENOUS at 07:54

## 2024-07-25 RX ADMIN — DIVALPROEX SODIUM 1000 MG: 125 CAPSULE, COATED PELLETS ORAL at 21:17

## 2024-07-25 RX ADMIN — LEVETIRACETAM 1500 MG: 100 SOLUTION ORAL at 09:38

## 2024-07-25 RX ADMIN — MORPHINE SULFATE 1 MG: 2 INJECTION, SOLUTION INTRAMUSCULAR; INTRAVENOUS at 00:08

## 2024-07-25 RX ADMIN — SODIUM CHLORIDE, PRESERVATIVE FREE 10 ML: 5 INJECTION INTRAVENOUS at 21:12

## 2024-07-25 RX ADMIN — MORPHINE SULFATE 1 MG: 2 INJECTION, SOLUTION INTRAMUSCULAR; INTRAVENOUS at 14:30

## 2024-07-25 RX ADMIN — BUSPIRONE HYDROCHLORIDE 10 MG: 5 TABLET ORAL at 21:16

## 2024-07-25 RX ADMIN — POLYETHYLENE GLYCOL (3350) 17 G: 17 POWDER, FOR SOLUTION ORAL at 09:39

## 2024-07-25 RX ADMIN — ACETAMINOPHEN 650 MG: 325 TABLET ORAL at 23:56

## 2024-07-25 RX ADMIN — DIVALPROEX SODIUM 1000 MG: 125 CAPSULE, COATED PELLETS ORAL at 07:54

## 2024-07-25 RX ADMIN — ATORVASTATIN CALCIUM 40 MG: 40 TABLET, FILM COATED ORAL at 09:39

## 2024-07-25 RX ADMIN — MIRTAZAPINE 15 MG: 15 TABLET, FILM COATED ORAL at 21:16

## 2024-07-25 RX ADMIN — SODIUM CHLORIDE, PRESERVATIVE FREE 10 ML: 5 INJECTION INTRAVENOUS at 14:31

## 2024-07-25 RX ADMIN — PANTOPRAZOLE SODIUM 40 MG: 40 INJECTION, POWDER, FOR SOLUTION INTRAVENOUS at 09:39

## 2024-07-25 RX ADMIN — LISINOPRIL 40 MG: 20 TABLET ORAL at 09:39

## 2024-07-25 RX ADMIN — BUSPIRONE HYDROCHLORIDE 10 MG: 5 TABLET ORAL at 09:38

## 2024-07-25 RX ADMIN — PIPERACILLIN AND TAZOBACTAM 3375 MG: 3; .375 INJECTION, POWDER, LYOPHILIZED, FOR SOLUTION INTRAVENOUS at 05:19

## 2024-07-25 RX ADMIN — CARBAMAZEPINE 300 MG: 100 SUSPENSION ORAL at 21:17

## 2024-07-25 RX ADMIN — FERROUS SULFATE TAB 325 MG (65 MG ELEMENTAL FE) 325 MG: 325 (65 FE) TAB at 09:39

## 2024-07-25 RX ADMIN — Medication 1 CAPSULE: at 09:40

## 2024-07-25 RX ADMIN — LEVETIRACETAM 1500 MG: 100 SOLUTION ORAL at 21:17

## 2024-07-25 RX ADMIN — PIPERACILLIN AND TAZOBACTAM 3375 MG: 3; .375 INJECTION, POWDER, LYOPHILIZED, FOR SOLUTION INTRAVENOUS at 14:30

## 2024-07-25 RX ADMIN — SODIUM CHLORIDE, PRESERVATIVE FREE 10 ML: 5 INJECTION INTRAVENOUS at 10:03

## 2024-07-25 RX ADMIN — SODIUM CHLORIDE: 9 INJECTION, SOLUTION INTRAVENOUS at 21:13

## 2024-07-25 RX ADMIN — PIPERACILLIN AND TAZOBACTAM 3375 MG: 3; .375 INJECTION, POWDER, LYOPHILIZED, FOR SOLUTION INTRAVENOUS at 21:15

## 2024-07-25 RX ADMIN — CARBAMAZEPINE 300 MG: 100 SUSPENSION ORAL at 09:38

## 2024-07-25 RX ADMIN — FERROUS SULFATE TAB 325 MG (65 MG ELEMENTAL FE) 325 MG: 325 (65 FE) TAB at 21:16

## 2024-07-25 ASSESSMENT — PAIN DESCRIPTION - DESCRIPTORS
DESCRIPTORS: ACHING;DISCOMFORT;SORE
DESCRIPTORS: ACHING

## 2024-07-25 ASSESSMENT — PAIN DESCRIPTION - FREQUENCY: FREQUENCY: INTERMITTENT

## 2024-07-25 ASSESSMENT — PAIN SCALES - GENERAL
PAINLEVEL_OUTOF10: 2
PAINLEVEL_OUTOF10: 0
PAINLEVEL_OUTOF10: 7
PAINLEVEL_OUTOF10: 7
PAINLEVEL_OUTOF10: 0

## 2024-07-25 ASSESSMENT — PAIN DESCRIPTION - LOCATION
LOCATION: GENERALIZED

## 2024-07-25 ASSESSMENT — PAIN SCALES - WONG BAKER
WONGBAKER_NUMERICALRESPONSE: HURTS WHOLE LOT
WONGBAKER_NUMERICALRESPONSE: NO HURT
WONGBAKER_NUMERICALRESPONSE: HURTS A LITTLE BIT

## 2024-07-25 ASSESSMENT — PAIN DESCRIPTION - PAIN TYPE: TYPE: ACUTE PAIN;CHRONIC PAIN

## 2024-07-25 ASSESSMENT — PAIN DESCRIPTION - ORIENTATION: ORIENTATION: RIGHT;LEFT

## 2024-07-25 ASSESSMENT — PAIN - FUNCTIONAL ASSESSMENT: PAIN_FUNCTIONAL_ASSESSMENT: PREVENTS OR INTERFERES SOME ACTIVE ACTIVITIES AND ADLS

## 2024-07-25 ASSESSMENT — PAIN DESCRIPTION - ONSET: ONSET: GRADUAL

## 2024-07-25 NOTE — PROGRESS NOTES
Physician Progress Note      PATIENT:               FELIPA GUERRA  [facility] #:                  [id number]  :                       1960  ADMIT DATE:       2024 12:10 PM  DISCH DATE:  RESPONDING  PROVIDER #:        Phylicia Dougherty MD          QUERY TEXT:    Pt admitted with Empyema.  Noted documentation of Aspiration pneumonia   complicated by left-sided empyema on  consult note and  PN by ordered   Infectious Disease consult consultant.  If possible, please document in   progress notes and discharge summary:    The medical record reflects the following:  Risk Factors: empyema, possible pneumonia, Cerebral palsy  Clinical Indicators:  Pro-Js 0.181, lactate 1.7, WBC 11.6, .7, CXR:   Probable large left sided pneumonia, underlying mass not excluded. had a   history of aspiration pneumonia who was asked to return to the emergency   department after a chest x-ray that was done to confirm her G-tube placement   showed a left-sided consolidation. CT of the chest showed empyema and this was   confirmed on 7/15/2024 by an IR placed chest tube drained pus.  Pleural fluid   analysis was exudative.  Treatment: 30 cc/kg IV fluids ordered. Patient s/p Vanco/Zosyn and doxycycline   in the ED-Continue with vancomycin/Zosyn, -IR consulted for thoracentesis of   possible empyema-CT placement, Continue IV LR, Critical care consulted from   the ED, contemplating possible need for bronchoscopy on the medicine   floor/SDU, ID consult, Pulmonary consult    Thank you, Cora Saez RN, CDS [phone]  Options provided:  -- Aspiration pneumonia confirmed present on admission  -- Aspiration pneumonia confirmed not present on admission  -- Aspiration pneumonia ruled out  -- Defer to Infectious Disease consultant documentation regarding Aspiration   pneumonia  -- Other - I will add my own diagnosis  -- Disagree - Not applicable / Not valid  -- Disagree - Clinically unable to determine / Unknown  -- Refer to Clinical

## 2024-07-25 NOTE — PROGRESS NOTES
Pulmonary and Critical Care  Progress Note      VITALS:  BP (!) 144/58   Pulse 80   Temp 97.1 °F (36.2 °C) (Axillary)   Resp 22   Ht 1.473 m (4' 10\")   Wt 51.2 kg (112 lb 14 oz)   SpO2 99%   BMI 23.59 kg/m²     Subjective:   CHIEF COMPLAINT :SOB     HPI:                The patient is a 63 y.o. female  is lying  in the bed. She is non verbal. She has not much output from the chest tube     Objective:   PHYSICAL EXAM:    LUNGS:Decreased air entry left base  Abd-soft, BS+,NT  Ext- no pedal edema  CVS-s1s2, no murmurs      DATA:    CBC:  Recent Labs     07/23/24 1806 07/24/24 0256 07/25/24  0515   WBC 9.2 7.5 6.7   RBC 3.51* 3.27* 3.24*   HGB 9.3* 8.5* 8.6*   HCT 34.0* 30.2* 29.8*    448* 406   MCV 96.9 92.4 92.0   MCH 26.5* 26.0* 26.5*   MCHC 27.4* 28.1* 28.9*   RDW 20.1* 20.2* 20.6*      BMP:  Recent Labs     07/23/24 1806 07/24/24 0256 07/25/24  0515   * 136 133*   K 5.3* 4.3 4.8   CL 98* 101 98*   CO2 24 24 26   BUN 14 13 10   CREATININE 0.3* 0.3* 0.3*   CALCIUM 9.2 9.1 9.5   GLUCOSE 152* 173* 130*      ABG:  No results for input(s): \"PH\", \"PO2ART\", \"UYD6FCG\", \"HCO3\", \"BEART\", \"O2SAT\" in the last 72 hours.  BNP  Lab Results   Component Value Date    BNP 49 11/12/2012      D-Dimer:  Lab Results   Component Value Date    DDIMER 323 (H) 10/29/2022      Radiology: None      Assessment/Plan     Patient Active Problem List    Diagnosis Date Noted    Weakness 02/03/2023     Priority: Medium    Acute cystitis without hematuria 12/23/2022     Priority: Medium    Urinary problem 12/19/2022     Priority: Medium    SOB (shortness of breath) 11/02/2022     Priority: Medium    COVID-19 10/29/2022     Priority: Medium    Abnormal uterine and vaginal bleeding, unspecified 10/29/2022     Priority: Medium    Urinary incontinence 10/29/2022     Priority: Medium    Chronic respiratory failure (HCC) 05/16/2022     Priority: Medium    Bradyarrhythmia 02/04/2020     Priority: Medium    Dyskinesia 12/03/2015      in am  Keep sats > 92%  Await placement  DVT and GI Prophylaxis  C.w present management    Electronically signed by Eric Knox MD on 7/25/2024 at 12:18 PM

## 2024-07-25 NOTE — PROGRESS NOTES
Infectious Disease Progress Note  2024   Patient Name: Jm Garcia : 1960   Impression  Left Aspiration Pneumonia Complicated by Left-Sided Empyema:  Unstageable Sacral Pressure Wound:  Allergy to nitrofurantoin (hives, swelling):  She has had past aspiration pneumonia and is at great risk for recurrence due to her poor dentation, although now has a G-tube placed. Will empirically broadly cover as recent past aspiration.  CrCl 146. T-max 99.1, initial leukocytosis of 11.6 normalized on 7/15. Hypoalbuminemia. Resp panel and MRSA by PCR negative. Pct 0.181, 0.237.  on dwt to 178.   -BC 0/2 NGTD at 24h  -PCXR: probable large left sided pneumonia, underlying mass not excluded. CT chest with contrast recommended.   -CT Chest wo Contrast: No significant elevated lung parenchyma appreciated on the left side. Absence of   contrast limits evaluation however the findings are concerning for underlying   empyema formation.   7/15-S/p per Dr. Oliva, IR, CT guided left chest tube placement for empyema. 450 cc purulent drainage. Fluid analysis: RBC 1,135,000. Glucose 237. , Protein 1.6. ,445. Culture:NGTD  -PCXR: Interval placement of left chest tube with improvement in aeration   in the left upper lung zone. There is persistent left mid and lower lung zone   opacification likely due to effusion.   -PCXR: pigtail catheter in left pleural space. Moderate left pleural effusion. Diffuse moderate atelectasis of the left lung. The right lung is clear. No significant change.   Dr. Knox, Pulmonology, started a 5 day course of IV Solumedrol.   Dr. Lewis, CTS, rec due to improvement from TPA treatment with CT, does not recommend surgery at this time.  Recent G-Tube Placement Secondary to Aspiration:  MRDD/ Cerebral Palsy/ Seizures:  COPD Oxygen Dependent 2 L/NC:  Multi-morbidity: per PMHx: MRDD, anxiety disorder/ depression, chronic back pain, COPD oxygen dependent, DMII,  complication, without long-term current use of insulin (HCC)    Diverticulosis    Lesion of right native kidney    Black stool    Hypoxia    COVID-19 virus infection    Other cerebral palsy (HCC)    Chronic respiratory failure (HCC)    COVID-19    Abnormal uterine and vaginal bleeding, unspecified    Bradyarrhythmia    Dyskinesia    Hemiplegia affecting left nondominant side (HCC)    Insomnia    Intermittent explosive disorder    Intraparenchymal hemorrhage of brain (HCC)    Iron deficiency anemia, unspecified    Organic personality syndrome    Subdural hematoma (HCC)    Urinary incontinence    SOB (shortness of breath)    Urinary problem    Acute cystitis without hematuria    Weakness    Contusion of right knee    Generalized weakness    Acute hypoxemic respiratory failure (HCC)    Moderate malnutrition (HCC)    Mucus plugging of bronchi    Empyema of left pleural space (HCC)       Active Problems  Principal Problem:    Acute hypoxemic respiratory failure (HCC)  Active Problems:    Moderate malnutrition (HCC)    Mucus plugging of bronchi    Empyema of left pleural space (HCC)  Resolved Problems:    * No resolved hospital problems. *    Electronically signed by: Electronically signed by INOCENCIO Ibrahim CNP on 7/25/2024 at 10:49 AM

## 2024-07-25 NOTE — PROGRESS NOTES
1245 Assisted Dr. Knox removed chest tube.    Placed  on site and then 4x4 followed tegaderm pressure held. Pt tolerated well.

## 2024-07-25 NOTE — PROGRESS NOTES
V2.0  [facility] Hospitalist Progress Note      Name:  Jm Garcia /Age/Sex: 1960  (63 y.o. female)   MRN & CSN:  [id number] & [id number] Encounter Date/Time: 2024 1:42 PM EDT    Location:  6839/3122-A PCP: Yuly Bro APRN - CNP       Hospital Day: 12    Assessment and Plan:   Jm Garcia is a 63 y.o. female with pmh of  who presents with Acute hypoxemic respiratory failure (HCC)      Plan:    Acute on chronic hypoxemic respiratory failure 2/2 left sided empyema  Sepsis  Chronic COPD -not in exacerbation  Patient found to have left lung consolidation/empyema on x-ray done to confirm location of the G-tube placed in the ED 2 days ago.  On 2L oxygen at baseline. Hx of aspiration PNA and at risk of recurrence due to poor dentition (does have G tube).  Initiated on vancomycin and zosyn in ER, discontinued vancomycin and continue zosyn  S/p CT guided chest tube placement by IR on 7/15, had 500cc purulent drainage during chest tube insertion  S/p tPA/Dnase x4 rounds  Pulmonology following  ID following: continue zosyn, attempt to transition to augmentin for 30 day course past removal of chest tube  Fluid culture sent on  but not resulted, sent another sample on   Repeat CT chest on  with decreased size of empyema  CT surgery consulted by pulmonology for trapped lung, pending recommendations  Pulmonology managing the chest tube     Normocytic anemia  Hx of iron deficiency anemia  Hgb 6.6 on admission from baseline 9-10. Corrected with 1U PRBC.  No overt sources of GIB, does have bloody drainage in chest tube.  Monitor H&H, stable at this time  Hold DVT prophylaxis    Hypertension  Hold home amlodipine as pt with acceptable BP without this, restart as required  Continue home lisinopril    Seizure disorder  Continue home keppra and carbamazepine    Non-insulin dependent type II diabetes mellitus  Hold home PO meds  POCT glucose, SSI, hypoglycemia protocol    History of depression -continue home  the right lung.  HEART AND PERICARDIUM: Within normal limits. AORTA: Normal caliber aorta. ADENOPATHY/MEDIASTINUM: There are enlarged lymph nodes within the mediastinum the largest in the subcarinal space 1.5 x 1.5 cm. LIMITED VIEWS OF THE ABDOMEN: Within normal limits. OSSEOUS STRUCTURES: No sclerotic or lytic lesions. No acute fractures are identified. OVERLYING SOFT TISSUES: Unremarkable. THYROID: The thyroid is unremarkable.     No significant elevated lung parenchyma appreciated on the left side. Absence of contrast limits evaluation however the findings are concerning for underlying empyema formation. Electronically signed by Pj Steve    XR CHEST PORTABLE    Result Date: 7/14/2024  EXAMINATION: XR CHEST PORTABLE, 7/14/2024 12:22 PM HISTORY: r/o ptx COMPARISON:  No comparisons available.  Technique: Single view. Findings: Complete opacification of the left hemithorax. No pneumothorax. Heart is normal size. Mediastinal and hilar contours are within normal limits. Bony thorax no acute abnormality.     Probable large left sided pneumonia, underlying mass not excluded. CT chest with contrast recommended Electronically signed by Pj Steve      Electronically signed by Phylicia Medrano MD on 7/25/2024 at 9:28 AM

## 2024-07-26 ENCOUNTER — APPOINTMENT (OUTPATIENT)
Dept: GENERAL RADIOLOGY | Age: 64
DRG: 871 | End: 2024-07-26
Payer: MEDICARE

## 2024-07-26 LAB
ANION GAP SERPL CALCULATED.3IONS-SCNC: 9 MMOL/L (ref 7–16)
BASOPHILS ABSOLUTE: 0 K/CU MM
BASOPHILS RELATIVE PERCENT: 0.3 % (ref 0–1)
BUN SERPL-MCNC: 15 MG/DL (ref 6–23)
CALCIUM SERPL-MCNC: 9.3 MG/DL (ref 8.3–10.6)
CHLORIDE BLD-SCNC: 97 MMOL/L (ref 99–110)
CO2: 27 MMOL/L (ref 21–32)
CREAT SERPL-MCNC: 0.3 MG/DL (ref 0.6–1.1)
DIFFERENTIAL TYPE: ABNORMAL
EOSINOPHILS ABSOLUTE: 0.1 K/CU MM
EOSINOPHILS RELATIVE PERCENT: 1 % (ref 0–3)
GFR, ESTIMATED: >90 ML/MIN/1.73M2
GLUCOSE BLD-MCNC: 111 MG/DL (ref 70–99)
GLUCOSE BLD-MCNC: 156 MG/DL (ref 70–99)
GLUCOSE BLD-MCNC: 168 MG/DL (ref 70–99)
GLUCOSE SERPL-MCNC: 180 MG/DL (ref 70–99)
HCT VFR BLD CALC: 30.6 % (ref 37–47)
HEMOGLOBIN: 8.7 GM/DL (ref 12.5–16)
IMMATURE NEUTROPHIL %: 1.2 % (ref 0–0.43)
LYMPHOCYTES ABSOLUTE: 1.5 K/CU MM
LYMPHOCYTES RELATIVE PERCENT: 22.2 % (ref 24–44)
MCH RBC QN AUTO: 26.8 PG (ref 27–31)
MCHC RBC AUTO-ENTMCNC: 28.4 % (ref 32–36)
MCV RBC AUTO: 94.2 FL (ref 78–100)
MONOCYTES ABSOLUTE: 0.6 K/CU MM
MONOCYTES RELATIVE PERCENT: 9.4 % (ref 0–4)
NEUTROPHILS ABSOLUTE: 4.5 K/CU MM
NEUTROPHILS RELATIVE PERCENT: 65.9 % (ref 36–66)
NUCLEATED RBC %: 0 %
PDW BLD-RTO: 21 % (ref 11.7–14.9)
PLATELET # BLD: 378 K/CU MM (ref 140–440)
PMV BLD AUTO: 8.8 FL (ref 7.5–11.1)
POTASSIUM SERPL-SCNC: 4.5 MMOL/L (ref 3.5–5.1)
RBC # BLD: 3.25 M/CU MM (ref 4.2–5.4)
SODIUM BLD-SCNC: 133 MMOL/L (ref 135–145)
TOTAL IMMATURE NEUTOROPHIL: 0.08 K/CU MM
TOTAL NUCLEATED RBC: 0 K/CU MM
WBC # BLD: 6.8 K/CU MM (ref 4–10.5)

## 2024-07-26 PROCEDURE — 85025 COMPLETE CBC W/AUTO DIFF WBC: CPT

## 2024-07-26 PROCEDURE — 36415 COLL VENOUS BLD VENIPUNCTURE: CPT

## 2024-07-26 PROCEDURE — 99233 SBSQ HOSP IP/OBS HIGH 50: CPT | Performed by: NURSE PRACTITIONER

## 2024-07-26 PROCEDURE — 71045 X-RAY EXAM CHEST 1 VIEW: CPT

## 2024-07-26 PROCEDURE — 2140000000 HC CCU INTERMEDIATE R&B

## 2024-07-26 PROCEDURE — 6360000002 HC RX W HCPCS: Performed by: STUDENT IN AN ORGANIZED HEALTH CARE EDUCATION/TRAINING PROGRAM

## 2024-07-26 PROCEDURE — 2580000003 HC RX 258: Performed by: STUDENT IN AN ORGANIZED HEALTH CARE EDUCATION/TRAINING PROGRAM

## 2024-07-26 PROCEDURE — 99232 SBSQ HOSP IP/OBS MODERATE 35: CPT | Performed by: INTERNAL MEDICINE

## 2024-07-26 PROCEDURE — 6360000002 HC RX W HCPCS: Performed by: INTERNAL MEDICINE

## 2024-07-26 PROCEDURE — 94761 N-INVAS EAR/PLS OXIMETRY MLT: CPT

## 2024-07-26 PROCEDURE — 6370000000 HC RX 637 (ALT 250 FOR IP): Performed by: STUDENT IN AN ORGANIZED HEALTH CARE EDUCATION/TRAINING PROGRAM

## 2024-07-26 PROCEDURE — 82962 GLUCOSE BLOOD TEST: CPT

## 2024-07-26 PROCEDURE — 6370000000 HC RX 637 (ALT 250 FOR IP): Performed by: NURSE PRACTITIONER

## 2024-07-26 PROCEDURE — 80048 BASIC METABOLIC PNL TOTAL CA: CPT

## 2024-07-26 RX ORDER — AMOXICILLIN AND CLAVULANATE POTASSIUM 875; 125 MG/1; MG/1
1 TABLET, FILM COATED ORAL EVERY 12 HOURS SCHEDULED
Status: DISCONTINUED | OUTPATIENT
Start: 2024-07-26 | End: 2024-07-27 | Stop reason: HOSPADM

## 2024-07-26 RX ORDER — INSULIN LISPRO 100 [IU]/ML
0-4 INJECTION, SOLUTION INTRAVENOUS; SUBCUTANEOUS EVERY 6 HOURS
Status: DISCONTINUED | OUTPATIENT
Start: 2024-07-26 | End: 2024-07-27 | Stop reason: HOSPADM

## 2024-07-26 RX ADMIN — DIVALPROEX SODIUM 1000 MG: 125 CAPSULE, COATED PELLETS ORAL at 08:33

## 2024-07-26 RX ADMIN — BUSPIRONE HYDROCHLORIDE 10 MG: 5 TABLET ORAL at 20:43

## 2024-07-26 RX ADMIN — Medication 1 CAPSULE: at 08:32

## 2024-07-26 RX ADMIN — AMOXICILLIN AND CLAVULANATE POTASSIUM 1 TABLET: 875; 125 TABLET, FILM COATED ORAL at 20:43

## 2024-07-26 RX ADMIN — FERROUS SULFATE TAB 325 MG (65 MG ELEMENTAL FE) 325 MG: 325 (65 FE) TAB at 08:32

## 2024-07-26 RX ADMIN — PANTOPRAZOLE SODIUM 40 MG: 40 INJECTION, POWDER, FOR SOLUTION INTRAVENOUS at 08:32

## 2024-07-26 RX ADMIN — COLLAGENASE SANTYL: 250 OINTMENT TOPICAL at 08:33

## 2024-07-26 RX ADMIN — MIRTAZAPINE 15 MG: 15 TABLET, FILM COATED ORAL at 20:43

## 2024-07-26 RX ADMIN — FLUTICASONE PROPIONATE 2 SPRAY: 50 SPRAY, METERED NASAL at 08:34

## 2024-07-26 RX ADMIN — LEVETIRACETAM 1500 MG: 100 SOLUTION ORAL at 08:33

## 2024-07-26 RX ADMIN — DIVALPROEX SODIUM 1000 MG: 125 CAPSULE, COATED PELLETS ORAL at 20:42

## 2024-07-26 RX ADMIN — PIPERACILLIN AND TAZOBACTAM 3375 MG: 3; .375 INJECTION, POWDER, LYOPHILIZED, FOR SOLUTION INTRAVENOUS at 04:52

## 2024-07-26 RX ADMIN — MORPHINE SULFATE 1 MG: 2 INJECTION, SOLUTION INTRAMUSCULAR; INTRAVENOUS at 23:16

## 2024-07-26 RX ADMIN — LEVETIRACETAM 1500 MG: 100 SOLUTION ORAL at 20:45

## 2024-07-26 RX ADMIN — CARBAMAZEPINE 300 MG: 100 SUSPENSION ORAL at 10:35

## 2024-07-26 RX ADMIN — ATORVASTATIN CALCIUM 40 MG: 40 TABLET, FILM COATED ORAL at 08:32

## 2024-07-26 RX ADMIN — SODIUM CHLORIDE, PRESERVATIVE FREE 10 ML: 5 INJECTION INTRAVENOUS at 20:45

## 2024-07-26 RX ADMIN — BUSPIRONE HYDROCHLORIDE 10 MG: 5 TABLET ORAL at 08:32

## 2024-07-26 RX ADMIN — FERROUS SULFATE TAB 325 MG (65 MG ELEMENTAL FE) 325 MG: 325 (65 FE) TAB at 20:43

## 2024-07-26 RX ADMIN — LISINOPRIL 40 MG: 20 TABLET ORAL at 08:32

## 2024-07-26 RX ADMIN — CARBAMAZEPINE 300 MG: 100 SUSPENSION ORAL at 20:44

## 2024-07-26 ASSESSMENT — PAIN SCALES - GENERAL
PAINLEVEL_OUTOF10: 2
PAINLEVEL_OUTOF10: 7
PAINLEVEL_OUTOF10: 1
PAINLEVEL_OUTOF10: 7

## 2024-07-26 ASSESSMENT — PAIN DESCRIPTION - PAIN TYPE: TYPE: CHRONIC PAIN

## 2024-07-26 ASSESSMENT — PAIN SCALES - WONG BAKER
WONGBAKER_NUMERICALRESPONSE: NO HURT
WONGBAKER_NUMERICALRESPONSE: HURTS LITTLE MORE

## 2024-07-26 ASSESSMENT — PAIN DESCRIPTION - FREQUENCY: FREQUENCY: OTHER (COMMENT)

## 2024-07-26 ASSESSMENT — PAIN DESCRIPTION - ONSET: ONSET: UNABLE TO COMMUNICATE

## 2024-07-26 ASSESSMENT — PAIN DESCRIPTION - ORIENTATION: ORIENTATION: RIGHT

## 2024-07-26 ASSESSMENT — PAIN DESCRIPTION - DESCRIPTORS: DESCRIPTORS: ACHING

## 2024-07-26 NOTE — PROGRESS NOTES
Comprehensive Nutrition Assessment    Type and Reason for Visit:  Reassess    Nutrition Recommendations/Plan:   Continue current EN and flush regimen     Malnutrition Assessment:  Malnutrition Status:  Moderate malnutrition (07/16/24 1157)    Context:  Social/Environmental Circumstances (dysphagia, requiring PEG for EN initiation)     Findings of the 6 clinical characteristics of malnutrition:  Energy Intake:  Less than 75% estimated energy requirements for 3 months or longer (Reduced po intakes on average x past 6 months with dysphagia, then PEG placed 2 months ago)  Weight Loss:  Greater than 10% over 6 months (22%)     Body Fat Loss:  No significant body fat loss Orbital, Triceps, Buccal region, Fat Overlying Ribs   Muscle Mass Loss:  Mild muscle mass loss (contractures) Thigh (quadraceps), Calf (gastrocnemius)  Fluid Accumulation:  No significant fluid accumulation Extremities   Strength:  Not Performed    Nutrition Assessment:    Pt continues on bolus EN via PEG tube 4 times daily, tolerating well per chart review, +UOP/BM. CT removed yesterday.    Nutrition Related Findings:    Na 133, Cr 0.3, Glu 156-180, A1c 5.9    Culturelle, Remeron   Wound Type: Pressure Injury, Unstageable       Current Nutrition Intake & Therapies:    Average Meal Intake: NPO  Average Supplements Intake: NPO  Diet NPO  ADULT TUBE FEEDING; PEG; Diabetic; Bolus; 4 Times Daily, Other (specify); q 6 hours; 237; Syringe Push; 88; Before and after each bolus  Current Tube Feeding (TF) Orders:  Feeding Route: PEG  Formula: Diabetic (glucerna 1.5)  Schedule: Bolus  Feeding Regimen: 237 ml 4 times per day  Additives/Modulars: None  Water Flushes: 88 ml before and after bolus  Current TF & Flush Orders Provides: 1422 calories, 78 g protein, 1423 ml free water from tube feeding and flushes  Goal TF & Flush Orders Provides: current goal    Anthropometric Measures:  Height: 147.3 cm (4' 10\")  Ideal Body Weight (IBW): 90 lbs (41 kg)    Admission  Body Weight: 52.8 kg (116 lb 6.5 oz)  Current Body Weight: 52.9 kg (116 lb 10 oz), 129.3 % IBW. Weight Source: Bed Scale  Current BMI (kg/m2): 24.4  Usual Body Weight: 68 kg (149 lb 14.6 oz) (1/2024)  % Weight Change (Calculated): -22.4  Weight Adjustment For: No Adjustment                 BMI Categories: Normal Weight (BMI 18.5-24.9)    Estimated Daily Nutrient Needs:  Energy Requirements Based On: Kcal/kg  Weight Used for Energy Requirements: Current  Energy (kcal/day): 0969-7868  Weight Used for Protein Requirements: Current  Protein (g/day): 63-79 (1.2-1.5 g/kg, up to 20% of EEN)  Method Used for Fluid Requirements: 1 ml/kcal  Fluid (ml/day): 1898-7709    Nutrition Diagnosis:   Moderate malnutrition, In context of social or environmental circumstances (dysphagia) related to swallowing difficulty, inadequate protein-energy intake as evidenced by poor intake prior to admission, weight loss greater than or equal to 10% in 6 months, mild muscle loss    Nutrition Interventions:   Food and/or Nutrient Delivery: Continue NPO, Continue Current Tube Feeding  Nutrition Education/Counseling: Education not indicated  Coordination of Nutrition Care: Continue to monitor while inpatient  Plan of Care discussed with: RN, TRACEE ROQUE re: pcm dx    Goals:  Previous Goal Met: Progressing toward Goal(s)  Goals: Meet at least 75% of estimated needs, Tolerate nutrition support at goal rate       Nutrition Monitoring and Evaluation:   Behavioral-Environmental Outcomes: None Identified  Food/Nutrient Intake Outcomes: Enteral Nutrition Intake/Tolerance  Physical Signs/Symptoms Outcomes: Skin, Weight, GI Status, Biochemical Data    Discharge Planning:    Enteral Nutrition     Kayleigh Stockton RD, LD  Contact: [phone]

## 2024-07-26 NOTE — PROGRESS NOTES
V2.0  [facility] [place]      Name:  Jm Garcia /Age/Sex: 1960  (63 y.o. female)   MRN & CSN:  [id number] & [id number] Encounter Date/Time: 2024 1:42 PM EDT    Location:  0442/3122-A PCP: Yuly Bro APRN - CNP       Hospital Day: 13    Assessment and Plan:   Jm Garcia is a 63 y.o. female with pmh of  who presents with Acute hypoxemic respiratory failure (HCC)      Plan:    Acute on chronic hypoxemic respiratory failure 2/2 left sided empyema  Sepsis  Chronic COPD -not in exacerbation  Patient found to have left lung consolidation/empyema on x-ray done to confirm location of the G-tube placed in the ED 2 days ago.  On 2L oxygen at baseline. Hx of aspiration PNA and at risk of recurrence due to poor dentition (does have G tube).  Initiated on vancomycin and zosyn in ER, discontinued vancomycin and continue zosyn  S/p CT guided chest tube placement by IR on 7/15, had 500cc purulent drainage during chest tube insertion  S/p tPA/Dnase x4 rounds  Pulmonology following  ID following: continue zosyn, attempt to transition to augmentin for 30 day course past removal of chest tube  Fluid culture sent on  but not resulted, sent another sample on   Repeat CT chest on  with decreased size of empyema  Chest tube removed, anticipate d/c in the am    Normocytic anemia  Hx of iron deficiency anemia  Hgb 6.6 on admission from baseline 9-10. Corrected with 1U PRBC.  No overt sources of GIB, does have bloody drainage in chest tube.  Monitor H&H, stable at this time  Hold DVT prophylaxis    Hypertension  Hold home amlodipine as pt with acceptable BP without this, restart as required  Continue home lisinopril    Seizure disorder  Continue home keppra and carbamazepine    Non-insulin dependent type II diabetes mellitus  Hold home PO meds  POCT glucose, SSI, hypoglycemia protocol    History of depression -continue home meds  Hyperlipidemia -continue home statin        Diet Diet NPO  ADULT  Hemoglobin 8.7 (L) 12.5 - 16.0 GM/DL    Hematocrit 30.6 (L) 37 - 47 %    MCV 94.2 78 - 100 FL    MCH 26.8 (L) 27 - 31 PG    MCHC 28.4 (L) 32.0 - 36.0 %    RDW 21.0 (H) 11.7 - 14.9 %    Platelets 378 140 - 440 K/CU MM    MPV 8.8 7.5 - 11.1 FL    Differential Type AUTOMATED DIFFERENTIAL     Neutrophils % 65.9 36 - 66 %    Lymphocytes % 22.2 (L) 24 - 44 %    Monocytes % 9.4 (H) 0 - 4 %    Eosinophils % 1.0 0 - 3 %    Basophils % 0.3 0 - 1 %    Neutrophils Absolute 4.5 K/CU MM    Lymphocytes Absolute 1.5 K/CU MM    Monocytes Absolute 0.6 K/CU MM    Eosinophils Absolute 0.1 K/CU MM    Basophils Absolute 0.0 K/CU MM    Nucleated RBC % 0.0 %    Total Nucleated RBC 0.0 K/CU MM    Total Immature Neutrophil 0.08 K/CU MM    Immature Neutrophil % 1.2 (H) 0 - 0.43 %   POCT Glucose    Collection Time: 07/26/24 11:43 AM   Result Value Ref Range    POC Glucose 156 (H) 70 - 99 MG/DL        Imaging/Diagnostics Last 24 Hours   CT CHEST WO CONTRAST    Result Date: 7/14/2024  EXAMINATION: CT CHEST WO CONTRAST, 7/14/2024 12:50 PM HISTORY: Sob; PTx vs mcus plug COMPARISON:  No comparisons available.  TECHNIQUE:  CT scan of the chest was performed without IV contrast.  One or more of the following dose reduction techniques were used: automated exposure control, adjustment of the mA and/or kV according to patient size, use of iterative reconstruction technique. FINDINGS: Coronary artery calcifications in the right and left main vessels.  (msn13) LUNGS: There is no significant aerated lung parenchyma noted on the left side with a large complex appearing fluid collection replacing most of the left lung probably within the pleural space with atelectasis and infiltrate noted within the aerated left upper lobe. No tracheomalacia. No bronchiectasis. Minimal emphysematous changes in the right lung.  HEART AND PERICARDIUM: Within normal limits. AORTA: Normal caliber aorta. ADENOPATHY/MEDIASTINUM: There are enlarged lymph nodes within the  done

## 2024-07-26 NOTE — PROGRESS NOTES
Pulmonary and Critical Care  Progress Note      VITALS:  BP (!) 149/61   Pulse 83   Temp 98.8 °F (37.1 °C) (Oral)   Resp 23   Ht 1.473 m (4' 10\")   Wt 52.9 kg (116 lb 10 oz)   SpO2 99%   BMI 24.37 kg/m²     Subjective:   CHIEF COMPLAINT :SOB     HPI:                The patient is a 63 y.o. female   is lying  in the bed. She is non verbal. Her left Chest tube has been removed yesterday    Objective:   PHYSICAL EXAM:    LUNGS:Decreased air entry left base  Abd-soft, BS+,NT  Ext- no pedal edema  CVS-s1s2, no murmurs      DATA:    CBC:  Recent Labs     07/24/24  0256 07/25/24 0515 07/26/24 0257   WBC 7.5 6.7 6.8   RBC 3.27* 3.24* 3.25*   HGB 8.5* 8.6* 8.7*   HCT 30.2* 29.8* 30.6*   * 406 378   MCV 92.4 92.0 94.2   MCH 26.0* 26.5* 26.8*   MCHC 28.1* 28.9* 28.4*   RDW 20.2* 20.6* 21.0*      BMP:  Recent Labs     07/24/24 0256 07/25/24 0515 07/26/24 0257    133* 133*   K 4.3 4.8 4.5    98* 97*   CO2 24 26 27   BUN 13 10 15   CREATININE 0.3* 0.3* 0.3*   CALCIUM 9.1 9.5 9.3   GLUCOSE 173* 130* 180*      ABG:  No results for input(s): \"PH\", \"PO2ART\", \"RBI3OME\", \"HCO3\", \"BEART\", \"O2SAT\" in the last 72 hours.  BNP  Lab Results   Component Value Date    BNP 49 11/12/2012      D-Dimer:  Lab Results   Component Value Date    DDIMER 323 (H) 10/29/2022      Radiology: None      Assessment/Plan     Patient Active Problem List    Diagnosis Date Noted    Weakness 02/03/2023     Priority: Medium    Acute cystitis without hematuria 12/23/2022     Priority: Medium    Urinary problem 12/19/2022     Priority: Medium    SOB (shortness of breath) 11/02/2022     Priority: Medium    COVID-19 10/29/2022     Priority: Medium    Abnormal uterine and vaginal bleeding, unspecified 10/29/2022     Priority: Medium    Urinary incontinence 10/29/2022     Priority: Medium    Chronic respiratory failure (HCC) 05/16/2022     Priority: Medium    Bradyarrhythmia 02/04/2020     Priority: Medium    Dyskinesia 12/03/2015

## 2024-07-26 NOTE — PROGRESS NOTES
Infectious Disease Progress Note  2024   Patient Name: Jm Garcia : 1960   Impression  Left Aspiration Pneumonia Complicated by Left-Sided Empyema:  Unstageable Sacral Pressure Wound:  Allergy to nitrofurantoin (hives, swelling):  She has had past aspiration pneumonia and is at great risk for recurrence due to her poor dentation, although now has a G-tube placed. Will empirically broadly cover as recent past aspiration.  CrCl 146. T-max 99.1, initial leukocytosis of 11.6 normalized on 7/15. Hypoalbuminemia. Resp panel and MRSA by PCR negative. Pct 0.181, 0.237.  on dwt to 178.   -BC 0/2 NGTD at 24h  -PCXR: probable large left sided pneumonia, underlying mass not excluded. CT chest with contrast recommended.   -CT Chest wo Contrast: No significant elevated lung parenchyma appreciated on the left side. Absence of   contrast limits evaluation however the findings are concerning for underlying   empyema formation.   7/15-S/p per Dr. Oliva, IR, CT guided left chest tube placement for empyema. 450 cc purulent drainage. Fluid analysis: RBC 1,135,000. Glucose 237. , Protein 1.6. ,445. Culture:NGTD  -PCXR: Interval placement of left chest tube with improvement in aeration   in the left upper lung zone. There is persistent left mid and lower lung zone   opacification likely due to effusion.   -PCXR: pigtail catheter in left pleural space. Moderate left pleural effusion. Diffuse moderate atelectasis of the left lung. The right lung is clear. No significant change.   Dr. Knox, Pulmonology, started a 5 day course of IV Solumedrol.   Dr. Lewis, CTS, rec due to improvement from TPA treatment with CT, does not recommend surgery at this time.  Recent G-Tube Placement Secondary to Aspiration:  MRDD/ Cerebral Palsy/ Seizures:  COPD Oxygen Dependent 2 L/NC:  Multi-morbidity: per PMHx: MRDD, anxiety disorder/ depression, chronic back pain, COPD oxygen dependent, DMII,  MG/DL    BUN 15 6 - 23 MG/DL    Creatinine 0.3 (L) 0.6 - 1.1 MG/DL    Est, Glom Filt Rate >90 >60 mL/min/1.73m2    Calcium 9.3 8.3 - 10.6 MG/DL   CBC with Auto Differential    Collection Time: 07/26/24  2:57 AM   Result Value Ref Range    WBC 6.8 4.0 - 10.5 K/CU MM    RBC 3.25 (L) 4.2 - 5.4 M/CU MM    Hemoglobin 8.7 (L) 12.5 - 16.0 GM/DL    Hematocrit 30.6 (L) 37 - 47 %    MCV 94.2 78 - 100 FL    MCH 26.8 (L) 27 - 31 PG    MCHC 28.4 (L) 32.0 - 36.0 %    RDW 21.0 (H) 11.7 - 14.9 %    Platelets 378 140 - 440 K/CU MM    MPV 8.8 7.5 - 11.1 FL    Differential Type AUTOMATED DIFFERENTIAL     Neutrophils % 65.9 36 - 66 %    Lymphocytes % 22.2 (L) 24 - 44 %    Monocytes % 9.4 (H) 0 - 4 %    Eosinophils % 1.0 0 - 3 %    Basophils % 0.3 0 - 1 %    Neutrophils Absolute 4.5 K/CU MM    Lymphocytes Absolute 1.5 K/CU MM    Monocytes Absolute 0.6 K/CU MM    Eosinophils Absolute 0.1 K/CU MM    Basophils Absolute 0.0 K/CU MM    Nucleated RBC % 0.0 %    Total Nucleated RBC 0.0 K/CU MM    Total Immature Neutrophil 0.08 K/CU MM    Immature Neutrophil % 1.2 (H) 0 - 0.43 %     CULTURE results: Invalid input(s): \"BLOOD CULTURE\", \"URINE CULTURE\", \"SURGICAL CULTURE\"    Diagnosis:  Patient Active Problem List   Diagnosis    Pneumonia due to infectious organism    HTN (hypertension), benign    Seizure disorder (HCC)    Altered mental status    Chest pain    MR (mental retardation)    COPD exacerbation (HCC)    Left hemiplegia (HCC)    H/O: stroke    Mixed hyperlipidemia    Acquired hypothyroidism    Sepsis (HCC)    Acute bronchitis    Acute respiratory failure with hypoxia (HCC)    Acute respiratory failure (HCC)    Anxiety and depression    Anemia    Controlled type 2 diabetes mellitus without complication, without long-term current use of insulin (HCC)    Diverticulosis    Lesion of right native kidney    Black stool    Hypoxia    COVID-19 virus infection    Other cerebral palsy (HCC)    Chronic respiratory failure (HCC)    COVID-19

## 2024-07-26 NOTE — CARE COORDINATION
Spoke with Ny/Dm. Confirmed that pt can still go to [facility]. Left VM with Amanda/SELENA guardian to touch base and gave brief update.

## 2024-07-26 NOTE — CARE COORDINATION
Discharge RN: If pt is discharged after hours or over the weekend, please complete the following at discharge... (1) Call report [phone], (2) Fax AVS with completed EDUARDO, and any scripts to [phone], (3) set up transportation with [facility] [phone], (4) Inform pt and family as appropriate. Thank you.      CALL APSI GUARDIAN/JESSICA TO INFORM OF DISCHARGE AND TIME.

## 2024-07-27 VITALS
SYSTOLIC BLOOD PRESSURE: 167 MMHG | WEIGHT: 108.91 LBS | DIASTOLIC BLOOD PRESSURE: 76 MMHG | HEART RATE: 85 BPM | TEMPERATURE: 98.8 F | HEIGHT: 58 IN | BODY MASS INDEX: 22.86 KG/M2 | RESPIRATION RATE: 26 BRPM | OXYGEN SATURATION: 98 %

## 2024-07-27 LAB
ANION GAP SERPL CALCULATED.3IONS-SCNC: 11 MMOL/L (ref 7–16)
BASOPHILS ABSOLUTE: 0 K/CU MM
BASOPHILS RELATIVE PERCENT: 0.3 % (ref 0–1)
BUN SERPL-MCNC: 13 MG/DL (ref 6–23)
CALCIUM SERPL-MCNC: 9.4 MG/DL (ref 8.3–10.6)
CHLORIDE BLD-SCNC: 96 MMOL/L (ref 99–110)
CO2: 26 MMOL/L (ref 21–32)
CREAT SERPL-MCNC: 0.2 MG/DL (ref 0.6–1.1)
DIFFERENTIAL TYPE: ABNORMAL
EOSINOPHILS ABSOLUTE: 0.1 K/CU MM
EOSINOPHILS RELATIVE PERCENT: 0.8 % (ref 0–3)
GFR, ESTIMATED: >90 ML/MIN/1.73M2
GLUCOSE BLD-MCNC: 122 MG/DL (ref 70–99)
GLUCOSE BLD-MCNC: 129 MG/DL (ref 70–99)
GLUCOSE BLD-MCNC: 138 MG/DL (ref 70–99)
GLUCOSE SERPL-MCNC: 144 MG/DL (ref 70–99)
HCT VFR BLD CALC: 32.2 % (ref 37–47)
HEMOGLOBIN: 9.2 GM/DL (ref 12.5–16)
IMMATURE NEUTROPHIL %: 1 % (ref 0–0.43)
LYMPHOCYTES ABSOLUTE: 2.6 K/CU MM
LYMPHOCYTES RELATIVE PERCENT: 32.5 % (ref 24–44)
MCH RBC QN AUTO: 26.7 PG (ref 27–31)
MCHC RBC AUTO-ENTMCNC: 28.6 % (ref 32–36)
MCV RBC AUTO: 93.3 FL (ref 78–100)
MONOCYTES ABSOLUTE: 0.8 K/CU MM
MONOCYTES RELATIVE PERCENT: 10.2 % (ref 0–4)
NEUTROPHILS ABSOLUTE: 4.4 K/CU MM
NEUTROPHILS RELATIVE PERCENT: 55.2 % (ref 36–66)
NUCLEATED RBC %: 0 %
PDW BLD-RTO: 21.2 % (ref 11.7–14.9)
PLATELET # BLD: 430 K/CU MM (ref 140–440)
PMV BLD AUTO: 8.6 FL (ref 7.5–11.1)
POTASSIUM SERPL-SCNC: 4.8 MMOL/L (ref 3.5–5.1)
RBC # BLD: 3.45 M/CU MM (ref 4.2–5.4)
SODIUM BLD-SCNC: 133 MMOL/L (ref 135–145)
TOTAL IMMATURE NEUTOROPHIL: 0.08 K/CU MM
TOTAL NUCLEATED RBC: 0 K/CU MM
WBC # BLD: 7.9 K/CU MM (ref 4–10.5)

## 2024-07-27 PROCEDURE — 94761 N-INVAS EAR/PLS OXIMETRY MLT: CPT

## 2024-07-27 PROCEDURE — 85025 COMPLETE CBC W/AUTO DIFF WBC: CPT

## 2024-07-27 PROCEDURE — 2580000003 HC RX 258: Performed by: STUDENT IN AN ORGANIZED HEALTH CARE EDUCATION/TRAINING PROGRAM

## 2024-07-27 PROCEDURE — 6370000000 HC RX 637 (ALT 250 FOR IP): Performed by: STUDENT IN AN ORGANIZED HEALTH CARE EDUCATION/TRAINING PROGRAM

## 2024-07-27 PROCEDURE — 99232 SBSQ HOSP IP/OBS MODERATE 35: CPT | Performed by: INTERNAL MEDICINE

## 2024-07-27 PROCEDURE — 80048 BASIC METABOLIC PNL TOTAL CA: CPT

## 2024-07-27 PROCEDURE — 36415 COLL VENOUS BLD VENIPUNCTURE: CPT

## 2024-07-27 PROCEDURE — 6360000002 HC RX W HCPCS: Performed by: STUDENT IN AN ORGANIZED HEALTH CARE EDUCATION/TRAINING PROGRAM

## 2024-07-27 PROCEDURE — 82962 GLUCOSE BLOOD TEST: CPT

## 2024-07-27 PROCEDURE — 6360000002 HC RX W HCPCS: Performed by: INTERNAL MEDICINE

## 2024-07-27 PROCEDURE — 6370000000 HC RX 637 (ALT 250 FOR IP): Performed by: NURSE PRACTITIONER

## 2024-07-27 RX ORDER — AMOXICILLIN AND CLAVULANATE POTASSIUM 875; 125 MG/1; MG/1
1 TABLET, FILM COATED ORAL EVERY 12 HOURS SCHEDULED
Qty: 56 TABLET | Refills: 0 | Status: SHIPPED | OUTPATIENT
Start: 2024-07-27 | End: 2024-08-24

## 2024-07-27 RX ADMIN — SODIUM CHLORIDE, PRESERVATIVE FREE 10 ML: 5 INJECTION INTRAVENOUS at 09:02

## 2024-07-27 RX ADMIN — ATORVASTATIN CALCIUM 40 MG: 40 TABLET, FILM COATED ORAL at 09:02

## 2024-07-27 RX ADMIN — FERROUS SULFATE TAB 325 MG (65 MG ELEMENTAL FE) 325 MG: 325 (65 FE) TAB at 09:02

## 2024-07-27 RX ADMIN — COLLAGENASE SANTYL: 250 OINTMENT TOPICAL at 09:02

## 2024-07-27 RX ADMIN — LEVETIRACETAM 1500 MG: 100 SOLUTION ORAL at 09:02

## 2024-07-27 RX ADMIN — AMOXICILLIN AND CLAVULANATE POTASSIUM 1 TABLET: 875; 125 TABLET, FILM COATED ORAL at 09:01

## 2024-07-27 RX ADMIN — PANTOPRAZOLE SODIUM 40 MG: 40 INJECTION, POWDER, FOR SOLUTION INTRAVENOUS at 09:02

## 2024-07-27 RX ADMIN — FLUTICASONE PROPIONATE 2 SPRAY: 50 SPRAY, METERED NASAL at 09:03

## 2024-07-27 RX ADMIN — MORPHINE SULFATE 1 MG: 2 INJECTION, SOLUTION INTRAMUSCULAR; INTRAVENOUS at 09:15

## 2024-07-27 RX ADMIN — DIVALPROEX SODIUM 1000 MG: 125 CAPSULE, COATED PELLETS ORAL at 09:01

## 2024-07-27 RX ADMIN — BUSPIRONE HYDROCHLORIDE 10 MG: 5 TABLET ORAL at 09:02

## 2024-07-27 RX ADMIN — LISINOPRIL 40 MG: 20 TABLET ORAL at 09:02

## 2024-07-27 RX ADMIN — Medication 1 CAPSULE: at 09:02

## 2024-07-27 RX ADMIN — CARBAMAZEPINE 300 MG: 100 SUSPENSION ORAL at 09:02

## 2024-07-27 ASSESSMENT — PAIN SCALES - GENERAL
PAINLEVEL_OUTOF10: 6
PAINLEVEL_OUTOF10: 0

## 2024-07-27 NOTE — DISCHARGE SUMMARY
V2.0  Discharge Summary    Name:  Jm Garcia /Age/Sex: 1960 (63 y.o. female)   Admit Date: 2024  Discharge Date: 24    MRN & CSN:  [id number] & [id number] Encounter Date and Time 24 10:33 AM EDT    Attending:  Phylicia Medrano MD Discharging Provider: Phylicia Medrano MD       Hospital Course:     Brief HPI: Jm Garcia is a 63 y.o. female who presented with ***    Brief Problem Based Course:   ***      The patient expressed appropriate understanding of, and agreement with the discharge recommendations, medications, and plan.     Consults this admission:  IP CONSULT TO CRITICAL CARE  IP CONSULT TO PULMONOLOGY  IP CONSULT TO VASCULAR ACCESS TEAM  IP CONSULT TO INTERVENTIONAL RADIOLOGY  IP CONSULT TO DIETITIAN  IP CONSULT TO DIETITIAN  IP CONSULT TO INFECTIOUS DISEASES  IP CONSULT TO CARDIOTHORACIC SURGERY  IP CONSULT TO VASCULAR ACCESS TEAM  IP CONSULT TO VASCULAR ACCESS TEAM    Discharge Diagnosis:   Acute hypoxemic respiratory failure (HCC)    ***    Discharge Instruction:   Follow up appointments: ***  Primary care physician: Yuly Bro APRN - CNP within 1 week  Diet: {diet:89768}   Activity: {discharge activity:76024}  Disposition: Discharged to:   []Home, []HHC, []SNF, []Acute Rehab, []Hospice ***  Condition on discharge: Stable  Labs and Tests to be Followed up as an outpatient by PCP or Specialist: ***    Discharge Medications:        Medication List        START taking these medications      amoxicillin-clavulanate 875-125 MG per tablet  Commonly known as: AUGMENTIN  Take 1 tablet by mouth every 12 hours for 56 doses     collagenase 250 UNIT/GM ointment  Apply topically daily.  Start taking on: 2024            CHANGE how you take these medications      atorvastatin 40 MG tablet  Commonly known as: LIPITOR  TAKE 1 TABLET BY MOUTH DAILY  What changed: when to take this     FeroSul 325 (65 Fe) MG tablet  Generic drug: ferrous sulfate  TAKE 1 TABLET BY MOUTH DAILY WITH  01:17 PM     Blood Cultures: No results found for: \"BC\"  No results found for: \"BLOODCULT2\"  Organism:   Lab Results   Component Value Date/Time    ORG ECOL 12/18/2018 07:49 AM       Time Spent Discharging patient 35 minutes    Electronically signed by Phylicia Medrano MD on 7/27/2024 at 10:33 AM

## 2024-07-27 NOTE — PROGRESS NOTES
Discharge order noted. Transportation with [facility] to [facility] set up for 12:15pm. Left voicemail with APSI to notify of discharge. No family present at the bedside. Attempted to call report to [facility] with no answer. Will call again.

## 2024-07-27 NOTE — PROGRESS NOTES
Pulmonary and Critical Care  Progress Note      VITALS:  BP (!) 167/76   Pulse 85   Temp 98.8 °F (37.1 °C) (Axillary)   Resp 26   Ht 1.473 m (4' 10\")   Wt 49.4 kg (108 lb 14.5 oz)   SpO2 98%   BMI 22.76 kg/m²     Subjective:   CHIEF COMPLAINT :SOB     HPI:                The patient is a 63 y.o. female is lying  in the bed. She is non verbal. She is not in acute resp distress    Objective:   PHYSICAL EXAM:    LUNGS:Decreased air entry left base  Abd-soft, BS+,NT  Ext- no pedal edema  CVS-s1s2, no murmurs      DATA:    CBC:  Recent Labs     07/25/24  0515 07/26/24 0257 07/27/24  0231   WBC 6.7 6.8 7.9   RBC 3.24* 3.25* 3.45*   HGB 8.6* 8.7* 9.2*   HCT 29.8* 30.6* 32.2*    378 430   MCV 92.0 94.2 93.3   MCH 26.5* 26.8* 26.7*   MCHC 28.9* 28.4* 28.6*   RDW 20.6* 21.0* 21.2*      BMP:  Recent Labs     07/25/24 0515 07/26/24 0257 07/27/24  0231   * 133* 133*   K 4.8 4.5 4.8   CL 98* 97* 96*   CO2 26 27 26   BUN 10 15 13   CREATININE 0.3* 0.3* 0.2*   CALCIUM 9.5 9.3 9.4   GLUCOSE 130* 180* 144*      ABG:  No results for input(s): \"PH\", \"PO2ART\", \"IXY4RFY\", \"HCO3\", \"BEART\", \"O2SAT\" in the last 72 hours.  BNP  Lab Results   Component Value Date    BNP 49 11/12/2012      D-Dimer:  Lab Results   Component Value Date    DDIMER 323 (H) 10/29/2022      Radiology: Interval removal left-sided pleural drain with little remaining  pleural fluid and no pneumothorax      Assessment/Plan     Patient Active Problem List    Diagnosis Date Noted    Weakness 02/03/2023     Priority: Medium    Acute cystitis without hematuria 12/23/2022     Priority: Medium    Urinary problem 12/19/2022     Priority: Medium    SOB (shortness of breath) 11/02/2022     Priority: Medium    COVID-19 10/29/2022     Priority: Medium    Abnormal uterine and vaginal bleeding, unspecified 10/29/2022     Priority: Medium    Urinary incontinence 10/29/2022     Priority: Medium    Chronic respiratory failure (HCC) 05/16/2022     Priority: Medium     Bradyarrhythmia 02/04/2020     Priority: Medium    Dyskinesia 12/03/2015     Priority: Medium    Hemiplegia affecting left nondominant side (HCC) 12/03/2015     Priority: Medium    Insomnia 12/03/2015     Priority: Medium    Intermittent explosive disorder 12/03/2015     Priority: Medium    Iron deficiency anemia, unspecified 12/03/2015     Priority: Medium    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     Priority: Medium    Subdural hematoma (HCC) 12/03/2015     Priority: Medium    Intraparenchymal hemorrhage of brain ([facility]) 02/08/2014     Priority: Medium    Moderate malnutrition ([facility]) 07/16/2024    Mucus plugging of bronchi 07/16/2024    Empyema of left pleural space ([facility]) 07/16/2024    Acute hypoxemic respiratory failure ([facility]) 07/14/2024    Generalized weakness 04/30/2024    Contusion of right knee 06/19/2023    Other cerebral palsy ([facility]) 01/28/2022    COVID-19 virus infection 10/12/2021    Hypoxia 09/23/2021    Diverticulosis 05/05/2020    Lesion of right native kidney 05/05/2020    Black stool 05/05/2020    Anxiety and depression 03/10/2020    Anemia 03/10/2020    Controlled type 2 diabetes mellitus without complication, without long-term current use of insulin ([facility]) 03/10/2020    Acute respiratory failure with hypoxia ([facility]) 06/28/2019    Acute respiratory failure ([facility]) 06/28/2019    Acute bronchitis 12/21/2018    Sepsis ([facility]) 12/18/2018    Mixed hyperlipidemia 04/24/2018    Acquired hypothyroidism 04/24/2018    Left hemiplegia ([facility]) 07/24/2017    H/O: stroke 07/24/2017    COPD exacerbation ([facility]) 10/21/2015    Chest pain 03/13/2015    MR (mental retardation) 03/13/2015     Overview Note:     Replacing deprecated diagnoses      Seizure disorder ([facility]) 03/10/2014    Altered mental status 03/10/2014    Pneumonia due to infectious organism 11/10/2012    HTN (hypertension), benign 11/10/2012     Left Empyema s/p left chest tube- removed   Left entrapped lung- slowly improving  Anemia  COPD  H/o Seizure

## 2024-07-27 NOTE — PROGRESS NOTES
Called report to [facility]. All questions answered. [facility] in to  patient for transport.

## 2024-07-27 NOTE — DISCHARGE INSTR - COC
Continuity of Care Form    Patient Name: Jm Garcia   :  1960  MRN:  [id number]    Admit date:  2024  Discharge date:  2024    Code Status Order: Full Code   Advance Directives:     Admitting Physician:  No admitting provider for patient encounter.  PCP: Yuly Bro, APRN - CNP    Discharging Nurse: Tahmina Gonzalez RN  Discharging Hospital Unit/Room#: 3129/3129-A  Discharging Unit Phone Number: [phone]    Emergency Contact:   Extended Emergency Contact Information  Primary Emergency Contact: APSI,APSI  Address: [address] [place] [address] [place]  Home Phone: [phone]  Work Phone: [phone]  Mobile Phone: [phone]  Relation: Legal Guardian    Past Surgical History:  Past Surgical History:   Procedure Laterality Date    GASTROSTOMY TUBE PLACEMENT      UPPER GASTROINTESTINAL ENDOSCOPY N/A 5/3/2024    ESOPHAGOGASTRODUODENOSCOPY PERCUTANEOUS ENDOSCOPIC GASTROSTOMY TUBE PLACEMENT performed by Ashok Casas DO at [facility] ENDOSCOPY       Immunization History:   Immunization History   Administered Date(s) Administered    COVID-19, PFIZER PURPLE top, DILUTE for use, (age 12 y+), 30mcg/0.3mL 2021, 2021    Influenza Vaccine, unspecified formulation 10/21/2016    Influenza Virus Vaccine 2012, 10/01/2013, 2015    Influenza, FLUARIX, FLULAVAL, FLUZONE (age 6 mo+) AND AFLURIA, (age 3 y+), PF, 0.5mL 10/23/2018, 2019, 10/19/2022    Pneumococcal Conjugate Vaccine 2011, 2015    Pneumococcal, PCV20, PREVNAR 20, (age 6w+), IM, 0.5mL 10/19/2022    Pneumococcal, PPSV23, PNEUMOVAX 23, (age 2y+), SC/IM, 0.5mL 2011, 2015       Active Problems:  Patient Active Problem List   Diagnosis Code    Pneumonia due to infectious organism J18.9    HTN (hypertension), benign I10    Seizure disorder (HCC) G40.909    Altered mental status R41.82    Chest pain R07.9    MR (mental retardation) F79    COPD  07/22/24 1020   Number of days: 12        Elimination:  Continence:   Bowel: No  Bladder: No  Urinary Catheter: None   Colostomy/Ileostomy/Ileal Conduit: No       Date of Last BM: 7/26/2024 11PM    Intake/Output Summary (Last 24 hours) at 7/27/2024 1042  Last data filed at 7/27/2024 0855  Gross per 24 hour   Intake 1676 ml   Output 1200 ml   Net 476 ml     I/O last 3 completed shifts:  In: 2573 [NG/GT:2573]  Out: 1200 [Urine:1200]    Safety Concerns:     History of Seizures, Cerebral Palsy, Aspiration Risk    Impairments/Disabilities:      Cerebral Palsy - Contracted in all extremities . Mentally Disabled. Dysphagia    Nutrition Therapy:  Current Nutrition Therapy:   - Tube Feedings:  Diabetic    Routes of Feeding: Gastrostomy Tube  Liquids: No Liquids  Daily Fluid Restriction: no  Last Modified Barium Swallow with Video (Video Swallowing Test): not done    Treatments at the Time of Hospital Discharge:   Respiratory Treatments: None  Oxygen Therapy:  is not on home oxygen therapy.  Ventilator:    - No ventilator support    Rehab Therapies: Physical Therapy and Occupational Therapy  Weight Bearing Status/Restrictions: No weight bearing restrictions  Other Medical Equipment (for information only, NOT a DME order):  hospital bed  Other Treatments: Wound care to sacrum daily. G tube care    Patient's personal belongings (please select all that are sent with patient):  None    RN SIGNATURE:  Electronically signed by Tahmina Gonzalez RN on 7/27/24 at 10:46 AM EDT    CASE MANAGEMENT/SOCIAL WORK SECTION    Inpatient Status Date: ***    Readmission Risk Assessment Score:  Readmission Risk              Risk of Unplanned Readmission:  25           Discharging to Facility/ Agency   Name:   Address:  Phone:  Fax:    Dialysis Facility (if applicable)   Name:  Address:  Dialysis Schedule:  Phone:  Fax:    / signature: {Esignature:[id number]}    PHYSICIAN SECTION    Prognosis:

## 2024-11-04 ENCOUNTER — HOSPITAL ENCOUNTER (EMERGENCY)
Age: 64
Discharge: NURSING, REHAB OR LONG-TERM CARE | End: 2024-11-04
Payer: MEDICARE

## 2024-11-04 ENCOUNTER — APPOINTMENT (OUTPATIENT)
Dept: GENERAL RADIOLOGY | Age: 64
End: 2024-11-04
Payer: MEDICARE

## 2024-11-04 VITALS
OXYGEN SATURATION: 96 % | SYSTOLIC BLOOD PRESSURE: 129 MMHG | HEART RATE: 75 BPM | TEMPERATURE: 98.2 F | DIASTOLIC BLOOD PRESSURE: 52 MMHG | RESPIRATION RATE: 16 BRPM

## 2024-11-04 DIAGNOSIS — K94.23 GASTROSTOMY TUBE DYSFUNCTION (HCC): Primary | ICD-10-CM

## 2024-11-04 PROCEDURE — 99284 EMERGENCY DEPT VISIT MOD MDM: CPT

## 2024-11-04 PROCEDURE — 74018 RADEX ABDOMEN 1 VIEW: CPT

## 2024-11-04 PROCEDURE — 99283 EMERGENCY DEPT VISIT LOW MDM: CPT

## 2024-11-04 PROCEDURE — 43762 RPLC GTUBE NO REVJ TRC: CPT

## 2024-11-04 PROCEDURE — 6360000004 HC RX CONTRAST MEDICATION: Performed by: PHYSICIAN ASSISTANT

## 2024-11-04 RX ORDER — DIATRIZOATE MEGLUMINE AND DIATRIZOATE SODIUM 660; 100 MG/ML; MG/ML
30 SOLUTION ORAL; RECTAL
Status: DISCONTINUED | OUTPATIENT
Start: 2024-11-04 | End: 2024-11-04 | Stop reason: HOSPADM

## 2024-11-04 RX ADMIN — DIATRIZOATE MEGLUMINE AND DIATRIZOATE SODIUM 30 ML: 600; 100 SOLUTION ORAL; RECTAL at 01:09

## 2024-11-04 ASSESSMENT — PAIN - FUNCTIONAL ASSESSMENT: PAIN_FUNCTIONAL_ASSESSMENT: NONE - DENIES PAIN

## 2024-11-04 NOTE — ED TRIAGE NOTES
Pt to the ED via EMS with c/o clogged g-tube. Per staff at [facility], it is clogged at the tip and the entire g-tube needs replaced. Pt is at her baseline mental status per staff.

## 2024-11-04 NOTE — ED NOTES
Report called to Andriy at [facility], all questions answered. [facility] at bedside for transport back to facility.

## 2024-11-04 NOTE — ED PROVIDER NOTES
Triage Chief Complaint:   G Tube Complications    [place]:  Jm Garcia is a 63 y.o. female that presents today for g tube complicatons. Context is pt has g tube in place chronicallly. Clogged. Will not flush x 1 day  Request g tube placement.  Patient has not been sick no fevers. No changes in behavior. No sign of infection around the G-tube her stoma. No stoma pain. No vomitus. No abnormality with urination output or stool output.    ROS:  REVIEW OF SYSTEMS      Review of Symptoms:    All other review of systems are negative  See HPI and nursing notes for additional information       Past Medical History:   Diagnosis Date    Anxiety disorder     Back pain, chronic     Cerebral palsy (HCC)     COPD (chronic obstructive pulmonary disease) (HCC)     COVID-19 10/12/2021    CVA (cerebral infarction) 2014 x2    Diabetes mellitus (HCC)     Diverticulosis     Epilepsy (HCC)     GERD (gastroesophageal reflux disease)     Hyperlipidemia     Hypertension     Insomnia     Iron (Fe) deficiency anemia     Mental disability     Seizures (HCC)     Unspecified cerebral artery occlusion with cerebral infarction      Past Surgical History:   Procedure Laterality Date    CT GUIDED CHEST TUBE  7/15/2024    CT GUIDED CHEST TUBE 7/15/2024 [facility] CT SCAN    GASTROSTOMY TUBE PLACEMENT      UPPER GASTROINTESTINAL ENDOSCOPY N/A 5/3/2024    ESOPHAGOGASTRODUODENOSCOPY PERCUTANEOUS ENDOSCOPIC GASTROSTOMY TUBE PLACEMENT performed by Ashok Casas DO at [facility] ENDOSCOPY     Family History   Problem Relation Age of Onset    Breast Cancer Neg Hx      Social History     Socioeconomic History    Marital status: Single     Spouse name: Not on file    Number of children: Not on file    Years of education: Not on file    Highest education level: Not on file   Occupational History    Not on file   Tobacco Use    Smoking status: Former     Current packs/day: 0.00     Average packs/day: 1.5 packs/day for 20.0 years (30.0 ttl pk-yrs)     Types:

## 2025-03-05 ENCOUNTER — HOSPITAL ENCOUNTER (OUTPATIENT)
Age: 65
Setting detail: OBSERVATION
Discharge: NURSING, REHAB OR LONG-TERM CARE | End: 2025-03-08
Attending: EMERGENCY MEDICINE | Admitting: STUDENT IN AN ORGANIZED HEALTH CARE EDUCATION/TRAINING PROGRAM
Payer: COMMERCIAL

## 2025-03-05 DIAGNOSIS — T85.528A DISLODGED GASTROSTOMY TUBE: Primary | ICD-10-CM

## 2025-03-05 LAB
ALBUMIN SERPL-MCNC: 3 G/DL (ref 3.4–5)
ALBUMIN/GLOB SERPL: 1 {RATIO} (ref 1.1–2.2)
ALP SERPL-CCNC: 107 U/L (ref 40–129)
ALT SERPL-CCNC: 9 U/L (ref 10–40)
ANION GAP SERPL CALCULATED.3IONS-SCNC: 11 MMOL/L (ref 9–17)
AST SERPL-CCNC: 34 U/L (ref 15–37)
BASOPHILS # BLD: 0.03 K/UL
BASOPHILS NFR BLD: 1 % (ref 0–1)
BILIRUB SERPL-MCNC: 0.4 MG/DL (ref 0–1)
BUN SERPL-MCNC: 7 MG/DL (ref 7–20)
CALCIUM SERPL-MCNC: 9.5 MG/DL (ref 8.3–10.6)
CHLORIDE SERPL-SCNC: 91 MMOL/L (ref 99–110)
CO2 SERPL-SCNC: 22 MMOL/L (ref 21–32)
CREAT SERPL-MCNC: 0.3 MG/DL (ref 0.6–1.2)
EOSINOPHIL # BLD: 0.21 K/UL
EOSINOPHILS RELATIVE PERCENT: 4 % (ref 0–3)
ERYTHROCYTE [DISTWIDTH] IN BLOOD BY AUTOMATED COUNT: 12.9 % (ref 11.7–14.9)
GFR, ESTIMATED: >90 ML/MIN/1.73M2
GLUCOSE SERPL-MCNC: 98 MG/DL (ref 74–99)
HCT VFR BLD AUTO: 38.4 % (ref 37–47)
HGB BLD-MCNC: 12.4 G/DL (ref 12.5–16)
IMM GRANULOCYTES # BLD AUTO: 0.01 K/UL
IMM GRANULOCYTES NFR BLD: 0 %
INR PPP: 0.9
LYMPHOCYTES NFR BLD: 2.07 K/UL
LYMPHOCYTES RELATIVE PERCENT: 36 % (ref 24–44)
MCH RBC QN AUTO: 32.4 PG (ref 27–31)
MCHC RBC AUTO-ENTMCNC: 32.3 G/DL (ref 32–36)
MCV RBC AUTO: 100.3 FL (ref 78–100)
MONOCYTES NFR BLD: 0.64 K/UL
MONOCYTES NFR BLD: 11 % (ref 0–4)
NEUTROPHILS NFR BLD: 48 % (ref 36–66)
NEUTS SEG NFR BLD: 2.73 K/UL
PARTIAL THROMBOPLASTIN TIME: 31.9 SEC (ref 25.1–37.1)
PLATELET # BLD AUTO: 219 K/UL (ref 140–440)
PMV BLD AUTO: 8.5 FL (ref 7.5–11.1)
POTASSIUM SERPL-SCNC: 4.5 MMOL/L (ref 3.5–5.1)
PROT SERPL-MCNC: 6 G/DL (ref 6.4–8.2)
PROTHROMBIN TIME: 12.1 SEC (ref 11.7–14.5)
RBC # BLD AUTO: 3.83 M/UL (ref 4.2–5.4)
SODIUM SERPL-SCNC: 123 MMOL/L (ref 136–145)
WBC OTHER # BLD: 5.7 K/UL (ref 4–10.5)

## 2025-03-05 PROCEDURE — 80053 COMPREHEN METABOLIC PANEL: CPT

## 2025-03-05 PROCEDURE — 6370000000 HC RX 637 (ALT 250 FOR IP): Performed by: STUDENT IN AN ORGANIZED HEALTH CARE EDUCATION/TRAINING PROGRAM

## 2025-03-05 PROCEDURE — 85730 THROMBOPLASTIN TIME PARTIAL: CPT

## 2025-03-05 PROCEDURE — 85610 PROTHROMBIN TIME: CPT

## 2025-03-05 PROCEDURE — 83930 ASSAY OF BLOOD OSMOLALITY: CPT

## 2025-03-05 PROCEDURE — 99285 EMERGENCY DEPT VISIT HI MDM: CPT

## 2025-03-05 PROCEDURE — 2580000003 HC RX 258: Performed by: STUDENT IN AN ORGANIZED HEALTH CARE EDUCATION/TRAINING PROGRAM

## 2025-03-05 PROCEDURE — 85025 COMPLETE CBC W/AUTO DIFF WBC: CPT

## 2025-03-05 PROCEDURE — G0378 HOSPITAL OBSERVATION PER HR: HCPCS

## 2025-03-05 RX ORDER — ONDANSETRON 4 MG/1
4 TABLET, ORALLY DISINTEGRATING ORAL EVERY 8 HOURS PRN
Status: DISCONTINUED | OUTPATIENT
Start: 2025-03-05 | End: 2025-03-08 | Stop reason: HOSPADM

## 2025-03-05 RX ORDER — POTASSIUM CHLORIDE 7.45 MG/ML
10 INJECTION INTRAVENOUS PRN
Status: DISCONTINUED | OUTPATIENT
Start: 2025-03-05 | End: 2025-03-08 | Stop reason: HOSPADM

## 2025-03-05 RX ORDER — ONDANSETRON 2 MG/ML
4 INJECTION INTRAMUSCULAR; INTRAVENOUS EVERY 6 HOURS PRN
Status: DISCONTINUED | OUTPATIENT
Start: 2025-03-05 | End: 2025-03-08 | Stop reason: HOSPADM

## 2025-03-05 RX ORDER — LISINOPRIL 20 MG/1
40 TABLET ORAL DAILY
Status: DISCONTINUED | OUTPATIENT
Start: 2025-03-06 | End: 2025-03-08 | Stop reason: HOSPADM

## 2025-03-05 RX ORDER — MAGNESIUM SULFATE IN WATER 40 MG/ML
2000 INJECTION, SOLUTION INTRAVENOUS PRN
Status: DISCONTINUED | OUTPATIENT
Start: 2025-03-05 | End: 2025-03-08 | Stop reason: HOSPADM

## 2025-03-05 RX ORDER — FLUTICASONE PROPIONATE 50 MCG
1 SPRAY, SUSPENSION (ML) NASAL DAILY PRN
Status: DISCONTINUED | OUTPATIENT
Start: 2025-03-05 | End: 2025-03-08 | Stop reason: HOSPADM

## 2025-03-05 RX ORDER — LEVOTHYROXINE SODIUM 50 UG/1
50 TABLET ORAL DAILY
Status: DISCONTINUED | OUTPATIENT
Start: 2025-03-06 | End: 2025-03-08 | Stop reason: HOSPADM

## 2025-03-05 RX ORDER — ENOXAPARIN SODIUM 100 MG/ML
30 INJECTION SUBCUTANEOUS EVERY EVENING
Status: DISCONTINUED | OUTPATIENT
Start: 2025-03-06 | End: 2025-03-08 | Stop reason: HOSPADM

## 2025-03-05 RX ORDER — LEVETIRACETAM 500 MG/1
1500 TABLET ORAL 2 TIMES DAILY
Status: DISCONTINUED | OUTPATIENT
Start: 2025-03-05 | End: 2025-03-08 | Stop reason: HOSPADM

## 2025-03-05 RX ORDER — ATORVASTATIN CALCIUM 40 MG/1
40 TABLET, FILM COATED ORAL NIGHTLY
Status: DISCONTINUED | OUTPATIENT
Start: 2025-03-06 | End: 2025-03-08 | Stop reason: HOSPADM

## 2025-03-05 RX ORDER — ACETAMINOPHEN 650 MG/1
650 SUPPOSITORY RECTAL EVERY 6 HOURS PRN
Status: DISCONTINUED | OUTPATIENT
Start: 2025-03-05 | End: 2025-03-08 | Stop reason: HOSPADM

## 2025-03-05 RX ORDER — MIRTAZAPINE 15 MG/1
15 TABLET, FILM COATED ORAL NIGHTLY
Status: DISCONTINUED | OUTPATIENT
Start: 2025-03-05 | End: 2025-03-08 | Stop reason: HOSPADM

## 2025-03-05 RX ORDER — ACETAMINOPHEN 325 MG/1
650 TABLET ORAL EVERY 6 HOURS PRN
Status: DISCONTINUED | OUTPATIENT
Start: 2025-03-05 | End: 2025-03-08 | Stop reason: HOSPADM

## 2025-03-05 RX ORDER — CARBAMAZEPINE 300 MG/1
300 CAPSULE, EXTENDED RELEASE ORAL 2 TIMES DAILY
Status: DISCONTINUED | OUTPATIENT
Start: 2025-03-05 | End: 2025-03-08 | Stop reason: HOSPADM

## 2025-03-05 RX ORDER — DIVALPROEX SODIUM 250 MG/1
1000 TABLET, DELAYED RELEASE ORAL
Status: DISCONTINUED | OUTPATIENT
Start: 2025-03-05 | End: 2025-03-08 | Stop reason: HOSPADM

## 2025-03-05 RX ORDER — FERROUS SULFATE 325(65) MG
325 TABLET ORAL DAILY
Status: DISCONTINUED | OUTPATIENT
Start: 2025-03-06 | End: 2025-03-08 | Stop reason: HOSPADM

## 2025-03-05 RX ORDER — IPRATROPIUM BROMIDE AND ALBUTEROL SULFATE 2.5; .5 MG/3ML; MG/3ML
1 SOLUTION RESPIRATORY (INHALATION)
Status: DISCONTINUED | OUTPATIENT
Start: 2025-03-06 | End: 2025-03-08 | Stop reason: HOSPADM

## 2025-03-05 RX ORDER — SODIUM CHLORIDE 0.9 % (FLUSH) 0.9 %
5-40 SYRINGE (ML) INJECTION PRN
Status: DISCONTINUED | OUTPATIENT
Start: 2025-03-05 | End: 2025-03-08 | Stop reason: HOSPADM

## 2025-03-05 RX ORDER — SODIUM CHLORIDE 9 MG/ML
INJECTION, SOLUTION INTRAVENOUS CONTINUOUS
Status: DISPENSED | OUTPATIENT
Start: 2025-03-05 | End: 2025-03-06

## 2025-03-05 RX ORDER — SODIUM CHLORIDE 0.9 % (FLUSH) 0.9 %
5-40 SYRINGE (ML) INJECTION EVERY 12 HOURS SCHEDULED
Status: DISCONTINUED | OUTPATIENT
Start: 2025-03-05 | End: 2025-03-08 | Stop reason: HOSPADM

## 2025-03-05 RX ORDER — SODIUM CHLORIDE 9 MG/ML
INJECTION, SOLUTION INTRAVENOUS PRN
Status: DISCONTINUED | OUTPATIENT
Start: 2025-03-05 | End: 2025-03-08 | Stop reason: HOSPADM

## 2025-03-05 RX ORDER — BUSPIRONE HYDROCHLORIDE 5 MG/1
10 TABLET ORAL 2 TIMES DAILY
Status: DISCONTINUED | OUTPATIENT
Start: 2025-03-05 | End: 2025-03-08 | Stop reason: HOSPADM

## 2025-03-05 RX ORDER — SODIUM CHLORIDE, SODIUM LACTATE, POTASSIUM CHLORIDE, CALCIUM CHLORIDE 600; 310; 30; 20 MG/100ML; MG/100ML; MG/100ML; MG/100ML
INJECTION, SOLUTION INTRAVENOUS CONTINUOUS
Status: DISCONTINUED | OUTPATIENT
Start: 2025-03-05 | End: 2025-03-05

## 2025-03-05 RX ADMIN — DIVALPROEX SODIUM 1000 MG: 500 TABLET, DELAYED RELEASE ORAL at 23:49

## 2025-03-05 RX ADMIN — LEVETIRACETAM 1500 MG: 500 TABLET, FILM COATED ORAL at 23:49

## 2025-03-05 RX ADMIN — MIRTAZAPINE 15 MG: 15 TABLET, FILM COATED ORAL at 23:50

## 2025-03-05 RX ADMIN — SODIUM CHLORIDE: 9 INJECTION, SOLUTION INTRAVENOUS at 23:58

## 2025-03-05 ASSESSMENT — PAIN SCALES - WONG BAKER
WONGBAKER_NUMERICALRESPONSE: NO HURT
WONGBAKER_NUMERICALRESPONSE: NO HURT

## 2025-03-05 ASSESSMENT — PAIN - FUNCTIONAL ASSESSMENT
PAIN_FUNCTIONAL_ASSESSMENT: WONG-BAKER FACES
PAIN_FUNCTIONAL_ASSESSMENT: WONG-BAKER FACES

## 2025-03-05 NOTE — ED PROVIDER NOTES
Emergency Department Encounter    Patient: Jm Garcia  MRN: [id number]  : 1960  Date of Evaluation: 3/5/2025  ED Provider:  Mary Jane Caldwell DO    Triage Chief Complaint:   Feeding Tube Problem (Pulled out and staff at facility unable to get in )    [place]:  Jm Garcia is a 64 y.o. female with history of MRDD, seizures, anxiety, CVA, hyperlipidemia, diabetes, COPD, hypertension, insomnia, acid reflux, cerebral palsy that presents to the emergency department for G-tube replacement.  Patient pulled out her G-tube at facility today.  Per report they were unable to get patient G-tube back in.  Patient arrived to the emergency department in no acute distress.  Patient unable to give any history of present illness.  Patient although appears in no acute distress.    ROS - see HPI, below listed is current ROS at time of my eval:  Review of systems unable to be obtained due to patient mental disability    Past Medical History:   Diagnosis Date    Anxiety disorder     Back pain, chronic     Cerebral palsy (HCC)     COPD (chronic obstructive pulmonary disease) (HCC)     COVID-19 10/12/2021    CVA (cerebral infarction) 2014 x2    Diabetes mellitus (HCC)     Diverticulosis     Epilepsy (HCC)     GERD (gastroesophageal reflux disease)     Hyperlipidemia     Hypertension     Insomnia     Iron (Fe) deficiency anemia     Mental disability     Seizures (HCC)     Unspecified cerebral artery occlusion with cerebral infarction      Past Surgical History:   Procedure Laterality Date    CT GUIDED CHEST TUBE  7/15/2024    CT GUIDED CHEST TUBE 7/15/2024 [facility] CT SCAN    GASTROSTOMY TUBE PLACEMENT      UPPER GASTROINTESTINAL ENDOSCOPY N/A 5/3/2024    ESOPHAGOGASTRODUODENOSCOPY PERCUTANEOUS ENDOSCOPIC GASTROSTOMY TUBE PLACEMENT performed by Ashok Casas DO at [facility] ENDOSCOPY     Family History   Problem Relation Age of Onset    Breast Cancer Neg Hx      Social History     Socioeconomic History    Marital status: Single

## 2025-03-06 ENCOUNTER — ANESTHESIA EVENT (OUTPATIENT)
Dept: ENDOSCOPY | Age: 65
End: 2025-03-06
Payer: COMMERCIAL

## 2025-03-06 LAB
ALBUMIN SERPL-MCNC: 2.9 G/DL (ref 3.4–5)
ALBUMIN/GLOB SERPL: 1.1 {RATIO} (ref 1.1–2.2)
ALP SERPL-CCNC: 95 U/L (ref 40–129)
ALT SERPL-CCNC: 10 U/L (ref 10–40)
ANION GAP SERPL CALCULATED.3IONS-SCNC: 10 MMOL/L (ref 9–17)
AST SERPL-CCNC: 23 U/L (ref 15–37)
BASOPHILS # BLD: 0.03 K/UL
BASOPHILS NFR BLD: 1 % (ref 0–1)
BILIRUB SERPL-MCNC: 0.3 MG/DL (ref 0–1)
BILIRUB UR QL STRIP: NEGATIVE
BUN SERPL-MCNC: 6 MG/DL (ref 7–20)
CALCIUM SERPL-MCNC: 8.8 MG/DL (ref 8.3–10.6)
CHLORIDE SERPL-SCNC: 96 MMOL/L (ref 99–110)
CLARITY UR: CLEAR
CO2 SERPL-SCNC: 23 MMOL/L (ref 21–32)
COLOR UR: YELLOW
COMMENT: NORMAL
CREAT SERPL-MCNC: 0.3 MG/DL (ref 0.6–1.2)
EOSINOPHIL # BLD: 0.18 K/UL
EOSINOPHILS RELATIVE PERCENT: 3 % (ref 0–3)
ERYTHROCYTE [DISTWIDTH] IN BLOOD BY AUTOMATED COUNT: 13 % (ref 11.7–14.9)
FOLATE SERPL-MCNC: 22.7 NG/ML (ref 4.8–24.2)
GFR, ESTIMATED: >90 ML/MIN/1.73M2
GLUCOSE SERPL-MCNC: 99 MG/DL (ref 74–99)
GLUCOSE UR STRIP-MCNC: NEGATIVE MG/DL
HCT VFR BLD AUTO: 34.4 % (ref 37–47)
HGB BLD-MCNC: 11.3 G/DL (ref 12.5–16)
HGB UR QL STRIP.AUTO: NEGATIVE
IMM GRANULOCYTES # BLD AUTO: 0.03 K/UL
IMM GRANULOCYTES NFR BLD: 1 %
KETONES UR STRIP-MCNC: NEGATIVE MG/DL
LEUKOCYTE ESTERASE UR QL STRIP: NEGATIVE
LYMPHOCYTES NFR BLD: 2.26 K/UL
LYMPHOCYTES RELATIVE PERCENT: 42 % (ref 24–44)
MCH RBC QN AUTO: 32.6 PG (ref 27–31)
MCHC RBC AUTO-ENTMCNC: 32.8 G/DL (ref 32–36)
MCV RBC AUTO: 99.1 FL (ref 78–100)
MONOCYTES NFR BLD: 0.47 K/UL
MONOCYTES NFR BLD: 9 % (ref 0–4)
NEUTROPHILS NFR BLD: 45 % (ref 36–66)
NEUTS SEG NFR BLD: 2.43 K/UL
NITRITE UR QL STRIP: NEGATIVE
OSMOLALITY SERPL: 269 MOSM/KG (ref 280–300)
OSMOLALITY UR: 177 MOSM/KG (ref 292–1090)
PH UR STRIP: 7 [PH] (ref 5–8)
PLATELET # BLD AUTO: 213 K/UL (ref 140–440)
PMV BLD AUTO: 8.2 FL (ref 7.5–11.1)
POTASSIUM SERPL-SCNC: 3.8 MMOL/L (ref 3.5–5.1)
PROT SERPL-MCNC: 5.4 G/DL (ref 6.4–8.2)
PROT UR STRIP-MCNC: NEGATIVE MG/DL
RBC # BLD AUTO: 3.47 M/UL (ref 4.2–5.4)
SODIUM SERPL-SCNC: 128 MMOL/L (ref 136–145)
SODIUM UR-SCNC: 28 MMOL/L (ref 40–220)
SP GR UR STRIP: 1.01 (ref 1–1.03)
TSH SERPL DL<=0.05 MIU/L-ACNC: 1.55 UIU/ML (ref 0.27–4.2)
UROBILINOGEN UR STRIP-ACNC: 0.2 EU/DL (ref 0–1)
VIT B12 SERPL-MCNC: 1922 PG/ML (ref 211–911)
WBC OTHER # BLD: 5.4 K/UL (ref 4–10.5)

## 2025-03-06 PROCEDURE — G0378 HOSPITAL OBSERVATION PER HR: HCPCS

## 2025-03-06 PROCEDURE — 84443 ASSAY THYROID STIM HORMONE: CPT

## 2025-03-06 PROCEDURE — 83935 ASSAY OF URINE OSMOLALITY: CPT

## 2025-03-06 PROCEDURE — 82746 ASSAY OF FOLIC ACID SERUM: CPT

## 2025-03-06 PROCEDURE — 99222 1ST HOSP IP/OBS MODERATE 55: CPT | Performed by: SURGERY

## 2025-03-06 PROCEDURE — 85025 COMPLETE CBC W/AUTO DIFF WBC: CPT

## 2025-03-06 PROCEDURE — 6370000000 HC RX 637 (ALT 250 FOR IP): Performed by: STUDENT IN AN ORGANIZED HEALTH CARE EDUCATION/TRAINING PROGRAM

## 2025-03-06 PROCEDURE — 80053 COMPREHEN METABOLIC PANEL: CPT

## 2025-03-06 PROCEDURE — 2580000003 HC RX 258: Performed by: STUDENT IN AN ORGANIZED HEALTH CARE EDUCATION/TRAINING PROGRAM

## 2025-03-06 PROCEDURE — 84300 ASSAY OF URINE SODIUM: CPT

## 2025-03-06 PROCEDURE — 82607 VITAMIN B-12: CPT

## 2025-03-06 PROCEDURE — 94640 AIRWAY INHALATION TREATMENT: CPT

## 2025-03-06 PROCEDURE — 94761 N-INVAS EAR/PLS OXIMETRY MLT: CPT

## 2025-03-06 PROCEDURE — 81003 URINALYSIS AUTO W/O SCOPE: CPT

## 2025-03-06 PROCEDURE — 99232 SBSQ HOSP IP/OBS MODERATE 35: CPT | Performed by: SURGERY

## 2025-03-06 RX ORDER — SODIUM CHLORIDE, SODIUM LACTATE, POTASSIUM CHLORIDE, CALCIUM CHLORIDE 600; 310; 30; 20 MG/100ML; MG/100ML; MG/100ML; MG/100ML
INJECTION, SOLUTION INTRAVENOUS CONTINUOUS
Status: DISCONTINUED | OUTPATIENT
Start: 2025-03-06 | End: 2025-03-08 | Stop reason: HOSPADM

## 2025-03-06 RX ADMIN — BUSPIRONE HYDROCHLORIDE 10 MG: 5 TABLET ORAL at 09:45

## 2025-03-06 RX ADMIN — FERROUS SULFATE TAB 325 MG (65 MG ELEMENTAL FE) 325 MG: 325 (65 FE) TAB at 09:45

## 2025-03-06 RX ADMIN — BUSPIRONE HYDROCHLORIDE 10 MG: 5 TABLET ORAL at 20:40

## 2025-03-06 RX ADMIN — DIVALPROEX SODIUM 1000 MG: 500 TABLET, DELAYED RELEASE ORAL at 20:39

## 2025-03-06 RX ADMIN — CARBAMAZEPINE 300 MG: 300 CAPSULE, EXTENDED RELEASE ORAL at 21:56

## 2025-03-06 RX ADMIN — IPRATROPIUM BROMIDE AND ALBUTEROL SULFATE 1 DOSE: .5; 2.5 SOLUTION RESPIRATORY (INHALATION) at 10:53

## 2025-03-06 RX ADMIN — MIRTAZAPINE 15 MG: 15 TABLET, FILM COATED ORAL at 20:40

## 2025-03-06 RX ADMIN — SODIUM CHLORIDE, SODIUM LACTATE, POTASSIUM CHLORIDE, AND CALCIUM CHLORIDE: .6; .31; .03; .02 INJECTION, SOLUTION INTRAVENOUS at 15:54

## 2025-03-06 RX ADMIN — CARBAMAZEPINE 300 MG: 300 CAPSULE, EXTENDED RELEASE ORAL at 01:55

## 2025-03-06 RX ADMIN — LEVETIRACETAM 1500 MG: 500 TABLET, FILM COATED ORAL at 20:41

## 2025-03-06 RX ADMIN — CARBAMAZEPINE 300 MG: 300 CAPSULE, EXTENDED RELEASE ORAL at 09:45

## 2025-03-06 RX ADMIN — LISINOPRIL 40 MG: 20 TABLET ORAL at 09:46

## 2025-03-06 RX ADMIN — ATORVASTATIN CALCIUM 40 MG: 40 TABLET, FILM COATED ORAL at 20:40

## 2025-03-06 RX ADMIN — BUSPIRONE HYDROCHLORIDE 10 MG: 5 TABLET ORAL at 01:56

## 2025-03-06 RX ADMIN — LEVETIRACETAM 1500 MG: 500 TABLET, FILM COATED ORAL at 09:46

## 2025-03-06 RX ADMIN — DIVALPROEX SODIUM 1000 MG: 500 TABLET, DELAYED RELEASE ORAL at 09:45

## 2025-03-06 ASSESSMENT — ENCOUNTER SYMPTOMS
SHORTNESS OF BREATH: 1
ABDOMINAL PAIN: 1

## 2025-03-06 ASSESSMENT — PAIN SCALES - WONG BAKER
WONGBAKER_NUMERICALRESPONSE: NO HURT
WONGBAKER_NUMERICALRESPONSE: NO HURT

## 2025-03-06 NOTE — PROGRESS NOTES
4 Eyes Skin Assessment     NAME:  Jm Garcia  YOB: 1960  MEDICAL RECORD NUMBER:  [id number]    The patient is being assessed for  Admission    I agree that at least one RN has performed a thorough Head to Toe Skin Assessment on the patient. ALL assessment sites listed below have been assessed.      Areas assessed by both nurses:    Head, Face, Ears, Shoulders, Back, Chest, Arms, Elbows, Hands, Sacrum. Buttock, Coccyx, Ischium, Legs. Feet and Heels, and Under Medical Devices         Does the Patient have a Wound? No noted wound(s)       Heladio Prevention initiated by RN: Yes  Wound Care Orders initiated by RN: No    Pressure Injury (Stage 3,4, Unstageable, DTI, NWPT, and Complex wounds) if present, place Wound referral order by RN under : No    New Ostomies, if present place, Ostomy referral order under : Yes-     Hole where previous peg tube was placed prior to patient removing.     Nurse 1 eSignature: Electronically signed by Shea Gagnon RN on 3/6/25 at 3:02 PM EST    **SHARE this note so that the co-signing nurse can place an eSignature**    Nurse 2 eSignature: Electronically signed by Deana Mejia RN on 3/6/25 at 3:12 PM EST

## 2025-03-06 NOTE — CONSULTS
Department of General Surgery   Surgical Service Dr. Early   Consult Note    Date of Consult: 3/6/25    Critical care consult time: 0 min    Reason for Consult:  dislodged feeding tube    Requesting Physician:  Hospitalist / ED    CHIEF COMPLAINT:  dislodged feeding tube    History Obtained From:  patient, electronic medical record    HISTORY OF PRESENT ILLNESS:      The patient is a 64 y.o. female who presented to the ED with complaints described as:      Location: dislodged PEG  Quality: n/a  Severity: n/a  Duration: reportedly happened yesterday  Timing: as above  Context: had PEG placed May 2024  Modifying factors: n/a  Associated signs and symptoms: n/a    Pt presented to ED yesterday.    Attempted bedside replacement which was unsuccessful.    C/s placed to Gen Surg.       Past Medical History:    Past Medical History:   Diagnosis Date    Anxiety disorder     Back pain, chronic     Cerebral palsy (HCC)     COPD (chronic obstructive pulmonary disease) (HCC)     COVID-19 10/12/2021    CVA (cerebral infarction) 2014 x2    Diabetes mellitus (HCC)     Diverticulosis     Epilepsy (HCC)     GERD (gastroesophageal reflux disease)     Hyperlipidemia     Hypertension     Insomnia     Iron (Fe) deficiency anemia     Mental disability     Seizures (HCC)     Unspecified cerebral artery occlusion with cerebral infarction        Past Surgical History:    Past Surgical History:   Procedure Laterality Date    CT GUIDED CHEST TUBE  7/15/2024    CT GUIDED CHEST TUBE 7/15/2024 [facility] CT SCAN    GASTROSTOMY TUBE PLACEMENT      UPPER GASTROINTESTINAL ENDOSCOPY N/A 5/3/2024    ESOPHAGOGASTRODUODENOSCOPY PERCUTANEOUS ENDOSCOPIC GASTROSTOMY TUBE PLACEMENT performed by Ashok Casas DO at [facility] ENDOSCOPY       Current Medications:   Current Facility-Administered Medications   Medication Dose Route Frequency Provider Last Rate Last Admin    atorvastatin (LIPITOR) tablet 40 mg  40 mg Oral Nightly Manolo Pepe MD busPIRone  and Isolation Panel [NHANES]     Frequency of Communication with Friends and Family: Three times a week     Frequency of Social Gatherings with Friends and Family: Three times a week     Attends Bahai Services: Never     Active Member of Clubs or Organizations: No     Attends Club or Organization Meetings: Never     Marital Status: Never    Housing Stability: Low Risk  (7/20/2024)    Housing Stability Vital Sign     Unable to Pay for Housing in the Last Year: No     Number of Places Lived in the Last Year: 1     Unstable Housing in the Last Year: No       Family History:   Family History   Problem Relation Age of Onset    Breast Cancer Neg Hx        REVIEW OFSYSTEMS:    Review of Systems   Gastrointestinal:  Positive for abdominal pain (only with pressing around the previous G tube site).   All other systems reviewed and are negative.      PHYSICAL EXAM:  Vitals:    03/06/25 0502 03/06/25 0532 03/06/25 0601 03/06/25 0702   BP: (!) 142/64 (!) 140/58 (!) 153/63 (!) 156/76   Pulse: 73 72 78 78   Resp: 18 17 20 24   Temp:       TempSrc:       SpO2: 97% 99% 94% 95%   Weight:       Height:           Physical Exam  Constitutional:       General: She is not in acute distress.  HENT:      Head: Normocephalic and atraumatic.      Nose: Nose normal.   Eyes:      General:         Right eye: No discharge.         Left eye: No discharge.      Extraocular Movements: Extraocular movements intact.   Cardiovascular:      Rate and Rhythm: Normal rate.   Pulmonary:      Effort: Pulmonary effort is normal.   Abdominal:      Palpations: Abdomen is soft.      Comments: Previus G tube site has dressing over   Musculoskeletal:         General: No swelling.   Neurological:      Mental Status: She is alert. Mental status is at baseline.           DATA:    CBC:   Lab Results   Component Value Date/Time    WBC 5.4 03/06/2025 05:43 AM    RBC 3.47 03/06/2025 05:43 AM    HGB 11.3 03/06/2025 05:43 AM    HCT 34.4 03/06/2025 05:43 AM

## 2025-03-06 NOTE — ED NOTES
ED TO INPATIENT SBAR HANDOFF    Patient Name: Jm Garcia   :  1960  64 y.o.   Preferred Name  Jm  Family/Caregiver Present no   Restraints no   C-SSRS: Risk of Suicide: No Risk  Sitter no   Sepsis Risk Score        Situation  Chief Complaint   Patient presents with    Feeding Tube Problem     Pulled out and staff at facility unable to get in      Brief Description of Patient's Condition:  Pt presents to the emergency department for G-tube replacement.  Patient pulled out her G-tube at facility today.  Per report they were unable to get patient G-tube back in. Pt is baseline, AxO x 1, in NAD on assessment.  Mental Status: AxO x 1 at baseline  Arrived from: nursing home    Imaging:   No orders to display     Abnormal labs:   Abnormal Labs Reviewed   CBC WITH AUTO DIFFERENTIAL - Abnormal; Notable for the following components:       Result Value    RBC 3.83 (*)     Hemoglobin 12.4 (*)     .3 (*)     MCH 32.4 (*)     Monocytes % 11 (*)     Eosinophils % 4 (*)     All other components within normal limits   COMPREHENSIVE METABOLIC PANEL - Abnormal; Notable for the following components:    Sodium 123 (*)     Chloride 91 (*)     Creatinine 0.3 (*)     Total Protein 6.0 (*)     Albumin 3.0 (*)     Albumin/Globulin Ratio 1.0 (*)     ALT 9 (*)     All other components within normal limits   COMPREHENSIVE METABOLIC PANEL W/ REFLEX TO MG FOR LOW K - Abnormal; Notable for the following components:    Sodium 128 (*)     Chloride 96 (*)     BUN 6 (*)     Creatinine 0.3 (*)     Total Protein 5.4 (*)     Albumin 2.9 (*)     All other components within normal limits   CBC WITH AUTO DIFFERENTIAL - Abnormal; Notable for the following components:    RBC 3.47 (*)     Hemoglobin 11.3 (*)     Hematocrit 34.4 (*)     MCH 32.6 (*)     Monocytes % 9 (*)     Immature Granulocytes % 1 (*)     All other components within normal limits   VITAMIN B12 & FOLATE - Abnormal; Notable for the following components:    Vitamin B-12

## 2025-03-06 NOTE — H&P
History and Physical      Name:  Jm Garcia /Age/Sex: 1960  (64 y.o. female)   MRN & CSN:  [id number] & [id number] Encounter Date/Time: 3/5/2025 7:57 PM   Location:  ED22/ED-22 PCP: Yuly Bro APRN - CNP       Hospital Day: 1    Assessment and Plan:     Patient is a 64 y.o. female who presented with dislodged G-tube.     # Dislodged gastrotomy tube  - Reported that patient pulled out G-tube at nursing facility around afternoon 3/4, unable to replace G-tube, thus sent to ED. Also unable to be replaced in ED.   - ED provider discussed with GSx, follow-up. Keep NPO for now.     # Hyponatremia  - Initial Na of 123, likely in setting of dehydration.   - Studies ordered, monitor labs with gentle NS.     # Cerebral palsy   # MRDD  - Resides in LTC, has legal guardians, requires one-to-one assistance with meals previously.    # Seizure disorder  - Continue carbamazepine, Depakote and Keppra.   - Seizure precautions.    # Depression and anxiety  - Continue Remeron and BuSpar.    # Essential hypertension  - Continue lisinopril.    # Mixed hyperlipidemia  - Continue Lipitor.    # Acquired hypothyroidism  - Continue Synthroid.  - Follow-up repeat TSH.    # Hx of SDH in 2015     Checklist:  Advanced care planning: full  Diet: NPO    VTE ppx: Lovenox (held pending surgical evaluation)    Disposition: place in observation.  Estimated discharge: 1-2 day(s).  Current living situation: LTC.    Expected disposition: LTC.      Spoke with ED provider who recommended admission for the patient and I agree with that plan.  Personally reviewed lab studies and imaging.  EKG interpreted personally and results as stated above.  Imaging that was interpreted personally and results as stated above.    History of Present Illness:     Chief Complaint: G-tube displacement    Patient is a 64 y.o. female with a PMHx as above who presented to the ED with G-tube displacement. Reported that patient pulled out G-tube at nursing  \"LYMPHSABS\", \"EOSABS\", \"BASOSABS\" in the last 72 hours.    Invalid input(s): \"SEGSPCTL\", \"ATYLMREL\"  CMP:  No results for input(s): \"NA\", \"K\", \"CL\", \"CO2\", \"BUN\", \"CREATININE\", \"GFRAA\", \"GLUCOSE\", \"LABALBU\", \"CALCIUM\", \"BILITOT\", \"ALKPHOS\", \"AST\", \"ALT\" in the last 72 hours.  Lipids:   Lab Results   Component Value Date/Time    CHOL 127 02/29/2024 06:16 AM    HDL 42 02/29/2024 06:16 AM    TRIG 146 02/29/2024 06:16 AM     Hemoglobin A1C:   Lab Results   Component Value Date/Time    LABA1C 5.9 07/14/2024 05:54 PM     TSH: No results found for: \"TSH\"  Troponin:   Lab Results   Component Value Date/Time    TROPONINT <0.010 02/02/2023 12:30 PM    TROPONINT <0.010 12/27/2022 09:33 AM    TROPONINT <0.010 12/19/2022 03:30 PM     BNP: No results for input(s): \"PROBNP\" in the last 72 hours.  Lactic Acid: No results for input(s): \"LACTA\" in the last 72 hours.  UA:  Lab Results   Component Value Date/Time    NITRU NEGATIVE 02/02/2023 01:04 PM    COLORU YELLOW 02/02/2023 01:04 PM    PHUR 6.0 02/02/2023 01:04 PM    PHUR 6 04/05/2019 03:53 PM    WBCUA 21 01/23/2023 01:32 PM    RBCUA 28 01/23/2023 01:32 PM    MUCUS RARE 01/23/2023 01:32 PM    TRICHOMONAS NONE SEEN 01/23/2023 01:32 PM    YEAST RARE 08/11/2018 04:20 PM    BACTERIA NEGATIVE 01/23/2023 01:32 PM    CLARITYU CLEAR 02/02/2023 01:04 PM    SPECGRAV 1.020 04/05/2019 03:53 PM    LEUKOCYTESUR NEGATIVE 02/02/2023 01:04 PM    UROBILINOGEN 0.2 02/02/2023 01:04 PM    BILIRUBINUR NEGATIVE 02/02/2023 01:04 PM    BILIRUBINUR neg 04/05/2019 03:53 PM    BLOODU NEGATIVE 02/02/2023 01:04 PM    GLUCOSEU NEGATIVE 02/02/2023 01:04 PM    GLUCOSEU neg 04/05/2019 03:53 PM    KETUA TRACE 02/02/2023 01:04 PM     Urine Cultures:   Lab Results   Component Value Date/Time    LABURIN  08/28/2018 01:17 PM     <10,000 CFU/ml mixed skin/urogenital jaycob. No further workup    LABURIN 50,000 CFU/ml  Multiple Resistant Drug Organism   08/28/2018 01:17 PM     Blood Cultures: No results found for: \"BC\"  No

## 2025-03-06 NOTE — ANESTHESIA PRE PROCEDURE
risks discussed with patient and healthcare power of .    Use of blood products discussed with healthcare power of  whom consented to blood products.    Plan discussed with CRNA.    Attending anesthesiologist reviewed and agrees with Preprocedure content              INOCENCIO Cruz - CRNA   3/6/2025

## 2025-03-06 NOTE — CARE COORDINATION
FRACISCO received a call from Erika/Guardian through [employer], she is aware pt is to have surgery to reinsert her feeding tube, Pt is admitted but no inpatient room establish as of yet. Erika states she will update pt brother. Erika/SELENA had no other needs, Consent has been signed for pt to go to surgery.    FRACISCO updated MARTA Benites/FRACISCO who has been following this pt.

## 2025-03-06 NOTE — CARE COORDINATION
Chart reviewed, pt has [employer] guardian. Attempted to contact guardian, however office hours are 9a-5p. Will attempt to call again later. Pt is from [facility], CM confirmed with Ny/Dm. Discharge plan is to return to [facility] at discharge. Will need to update and confirm with [employer] guardian.     0921 - Left  for Amanda Hall requesting call back to confirm discharge plan is okay to return to [facility].    03/06/25 0846   Service Assessment   Patient Orientation Unable to Assess   Cognition Severely Impaired   History Provided By Medical Record   Primary Caregiver Other (Comment)  ([facility] staff)   Support Systems Other (Comment)  (SELENA grajeda; [facility] staff)   Patient's Healthcare Decision Maker is: Named in Scanned ACP Document   PCP Verified by CM Yes   Prior Functional Level Assistance with the following:   Current Functional Level Assistance with the following:   Can patient return to prior living arrangement Yes   Ability to make needs known: Unable   Family able to assist with home care needs: No   Would you like for me to discuss the discharge plan with any other family members/significant others, and if so, who? Yes  (SELENA grajeda)   Financial Resources Medicaid;Medicare   Community Resources ECF/Home Care   CM/SW Referral Other (see comment)  (Discharge planning)

## 2025-03-06 NOTE — ED NOTES
Informed/surgical consent signed by Dr Early with the legal guardian on the phone. Nickie VALENTE and marlo RN signed the consent form as witnesses

## 2025-03-06 NOTE — ED NOTES
[facility] home contacted about when pt's G-tube was removed. RN @ [facility] states there is no overnight/morning documentation in regards to pt's continuous tube feed and when the morning RN and nurses aid went in at 1200 \"the feeding tube was out and there was an undetermined amount of tube feed all over the bed and the pt.\" LIP notified of discussion with [facility] with [facility] staff.

## 2025-03-06 NOTE — ED NOTES
The following labs were labeled with appropriate pt sticker and tubed to lab:     [x] Blue     [x] Lavender   [] on ice  [x] Green/yellow x2  [] Green/black [] on ice  [] Grey  [] on ice  [] Yellow  [x] Red  [] Pink  [] Type/ Screen  [] ABG  [] VBG    [] COVID-19 swab    [] Rapid  [] PCR  [] Flu swab  [] Peds Viral Panel     [] Urine Sample  [] Fecal Sample  [] Pelvic Cultures  [] Blood Cultures  [] X 2  [] STREP Cultures  [] Wound Cultures

## 2025-03-06 NOTE — PROGRESS NOTES
V2.0  [facility] Hospitalist Progress Note      Name:  Jm Garcia /Age/Sex: 1960  (64 y.o. female)   MRN & CSN:  [id number] & [id number] Encounter Date/Time: 3/6/2025 11:45 AM EST    Location:  ED15/ED-15 PCP: Yuly Bro, INOCENCIO Claudio CNP       Hospital Day: 2    Assessment and Plan:   Jm Garcia is a 64 y.o. female with pmh of cerebral palsy, MRDD, seizure disorder, hypertension, depression who presents with Dislodged gastrostomy tube      Plan:  Dislodged PEG tube: General Surgery consulted plan for EGD with PEG tube replacement later today  Hyponatremia: Initially 123 improved to 128 likely secondary to decreased p.o. intake  History of MRDD and cerebral palsy  Seizure disorder: Continue home meds  Mood disorder: Continue remeron and BuSpar  HTN: continue lisinopril    Diet Diet NPO Exceptions are: Ice Chips, Sips of Water with Meds   DVT Prophylaxis [] Lovenox, []  Heparin, [] SCDs, [] Ambulation,  [] Eliquis, [] Xarelto  [] Coumadin   Code Status Full Code   Disposition From: LTC  Expected Disposition: same  Estimated Date of Discharge: 1 day  Patient requires continued admission due to Dislodged peg tube   Surrogate Decision Maker/ POA      Subjective:     Chief Complaint: Feeding Tube Problem (Pulled out and staff at facility unable to get in )       Jm Garcia is a 64 y.o. female who presents with dislodged PEG tube.  Seen and examined at bedside denies any active complaints doing well answers question by nodding the head         Review of Systems:    Review of Systems  Limited due to mental status    Objective:   No intake or output data in the 24 hours ending 25 1145     Vitals:   Vitals:    25 1053   BP:    Pulse: 83   Resp: 26   Temp:    SpO2: 96%       Physical Exam:   Physical Exam     General: Awake.     HEENT: PERRLA. Vision grossly intact. Normal hearing. Oropharynx clear, dry MM.  Neck: Supple. No JVD.   CV: RRR. NL S1/S2.No BLE pitting edema.   Pulm: NL effort on RA. CTAB.     Hemoglobin 11.3 (L) 12.5 - 16.0 g/dL    Hematocrit 34.4 (L) 37.0 - 47.0 %    MCV 99.1 78.0 - 100.0 fL    MCH 32.6 (H) 27.0 - 31.0 pg    MCHC 32.8 32.0 - 36.0 g/dL    RDW 13.0 11.7 - 14.9 %    Platelets 213 140 - 440 k/uL    MPV 8.2 7.5 - 11.1 fL    Neutrophils % 45 36 - 66 %    Lymphocytes % 42 24 - 44 %    Monocytes % 9 (H) 0 - 4 %    Eosinophils % 3 0 - 3 %    Basophils % 1 0 - 1 %    Immature Granulocytes % 1 (H) 0 %    Neutrophils Absolute 2.43 k/uL    Lymphocytes Absolute 2.26 k/uL    Monocytes Absolute 0.47 k/uL    Eosinophils Absolute 0.18 k/uL    Basophils Absolute 0.03 k/uL    Immature Granulocytes Absolute 0.03 k/uL   Vitamin B12 & Folate    Collection Time: 03/06/25  5:43 AM   Result Value Ref Range    Vitamin B-12 1922 (H) 211 - 911 pg/mL    Folate 22.7 4.8 - 24.2 ng/mL   Sodium, urine, random    Collection Time: 03/06/25  5:50 AM   Result Value Ref Range    Sodium, Ur 28 (L) 40 - 220 mmol/L   Osmolality, Urine    Collection Time: 03/06/25  5:50 AM   Result Value Ref Range    Osmolality, Ur 177 (L) 292 - 1090 mOsm/kg   Urinalysis    Collection Time: 03/06/25  5:50 AM   Result Value Ref Range    Color, UA Yellow Yellow    Turbidity UA Clear Clear    Glucose, Ur NEGATIVE NEGATIVE mg/dL    Bilirubin, Urine NEGATIVE NEGATIVE    Ketones, Urine NEGATIVE NEGATIVE mg/dL    Specific Gravity, UA 1.010 1.005 - 1.030    Urine Hgb NEGATIVE NEGATIVE    pH, Urine 7.0 5.0 - 8.0    Protein, UA NEGATIVE NEGATIVE mg/dL    Urobilinogen, Urine 0.2 0.0 - 1.0 EU/dL    Nitrite, Urine NEGATIVE NEGATIVE    Leukocyte Esterase, Urine NEGATIVE NEGATIVE    Comment       Microscopic exam not performed based on chemical results unless requested in original order.        Imaging/Diagnostics Last 24 Hours   No results found.    Electronically signed by Sofi Osborne MD on 3/6/2025 at 11:45 AM

## 2025-03-07 ENCOUNTER — ANESTHESIA (OUTPATIENT)
Dept: ENDOSCOPY | Age: 65
End: 2025-03-07
Payer: COMMERCIAL

## 2025-03-07 PROCEDURE — 3700000000 HC ANESTHESIA ATTENDED CARE: Performed by: SURGERY

## 2025-03-07 PROCEDURE — 6370000000 HC RX 637 (ALT 250 FOR IP): Performed by: STUDENT IN AN ORGANIZED HEALTH CARE EDUCATION/TRAINING PROGRAM

## 2025-03-07 PROCEDURE — 6360000002 HC RX W HCPCS: Performed by: NURSE ANESTHETIST, CERTIFIED REGISTERED

## 2025-03-07 PROCEDURE — 43246 EGD PLACE GASTROSTOMY TUBE: CPT | Performed by: SURGERY

## 2025-03-07 PROCEDURE — 99231 SBSQ HOSP IP/OBS SF/LOW 25: CPT | Performed by: SURGERY

## 2025-03-07 PROCEDURE — G0378 HOSPITAL OBSERVATION PER HR: HCPCS

## 2025-03-07 PROCEDURE — 6360000002 HC RX W HCPCS: Performed by: STUDENT IN AN ORGANIZED HEALTH CARE EDUCATION/TRAINING PROGRAM

## 2025-03-07 PROCEDURE — 96374 THER/PROPH/DIAG INJ IV PUSH: CPT

## 2025-03-07 PROCEDURE — 94761 N-INVAS EAR/PLS OXIMETRY MLT: CPT

## 2025-03-07 PROCEDURE — 3700000001 HC ADD 15 MINUTES (ANESTHESIA): Performed by: SURGERY

## 2025-03-07 PROCEDURE — 2580000003 HC RX 258: Performed by: NURSE ANESTHETIST, CERTIFIED REGISTERED

## 2025-03-07 PROCEDURE — 2709999900 HC NON-CHARGEABLE SUPPLY: Performed by: SURGERY

## 2025-03-07 PROCEDURE — 3609013300 HC EGD TUBE PLACEMENT: Performed by: SURGERY

## 2025-03-07 PROCEDURE — 6370000000 HC RX 637 (ALT 250 FOR IP): Performed by: SURGERY

## 2025-03-07 PROCEDURE — 94640 AIRWAY INHALATION TREATMENT: CPT

## 2025-03-07 RX ORDER — LIDOCAINE HYDROCHLORIDE 20 MG/ML
INJECTION, SOLUTION INFILTRATION; PERINEURAL
Status: DISCONTINUED | OUTPATIENT
Start: 2025-03-07 | End: 2025-03-07 | Stop reason: SDUPTHER

## 2025-03-07 RX ORDER — SODIUM CHLORIDE 0.9 % (FLUSH) 0.9 %
5-40 SYRINGE (ML) INJECTION EVERY 12 HOURS SCHEDULED
Status: DISCONTINUED | OUTPATIENT
Start: 2025-03-07 | End: 2025-03-07 | Stop reason: HOSPADM

## 2025-03-07 RX ORDER — SODIUM CHLORIDE, SODIUM LACTATE, POTASSIUM CHLORIDE, CALCIUM CHLORIDE 600; 310; 30; 20 MG/100ML; MG/100ML; MG/100ML; MG/100ML
INJECTION, SOLUTION INTRAVENOUS
Status: DISCONTINUED | OUTPATIENT
Start: 2025-03-07 | End: 2025-03-07 | Stop reason: SDUPTHER

## 2025-03-07 RX ORDER — LABETALOL HYDROCHLORIDE 5 MG/ML
10 INJECTION, SOLUTION INTRAVENOUS ONCE
Status: COMPLETED | OUTPATIENT
Start: 2025-03-07 | End: 2025-03-07

## 2025-03-07 RX ORDER — SODIUM CHLORIDE 9 MG/ML
INJECTION, SOLUTION INTRAVENOUS PRN
Status: DISCONTINUED | OUTPATIENT
Start: 2025-03-07 | End: 2025-03-07 | Stop reason: HOSPADM

## 2025-03-07 RX ORDER — LEVETIRACETAM 500 MG/5ML
1500 INJECTION, SOLUTION, CONCENTRATE INTRAVENOUS EVERY 12 HOURS
Status: DISCONTINUED | OUTPATIENT
Start: 2025-03-07 | End: 2025-03-08 | Stop reason: HOSPADM

## 2025-03-07 RX ORDER — PROPOFOL 10 MG/ML
INJECTION, EMULSION INTRAVENOUS
Status: DISCONTINUED | OUTPATIENT
Start: 2025-03-07 | End: 2025-03-07 | Stop reason: SDUPTHER

## 2025-03-07 RX ORDER — SODIUM CHLORIDE 0.9 % (FLUSH) 0.9 %
5-40 SYRINGE (ML) INJECTION PRN
Status: DISCONTINUED | OUTPATIENT
Start: 2025-03-07 | End: 2025-03-07 | Stop reason: HOSPADM

## 2025-03-07 RX ADMIN — LEVOTHYROXINE SODIUM 50 MCG: 0.05 TABLET ORAL at 05:22

## 2025-03-07 RX ADMIN — IPRATROPIUM BROMIDE AND ALBUTEROL SULFATE 1 DOSE: .5; 2.5 SOLUTION RESPIRATORY (INHALATION) at 15:18

## 2025-03-07 RX ADMIN — IPRATROPIUM BROMIDE AND ALBUTEROL SULFATE 1 DOSE: .5; 2.5 SOLUTION RESPIRATORY (INHALATION) at 11:33

## 2025-03-07 RX ADMIN — PROPOFOL 100 MG: 10 INJECTION, EMULSION INTRAVENOUS at 07:53

## 2025-03-07 RX ADMIN — PROPOFOL 50 MG: 10 INJECTION, EMULSION INTRAVENOUS at 07:49

## 2025-03-07 RX ADMIN — IPRATROPIUM BROMIDE AND ALBUTEROL SULFATE 1 DOSE: .5; 2.5 SOLUTION RESPIRATORY (INHALATION) at 22:36

## 2025-03-07 RX ADMIN — SODIUM CHLORIDE, POTASSIUM CHLORIDE, SODIUM LACTATE AND CALCIUM CHLORIDE: 600; 310; 30; 20 INJECTION, SOLUTION INTRAVENOUS at 07:49

## 2025-03-07 RX ADMIN — LABETALOL HYDROCHLORIDE 10 MG: 5 INJECTION, SOLUTION INTRAVENOUS at 05:40

## 2025-03-07 RX ADMIN — LEVETIRACETAM 1500 MG: 500 INJECTION INTRAVENOUS at 13:07

## 2025-03-07 RX ADMIN — LIDOCAINE HYDROCHLORIDE 150 MG: 20 INJECTION, SOLUTION INFILTRATION; PERINEURAL at 07:49

## 2025-03-07 ASSESSMENT — PAIN SCALES - WONG BAKER: WONGBAKER_NUMERICALRESPONSE: NO HURT

## 2025-03-07 ASSESSMENT — ENCOUNTER SYMPTOMS: ABDOMINAL PAIN: 1

## 2025-03-07 NOTE — CARE COORDINATION
Reviewed chart, discussed in IDR, and plan is to restart tube feed tomorrow, if tolerated will send back to LTC bed at [facility].   Called and updated Ny in [facility] admission and pt's [facility]   Guardian Amanda about possible d/c this weekend.         Discharge nurse, please all [facility] pt's legal Guardian at [phone] and inform them of pt's discharge .  Also Pt returning/new to [facility] at discharge. Please call report to [phone] and fax orders, AVS, and discharge summaries to [phone].

## 2025-03-07 NOTE — OP NOTE
Operative Report      Patient ID:  Jm Garcia  [id number]  64 y.o.  1960    Pre-operative Diagnosis: 1. Dislodged PEG (unable to be replaced at bedside)    Post-operative Diagnosis: Same as above    Procedure:   1. Percutaneous endoscopic gastrostomy (PEG) placement     Surgeon: Everardo Early MD, FACS    Assistant: N/A    Findings:  Consistent with post-operative diagnosis    Estimated Blood Loss:  Less than 5 mL           Total IV Fluids: Per anesthesia records           Complications:  None; patient tolerated the procedure well.           Disposition: PACU - hemodynamically stable.    Procedure Details:    After discussing the procedure with the patient's family--including the risks, benefits, and alternatives--and informed consent was obtained.     The patient was transported to the endoscopy room and positioned appropriately.      Sedation was started without complication.     An approved timeout was held with all members of the operating room team present and in agreement.     A mouthpiece was placed and the endoscope was passed into the patient's mouth, through the oropharynx and hypopharynx, and into the esophagus without difficulty. The endoscope was passed into the stomach. No abnormalities were noted.      The stomach was insufflated with air and the endoscope positioned  in the midportion, directed towards the anterior abdominal wall. The stomach was transilluminated and an optimal position for the PEG tube was identified using the single poke method.    A polypectomy snare was passed into the stomach via the working channel of the endoscope, opened fully, and positioned so that the loop encircled the point of demonstrated finger indentation.     The overlying abdominal skin was anesthetized with lidocaine and a 1 cm incision was made at the chosen site. An introducer needle with overlying catheter was passed through this incision and into the stomach under visualization with the endoscope. The

## 2025-03-07 NOTE — PROGRESS NOTES
V2.0  [facility] [place] [facility] Hospitalist Progress Note      Name:  Jm Garcia /Age/Sex: 1960  (64 y.o. female)   MRN & CSN:  [id number] & [id number] Encounter Date/Time: 3/7/2025 11:45 AM EST    Location:  [address] [place] [address] PCP: Yuly Bro APRN - CNP       Hospital Day: 3    Assessment and Plan:   Jm Garcia is a 64 y.o. female with pmh of cerebral palsy, MRDD, seizure disorder, hypertension, depression who presents with Dislodged gastrostomy tube      Plan:  Dislodged PEG tube: General Surgery consulted underwent EGD with PEG tube placement this morning   hyponatremia: Initially 123 improved to 128 likely secondary to decreased p.o. intake  History of MRDD and cerebral palsy  Seizure disorder: Continue home meds  Mood disorder: Continue remeron and BuSpar  HTN: continue lisinopril    Diet Diet NPO   DVT Prophylaxis [] Lovenox, []  Heparin, [] SCDs, [] Ambulation,  [] Eliquis, [] Xarelto  [] Coumadin   Code Status Full Code   Disposition From: [facility]  Expected Disposition: same  Estimated Date of Discharge: 1 day  Patient requires continued admission due to Dislodged peg tube   Surrogate Decision Maker/ POA      Subjective:     Chief Complaint: Feeding Tube Problem (Pulled out and staff at facility unable to get in )       Jm Garcia is a 64 y.o. female who presents with dislodged PEG tube.  Seen and examined at bedside denies any active complaints doing well answers question by nodding the head         Review of Systems:    Review of Systems  Limited due to mental status    Objective:     Intake/Output Summary (Last 24 hours) at 3/7/2025 1111  Last data filed at 3/7/2025 0758  Gross per 24 hour   Intake 100 ml   Output --   Net 100 ml        Vitals:   Vitals:    25 0844   BP: (!) 174/83   Pulse: 72   Resp: 18   Temp: 97.9 °F (36.6 °C)   SpO2: 100%       Physical Exam:   Physical Exam     General: Awake.     HEENT: PERRLA. Vision grossly intact. Normal hearing. Oropharynx clear, dry MM.  Neck: Supple. No JVD.

## 2025-03-07 NOTE — PROGRESS NOTES
Spiritual Health History and Assessment/Progress Note  [facility]    Spiritual/Emotional Needs,  ,  ,      Name: Jm Garcia MRN: [id number]    Age: 64 y.o.     Sex: female   Language: English   Synagogue: None   Dislodged gastrostomy tube     Date: 3/7/2025            Total Time Calculated: 10 min              Spiritual Assessment began in [facility]        Referral/Consult From: Patient, Nurse   Encounter Overview/Reason: Spiritual/Emotional Needs  Service Provided For: Patient    Allison, Belief, Meaning:   Patient unable to assess at this time  Family/Friends No family/friends present      Importance and Influence:  Patient unable to assess at this time  Family/Friends No family/friends present    Community:  Patient Other: unable to assess  Family/Friends No family/friends present    Assessment and Plan of Care:     Patient Interventions include: Other: prayed with pt in response to pt and nurse's request  Family/Friends Interventions include: No family/friends present    Patient Plan of Care: Other: continue to visit as needed  Family/Friends Plan of Care: No family/friends present    Pt is non-verbal and has developmental disabilities. Felicity Bean prayed with the pt in response to nurse's request. Will continue to visit and support as needed/requested.    Electronically signed by Chaplain Armen on 3/7/2025 at 2:37 PM

## 2025-03-07 NOTE — PROGRESS NOTES
Comprehensive Nutrition Assessment    Type and Reason for Visit:  Initial, Positive nutrition screen (Hx of PEG)    Nutrition Recommendations/Plan:   Recommend EN: Glucerna 1.5/Diabetic formula of 237 mL 4 times daily or Q 6 Hour with FWF of 90 mL flush before and after bolus. If pt with risk of aspiration, recommend continuous feed of Glucerna 1.5/Diabetic formula at goal rate of 40 ml/hr with 120 ml flush Q 4 H.  Consult dietitian once PEG is placed and ready for use.   Consider IVF/dextrose solution if PEG is unable to be re-inserted within 48 hours.     Malnutrition Assessment:  Malnutrition Status:  Insufficient data (03/07/25 1333)    Context:  Social/Environmental Circumstances       Nutrition Assessment:    Admitted with dislodged G tube. Pt hx of dysphagia with significant wt loss from 1/2024 to 5/20254. G tube was placed on 5/2024, hx of tolerating Glucerna 1.5 formula 237 mL 4 times daily on bolus feeds. Plans for Endoscopy with PEG tube placement per general surgery. RD without TF order and mgt at this time, will revisit pt over the weekend. High nutrition risk.    Nutrition Related Findings:    A1c 5.9 not current, Vit B 12 1922, Na 128, Cr 0.3, BUN 6 +depakote, keppra, synthroid, remeron Wound Type: None     Past Medical History:   Diagnosis Date    Anxiety disorder     Back pain, chronic     Cerebral palsy (HCC)     COPD (chronic obstructive pulmonary disease) (HCC)     COVID-19 10/12/2021    CVA (cerebral infarction) 2014 x2    Diabetes mellitus (HCC)     Diverticulosis     Epilepsy (HCC)     GERD (gastroesophageal reflux disease)     Hyperlipidemia     Hypertension     Insomnia     Iron (Fe) deficiency anemia     Mental disability     Seizures (HCC)     Unspecified cerebral artery occlusion with cerebral infarction      Past Surgical History:   Procedure Laterality Date    CT GUIDED CHEST TUBE  7/15/2024    CT GUIDED CHEST TUBE 7/15/2024 [facility] CT SCAN    GASTROSTOMY TUBE PLACEMENT      UPPER    Too soon to determine     Rashaun Ansari RD, LD  Contact: [phone]

## 2025-03-07 NOTE — PROGRESS NOTES
General Surgery-Dr. Early    [facility] Day: 3    Chief Complaint on Admission: dislodged feeding tube      Subjective:     Transferred from ED to floor.  No new events.           ROS:  Review of Systems   Gastrointestinal:  Positive for abdominal pain (if push near feeding tube site).   All other systems reviewed and are negative.      Allergies  Macrobid [nitrofurantoin]          Diagnosis Date    Anxiety disorder     Back pain, chronic     Cerebral palsy (HCC)     COPD (chronic obstructive pulmonary disease) (HCC)     COVID-19 10/12/2021    CVA (cerebral infarction) 2014 x2    Diabetes mellitus (HCC)     Diverticulosis     Epilepsy (HCC)     GERD (gastroesophageal reflux disease)     Hyperlipidemia     Hypertension     Insomnia     Iron (Fe) deficiency anemia     Mental disability     Seizures (HCC)     Unspecified cerebral artery occlusion with cerebral infarction        Objective:     Vitals:    25 0612   BP: (!) 171/77   Pulse:    Resp:    Temp:    SpO2:        TEMPERATURE:  Current -Temp: 98.6 °F (37 °C); Max - Temp  Av.3 °F (36.8 °C)  Min: 98.1 °F (36.7 °C)  Max: 98.6 °F (37 °C)    No intake/output data recorded.No intake/output data recorded.      Physical Exam:  Physical Exam  Vitals reviewed.   Constitutional:       General: She is not in acute distress.  HENT:      Head: Normocephalic and atraumatic.      Right Ear: External ear normal.      Left Ear: External ear normal.   Eyes:      General:         Right eye: No discharge.         Left eye: No discharge.      Extraocular Movements: Extraocular movements intact.   Cardiovascular:      Rate and Rhythm: Normal rate.   Abdominal:      Palpations: Abdomen is soft.      Comments: Prior feeding tube site closing   Skin:     General: Skin is warm.   Neurological:      Mental Status: She is alert. Mental status is at baseline.           Scheduled Meds:   atorvastatin  40 mg Oral Nightly    busPIRone  10 mg Oral BID    carBAMazepine  300 mg Oral BID     divalproex  1,000 mg Oral BID RT    ferrous sulfate  325 mg Oral Daily    ipratropium 0.5 mg-albuterol 2.5 mg  1 Dose Inhalation Q4H WA RT    levETIRAcetam  1,500 mg Oral BID    levothyroxine  50 mcg Oral Daily    lisinopril  40 mg Oral Daily    mirtazapine  15 mg Oral Nightly    sodium chloride flush  5-40 mL IntraVENous 2 times per day    [Held by provider] enoxaparin  30 mg SubCUTAneous QPM     ContinuousInfusions:   lactated ringers 50 mL/hr at 03/06/25 1554    sodium chloride       PRN Meds:fluticasone, sodium chloride flush, sodium chloride, potassium chloride, magnesium sulfate, ondansetron **OR** ondansetron, melatonin, acetaminophen **OR** acetaminophen      Labs/Imaging Results:   Lab Results   Component Value Date    WBC 5.4 03/06/2025    HGB 11.3 (L) 03/06/2025    HCT 34.4 (L) 03/06/2025    MCV 99.1 03/06/2025     03/06/2025     Lab Results   Component Value Date     (L) 03/06/2025    K 3.8 03/06/2025    CL 96 (L) 03/06/2025    CO2 23 03/06/2025    BUN 6 (L) 03/06/2025    CREATININE 0.3 (L) 03/06/2025    GLUCOSE 99 03/06/2025    CALCIUM 8.8 03/06/2025    BILITOT 0.3 03/06/2025    ALKPHOS 95 03/06/2025    AST 23 03/06/2025    ALT 10 03/06/2025    LABGLOM >90 03/06/2025    GFRAA >60 10/13/2022       Assessment:       65 y/o F with dislodged feeding tube    Plan:       -to endo today for PEG  -NPO. IVF.  -consent obtained. Reviewed in detail with the patient and/or family the expected pre-operative, operative, and post-operative courses including risks, benefits, and alternatives to the procedure. The patient's questions were answered in detail and agreed to proceed with the procedure.   -Ancef ordered.         Electronically signed by Everardo Early II, MD on 3/7/2025 at 6:50 AM

## 2025-03-07 NOTE — ANESTHESIA POSTPROCEDURE EVALUATION
Department of Anesthesiology  Postprocedure Note    Patient: Jm Garcia  MRN: [id number]  YOB: 1960  Date of evaluation: 3/7/2025    Procedure Summary       Date: 03/07/25 Room / Location: [place] [address] / [facility]    Anesthesia Start: 0736 Anesthesia Stop: 0805    Procedure: ESOPHAGOGASTRODUODENOSCOPY PERCUTANEOUS ENDOSCOPIC GASTROSTOMY TUBE PLACEMENT Diagnosis:       Malfunction of percutaneous endoscopic gastrostomy (PEG) tube (HCC)      (Malfunction of percutaneous endoscopic gastrostomy (PEG) tube (HCC) [K94.23])    Surgeons: Everardo Early II, MD Responsible Provider: Sierra Padron MD    Anesthesia Type: MAC ASA Status: 4            Anesthesia Type: No value filed.    Nikita Phase I:      Nikita Phase II:      Anesthesia Post Evaluation    Patient location during evaluation: bedside  Patient participation: complete - patient participated  Level of consciousness: awake and alert  Airway patency: patent  Nausea & Vomiting: no nausea and no vomiting  Cardiovascular status: blood pressure returned to baseline  Respiratory status: spontaneous ventilation, room air and acceptable  Hydration status: stable  Pain management: adequate    No notable events documented.

## 2025-03-07 NOTE — PLAN OF CARE
Problem: Safety - Adult  Goal: Free from fall injury  3/7/2025 0023 by Paola Jacobs LPN  Outcome: Progressing  3/6/2025 1511 by Shea Gagnon RN  Outcome: Progressing     Problem: Chronic Conditions and Co-morbidities  Goal: Patient's chronic conditions and co-morbidity symptoms are monitored and maintained or improved  3/7/2025 0023 by Paola Jacobs LPN  Outcome: Progressing  3/6/2025 1511 by Shea Gagnon RN  Outcome: Progressing     Problem: Discharge Planning  Goal: Discharge to home or other facility with appropriate resources  3/7/2025 0023 by Paola Jacobs LPN  Outcome: Progressing  3/6/2025 1511 by Shea Gagnon RN  Outcome: Progressing     Problem: Pain  Goal: Verbalizes/displays adequate comfort level or baseline comfort level  3/7/2025 0023 by Paola Jacobs LPN  Outcome: Progressing  3/6/2025 1511 by Shea Gagnon RN  Outcome: Progressing     Problem: Skin/Tissue Integrity  Goal: Skin integrity remains intact  Description: 1.  Monitor for areas of redness and/or skin breakdown  2.  Assess vascular access sites hourly  3.  Every 4-6 hours minimum:  Change oxygen saturation probe site  4.  Every 4-6 hours:  If on nasal continuous positive airway pressure, respiratory therapy assess nares and determine need for appliance change or resting period  3/7/2025 0023 by Poala Jacobs LPN  Outcome: Progressing  3/6/2025 1511 by Shea Gagnon, RN  Outcome: Progressing     Problem: ABCDS Injury Assessment  Goal: Absence of physical injury  3/7/2025 0023 by Paola Jacobs LPN  Outcome: Progressing  3/6/2025 1511 by Shea Gagnon, RN  Outcome: Progressing

## 2025-03-08 VITALS
WEIGHT: 108.91 LBS | BODY MASS INDEX: 22.86 KG/M2 | HEIGHT: 58 IN | RESPIRATION RATE: 16 BRPM | SYSTOLIC BLOOD PRESSURE: 145 MMHG | HEART RATE: 72 BPM | OXYGEN SATURATION: 95 % | TEMPERATURE: 98.1 F | DIASTOLIC BLOOD PRESSURE: 116 MMHG

## 2025-03-08 LAB
ANION GAP SERPL CALCULATED.3IONS-SCNC: 11 MMOL/L (ref 9–17)
BUN SERPL-MCNC: 4 MG/DL (ref 7–20)
CALCIUM SERPL-MCNC: 10.1 MG/DL (ref 8.3–10.6)
CHLORIDE SERPL-SCNC: 100 MMOL/L (ref 99–110)
CO2 SERPL-SCNC: 26 MMOL/L (ref 21–32)
CREAT SERPL-MCNC: 0.3 MG/DL (ref 0.6–1.2)
GFR, ESTIMATED: >90 ML/MIN/1.73M2
GLUCOSE SERPL-MCNC: 108 MG/DL (ref 74–99)
POTASSIUM SERPL-SCNC: 3.8 MMOL/L (ref 3.5–5.1)
SODIUM SERPL-SCNC: 136 MMOL/L (ref 136–145)

## 2025-03-08 PROCEDURE — 94640 AIRWAY INHALATION TREATMENT: CPT

## 2025-03-08 PROCEDURE — G0378 HOSPITAL OBSERVATION PER HR: HCPCS

## 2025-03-08 PROCEDURE — 99024 POSTOP FOLLOW-UP VISIT: CPT | Performed by: PHYSICIAN ASSISTANT

## 2025-03-08 PROCEDURE — 96376 TX/PRO/DX INJ SAME DRUG ADON: CPT

## 2025-03-08 PROCEDURE — 96375 TX/PRO/DX INJ NEW DRUG ADDON: CPT

## 2025-03-08 PROCEDURE — 94761 N-INVAS EAR/PLS OXIMETRY MLT: CPT

## 2025-03-08 PROCEDURE — 2500000003 HC RX 250 WO HCPCS: Performed by: SURGERY

## 2025-03-08 PROCEDURE — 80048 BASIC METABOLIC PNL TOTAL CA: CPT

## 2025-03-08 PROCEDURE — 36415 COLL VENOUS BLD VENIPUNCTURE: CPT

## 2025-03-08 PROCEDURE — 6370000000 HC RX 637 (ALT 250 FOR IP): Performed by: SURGERY

## 2025-03-08 PROCEDURE — 6360000002 HC RX W HCPCS: Performed by: STUDENT IN AN ORGANIZED HEALTH CARE EDUCATION/TRAINING PROGRAM

## 2025-03-08 PROCEDURE — APPNB15 APP NON BILLABLE TIME 0-15 MINS: Performed by: PHYSICIAN ASSISTANT

## 2025-03-08 RX ADMIN — SODIUM CHLORIDE, PRESERVATIVE FREE 5 ML: 5 INJECTION INTRAVENOUS at 10:30

## 2025-03-08 RX ADMIN — LEVETIRACETAM 1500 MG: 500 INJECTION INTRAVENOUS at 12:25

## 2025-03-08 RX ADMIN — SODIUM CHLORIDE, PRESERVATIVE FREE 10 ML: 5 INJECTION INTRAVENOUS at 01:00

## 2025-03-08 RX ADMIN — IPRATROPIUM BROMIDE AND ALBUTEROL SULFATE 1 DOSE: .5; 2.5 SOLUTION RESPIRATORY (INHALATION) at 15:08

## 2025-03-08 RX ADMIN — LEVETIRACETAM 1500 MG: 500 INJECTION INTRAVENOUS at 00:51

## 2025-03-08 RX ADMIN — BUSPIRONE HYDROCHLORIDE 10 MG: 5 TABLET ORAL at 10:28

## 2025-03-08 RX ADMIN — IPRATROPIUM BROMIDE AND ALBUTEROL SULFATE 1 DOSE: .5; 2.5 SOLUTION RESPIRATORY (INHALATION) at 07:39

## 2025-03-08 RX ADMIN — CARBAMAZEPINE 300 MG: 300 CAPSULE, EXTENDED RELEASE ORAL at 10:28

## 2025-03-08 RX ADMIN — LISINOPRIL 40 MG: 20 TABLET ORAL at 10:28

## 2025-03-08 RX ADMIN — IPRATROPIUM BROMIDE AND ALBUTEROL SULFATE 1 DOSE: .5; 2.5 SOLUTION RESPIRATORY (INHALATION) at 11:03

## 2025-03-08 RX ADMIN — FERROUS SULFATE TAB 325 MG (65 MG ELEMENTAL FE) 325 MG: 325 (65 FE) TAB at 10:28

## 2025-03-08 ASSESSMENT — PAIN SCALES - WONG BAKER: WONGBAKER_NUMERICALRESPONSE: NO HURT

## 2025-03-08 NOTE — PLAN OF CARE
Problem: Safety - Adult  Goal: Free from fall injury  3/8/2025 1015 by Katie Clements RN  Outcome: Progressing  3/8/2025 0322 by Levon Domínguez RN  Outcome: Progressing     Problem: Chronic Conditions and Co-morbidities  Goal: Patient's chronic conditions and co-morbidity symptoms are monitored and maintained or improved  3/8/2025 1015 by Katie Clements RN  Outcome: Progressing  3/8/2025 0322 by Levon Domínguez RN  Outcome: Progressing     Problem: Discharge Planning  Goal: Discharge to home or other facility with appropriate resources  3/8/2025 1015 by Katie Clements RN  Outcome: Progressing  3/8/2025 0322 by Levon Domínguez RN  Outcome: Progressing     Problem: Pain  Goal: Verbalizes/displays adequate comfort level or baseline comfort level  3/8/2025 1015 by Katie Clements RN  Outcome: Progressing  3/8/2025 0322 by Levon Domínguez RN  Outcome: Progressing     Problem: Skin/Tissue Integrity  Goal: Skin integrity remains intact  Description: 1.  Monitor for areas of redness and/or skin breakdown  2.  Assess vascular access sites hourly  3.  Every 4-6 hours minimum:  Change oxygen saturation probe site  4.  Every 4-6 hours:  If on nasal continuous positive airway pressure, respiratory therapy assess nares and determine need for appliance change or resting period  3/8/2025 1015 by Katie Clements RN  Outcome: Progressing  3/8/2025 0322 by Levon Domínguez RN  Outcome: Progressing     Problem: ABCDS Injury Assessment  Goal: Absence of physical injury  3/8/2025 1015 by Katie Clements RN  Outcome: Progressing  3/8/2025 0322 by Levon Domínguez RN  Outcome: Progressing     Problem: Nutrition Deficit:  Goal: Optimize nutritional status  3/8/2025 1015 by Katie Clements RN  Outcome: Progressing  3/8/2025 0322 by Levon Domínguez RN  Outcome: Progressing

## 2025-03-08 NOTE — DISCHARGE INSTR - COC
Continuity of Care Form    Patient Name: Jm Garcia   :  1960  MRN:  [id number]    Admit date:  3/5/2025  Discharge date:  ***    Code Status Order: Full Code   Advance Directives:     Admitting Physician:  Manolo Pepe MD  PCP: Yuly Bro, APRN - CNP    Discharging Nurse: ***  Discharging Hospital Unit/Room#: 4107/4107-A  Discharging Unit Phone Number: ***    Emergency Contact:   Extended Emergency Contact Information  Primary Emergency Contact: SELENA WALTERS  Address: [address] [place] [address]  Home Phone: [phone]  Mobile Phone: [phone]  Relation: Legal Guardian    Past Surgical History:  Past Surgical History:   Procedure Laterality Date    CT GUIDED CHEST TUBE  7/15/2024    CT GUIDED CHEST TUBE 7/15/2024 [facility] CT SCAN    GASTROSTOMY TUBE PLACEMENT      UPPER GASTROINTESTINAL ENDOSCOPY N/A 5/3/2024    ESOPHAGOGASTRODUODENOSCOPY PERCUTANEOUS ENDOSCOPIC GASTROSTOMY TUBE PLACEMENT performed by Ashok Casas DO at [facility] ENDOSCOPY    UPPER GASTROINTESTINAL ENDOSCOPY N/A 3/7/2025    ESOPHAGOGASTRODUODENOSCOPY PERCUTANEOUS ENDOSCOPIC GASTROSTOMY TUBE PLACEMENT performed by Everardo Early II, MD at [facility] ENDOSCOPY       Immunization History:   Immunization History   Administered Date(s) Administered    COVID-19, PFIZER PURPLE top, DILUTE for use, (age 12 y+), 30mcg/0.3mL 2021, 2021    Influenza Vaccine, unspecified formulation 10/21/2016    Influenza Virus Vaccine 2012, 10/01/2013, 2015    Influenza, FLUARIX, FLULAVAL, FLUZONE (age 6 mo+) and AFLURIA, (age 3 y+), Quadv PF, 0.5mL 10/23/2018, 2019, 10/19/2022    Pneumococcal Conjugate Vaccine 2011, 2015    Pneumococcal, PCV20, PREVNAR 20, (age 6w+), IM, 0.5mL 10/19/2022    Pneumococcal, PPSV23, PNEUMOVAX 23, (age 2y+), SC/IM, 0.5mL 2011, 2015       Active Problems:  Patient Active Problem List   Diagnosis Code    Pneumonia due

## 2025-03-08 NOTE — DISCHARGE SUMMARY
Attempted to call [employer] guardian at both numbers listed. Voicemail left due to no response. Recording stated office was closed. This nurse called report to nurse Hinton at [facility]. [facility] to transport at 1600

## 2025-03-08 NOTE — PROGRESS NOTES
General Surgery Progress Note      Hospital Day: 4    Chief Complaint on Admission: Dislodged Gastrostomy tube      Subjective:     Jm Garcia is a 64 y.o. female with 1 Day Post-Op PEG placement for dislodged gastrostomy tube. Tube was unable to be replaced at the bedside, so was replaced in Endoscopy. Patient does not verablize, but is able to nod that she has abd pain. Tolerating Diet NPO.      ROS:  Review of Systems   Unable to perform ROS: Patient nonverbal       Allergies  Macrobid [nitrofurantoin]          Diagnosis Date    Anxiety disorder     Back pain, chronic     Cerebral palsy (HCC)     COPD (chronic obstructive pulmonary disease) (HCC)     COVID-19 10/12/2021    CVA (cerebral infarction) 2014 x2    Diabetes mellitus (HCC)     Diverticulosis     Epilepsy (HCC)     GERD (gastroesophageal reflux disease)     Hyperlipidemia     Hypertension     Insomnia     Iron (Fe) deficiency anemia     Mental disability     Seizures (HCC)     Unspecified cerebral artery occlusion with cerebral infarction        Objective:     Vitals:    25 0739   BP:    Pulse: 77   Resp: 16   Temp:    SpO2: 93%       TEMPERATURE:  Current - Temp: 99.1 °F (37.3 °C); Max - Temp  Av.7 °F (37.1 °C)  Min: 98.2 °F (36.8 °C)  Max: 99.1 °F (37.3 °C)    No intake/output data recorded.I/O last 3 completed shifts:  In: 100 [I.V.:100]  Out: -       Physical Exam:  Physical Exam  Constitutional:       General: She is sleeping.   Abdominal:      Tenderness: There is abdominal tenderness.      Comments: PEG in place. Abd binder on to secure tube.    Neurological:      Mental Status: She is easily aroused.             Scheduled Meds:   levETIRAcetam  1,500 mg IntraVENous Q12H    atorvastatin  40 mg Oral Nightly    busPIRone  10 mg Oral BID    carBAMazepine  300 mg Oral BID    divalproex  1,000 mg Oral BID RT    ferrous sulfate  325 mg Oral Daily    ipratropium 0.5 mg-albuterol 2.5 mg  1 Dose Inhalation Q4H WA RT    [Held by provider]

## 2025-03-08 NOTE — PROGRESS NOTES
I did NOT see the patient. The patient was discussed with the KAREN. I was available for questions and consultation as needed.       PEG tube replaced yesterday. Starting TF today.

## 2025-03-08 NOTE — CARE COORDINATION
Chart reviewed. Pt notified by NP that pt can be discharged today on tube feeds. Bryson called Adrianne with [facility]. Adrianne stated that they already have tube feed for pt and that pt can return today. No other questions or needs stated at this time.     Bryson notified NP that pt can leave today.     Discharging RN: Please set up transport with [facility] at [phone]. Please call pt's guardian and notify them of transport. Please call [facility] with transport time. Place completed EDUARDO on pt's packet and give to [facility].

## 2025-03-08 NOTE — PLAN OF CARE
Problem: Safety - Adult  Goal: Free from fall injury  Outcome: Progressing     Problem: Chronic Conditions and Co-morbidities  Goal: Patient's chronic conditions and co-morbidity symptoms are monitored and maintained or improved  Outcome: Progressing     Problem: Discharge Planning  Goal: Discharge to home or other facility with appropriate resources  Outcome: Progressing     Problem: Pain  Goal: Verbalizes/displays adequate comfort level or baseline comfort level  Outcome: Progressing     Problem: Skin/Tissue Integrity  Goal: Skin integrity remains intact  Description: 1.  Monitor for areas of redness and/or skin breakdown  2.  Assess vascular access sites hourly  3.  Every 4-6 hours minimum:  Change oxygen saturation probe site  4.  Every 4-6 hours:  If on nasal continuous positive airway pressure, respiratory therapy assess nares and determine need for appliance change or resting period  Outcome: Progressing     Problem: ABCDS Injury Assessment  Goal: Absence of physical injury  Outcome: Progressing     Problem: Nutrition Deficit:  Goal: Optimize nutritional status  Outcome: Progressing

## 2025-03-08 NOTE — DISCHARGE SUMMARY
Discharge Summary    Name:  Jm Garcia /Age/Sex: 1960 (64 y.o. female)   Admit Date: 3/5/2025  Discharge Date: 3/8/25    MRN & CSN:  [id number] & [id number] Encounter Date and Time 3/8/25 1:15 PM EST    Attending:  Henny Almaguer MD Discharging Provider: Brook Castillo PA-C       Hospital Course:     Brief HPI:   Patient is a 64 y.o. female with a PMHx as above who presented to the ED with G-tube displacement. Reported that patient pulled out G-tube at nursing facility around afternoon 3/4, unable to replace G-tube, thus sent to ED. Hx otherwise is very limited as patient unable to provide any details.   Patient had hyponatremia initially likely from PEG tube malfunctioning and this improved throughout hospitalization.  She was kept on IV fluids until PEG tube was replaced.  General surgery was consulted.  They replaced the PEG tube on 3/7/2025.  Dietician consulted, recommended continuous feed of Glucerna 1.5/Diabetic formula at goal rate of 40 ml/hr with 120 ml flush Q 4 H. Tube feed was initiated on 3/8/2025 without difficulty.  General surgery signed off.  She was discharged back to nursing facility in stable condition.    Brief Problem Based Course:     # Dislodged gastrotomy tube, replaced on 3/7/25  #Dysphagia and prior aspirations, s/p PEG tube placement on 5/3/24  # Hyponatremia, resolved  # Cerebral palsy   # MRDD  # Seizure disorder  # Depression and anxiety  # Essential hypertension  # Mixed hyperlipidemia  # Acquired hypothyroidism     # Hx of SDH in 2015   The patient expressed appropriate understanding of, and agreement with the discharge recommendations, medications, and plan.     Consults this admission:  IP CONSULT TO GENERAL SURGERY  IP CONSULT TO DIETITIAN    Discharge Diagnosis:   Principal Problem:    Dislodged gastrostomy tube  Resolved Problems:    * No resolved hospital problems. *      Patient Active Problem List    Diagnosis Date Noted    Weakness 2023    Acute  PCP  Primary care physician: Yuly Bro APRN - CNP within 2 weeks  Diet: tube feed  Activity: activity as tolerated  Disposition: Discharged to:   []Home, []HHC, [x]SNF, []Acute Rehab, []Hospice   Condition on discharge: Stable  Labs and Tests to be Followed up as an outpatient by PCP or Specialist:   Discharge Medications:        Medication List        CHANGE how you take these medications      atorvastatin 40 MG tablet  Commonly known as: LIPITOR  TAKE 1 TABLET BY MOUTH DAILY  What changed: when to take this     FeroSul 325 (65 Fe) MG tablet  Generic drug: ferrous sulfate  TAKE 1 TABLET BY MOUTH DAILY WITH BREAKFAST  What changed:   how much to take  when to take this            CONTINUE taking these medications      acidophilus/citrus pectin Tabs  TAKE 1 CAPSULE BY MOUTH DAILY     busPIRone 10 MG tablet  Commonly known as: BUSPAR  Take 1 tablet by mouth 2 times daily     carBAMazepine 300 MG extended release capsule  Commonly known as: CARBATROL  TAKE 1 CAPSULE BY MOUTH TWICE A DAY     Depend Underwear Large/XL Misc  1 Units by Does not apply route 4 times daily     Diapers & Supplies Misc  1 each by Does not apply route daily as needed (4-5 times daily)     Disposable Gloves Misc  1 Units by Does not apply route 4 times daily     divalproex 500 MG DR tablet  Commonly known as: DEPAKOTE  Take 2 tablets by mouth in the morning and 2 tablets in the evening.     fluticasone 50 MCG/ACT nasal spray  Commonly known as: FLONASE  INSTILL 1 SPRAY INTO EACH NOSTRIL DAILY     * ipratropium 0.5 mg-albuterol 2.5 mg 0.5-2.5 (3) MG/3ML Soln nebulizer solution  Commonly known as: DUONEB  Inhale 3 mLs into the lungs every 4 hours     * Combivent Respimat  MCG/ACT Aers inhaler  Generic drug: albuterol-ipratropium  INHALE 1 PUFF BY ORAL INHALATION EVERY 6 HOURS     levETIRAcetam 750 MG tablet  Commonly known as: KEPPRA  Take 2 tablets by mouth 2 times daily     levothyroxine 50 MCG tablet  Commonly known as:

## 2025-03-17 ENCOUNTER — APPOINTMENT (OUTPATIENT)
Dept: GENERAL RADIOLOGY | Age: 65
DRG: 177 | End: 2025-03-17
Payer: COMMERCIAL

## 2025-03-17 ENCOUNTER — HOSPITAL ENCOUNTER (INPATIENT)
Age: 65
LOS: 3 days | Discharge: OTHER INSTITUTION | DRG: 177 | End: 2025-03-20
Attending: EMERGENCY MEDICINE | Admitting: STUDENT IN AN ORGANIZED HEALTH CARE EDUCATION/TRAINING PROGRAM
Payer: COMMERCIAL

## 2025-03-17 ENCOUNTER — APPOINTMENT (OUTPATIENT)
Dept: CT IMAGING | Age: 65
DRG: 177 | End: 2025-03-17
Payer: COMMERCIAL

## 2025-03-17 DIAGNOSIS — R41.82 ALTERED MENTAL STATUS, UNSPECIFIED ALTERED MENTAL STATUS TYPE: Primary | ICD-10-CM

## 2025-03-17 DIAGNOSIS — J11.1 INFLUENZA WITH RESPIRATORY MANIFESTATION OTHER THAN PNEUMONIA: ICD-10-CM

## 2025-03-17 DIAGNOSIS — G40.909 SEIZURE DISORDER (HCC): ICD-10-CM

## 2025-03-17 DIAGNOSIS — J18.9 PNEUMONIA DUE TO INFECTIOUS ORGANISM, UNSPECIFIED LATERALITY, UNSPECIFIED PART OF LUNG: ICD-10-CM

## 2025-03-17 PROBLEM — J10.1 INFLUENZA A: Status: ACTIVE | Noted: 2025-03-17

## 2025-03-17 LAB
ALBUMIN SERPL-MCNC: 3.4 G/DL (ref 3.4–5)
ALBUMIN/GLOB SERPL: 1 {RATIO} (ref 1.1–2.2)
ALP SERPL-CCNC: 111 U/L (ref 40–129)
ALT SERPL-CCNC: 16 U/L (ref 10–40)
ANION GAP SERPL CALCULATED.3IONS-SCNC: 15 MMOL/L (ref 9–17)
ARTERIAL PATENCY WRIST A: ABNORMAL
AST SERPL-CCNC: 47 U/L (ref 15–37)
B PARAP IS1001 DNA NPH QL NAA+NON-PROBE: NOT DETECTED
B PERT DNA SPEC QL NAA+PROBE: NOT DETECTED
BASOPHILS # BLD: 0.02 K/UL
BASOPHILS NFR BLD: 0 % (ref 0–1)
BILIRUB SERPL-MCNC: 0.3 MG/DL (ref 0–1)
BILIRUB UR QL STRIP: NEGATIVE
BODY TEMPERATURE: 37
BUN SERPL-MCNC: 11 MG/DL (ref 7–20)
C PNEUM DNA NPH QL NAA+NON-PROBE: NOT DETECTED
CALCIUM SERPL-MCNC: 9.2 MG/DL (ref 8.3–10.6)
CHLORIDE SERPL-SCNC: 86 MMOL/L (ref 99–110)
CLARITY UR: CLEAR
CO2 SERPL-SCNC: 24 MMOL/L (ref 21–32)
COHGB MFR BLD: 1.1 % (ref 0.5–1.5)
COLOR UR: YELLOW
CREAT SERPL-MCNC: 0.4 MG/DL (ref 0.6–1.2)
EOSINOPHIL # BLD: 0 K/UL
EOSINOPHILS RELATIVE PERCENT: 0 % (ref 0–3)
EPI CELLS #/AREA URNS HPF: 12 /HPF
ERYTHROCYTE [DISTWIDTH] IN BLOOD BY AUTOMATED COUNT: 13.1 % (ref 11.7–14.9)
FLUAV H3 RNA NPH QL NAA+NON-PROBE: DETECTED
FLUAV RNA NPH QL NAA+NON-PROBE: DETECTED
FLUBV RNA NPH QL NAA+NON-PROBE: NOT DETECTED
GFR, ESTIMATED: >90 ML/MIN/1.73M2
GLUCOSE SERPL-MCNC: 107 MG/DL (ref 74–99)
GLUCOSE UR STRIP-MCNC: NEGATIVE MG/DL
HADV DNA NPH QL NAA+NON-PROBE: NOT DETECTED
HCO3 VENOUS: 30.3 MMOL/L (ref 22–29)
HCOV 229E RNA NPH QL NAA+NON-PROBE: NOT DETECTED
HCOV HKU1 RNA NPH QL NAA+NON-PROBE: NOT DETECTED
HCOV NL63 RNA NPH QL NAA+NON-PROBE: NOT DETECTED
HCOV OC43 RNA NPH QL NAA+NON-PROBE: NOT DETECTED
HCT VFR BLD AUTO: 36.6 % (ref 37–47)
HGB BLD-MCNC: 12.4 G/DL (ref 12.5–16)
HGB UR QL STRIP.AUTO: ABNORMAL
HMPV RNA NPH QL NAA+NON-PROBE: NOT DETECTED
HPIV1 RNA NPH QL NAA+NON-PROBE: NOT DETECTED
HPIV2 RNA NPH QL NAA+NON-PROBE: NOT DETECTED
HPIV3 RNA NPH QL NAA+NON-PROBE: NOT DETECTED
HPIV4 RNA NPH QL NAA+NON-PROBE: NOT DETECTED
IMM GRANULOCYTES # BLD AUTO: 0.04 K/UL
IMM GRANULOCYTES NFR BLD: 1 %
KETONES UR STRIP-MCNC: NEGATIVE MG/DL
LEUKOCYTE ESTERASE UR QL STRIP: NEGATIVE
LYMPHOCYTES NFR BLD: 1.58 K/UL
LYMPHOCYTES RELATIVE PERCENT: 21 % (ref 24–44)
M PNEUMO DNA NPH QL NAA+NON-PROBE: NOT DETECTED
MCH RBC QN AUTO: 33.4 PG (ref 27–31)
MCHC RBC AUTO-ENTMCNC: 33.9 G/DL (ref 32–36)
MCV RBC AUTO: 98.7 FL (ref 78–100)
METHEMOGLOBIN: 0.3 % (ref 0.5–1.5)
MONOCYTES NFR BLD: 0.66 K/UL
MONOCYTES NFR BLD: 9 % (ref 0–4)
NEUTROPHILS NFR BLD: 69 % (ref 36–66)
NEUTS SEG NFR BLD: 5.18 K/UL
NITRITE UR QL STRIP: NEGATIVE
OXYHGB MFR BLD: 56.5 %
PCO2 VENOUS: 49.5 MM HG (ref 38–54)
PH UR STRIP: 6 [PH] (ref 5–8)
PH VENOUS: 7.4 (ref 7.32–7.43)
PLATELET, FLUORESCENCE: 250 K/UL (ref 140–440)
PMV BLD AUTO: 9.1 FL (ref 7.5–11.1)
PO2 VENOUS: 32.2 MM HG (ref 23–48)
POSITIVE BASE EXCESS, VEN: 4.5 MMOL/L (ref 0–3)
POTASSIUM SERPL-SCNC: 4.5 MMOL/L (ref 3.5–5.1)
PROT SERPL-MCNC: 6.7 G/DL (ref 6.4–8.2)
PROT UR STRIP-MCNC: NEGATIVE MG/DL
RBC # BLD AUTO: 3.71 M/UL (ref 4.2–5.4)
RBC #/AREA URNS HPF: 1 /HPF (ref 0–2)
RSV RNA NPH QL NAA+NON-PROBE: NOT DETECTED
RV+EV RNA NPH QL NAA+NON-PROBE: NOT DETECTED
SARS-COV-2 RNA NPH QL NAA+NON-PROBE: NOT DETECTED
SODIUM SERPL-SCNC: 125 MMOL/L (ref 136–145)
SP GR UR STRIP: 1.01 (ref 1–1.03)
SPECIMEN DESCRIPTION: ABNORMAL
TROPONIN I SERPL HS-MCNC: 16 NG/L (ref 0–14)
TROPONIN I SERPL HS-MCNC: 17 NG/L (ref 0–14)
TROPONIN I SERPL HS-MCNC: 18 NG/L (ref 0–14)
TROPONIN I SERPL HS-MCNC: 19 NG/L (ref 0–14)
UROBILINOGEN UR STRIP-ACNC: 1 EU/DL (ref 0–1)
WBC #/AREA URNS HPF: 2 /HPF (ref 0–5)
WBC OTHER # BLD: 7.5 K/UL (ref 4–10.5)

## 2025-03-17 PROCEDURE — 87449 NOS EACH ORGANISM AG IA: CPT

## 2025-03-17 PROCEDURE — 85025 COMPLETE CBC W/AUTO DIFF WBC: CPT

## 2025-03-17 PROCEDURE — 81001 URINALYSIS AUTO W/SCOPE: CPT

## 2025-03-17 PROCEDURE — 82805 BLOOD GASES W/O2 SATURATION: CPT

## 2025-03-17 PROCEDURE — 0202U NFCT DS 22 TRGT SARS-COV-2: CPT

## 2025-03-17 PROCEDURE — 80053 COMPREHEN METABOLIC PANEL: CPT

## 2025-03-17 PROCEDURE — 36415 COLL VENOUS BLD VENIPUNCTURE: CPT

## 2025-03-17 PROCEDURE — 70450 CT HEAD/BRAIN W/O DYE: CPT

## 2025-03-17 PROCEDURE — 99285 EMERGENCY DEPT VISIT HI MDM: CPT

## 2025-03-17 PROCEDURE — 80177 DRUG SCRN QUAN LEVETIRACETAM: CPT

## 2025-03-17 PROCEDURE — 87086 URINE CULTURE/COLONY COUNT: CPT

## 2025-03-17 PROCEDURE — 2580000003 HC RX 258: Performed by: PHYSICIAN ASSISTANT

## 2025-03-17 PROCEDURE — 93005 ELECTROCARDIOGRAM TRACING: CPT | Performed by: EMERGENCY MEDICINE

## 2025-03-17 PROCEDURE — 84484 ASSAY OF TROPONIN QUANT: CPT

## 2025-03-17 PROCEDURE — 6360000002 HC RX W HCPCS: Performed by: PHYSICIAN ASSISTANT

## 2025-03-17 PROCEDURE — 1200000000 HC SEMI PRIVATE

## 2025-03-17 PROCEDURE — 95708 EEG WO VID EA 12-26HR UNMNTR: CPT

## 2025-03-17 PROCEDURE — 96374 THER/PROPH/DIAG INJ IV PUSH: CPT

## 2025-03-17 PROCEDURE — 71045 X-RAY EXAM CHEST 1 VIEW: CPT

## 2025-03-17 PROCEDURE — 71250 CT THORAX DX C-: CPT

## 2025-03-17 RX ORDER — ONDANSETRON 4 MG/1
4 TABLET, FILM COATED ORAL EVERY 8 HOURS PRN
COMMUNITY
Start: 2024-12-09

## 2025-03-17 RX ORDER — MAGNESIUM SULFATE IN WATER 40 MG/ML
2000 INJECTION, SOLUTION INTRAVENOUS PRN
Status: DISCONTINUED | OUTPATIENT
Start: 2025-03-17 | End: 2025-03-20 | Stop reason: HOSPADM

## 2025-03-17 RX ORDER — POTASSIUM CHLORIDE 1500 MG/1
40 TABLET, EXTENDED RELEASE ORAL PRN
Status: ACTIVE | OUTPATIENT
Start: 2025-03-17 | End: 2025-03-19

## 2025-03-17 RX ORDER — VALPROIC ACID 250 MG/5ML
750 SOLUTION ORAL 2 TIMES DAILY
COMMUNITY
Start: 2025-02-23

## 2025-03-17 RX ORDER — ENOXAPARIN SODIUM 100 MG/ML
30 INJECTION SUBCUTANEOUS DAILY
Status: DISCONTINUED | OUTPATIENT
Start: 2025-03-17 | End: 2025-03-20 | Stop reason: HOSPADM

## 2025-03-17 RX ORDER — POLYETHYLENE GLYCOL 3350 17 G/17G
17 POWDER, FOR SOLUTION ORAL DAILY PRN
Status: DISCONTINUED | OUTPATIENT
Start: 2025-03-17 | End: 2025-03-20 | Stop reason: HOSPADM

## 2025-03-17 RX ORDER — SODIUM CHLORIDE 0.9 % (FLUSH) 0.9 %
5-40 SYRINGE (ML) INJECTION PRN
Status: DISCONTINUED | OUTPATIENT
Start: 2025-03-17 | End: 2025-03-20 | Stop reason: HOSPADM

## 2025-03-17 RX ORDER — CARBAMAZEPINE 100 MG/5ML
200 SUSPENSION ORAL 2 TIMES DAILY
COMMUNITY
Start: 2025-02-28

## 2025-03-17 RX ORDER — ONDANSETRON 2 MG/ML
4 INJECTION INTRAMUSCULAR; INTRAVENOUS EVERY 6 HOURS PRN
Status: DISCONTINUED | OUTPATIENT
Start: 2025-03-17 | End: 2025-03-20 | Stop reason: HOSPADM

## 2025-03-17 RX ORDER — DEXTROSE MONOHYDRATE AND SODIUM CHLORIDE 5; .45 G/100ML; G/100ML
INJECTION, SOLUTION INTRAVENOUS CONTINUOUS
Status: DISPENSED | OUTPATIENT
Start: 2025-03-17 | End: 2025-03-18

## 2025-03-17 RX ORDER — POTASSIUM CHLORIDE 7.45 MG/ML
10 INJECTION INTRAVENOUS PRN
Status: ACTIVE | OUTPATIENT
Start: 2025-03-17 | End: 2025-03-19

## 2025-03-17 RX ORDER — SODIUM CHLORIDE 9 MG/ML
INJECTION, SOLUTION INTRAVENOUS PRN
Status: DISCONTINUED | OUTPATIENT
Start: 2025-03-17 | End: 2025-03-20 | Stop reason: HOSPADM

## 2025-03-17 RX ORDER — LEVETIRACETAM 100 MG/ML
500 SOLUTION ORAL 2 TIMES DAILY
COMMUNITY
Start: 2025-02-24

## 2025-03-17 RX ORDER — OSELTAMIVIR PHOSPHATE 75 MG/1
75 CAPSULE ORAL 2 TIMES DAILY
Status: DISCONTINUED | OUTPATIENT
Start: 2025-03-17 | End: 2025-03-18

## 2025-03-17 RX ORDER — ONDANSETRON 4 MG/1
4 TABLET, ORALLY DISINTEGRATING ORAL EVERY 8 HOURS PRN
Status: DISCONTINUED | OUTPATIENT
Start: 2025-03-17 | End: 2025-03-20 | Stop reason: HOSPADM

## 2025-03-17 RX ORDER — ACETAMINOPHEN 325 MG/1
650 TABLET ORAL EVERY 6 HOURS PRN
Status: DISCONTINUED | OUTPATIENT
Start: 2025-03-17 | End: 2025-03-20 | Stop reason: HOSPADM

## 2025-03-17 RX ORDER — ACETAMINOPHEN 500 MG
1000 TABLET ORAL EVERY 8 HOURS
COMMUNITY

## 2025-03-17 RX ORDER — ACETAMINOPHEN 650 MG/1
650 SUPPOSITORY RECTAL EVERY 6 HOURS PRN
Status: DISCONTINUED | OUTPATIENT
Start: 2025-03-17 | End: 2025-03-20 | Stop reason: HOSPADM

## 2025-03-17 RX ORDER — SODIUM CHLORIDE 0.9 % (FLUSH) 0.9 %
5-40 SYRINGE (ML) INJECTION EVERY 12 HOURS SCHEDULED
Status: DISCONTINUED | OUTPATIENT
Start: 2025-03-17 | End: 2025-03-20 | Stop reason: HOSPADM

## 2025-03-17 RX ADMIN — SODIUM CHLORIDE 3000 MG: 9 INJECTION, SOLUTION INTRAVENOUS at 17:16

## 2025-03-17 NOTE — H&P
FEROSUL 325 (65 Fe) MG tablet TAKE 1 TABLET BY MOUTH DAILY WITH BREAKFAST  Patient taking differently: 1 tablet by Per G Tube route daily (with breakfast) 6/16/23  Yes Jeanette Rao MD   Lactobacillus Acid-Pectin (ACIDOPHILUS/CITRUS PECTIN) TABS TAKE 1 CAPSULE BY MOUTH DAILY  Patient taking differently: 1 tablet daily 03/17/25 Receives via G-tube 1/16/23  Yes Jeanette Rao MD   levothyroxine (SYNTHROID) 50 MCG tablet Take 1 tablet by mouth Daily  Patient taking differently: 1 tablet by Per G Tube route Daily 11/2/22  Yes Hernando Lepe MD   fluticasone (FLONASE) 50 MCG/ACT nasal spray INSTILL 1 SPRAY INTO EACH NOSTRIL DAILY  Patient taking differently: 1 spray by Nasal route daily 5/24/22  Yes Ena Burks MD   atorvastatin (LIPITOR) 40 MG tablet TAKE 1 TABLET BY MOUTH DAILY  Patient taking differently: 1.5 tablets by Per G Tube route nightly 03/17/25 Takes 40 mg along with 20 mg to equal 60 mg dosing per nursing facility. 12/17/21  Yes Jeanette Rao MD   lisinopril (PRINIVIL;ZESTRIL) 40 MG tablet TAKE 1 TABLET BY MOUTH DAILY  Patient taking differently: 1 tablet by Per G Tube route daily 3/1/21  Yes Jeanette Rao MD   busPIRone (BUSPAR) 10 MG tablet Take 1 tablet by mouth 2 times daily  Patient taking differently: 1 tablet by Per G Tube route 2 times daily 5/31/18  Yes Jeanette Rao MD       Physical Exam:    Physical Exam     General: NAD-patient nonverbal  Eyes: EOMI  ENT: neck supple  Cardiovascular: Regular rate.  Respiratory: Clear to auscultation  Gastrointestinal: Soft, non tender  Genitourinary: no suprapubic tenderness  Musculoskeletal: No edema  Skin: warm, dry  Neuro: Alert.    Past Medical History:   PMHx   Past Medical History:   Diagnosis Date   • Anxiety disorder    • Back pain, chronic    • Cerebral palsy (HCC)    • COPD (chronic obstructive pulmonary disease) (HCC)    • COVID-19 10/12/2021   • CVA (cerebral infarction) 2014 x2   • Diabetes mellitus (HCC)    • Diverticulosis    •

## 2025-03-17 NOTE — ED NOTES
Attempted to call [facility] about SBAR, no answer at this time.   
IV attempted X2 by this RN with no success. Pt tolerated moderately well. Pt incontinent of brown semi solid stool, ruben area cleaned and new depends placed on patient. Pt in position of comfort. Side rails up X 2 with seizure precautions in place. No distress noted at this time. Pure wick placed on patient to obtain urine sample  
Medication History  [facility]    Patient Name: Jm Garcia 1960     Medication history has been completed by: Nirmala Cao CPhT    Source(s) of information: Medication list provided by [facility]    Primary Care Physician: Yuly Bro, INOCENCIO - CNP     Pharmacy:     Allergies as of 03/17/2025 - Fully Reviewed 03/17/2025   Allergen Reaction Noted    Macrobid [nitrofurantoin] Hives and Swelling 12/07/2015        Prior to Admission medications    Medication Sig Start Date End Date Taking? Authorizing Provider   acetaminophen (TYLENOL) 500 MG tablet 2 tablets by Per G Tube route in the morning and 2 tablets at noon and 2 tablets in the evening.   Yes Lovely Akers MD   carBAMazepine (TEGRETOL) 100 MG/5ML suspension 10 mLs by Per G Tube route 2 times daily 2/28/25  Yes Lovely Akers MD   levETIRAcetam (KEPPRA) 100 MG/ML oral solution 5 mLs by Per G Tube route 2 times daily 2/24/25  Yes Lovely Akers MD   melatonin 3 MG TABS tablet 1 tablet by Per G Tube route nightly 3/14/25  Yes ProviderLovely MD   ondansetron (ZOFRAN) 4 MG tablet 1 tablet by Per G Tube route every 8 hours as needed 12/9/24  Yes Lovely Akers MD   valproic acid (DEPAKENE) 250 MG/5ML SOLN oral solution 15 mLs by Per G Tube route 2 times daily 2/23/25  Yes Lovely Akers MD   mirtazapine (REMERON) 15 MG tablet 1 tablet by Per G Tube route nightly 1/24/24  Yes ProviderLovely MD   COMBIVENT RESPIMAT  MCG/ACT AERS inhaler INHALE 1 PUFF BY ORAL INHALATION EVERY 6 HOURS 3/15/24  Yes Ross Harman MD   FEROSUL 325 (65 Fe) MG tablet 1 tablet by Per G Tube route daily (with breakfast) 6/16/23  Yes Jeanette Rao MD   Lactobacillus Acid-Pectin (ACIDOPHILUS/CITRUS PECTIN) TABS 1 tablet daily 03/17/25 Receives via G-tube 1/16/23  Yes Jeanette Rao MD   levothyroxine (SYNTHROID) 50 MCG tablet  1 tablet by Per G Tube route Daily 11/2/22  Yes Hernando Lepe, 
O2 sat 89% on room air, pt placed on 2 Lpm O2 via NC.   
Report given to AMBROSIO Pemberton and care transferred at this time  
Seizure pads placed on bed for seizure precautions. Pt resting in position of comfort on right side, no distress noted at this time  
1485    Electronically signed by: Electronically signed by Humaira Haq RN on 3/17/2025 at 5:30 PM

## 2025-03-18 LAB
ALBUMIN SERPL-MCNC: 2.8 G/DL (ref 3.4–5)
ALBUMIN/GLOB SERPL: 1 {RATIO} (ref 1.1–2.2)
ALP SERPL-CCNC: 70 U/L (ref 40–129)
ALT SERPL-CCNC: 13 U/L (ref 10–40)
ANION GAP SERPL CALCULATED.3IONS-SCNC: 8 MMOL/L (ref 9–17)
AST SERPL-CCNC: 29 U/L (ref 15–37)
BASOPHILS # BLD: 0.02 K/UL
BASOPHILS NFR BLD: 0 % (ref 0–1)
BILIRUB SERPL-MCNC: <0.2 MG/DL (ref 0–1)
BUN SERPL-MCNC: 7 MG/DL (ref 7–20)
CALCIUM SERPL-MCNC: 8.9 MG/DL (ref 8.3–10.6)
CHLORIDE SERPL-SCNC: 92 MMOL/L (ref 99–110)
CHLORIDE UR-SCNC: <20 MMOL/L (ref 110–250)
CO2 SERPL-SCNC: 30 MMOL/L (ref 21–32)
CREAT SERPL-MCNC: 0.2 MG/DL (ref 0.6–1.2)
EOSINOPHIL # BLD: 0 K/UL
EOSINOPHILS RELATIVE PERCENT: 0 % (ref 0–3)
ERYTHROCYTE [DISTWIDTH] IN BLOOD BY AUTOMATED COUNT: 13.1 % (ref 11.7–14.9)
GFR, ESTIMATED: >90 ML/MIN/1.73M2
GLUCOSE BLD-MCNC: 117 MG/DL (ref 74–99)
GLUCOSE BLD-MCNC: 117 MG/DL (ref 74–99)
GLUCOSE BLD-MCNC: 168 MG/DL (ref 74–99)
GLUCOSE SERPL-MCNC: 116 MG/DL (ref 74–99)
HCT VFR BLD AUTO: 31.9 % (ref 37–47)
HGB BLD-MCNC: 10.6 G/DL (ref 12.5–16)
IMM GRANULOCYTES # BLD AUTO: 0.03 K/UL
IMM GRANULOCYTES NFR BLD: 0 %
KEPPRA DOSE AMT: ABNORMAL
KEPPRA: 74.5 UG/ML (ref 6–46)
L PNEUMO1 AG UR QL IA.RAPID: NEGATIVE
LACTATE BLDV-SCNC: 0.6 MMOL/L (ref 0.4–2)
LYMPHOCYTES NFR BLD: 1.82 K/UL
LYMPHOCYTES RELATIVE PERCENT: 27 % (ref 24–44)
MCH RBC QN AUTO: 31.8 PG (ref 27–31)
MCHC RBC AUTO-ENTMCNC: 33.2 G/DL (ref 32–36)
MCV RBC AUTO: 95.8 FL (ref 78–100)
MICROORGANISM SPEC CULT: NORMAL
MONOCYTES NFR BLD: 0.76 K/UL
MONOCYTES NFR BLD: 11 % (ref 0–4)
NEUTROPHILS NFR BLD: 62 % (ref 36–66)
NEUTS SEG NFR BLD: 4.25 K/UL
OSMOLALITY SERPL: 264 MOSM/KG (ref 280–300)
OSMOLALITY UR: 135 MOSM/KG (ref 292–1090)
PHOSPHATE SERPL-MCNC: 3.3 MG/DL (ref 2.5–4.9)
PLATELET # BLD AUTO: 229 K/UL (ref 140–440)
PMV BLD AUTO: 8.1 FL (ref 7.5–11.1)
POTASSIUM SERPL-SCNC: 3.7 MMOL/L (ref 3.5–5.1)
POTASSIUM, UR: 8.5 MMOL/L (ref 25–150)
PROT SERPL-MCNC: 5.5 G/DL (ref 6.4–8.2)
RBC # BLD AUTO: 3.33 M/UL (ref 4.2–5.4)
SERVICE CMNT-IMP: NORMAL
SODIUM SERPL-SCNC: 129 MMOL/L (ref 136–145)
SODIUM UR-SCNC: 25 MMOL/L (ref 40–220)
SPECIMEN DESCRIPTION: NORMAL
WBC OTHER # BLD: 6.9 K/UL (ref 4–10.5)

## 2025-03-18 PROCEDURE — 1200000000 HC SEMI PRIVATE

## 2025-03-18 PROCEDURE — 84133 ASSAY OF URINE POTASSIUM: CPT

## 2025-03-18 PROCEDURE — 95719 EEG PHYS/QHP EA INCR W/O VID: CPT | Performed by: STUDENT IN AN ORGANIZED HEALTH CARE EDUCATION/TRAINING PROGRAM

## 2025-03-18 PROCEDURE — 6360000002 HC RX W HCPCS: Performed by: STUDENT IN AN ORGANIZED HEALTH CARE EDUCATION/TRAINING PROGRAM

## 2025-03-18 PROCEDURE — 83935 ASSAY OF URINE OSMOLALITY: CPT

## 2025-03-18 PROCEDURE — 2500000003 HC RX 250 WO HCPCS: Performed by: STUDENT IN AN ORGANIZED HEALTH CARE EDUCATION/TRAINING PROGRAM

## 2025-03-18 PROCEDURE — XX20X89 MONITORING OF BRAIN ELECTRICAL ACTIVITY, COMPUTER-AIDED DETECTION AND NOTIFICATION, NEW TECHNOLOGY GROUP 9: ICD-10-PCS | Performed by: STUDENT IN AN ORGANIZED HEALTH CARE EDUCATION/TRAINING PROGRAM

## 2025-03-18 PROCEDURE — 92610 EVALUATE SWALLOWING FUNCTION: CPT

## 2025-03-18 PROCEDURE — 93005 ELECTROCARDIOGRAM TRACING: CPT | Performed by: STUDENT IN AN ORGANIZED HEALTH CARE EDUCATION/TRAINING PROGRAM

## 2025-03-18 PROCEDURE — 83930 ASSAY OF BLOOD OSMOLALITY: CPT

## 2025-03-18 PROCEDURE — 6360000002 HC RX W HCPCS: Performed by: PHARMACIST

## 2025-03-18 PROCEDURE — 2580000003 HC RX 258: Performed by: STUDENT IN AN ORGANIZED HEALTH CARE EDUCATION/TRAINING PROGRAM

## 2025-03-18 PROCEDURE — 36415 COLL VENOUS BLD VENIPUNCTURE: CPT

## 2025-03-18 PROCEDURE — 83605 ASSAY OF LACTIC ACID: CPT

## 2025-03-18 PROCEDURE — 6370000000 HC RX 637 (ALT 250 FOR IP): Performed by: STUDENT IN AN ORGANIZED HEALTH CARE EDUCATION/TRAINING PROGRAM

## 2025-03-18 PROCEDURE — 84100 ASSAY OF PHOSPHORUS: CPT

## 2025-03-18 PROCEDURE — 2580000003 HC RX 258: Performed by: PHARMACIST

## 2025-03-18 PROCEDURE — 84300 ASSAY OF URINE SODIUM: CPT

## 2025-03-18 PROCEDURE — 94640 AIRWAY INHALATION TREATMENT: CPT

## 2025-03-18 PROCEDURE — 80053 COMPREHEN METABOLIC PANEL: CPT

## 2025-03-18 PROCEDURE — 82436 ASSAY OF URINE CHLORIDE: CPT

## 2025-03-18 PROCEDURE — 94761 N-INVAS EAR/PLS OXIMETRY MLT: CPT

## 2025-03-18 PROCEDURE — 6370000000 HC RX 637 (ALT 250 FOR IP): Performed by: NURSE PRACTITIONER

## 2025-03-18 PROCEDURE — 85025 COMPLETE CBC W/AUTO DIFF WBC: CPT

## 2025-03-18 PROCEDURE — 82962 GLUCOSE BLOOD TEST: CPT

## 2025-03-18 RX ORDER — ACETAMINOPHEN 500 MG
1000 TABLET ORAL EVERY 8 HOURS
Status: DISCONTINUED | OUTPATIENT
Start: 2025-03-18 | End: 2025-03-20 | Stop reason: HOSPADM

## 2025-03-18 RX ORDER — SODIUM CHLORIDE 9 MG/ML
INJECTION, SOLUTION INTRAVENOUS CONTINUOUS
Status: DISPENSED | OUTPATIENT
Start: 2025-03-18 | End: 2025-03-18

## 2025-03-18 RX ORDER — CARBAMAZEPINE 100 MG/5ML
200 SUSPENSION ORAL 2 TIMES DAILY
Status: DISCONTINUED | OUTPATIENT
Start: 2025-03-18 | End: 2025-03-20 | Stop reason: HOSPADM

## 2025-03-18 RX ORDER — LACTOBACILLUS RHAMNOSUS GG 10B CELL
1 CAPSULE ORAL
Status: DISCONTINUED | OUTPATIENT
Start: 2025-03-18 | End: 2025-03-20 | Stop reason: HOSPADM

## 2025-03-18 RX ORDER — FLUTICASONE PROPIONATE 50 MCG
1 SPRAY, SUSPENSION (ML) NASAL DAILY PRN
Status: DISCONTINUED | OUTPATIENT
Start: 2025-03-18 | End: 2025-03-20 | Stop reason: HOSPADM

## 2025-03-18 RX ORDER — ATORVASTATIN CALCIUM 40 MG/1
40 TABLET, FILM COATED ORAL NIGHTLY
Status: DISCONTINUED | OUTPATIENT
Start: 2025-03-18 | End: 2025-03-20 | Stop reason: HOSPADM

## 2025-03-18 RX ORDER — IPRATROPIUM BROMIDE AND ALBUTEROL SULFATE 2.5; .5 MG/3ML; MG/3ML
1 SOLUTION RESPIRATORY (INHALATION)
Status: DISCONTINUED | OUTPATIENT
Start: 2025-03-18 | End: 2025-03-20 | Stop reason: HOSPADM

## 2025-03-18 RX ORDER — BUSPIRONE HYDROCHLORIDE 5 MG/1
10 TABLET ORAL 2 TIMES DAILY
Status: DISCONTINUED | OUTPATIENT
Start: 2025-03-18 | End: 2025-03-20 | Stop reason: HOSPADM

## 2025-03-18 RX ORDER — OSELTAMIVIR PHOSPHATE 75 MG/1
75 CAPSULE ORAL 2 TIMES DAILY
Status: DISCONTINUED | OUTPATIENT
Start: 2025-03-18 | End: 2025-03-20 | Stop reason: HOSPADM

## 2025-03-18 RX ORDER — VALPROIC ACID 250 MG/5ML
750 SOLUTION ORAL 2 TIMES DAILY
Status: DISCONTINUED | OUTPATIENT
Start: 2025-03-18 | End: 2025-03-20 | Stop reason: HOSPADM

## 2025-03-18 RX ORDER — LEVETIRACETAM 100 MG/ML
500 SOLUTION ORAL 2 TIMES DAILY
Status: DISCONTINUED | OUTPATIENT
Start: 2025-03-18 | End: 2025-03-20 | Stop reason: HOSPADM

## 2025-03-18 RX ORDER — MIRTAZAPINE 15 MG/1
15 TABLET, FILM COATED ORAL NIGHTLY
Status: DISCONTINUED | OUTPATIENT
Start: 2025-03-18 | End: 2025-03-20 | Stop reason: HOSPADM

## 2025-03-18 RX ORDER — LISINOPRIL 20 MG/1
40 TABLET ORAL DAILY
Status: DISCONTINUED | OUTPATIENT
Start: 2025-03-18 | End: 2025-03-20 | Stop reason: HOSPADM

## 2025-03-18 RX ORDER — FERROUS SULFATE 325(65) MG
325 TABLET ORAL
Status: DISCONTINUED | OUTPATIENT
Start: 2025-03-18 | End: 2025-03-20 | Stop reason: HOSPADM

## 2025-03-18 RX ORDER — L. ACIDOPHILUS/PECTIN, CITRUS 25MM-100MG
1 TABLET ORAL DAILY
Status: DISCONTINUED | OUTPATIENT
Start: 2025-03-18 | End: 2025-03-18

## 2025-03-18 RX ORDER — LEVOTHYROXINE SODIUM 50 UG/1
50 TABLET ORAL DAILY
Status: DISCONTINUED | OUTPATIENT
Start: 2025-03-18 | End: 2025-03-20 | Stop reason: HOSPADM

## 2025-03-18 RX ADMIN — SODIUM CHLORIDE, PRESERVATIVE FREE 10 ML: 5 INJECTION INTRAVENOUS at 22:51

## 2025-03-18 RX ADMIN — CARBAMAZEPINE 200 MG: 100 SUSPENSION ORAL at 22:52

## 2025-03-18 RX ADMIN — IPRATROPIUM BROMIDE AND ALBUTEROL SULFATE 1 DOSE: 2.5; .5 SOLUTION RESPIRATORY (INHALATION) at 12:31

## 2025-03-18 RX ADMIN — OSELTAMIVIR PHOSPHATE 75 MG: 75 CAPSULE ORAL at 01:29

## 2025-03-18 RX ADMIN — ACETAMINOPHEN 650 MG: 325 TABLET ORAL at 01:33

## 2025-03-18 RX ADMIN — IPRATROPIUM BROMIDE AND ALBUTEROL SULFATE 1 DOSE: 2.5; .5 SOLUTION RESPIRATORY (INHALATION) at 19:52

## 2025-03-18 RX ADMIN — LEVETIRACETAM 500 MG: 100 SOLUTION ORAL at 09:27

## 2025-03-18 RX ADMIN — ACETAMINOPHEN 1000 MG: 500 TABLET ORAL at 12:32

## 2025-03-18 RX ADMIN — SODIUM CHLORIDE 3000 MG: 9 INJECTION, SOLUTION INTRAVENOUS at 03:30

## 2025-03-18 RX ADMIN — BUSPIRONE HYDROCHLORIDE 10 MG: 5 TABLET ORAL at 22:50

## 2025-03-18 RX ADMIN — LEVOTHYROXINE SODIUM 50 MCG: 0.05 TABLET ORAL at 05:18

## 2025-03-18 RX ADMIN — IPRATROPIUM BROMIDE AND ALBUTEROL SULFATE 1 DOSE: 2.5; .5 SOLUTION RESPIRATORY (INHALATION) at 16:49

## 2025-03-18 RX ADMIN — Medication 1 CAPSULE: at 09:24

## 2025-03-18 RX ADMIN — VANCOMYCIN HYDROCHLORIDE 1000 MG: 1 INJECTION, POWDER, LYOPHILIZED, FOR SOLUTION INTRAVENOUS at 13:11

## 2025-03-18 RX ADMIN — MIRTAZAPINE 15 MG: 15 TABLET, FILM COATED ORAL at 22:50

## 2025-03-18 RX ADMIN — SODIUM CHLORIDE, PRESERVATIVE FREE 10 ML: 5 INJECTION INTRAVENOUS at 03:30

## 2025-03-18 RX ADMIN — SODIUM CHLORIDE 3000 MG: 9 INJECTION, SOLUTION INTRAVENOUS at 18:36

## 2025-03-18 RX ADMIN — ACETAMINOPHEN 1000 MG: 500 TABLET ORAL at 23:19

## 2025-03-18 RX ADMIN — LEVETIRACETAM 500 MG: 100 SOLUTION ORAL at 22:53

## 2025-03-18 RX ADMIN — ENOXAPARIN SODIUM 30 MG: 100 INJECTION SUBCUTANEOUS at 09:24

## 2025-03-18 RX ADMIN — VANCOMYCIN HYDROCHLORIDE 1250 MG: 1.25 INJECTION, POWDER, LYOPHILIZED, FOR SOLUTION INTRAVENOUS at 01:17

## 2025-03-18 RX ADMIN — SODIUM CHLORIDE 3000 MG: 9 INJECTION, SOLUTION INTRAVENOUS at 09:19

## 2025-03-18 RX ADMIN — ATORVASTATIN CALCIUM 40 MG: 40 TABLET, FILM COATED ORAL at 22:50

## 2025-03-18 RX ADMIN — BUSPIRONE HYDROCHLORIDE 10 MG: 5 TABLET ORAL at 09:25

## 2025-03-18 RX ADMIN — SODIUM CHLORIDE, PRESERVATIVE FREE 10 ML: 5 INJECTION INTRAVENOUS at 09:41

## 2025-03-18 RX ADMIN — MELATONIN TAB 3 MG 3 MG: 3 TAB at 22:50

## 2025-03-18 RX ADMIN — FERROUS SULFATE TAB 325 MG (65 MG ELEMENTAL FE) 325 MG: 325 (65 FE) TAB at 09:25

## 2025-03-18 RX ADMIN — VANCOMYCIN HYDROCHLORIDE 1000 MG: 1 INJECTION, POWDER, LYOPHILIZED, FOR SOLUTION INTRAVENOUS at 23:22

## 2025-03-18 RX ADMIN — VALPROIC ACID 750 MG: 250 SOLUTION ORAL at 09:34

## 2025-03-18 RX ADMIN — OSELTAMIVIR PHOSPHATE 75 MG: 75 CAPSULE ORAL at 22:50

## 2025-03-18 RX ADMIN — CARBAMAZEPINE 200 MG: 100 SUSPENSION ORAL at 09:25

## 2025-03-18 RX ADMIN — LISINOPRIL 40 MG: 20 TABLET ORAL at 09:25

## 2025-03-18 RX ADMIN — IPRATROPIUM BROMIDE AND ALBUTEROL SULFATE 1 DOSE: 2.5; .5 SOLUTION RESPIRATORY (INHALATION) at 09:14

## 2025-03-18 RX ADMIN — OSELTAMIVIR PHOSPHATE 75 MG: 75 CAPSULE ORAL at 09:25

## 2025-03-18 RX ADMIN — SODIUM CHLORIDE: 0.9 INJECTION, SOLUTION INTRAVENOUS at 01:11

## 2025-03-18 RX ADMIN — VALPROIC ACID 750 MG: 250 SOLUTION ORAL at 22:52

## 2025-03-18 RX ADMIN — ACETAMINOPHEN 650 MG: 325 TABLET ORAL at 05:18

## 2025-03-18 ASSESSMENT — PAIN SCALES - WONG BAKER: WONGBAKER_NUMERICALRESPONSE: HURTS A LITTLE BIT

## 2025-03-18 NOTE — PROCEDURES
encephalopathy/altered mental status. It was available for review at the bedside as well as via remote access for the entire period of monitoring. No video was available during this recording.      Technical Summary:  8 channels of continuous EEG over the bilateral frontopolar, temporal and occipital head regions were recorded in a digital format on a patient who is reported to be awake and drowsy during the recording.      The background consists of 4-6 Hz activity in the theta frequency range.  It is symmetric, normal voltage and continuous.  Posterior dominant rhythm (PDR) is absent, but the background is reactive to stimulation.     No clinical events reported.     Of note, excessive amounts of EMG and electrode artifact was seen throughout the recording making interpretation difficult at times.       EEG Interpretation:   This EEG is abnormal due to the presence of:   Moderate generalized slowing. This is a non-specific finding but is consistent with a generalized disturbance of cerebral function. It may be seen in a variety of conditions, such as toxic, metabolic, post-anoxic, multi-focal or diffuse structural abnormalities.   No electrographic seizures or non-convulsive status epilepticus was seen over the entire monitoring period.      Of note, this EEG is limited due to the number of electrodes and lack of parasaggital brain region coverage. If clinical suspicions persist for seizure as an etiology for the patient's symptoms, or if suspicion remains high for an underlying seizure disorder, additional EEG testing utilizing the standard 10-20 system of electrode placement for full cerebral coverage should be considered.        Clinical correlation recommended.     Paola Gilbert DO   Epileptologist  3/18/2025 8:47 AM

## 2025-03-18 NOTE — CARE COORDINATION
03/18/25 1341   Service Assessment   Patient Orientation Unable to Assess   Cognition Severely Impaired   History Provided By Medical Record;Other (see comment)   Primary Caregiver Other (Comment)  ([facility])   Support Systems Other (Comment);Adult Protective Services   Patient's Healthcare Decision Maker is: Named in Scanned ACP Document   PCP Verified by CM Yes   Last Visit to PCP Within last 6 months   Prior Functional Level Assistance with the following:;Bathing;Dressing;Toileting;Cooking;Housework;Shopping;Mobility   Current Functional Level Assistance with the following:;Bathing;Dressing;Toileting;Cooking;Housework;Shopping;Mobility   Can patient return to prior living arrangement Yes   Ability to make needs known: Unable   Family able to assist with home care needs: No   Would you like for me to discuss the discharge plan with any other family members/significant others, and if so, who? Yes   Financial Resources Medicaid;Medicare   Community Resources ECF/Home Care     Discharge planning initiated. Patient is from [facility]. Her guardian is [facility]. Patient can return to [facility] at discharge, no precert needed. CM following.

## 2025-03-19 LAB
CRP SERPL HS-MCNC: 54.2 MG/L (ref 0–5)
DATE LAST DOSE: ABNORMAL
EKG ATRIAL RATE: 312 BPM
EKG ATRIAL RATE: 74 BPM
EKG ATRIAL RATE: 75 BPM
EKG DIAGNOSIS: NORMAL
EKG P AXIS: 51 DEGREES
EKG P-R INTERVAL: 150 MS
EKG P-R INTERVAL: 164 MS
EKG Q-T INTERVAL: 356 MS
EKG Q-T INTERVAL: 362 MS
EKG Q-T INTERVAL: 380 MS
EKG QRS DURATION: 62 MS
EKG QRS DURATION: 72 MS
EKG QRS DURATION: 80 MS
EKG QTC CALCULATION (BAZETT): 401 MS
EKG QTC CALCULATION (BAZETT): 408 MS
EKG QTC CALCULATION (BAZETT): 424 MS
EKG R AXIS: 148 DEGREES
EKG R AXIS: 45 DEGREES
EKG R AXIS: 53 DEGREES
EKG T AXIS: 165 DEGREES
EKG T AXIS: 21 DEGREES
EKG T AXIS: 47 DEGREES
EKG VENTRICULAR RATE: 74 BPM
EKG VENTRICULAR RATE: 75 BPM
EKG VENTRICULAR RATE: 79 BPM
LACTATE BLDV-SCNC: 1.4 MMOL/L (ref 0.4–2)
MRSA, DNA, NASAL: NOT DETECTED
PROCALCITONIN SERPL-MCNC: 0.09 NG/ML
S PNEUM AG SPEC QL: NEGATIVE
SPECIMEN DESCRIPTION: NORMAL
SPECIMEN SOURCE: NORMAL
TME LAST DOSE: ABNORMAL H
VANCOMYCIN DOSE: ABNORMAL MG
VANCOMYCIN SERPL-MCNC: 29.9 UG/ML (ref 10–20)

## 2025-03-19 PROCEDURE — 94761 N-INVAS EAR/PLS OXIMETRY MLT: CPT

## 2025-03-19 PROCEDURE — 1200000000 HC SEMI PRIVATE

## 2025-03-19 PROCEDURE — 6360000002 HC RX W HCPCS: Performed by: STUDENT IN AN ORGANIZED HEALTH CARE EDUCATION/TRAINING PROGRAM

## 2025-03-19 PROCEDURE — 87641 MR-STAPH DNA AMP PROBE: CPT

## 2025-03-19 PROCEDURE — 87040 BLOOD CULTURE FOR BACTERIA: CPT

## 2025-03-19 PROCEDURE — 2500000003 HC RX 250 WO HCPCS: Performed by: STUDENT IN AN ORGANIZED HEALTH CARE EDUCATION/TRAINING PROGRAM

## 2025-03-19 PROCEDURE — 6370000000 HC RX 637 (ALT 250 FOR IP): Performed by: STUDENT IN AN ORGANIZED HEALTH CARE EDUCATION/TRAINING PROGRAM

## 2025-03-19 PROCEDURE — 2500000003 HC RX 250 WO HCPCS

## 2025-03-19 PROCEDURE — 83605 ASSAY OF LACTIC ACID: CPT

## 2025-03-19 PROCEDURE — 87899 AGENT NOS ASSAY W/OPTIC: CPT

## 2025-03-19 PROCEDURE — 2580000003 HC RX 258: Performed by: STUDENT IN AN ORGANIZED HEALTH CARE EDUCATION/TRAINING PROGRAM

## 2025-03-19 PROCEDURE — 92526 ORAL FUNCTION THERAPY: CPT

## 2025-03-19 PROCEDURE — 6370000000 HC RX 637 (ALT 250 FOR IP): Performed by: NURSE PRACTITIONER

## 2025-03-19 PROCEDURE — 86140 C-REACTIVE PROTEIN: CPT

## 2025-03-19 PROCEDURE — 84145 PROCALCITONIN (PCT): CPT

## 2025-03-19 PROCEDURE — 6360000002 HC RX W HCPCS: Performed by: PHARMACIST

## 2025-03-19 PROCEDURE — 94640 AIRWAY INHALATION TREATMENT: CPT

## 2025-03-19 PROCEDURE — 2580000003 HC RX 258: Performed by: PHARMACIST

## 2025-03-19 PROCEDURE — 2700000000 HC OXYGEN THERAPY PER DAY

## 2025-03-19 PROCEDURE — 80202 ASSAY OF VANCOMYCIN: CPT

## 2025-03-19 PROCEDURE — 36415 COLL VENOUS BLD VENIPUNCTURE: CPT

## 2025-03-19 RX ORDER — HYDRALAZINE HYDROCHLORIDE 20 MG/ML
10 INJECTION INTRAMUSCULAR; INTRAVENOUS EVERY 6 HOURS PRN
Status: DISCONTINUED | OUTPATIENT
Start: 2025-03-19 | End: 2025-03-20 | Stop reason: HOSPADM

## 2025-03-19 RX ADMIN — FERROUS SULFATE TAB 325 MG (65 MG ELEMENTAL FE) 325 MG: 325 (65 FE) TAB at 10:10

## 2025-03-19 RX ADMIN — CARBAMAZEPINE 200 MG: 100 SUSPENSION ORAL at 22:25

## 2025-03-19 RX ADMIN — IPRATROPIUM BROMIDE AND ALBUTEROL SULFATE 1 DOSE: 2.5; .5 SOLUTION RESPIRATORY (INHALATION) at 15:17

## 2025-03-19 RX ADMIN — SODIUM CHLORIDE, PRESERVATIVE FREE 10 ML: 5 INJECTION INTRAVENOUS at 10:24

## 2025-03-19 RX ADMIN — ENOXAPARIN SODIUM 30 MG: 100 INJECTION SUBCUTANEOUS at 10:23

## 2025-03-19 RX ADMIN — BUSPIRONE HYDROCHLORIDE 10 MG: 5 TABLET ORAL at 22:24

## 2025-03-19 RX ADMIN — OSELTAMIVIR PHOSPHATE 75 MG: 75 CAPSULE ORAL at 22:24

## 2025-03-19 RX ADMIN — VANCOMYCIN HYDROCHLORIDE 1000 MG: 1 INJECTION, POWDER, LYOPHILIZED, FOR SOLUTION INTRAVENOUS at 13:07

## 2025-03-19 RX ADMIN — LISINOPRIL 40 MG: 20 TABLET ORAL at 10:10

## 2025-03-19 RX ADMIN — ACETAMINOPHEN 1000 MG: 500 TABLET ORAL at 15:08

## 2025-03-19 RX ADMIN — LEVETIRACETAM 500 MG: 100 SOLUTION ORAL at 10:10

## 2025-03-19 RX ADMIN — ACETAMINOPHEN 1000 MG: 500 TABLET ORAL at 22:30

## 2025-03-19 RX ADMIN — CARBAMAZEPINE 200 MG: 100 SUSPENSION ORAL at 10:11

## 2025-03-19 RX ADMIN — VALPROIC ACID 750 MG: 250 SOLUTION ORAL at 22:25

## 2025-03-19 RX ADMIN — MICONAZOLE NITRATE: 2 POWDER TOPICAL at 04:13

## 2025-03-19 RX ADMIN — SODIUM CHLORIDE 3000 MG: 9 INJECTION, SOLUTION INTRAVENOUS at 15:06

## 2025-03-19 RX ADMIN — HYDRALAZINE HYDROCHLORIDE 10 MG: 20 INJECTION INTRAMUSCULAR; INTRAVENOUS at 10:24

## 2025-03-19 RX ADMIN — SODIUM CHLORIDE: 0.9 INJECTION, SOLUTION INTRAVENOUS at 22:52

## 2025-03-19 RX ADMIN — IPRATROPIUM BROMIDE AND ALBUTEROL SULFATE 1 DOSE: 2.5; .5 SOLUTION RESPIRATORY (INHALATION) at 11:42

## 2025-03-19 RX ADMIN — ATORVASTATIN CALCIUM 40 MG: 40 TABLET, FILM COATED ORAL at 22:24

## 2025-03-19 RX ADMIN — IPRATROPIUM BROMIDE AND ALBUTEROL SULFATE 1 DOSE: 2.5; .5 SOLUTION RESPIRATORY (INHALATION) at 07:25

## 2025-03-19 RX ADMIN — MIRTAZAPINE 15 MG: 15 TABLET, FILM COATED ORAL at 22:24

## 2025-03-19 RX ADMIN — OSELTAMIVIR PHOSPHATE 75 MG: 75 CAPSULE ORAL at 10:10

## 2025-03-19 RX ADMIN — Medication 1 CAPSULE: at 10:10

## 2025-03-19 RX ADMIN — LEVETIRACETAM 500 MG: 100 SOLUTION ORAL at 22:24

## 2025-03-19 RX ADMIN — BUSPIRONE HYDROCHLORIDE 10 MG: 5 TABLET ORAL at 10:10

## 2025-03-19 RX ADMIN — SODIUM CHLORIDE 3000 MG: 9 INJECTION, SOLUTION INTRAVENOUS at 01:22

## 2025-03-19 RX ADMIN — MELATONIN TAB 3 MG 3 MG: 3 TAB at 22:24

## 2025-03-19 RX ADMIN — ACETAMINOPHEN 1000 MG: 500 TABLET ORAL at 06:28

## 2025-03-19 RX ADMIN — MICONAZOLE NITRATE: 2 POWDER TOPICAL at 22:31

## 2025-03-19 RX ADMIN — SODIUM CHLORIDE, PRESERVATIVE FREE 10 ML: 5 INJECTION INTRAVENOUS at 22:27

## 2025-03-19 RX ADMIN — VALPROIC ACID 750 MG: 250 SOLUTION ORAL at 10:09

## 2025-03-19 RX ADMIN — SODIUM CHLORIDE 3000 MG: 9 INJECTION, SOLUTION INTRAVENOUS at 22:54

## 2025-03-19 RX ADMIN — SODIUM CHLORIDE 3000 MG: 9 INJECTION, SOLUTION INTRAVENOUS at 06:34

## 2025-03-19 RX ADMIN — LEVOTHYROXINE SODIUM 50 MCG: 0.05 TABLET ORAL at 06:28

## 2025-03-19 ASSESSMENT — PAIN SCALES - GENERAL: PAINLEVEL_OUTOF10: 0

## 2025-03-19 ASSESSMENT — PAIN SCALES - WONG BAKER: WONGBAKER_NUMERICALRESPONSE: NO HURT

## 2025-03-19 NOTE — CONSULTS
Comprehensive Nutrition Assessment    Type and Reason for Visit:  Consult, Initial (TF o/m consult)    Nutrition Recommendations/Plan:   Recommend Continuous EN: Glucerna 1.5 (diabetic formula) at a goal rate of 40 mL/hr 120 mL FWF Q4 H.   Recommend advancing slowly to goal rate.   Will monitor TF tolerance, GI status, wt, labs, lytes, and POC.   Will follow pt as a high nutrition risk.      Malnutrition Assessment:  Malnutrition Status:  Insufficient data (03/18/25 1107)    Context:  Acute Illness     Findings of the 6 clinical characteristics of malnutrition:  Energy Intake:  75% or less of estimated energy requirements for 7 or more days  Weight Loss:  Unable to assess (CBW wt source not specified. Unable to acess)     Body Fat Loss:  Unable to assess     Muscle Mass Loss:  Unable to assess    Fluid Accumulation:  Unable to assess     Strength:  Not Performed    Nutrition Assessment:    Pt admitted with influenza A. Pt has a pmh of anxiety disorder, cerebral palsy, COPD, CVA, diabetes mellitus, GERD, HLD, HTN, seizures.Pt had PEG tube placed. Pt has tolerated Glucerna bolus feedings in the past. Pt currently has aspiration precautions. RD spoke with RN. RN contacted [facility] where staff reported pt was running at 35 mL/hr on 3/17/25 at [facility]. Recommend advancing to goal rate slowly. Recommend Glucerna 1.5(Diabetic formula) at goal rate of 40 ml/hr with 120 ml FWF Q 4 H. This will provide ~1440 total kcals, ~79 g of protein and ~1449 total fluids/day at goal rate. Will follow pt as a high nutrition risk.    Nutrition Related Findings:    POCT Glucose 117, Na 129, Crt 0.2,  RBC 3.33, Hemoglobin 10.6, K Ur 8.5, + lipitor, buspar, lovenox, ferrous sulfate, keppra, synthroid, lisinopril, remeron, NaCl, depakene Wound Type: None       Current Nutrition Intake & Therapies:    Average Meal Intake: NPO  Average Supplements Intake: NPO  Diet NPO    Anthropometric Measures:  Height: 147.3 cm (4' 9.99\")  Ideal Body Weight 
Position ___ O'Clock 0 03/19/25 0944   Undermining Starts ___ O'Clock 0 03/19/25 0944   Undermining Ends___ O'Clock 0 03/19/25 0944   Undermining Maxium Distance (cm) 0 03/19/25 0944   Wound Assessment Purple/maroon;Pink/red 03/19/25 0944   Drainage Amount Moderate (25-50%) 03/19/25 0944   Drainage Description Serosanguinous 03/19/25 0944   Odor None 03/19/25 0944   Nirmala-wound Assessment Fragile 03/19/25 0944   Margins Undefined edges 03/19/25 0944   Wound Thickness Description not for Pressure Injury Partial thickness 03/19/25 0944   Number of days: 0       Response to treatment:  Well tolerated by patient.     Pain Assessment:  Severity: no  Quality of pain:   Wound Pain Timing/Severity:   Premedicated: none    Plan:     Plan of Care: Wound 03/18/25 Radial Left-Dressing/Treatment: Non adherent, ABD, Roll gauze      Patient in bed nurse at bedside pt is non-verbal with cerebral palsy. Has a skin tear to left arm cleansed with NS measured and pictured. Applied versatel/abd/kerlix. Heels intact and floated. Buttock with scar tissue and blanchable erythema foam border reinforced. Turned to lt side. Pt is at high  risk for skin breakdown AEB Heladio. Recommend repositioning with wedges and floating heels with pillows. Follow Heladio orders. SIERRA mattress ordered.           Specialty Bed Required : yes  [x] Low Air Loss   [] Pressure Redistribution  [] Fluid Immersion  [] Bariatric  [] Total Pressure Relief  [] Other:     Discharge Plan:  Placement for patient upon discharge: tbd  Hospice Care: no  Patient appropriate for Outpatient Wound Care Center: tbd    Patient/Caregiver Teaching:  Level of patient/caregiver understanding able to: pt not appropriate for teaching.          Electronically signed by Libby Vale RN,  on 3/19/2025 at 12:01 PM

## 2025-03-20 VITALS
WEIGHT: 109 LBS | TEMPERATURE: 98.1 F | HEART RATE: 69 BPM | DIASTOLIC BLOOD PRESSURE: 136 MMHG | HEIGHT: 58 IN | OXYGEN SATURATION: 88 % | RESPIRATION RATE: 18 BRPM | SYSTOLIC BLOOD PRESSURE: 153 MMHG | BODY MASS INDEX: 22.88 KG/M2

## 2025-03-20 LAB
ANION GAP SERPL CALCULATED.3IONS-SCNC: 10 MMOL/L (ref 9–17)
BUN SERPL-MCNC: 5 MG/DL (ref 7–20)
CALCIUM SERPL-MCNC: 9.1 MG/DL (ref 8.3–10.6)
CHLORIDE SERPL-SCNC: 101 MMOL/L (ref 99–110)
CO2 SERPL-SCNC: 27 MMOL/L (ref 21–32)
CREAT SERPL-MCNC: 0.2 MG/DL (ref 0.6–1.2)
ERYTHROCYTE [DISTWIDTH] IN BLOOD BY AUTOMATED COUNT: 13.1 % (ref 11.7–14.9)
GFR, ESTIMATED: >90 ML/MIN/1.73M2
GLUCOSE SERPL-MCNC: 127 MG/DL (ref 74–99)
HCT VFR BLD AUTO: 33.5 % (ref 37–47)
HGB BLD-MCNC: 10.9 G/DL (ref 12.5–16)
MCH RBC QN AUTO: 32.4 PG (ref 27–31)
MCHC RBC AUTO-ENTMCNC: 32.5 G/DL (ref 32–36)
MCV RBC AUTO: 99.7 FL (ref 78–100)
PLATELET # BLD AUTO: 239 K/UL (ref 140–440)
PMV BLD AUTO: 8.1 FL (ref 7.5–11.1)
POTASSIUM SERPL-SCNC: 3.2 MMOL/L (ref 3.5–5.1)
POTASSIUM SERPL-SCNC: 4.7 MMOL/L (ref 3.5–5.1)
RBC # BLD AUTO: 3.36 M/UL (ref 4.2–5.4)
SODIUM SERPL-SCNC: 138 MMOL/L (ref 136–145)
WBC OTHER # BLD: 3.1 K/UL (ref 4–10.5)

## 2025-03-20 PROCEDURE — 80048 BASIC METABOLIC PNL TOTAL CA: CPT

## 2025-03-20 PROCEDURE — 6360000002 HC RX W HCPCS: Performed by: STUDENT IN AN ORGANIZED HEALTH CARE EDUCATION/TRAINING PROGRAM

## 2025-03-20 PROCEDURE — 85027 COMPLETE CBC AUTOMATED: CPT

## 2025-03-20 PROCEDURE — 6370000000 HC RX 637 (ALT 250 FOR IP): Performed by: STUDENT IN AN ORGANIZED HEALTH CARE EDUCATION/TRAINING PROGRAM

## 2025-03-20 PROCEDURE — 2580000003 HC RX 258: Performed by: STUDENT IN AN ORGANIZED HEALTH CARE EDUCATION/TRAINING PROGRAM

## 2025-03-20 PROCEDURE — 2500000003 HC RX 250 WO HCPCS: Performed by: STUDENT IN AN ORGANIZED HEALTH CARE EDUCATION/TRAINING PROGRAM

## 2025-03-20 PROCEDURE — 6370000000 HC RX 637 (ALT 250 FOR IP): Performed by: NURSE PRACTITIONER

## 2025-03-20 PROCEDURE — 94640 AIRWAY INHALATION TREATMENT: CPT

## 2025-03-20 PROCEDURE — 36415 COLL VENOUS BLD VENIPUNCTURE: CPT

## 2025-03-20 PROCEDURE — 84132 ASSAY OF SERUM POTASSIUM: CPT

## 2025-03-20 RX ORDER — POTASSIUM CHLORIDE 7.45 MG/ML
10 INJECTION INTRAVENOUS PRN
Status: DISCONTINUED | OUTPATIENT
Start: 2025-03-20 | End: 2025-03-20 | Stop reason: HOSPADM

## 2025-03-20 RX ORDER — DOXYCYCLINE HYCLATE 100 MG
100 TABLET ORAL 2 TIMES DAILY
Qty: 10 TABLET | Refills: 0 | Status: SHIPPED | OUTPATIENT
Start: 2025-03-20 | End: 2025-03-25

## 2025-03-20 RX ORDER — POTASSIUM CHLORIDE 1500 MG/1
40 TABLET, EXTENDED RELEASE ORAL PRN
Status: DISCONTINUED | OUTPATIENT
Start: 2025-03-20 | End: 2025-03-20 | Stop reason: HOSPADM

## 2025-03-20 RX ORDER — OSELTAMIVIR PHOSPHATE 75 MG/1
75 CAPSULE ORAL 2 TIMES DAILY
Qty: 4 CAPSULE | Refills: 0 | Status: SHIPPED | OUTPATIENT
Start: 2025-03-20 | End: 2025-03-22

## 2025-03-20 RX ADMIN — ENOXAPARIN SODIUM 30 MG: 100 INJECTION SUBCUTANEOUS at 11:08

## 2025-03-20 RX ADMIN — IPRATROPIUM BROMIDE AND ALBUTEROL SULFATE 1 DOSE: 2.5; .5 SOLUTION RESPIRATORY (INHALATION) at 15:24

## 2025-03-20 RX ADMIN — CARBAMAZEPINE 200 MG: 100 SUSPENSION ORAL at 11:08

## 2025-03-20 RX ADMIN — OSELTAMIVIR PHOSPHATE 75 MG: 75 CAPSULE ORAL at 11:08

## 2025-03-20 RX ADMIN — ACETAMINOPHEN 1000 MG: 500 TABLET ORAL at 11:08

## 2025-03-20 RX ADMIN — LEVETIRACETAM 500 MG: 100 SOLUTION ORAL at 11:08

## 2025-03-20 RX ADMIN — SODIUM CHLORIDE 3000 MG: 9 INJECTION, SOLUTION INTRAVENOUS at 11:31

## 2025-03-20 RX ADMIN — LEVOTHYROXINE SODIUM 50 MCG: 0.05 TABLET ORAL at 05:11

## 2025-03-20 RX ADMIN — LISINOPRIL 40 MG: 20 TABLET ORAL at 11:08

## 2025-03-20 RX ADMIN — MICONAZOLE NITRATE: 2 POWDER TOPICAL at 11:09

## 2025-03-20 RX ADMIN — SODIUM CHLORIDE 3000 MG: 9 INJECTION, SOLUTION INTRAVENOUS at 05:11

## 2025-03-20 RX ADMIN — POTASSIUM BICARBONATE 40 MEQ: 782 TABLET, EFFERVESCENT ORAL at 11:08

## 2025-03-20 RX ADMIN — Medication 1 CAPSULE: at 11:08

## 2025-03-20 RX ADMIN — FERROUS SULFATE TAB 325 MG (65 MG ELEMENTAL FE) 325 MG: 325 (65 FE) TAB at 11:08

## 2025-03-20 RX ADMIN — IPRATROPIUM BROMIDE AND ALBUTEROL SULFATE 1 DOSE: 2.5; .5 SOLUTION RESPIRATORY (INHALATION) at 07:23

## 2025-03-20 RX ADMIN — SODIUM CHLORIDE: 0.9 INJECTION, SOLUTION INTRAVENOUS at 05:10

## 2025-03-20 RX ADMIN — VALPROIC ACID 750 MG: 250 SOLUTION ORAL at 11:08

## 2025-03-20 RX ADMIN — BUSPIRONE HYDROCHLORIDE 10 MG: 5 TABLET ORAL at 11:08

## 2025-03-20 RX ADMIN — SODIUM CHLORIDE: 0.9 INJECTION, SOLUTION INTRAVENOUS at 11:30

## 2025-03-20 RX ADMIN — SODIUM CHLORIDE, PRESERVATIVE FREE 10 ML: 5 INJECTION INTRAVENOUS at 11:09

## 2025-03-20 NOTE — CARE COORDINATION
**Upon discharge patient will be going to      [facility]     ** Please advise family of discharge.     *Please call report to [phone]  * Fax scripts and AVS to [phone]    **Needs Completed EDUARDO

## 2025-03-20 NOTE — PLAN OF CARE
Problem: Chronic Conditions and Co-morbidities  Goal: Patient's chronic conditions and co-morbidity symptoms are monitored and maintained or improved  3/20/2025 1450 by Filomena Rojas LPN  Outcome: Completed     Problem: Discharge Planning  Goal: Discharge to home or other facility with appropriate resources  3/20/2025 1450 by Filomena Rojas LPN  Outcome: Completed     Problem: Pain  Goal: Verbalizes/displays adequate comfort level or baseline comfort level  3/20/2025 1450 by Filomena Rojas LPN  Outcome: Completed     Problem: Safety - Adult  Goal: Free from fall injury  3/20/2025 1450 by Filomena Rojas LPN  Outcome: Completed     Problem: Nutrition Deficit:  Goal: Optimize nutritional status  3/20/2025 1450 by Filomena Rojas LPN  Outcome: Completed     Problem: Skin/Tissue Integrity  Goal: Skin integrity remains intact  Description: 1.  Monitor for areas of redness and/or skin breakdown  2.  Assess vascular access sites hourly  3.  Every 4-6 hours minimum:  Change oxygen saturation probe site  4.  Every 4-6 hours:  If on nasal continuous positive airway pressure, respiratory therapy assess nares and determine need for appliance change or resting period  3/20/2025 1450 by Filomena Rojas LPN  Outcome: Completed     Problem: Coping  Goal: Pt/Family able to verbalize concerns and demonstrate effective coping strategies  Description: INTERVENTIONS:  1. Assist patient/family to identify coping skills, available support systems and cultural and spiritual values  2. Provide emotional support, including active listening and acknowledgement of concerns of patient and caregivers  3. Reduce environmental stimuli, as able  4. Instruct patient/family in relaxation techniques, as appropriate  5. Assess for spiritual pain/suffering and initiate Spiritual Care, Psychosocial Clinical Specialist consults as needed  3/20/2025 1450 by Filomena Rojas LPN  Outcome: Completed     Problem: Neurosensory - Adult  Goal: Achieves stable or improved 
Progressing  3/19/2025 1651 by Aminta Sun RN  Outcome: Progressing     Problem: Musculoskeletal - Adult  Goal: Maintain proper alignment of affected body part  3/20/2025 0403 by Connie Garsia LPN  Outcome: Progressing  3/19/2025 1651 by Aminta Sun RN  Outcome: Progressing     Problem: Gastrointestinal - Adult  Goal: Minimal or absence of nausea and vomiting  3/20/2025 0403 by Connie Garsia LPN  Outcome: Progressing  3/19/2025 1651 by Aminta Sun RN  Outcome: Progressing  Goal: Maintains or returns to baseline bowel function  3/20/2025 0403 by Connie Garsia LPN  Outcome: Progressing  3/19/2025 1651 by Aminta Sun RN  Outcome: Progressing  Goal: Maintains adequate nutritional intake  3/20/2025 0403 by Connie Garsia LPN  Outcome: Progressing  3/19/2025 1651 by Aminta Sun RN  Outcome: Progressing     Problem: Genitourinary - Adult  Goal: Absence of urinary retention  3/20/2025 0403 by Connie Garsia LPN  Outcome: Progressing  3/19/2025 1651 by Aminta Sun RN  Outcome: Progressing     Problem: Infection - Adult  Goal: Absence of infection at discharge  3/19/2025 1651 by Aminta Sun RN  Outcome: Progressing

## 2025-03-20 NOTE — CARE COORDINATION
Transport scheduled for 5pm with [facility]. This RN CM spoke with Lennox Sanchez, with [facility], to advise him on pt's discharge. Packet at nurse's station. Nurse and facility aware of DC time.

## 2025-03-20 NOTE — DISCHARGE INSTR - COC
Name:  Address:  Dialysis Schedule:  Phone:  Fax:    / signature: {Esignature:[id number]}    PHYSICIAN SECTION    Prognosis: {Prognosis:2607932240}    Condition at Discharge: { Patient Condition:983628845}    Rehab Potential (if transferring to Rehab): {Prognosis:4410405900}    Recommended Labs or Other Treatments After Discharge: ***    Physician Certification: I certify the above information and transfer of Jm Garcia  is necessary for the continuing treatment of the diagnosis listed and that she requires {Admit to Appropriate Level of Care:96659} for {GREATER/LESS:607238318} 30 days.     Update Admission H&P: {CHP DME Changes in HandP:077766955}    PHYSICIAN SIGNATURE:  {Esignature:056967769}

## 2025-03-20 NOTE — PROGRESS NOTES
4 Eyes Skin Assessment     NAME:  Jm Garcia  YOB: 1960  MEDICAL RECORD NUMBER:  [id number]    The patient is being assessed for  Shift Handoff    I agree that at least one RN has performed a thorough Head to Toe Skin Assessment on the patient. ALL assessment sites listed below have been assessed.      Areas assessed by both nurses:    Head, Face, Ears, Shoulders, Back, Chest, Arms, Elbows, Hands, Sacrum. Buttock, Coccyx, Ischium, Legs. Feet and Heels, and Under Medical Devices         Does the Patient have a Wound? Yes wound(s) were present on assessment. LDA wound assessment was Initiated and completed by RN       Heladio Prevention initiated by RN: Yes  Wound Care Orders initiated by RN: Yes    Pressure Injury (Stage 3,4, Unstageable, DTI, NWPT, and Complex wounds) if present, place Wound referral order by RN under : No    New Ostomies, if present place, Ostomy referral order under : No     Nurse 1 eSignature: Electronically signed by Niecy Velasco LPN on 3/19/25 at 12:48 AM EDT    **SHARE this note so that the co-signing nurse can place an eSignature**    Nurse 2 eSignature: Electronically signed by James Myers RN on 3/19/25 at 2:57 AM EDT   
4 Eyes Skin Assessment     NAME:  Jm Garcia  YOB: 1960  MEDICAL RECORD NUMBER:  [id number]    The patient is being assessed for  Admission    I agree that at least one RN has performed a thorough Head to Toe Skin Assessment on the patient. ALL assessment sites listed below have been assessed.      Areas assessed by both nurses:    Head, Face, Ears, Shoulders, Back, Chest, Arms, Elbows, Hands, Sacrum. Buttock, Coccyx, Ischium, and Legs. Feet and Heels        Does the Patient have a Wound? No noted wound(s)       Heladio Prevention initiated by RN: Yes  Wound Care Orders initiated by RN: No    Pressure Injury (Stage 3,4, Unstageable, DTI, NWPT, and Complex wounds) if present, place Wound referral order by RN under : No    New Ostomies, if present place, Ostomy referral order under : No     Nurse 1 eSignature: Electronically signed by Aletha Wu RN on 3/18/25 at 4:45 AM EDT    **SHARE this note so that the co-signing nurse can place an eSignature**    Nurse 2 eSignature: {Esignature:[id number]}    
Gracie Rapid EEG complete as of [03/17/2025 @ 1400. Epileptologist on call, Dr. Paola Gilbert, has been notified via [facility].    Electronically signed by Augusta Jewell MA on 3/17/2025 at 2:12 PM   
PHARMACY VANCOMYCIN MONITORING SERVICE  Pharmacy consulted by Dr. Mo Catalan for monitoring and adjustment.    Indication for treatment: Vancomycin indication: CAP with risk factors  Goal trough: Trough Goal: 15-20 mcg/mL  AUC/MAGALIE: 400-600      Pertinent Laboratory Values:   Temp Readings from Last 3 Encounters:   03/17/25 98.6 °F (37 °C) (Oral)   03/08/25 98.1 °F (36.7 °C) (Oral)   11/04/24 98.2 °F (36.8 °C) (Oral)     Recent Labs     03/17/25  1321   WBC 7.5     Recent Labs     03/17/25  1321   BUN 11   CREATININE 0.4*     Estimated Creatinine Clearance: 108 mL/min (A) (based on SCr of 0.4 mg/dL (L)).    Last Encounter Weight:  Wt Readings from Last 3 Encounters:   03/17/25 49.4 kg (109 lb)   03/05/25 49.4 kg (108 lb 14.6 oz)   07/27/24 49.4 kg (108 lb 14.5 oz)       Plan:  Vancomycin 1250mg IVPB x 1 dose then Vancomycin 1000mg Q12h.   Pharmacy will continue to monitor patient and adjust therapy as indicated    VANCOMYCIN CONCENTRATION SCHEDULED FOR 03/19/2025 @0600    Thank you for the consult.  Guy Hall RPH  3/17/2025 6:43 PM   
Pt identified as a candidate for COPD/GOLD assessment. PFTs are required for confirmation of diagnosis and GOLD grading used for pharmacological and nonpharmacological therapy recommendations.  Patient would benefit if a pft were to be ordered, after discharged and able to complete PFT testing as an out-patient.  
This nurse called legal guardian to notify them of patient discharging back to facility. Legal guardian did not answer, voicemail left.   
[facility]  DEPARTMENT OF SPEECH/LANGUAGE PATHOLOGY  DAILY PROGRESS NOTE  Jm Garcia  3/19/2025  [id number]  Influenza A [J10.1]  Seizure disorder (HCC) [G40.909]  Influenza with respiratory manifestation other than pneumonia [J11.1]  Altered mental status, unspecified altered mental status type [R41.82]  Pneumonia due to infectious organism, unspecified laterality, unspecified part of lung [J18.9]  Allergies   Allergen Reactions    Macrobid [Nitrofurantoin] Hives and Swelling     Hives       Pt was seen this date for dysphagia treatment.     IMPRESSION AND RECOMMENDATIONS: Jm Garcia was seen for dysphagia treatment and possible diet advancement. Pt seen in room at 25 degree with tube feeds running. Per recent MBS at [facility], pt is at HIGH risk for reflux and recommend pt remain at least at 45 degrees. Whiteboard updated to reflect recommendations - RN notified. HOB elevated and pt repositioned prior trials. Oral care completed prior to intake - head in chin down position. Pt eager for oral intake. PO trials of ice chips, sips of thin liquids via straw, honey via spoon - pt declined puree. Adequate acceptance and transit. Unable to r/o premature spillage (though noted on MBS completed last week). Suspect delayed swallow initiation with immediate overt s/s of aspiration following ice chips. No overt s/s of aspiration with any small sips of thin liquids or honey-thick liquid trials.    Recommend pt remain strict NPO with tfs via PEG. Medications via PEG. Strict Reflux precautions - HOB no lower than 45 degrees. RN notified. SLP will continue to follow.     GOALS (current status in bold):  Short-term Goals  Timeframe for Short-term Goals: LOS/until Goals are met  Goal 1: Pt will participate in therapeutic PO trials with SLP team to determine appropriateness of PO diet - Progressing, continue  Goal 2: Pt/caregivers will demonstrate comprehension of POC/Recommendations; Progressing - 
@0800    Thank you for the consult.  Jaycee Humphrey RP  3/18/2025 2:07 PM   
Diet and Intervention  Diet Solids Recommendation: NPO  Liquid Consistency Recommendation: NPO  Recommended Form of Meds: Via alternative means of nutrition  Recommendations: NPO     Treatment/Goals  Short-term Goals  Timeframe for Short-term Goals: LOS/until Goals are met  Goal 1: Pt will participate in therapeutic PO trials with SLP team to determine appropriateness of PO diet  Goal 2: Pt/caregivers will demonstrate comprehension of POC/Recommendations    General  Chart Reviewed: Yes  Subjective  Subjective: Pt alert, mostly non-verbal  Behavior/Cognition: Alert  Respiratory Status: O2 via nasual cannula  O2 Device: Nasal cannula  Liters of Oxygen: 2 L  Communication Observation: Non-verbal (Occasional verbalizations)  Follows Directions: None  Dentition: Poor dental/oral hygeine;Some missing teeth  Patient Positioning: Upright in bed  Volitional Cough: Strong  Prior Dysphagia History: MBS completed at [facility] 3/12/25 - rec pt remain NPO  Consistencies Administered: Ice Chips;Pureed;Moderately Thick  - cup    Oral Motor Deficits  Consistencies Administered: Ice Chips;Pureed;Moderately Thick  - cup    Prognosis  Prognosis Considerations: Medical Prognosis;Medical Diagnosis;Severity of Impairments;Previous Level of Function;Potential  Consulted and agree with results and recommendations: RN  RN Name: Laura\    Education  Patient Education: Recommendations  Patient Education Response: No evidence of learning  Safety Devices in place: Yes  Type of devices: Bed alarm in place       Therapy Time  SLP Individual Minutes  Time In: 0944  Time Out: 1000  Minutes: 16    ZAIN Anderson  3/18/2025 11:28 AM        
3.4 3.4 - 5.0 g/dL    Albumin/Globulin Ratio 1.0 (L) 1.1 - 2.2    Total Bilirubin 0.3 0.0 - 1.0 mg/dL    Alkaline Phosphatase 111 40 - 129 U/L    ALT 16 10 - 40 U/L    AST 47 (H) 15 - 37 U/L   Respiratory Panel, Molecular, with COVID-19 (Restricted: peds pts or suitable admitted adults)    Collection Time: 03/17/25  2:44 PM    Specimen: Nasopharyngeal Swab   Result Value Ref Range    Specimen Description .NASOPHARYNGEAL SWAB     Adenovirus PCR Not Detected Not Detected    Coronavirus 229E PCR Not Detected Not Detected    Coronavirus HKU1 PCR Not Detected Not Detected    Coronavirus NL63 PCR Not Detected Not Detected    Coronavirus OC43 PCR Not Detected Not Detected    SARS-CoV-2, PCR Not Detected Not Detected    Human Metapneumovirus PCR Not Detected Not Detected    Rhino/Enterovirus PCR Not Detected Not Detected    Influenza A by PCR DETECTED (A) Not Detected    Influenza A H3 PCR DETECTED (A) Not Detected    Influenza B by PCR Not Detected Not Detected    Parainfluenza 1 PCR Not Detected Not Detected    Parainfluenza 2 PCR Not Detected Not Detected    Parainfluenza 3 PCR Not Detected Not Detected    Parainfluenza 4 PCR Not Detected Not Detected    Resp Syncytial Virus PCR Not Detected Not Detected    Bordetella parapertussis by PCR Not Detected Not Detected    B Pertussis by PCR Not Detected Not Detected    Chlamydia pneumoniae By PCR Not Detected Not Detected    Mycoplasma pneumo by PCR Not Detected Not Detected   Troponin    Collection Time: 03/17/25  2:53 PM   Result Value Ref Range    Troponin, High Sensitivity 16 (H) 0 - 14 ng/L   Blood Gas, Venous    Collection Time: 03/17/25  3:19 PM   Result Value Ref Range    pH, Lan 7.404 7.320 - 7.430    pCO2, Lan 49.5 38 - 54 mm Hg    PO2, Lan 32.2 23 - 48 mm Hg    HCO3, Venous 30.3 (H) 22 - 29 mmol/L    Positive Base Excess, Lan 4.5 (H) 0 - 3 mmol/L    Oxyhemoglobin 56.5 %    Carboxyhemoglobin 1.1 0.5 - 1.5 %    Methemoglobin 0.3 (L) 0.5 - 1.5 %    Pt Temp 37.0     
03/19/25  0209   LACTA 0.6 1.4     BNP: No results for input(s): \"PROBNP\" in the last 72 hours.  UA:  Lab Results   Component Value Date/Time    NITRU NEGATIVE 03/17/2025 04:20 PM    COLORU Yellow 03/17/2025 04:20 PM    PHUR 6.0 03/17/2025 04:20 PM    PHUR 6 04/05/2019 03:53 PM    WBCUA 2 03/17/2025 04:20 PM    RBCUA 1 03/17/2025 04:20 PM    RBCUA 28 01/23/2023 01:32 PM    MUCUS RARE 01/23/2023 01:32 PM    TRICHOMONAS NONE SEEN 01/23/2023 01:32 PM    YEAST RARE 08/11/2018 04:20 PM    BACTERIA NEGATIVE 01/23/2023 01:32 PM    CLARITYU CLEAR 02/02/2023 01:04 PM    SPECGRAV 1.020 04/05/2019 03:53 PM    LEUKOCYTESUR NEGATIVE 03/17/2025 04:20 PM    UROBILINOGEN 1.0 03/17/2025 04:20 PM    BILIRUBINUR NEGATIVE 03/17/2025 04:20 PM    BLOODU NEGATIVE 02/02/2023 01:04 PM    GLUCOSEU NEGATIVE 03/17/2025 04:20 PM    KETUA NEGATIVE 03/17/2025 04:20 PM     Urine Cultures:   Lab Results   Component Value Date/Time    LABURIN  08/28/2018 01:17 PM     <10,000 CFU/ml mixed skin/urogenital jaycob. No further workup    LABURIN 50,000 CFU/ml  Multiple Resistant Drug Organism   08/28/2018 01:17 PM     Blood Cultures: No results found for: \"BC\"  No results found for: \"BLOODCULT2\"  Organism:   Lab Results   Component Value Date/Time    ORG ECOL 12/18/2018 07:49 AM         Electronically signed by Omar Carbajal MD on 3/19/2025 at 11:11 AM

## 2025-03-20 NOTE — DISCHARGE SUMMARY
V2.0  Discharge Summary    Name:  Jm Garcia /Age/Sex: 1960 (64 y.o. female)   Admit Date: 3/17/2025  Discharge Date: 3/20/25    MRN & CSN:  [id number] & [id number] Encounter Date and Time 3/20/25 1:37 PM EDT    Attending:  Omar Carbajal MD Discharging Provider: Omar Carbajal MD       Hospital Course:     Brief HPI:     Jm Garcia is a 64 y.o. female with pmh of nxiety disorder, cerebral palsy, COPD, CVA, diabetes mellitus, GERD, HLD, HTN, seizures who presents from the nursing facility with complaint of altered mental status and shortness of breath.  They were unsure if the patient is postictal.  As per their assessment, patient's mental status changed from her baseline.  Vital signs stable in the ED on 2 L oxygen, labs with sodium 125, hemoglobin 12.4/hematocrit 36, respiratory viral panel positive for influenza A infection, urinalysis unremarkable, CT head with no acute changes.  CT chest abdomen and pelvis with \".  There is endobronchial debris, bronchial wall thickening, and some tree-in-bud-type nodularity as well as mild groundglass changes in the lower  lungs. The findings favor bronchitis with developing pneumonia      Brief Problem Based Course:       # Acute hypoxemic respiratory failure secondary to influenza A  -Possible aspiration pneumonia  -Was started on antimicrobials.  Will complete treatment course with Tamiflu and doxycycline       # Metabolic encephalopathy  -Improved     # History of seizures  # Cerebral palsy  -Continue home medications     # Hypertension  -Continue antihypertensives     # Hypothyroidism  -Continue levothyroxine     # Hyperlipidemia  -Continue statins    The patient expressed appropriate understanding of, and agreement with the discharge recommendations, medications, and plan.     Consults this admission:  PHARMACY TO DOSE VANCOMYCIN  IP CONSULT TO DIETITIAN    Discharge Diagnosis:   Influenza A        Discharge Instruction:   Follow up appointments:

## 2025-03-23 LAB
MICROORGANISM SPEC CULT: NORMAL
MICROORGANISM SPEC CULT: NORMAL
SERVICE CMNT-IMP: NORMAL
SERVICE CMNT-IMP: NORMAL
SPECIMEN DESCRIPTION: NORMAL
SPECIMEN DESCRIPTION: NORMAL

## 2025-04-16 PROBLEM — J10.1 INFLUENZA A: Status: RESOLVED | Noted: 2025-03-17 | Resolved: 2025-04-16

## 2025-07-13 ENCOUNTER — HOSPITAL ENCOUNTER (EMERGENCY)
Age: 65
Discharge: HOME | End: 2025-07-13
Attending: EMERGENCY MEDICINE
Payer: COMMERCIAL

## 2025-07-13 ENCOUNTER — APPOINTMENT (OUTPATIENT)
Dept: GENERAL RADIOLOGY | Age: 65
End: 2025-07-13
Attending: EMERGENCY MEDICINE
Payer: COMMERCIAL

## 2025-07-13 VITALS
HEART RATE: 75 BPM | SYSTOLIC BLOOD PRESSURE: 154 MMHG | RESPIRATION RATE: 18 BRPM | TEMPERATURE: 96.6 F | OXYGEN SATURATION: 98 % | DIASTOLIC BLOOD PRESSURE: 90 MMHG

## 2025-07-13 DIAGNOSIS — K94.20 PROBLEM WITH GASTROSTOMY TUBE (HCC): Primary | ICD-10-CM

## 2025-07-13 PROCEDURE — 74018 RADEX ABDOMEN 1 VIEW: CPT

## 2025-07-13 PROCEDURE — 6360000004 HC RX CONTRAST MEDICATION: Performed by: EMERGENCY MEDICINE

## 2025-07-13 PROCEDURE — 43762 RPLC GTUBE NO REVJ TRC: CPT

## 2025-07-13 PROCEDURE — 99283 EMERGENCY DEPT VISIT LOW MDM: CPT

## 2025-07-13 RX ORDER — DIATRIZOATE MEGLUMINE AND DIATRIZOATE SODIUM 660; 100 MG/ML; MG/ML
30 SOLUTION ORAL; RECTAL
Status: DISCONTINUED | OUTPATIENT
Start: 2025-07-13 | End: 2025-07-13 | Stop reason: HOSPADM

## 2025-07-13 RX ADMIN — DIATRIZOATE MEGLUMINE AND DIATRIZOATE SODIUM 30 ML: 600; 100 SOLUTION ORAL; RECTAL at 05:28

## 2025-07-13 ASSESSMENT — PAIN SCALES - WONG BAKER
WONGBAKER_NUMERICALRESPONSE: NO HURT
WONGBAKER_NUMERICALRESPONSE: NO HURT

## 2025-07-13 ASSESSMENT — PAIN - FUNCTIONAL ASSESSMENT
PAIN_FUNCTIONAL_ASSESSMENT: WONG-BAKER FACES
PAIN_FUNCTIONAL_ASSESSMENT: WONG-BAKER FACES

## 2025-07-13 NOTE — ED PROVIDER NOTES
place a 14 Telugu G-tube at bedside.  Placement was confirmed with KUB with Gastrografin.    We secured the G-tube.    She is sent back to her facility for further evaluation and management.        -  Patient seen and evaluated in the emergency department.  -  Triage and nursing notes reviewed and incorporated.  -  Old chart records reviewed and incorporated.  -  Work-up included:  See above  -  Results discussed with patient.      REASSESSMENT        7/13/25 4:17 AM EDT I have completed a reassessment     CRITICAL CARE TIME     This excludes seperately billable procedures and family discussion time. Critical care time provided for obtaining history, conducting a physical exam, performing and monitoring interventions, ordering, collecting and interpreting tests, and establishing medical decision-making.  There was a potential for life/limb threatening pathology requiring close evaluation and intervention with concern for patient decompensation.    CONSULTS:  None    PROCEDURES:  None performed unless otherwise noted below     Feeding Tube    Date/Time: 7/13/2025 6:06 AM    Performed by: Kyung Askew MD  Authorized by: Kyung Askew MD    Consent:     Consent obtained:  Emergent situation  Universal protocol:     Patient identity confirmed:  Arm band  Pre-procedure details:     Old tube type:  Gastrostomy    Old tube size:  14 Fr  Sedation:     Sedation type:  None  Anesthesia:     Anesthesia method:  None  Procedure details:     Patient position:  Supine    Procedure type:  Replacement    Tube type:  Gastrostomy    Tube size:  14 Fr    Bulb inflation volume:  5ml    Bulb inflation fluid:  Normal saline  Post-procedure details:     Placement/position confirmation:  X-ray    Placement difficulty:  None    Bleeding:  None    Procedure completion:  Tolerated well, no immediate complications          FINAL IMPRESSION      1. Problem with gastrostomy tube (HCC)          DISPOSITION/PLAN   DISPOSITION

## 2025-07-13 NOTE — ED TRIAGE NOTES
PT to ED via [facility] from [facility].  PT pulled out Gtube shortly before arrival.  PT has hx TIA and is contracted at baseline.      AO1: Self only.

## 2025-08-09 ENCOUNTER — HOSPITAL ENCOUNTER (OUTPATIENT)
Age: 65
Setting detail: OBSERVATION
Discharge: NURSING, REHAB OR LONG-TERM CARE | End: 2025-08-13
Attending: EMERGENCY MEDICINE
Payer: COMMERCIAL

## 2025-08-09 DIAGNOSIS — T85.528A DISLODGED GASTROSTOMY TUBE: Primary | ICD-10-CM

## 2025-08-09 DIAGNOSIS — E78.2 MIXED HYPERLIPIDEMIA: ICD-10-CM

## 2025-08-09 PROBLEM — K94.20 COMPLICATION OF GASTROSTOMY TUBE (HCC): Status: ACTIVE | Noted: 2025-08-09

## 2025-08-09 LAB
ANION GAP SERPL CALCULATED.3IONS-SCNC: 8 MMOL/L (ref 9–17)
BACTERIA URNS QL MICRO: ABNORMAL
BASOPHILS # BLD: 0.02 K/UL
BASOPHILS NFR BLD: 1 % (ref 0–1)
BILIRUB UR QL STRIP: NEGATIVE
BUN SERPL-MCNC: 14 MG/DL (ref 7–20)
CALCIUM SERPL-MCNC: 8.9 MG/DL (ref 8.3–10.6)
CHLORIDE SERPL-SCNC: 90 MMOL/L (ref 99–110)
CLARITY UR: ABNORMAL
CO2 SERPL-SCNC: 31 MMOL/L (ref 21–32)
COLOR UR: YELLOW
CREAT SERPL-MCNC: 0.4 MG/DL (ref 0.6–1.2)
CRYSTALS URNS MICRO: ABNORMAL /HPF
EOSINOPHIL # BLD: 0.08 K/UL
EOSINOPHILS RELATIVE PERCENT: 2 % (ref 0–3)
EPI CELLS #/AREA URNS HPF: 1 /HPF
ERYTHROCYTE [DISTWIDTH] IN BLOOD BY AUTOMATED COUNT: 12.9 % (ref 11.7–14.9)
GFR, ESTIMATED: >90 ML/MIN/1.73M2
GLUCOSE SERPL-MCNC: 108 MG/DL (ref 74–99)
GLUCOSE UR STRIP-MCNC: NEGATIVE MG/DL
HCT VFR BLD AUTO: 38.8 % (ref 37–47)
HGB BLD-MCNC: 13.1 G/DL (ref 12.5–16)
HGB UR QL STRIP.AUTO: NEGATIVE
IMM GRANULOCYTES # BLD AUTO: 0.01 K/UL
IMM GRANULOCYTES NFR BLD: 0 %
INR PPP: 0.9
KETONES UR STRIP-MCNC: NEGATIVE MG/DL
LEUKOCYTE ESTERASE UR QL STRIP: ABNORMAL
LYMPHOCYTES NFR BLD: 1.89 K/UL
LYMPHOCYTES RELATIVE PERCENT: 46 % (ref 24–44)
MCH RBC QN AUTO: 33.4 PG (ref 27–31)
MCHC RBC AUTO-ENTMCNC: 33.8 G/DL (ref 32–36)
MCV RBC AUTO: 99 FL (ref 78–100)
MONOCYTES NFR BLD: 0.3 K/UL
MONOCYTES NFR BLD: 7 % (ref 0–5)
NEUTROPHILS NFR BLD: 44 % (ref 36–66)
NEUTS SEG NFR BLD: 1.8 K/UL
NITRITE UR QL STRIP: NEGATIVE
OSMOLALITY UR: 491 MOSM/KG (ref 292–1090)
PARTIAL THROMBOPLASTIN TIME: 31.7 SEC (ref 25.1–37.1)
PH UR STRIP: 6.5 [PH] (ref 5–8)
PLATELET # BLD AUTO: 225 K/UL (ref 140–440)
PMV BLD AUTO: 8.8 FL (ref 7.5–11.1)
POTASSIUM SERPL-SCNC: 3.6 MMOL/L (ref 3.5–5.1)
PROT UR STRIP-MCNC: NEGATIVE MG/DL
PROTHROMBIN TIME: 12.7 SEC (ref 11.7–14.5)
RBC # BLD AUTO: 3.92 M/UL (ref 4.2–5.4)
RBC #/AREA URNS HPF: <1 /HPF (ref 0–2)
SODIUM SERPL-SCNC: 128 MMOL/L (ref 136–145)
SODIUM UR-SCNC: 28 MMOL/L (ref 40–220)
SP GR UR STRIP: 1.01 (ref 1–1.03)
UROBILINOGEN UR STRIP-ACNC: 0.2 EU/DL (ref 0–1)
WBC #/AREA URNS HPF: 13 /HPF (ref 0–5)
WBC OTHER # BLD: 4.1 K/UL (ref 4–10.5)

## 2025-08-09 PROCEDURE — 94761 N-INVAS EAR/PLS OXIMETRY MLT: CPT

## 2025-08-09 PROCEDURE — 6360000002 HC RX W HCPCS: Performed by: STUDENT IN AN ORGANIZED HEALTH CARE EDUCATION/TRAINING PROGRAM

## 2025-08-09 PROCEDURE — 85610 PROTHROMBIN TIME: CPT

## 2025-08-09 PROCEDURE — 84300 ASSAY OF URINE SODIUM: CPT

## 2025-08-09 PROCEDURE — 83935 ASSAY OF URINE OSMOLALITY: CPT

## 2025-08-09 PROCEDURE — 2500000003 HC RX 250 WO HCPCS: Performed by: STUDENT IN AN ORGANIZED HEALTH CARE EDUCATION/TRAINING PROGRAM

## 2025-08-09 PROCEDURE — 99285 EMERGENCY DEPT VISIT HI MDM: CPT

## 2025-08-09 PROCEDURE — 85025 COMPLETE CBC W/AUTO DIFF WBC: CPT

## 2025-08-09 PROCEDURE — G0378 HOSPITAL OBSERVATION PER HR: HCPCS

## 2025-08-09 PROCEDURE — 96361 HYDRATE IV INFUSION ADD-ON: CPT

## 2025-08-09 PROCEDURE — 96366 THER/PROPH/DIAG IV INF ADDON: CPT

## 2025-08-09 PROCEDURE — 96375 TX/PRO/DX INJ NEW DRUG ADDON: CPT

## 2025-08-09 PROCEDURE — 2580000003 HC RX 258: Performed by: STUDENT IN AN ORGANIZED HEALTH CARE EDUCATION/TRAINING PROGRAM

## 2025-08-09 PROCEDURE — 85730 THROMBOPLASTIN TIME PARTIAL: CPT

## 2025-08-09 PROCEDURE — 81001 URINALYSIS AUTO W/SCOPE: CPT

## 2025-08-09 PROCEDURE — 96365 THER/PROPH/DIAG IV INF INIT: CPT

## 2025-08-09 PROCEDURE — 80048 BASIC METABOLIC PNL TOTAL CA: CPT

## 2025-08-09 PROCEDURE — 96376 TX/PRO/DX INJ SAME DRUG ADON: CPT

## 2025-08-09 RX ORDER — LEVETIRACETAM 500 MG/5ML
500 INJECTION, SOLUTION, CONCENTRATE INTRAVENOUS EVERY 12 HOURS
Status: DISCONTINUED | OUTPATIENT
Start: 2025-08-09 | End: 2025-08-11

## 2025-08-09 RX ORDER — ONDANSETRON 2 MG/ML
4 INJECTION INTRAMUSCULAR; INTRAVENOUS EVERY 6 HOURS PRN
Status: DISCONTINUED | OUTPATIENT
Start: 2025-08-09 | End: 2025-08-13 | Stop reason: HOSPADM

## 2025-08-09 RX ORDER — SODIUM CHLORIDE 9 MG/ML
1000 INJECTION, SOLUTION INTRAVENOUS CONTINUOUS
Status: DISCONTINUED | OUTPATIENT
Start: 2025-08-09 | End: 2025-08-11

## 2025-08-09 RX ORDER — ENOXAPARIN SODIUM 100 MG/ML
40 INJECTION SUBCUTANEOUS NIGHTLY
Status: DISCONTINUED | OUTPATIENT
Start: 2025-08-09 | End: 2025-08-13 | Stop reason: HOSPADM

## 2025-08-09 RX ADMIN — VALPROATE SODIUM 500 MG: 100 INJECTION, SOLUTION INTRAVENOUS at 18:19

## 2025-08-09 RX ADMIN — LEVETIRACETAM 500 MG: 500 INJECTION INTRAVENOUS at 11:37

## 2025-08-09 RX ADMIN — VALPROATE SODIUM 500 MG: 100 INJECTION, SOLUTION INTRAVENOUS at 11:41

## 2025-08-09 RX ADMIN — LEVETIRACETAM 500 MG: 500 INJECTION INTRAVENOUS at 20:36

## 2025-08-09 RX ADMIN — SODIUM CHLORIDE 1000 ML: 0.9 INJECTION, SOLUTION INTRAVENOUS at 18:16

## 2025-08-09 ASSESSMENT — PAIN SCALES - GENERAL
PAINLEVEL_OUTOF10: 0
PAINLEVEL_OUTOF10: 0

## 2025-08-09 ASSESSMENT — PAIN - FUNCTIONAL ASSESSMENT: PAIN_FUNCTIONAL_ASSESSMENT: 0-10

## 2025-08-09 ASSESSMENT — PAIN SCALES - WONG BAKER: WONGBAKER_NUMERICALRESPONSE: NO HURT

## 2025-08-10 LAB
ANION GAP SERPL CALCULATED.3IONS-SCNC: 12 MMOL/L (ref 9–17)
BUN SERPL-MCNC: 16 MG/DL (ref 7–20)
CALCIUM SERPL-MCNC: 8.9 MG/DL (ref 8.3–10.6)
CHLORIDE SERPL-SCNC: 102 MMOL/L (ref 99–110)
CO2 SERPL-SCNC: 23 MMOL/L (ref 21–32)
CREAT SERPL-MCNC: 1.3 MG/DL (ref 0.6–1.2)
GFR, ESTIMATED: 46 ML/MIN/1.73M2
GLUCOSE SERPL-MCNC: 245 MG/DL (ref 74–99)
POTASSIUM SERPL-SCNC: 4.1 MMOL/L (ref 3.5–5.1)
SODIUM SERPL-SCNC: 137 MMOL/L (ref 136–145)

## 2025-08-10 PROCEDURE — 2580000003 HC RX 258: Performed by: STUDENT IN AN ORGANIZED HEALTH CARE EDUCATION/TRAINING PROGRAM

## 2025-08-10 PROCEDURE — 96376 TX/PRO/DX INJ SAME DRUG ADON: CPT

## 2025-08-10 PROCEDURE — 6360000002 HC RX W HCPCS: Performed by: STUDENT IN AN ORGANIZED HEALTH CARE EDUCATION/TRAINING PROGRAM

## 2025-08-10 PROCEDURE — 94761 N-INVAS EAR/PLS OXIMETRY MLT: CPT

## 2025-08-10 PROCEDURE — 36415 COLL VENOUS BLD VENIPUNCTURE: CPT

## 2025-08-10 PROCEDURE — 2500000003 HC RX 250 WO HCPCS: Performed by: STUDENT IN AN ORGANIZED HEALTH CARE EDUCATION/TRAINING PROGRAM

## 2025-08-10 PROCEDURE — 96366 THER/PROPH/DIAG IV INF ADDON: CPT

## 2025-08-10 PROCEDURE — 96361 HYDRATE IV INFUSION ADD-ON: CPT

## 2025-08-10 PROCEDURE — 80048 BASIC METABOLIC PNL TOTAL CA: CPT

## 2025-08-10 PROCEDURE — G0378 HOSPITAL OBSERVATION PER HR: HCPCS

## 2025-08-10 RX ADMIN — LEVETIRACETAM 500 MG: 500 INJECTION INTRAVENOUS at 09:16

## 2025-08-10 RX ADMIN — VALPROATE SODIUM 500 MG: 100 INJECTION, SOLUTION INTRAVENOUS at 17:59

## 2025-08-10 RX ADMIN — VALPROATE SODIUM 500 MG: 100 INJECTION, SOLUTION INTRAVENOUS at 09:27

## 2025-08-10 RX ADMIN — VALPROATE SODIUM 500 MG: 100 INJECTION, SOLUTION INTRAVENOUS at 02:23

## 2025-08-10 RX ADMIN — SODIUM CHLORIDE 1000 ML: 0.9 INJECTION, SOLUTION INTRAVENOUS at 05:21

## 2025-08-10 RX ADMIN — SODIUM CHLORIDE 1000 ML: 0.9 INJECTION, SOLUTION INTRAVENOUS at 18:49

## 2025-08-10 RX ADMIN — LEVETIRACETAM 500 MG: 500 INJECTION INTRAVENOUS at 21:22

## 2025-08-10 ASSESSMENT — PAIN SCALES - GENERAL
PAINLEVEL_OUTOF10: 0

## 2025-08-11 ENCOUNTER — APPOINTMENT (OUTPATIENT)
Dept: INTERVENTIONAL RADIOLOGY/VASCULAR | Age: 65
End: 2025-08-11
Payer: COMMERCIAL

## 2025-08-11 LAB
ANION GAP SERPL CALCULATED.3IONS-SCNC: 16 MMOL/L (ref 9–17)
BASOPHILS # BLD: 0.03 K/UL
BASOPHILS NFR BLD: 0 % (ref 0–1)
BUN SERPL-MCNC: 7 MG/DL (ref 7–20)
CALCIUM SERPL-MCNC: 9.1 MG/DL (ref 8.3–10.6)
CHLORIDE SERPL-SCNC: 103 MMOL/L (ref 99–110)
CO2 SERPL-SCNC: 21 MMOL/L (ref 21–32)
CREAT SERPL-MCNC: 0.3 MG/DL (ref 0.6–1.2)
EOSINOPHIL # BLD: 0.01 K/UL
EOSINOPHILS RELATIVE PERCENT: 0 % (ref 0–3)
ERYTHROCYTE [DISTWIDTH] IN BLOOD BY AUTOMATED COUNT: 13.3 % (ref 11.7–14.9)
GFR, ESTIMATED: >90 ML/MIN/1.73M2
GLUCOSE SERPL-MCNC: 105 MG/DL (ref 74–99)
HCT VFR BLD AUTO: 44.1 % (ref 37–47)
HGB BLD-MCNC: 14 G/DL (ref 12.5–16)
IMM GRANULOCYTES # BLD AUTO: 0.03 K/UL
IMM GRANULOCYTES NFR BLD: 0 %
INR PPP: 1
LYMPHOCYTES NFR BLD: 1.32 K/UL
LYMPHOCYTES RELATIVE PERCENT: 17 % (ref 24–44)
MCH RBC QN AUTO: 32.8 PG (ref 27–31)
MCHC RBC AUTO-ENTMCNC: 31.7 G/DL (ref 32–36)
MCV RBC AUTO: 103.3 FL (ref 78–100)
MONOCYTES NFR BLD: 0.53 K/UL
MONOCYTES NFR BLD: 7 % (ref 0–5)
NEUTROPHILS NFR BLD: 75 % (ref 36–66)
NEUTS SEG NFR BLD: 5.67 K/UL
PLATELET # BLD AUTO: 295 K/UL (ref 140–440)
PMV BLD AUTO: 8.7 FL (ref 7.5–11.1)
POTASSIUM SERPL-SCNC: 3.7 MMOL/L (ref 3.5–5.1)
PROTHROMBIN TIME: 13.5 SEC (ref 11.7–14.5)
RBC # BLD AUTO: 4.27 M/UL (ref 4.2–5.4)
SODIUM SERPL-SCNC: 139 MMOL/L (ref 136–145)
WBC OTHER # BLD: 7.6 K/UL (ref 4–10.5)

## 2025-08-11 PROCEDURE — 96366 THER/PROPH/DIAG IV INF ADDON: CPT

## 2025-08-11 PROCEDURE — G0378 HOSPITAL OBSERVATION PER HR: HCPCS

## 2025-08-11 PROCEDURE — 49450 REPLACE G/C TUBE PERC: CPT | Performed by: RADIOLOGY

## 2025-08-11 PROCEDURE — 6370000000 HC RX 637 (ALT 250 FOR IP): Performed by: STUDENT IN AN ORGANIZED HEALTH CARE EDUCATION/TRAINING PROGRAM

## 2025-08-11 PROCEDURE — 80048 BASIC METABOLIC PNL TOTAL CA: CPT

## 2025-08-11 PROCEDURE — 96372 THER/PROPH/DIAG INJ SC/IM: CPT

## 2025-08-11 PROCEDURE — 49440 PLACE GASTROSTOMY TUBE PERC: CPT

## 2025-08-11 PROCEDURE — 96376 TX/PRO/DX INJ SAME DRUG ADON: CPT

## 2025-08-11 PROCEDURE — 2500000003 HC RX 250 WO HCPCS: Performed by: STUDENT IN AN ORGANIZED HEALTH CARE EDUCATION/TRAINING PROGRAM

## 2025-08-11 PROCEDURE — 85610 PROTHROMBIN TIME: CPT

## 2025-08-11 PROCEDURE — 85025 COMPLETE CBC W/AUTO DIFF WBC: CPT

## 2025-08-11 PROCEDURE — 6360000002 HC RX W HCPCS: Performed by: STUDENT IN AN ORGANIZED HEALTH CARE EDUCATION/TRAINING PROGRAM

## 2025-08-11 PROCEDURE — 96361 HYDRATE IV INFUSION ADD-ON: CPT

## 2025-08-11 PROCEDURE — 6360000002 HC RX W HCPCS: Performed by: NURSE PRACTITIONER

## 2025-08-11 PROCEDURE — 6370000000 HC RX 637 (ALT 250 FOR IP): Performed by: NURSE PRACTITIONER

## 2025-08-11 PROCEDURE — 2580000003 HC RX 258: Performed by: STUDENT IN AN ORGANIZED HEALTH CARE EDUCATION/TRAINING PROGRAM

## 2025-08-11 PROCEDURE — 6360000002 HC RX W HCPCS: Performed by: RADIOLOGY

## 2025-08-11 PROCEDURE — 6360000004 HC RX CONTRAST MEDICATION

## 2025-08-11 PROCEDURE — 36415 COLL VENOUS BLD VENIPUNCTURE: CPT

## 2025-08-11 PROCEDURE — 94761 N-INVAS EAR/PLS OXIMETRY MLT: CPT

## 2025-08-11 PROCEDURE — 6360000002 HC RX W HCPCS

## 2025-08-11 PROCEDURE — 2709999900 IR GUIDED REPLACE G TUBE/CECOSTOMY PERC W CONTRAST

## 2025-08-11 PROCEDURE — 6360000004 HC RX CONTRAST MEDICATION: Performed by: RADIOLOGY

## 2025-08-11 PROCEDURE — 94640 AIRWAY INHALATION TREATMENT: CPT

## 2025-08-11 RX ORDER — CARBAMAZEPINE 100 MG/5ML
200 SUSPENSION ORAL 2 TIMES DAILY
Status: DISCONTINUED | OUTPATIENT
Start: 2025-08-11 | End: 2025-08-13 | Stop reason: HOSPADM

## 2025-08-11 RX ORDER — LEVETIRACETAM 100 MG/ML
500 SOLUTION ORAL 2 TIMES DAILY
Status: DISCONTINUED | OUTPATIENT
Start: 2025-08-11 | End: 2025-08-13 | Stop reason: HOSPADM

## 2025-08-11 RX ORDER — VALPROIC ACID 250 MG/5ML
750 SOLUTION ORAL 2 TIMES DAILY
Status: DISCONTINUED | OUTPATIENT
Start: 2025-08-11 | End: 2025-08-13 | Stop reason: HOSPADM

## 2025-08-11 RX ORDER — DIPHENHYDRAMINE HYDROCHLORIDE 50 MG/ML
INJECTION, SOLUTION INTRAMUSCULAR; INTRAVENOUS PRN
Status: COMPLETED | OUTPATIENT
Start: 2025-08-11 | End: 2025-08-11

## 2025-08-11 RX ORDER — ACETAMINOPHEN 500 MG
1000 TABLET ORAL EVERY 8 HOURS
Status: DISCONTINUED | OUTPATIENT
Start: 2025-08-11 | End: 2025-08-13 | Stop reason: HOSPADM

## 2025-08-11 RX ORDER — FENTANYL CITRATE 50 UG/ML
INJECTION, SOLUTION INTRAMUSCULAR; INTRAVENOUS PRN
Status: COMPLETED | OUTPATIENT
Start: 2025-08-11 | End: 2025-08-11

## 2025-08-11 RX ORDER — IOPAMIDOL 755 MG/ML
INJECTION, SOLUTION INTRAVASCULAR PRN
Status: COMPLETED | OUTPATIENT
Start: 2025-08-11 | End: 2025-08-11

## 2025-08-11 RX ORDER — LIDOCAINE HYDROCHLORIDE 10 MG/ML
INJECTION, SOLUTION EPIDURAL; INFILTRATION; INTRACAUDAL; PERINEURAL PRN
Status: COMPLETED | OUTPATIENT
Start: 2025-08-11 | End: 2025-08-11

## 2025-08-11 RX ORDER — BUSPIRONE HYDROCHLORIDE 5 MG/1
10 TABLET ORAL 2 TIMES DAILY
Status: DISCONTINUED | OUTPATIENT
Start: 2025-08-11 | End: 2025-08-13 | Stop reason: HOSPADM

## 2025-08-11 RX ORDER — IPRATROPIUM BROMIDE AND ALBUTEROL SULFATE 2.5; .5 MG/3ML; MG/3ML
1 SOLUTION RESPIRATORY (INHALATION)
Status: DISCONTINUED | OUTPATIENT
Start: 2025-08-11 | End: 2025-08-13 | Stop reason: HOSPADM

## 2025-08-11 RX ORDER — HYDRALAZINE HYDROCHLORIDE 20 MG/ML
10 INJECTION INTRAMUSCULAR; INTRAVENOUS
Status: DISCONTINUED | OUTPATIENT
Start: 2025-08-11 | End: 2025-08-13 | Stop reason: HOSPADM

## 2025-08-11 RX ORDER — FERROUS SULFATE 325(65) MG
325 TABLET ORAL
Status: DISCONTINUED | OUTPATIENT
Start: 2025-08-12 | End: 2025-08-13 | Stop reason: HOSPADM

## 2025-08-11 RX ORDER — CARBOXYMETHYLCELLULOSE SODIUM 10 MG/ML
1 GEL OPHTHALMIC PRN
Status: DISCONTINUED | OUTPATIENT
Start: 2025-08-11 | End: 2025-08-13 | Stop reason: HOSPADM

## 2025-08-11 RX ORDER — ATORVASTATIN CALCIUM 40 MG/1
40 TABLET, FILM COATED ORAL NIGHTLY
Status: DISCONTINUED | OUTPATIENT
Start: 2025-08-11 | End: 2025-08-13 | Stop reason: HOSPADM

## 2025-08-11 RX ADMIN — HYDRALAZINE HYDROCHLORIDE 10 MG: 20 INJECTION INTRAMUSCULAR; INTRAVENOUS at 23:25

## 2025-08-11 RX ADMIN — FENTANYL CITRATE 50 MCG: 50 INJECTION, SOLUTION INTRAMUSCULAR; INTRAVENOUS at 17:16

## 2025-08-11 RX ADMIN — ENOXAPARIN SODIUM 40 MG: 100 INJECTION SUBCUTANEOUS at 22:52

## 2025-08-11 RX ADMIN — ATORVASTATIN CALCIUM 40 MG: 40 TABLET, FILM COATED ORAL at 22:52

## 2025-08-11 RX ADMIN — IPRATROPIUM BROMIDE AND ALBUTEROL SULFATE 1 DOSE: .5; 3 SOLUTION RESPIRATORY (INHALATION) at 21:51

## 2025-08-11 RX ADMIN — VALPROATE SODIUM 500 MG: 100 INJECTION, SOLUTION INTRAVENOUS at 18:36

## 2025-08-11 RX ADMIN — LEVETIRACETAM 500 MG: 500 INJECTION INTRAVENOUS at 08:28

## 2025-08-11 RX ADMIN — LIDOCAINE HYDROCHLORIDE 7 ML: 10 INJECTION, SOLUTION EPIDURAL; INFILTRATION; INTRACAUDAL; PERINEURAL at 17:51

## 2025-08-11 RX ADMIN — CARBAMAZEPINE 200 MG: 100 SUSPENSION ORAL at 23:08

## 2025-08-11 RX ADMIN — VALPROATE SODIUM 500 MG: 100 INJECTION, SOLUTION INTRAVENOUS at 10:40

## 2025-08-11 RX ADMIN — DIPHENHYDRAMINE HYDROCHLORIDE 25 MG: 50 INJECTION, SOLUTION INTRAMUSCULAR; INTRAVENOUS at 17:16

## 2025-08-11 RX ADMIN — LEVETIRACETAM 500 MG: 100 SOLUTION ORAL at 22:52

## 2025-08-11 RX ADMIN — CARBOXYMETHYLCELLULOSE SODIUM 1 DROP: 10 GEL OPHTHALMIC at 03:08

## 2025-08-11 RX ADMIN — VALPROATE SODIUM 500 MG: 100 INJECTION, SOLUTION INTRAVENOUS at 02:00

## 2025-08-11 RX ADMIN — ACETAMINOPHEN 1000 MG: 500 TABLET ORAL at 22:52

## 2025-08-11 RX ADMIN — BUSPIRONE HYDROCHLORIDE 10 MG: 5 TABLET ORAL at 22:52

## 2025-08-11 RX ADMIN — HYDRALAZINE HYDROCHLORIDE 10 MG: 20 INJECTION INTRAMUSCULAR; INTRAVENOUS at 04:38

## 2025-08-11 RX ADMIN — VALPROIC ACID 750 MG: 250 SOLUTION ORAL at 23:07

## 2025-08-11 RX ADMIN — IOPAMIDOL 4 ML: 755 INJECTION, SOLUTION INTRAVENOUS at 17:51

## 2025-08-11 ASSESSMENT — PAIN DESCRIPTION - DIRECTION: RADIATING_TOWARDS: UNABLE TO VERBALIZE

## 2025-08-11 ASSESSMENT — PAIN DESCRIPTION - FREQUENCY
FREQUENCY: OTHER (COMMENT)
FREQUENCY: OTHER (COMMENT)

## 2025-08-11 ASSESSMENT — PAIN DESCRIPTION - LOCATION
LOCATION: ABDOMEN
LOCATION: ABDOMEN

## 2025-08-11 ASSESSMENT — PAIN DESCRIPTION - PAIN TYPE
TYPE: OTHER (COMMENT)
TYPE: ACUTE PAIN

## 2025-08-11 ASSESSMENT — PAIN SCALES - GENERAL
PAINLEVEL_OUTOF10: 10
PAINLEVEL_OUTOF10: 7
PAINLEVEL_OUTOF10: 2

## 2025-08-11 ASSESSMENT — PAIN - FUNCTIONAL ASSESSMENT
PAIN_FUNCTIONAL_ASSESSMENT: ACTIVITIES ARE NOT PREVENTED
PAIN_FUNCTIONAL_ASSESSMENT: ACTIVITIES ARE NOT PREVENTED

## 2025-08-11 ASSESSMENT — PAIN DESCRIPTION - ONSET
ONSET: UNABLE TO COMMUNICATE
ONSET: OTHER (COMMENT)

## 2025-08-12 ENCOUNTER — APPOINTMENT (OUTPATIENT)
Dept: GENERAL RADIOLOGY | Age: 65
End: 2025-08-12
Payer: COMMERCIAL

## 2025-08-12 LAB
ALBUMIN SERPL-MCNC: 3.2 G/DL (ref 3.4–5)
ALBUMIN/GLOB SERPL: 1.1 {RATIO} (ref 1.1–2.2)
ALP SERPL-CCNC: 119 U/L (ref 40–129)
ALT SERPL-CCNC: 22 U/L (ref 10–40)
ANION GAP SERPL CALCULATED.3IONS-SCNC: 12 MMOL/L (ref 9–17)
AST SERPL-CCNC: 36 U/L (ref 15–37)
BILIRUB DIRECT SERPL-MCNC: <0.2 MG/DL (ref 0–0.3)
BILIRUB INDIRECT SERPL-MCNC: ABNORMAL MG/DL (ref 0–0.7)
BILIRUB SERPL-MCNC: 0.3 MG/DL (ref 0–1)
BUN SERPL-MCNC: 6 MG/DL (ref 7–20)
CALCIUM SERPL-MCNC: 9.3 MG/DL (ref 8.3–10.6)
CHLORIDE SERPL-SCNC: 101 MMOL/L (ref 99–110)
CO2 SERPL-SCNC: 21 MMOL/L (ref 21–32)
CREAT SERPL-MCNC: 0.3 MG/DL (ref 0.6–1.2)
ERYTHROCYTE [DISTWIDTH] IN BLOOD BY AUTOMATED COUNT: 13.3 % (ref 11.7–14.9)
GFR, ESTIMATED: >90 ML/MIN/1.73M2
GLUCOSE SERPL-MCNC: 105 MG/DL (ref 74–99)
HCT VFR BLD AUTO: 40.5 % (ref 37–47)
HGB BLD-MCNC: 13.2 G/DL (ref 12.5–16)
MAGNESIUM SERPL-MCNC: 1.9 MG/DL (ref 1.8–2.4)
MCH RBC QN AUTO: 32.7 PG (ref 27–31)
MCHC RBC AUTO-ENTMCNC: 32.6 G/DL (ref 32–36)
MCV RBC AUTO: 100.2 FL (ref 78–100)
PHOSPHATE SERPL-MCNC: 1.8 MG/DL (ref 2.5–4.9)
PLATELET # BLD AUTO: 281 K/UL (ref 140–440)
PMV BLD AUTO: 8.5 FL (ref 7.5–11.1)
POTASSIUM SERPL-SCNC: 3.4 MMOL/L (ref 3.5–5.1)
PROT SERPL-MCNC: 6.1 G/DL (ref 6.4–8.2)
RBC # BLD AUTO: 4.04 M/UL (ref 4.2–5.4)
SODIUM SERPL-SCNC: 134 MMOL/L (ref 136–145)
TRIGL SERPL-MCNC: 239 MG/DL
WBC OTHER # BLD: 8.4 K/UL (ref 4–10.5)

## 2025-08-12 PROCEDURE — G0378 HOSPITAL OBSERVATION PER HR: HCPCS

## 2025-08-12 PROCEDURE — 96361 HYDRATE IV INFUSION ADD-ON: CPT

## 2025-08-12 PROCEDURE — 6360000002 HC RX W HCPCS: Performed by: NURSE PRACTITIONER

## 2025-08-12 PROCEDURE — 96376 TX/PRO/DX INJ SAME DRUG ADON: CPT

## 2025-08-12 PROCEDURE — 94761 N-INVAS EAR/PLS OXIMETRY MLT: CPT

## 2025-08-12 PROCEDURE — 84100 ASSAY OF PHOSPHORUS: CPT

## 2025-08-12 PROCEDURE — 96367 TX/PROPH/DG ADDL SEQ IV INF: CPT

## 2025-08-12 PROCEDURE — 6370000000 HC RX 637 (ALT 250 FOR IP): Performed by: STUDENT IN AN ORGANIZED HEALTH CARE EDUCATION/TRAINING PROGRAM

## 2025-08-12 PROCEDURE — 80053 COMPREHEN METABOLIC PANEL: CPT

## 2025-08-12 PROCEDURE — 96372 THER/PROPH/DIAG INJ SC/IM: CPT

## 2025-08-12 PROCEDURE — 94640 AIRWAY INHALATION TREATMENT: CPT

## 2025-08-12 PROCEDURE — 36415 COLL VENOUS BLD VENIPUNCTURE: CPT

## 2025-08-12 PROCEDURE — 6360000002 HC RX W HCPCS: Performed by: STUDENT IN AN ORGANIZED HEALTH CARE EDUCATION/TRAINING PROGRAM

## 2025-08-12 PROCEDURE — 96375 TX/PRO/DX INJ NEW DRUG ADDON: CPT

## 2025-08-12 PROCEDURE — 6360000002 HC RX W HCPCS

## 2025-08-12 PROCEDURE — 84478 ASSAY OF TRIGLYCERIDES: CPT

## 2025-08-12 PROCEDURE — 2580000003 HC RX 258: Performed by: STUDENT IN AN ORGANIZED HEALTH CARE EDUCATION/TRAINING PROGRAM

## 2025-08-12 PROCEDURE — 82248 BILIRUBIN DIRECT: CPT

## 2025-08-12 PROCEDURE — 71045 X-RAY EXAM CHEST 1 VIEW: CPT

## 2025-08-12 PROCEDURE — 85027 COMPLETE CBC AUTOMATED: CPT

## 2025-08-12 PROCEDURE — 83735 ASSAY OF MAGNESIUM: CPT

## 2025-08-12 PROCEDURE — 96366 THER/PROPH/DIAG IV INF ADDON: CPT

## 2025-08-12 RX ORDER — SODIUM CHLORIDE 9 MG/ML
INJECTION, SOLUTION INTRAVENOUS PRN
Status: DISCONTINUED | OUTPATIENT
Start: 2025-08-12 | End: 2025-08-13 | Stop reason: HOSPADM

## 2025-08-12 RX ORDER — POTASSIUM CHLORIDE 7.45 MG/ML
10 INJECTION INTRAVENOUS
Status: COMPLETED | OUTPATIENT
Start: 2025-08-12 | End: 2025-08-13

## 2025-08-12 RX ORDER — POTASSIUM CHLORIDE 7.45 MG/ML
10 INJECTION INTRAVENOUS ONCE
Status: DISCONTINUED | OUTPATIENT
Start: 2025-08-12 | End: 2025-08-12

## 2025-08-12 RX ORDER — MORPHINE SULFATE 2 MG/ML
2 INJECTION, SOLUTION INTRAMUSCULAR; INTRAVENOUS ONCE
Refills: 0 | Status: COMPLETED | OUTPATIENT
Start: 2025-08-12 | End: 2025-08-12

## 2025-08-12 RX ADMIN — ACETAMINOPHEN 1000 MG: 500 TABLET ORAL at 14:46

## 2025-08-12 RX ADMIN — VALPROIC ACID 750 MG: 250 SOLUTION ORAL at 19:52

## 2025-08-12 RX ADMIN — LEVETIRACETAM 500 MG: 100 SOLUTION ORAL at 19:53

## 2025-08-12 RX ADMIN — POTASSIUM BICARBONATE 40 MEQ: 782 TABLET, EFFERVESCENT ORAL at 08:59

## 2025-08-12 RX ADMIN — IPRATROPIUM BROMIDE AND ALBUTEROL SULFATE 1 DOSE: .5; 3 SOLUTION RESPIRATORY (INHALATION) at 11:15

## 2025-08-12 RX ADMIN — HYDRALAZINE HYDROCHLORIDE 10 MG: 20 INJECTION INTRAMUSCULAR; INTRAVENOUS at 20:06

## 2025-08-12 RX ADMIN — POTASSIUM CHLORIDE 10 MEQ: 7.46 INJECTION, SOLUTION INTRAVENOUS at 10:08

## 2025-08-12 RX ADMIN — BUSPIRONE HYDROCHLORIDE 10 MG: 5 TABLET ORAL at 09:00

## 2025-08-12 RX ADMIN — FERROUS SULFATE TAB 325 MG (65 MG ELEMENTAL FE) 325 MG: 325 (65 FE) TAB at 09:00

## 2025-08-12 RX ADMIN — CARBAMAZEPINE 200 MG: 100 SUSPENSION ORAL at 09:00

## 2025-08-12 RX ADMIN — ACETAMINOPHEN 1000 MG: 500 TABLET ORAL at 19:52

## 2025-08-12 RX ADMIN — VALPROIC ACID 750 MG: 250 SOLUTION ORAL at 08:59

## 2025-08-12 RX ADMIN — POTASSIUM CHLORIDE 10 MEQ: 7.46 INJECTION, SOLUTION INTRAVENOUS at 11:21

## 2025-08-12 RX ADMIN — CARBAMAZEPINE 200 MG: 100 SUSPENSION ORAL at 19:53

## 2025-08-12 RX ADMIN — IPRATROPIUM BROMIDE AND ALBUTEROL SULFATE 1 DOSE: .5; 3 SOLUTION RESPIRATORY (INHALATION) at 15:23

## 2025-08-12 RX ADMIN — ONDANSETRON 4 MG: 2 INJECTION INTRAMUSCULAR; INTRAVENOUS at 20:06

## 2025-08-12 RX ADMIN — LEVETIRACETAM 500 MG: 100 SOLUTION ORAL at 08:59

## 2025-08-12 RX ADMIN — ACETAMINOPHEN 1000 MG: 500 TABLET ORAL at 03:33

## 2025-08-12 RX ADMIN — SODIUM CHLORIDE: 0.9 INJECTION, SOLUTION INTRAVENOUS at 10:06

## 2025-08-12 RX ADMIN — BUSPIRONE HYDROCHLORIDE 10 MG: 5 TABLET ORAL at 19:52

## 2025-08-12 RX ADMIN — ATORVASTATIN CALCIUM 40 MG: 40 TABLET, FILM COATED ORAL at 19:52

## 2025-08-12 RX ADMIN — ENOXAPARIN SODIUM 40 MG: 100 INJECTION SUBCUTANEOUS at 19:52

## 2025-08-12 RX ADMIN — MORPHINE SULFATE 2 MG: 2 INJECTION, SOLUTION INTRAMUSCULAR; INTRAVENOUS at 22:32

## 2025-08-12 RX ADMIN — IPRATROPIUM BROMIDE AND ALBUTEROL SULFATE 1 DOSE: .5; 3 SOLUTION RESPIRATORY (INHALATION) at 20:19

## 2025-08-12 RX ADMIN — SODIUM CHLORIDE: 0.9 INJECTION, SOLUTION INTRAVENOUS at 10:07

## 2025-08-12 ASSESSMENT — PAIN DESCRIPTION - FREQUENCY: FREQUENCY: INTERMITTENT

## 2025-08-12 ASSESSMENT — PAIN - FUNCTIONAL ASSESSMENT
PAIN_FUNCTIONAL_ASSESSMENT: ADULT NONVERBAL PAIN SCALE (NPVS)
PAIN_FUNCTIONAL_ASSESSMENT: ADULT NONVERBAL PAIN SCALE (NPVS)
PAIN_FUNCTIONAL_ASSESSMENT: WONG-BAKER FACES
PAIN_FUNCTIONAL_ASSESSMENT: ADULT NONVERBAL PAIN SCALE (NPVS)
PAIN_FUNCTIONAL_ASSESSMENT: PREVENTS OR INTERFERES SOME ACTIVE ACTIVITIES AND ADLS

## 2025-08-12 ASSESSMENT — PAIN DESCRIPTION - LOCATION
LOCATION: ABDOMEN
LOCATION: ABDOMEN

## 2025-08-12 ASSESSMENT — PAIN DESCRIPTION - ORIENTATION: ORIENTATION: RIGHT

## 2025-08-12 ASSESSMENT — PAIN DESCRIPTION - ONSET: ONSET: PROGRESSIVE

## 2025-08-12 ASSESSMENT — PAIN SCALES - GENERAL: PAINLEVEL_OUTOF10: 4

## 2025-08-12 ASSESSMENT — PAIN SCALES - WONG BAKER: WONGBAKER_NUMERICALRESPONSE: HURTS WHOLE LOT

## 2025-08-13 VITALS
SYSTOLIC BLOOD PRESSURE: 161 MMHG | DIASTOLIC BLOOD PRESSURE: 80 MMHG | RESPIRATION RATE: 16 BRPM | BODY MASS INDEX: 25.4 KG/M2 | OXYGEN SATURATION: 97 % | HEIGHT: 57 IN | TEMPERATURE: 98.1 F | WEIGHT: 117.73 LBS | HEART RATE: 80 BPM

## 2025-08-13 LAB
ALBUMIN SERPL-MCNC: 3.2 G/DL (ref 3.4–5)
ALBUMIN/GLOB SERPL: 1.3 {RATIO} (ref 1.1–2.2)
ALP SERPL-CCNC: 148 U/L (ref 40–129)
ALT SERPL-CCNC: 16 U/L (ref 10–40)
ANION GAP SERPL CALCULATED.3IONS-SCNC: 7 MMOL/L (ref 9–17)
AST SERPL-CCNC: 24 U/L (ref 15–37)
BILIRUB DIRECT SERPL-MCNC: <0.2 MG/DL (ref 0–0.3)
BILIRUB INDIRECT SERPL-MCNC: ABNORMAL MG/DL (ref 0–0.7)
BILIRUB SERPL-MCNC: 0.3 MG/DL (ref 0–1)
BUN SERPL-MCNC: 12 MG/DL (ref 7–20)
CALCIUM SERPL-MCNC: 9 MG/DL (ref 8.3–10.6)
CHLORIDE SERPL-SCNC: 95 MMOL/L (ref 99–110)
CO2 SERPL-SCNC: 27 MMOL/L (ref 21–32)
CREAT SERPL-MCNC: 0.4 MG/DL (ref 0.6–1.2)
GFR, ESTIMATED: >90 ML/MIN/1.73M2
GLUCOSE SERPL-MCNC: 104 MG/DL (ref 74–99)
MAGNESIUM SERPL-MCNC: 1.9 MG/DL (ref 1.8–2.4)
PHOSPHATE SERPL-MCNC: 3.1 MG/DL (ref 2.5–4.9)
POTASSIUM SERPL-SCNC: 4.4 MMOL/L (ref 3.5–5.1)
PROT SERPL-MCNC: 5.7 G/DL (ref 6.4–8.2)
SODIUM SERPL-SCNC: 130 MMOL/L (ref 136–145)

## 2025-08-13 PROCEDURE — 94640 AIRWAY INHALATION TREATMENT: CPT

## 2025-08-13 PROCEDURE — 96366 THER/PROPH/DIAG IV INF ADDON: CPT

## 2025-08-13 PROCEDURE — G0378 HOSPITAL OBSERVATION PER HR: HCPCS

## 2025-08-13 PROCEDURE — 97530 THERAPEUTIC ACTIVITIES: CPT

## 2025-08-13 PROCEDURE — 83735 ASSAY OF MAGNESIUM: CPT

## 2025-08-13 PROCEDURE — 84100 ASSAY OF PHOSPHORUS: CPT

## 2025-08-13 PROCEDURE — 82248 BILIRUBIN DIRECT: CPT

## 2025-08-13 PROCEDURE — 36415 COLL VENOUS BLD VENIPUNCTURE: CPT

## 2025-08-13 PROCEDURE — 6370000000 HC RX 637 (ALT 250 FOR IP): Performed by: STUDENT IN AN ORGANIZED HEALTH CARE EDUCATION/TRAINING PROGRAM

## 2025-08-13 PROCEDURE — 94761 N-INVAS EAR/PLS OXIMETRY MLT: CPT

## 2025-08-13 PROCEDURE — 97163 PT EVAL HIGH COMPLEX 45 MIN: CPT

## 2025-08-13 PROCEDURE — 94664 DEMO&/EVAL PT USE INHALER: CPT

## 2025-08-13 PROCEDURE — 80053 COMPREHEN METABOLIC PANEL: CPT

## 2025-08-13 RX ORDER — BUSPIRONE HYDROCHLORIDE 10 MG/1
10 TABLET ORAL 2 TIMES DAILY
Qty: 60 TABLET | Refills: 1
Start: 2025-08-13

## 2025-08-13 RX ORDER — TRAMADOL HYDROCHLORIDE 50 MG/1
25 TABLET ORAL EVERY 6 HOURS PRN
Status: DISCONTINUED | OUTPATIENT
Start: 2025-08-13 | End: 2025-08-13 | Stop reason: HOSPADM

## 2025-08-13 RX ORDER — ATORVASTATIN CALCIUM 40 MG/1
60 TABLET, FILM COATED ORAL NIGHTLY
Qty: 30 TABLET | Refills: 1
Start: 2025-08-13

## 2025-08-13 RX ORDER — LEVOTHYROXINE SODIUM 50 UG/1
50 TABLET ORAL DAILY
Qty: 30 TABLET | Refills: 1
Start: 2025-08-13

## 2025-08-13 RX ORDER — LEVOTHYROXINE SODIUM 50 UG/1
50 TABLET ORAL DAILY
Status: DISCONTINUED | OUTPATIENT
Start: 2025-08-13 | End: 2025-08-13 | Stop reason: HOSPADM

## 2025-08-13 RX ADMIN — BUSPIRONE HYDROCHLORIDE 10 MG: 5 TABLET ORAL at 10:09

## 2025-08-13 RX ADMIN — CARBAMAZEPINE 200 MG: 100 SUSPENSION ORAL at 10:09

## 2025-08-13 RX ADMIN — LEVETIRACETAM 500 MG: 100 SOLUTION ORAL at 10:08

## 2025-08-13 RX ADMIN — IPRATROPIUM BROMIDE AND ALBUTEROL SULFATE 1 DOSE: .5; 3 SOLUTION RESPIRATORY (INHALATION) at 11:15

## 2025-08-13 RX ADMIN — IPRATROPIUM BROMIDE AND ALBUTEROL SULFATE 1 DOSE: .5; 3 SOLUTION RESPIRATORY (INHALATION) at 07:34

## 2025-08-13 RX ADMIN — VALPROIC ACID 750 MG: 250 SOLUTION ORAL at 10:09

## 2025-08-13 RX ADMIN — ACETAMINOPHEN 1000 MG: 500 TABLET ORAL at 05:09

## 2025-08-13 RX ADMIN — LEVOTHYROXINE SODIUM 50 MCG: 0.05 TABLET ORAL at 10:19

## 2025-08-13 RX ADMIN — FERROUS SULFATE TAB 325 MG (65 MG ELEMENTAL FE) 325 MG: 325 (65 FE) TAB at 10:09

## 2025-08-13 ASSESSMENT — PAIN DESCRIPTION - ORIENTATION: ORIENTATION: LEFT

## 2025-08-13 ASSESSMENT — PAIN DESCRIPTION - LOCATION: LOCATION: ABDOMEN

## 2025-08-13 ASSESSMENT — PAIN DESCRIPTION - DESCRIPTORS: DESCRIPTORS: ACHING

## 2025-08-13 ASSESSMENT — PAIN SCALES - GENERAL: PAINLEVEL_OUTOF10: 5

## (undated) DEVICE — ENFIT G-TUBE CONNECTOR, MEDLINE: Brand: MEDLINE

## (undated) DEVICE — ENDOSCOPIC KIT 1.1+ OP4 CA DE 2 GWN AAMI LEVEL 3

## (undated) DEVICE — BINDER ABD UNISX 9IN 62IN L AND XL UNIV

## (undated) DEVICE — ENDOSCOPY KIT: Brand: MEDLINE INDUSTRIES, INC.

## (undated) DEVICE — TUBE GASTROSTMY DIA20FR STD STR REPL ENDOVIVE

## (undated) DEVICE — QUICK SWITCH VALVE, NON-STERILE: Brand: MEDLINE

## (undated) DEVICE — Z DISCONTINUED (USE MFG CAT MVABO)  TUBING GAS SAMPLING STD 6.5 FT FEMALE CONN SMRT CAPNOLINE

## (undated) DEVICE — ENDOVIVE SFT PEG KIT PULL WENFIT 20F BX2